# Patient Record
Sex: MALE | Race: WHITE | NOT HISPANIC OR LATINO | Employment: UNEMPLOYED | URBAN - METROPOLITAN AREA
[De-identification: names, ages, dates, MRNs, and addresses within clinical notes are randomized per-mention and may not be internally consistent; named-entity substitution may affect disease eponyms.]

---

## 2017-01-07 ENCOUNTER — TRANSFERRED RECORDS (OUTPATIENT)
Dept: HEALTH INFORMATION MANAGEMENT | Facility: CLINIC | Age: 22
End: 2017-01-07

## 2017-01-08 ENCOUNTER — TRANSFERRED RECORDS (OUTPATIENT)
Dept: HEALTH INFORMATION MANAGEMENT | Facility: CLINIC | Age: 22
End: 2017-01-08

## 2017-01-09 ENCOUNTER — TELEPHONE (OUTPATIENT)
Dept: PSYCHIATRY | Facility: CLINIC | Age: 22
End: 2017-01-09

## 2017-01-09 DIAGNOSIS — F31.9 BIPOLAR DISORDER (H): Primary | ICD-10-CM

## 2017-01-09 NOTE — TELEPHONE ENCOUNTER
----- Message from Kusum Sanchez sent at 1/9/2017 11:49 AM CST -----  Regarding: Patient Call  Contact: [phone]  Nathalie Fitzpatrickrick is calling because he would like to discuss discontinuing Latuda.  He says that the effects are very negative.  He has been experiencing akithisia, confusion and anger issues.  He said he went to the ER because of this.  Laci would like a call back at [phone].  It is ok to leave a detailed message.    Thanks,  Kusum

## 2017-01-09 NOTE — TELEPHONE ENCOUNTER
"Appt history:  LS  12/20/16  RTC  8 weeks   CAN  none  NSH  none  PEN  1/17/17    At 12/20/16 appt:  -No changes.  -He feels Latuda is helping with mood stabilization. He is using significant amounts of THC but is not interested in changing this.   -He is pleased with Latuda. He feels its helps to stabilize his mood and make him calmer.    Returned call to pt:  \"The stuff [Latuda] isn't working.  More negatives than positives.  It's like really dark and, god man, then I'm really depressed all the time.  In the morning I can't concentrate.  I'm agitated by it consistently.  It makes me wanna drink which is weird.  I liked Abilify better than this.\"      Over the weekend he went to the ER.  \"I just went there for like the night because of akathisia.\"  He was given benztropine & Valium which helped.  Then he went home.      Akathisia started: \"It just continues to get worse.  At first it was doing all right but it kept getting crappier. Not noticing it now with 20 mg.\"  Starting last night he self-decreased to Latuda 20 mg daily.      \"I was drinking pretty hard throughout Latuda.  Quit drinking about 2 weeks ago.  Maybe more like a week and a 1/2 ago.\"    \"I tried to quit marijuana but I wasn't able to.  I use is about 3 times a day.  I have bad dreams when I don't spoke pot.\"      Would be up to trying Latuda at 20 mg daily to see if sxs improve or would be up to trying something else.  States he tried Abilify in the past and would probably be able to quit smoking marijuana if he could go back to Abilify.  He doesn't recall why he stopped Abilify but per 1/19/16 note \"He is currently euthymic but experiencing likely medication side effects which he attributes to aripiprazole.\"    Routed to Dr. Oconnor for recommendations.        "

## 2017-01-10 RX ORDER — LURASIDONE HYDROCHLORIDE 20 MG/1
20 TABLET, FILM COATED ORAL DAILY
Qty: 1 TABLET | Refills: 0 | COMMUNITY
Start: 2017-01-10 | End: 2017-01-12

## 2017-01-10 NOTE — TELEPHONE ENCOUNTER
Bo Oconnor MD     Sent: Tue January 10, 2017  8:34 AM       To: Nimco Cr RN              Message                     Continue with Latuda 20 mg with food for now. It is unlikely that Latuda caused mood symptoms or increased urges to drink. It may have caused akathisia, which (if it's due to Latuda) should resolve with the decreased dose. Of course, it may be less effective as a mood stabilizer at 20 mg.              We could certainly try Abilify again, but if I recall correctly Laci also had akathisia with that. It's probably best that I discuss this with him at his next appointment.

## 2017-01-10 NOTE — TELEPHONE ENCOUNTER
Called pt who is agreeable to continue Latuda 20 mg with food.  Med tab updated.      He would like to discuss alternate meds at 1/17/17 appt.  He mentions today that Lamictal sounds interesting.

## 2017-01-12 ENCOUNTER — TELEPHONE (OUTPATIENT)
Dept: PSYCHIATRY | Facility: CLINIC | Age: 22
End: 2017-01-12

## 2017-01-12 DIAGNOSIS — F31.9 BIPOLAR 1 DISORDER (H): Primary | ICD-10-CM

## 2017-01-12 RX ORDER — ARIPIPRAZOLE 5 MG/1
5 TABLET ORAL DAILY
Qty: 30 TABLET | Refills: 0 | Status: SHIPPED | OUTPATIENT
Start: 2017-01-12 | End: 2017-02-14

## 2017-01-12 NOTE — TELEPHONE ENCOUNTER
Bo Oconnor MD     Sent: Thu January 12, 2017 11:02 AM       To: Nimco Cr RN              Message        Restart Abilify. His most recent dose was 5 mg so let's prescribe that for 1 month, as an add-on to his other medications.

## 2017-01-12 NOTE — TELEPHONE ENCOUNTER
----- Message from Tatum Merrill RN sent at 1/12/2017  8:05 AM CST -----  Regarding: FW: Pt Care  Contact: [phone]      ----- Message -----     From: Angel Lucia     Sent: 1/12/2017   7:15 AM       To: Louisa Vinson RN  Subject: Pt Care                                          Pt call lft several messages regarding medication Latuda and the symptoms that he has been experiencing.    Messages lft:    1/12/17@6:29am  1/12/17@6:44am  1/12/17@7:00am    Pt was loud expressing hostility, cursing, threatening w/homicidal and suicidal thoughts. Pt stated that he feels he is being use as an experiment. Pt expressed dissatisfaction and neglect  Pt feels he may need to file a grievance for lack of service.     Thanks

## 2017-01-12 NOTE — TELEPHONE ENCOUNTER
"See 1/9/17 encounter for additional information.      Returned call to pt:   Was angry earlier but took clonidine and feels better now.  Regarding SI and HI pt admits to this earlier because he was angry.  He no longer endorses these thoughts and replaces it with stating he's feeling angry, in a mixed state, and has anger outbursts.  His anger is not directed at anyone or anything.  \"I just feel angry inside.\"  Can't concentrate.  Can't think right with Latuda.  It hurts his nerves.  Went to ED over the weekend for akathisia and was administered benztropine and Valium which helped.      Last alcohol intake is \"I don't drink anymore.  I quit that a while back.\"  Last marijuana use was \"yesterday but I only smoked a bowl.\"  Use has been less than usual due to lack of money.  \"I can't quit marijuana on this med [Latuda].\"    Took Latuda 20 mg last night then dumped it all down the drain because \"it's shit and it doesn't work.\"  Will not take Latuda any longer.  Writer d/c'ed from med tab.  Requesting a trial of Abilify which he's previous tried and recalls success r/t to his mood.  Did have SE but states the SE of Abilify were better than how he feels now with Latuda.  States again today \"Lamictal sounds interesting\" and would like to discuss this in the future.  As of now, though, he wants to go back on Abilify because he's familiar with this.     Routed to Dr. Oconnor to advise.  Routed to clinic manager as FYI.    "

## 2017-01-17 ENCOUNTER — OFFICE VISIT (OUTPATIENT)
Dept: PSYCHIATRY | Facility: CLINIC | Age: 22
End: 2017-01-17
Attending: PSYCHIATRY & NEUROLOGY
Payer: COMMERCIAL

## 2017-01-17 DIAGNOSIS — F12.20 CANNABIS DEPENDENCE (H): ICD-10-CM

## 2017-01-17 DIAGNOSIS — F31.9 BIPOLAR 1 DISORDER (H): Primary | ICD-10-CM

## 2017-01-17 NOTE — PATIENT INSTRUCTIONS
Continue Depakote and Abilify, along with PRN clonidine  Blood test to check Depakote level and other routine labs  Return to clinic in 1 month

## 2017-01-17 NOTE — PROGRESS NOTES
" History and Physical    Laci Hinkle Jr. MRN# [id number]   Age: 20 year old YOB: 1995     Date of Assessment:  2017          Assessment:   This patient is a 20 year old  male with a significant past psychiatric history of  bipolar I disorder. He currently reports feeling well. He stopped Latuda and restarted Abilify 2.5 mg, and feels this is a much better medication for him. He describes akathisia and feeling too \"flat\" that resolved when he DCed Latuda. He will continue his current medications, get a blood test to check routine labs and Depakote level, and return in 1 month.      Attestation:  Patient has been seen and evaluated by me, Bo Oconnor MD, PhD.    I spent a total time of 30 minutes face-to-face with the patient during today s office visit.  Over 50% of this time was spent counseling the patient and/or coordinating care regarding medication management, counseling re substance use..          Diagnoses:   Axis I: Bipolar I disorder, currently euthymic; polysubstance dependence, THC and alcohol use currently active    Axis II: Deferred    Axis III: Medication-induced weight gain; hypertension    Axis IV: mild  psychosocial stressors     Axis V: Global Assessment of Functionin-60    Interim History   History is obtained from the patient.     At his last visit, Laci reported feeling well taking Latuda. Since then, he describes very uncomfortable akathisia and feeling \"too flat\" that he attributed to Latuda. The akathisia led to broken sleep and he would wake up feeling \"angry\". He occasionally had \"rages\" that his mother (who he lives with) would \"talk me down\" from. He denies any other symptoms suggestive of minda. He denies psychosis. No SI or HI.    The side effects were uncomfortable enough that Laci went to the ER and was given Cogentin and Valium which helped. He DCed Latuda immediately after that, and restarted Abilify 2.5 mg. He denies any side " "effects from Abilify.    He continues to use THC and alcohol regularly. He denies other substance use.    Over the past several days Laci has left a number of voice mails on the main Clinic number, and I listened to one of them today. He was clearly very angry and agitated, and made borderline threatening comments. He expressed his anger about the medications we were prescribing and said if things didn't change \"I'nm gonna come in there and...\". The rest of the sentence was unintelligible. I discussed this with him today and let him know the Clinic staff were frightened by the message. He did not appear to understand why, but said that he would not leave any more massages like that.    During the interview Laci appeared euthymic. Speech was normal. He was not agitated or irritable.     He will continue his current medications and return in 4 weeks.     Review of Systems     A comprehensive review of systems was performed and is negative other than noted above.          Allergies:    No Known Allergies        Current Medications   Depakote 2000 mg HS  Abilify 2.5 mg HS  Clonidine 0.1 mg daily    Mental Status Exam     Alertness: alert  and oriented  Appearance: adequately groomed  Behavior/Demeanor: cooperative, pleasant and calm, with good  eye contact  Speech: normal  Language: intact  Psychomotor: normal or unremarkable  Mood:  description consistent with euthymia  Affect: grandiose; was congruent to mood  Thought Process/Associations: unremarkable  Thought Content:  Denies active/passive suicidal ideation  Perception:  Denies hallucinations  Insight: fair  Judgment: fair  Cognition:  does appear grossly intact.  "

## 2017-01-18 ENCOUNTER — TELEPHONE (OUTPATIENT)
Dept: PSYCHIATRY | Facility: CLINIC | Age: 22
End: 2017-01-18

## 2017-01-18 DIAGNOSIS — F31.9 BIPOLAR 1 DISORDER (H): Primary | ICD-10-CM

## 2017-01-18 NOTE — TELEPHONE ENCOUNTER
----- Message -----      From: Bo Oconnor MD      Sent: 1/17/2017   4:56 PM        To: Louisa Vinson RN, MONIQUE Kennedy -     Can you order labs for Laci? He gets them done downstairs. He needs:     CBC + diff   AST ALT   Depakote level     Thx!     DB

## 2017-01-20 ASSESSMENT — PATIENT HEALTH QUESTIONNAIRE - PHQ9: SUM OF ALL RESPONSES TO PHQ QUESTIONS 1-9: 9

## 2017-02-13 ENCOUNTER — TELEPHONE (OUTPATIENT)
Dept: PSYCHIATRY | Facility: CLINIC | Age: 22
End: 2017-02-13

## 2017-02-13 DIAGNOSIS — F31.9 BIPOLAR 1 DISORDER (H): ICD-10-CM

## 2017-02-13 NOTE — TELEPHONE ENCOUNTER
Medication Requested: ARIPiprazole (ABILIFY) 5 MG   Last Written RX Date: 1/12/17  Last Pharmacy Fill Date:  unk  Last RX Quantity: 30,   # refills: 0    Last Office Visit: 1/17/17  Recommended RTC: 4 WKS  Next Scheduled Office Visit: 2/15/17    Since last Visit: # of CX0,# of NOS 0    Last Visit Recommendations for:  Medications: DOSE?   Labs: YES    Routing refill request to provider for review/approval because:    CORRECT DOSE?  1/12 TEL.CALL 5MG     1/17 PLAN 2.5MG       Bo Oconnor MD Kraemer, Julia B, RN        Caller: Unspecified (Yesterday,  1:47 PM)                     Ah, it's so hard to know with Laci. He previously took Abilify 5 mg but had possible akathisia. He restarted it a few weeks ago and 2.5 mg is the dose we agreed on at his last visit. So that's what he SHOULD be taking. And the dose we should refill for him.     Thx!     DB

## 2017-02-13 NOTE — TELEPHONE ENCOUNTER
See alternate 2/13/17 encounter.  Message has already been sent to Dr. Oconnor by RN from RF team.

## 2017-02-13 NOTE — TELEPHONE ENCOUNTER
refill  Received: Today       Bethany Bass Jodi L, RN       Phone Number: [phone]                     Elvin/Nimco     Caller:  patient     Medication:  abilify     Pharmacy and location:  [facility] on [address] in [facility]     Have you contacted the pharmacy: yes     How much of medication do you have left:  One dose     Pending appt: 2/15/17     Okay to leave VM:  yes

## 2017-02-13 NOTE — TELEPHONE ENCOUNTER
refill  Received: Today       Bethany Bass Jodi L, RN       Phone Number: [phone]                     Elvin/Nimco     Caller:  patient     Medication:  abilify     Pharmacy and location:  [facility] on [address] in [facility]     Have you contacted the pharmacy: yes     How much of medication do you have left:  One dose     Pending appt: 2/15/17     Okay to leave VM:  yes

## 2017-02-14 RX ORDER — ARIPIPRAZOLE 5 MG/1
2.5 TABLET ORAL DAILY
Qty: 15 TABLET | Refills: 0 | Status: SHIPPED | OUTPATIENT
Start: 2017-02-14 | End: 2017-02-15

## 2017-02-15 ENCOUNTER — OFFICE VISIT (OUTPATIENT)
Dept: PSYCHIATRY | Facility: CLINIC | Age: 22
End: 2017-02-15
Attending: PSYCHIATRY & NEUROLOGY
Payer: COMMERCIAL

## 2017-02-15 VITALS
WEIGHT: 204 LBS | BODY MASS INDEX: 31.95 KG/M2 | HEART RATE: 80 BPM | SYSTOLIC BLOOD PRESSURE: 123 MMHG | DIASTOLIC BLOOD PRESSURE: 84 MMHG

## 2017-02-15 DIAGNOSIS — F31.61 BIPOLAR DISORDER, CURRENT EPISODE MIXED, MILD (H): ICD-10-CM

## 2017-02-15 DIAGNOSIS — F31.9 BIPOLAR 1 DISORDER (H): ICD-10-CM

## 2017-02-15 PROCEDURE — 99212 OFFICE O/P EST SF 10 MIN: CPT | Mod: ZF

## 2017-02-15 RX ORDER — DIVALPROEX SODIUM 500 MG/1
2000 TABLET, EXTENDED RELEASE ORAL DAILY
Qty: 360 TABLET | Refills: 0 | Status: SHIPPED | OUTPATIENT
Start: 2017-02-15 | End: 2017-05-16

## 2017-02-15 RX ORDER — ARIPIPRAZOLE 5 MG/1
5 TABLET ORAL DAILY
Qty: 60 TABLET | Refills: 0 | Status: SHIPPED | OUTPATIENT
Start: 2017-02-15 | End: 2017-05-16

## 2017-02-15 NOTE — PATIENT INSTRUCTIONS
Continue usual medications  Please let us know when you have found a clinic so we can arrange for your blood test  Return in 6-8 weeks

## 2017-02-15 NOTE — MR AVS SNAPSHOT
After Visit Summary   2/15/2017    Laci Hinkle Jr    MRN: [id number]           Patient Information     Date Of Birth          1995        Visit Information        Provider Department      2/15/2017 3:30 PM Bo Oconnor MD [facility]        Today's Diagnoses     Bipolar 1 disorder (H)        Bipolar disorder, current episode mixed, mild (H)          Care Instructions    Continue usual medications  Please let us know when you have found a clinic so we can arrange for your blood test  Return in 6-8 weeks        Follow-ups after your visit        Follow-up notes from your care team     Return in about 8 weeks (around 4/12/2017).      Your next 10 appointments already scheduled     Apr 12, 2017  2:00 PM CDT   Adult Med Follow UP with Bo Oconnor MD   Psychiatry Clinic ([facility])    [place] [id number] [facility] [address] [phone]              Who to contact     Please call your clinic at [phone] to:    Ask questions about your health    Make or cancel appointments    Discuss your medicines    Learn about your test results    Speak to your doctor   If you have compliments or concerns about an experience at your clinic, or if you wish to file a complaint, please contact [facility] Patient Relations at [phone] or email us at [email or web address]         Additional Information About Your Visit        Care EveryWhere ID     This is your Care EveryWhere ID. This could be used by other organizations to access your [place] medical records  [id number]        Your Vitals Were     Pulse BMI (Body Mass Index)                80 31.95 kg/m2           Blood Pressure from Last 3 Encounters:   02/15/17 123/84   12/20/16 135/78   09/13/16 (!) 158/93    Weight from Last 3 Encounters:   02/15/17 92.5 kg (204 lb)   12/20/16 90.3 kg (199 lb)   09/13/16 90.4 kg (199 lb 3.2 oz)               Today, you had the following     No orders found for display         Today's Medication Changes          These changes are accurate as of: 2/15/17  3:51 PM.  If you have any questions, ask your nurse or doctor.               These medicines have changed or have updated prescriptions.        Dose/Directions    ARIPiprazole 5 MG tablet   Commonly known as:  ABILIFY   This may have changed:  how much to take   Used for:  Bipolar 1 disorder (H)   Changed by:  Bo Oconnor MD        Dose:  5 mg   Take 1 tablet (5 mg) by mouth daily *ALERT  DOSE CHANGE   2.5 MG/  1/2 TAB  DAILY   Quantity:  60 tablet   Refills:  0            Where to get your medicines      These medications were sent to [id number] - [facility], [place] [address] [address] [place] [address], [place] [address]     Phone:  [phone]     ARIPiprazole 5 MG tablet    divalproex 500 MG 24 hr tablet                Primary Care Provider Office Phone # Fax #    Bo Weinberg [phone]       [facility] [address] [facility] [address]        Thank you!     Thank you for choosing PSYCHIATRY CLINIC  for your care. Our goal is always to provide you with excellent care. Hearing back from our patients is one way we can continue to improve our services. Please take a few minutes to complete the written survey that you may receive in the mail after your visit with us. Thank you!             Your Updated Medication List - Protect others around you: Learn how to safely use, store and throw away your medicines at www.disposemymeds.org.          This list is accurate as of: 2/15/17  3:51 PM.  Always use your most recent med list.                   Brand Name Dispense Instructions for use    ARIPiprazole 5 MG tablet    ABILIFY    60 tablet    Take 1 tablet (5 mg) by mouth daily *ALERT  DOSE CHANGE   2.5 MG/  1/2 TAB  DAILY       cloNIDine 0.1 MG tablet    CATAPRES    180  tablet    Take 1-2 tablets po once daily.       divalproex 500 MG 24 hr tablet    DEPAKOTE ER    360 tablet    Take 4 tablets (2,000 mg) by mouth daily       nicotine polacrilex 2 MG gum    EQ NICOTINE    60 tablet    Place 1 each (2 mg) inside cheek as needed for smoking cessation       omeprazole 20 MG CR capsule    priLOSEC    30 capsule    Take 1 capsule (20 mg) by mouth every morning (before breakfast)

## 2017-02-15 NOTE — NURSING NOTE
Chief Complaint   Patient presents with     Recheck Medication     bipolar affective disorder     Reviewed allergies, smoking status, and pharmacy preference  Administered abuse screening questions   Obtained weight, blood pressure and heart rate

## 2017-02-15 NOTE — PROGRESS NOTES
" History and Physical    Laci Hinkle Jr. MRN# [id number]   Age: 20 year old YOB: 1995     Date of Assessment:  Feb 15, 2017          Assessment:   This patient is a 20 year old  male with a significant past psychiatric history of  bipolar I disorder. He currently reports feeling well. He stopped has increased Abilify to 5 mg, and feels this is a good dose. He denies akathisia. He will continue his current medications, get a blood test to check routine labs and Depakote level, and return in 2 month.      Attestation:  Patient has been seen and evaluated by me, Bo Oconnor MD, PhD.    I spent a total time of 30 minutes face-to-face with the patient during today s office visit.  Over 50% of this time was spent counseling the patient and/or coordinating care regarding medication management, counseling re substance use..          Diagnoses:   Axis I: Bipolar I disorder, currently euthymic; polysubstance dependence, THC and alcohol, currently in short-term remission     Axis II: Deferred    Axis III: Medication-induced weight gain; hypertension    Axis IV: mild  psychosocial stressors     Axis V: Global Assessment of Functionin-60    Interim History   History is obtained from the patient.     Laci reports feeling well. He denies symptoms of depression, minda, or psychosis. He has increased Abilify and denies side effects from the increased dose. He did feel sedated and with poor memory for about 4 days and then it resolved.    Laci reports possible ADHD symptoms. It is apparent when, for example, he is studying for his 's test. He was apparently diagnosed as a kid and found Strattera very helpful. He does not wish treatment now but may bring it up in the future. His addictions history means that Strattera or Wellbutrin may be the best options.    Laci has given up THC for the past 2 weeks and feels better and less \"paranoid\". He occasionally uses Kratom, but is " trying to limit his use.    He has not yet gotten his blood test. He is looking for a new clinic and will let us know when he has done and where we can send the order.     He will continue his current medications and return in 8 weeks.     Review of Systems     A comprehensive review of systems was performed and is negative other than noted above.          Allergies:    No Known Allergies        Current Medications   Depakote 2000 mg HS  Abilify 2.5 mg HS  Clonidine 0.1 mg daily    Mental Status Exam     Alertness: alert  and oriented  Appearance: adequately groomed  Behavior/Demeanor: cooperative, pleasant and calm, with good  eye contact  Speech: normal  Language: intact  Psychomotor: normal or unremarkable  Mood:  description consistent with euthymia  Affect: grandiose; was congruent to mood  Thought Process/Associations: unremarkable  Thought Content:  Denies active/passive suicidal ideation  Perception:  Denies hallucinations  Insight: fair  Judgment: fair  Cognition:  does appear grossly intact.

## 2017-02-17 ASSESSMENT — PATIENT HEALTH QUESTIONNAIRE - PHQ9: SUM OF ALL RESPONSES TO PHQ QUESTIONS 1-9: 4

## 2017-02-23 ENCOUNTER — TELEPHONE (OUTPATIENT)
Dept: PSYCHIATRY | Facility: CLINIC | Age: 22
End: 2017-02-23

## 2017-02-23 DIAGNOSIS — Z51.81 ENCOUNTER FOR THERAPEUTIC DRUG MONITORING: ICD-10-CM

## 2017-02-23 DIAGNOSIS — F31.9 BIPOLAR 1 DISORDER (H): Primary | ICD-10-CM

## 2017-02-23 NOTE — TELEPHONE ENCOUNTER
Returned call to pt:  States he discussed with Dr. Oconnor the option of starting Strattera but Dr. Oconnor wanted pt to think more about it.  Pt has thought about it and wants to restart it.  He took it when he was a child and recalls it worked and he had no negative side effects.  He's not sure why he stopped it.  States he cannot take stimulant meds.  Wants to start Strattera prior to starting school in May.  Willing to come in for an earlier appt to discuss with Dr. Oconnor.  Currently scheduled 4/12/17.

## 2017-02-23 NOTE — TELEPHONE ENCOUNTER
----- Message from Kusum Sanchez sent at 2/21/2017  3:56 PM CST -----  Regarding: Patient Call  Contact: [phone]  Laci Fitzpatrick is calling to discuss going back on Strattera.  He believes that his drug use has affected his concentration, and that Strattera would be the most effective medication.  He would like a call back at [phone].  It is ok to leave a detailed message.    Thanks,  Kusum

## 2017-02-23 NOTE — TELEPHONE ENCOUNTER
Spoke with pt regarding another matter when he requests lab orders placed at last appt be faxed to  [facility].  He will complete them some time next week and he is aware to have depakote level 12 hours post dose.    Pt is establishing care there today with a new PCP and will sign WU.      [facility]:  Phone: [phone]  Lab fax:  [phone]    1/18/17 orders are printed and faxed.

## 2017-02-27 NOTE — TELEPHONE ENCOUNTER
Called pt who agrees to schedule sooner on 3/7/17 at 8am.  Appt is on hold with message sent to scheduling.

## 2017-02-28 ENCOUNTER — TELEPHONE (OUTPATIENT)
Dept: PSYCHIATRY | Facility: CLINIC | Age: 22
End: 2017-02-28

## 2017-02-28 NOTE — TELEPHONE ENCOUNTER
Returned call to pt who already decided he wants to have the Depakote level drawn again.  He did have it done in January but the timing of dose to lab draw was off.  He's aware a lab order was faxed to PCP last week.  Will await results.

## 2017-02-28 NOTE — TELEPHONE ENCOUNTER
----- Message from Kusum Sanchez sent at 2/24/2017 12:08 PM CST -----  Regarding: Patient Call  Contact: [phone]  Alvin Rinaldi    Laci is calling to say that he had his Depakote levels drawn in January when he was hospitalized for 1 day.  He thinks this was January 7th or 17th.  The hospital is [facility].  He saw his PCP yesterday, and they told him that he did not need his levels drawn again.  I asked him to request that his PCP send us the results.  Laci can be reached at [phone].    Thanks,  Kusum

## 2017-03-01 ENCOUNTER — TELEPHONE (OUTPATIENT)
Dept: PSYCHIATRY | Facility: CLINIC | Age: 22
End: 2017-03-01

## 2017-03-01 DIAGNOSIS — F90.2 ATTENTION DEFICIT HYPERACTIVITY DISORDER (ADHD), COMBINED TYPE: ICD-10-CM

## 2017-03-01 DIAGNOSIS — F41.1 GENERALIZED ANXIETY DISORDER: ICD-10-CM

## 2017-03-01 DIAGNOSIS — F31.70 BIPOLAR DISORDER IN FULL REMISSION, MOST RECENT EPISODE UNSPECIFIED TYPE (H): ICD-10-CM

## 2017-03-01 NOTE — TELEPHONE ENCOUNTER
Spoke with who accounts for the early RF request as follows:  -he takes 2 tabs (0.2 mg) at bedtime  -sometimes he also takes 1 tab (0.1 mg) in the AM    He'd like the RF to be for 1 q AM + 2 q HS.  If Dr. Oconnor doesn't want to increase it now he would like to continue with 2 tabs q HS and discuss adding 1 more to take in AM.

## 2017-03-01 NOTE — TELEPHONE ENCOUNTER
Meds/Bond  Received: Today       Lita Cardoza, Nimco CHONG RN       Phone Number: [phone] (Call me)                     Caller:  Pt   Medication:  Clonidine   Pharmacy and location:  [facility] on [address] in [place]   Have you contacted the pharmacy: Yes   How much of medication do you have left:  1 dose   Pending appt: Yes   Okay to leave VM:  Yes

## 2017-03-01 NOTE — TELEPHONE ENCOUNTER
Received RF request from pt for:  clonidine    Last RF:  Per med tab: 6/17/16, 90 d/s if taking max daily dosing, 0 RFs  Per pharmacy:  2/11/17, #60, 1 mg tabs, taking 1-2 daily, Rx is by Dr. Oconnor    Due for RF:  Too soon to fill according to pharmacy RF date and quantity.      Appointment History:  Last appt: 2/15/17  RTC: 6-8 weeks  Cancel: none  No-show: none  Next appt: 3/7/17    LVM for pt requesting CB regarding RF request too soon.

## 2017-03-07 RX ORDER — CLONIDINE HYDROCHLORIDE 0.1 MG/1
TABLET ORAL
Qty: 90 TABLET | Refills: 0 | Status: SHIPPED | OUTPATIENT
Start: 2017-03-07 | End: 2017-04-07

## 2017-03-07 NOTE — TELEPHONE ENCOUNTER
Updated Rx sent.  Notified pt via VM.  He missed today's appt because he wanted to discuss starting Strattera.  Rescheduled to 3/28/17.  Appt placed on hold.  Message sent to scheduling.

## 2017-03-07 NOTE — TELEPHONE ENCOUNTER
Per Dr. Oconnor 3/7/17 at 0937:  0.1 mg qAM + 0.2 mg qHS is fine, it's well within the usual dose range of 0.2-0.6mg daily.

## 2017-03-28 ENCOUNTER — OFFICE VISIT (OUTPATIENT)
Dept: PSYCHIATRY | Facility: CLINIC | Age: 22
End: 2017-03-28
Attending: PSYCHIATRY & NEUROLOGY
Payer: COMMERCIAL

## 2017-03-28 ENCOUNTER — TELEPHONE (OUTPATIENT)
Dept: PSYCHIATRY | Facility: CLINIC | Age: 22
End: 2017-03-28

## 2017-03-28 VITALS
HEART RATE: 105 BPM | BODY MASS INDEX: 32.17 KG/M2 | DIASTOLIC BLOOD PRESSURE: 98 MMHG | SYSTOLIC BLOOD PRESSURE: 154 MMHG | WEIGHT: 205.4 LBS

## 2017-03-28 DIAGNOSIS — F31.30 BIPOLAR I DISORDER, MOST RECENT EPISODE DEPRESSED (H): Primary | ICD-10-CM

## 2017-03-28 PROCEDURE — 99212 OFFICE O/P EST SF 10 MIN: CPT | Mod: ZF

## 2017-03-28 RX ORDER — PREGABALIN 50 MG/1
50 CAPSULE ORAL 2 TIMES DAILY
Qty: 120 CAPSULE | Refills: 0 | Status: SHIPPED | OUTPATIENT
Start: 2017-03-28 | End: 2017-04-03

## 2017-03-28 NOTE — PROGRESS NOTES
History and Physical    Laci Hinkle Jr. MRN# [id number]   Age: 20 year old YOB: 1995     Date of Assessment:  Mar 28, 2017          Assessment:   This patient is a 20 year old  male with a significant past psychiatric history of  bipolar I disorder. He currently reports feeling well. He continues Abilify 5 mg, and feels this is a good dose. He denies akathisia. He complains of anxiety and accepts a trial of Lyrica 50 mg BID to target this. He previously had some response to gabapentin, but also had side effects. He will continue his other medications. He has not yet had a blood test to check routine labs and Depakote level, and so will get this done. He will return in 2 months. He will be starting school in Aug and is interested in a trial of Strattera for ADHD symptoms and we will discuss then.    Attestation:  Patient has been seen and evaluated by me, Bo Oconnor MD, PhD.    I spent a total time of 25 minutes face-to-face with the patient during today s office visit.  Over 50% of this time was spent counseling the patient and/or coordinating care regarding medication management, counseling re substance use..          Diagnoses:   Axis I: Bipolar I disorder, currently euthymic; polysubstance dependence, THC and alcohol, currently in short-term remission     Axis II: Deferred    Axis III: Medication-induced weight gain; hypertension    Axis IV: mild  psychosocial stressors     Axis V: Global Assessment of Functionin-60    Interim History   History is obtained from the patient.     Laci reports feeling well. He denies symptoms of depression, minda, or psychosis.     He does report generalized anxiety. It has been longstanding. Vistaril helped but only a little. Laci is trying to stay substance free and is not interested in a BZD. He previously had a partial response to gabapentin and accepts a trial of Lyrica.    Laci reports possible ADHD symptoms. He was  apparently diagnosed as a kid and found Strattera very helpful. His addictions history means that Strattera or Wellbutrin may be the best options.    Laci reports being clean for 3 days. He had a Rule 25 done and hopes to start treatment at [facility] in a week or 2.     He has not yet gotten his blood test.     He will continue his current medications, start Lyrica, and return in 8 weeks.     Review of Systems     A comprehensive review of systems was performed and is negative other than noted above.          Allergies:    No Known Allergies        Current Medications   Depakote 2000 mg HS  Abilify 2.5 mg HS  Clonidine 0.1 mg daily    Mental Status Exam     Alertness: alert  and oriented  Appearance: adequately groomed  Behavior/Demeanor: cooperative, pleasant and calm, with good  eye contact  Speech: normal  Language: intact  Psychomotor: normal or unremarkable  Mood:  description consistent with euthymia  Affect: grandiose; was congruent to mood  Thought Process/Associations: unremarkable  Thought Content:  Denies active/passive suicidal ideation  Perception:  Denies hallucinations  Insight: fair  Judgment: fair  Cognition:  does appear grossly intact.

## 2017-03-28 NOTE — MR AVS SNAPSHOT
After Visit Summary   3/28/2017    Laci Hinkle Jr    MRN: [id number]           Patient Information     Date Of Birth          1995        Visit Information        Provider Department      3/28/2017 3:00 PM Bo Oconnor MD Psychiatry Clinic        Today's Diagnoses     Bipolar I disorder, most recent episode depressed (H)    -  1      Care Instructions    Continue usual medications  Start Lyrica 50 mg twice daily for anxiety  Return in 2 months        Follow-ups after your visit        Follow-up notes from your care team     Return in about 8 weeks (around 5/23/2017).      Your next 10 appointments already scheduled     Apr 12, 2017  2:00 PM CDT   Adult Med Follow UP with Bo Oconnor MD   Psychiatry Clinic ([facility])    [address] [facility] [address] [phone]              Who to contact     Please call your clinic at [phone] to:    Ask questions about your health    Make or cancel appointments    Discuss your medicines    Learn about your test results    Speak to your doctor   If you have compliments or concerns about an experience at your clinic, or if you wish to file a complaint, please contact [facility] Patient Relations at [phone] or email us at [email or web address]         Additional Information About Your Visit        MyChart Information     Webtogs is an electronic gateway that provides easy, online access to your medical records. With Webtogs, you can request a clinic appointment, read your test results, renew a prescription or communicate with your care team.     To sign up for FastCAPt visit the website at [email or web address]   You will be asked to enter the access code listed below, as well as some personal information. Please follow the directions to create your username and password.     Your access code is: [email or web address]  Expires: 6/26/2017   3:26 PM     Your access code will  in 90 days. If you need help or a new code, please contact your [facility] or call [phone] for assistance.        Care EveryWhere ID     This is your Care EveryWhere ID. This could be used by other organizations to access your [place] medical records  [id number]        Your Vitals Were     Pulse BMI (Body Mass Index)                105 32.17 kg/m2           Blood Pressure from Last 3 Encounters:   17 (!) 154/98   02/15/17 123/84   16 135/78    Weight from Last 3 Encounters:   17 93.2 kg (205 lb 6.4 oz)   02/15/17 92.5 kg (204 lb)   16 90.3 kg (199 lb)              Today, you had the following     No orders found for display         Today's Medication Changes          These changes are accurate as of: 3/28/17  3:26 PM.  If you have any questions, ask your nurse or doctor.               Start taking these medicines.        Dose/Directions    pregabalin 50 MG capsule   Commonly known as:  LYRICA   Used for:  Bipolar I disorder, most recent episode depressed (H)   Started by:  Bo Oconnor MD        Dose:  50 mg   Take 1 capsule (50 mg) by mouth 2 times daily   Quantity:  120 capsule   Refills:  0            Where to get your medicines      Some of these will need a paper prescription and others can be bought over the counter.  Ask your nurse if you have questions.     Bring a paper prescription for each of these medications     pregabalin 50 MG capsule                Primary Care Provider Office Phone # Fax #    Bo Weinberg [phone]       [address] [facility] [address]        Thank you!     Thank you for choosing PSYCHIATRY CLINIC  for your care. Our goal is always to provide you with excellent care. Hearing back from our patients is one way we can continue to improve our services. Please take a few minutes to complete the written survey that  you may receive in the mail after your visit with us. Thank you!             Your Updated Medication List - Protect others around you: Learn how to safely use, store and throw away your medicines at www.disposemymeds.org.          This list is accurate as of: 3/28/17  3:26 PM.  Always use your most recent med list.                   Brand Name Dispense Instructions for use    ARIPiprazole 5 MG tablet    ABILIFY    60 tablet    Take 1 tablet (5 mg) by mouth daily *ALERT  DOSE CHANGE   2.5 MG/  1/2 TAB  DAILY       cloNIDine 0.1 MG tablet    CATAPRES    90 tablet    Take 1 tab (0.1 mg) po q AM and take 2 tabs (0.2 mg) po q HS.       divalproex 500 MG 24 hr tablet    DEPAKOTE ER    360 tablet    Take 4 tablets (2,000 mg) by mouth daily       nicotine polacrilex 2 MG gum    EQ NICOTINE    60 tablet    Place 1 each (2 mg) inside cheek as needed for smoking cessation       omeprazole 20 MG CR capsule    priLOSEC    30 capsule    Take 1 capsule (20 mg) by mouth every morning (before breakfast)       pregabalin 50 MG capsule    LYRICA    120 capsule    Take 1 capsule (50 mg) by mouth 2 times daily

## 2017-03-29 ENCOUNTER — TELEPHONE (OUTPATIENT)
Dept: PSYCHIATRY | Facility: CLINIC | Age: 22
End: 2017-03-29

## 2017-03-29 NOTE — TELEPHONE ENCOUNTER
Meds/Bond  Received: Today       Lita Cardoza, Nimco CHONG RN                   The pt is caller. He'd like to make sure his Lyrica is ready to be picked up at the pharmacy this afternoon. He's not sure why he hasn't heard back yet.

## 2017-03-29 NOTE — TELEPHONE ENCOUNTER
Received call from patient, was seen by Dr. Oconnor today who prescribed Lyrica for anxiety. Unfortunately it seems that this prescription did not go through to his pharmacy. Apologized to patient for the confusion, informed him that this writer was unable to prescribe a controlled substance tonight. Informed him that I would be routing my message to Dr. Oconnor, and he or his staff would be able to follow up on prescription tomorrow.    Sherice Cook MD  PGY2, N Psychiatry

## 2017-03-29 NOTE — TELEPHONE ENCOUNTER
Per 3/28/17 appt note:    Start Lyrica 50 mg twice daily for anxiety    Per tab a hard copy was printed.  Routed to Dr. Oconnor asking if he provided a signed hard copy to pt.

## 2017-03-30 ENCOUNTER — TELEPHONE (OUTPATIENT)
Dept: PSYCHIATRY | Facility: CLINIC | Age: 22
End: 2017-03-30

## 2017-03-30 DIAGNOSIS — F31.30 BIPOLAR I DISORDER, MOST RECENT EPISODE DEPRESSED (H): Primary | ICD-10-CM

## 2017-03-30 ASSESSMENT — PATIENT HEALTH QUESTIONNAIRE - PHQ9: SUM OF ALL RESPONSES TO PHQ QUESTIONS 1-9: 13

## 2017-03-30 NOTE — TELEPHONE ENCOUNTER
Rec'd notice from pharmacy that a PA is needed for Lyrica is needed.  He doesn't want to wait for the PA process.  In addition,  he's going into treatment and is concerned Lyrica would be d/c'ed there anyway.     We discussed gabapentin.  States he never had his own Rx for this and only tried his dad's.  Also, he was likely using at that time & only took it a few times.  As a result, efficacy is difficult to determine.  At this time, he prefers trying gabapentin.    Routed to Dr. Oconnor to advise.

## 2017-03-30 NOTE — TELEPHONE ENCOUNTER
----- Message from Lita Cardoza sent at 3/30/2017  8:32 AM CDT -----  Regarding: Meds/Bond  Contact: [phone]  The pt is caller. States the pharmacy told him he should try Neurontin rather than Lyrica. He told them he'd call us to get this script.  Pt also stated he's tried Neurontin in the past and it didn't work great for him.      Ok to [facility].    Pharmacy: [facility] [address] [facility]

## 2017-04-03 RX ORDER — GABAPENTIN 100 MG/1
100 CAPSULE ORAL 3 TIMES DAILY
Qty: 90 CAPSULE | Refills: 0 | Status: SHIPPED | OUTPATIENT
Start: 2017-04-03 | End: 2017-05-16

## 2017-04-03 NOTE — TELEPHONE ENCOUNTER
Bo Oconnor MD  Fllalitha, Nimco CHONG RN        Caller: Unspecified (4 days ago,  9:26 AM)                     Try gabapentin 100 mg tid. (if he prefers he could take 100 mg qAM + 200 mg qHS).

## 2017-04-06 ENCOUNTER — TELEPHONE (OUTPATIENT)
Dept: PSYCHIATRY | Facility: CLINIC | Age: 22
End: 2017-04-06

## 2017-04-06 NOTE — TELEPHONE ENCOUNTER
----- Message from Kusum Sanchez sent at 4/6/2017 10:17 AM CDT -----  Regarding: Patient Call  Contact: [phone]  Laci Fitzpatirck called to say that he is not taking his Abilify and he wishes to discuss this, and the reasons he is not taking it, with you.  He would like a call back at [phone].  It is ok to leave a message.    Thanks,  Kusum

## 2017-04-06 NOTE — TELEPHONE ENCOUNTER
"Returned call to pt:  He hasn't used psychedelics or weed \"in a long time and I think some of my psychotic symptoms could have been from that.\"  He would like to see how he does without Abilify 5 mg daily.  Last dose was 4/4/17.  He will monitor for return of sxs and he informed his mom so she can monitor for return of symptoms.    Recently started gabapentin and is pleased with anxiolytic effect.      PEN  5/16/17  "

## 2017-04-07 DIAGNOSIS — F41.1 GENERALIZED ANXIETY DISORDER: ICD-10-CM

## 2017-04-07 DIAGNOSIS — F31.70 BIPOLAR DISORDER IN FULL REMISSION, MOST RECENT EPISODE UNSPECIFIED TYPE (H): ICD-10-CM

## 2017-04-07 DIAGNOSIS — F90.2 ATTENTION DEFICIT HYPERACTIVITY DISORDER (ADHD), COMBINED TYPE: ICD-10-CM

## 2017-04-07 RX ORDER — CLONIDINE HYDROCHLORIDE 0.1 MG/1
TABLET ORAL
Qty: 90 TABLET | Refills: 0 | Status: SHIPPED | OUTPATIENT
Start: 2017-04-07 | End: 2017-05-10

## 2017-04-07 NOTE — TELEPHONE ENCOUNTER
Medication Requested: Clonidine 0.1 mg tabs  Last Written RX Date: 3-7-17  Last Pharmacy Fill Date: 3-7-17  Last RX Quantity: 90,   # refills: 0    Last Office Visit: 3-28-17  Recommended RTC: 2 mo  Next Scheduled Office Visit: 5-16-17    Since last Visit: # of CX 0 ,# of NOS 0    Last Visit Recommendations for:  Medications: continue  Labs: ordered but not done.    Will refill 30 day supply per protocol. With 1 additional refill.    Kathleen M Doege RN

## 2017-05-09 ENCOUNTER — TELEPHONE (OUTPATIENT)
Dept: PSYCHIATRY | Facility: CLINIC | Age: 22
End: 2017-05-09

## 2017-05-09 DIAGNOSIS — F31.30 BIPOLAR I DISORDER, MOST RECENT EPISODE DEPRESSED (H): Primary | ICD-10-CM

## 2017-05-09 NOTE — TELEPHONE ENCOUNTER
----- Message from Bethany Bass sent at 5/8/2017  3:08 PM CDT -----  Contact: [phone]  Elvin/Nimco    Patient is caller. He is wondering if the Neurontin can be increased. He says that he is not finding it as effective as before. He uses the [facility] [address] in [place].

## 2017-05-09 NOTE — TELEPHONE ENCOUNTER
"Returned call to pt:  1.  Gabapentin:  -Started at the beginning of April and was quite helpful.    -Currently taking 100 mg AM, 200 mg HS.  -Has noticed it becoming less effective.    -Increased anxiety when he has to leave the house and be around people stating, \"At first it worked.  It helped me be around people.\"  -Denies negative side effects.    2.  Abilify update:  -Called on 4/6/17 stating he's going to stop Abilify.   -He continues OFF it and is doing well.    -\"Things are good.  Pretty balanced.\"  -States Abilify works well to address sxs but feel it makes his motivation take a nosedive.  It increases his impulsivity & intensifies desire to use addictive drugs.  -At this time he prefers to continue without it.      Routed to Dr. Oconnor to address pt's request to increase gabapentin.  "

## 2017-05-10 DIAGNOSIS — F90.2 ATTENTION DEFICIT HYPERACTIVITY DISORDER (ADHD), COMBINED TYPE: ICD-10-CM

## 2017-05-10 DIAGNOSIS — F31.70 BIPOLAR DISORDER IN FULL REMISSION, MOST RECENT EPISODE UNSPECIFIED TYPE (H): ICD-10-CM

## 2017-05-10 DIAGNOSIS — F41.1 GENERALIZED ANXIETY DISORDER: ICD-10-CM

## 2017-05-10 RX ORDER — CLONIDINE HYDROCHLORIDE 0.1 MG/1
TABLET ORAL
Qty: 90 TABLET | Refills: 0 | Status: SHIPPED | OUTPATIENT
Start: 2017-05-10 | End: 2017-05-16

## 2017-05-10 NOTE — TELEPHONE ENCOUNTER
Medication Requested: Clonidine 0.1 mg tabs  Last Written RX Date: 4-7-17  Last Pharmacy Fill Date: 4-7-17  Last RX Quantity: 90,   # refills: 0    Last Office Visit: 3-28-17  Recommended RTC: 2 mo  Next Scheduled Office Visit: 5-16-16    Since last Visit: # of CX 0 ,# of NOS 0    Last Visit Recommendations for:  Medications: no change  Labs: 0    Will refill 30 day supply per protocol.      Kathleen M Doege RN

## 2017-05-12 RX ORDER — GABAPENTIN 400 MG/1
400 CAPSULE ORAL AT BEDTIME
Qty: 30 CAPSULE | Refills: 0 | Status: SHIPPED | OUTPATIENT
Start: 2017-05-12 | End: 2017-05-16

## 2017-05-12 RX ORDER — GABAPENTIN 100 MG/1
CAPSULE ORAL
Qty: 60 CAPSULE | Refills: 0 | Status: SHIPPED | OUTPATIENT
Start: 2017-05-12 | End: 2017-05-16

## 2017-05-16 ENCOUNTER — OFFICE VISIT (OUTPATIENT)
Dept: PSYCHIATRY | Facility: CLINIC | Age: 22
End: 2017-05-16
Attending: PSYCHIATRY & NEUROLOGY
Payer: COMMERCIAL

## 2017-05-16 VITALS
DIASTOLIC BLOOD PRESSURE: 84 MMHG | WEIGHT: 207 LBS | BODY MASS INDEX: 32.42 KG/M2 | HEART RATE: 71 BPM | SYSTOLIC BLOOD PRESSURE: 143 MMHG

## 2017-05-16 DIAGNOSIS — F31.70 BIPOLAR DISORDER IN FULL REMISSION, MOST RECENT EPISODE UNSPECIFIED TYPE (H): ICD-10-CM

## 2017-05-16 DIAGNOSIS — F31.30 BIPOLAR I DISORDER, MOST RECENT EPISODE DEPRESSED (H): ICD-10-CM

## 2017-05-16 DIAGNOSIS — F90.2 ATTENTION DEFICIT HYPERACTIVITY DISORDER (ADHD), COMBINED TYPE: ICD-10-CM

## 2017-05-16 DIAGNOSIS — F31.61 BIPOLAR DISORDER, CURRENT EPISODE MIXED, MILD (H): ICD-10-CM

## 2017-05-16 DIAGNOSIS — F41.1 GENERALIZED ANXIETY DISORDER: ICD-10-CM

## 2017-05-16 PROCEDURE — 99212 OFFICE O/P EST SF 10 MIN: CPT | Mod: ZF

## 2017-05-16 RX ORDER — DIVALPROEX SODIUM 500 MG/1
2000 TABLET, EXTENDED RELEASE ORAL DAILY
Qty: 360 TABLET | Refills: 0 | Status: SHIPPED | OUTPATIENT
Start: 2017-05-16 | End: 2017-07-18

## 2017-05-16 RX ORDER — GABAPENTIN 100 MG/1
CAPSULE ORAL
Qty: 90 CAPSULE | Refills: 0 | Status: SHIPPED | OUTPATIENT
Start: 2017-05-16 | End: 2017-07-14

## 2017-05-16 RX ORDER — GABAPENTIN 400 MG/1
400 CAPSULE ORAL AT BEDTIME
Qty: 90 CAPSULE | Refills: 0 | Status: SHIPPED | OUTPATIENT
Start: 2017-05-16 | End: 2017-07-18

## 2017-05-16 RX ORDER — CLONIDINE HYDROCHLORIDE 0.1 MG/1
TABLET ORAL
Qty: 270 TABLET | Refills: 0 | Status: SHIPPED | OUTPATIENT
Start: 2017-05-16 | End: 2017-07-18

## 2017-05-16 NOTE — MR AVS SNAPSHOT
After Visit Summary   5/16/2017    Laci Hinkle Jr    MRN: [id number]           Patient Information     Date Of Birth          1995        Visit Information        Provider Department      5/16/2017 2:30 PM Bo Oconnor MD Psychiatry Clinic        Today's Diagnoses     Attention deficit hyperactivity disorder (ADHD), combined type        Bipolar disorder in full remission, most recent episode unspecified type (H)        Generalized anxiety disorder        Bipolar disorder, current episode mixed, mild (H)        Bipolar I disorder, most recent episode depressed (H)          Care Instructions    Continue usual medications  Return in 3 months        Follow-ups after your visit        Follow-up notes from your care team     Return in about 3 months (around 8/16/2017).      Your next 10 appointments already scheduled     Aug 08, 2017  3:30 PM CDT   Adult Med Follow UP with Bo Oconnor MD   Psychiatry Clinic ([facility])    [address]  [address] [facility] [address] [phone]              Who to contact     Please call your clinic at [phone] to:    Ask questions about your health    Make or cancel appointments    Discuss your medicines    Learn about your test results    Speak to your doctor   If you have compliments or concerns about an experience at your clinic, or if you wish to file a complaint, please contact [facility] Patient Relations at [phone] or email us at [email or web address]         Additional Information About Your Visit        MyChart Information     TUNJIt is an electronic gateway that provides easy, online access to your medical records. With TUNJIt, you can request a clinic appointment, read your test results, renew a prescription or communicate with your care team.     To sign up for TUNJIt visit the website at [email or web address]   You will be asked to  enter the access code listed below, as well as some personal information. Please follow the directions to create your username and password.     Your access code is: [email or web address]  Expires: 2017  3:26 PM     Your access code will  in 90 days. If you need help or a new code, please contact your [facility] or call [phone] for assistance.        Care EveryWhere ID     This is your Care EveryWhere ID. This could be used by other organizations to access your [place] medical records  [id number]        Your Vitals Were     Pulse BMI (Body Mass Index)                71 32.42 kg/m2           Blood Pressure from Last 3 Encounters:   17 143/84   17 (!) 154/98   02/15/17 123/84    Weight from Last 3 Encounters:   17 93.9 kg (207 lb)   17 93.2 kg (205 lb 6.4 oz)   02/15/17 92.5 kg (204 lb)              Today, you had the following     No orders found for display         Where to get your medicines      These medications were [id number], [address], [place] [address]     Phone:  [phone]     cloNIDine 0.1 MG tablet    divalproex 500 MG 24 hr tablet    gabapentin 100 MG capsule    gabapentin 400 MG capsule          Primary Care Provider Office Phone # Fax #    Bo Weinberg [phone]       [address] [address]        Thank you!     Thank you for choosing PSYCHIATRY CLINIC  for your care. Our goal is always to provide you with excellent care. Hearing back from our patients is one way we can continue to improve our services. Please take a few minutes to complete the written survey that you may receive in the mail after your visit with us. Thank you!             Your Updated Medication List - Protect others around you: Learn how to safely use, store and throw away your medicines at  www.disposemymeds.org.          This list is accurate as of: 5/16/17  2:49 PM.  Always use your most recent med list.                   Brand Name Dispense Instructions for use    cloNIDine 0.1 MG tablet    CATAPRES    270 tablet    Take 1 tab (0.1 mg) po q AM and take 2 tabs (0.2 mg) po q HS.       divalproex 500 MG 24 hr tablet    DEPAKOTE ER    360 tablet    Take 4 tablets (2,000 mg) by mouth daily       * gabapentin 100 MG capsule    NEURONTIN    90 capsule    Take 2 caps (200 mg) po q AM.  In addition, take one 400 mg cap at HS.  Daily total of 600 mg.       * gabapentin 400 MG capsule    NEURONTIN    90 capsule    Take 1 capsule (400 mg) by mouth At Bedtime .  In addition, take two 100 mg capt po q AM.  Daily total of 600 mg.       nicotine polacrilex 2 MG gum    EQ NICOTINE    60 tablet    Place 1 each (2 mg) inside cheek as needed for smoking cessation       omeprazole 20 MG CR capsule    priLOSEC    30 capsule    Take 1 capsule (20 mg) by mouth every morning (before breakfast)       * Notice:  This list has 2 medication(s) that are the same as other medications prescribed for you. Read the directions carefully, and ask your doctor or other care provider to review them with you.

## 2017-05-18 ASSESSMENT — PATIENT HEALTH QUESTIONNAIRE - PHQ9: SUM OF ALL RESPONSES TO PHQ QUESTIONS 1-9: 9

## 2017-05-23 ENCOUNTER — TELEPHONE (OUTPATIENT)
Dept: PSYCHIATRY | Facility: CLINIC | Age: 22
End: 2017-05-23

## 2017-05-23 NOTE — TELEPHONE ENCOUNTER
----- Message from Emily Barron sent at 5/23/2017  9:47 AM CDT -----  Contact: [phone]  Laci, would like to know if Dr. Oconnor, can prescribe sleep medication because right now the only way he's able to sleep is to drink. He's requesting a call back from the nurse or dr. Oconnor.    Thanks!

## 2017-05-23 NOTE — TELEPHONE ENCOUNTER
"LS 5/16/17  RTC 3 months  PEN 8/8/17    Returned call to pt.  Difficulty falling asleep Sunday and Monday.  Three hours of sleep Sunday night. 6 hours of sleep Monday night.  Does not feel rested when he awakens.  Does not feel this is r/t to minda which he states is the only other time he has a history of difficulty sleeping.      While trying to fall asleep, he lays there with non-specific racing thoughts, will then get up for a while, continues to toss & turn...   Denies impulsive or unsafe behavior.  Denies extra energy - only has \"small bursts of energy.\"  Started outpt CD treatment on Friday.  Did have an alcoholic beverage (Peach Schnapps) Sunday and Monday nights to try to make himself tired.  Stopped Kratom a week ago Sunday (5/13/17).    Current clonidine Rx is for 0.1 mg AM and 0.2 mg HS but pt takes 0.1 mg TID.  However, 4 nights he stopped he 0.1 mg at HS.  Encourarged him to restart it but he won't because it made him feel twitchy.  The daytime doses do NOT cause him to feel twitchy.      Discussed if gabapentin 400 mg HS helps him sleep.  He states it does help but he's concerned that's starting to make him feel twitchy too.      Routed to Dr. Oconnor to advise.     "

## 2017-05-23 NOTE — TELEPHONE ENCOUNTER
Message  Received: Today       Bethany Bass Jodi L RN       Phone Number: [phone]                       Patient is caller. He is requesting a call back. Says that he is having problems with sleep and would like to talk to the nurse about possible mediations. He uses the [facility] in [place].

## 2017-05-30 NOTE — TELEPHONE ENCOUNTER
I spoke with pt and let him know Dr. Oconnor would like to meet with him prior to starting a new medication - I asked Dr. Oconnor if he would be okay with pt coming in sooner than next scheduled appt on 08/08/17 and he stated that would be fine. Pt is scheduled for tomorrow at 3:30pm. Message sent to scheduling to get him added officially. Norma Powers LPN

## 2017-05-30 NOTE — TELEPHONE ENCOUNTER
I called pt back and he stated that he feels these symptoms are more closely r/t hypomania and pt would like to try the Lamictal. Message forwarded to Dr. Oconnor to see if he agrees. Norma Powers LPN

## 2017-05-30 NOTE — TELEPHONE ENCOUNTER
----- Message -----      From: Bethany Bass      Sent: 5/26/2017  10:22 AM        To: MONIQUE Kennedy/Nimco     Patient is caller. He says that he called earlier and left a message about sleep meds. He has decided he would just like to try Lamictal instead. Thinks that it will be a better option.     He uses the [facility] on [address]

## 2017-05-30 NOTE — TELEPHONE ENCOUNTER
"Message from Dr. Oconnor:     Sorry for the delayed reply on this Nimco. I don't have much in the way of specific suggestions except 1) to reinforce basic sleep hygiene, 2) continue to monitor for any emerging signs of hypo/minda and 3) I doubt either clonidine or gabapentin is making him \"twitchy\".     DB   "

## 2017-05-30 NOTE — TELEPHONE ENCOUNTER
I'm not sure if Lamictal is a great medication for Laci. He has a tendency to start and stop meds on his own, which can be dangerous with Lamictal. At the least I would need to discuss it with him at his next appointment.     DB

## 2017-05-31 ENCOUNTER — OFFICE VISIT (OUTPATIENT)
Dept: PSYCHIATRY | Facility: CLINIC | Age: 22
End: 2017-05-31
Attending: PSYCHIATRY & NEUROLOGY
Payer: COMMERCIAL

## 2017-05-31 VITALS
SYSTOLIC BLOOD PRESSURE: 144 MMHG | WEIGHT: 209.2 LBS | HEART RATE: 86 BPM | DIASTOLIC BLOOD PRESSURE: 97 MMHG | BODY MASS INDEX: 32.77 KG/M2

## 2017-05-31 DIAGNOSIS — F31.10 BIPOLAR I DISORDER, MOST RECENT EPISODE (OR CURRENT) MANIC (H): Primary | ICD-10-CM

## 2017-05-31 PROCEDURE — 99212 OFFICE O/P EST SF 10 MIN: CPT | Mod: ZF

## 2017-05-31 RX ORDER — LAMOTRIGINE 25 MG/1
TABLET ORAL
Qty: 49 TABLET | Refills: 0 | Status: SHIPPED | OUTPATIENT
Start: 2017-05-31 | End: 2017-07-06

## 2017-05-31 NOTE — NURSING NOTE
Chief Complaint   Patient presents with     RECHECK     Bipolar affective disorder        Reviewed allergies, smoking status, and pharmacy preference  Administered abuse screening questions-done 5/16/17   Obtained weight, blood pressure and heart rate   Peyton Montelongo LPN

## 2017-05-31 NOTE — PATIENT INSTRUCTIONS
Start Lamictal 12.5 mg (half-tab) at night for 2 weeks on May 31, then increase to 25 mg (1 tab) for 2 weeks starting June 14, then increase to 50 mg (2 tabs) for 2 weeks starting June 28    Watch out for rash - stop medication if this occurs    Be very consistent with taking it    Continue other usual medications    Return in 6 weeks

## 2017-05-31 NOTE — PROGRESS NOTES
History and Physical    Laci Hinkle Jr. MRN# [id number]   Age: 20 year old YOB: 1995     Date of Assessment:  May 31, 2017          Assessment:   This patient is a 20 year old  male with a significant past psychiatric history of  bipolar I disorder. He currently reports mood cycling for the past couple of weeks. He will continue his usual medications (Depakote, gabapentin, clonidine). Will add lamotrigine 12.5mg for 2 weeks, then increase to 25mg for 2 weeks, then increase to 50mg for 2 weeks. He is aware of the risk of rash and will DC the medication if it occurs. He is aware of the importance of taking it very consistently and needing to restart at the beginning dose if he misses more than 2 days. He will return in 6 weeks.     Attestation:  Patient has been seen and evaluated by me, Bo Oconnor MD, PhD.    I spent a total time of 25 minutes face-to-face with the patient during today s office visit.  Over 50% of this time was spent counseling the patient and/or coordinating care regarding medication management, counseling re substance use..          Diagnoses:   Axis I: Bipolar I disorder, currently mild mood cycling; polysubstance dependence, THC and alcohol, currently in short-term remission     Axis II: Deferred    Axis III: Medication-induced weight gain; hypertension    Axis IV: mild  psychosocial stressors     Axis V: Global Assessment of Functionin-60    Interim History   History is obtained from the patient.     Laci reports mood cycling for the past 2 weeks. He experiences depression and hypomania, typically lasting for several hours each. He denies SI and denies any risky behaviors. The symptoms are worse if he has slept poorly the previous night. He cannot ID any other triggers.    His sleep has been problematic because of muscle twitches that keep him awake. He thinks they may be a withdrawal symptom of Kratom, which he stopped 2 weeks ago. There is no other  obvious cause.    Sobia has started a day program. He finds it moderately helpful.    We had a lengthy discussion about lamotrigine and its side effects, including Hansen-Julián syndrome. Laci was a galvez of this as he has read a bit about LTG. He agrees with the plan above.       Review of Systems     A comprehensive review of systems was performed and is negative other than noted above.          Allergies:    No Known Allergies        Current Medications   Depakote 2000 mg HS  Gabapentin 200 mg qAM + 400 mg qHS  Clonidine 0.1 mg daily + 0.2 mg HS    Mental Status Exam     Alertness: alert  and oriented  Appearance: adequately groomed  Behavior/Demeanor: cooperative, pleasant and calm, with good  eye contact  Speech: normal  Language: intact  Psychomotor: normal or unremarkable  Mood:  description consistent with euthymia  Affect: grandiose; was congruent to mood  Thought Process/Associations: unremarkable  Thought Content:  Denies active/passive suicidal ideation  Perception:  Denies hallucinations  Insight: fair  Judgment: fair  Cognition:  does appear grossly intact.

## 2017-06-26 ENCOUNTER — TELEPHONE (OUTPATIENT)
Dept: PSYCHIATRY | Facility: CLINIC | Age: 22
End: 2017-06-26

## 2017-06-26 DIAGNOSIS — F31.10 BIPOLAR I DISORDER, MOST RECENT EPISODE (OR CURRENT) MANIC (H): ICD-10-CM

## 2017-06-26 NOTE — TELEPHONE ENCOUNTER
----- Message from Melissa Cheney RN sent at 6/26/2017  1:05 PM CDT -----  Regarding: FW: Side Effects  Contact: [phone]      ----- Message -----     From: Emily Barron     Sent: 6/26/2017  12:38 PM       To: Nimco Cr RN  Subject: Side Effects                                     Caller: Laci  Medication:  Lamictal 25 mg  Pharmacy and location:  [facility]  Symptoms: No Emotions, Confused/Depressed  Symptom onset: Today  Pending appt: Yes  Okay to leave VM:  Yes    Thanks!

## 2017-06-26 NOTE — TELEPHONE ENCOUNTER
Called and left VM asking for a return call.    Per [employer]: Lamictal Adverse effects:    Common   Dermatologic: Rash (7% to 14% )   Gastrointestinal: Abdominal pain (immediate-release, 5% to 10% ), Diarrhea (immediate-release, 6% to 11%; extended-release, 5% ), Indigestion (immediate-release, 2% to 7% ), Nausea (immediate-release, 7% to 25%; extended-release, 7% ), Vomiting (immediate-release, 5% to 20%; extended-release, 6%))   Neurologic: Asthenia (immediate-release, 2% to 8%; extended-release, 6% ), Ataxia (immediate-release, 2% to 11% ), Coordination problem (immediate-release, 6% to 7%; extended-release, 3% ), Dizziness (immediate-release, 7% to 54% ; extended release, 14% ), Headache (immediate-release, 29% ), Insomnia (immediate-release, 5% to 10% ), Somnolence (immediate-release, 9% to 17%; extended-release, 5% ), Tremor (immediate-release, 4% to 10%; extended-release, 6% ), Vertigo (immediate-release, 2%; extended-release, 3% )   Ophthalmic: Blurred vision (immediate-release, 11% to 25% (adults) and 4% (children); extended-release, 3%), Diplopia (immediate-release, 24% to 49% (adults) and 5% (children); extended-release, 5%)   Psychiatric: Anxiety (immediate-release, 4%; extended-release, 3%), Depression (immediate-release, 4%; extended-release, 3% )   Reproductive: Dysmenorrhea (immediate-release, 5% to 7% )   Respiratory: Rhinitis (immediate-release, 7% to 14% )   Other: Pain (immediate-release, 5%)  Serious   Dermatologic: Erythema multiforme (less than 0.1% ), Rash, Serious (0.08% to 0.8% ), Hansen-Julián syndrome, Toxic epidermal necrolysis   Hematologic: Anemia (immediate release, less than 0.1% ), Disseminated intravascular coagulation, Eosinophil count raised (immediate release, less than 0.1% ), Leukopenia (immediate release, 0.1% to 1% ), Thrombocytopenia (immediate release, less than 0.1% )   Hepatic: Liver failure   Immunologic: Drug reaction with eosinophilia and systemic symptoms    Neurologic: Aseptic meningitis   Other: Angioedema (less than 0.1% ), Neuroleptic malignant syndrome

## 2017-07-06 RX ORDER — LAMOTRIGINE 25 MG/1
50 TABLET ORAL AT BEDTIME
Qty: 60 TABLET | Refills: 0 | Status: SHIPPED | OUTPATIENT
Start: 2017-07-06 | End: 2017-07-18

## 2017-07-06 NOTE — TELEPHONE ENCOUNTER
Patient called back and reported that he does not think it was the Lamictal that made him feel sick.  He reported that he believes that he mistakenly took his HIS dose of clonidine in the morning, and he felt really slowed down.  He reported that he had therapy that morning, and it did not turn out to be productive because he was just not feeling well.    He reported that the Lamictal has been very beneficial.  He stated that he noticed that at about the 6 week jd, he felt like his moods were in control, but he was not sedated.  He reported that he still feels like himself, but is just more stable.  He is still creative, and can still think, and he does not think that the Lamictal has had any adverse effect on his personality and cognitions.  He reported that he needs a refill.  Verified that he has been taking 50 mg total at night.  He stated that his only worry about Lamictal is that if he misses more than 2 days, he will need to start back at the beginning dose and titrate up.  He said that he wants to avoid having to do that as he feels very stable on the current dose.    Received RF request from patient for:    lamoTRIgine (LAMICTAL) 25 MG tablet 49 tablet 0 2017  --   Si.5mg po HS for 2 weeks, then 25mg HS for 2 weeks, then 50mg HS for 2 weeks     Last RF:  17    Due for RF:  yes    Appointment History:  Last appt: 17  RTC: 6 weeks  Cancel: none  No-show: none  Next appt: 17    Refilled per protocol for 30 d/s with no refills.  Sent the prescription electronically to [facility] on [address].    Will route to Dr. Oconnor for FYI.

## 2017-07-14 DIAGNOSIS — F31.30 BIPOLAR I DISORDER, MOST RECENT EPISODE DEPRESSED (H): ICD-10-CM

## 2017-07-14 RX ORDER — GABAPENTIN 100 MG/1
CAPSULE ORAL
Qty: 30 CAPSULE | Refills: 0 | Status: SHIPPED | OUTPATIENT
Start: 2017-07-14 | End: 2017-07-18

## 2017-07-14 NOTE — TELEPHONE ENCOUNTER
Medication requested: Gabapentin 100 mg caps  Last refilled: 6-11-17  Qty: 60      Last seen: 5-31-17  RTC: 6 wks  Cancel: 0  No-show: 0  Next appt: 8-8-17    Refill decision: Refilled for 30 days per protocol.    Kathleen M Doege RN

## 2017-07-18 ENCOUNTER — OFFICE VISIT (OUTPATIENT)
Dept: PSYCHIATRY | Facility: CLINIC | Age: 22
End: 2017-07-18
Attending: PSYCHIATRY & NEUROLOGY
Payer: COMMERCIAL

## 2017-07-18 VITALS
WEIGHT: 223.8 LBS | DIASTOLIC BLOOD PRESSURE: 97 MMHG | HEART RATE: 81 BPM | SYSTOLIC BLOOD PRESSURE: 145 MMHG | BODY MASS INDEX: 35.05 KG/M2

## 2017-07-18 DIAGNOSIS — F31.10 BIPOLAR I DISORDER, MOST RECENT EPISODE (OR CURRENT) MANIC (H): ICD-10-CM

## 2017-07-18 DIAGNOSIS — F31.70 BIPOLAR DISORDER IN FULL REMISSION, MOST RECENT EPISODE UNSPECIFIED TYPE (H): ICD-10-CM

## 2017-07-18 DIAGNOSIS — F31.30 BIPOLAR I DISORDER, MOST RECENT EPISODE DEPRESSED (H): ICD-10-CM

## 2017-07-18 DIAGNOSIS — F31.61 BIPOLAR DISORDER, CURRENT EPISODE MIXED, MILD (H): ICD-10-CM

## 2017-07-18 DIAGNOSIS — F41.1 GENERALIZED ANXIETY DISORDER: ICD-10-CM

## 2017-07-18 DIAGNOSIS — F90.2 ATTENTION DEFICIT HYPERACTIVITY DISORDER (ADHD), COMBINED TYPE: ICD-10-CM

## 2017-07-18 PROCEDURE — 99212 OFFICE O/P EST SF 10 MIN: CPT | Mod: ZF

## 2017-07-18 RX ORDER — GABAPENTIN 100 MG/1
CAPSULE ORAL
Qty: 120 CAPSULE | Refills: 0 | Status: SHIPPED | OUTPATIENT
Start: 2017-07-18 | End: 2017-07-19

## 2017-07-18 RX ORDER — LAMOTRIGINE 25 MG/1
50 TABLET ORAL AT BEDTIME
Qty: 180 TABLET | Refills: 0 | Status: SHIPPED | OUTPATIENT
Start: 2017-07-18 | End: 2017-10-18

## 2017-07-18 RX ORDER — GABAPENTIN 400 MG/1
400 CAPSULE ORAL AT BEDTIME
Qty: 90 CAPSULE | Refills: 0 | Status: SHIPPED | OUTPATIENT
Start: 2017-07-18 | End: 2017-09-07

## 2017-07-18 RX ORDER — CLONIDINE HYDROCHLORIDE 0.1 MG/1
TABLET ORAL
Qty: 180 TABLET | Refills: 0 | Status: SHIPPED | OUTPATIENT
Start: 2017-07-18 | End: 2017-10-18

## 2017-07-18 RX ORDER — DIVALPROEX SODIUM 500 MG/1
2000 TABLET, EXTENDED RELEASE ORAL DAILY
Qty: 360 TABLET | Refills: 0 | Status: SHIPPED | OUTPATIENT
Start: 2017-07-18 | End: 2017-10-18

## 2017-07-18 NOTE — NURSING NOTE
Chief Complaint   Patient presents with     Recheck Medication     bipolar I disorder     Reviewed allergies, smoking status, and pharmacy preference    Obtained weight, blood pressure and heart rate

## 2017-07-18 NOTE — PATIENT INSTRUCTIONS
Continue medications as usual  Continue attending therapy group and individual therapy sessions  Return in 3 months    Thank you for coming to the [facility].    Lab Testing:  If you had lab testing today and your results are reassuring or normal they will be mailed to you or sent through [employer] within 7 days.   If the lab tests need quick action we will call you with the results.  The phone number we will call with results is # [phone] (home) . If this is not the best number please call our clinic and change the number.    Medication Refills:  If you need any refills please call your pharmacy and they will contact us. Our fax number for refills is [phone]. Please allow three business for refill processing.   If you need to  your refill at a new pharmacy, please contact the new pharmacy directly. The new pharmacy will help you get your medications transferred.     Scheduling:  If you have any concerns about today's visit or wish to schedule another appointment please call our office during normal business hours [phone] (8-5:00 M-F)    Contact Us:  Please call [phone] during business hours (8-5:00 M-F).  If after clinic hours, or on the weekend, please call  [phone].    Financial Assistance [phone]  [facility] [phone]  [facility] [phone]  Medical Records [phone]      MENTAL HEALTH CRISIS NUMBERS:  [facility]:   [facility] - [phone]   Crisis Residence [facility] Residence - [phone]   Walk-In Counseling [facility] - [phone]   COPE 24/7 [place] Mobile Team for Adults - [[phone]]; Child - [[phone]]     Crisis Connection - [phone]     [facility]:   [facility] - [phone]   Walk-in counseling [facility] - [phone]   Walk-in counseling [facility] - [phone]   Crisis Residence [facility] - [phone]    Urgent Care Adult Mental Health:   --Drop-in, 24/7 crisis line, and [facility] Mobile Team [[phone]]    CRISIS TEXT LINE: Text [phone] from anywhere, anytime, any crisis 24/7;    OR SEE [email or web address]     Poison Control Center - [phone]    CHILD: [facility] needs assessment team - [phone]     [facility] Lifeline - [phone]; or [facility] Lifeline - [phone]    If you have a medical emergency please call 911or go to the nearest ER.                    _____________________________________________    Again thank you for choosing [facility] and please let us know how we can best partner with you to improve you and your family's health.  You may be receiving a survey in the mail regarding this appointment. We would love to have your feedback, both positive and negative, so please fill out the survey and return it using the provided envolpe. The survey is done by an external company, so your answers are anonymous.

## 2017-07-18 NOTE — PROGRESS NOTES
" History and Physical    Laci Hinkle Jr. MRN# [id number]   Age: 20 year old YOB: 1995     Date of Assessment:  May 31, 2017          Assessment:   This patient is a 20 year old  male with a significant past psychiatric history of  bipolar I disorder. He currently reports mood cycling for the past couple of weeks. He will continue Depakote and gabapentin at their usual doses. He has stopped his daytime dose of clonidine and is currently taking 0.2 mg HS, and will continue this. He has titrated LTG to 50 mg HS and feels this is working very well, and will continue this also. He is aware of the importance of taking his meds very consistently. He sees a therapist 2x per week and is also in a group substance abuse treatment program and will continue this. He will return in 3 months. We will need to check his VPA level and other routine labs then.    Attestation:  Patient has been seen and evaluated by me, Bo Oconnor MD, PhD.    I spent a total time of 25 minutes face-to-face with the patient during today s office visit.  Over 50% of this time was spent counseling the patient and/or coordinating care regarding medication management, counseling re substance use..          Diagnoses:   Axis I: Bipolar I disorder, currently euthymic; polysubstance dependence, THC and alcohol, currently in short-term remission     Axis II: Deferred    Axis III: Medication-induced weight gain; hypertension    Axis IV: mild  psychosocial stressors     Axis V: Global Assessment of Functionin-60    Interim History   History is obtained from the patient.     Laci reports feeling well since his last visit. He believes LTG has been very good for him and says that he feels less \"empty\" inside. He finds his group therapy very helpful, along with his individual therapy sessions. He is keeping a regular sleep wake cycle, has switched cigarettes to e-cigarettes, and has only used LSD once. He states he is " leading a healthier lifestyle and is feeling better because of that.    I have encouraged Laci to continue on his current path to health. We talked about the importance of maintianing regular biological rhythms, and in maintaining physical health.     Laci will continue his medications as above. He will return in 3 months.       Review of Systems     A comprehensive review of systems was performed and is negative other than noted above.          Allergies:    No Known Allergies        Current Medications   Depakote 2000 mg HS  Gabapentin 400 mg qAM + 200 mg qHS  Clonidine 0.2 mg HS  Lamotrigine 50 mg HS    Mental Status Exam     Alertness: alert  and oriented  Appearance: adequately groomed  Behavior/Demeanor: cooperative, pleasant and calm, with good  eye contact  Speech: normal  Language: intact  Psychomotor: normal or unremarkable  Mood:  description consistent with euthymia  Affect: grandiose; was congruent to mood  Thought Process/Associations: unremarkable  Thought Content:  Denies active/passive suicidal ideation  Perception:  Denies hallucinations  Insight: fair  Judgment: fair  Cognition:  does appear grossly intact.

## 2017-07-19 DIAGNOSIS — F31.30 BIPOLAR I DISORDER, MOST RECENT EPISODE DEPRESSED (H): ICD-10-CM

## 2017-07-19 RX ORDER — GABAPENTIN 100 MG/1
CAPSULE ORAL
Qty: 180 CAPSULE | Refills: 0 | Status: SHIPPED | OUTPATIENT
Start: 2017-07-19 | End: 2017-10-18

## 2017-07-19 NOTE — TELEPHONE ENCOUNTER
Medication/Refill question  Received: Yesterday       Kusum Sanchez, Nimco CHONG RN       Phone Number: [phone]                     Laci saw Dr. Oconnor today and Dr. Oconnor had said he was sending 3 months of refills to his pharmacy ([facility], [place]).  Laci noticed that the Neurontin 100 mg prescription seems to be only for 2 months.  He is wondering if there is a reason this was not entered for 3 months, or if this was in error?  Laci would appreciate a call back at [phone].  It is ok to leave a detailed message.

## 2017-07-19 NOTE — TELEPHONE ENCOUNTER
Writer confirms via med tab that only a 60 d/s was sent.  Writer re-sent 90 d/s with request to void 60 d/s sent on 7/18/17.  Notified pt via phone call.

## 2017-08-03 ENCOUNTER — TELEPHONE (OUTPATIENT)
Dept: PSYCHIATRY | Facility: CLINIC | Age: 22
End: 2017-08-03

## 2017-08-03 NOTE — TELEPHONE ENCOUNTER
----- Message from Kusum Sanchez sent at 8/2/2017 11:28 AM CDT -----  Regarding: Possible side effects  Contact: [phone]  Caller:  patient  Medication:  klonidine  Pharmacy and location:  [facility] on [address]  Symptoms: patient's memory has been weird and he has been eating a lot - especially when he takes the med.  He has noticed weight gain and lower energy  Symptom onset: in the last 2-3 months  Pending appt: 8/16/17  Okay to leave VM:  Yes     Patient would like to discuss going off of the med, but also wants to talk to a nurse about what has been going on and whether the appointment on 8/16 is necessary.

## 2017-08-03 NOTE — TELEPHONE ENCOUNTER
Appt history:  LS  7/18/17  RTC  3 months  CAN  8/8/17 not needed  NSH  none  PEN  8/16/17    At 7/18/17 appt:  He has stopped his daytime dose of clonidine and is currently taking 0.2 mg HS, and will continue this.     Returned call to pt:  8/3/17 at 1000:  LVM for pt requesting CB.

## 2017-08-10 NOTE — TELEPHONE ENCOUNTER
Message  Received: Yesterday       Emily Barron Jodi L RN       Phone Number: [phone]                     Laci, returning your call.

## 2017-08-10 NOTE — TELEPHONE ENCOUNTER
"Spoke with pt who states he no longer thinks clonidine is the source of memory concern.  He now attributes it to Lamictal because of research he's done on-line.  Severity of memory loss is \"not a lot but it's annoying.\"  He won't recall specific, small tasks he's completed.  For example, the need to re-check things such as if he put something in his backpack.  Another example is that he was making dough at work the other day he knew he added necessary ingredients but could not recall the specific action of doing so.  When he tries to replay something in his mind he cannot find the specific memory.  At weekly group he's unable to recall how his previous week has been and he does add that most of his days are the same but if something out of the ordinary happened he would remember it.      Pt has been sober for 2 months and memory issue has been noted 2-4 weeks.  We discussed his history of drug and alcohol use and their impacts on memory.  He's accepting of this.      His mood is \"pretty stable\" and \"my elio.\"  States Lamictal is the best medication he's ever had so he wants to continue it.      Asking for Dr. Oconnor's opinion of memory-enhancing supplements. Routed to Dr. Oconnor to advise.    "

## 2017-08-15 NOTE — TELEPHONE ENCOUNTER
Elvin, Bo Uribe MD   You  8/15/17  (7:59 AM)                 There aren't any medications shown to improve memory in people with BD. There was a small study just published that suggests that lurasidone can help cognition, but Laci has tried it and doesn't like it.     If it's related to poor concentration we could try bupropion.     People have tried using Alzheimer's meds (eg. donepizil, memantine) but there is little evidence for them.     I can talk with him about it at his next appointment.

## 2017-08-15 NOTE — TELEPHONE ENCOUNTER
"Called pt to let him know Dr. Oconnor has suggestions that can be further discussed at his appt tomorrow.  Pt started a supplement (vinpocetine) that's been \"so-so\" in that it helps a bit but also makes him feel \"too stable\" like he can't enjoy things.      "

## 2017-08-16 ENCOUNTER — OFFICE VISIT (OUTPATIENT)
Dept: PSYCHIATRY | Facility: CLINIC | Age: 22
End: 2017-08-16
Attending: PSYCHIATRY & NEUROLOGY
Payer: COMMERCIAL

## 2017-08-16 VITALS
HEART RATE: 69 BPM | SYSTOLIC BLOOD PRESSURE: 148 MMHG | HEIGHT: 67 IN | BODY MASS INDEX: 34.65 KG/M2 | OXYGEN SATURATION: 96 % | DIASTOLIC BLOOD PRESSURE: 97 MMHG | WEIGHT: 220.8 LBS

## 2017-08-16 DIAGNOSIS — F41.1 GENERALIZED ANXIETY DISORDER: ICD-10-CM

## 2017-08-16 DIAGNOSIS — F31.30 BIPOLAR I DISORDER, MOST RECENT EPISODE DEPRESSED (H): Primary | ICD-10-CM

## 2017-08-16 DIAGNOSIS — F90.2 ATTENTION DEFICIT HYPERACTIVITY DISORDER (ADHD), COMBINED TYPE: ICD-10-CM

## 2017-08-16 PROCEDURE — 99212 OFFICE O/P EST SF 10 MIN: CPT | Mod: ZF

## 2017-08-16 NOTE — MR AVS SNAPSHOT
After Visit Summary   8/16/2017    Laci Hinkle Jr    MRN: [id number]           Patient Information     Date Of Birth          1995        Visit Information        Provider Department      8/16/2017 3:30 PM Bo Oconnor MD Psychiatry Clinic        Today's Diagnoses     Bipolar I disorder, most recent episode depressed (H)    -  1    Attention deficit hyperactivity disorder (ADHD), combined type        Generalized anxiety disorder          Care Instructions    Divide lamotrigine dose so you take half in the morning and half in the evening  In 2 weeks if your memory is not bettr, take 1.5 tablets daily (instead of current 2 tabs)  Return in 8 weeks          Follow-ups after your visit        Follow-up notes from your care team     Return in about 8 weeks (around 10/11/2017).      Your next 10 appointments already scheduled     Oct 18, 2017  2:00 PM CDT   Adult Med Follow UP with Bo Oconnor MD   Psychiatry Clinic ([facility])    [address]  [address] [facility] [address] [phone]              Who to contact     Please call your clinic at [phone] to:    Ask questions about your health    Make or cancel appointments    Discuss your medicines    Learn about your test results    Speak to your doctor   If you have compliments or concerns about an experience at your clinic, or if you wish to file a complaint, please contact [facility] Patient Relations at [phone] or email us at [email or web address]         Additional Information About Your Visit        MyChart Information     WhoAPIt is an electronic gateway that provides easy, online access to your medical records. With FunGoPlay, you can request a clinic appointment, read your test results, renew a prescription or communicate with your care team.     To sign up for Zolair Energyhart visit the website at [email or web address]   You will be  "asked to enter the access code listed below, as well as some personal information. Please follow the directions to create your username and password.     Your access code is: [email or web address]  Expires: 10/16/2017  3:59 PM     Your access code will  in 90 days. If you need help or a new code, please contact your [facility] or call [phone] for assistance.        Care EveryWhere ID     This is your Care EveryWhere ID. This could be used by other organizations to access your [facility] medical records  [id number]        Your Vitals Were     Pulse Height Pulse Oximetry BMI (Body Mass Index)          69 1.702 m (5' 7\") 96% 34.58 kg/m2         Blood Pressure from Last 3 Encounters:   17 (!) 148/97   17 (!) 145/97   17 (!) 144/97    Weight from Last 3 Encounters:   17 100.2 kg (220 lb 12.8 oz)   17 101.5 kg (223 lb 12.8 oz)   17 94.9 kg (209 lb 3.2 oz)              Today, you had the following     No orders found for display       Primary Care Provider Office Phone # Fax #    [facility] [phone]       [address] [place] [address]        Equal Access to Services     AVELINO COLON AH: Hadii jc juleso Sokarlene, waaxda luqadaha, qaybta kaalmada anupam nieves. So [facility] [phone].    ATENCIÓN: Si habla español, tiene a simons disposición servicios gratuitos de asistencia lingüística. Marie al [phone].    We comply with applicable federal civil rights laws and [place] laws. We do not discriminate on the basis of race, color, national origin, age, disability sex, sexual orientation or gender identity.            Thank you!     Thank you for choosing PSYCHIATRY CLINIC  for your care. Our goal is always to provide you with excellent care. Hearing back from our patients is one way we can continue to improve our services. Please take a few minutes to complete the " written survey that you may receive in the mail after your visit with us. Thank you!             Your Updated Medication List - Protect others around you: Learn how to safely use, store and throw away your medicines at www.PaytopiaemJumpSofteds.org.          This list is accurate as of: 8/16/17  4:01 PM.  Always use your most recent med list.                   Brand Name Dispense Instructions for use Diagnosis    cloNIDine 0.1 MG tablet    CATAPRES    180 tablet    Take 2 tabs (0.2 mg) po q HS.    Attention deficit hyperactivity disorder (ADHD), combined type, Bipolar disorder in full remission, most recent episode unspecified type (H), Generalized anxiety disorder       divalproex 500 MG 24 hr tablet    DEPAKOTE ER    360 tablet    Take 4 tablets (2,000 mg) by mouth daily    Bipolar disorder, current episode mixed, mild (H)       * gabapentin 400 MG capsule    NEURONTIN    90 capsule    Take 1 capsule (400 mg) by mouth At Bedtime .  In addition, take two 100 mg capt po q AM.  Daily total of 600 mg.    Bipolar I disorder, most recent episode depressed (H)       * gabapentin 100 MG capsule    NEURONTIN    180 capsule    Take 2 caps (200 mg) po q AM.  In addition, take one 400 mg cap at HS.  Daily total of 600 mg.    Bipolar I disorder, most recent episode depressed (H)       lamoTRIgine 25 MG tablet    LaMICtal    180 tablet    Take 2 tablets (50 mg) by mouth At Bedtime    Bipolar I disorder, most recent episode (or current) manic (H)       nicotine polacrilex 2 MG gum    EQ NICOTINE    60 tablet    Place 1 each (2 mg) inside cheek as needed for smoking cessation    Tobacco abuse       omeprazole 20 MG CR capsule    priLOSEC    30 capsule    Take 1 capsule (20 mg) by mouth every morning (before breakfast)    Gastroesophageal reflux disease without esophagitis       * Notice:  This list has 2 medication(s) that are the same as other medications prescribed for you. Read the directions carefully, and ask your doctor or other  care provider to review them with you.

## 2017-08-16 NOTE — PATIENT INSTRUCTIONS
Divide lamotrigine dose so you take half in the morning and half in the evening  In 2 weeks if your memory is not bettr, take 1.5 tablets daily (instead of current 2 tabs)  Return in 8 weeks

## 2017-08-16 NOTE — NURSING NOTE
Chief Complaint   Patient presents with     Recheck Medication     Bipolar disorder     Reviewed allergies, smoking status, and pharmacy preference  Obtained weight, blood pressure and heart rate     Vianney Meadows CMA

## 2017-08-16 NOTE — PROGRESS NOTES
History and Physical    Laci Hinkle Jr. MRN# [id number]   Age: 20 year old YOB: 1995     Date of Assessment:  Aug 16, 2017          Assessment:   This patient is a 20 year old  male with a significant past psychiatric history of  bipolar I disorder. He currently is euthymic. He does reports being more forgetful for the past couple of weeks. He will continue his usual medications at their usual doses, but will divide LTG BID. He is also thinking of trying a choline supplement, which could be helpful. If in 2 weeks there is not substantial benefit, He will reduce LTG to 37.5 mg daily. He is aware of the importance of taking his meds very consistently. He sees a therapist 2x per week and is also in a group substance abuse treatment program and will continue this. He is thinking about doing DBT after this is finished. He will return in 8 weeks. We will need to check his VPA level and other routine labs then.    ADDENDUM - 2017 -   Laci has startedan intensive outpatient dual-diagnosis program. Wanda is the group facilitator. Contact: [phone]. Laci has signed an WU.     Attestation:  Patient has been seen and evaluated by me, Bo Oconnor MD, PhD.    I spent a total time of 25 minutes face-to-face with the patient during today s office visit.  Over 50% of this time was spent counseling the patient and/or coordinating care regarding medication management, counseling re substance use..          Diagnoses:   Axis I: Bipolar I disorder, currently euthymic; polysubstance dependence, THC and alcohol, currently in short-term remission     Axis II: Deferred    Axis III: Medication-induced weight gain; hypertension    Axis IV: mild  psychosocial stressors     Axis V: Global Assessment of Functionin-60    Interim History   History is obtained from the patient.     Laci reports feeling well since his last visit. He believes LTG has been very good for him. He does note  increased forgetfulness, as above. Itis more than a nuisance but not disabling. He finds his group therapy very helpful, along with his individual therapy sessions. He is keeping a regular sleep wake cycle. He denies substance use. He states he is leading a healthier lifestyle and is feeling better because of that.    Laci is agreeable with the plan as above. He will return in 3 months.       Review of Systems     A comprehensive review of systems was performed and is negative other than noted above.          Allergies:    No Known Allergies        Current Medications   Depakote 2000 mg HS  Gabapentin 400 mg qAM + 200 mg qHS  Clonidine 0.2 mg HS  Lamotrigine 50 mg HS    Mental Status Exam     Alertness: alert  and oriented  Appearance: adequately groomed  Behavior/Demeanor: cooperative, pleasant and calm, with good  eye contact  Speech: normal  Language: intact  Psychomotor: normal or unremarkable  Mood:  description consistent with euthymia  Affect: grandiose; was congruent to mood  Thought Process/Associations: unremarkable  Thought Content:  Denies active/passive suicidal ideation  Perception:  Denies hallucinations  Insight: fair  Judgment: fair  Cognition:  does appear grossly intact.

## 2017-08-17 ASSESSMENT — PATIENT HEALTH QUESTIONNAIRE - PHQ9: SUM OF ALL RESPONSES TO PHQ QUESTIONS 1-9: 7

## 2017-09-05 ENCOUNTER — CARE COORDINATION (OUTPATIENT)
Dept: PSYCHIATRY | Facility: CLINIC | Age: 22
End: 2017-09-05

## 2017-09-05 NOTE — PROGRESS NOTES
FW: Meds/Bond  Received: 5 days ago       Melissa Cheney, Tatum Jiang RN       Phone Number: [phone] (Call me)                       Previous Messages       ----- Message -----      From: Lita Cardoza      Sent: 8/31/2017  12:01 PM        To: Nimco Cr RN   Subject: Meds/Bond                                         The pt is caller. States he'd like to discuss how the Lamictal has been working out.  Reports no side effects. Ok to LVM.

## 2017-09-05 NOTE — PROGRESS NOTES
Appt history:  Last seen:  8/16/17  RTC:  8 weeks  Cancel:  none  No-show:  none  Next appt:  10/18/17    At last appt:  Divide lamotrigine dose so you take half in the morning and half in the evening  In 2 weeks if your memory is not bettr, take 1.5 tablets daily (instead of current 2 tabs)    Returned call to pt:  -No answer at number provided.    -LVM with clinic number and my name requesting c/b to discuss further.

## 2017-09-05 NOTE — PROGRESS NOTES
"Pt returns call.  Taking Lamictal 25 mg BID as discussed at last appt.  Describes mood as \"leveled out\" although notes that it can fluctuate \"a little bit\" but is not of concern.  Memory has improved and is almost back to normal.  Pt relays that he is taking ginkgo biloba to help with memory.  Does not feel further med change is needed at this time.  Agrees to call clinic before next appt if this should change.  Will update Dr. Oconnor upon his return to clinic.  "

## 2017-09-07 DIAGNOSIS — F31.30 BIPOLAR I DISORDER, MOST RECENT EPISODE DEPRESSED (H): ICD-10-CM

## 2017-09-07 RX ORDER — GABAPENTIN 400 MG/1
400 CAPSULE ORAL AT BEDTIME
Qty: 90 CAPSULE | Refills: 0 | Status: SHIPPED | OUTPATIENT
Start: 2017-09-07 | End: 2017-10-18

## 2017-09-07 NOTE — TELEPHONE ENCOUNTER
Medication requested: Gabapentin 400 mg caps  Last refilled: 8-19-17  Qty: 90      Last seen: 8-16-17  RTC: 8 wks  Cancel: 0  No-show: 0  Next appt: 10-18-17    Refill decision: Refilled for 30 days per protocol. With 1 additional refill.      Kathleen M Doege RN

## 2017-09-11 ENCOUNTER — TRANSFERRED RECORDS (OUTPATIENT)
Dept: HEALTH INFORMATION MANAGEMENT | Facility: CLINIC | Age: 22
End: 2017-09-11

## 2017-09-22 ENCOUNTER — TELEPHONE (OUTPATIENT)
Dept: PSYCHIATRY | Facility: CLINIC | Age: 22
End: 2017-09-22

## 2017-09-22 DIAGNOSIS — F31.10 BIPOLAR I DISORDER, MOST RECENT EPISODE (OR CURRENT) MANIC (H): ICD-10-CM

## 2017-09-22 NOTE — TELEPHONE ENCOUNTER
"Appt history:  LS  8/16/17  RTC  8 weeks  CAN  none  NSH  none  PEN  10/18/17    At 8/16/17 appt:  Divide lamotrigine dose so you take half in the morning and half in the evening  In 2 weeks if your memory is not bettr, take 1.5 tablets daily (instead of current 2 tabs)    Returned call to pt:  Since the time of last appt pt has been taking Lamictal 25 mg BID rather than 50 q HS.  This change was in an attempt to decrease his concerns about being forgetful.  Per pt, he feels less foggy with 25 BID dosing versus 50 mg a HS.  However, he feels like now his mood fluctuates every week or so from sad (decreased energy/tired/doesn't complete tasks), to okay, to \"a mild hypomanic\" where he feels impulsive.  Pt feels taking Lamictal 50 mg q HS would be better for his mood stability.  When asked about concern for mental fog returning with going back to HS dosing he states he's more concerned about mood at this time.  He is going to go back to Lamictal 50 mg q HS.  He will call PRN prior to 10/18/17 appt.      Routed to Dr. Oconnor to review and advise if he has any concerns about pt's plan.                  "

## 2017-09-22 NOTE — TELEPHONE ENCOUNTER
"----- Message from Paola Vicente sent at 9/22/2017  9:25 AM CDT -----  Regarding: med question  Contact: [phone]  The pt has been taking 1 tab of Lamictal in the am and 1 in the pm since August. He said his mood swings are getting better, but they are still \"challening.\" He was wondering if he could increase to taking 1.5 tabs at night to help with the mood swings and overall mood.   "

## 2017-09-27 ENCOUNTER — TELEPHONE (OUTPATIENT)
Dept: PSYCHIATRY | Facility: CLINIC | Age: 22
End: 2017-09-27

## 2017-09-28 ENCOUNTER — TELEPHONE (OUTPATIENT)
Dept: PSYCHIATRY | Facility: CLINIC | Age: 22
End: 2017-09-28

## 2017-09-28 NOTE — TELEPHONE ENCOUNTER
Wanda works with [facility]. WU is on file (see 9/27/17 note).  LVM for Wanda confirming her call has been rec'd and we have WU on file for all future communications.

## 2017-09-28 NOTE — TELEPHONE ENCOUNTER
----- Message from Kusum Russell sent at 9/28/2017  9:17 AM CDT -----  Regarding: care coordinate/Bond  Contact: [phone]  Wanda, group facilitator is the caller. She wanted to introduce herself to Dr Oconnor for any future care coordination that may happen as the pt just started an intensive outpatient dual-diagnosis program. Her contact number is listed if he should have any questions, or concerns.    Thanks

## 2017-10-18 ENCOUNTER — TELEPHONE (OUTPATIENT)
Dept: PSYCHIATRY | Facility: CLINIC | Age: 22
End: 2017-10-18

## 2017-10-18 ENCOUNTER — OFFICE VISIT (OUTPATIENT)
Dept: PSYCHIATRY | Facility: CLINIC | Age: 22
End: 2017-10-18
Attending: PSYCHIATRY & NEUROLOGY
Payer: COMMERCIAL

## 2017-10-18 DIAGNOSIS — F90.2 ATTENTION DEFICIT HYPERACTIVITY DISORDER (ADHD), COMBINED TYPE: ICD-10-CM

## 2017-10-18 DIAGNOSIS — F31.70 BIPOLAR DISORDER IN FULL REMISSION, MOST RECENT EPISODE UNSPECIFIED TYPE (H): ICD-10-CM

## 2017-10-18 DIAGNOSIS — F31.30 BIPOLAR I DISORDER, MOST RECENT EPISODE DEPRESSED (H): ICD-10-CM

## 2017-10-18 DIAGNOSIS — Z51.81 ENCOUNTER FOR THERAPEUTIC DRUG MONITORING: ICD-10-CM

## 2017-10-18 DIAGNOSIS — F41.1 GENERALIZED ANXIETY DISORDER: ICD-10-CM

## 2017-10-18 DIAGNOSIS — F31.10 BIPOLAR I DISORDER, MOST RECENT EPISODE (OR CURRENT) MANIC (H): ICD-10-CM

## 2017-10-18 DIAGNOSIS — F31.9 BIPOLAR 1 DISORDER (H): Primary | ICD-10-CM

## 2017-10-18 DIAGNOSIS — F31.61 BIPOLAR DISORDER, CURRENT EPISODE MIXED, MILD (H): ICD-10-CM

## 2017-10-18 RX ORDER — CLONIDINE HYDROCHLORIDE 0.1 MG/1
TABLET ORAL
Qty: 180 TABLET | Refills: 0 | Status: SHIPPED | OUTPATIENT
Start: 2017-10-18 | End: 2018-03-09

## 2017-10-18 RX ORDER — GABAPENTIN 400 MG/1
400 CAPSULE ORAL AT BEDTIME
Qty: 90 CAPSULE | Refills: 0 | Status: SHIPPED | OUTPATIENT
Start: 2017-10-18 | End: 2018-01-16

## 2017-10-18 RX ORDER — LAMOTRIGINE 25 MG/1
TABLET ORAL
Qty: 270 TABLET | Refills: 0 | Status: SHIPPED | OUTPATIENT
Start: 2017-10-18 | End: 2018-03-09

## 2017-10-18 RX ORDER — GABAPENTIN 100 MG/1
CAPSULE ORAL
Qty: 180 CAPSULE | Refills: 0 | Status: SHIPPED | OUTPATIENT
Start: 2017-10-18 | End: 2018-01-16

## 2017-10-18 RX ORDER — DIVALPROEX SODIUM 500 MG/1
2000 TABLET, EXTENDED RELEASE ORAL DAILY
Qty: 360 TABLET | Refills: 0 | Status: SHIPPED | OUTPATIENT
Start: 2017-10-18 | End: 2018-01-16

## 2017-10-18 NOTE — TELEPHONE ENCOUNTER
Message from Bo Oconnor MD sent at 10/18/2017  2:19 PM CDT:    Can you order labs for Laci? He gets them done at:    [address] , [place] [address] [phone]  Lab fax  [phone]    He needs:    CBC + diff  AST ALT  Valproic acid level

## 2017-10-18 NOTE — PATIENT INSTRUCTIONS
Increase lamotrigine to 12,5 mg (half-tab) in the morning and 50 mg (2 tabs) at night  Continue other medications  Blood test to check medication level  Return in 3 months

## 2017-10-18 NOTE — MR AVS SNAPSHOT
After Visit Summary   10/18/2017    Laci Hinkle Jr    MRN: [id number]           Patient Information     Date Of Birth          1995        Visit Information        Provider Department      10/18/2017 2:00 PM Bo Oconnor MD Psychiatry Clinic        Today's Diagnoses     Bipolar I disorder, most recent episode (or current) manic (H)        Bipolar I disorder, most recent episode depressed (H)        Bipolar disorder, current episode mixed, mild (H)        Attention deficit hyperactivity disorder (ADHD), combined type        Bipolar disorder in full remission, most recent episode unspecified type (H)        Generalized anxiety disorder          Care Instructions    Increase lamotrigine to 12,5 mg (half-tab) in the morning and 50 mg (2 tabs) at night  Continue other medications  Blood test to check medication level  Return in 3 months          Follow-ups after your visit        Follow-up notes from your care team     Return in about 3 months (around 1/18/2018).      Your next 10 appointments already scheduled     Jan 16, 2018  1:00 PM CST   Adult Med Follow UP with Bo Oconnor MD   Psychiatry Clinic ([facility])    [address] [facility] [address] [phone]              Who to contact     Please call your clinic at [phone] to:    Ask questions about your health    Make or cancel appointments    Discuss your medicines    Learn about your test results    Speak to your doctor   If you have compliments or concerns about an experience at your clinic, or if you wish to file a complaint, please contact [facility] Patient Relations at [phone] or email us at [email or web address]         Additional Information About Your Visit        YPlanhart Information     [employer] is an electronic gateway that provides easy, online access to your medical records. With [employer], you can request a  clinic appointment, read your test results, renew a prescription or communicate with your care team.     To sign up for Verteego ([employer])hart visit the website at [email or web address]   You will be asked to enter the access code listed below, as well as some personal information. Please follow the directions to create your username and password.     Your access code is: [email or web address]  Expires: 2018  2:27 PM     Your access code will  in 90 days. If you need help or a new code, please contact your [facility] or call [phone] for assistance.        Care EveryWhere ID     This is your Care EveryWhere ID. This could be used by other organizations to access your [facility] medical records  [id number]         Blood Pressure from Last 3 Encounters:   17 (!) 148/97   17 (!) 145/97   17 (!) 144/97    Weight from Last 3 Encounters:   17 100.2 kg (220 lb 12.8 oz)   17 101.5 kg (223 lb 12.8 oz)   17 94.9 kg (209 lb 3.2 oz)              Today, you had the following     No orders found for display         Today's Medication Changes          These changes are accurate as of: 10/18/17  2:27 PM.  If you have any questions, ask your nurse or doctor.               These medicines have changed or have updated prescriptions.        Dose/Directions    lamoTRIgine 25 MG tablet   Commonly known as:  LaMICtal   This may have changed:    - how much to take  - how to take this  - when to take this  - additional instructions   Used for:  Bipolar I disorder, most recent episode (or current) manic (H)        Take 1 in the morning and 2 at night (total 75 mg daily)   Quantity:  270 tablet   Refills:  0            Where to get your medicines      These medications were sent to [id number], [address] [place] [address]  [address] [facility] [address], [facility] [address]     Phone:  [phone]     cloNIDine 0.1 MG tablet    divalproex  500 MG 24 hr tablet    gabapentin 100 MG capsule    gabapentin 400 MG capsule    lamoTRIgine 25 MG tablet                Primary Care Provider Office Phone # Fax #    [facility] [phone] [address] [facility] [address]        Equal Access to Services     AVELINO COLON : Hadii jc osei hadissaco Soomaali, waaxda luqadaha, qaybta kaalmada adeegyada, waxkya blanca dequanparviz espinalderekodell ayala. So [facility] [phone].    ATENCIÓN: Si habla español, tiene a simons disposición servicios gratuitos de asistencia lingüística. Marie al [phone].    We comply with applicable federal civil rights laws and [place] laws. We do not discriminate on the basis of race, color, national origin, age, disability, sex, sexual orientation, or gender identity.            Thank you!     Thank you for choosing PSYCHIATRY CLINIC  for your care. Our goal is always to provide you with excellent care. Hearing back from our patients is one way we can continue to improve our services. Please take a few minutes to complete the written survey that you may receive in the mail after your visit with us. Thank you!             Your Updated Medication List - Protect others around you: Learn how to safely use, store and throw away your medicines at www.disposemymeds.org.          This list is accurate as of: 10/18/17  2:27 PM.  Always use your most recent med list.                   Brand Name Dispense Instructions for use Diagnosis    cloNIDine 0.1 MG tablet    CATAPRES    180 tablet    Take 2 tabs (0.2 mg) po q HS.    Attention deficit hyperactivity disorder (ADHD), combined type, Bipolar disorder in full remission, most recent episode unspecified type (H), Generalized anxiety disorder       divalproex 500 MG 24 hr tablet    DEPAKOTE ER    360 tablet    Take 4 tablets (2,000 mg) by mouth daily    Bipolar disorder, current episode mixed, mild (H)       * gabapentin 400 MG capsule    NEURONTIN    90  capsule    Take 1 capsule (400 mg) by mouth At Bedtime .  In addition, take two 100 mg caps po q AM.  Daily total of 600 mg.    Bipolar I disorder, most recent episode depressed (H)       * gabapentin 100 MG capsule    NEURONTIN    180 capsule    Take 2 caps (200 mg) po q AM.  In addition, take one 400 mg cap at HS.  Daily total of 600 mg.    Bipolar I disorder, most recent episode depressed (H)       lamoTRIgine 25 MG tablet    LaMICtal    270 tablet    Take 1 in the morning and 2 at night (total 75 mg daily)    Bipolar I disorder, most recent episode (or current) manic (H)       nicotine polacrilex 2 MG gum    EQ NICOTINE    60 tablet    Place 1 each (2 mg) inside cheek as needed for smoking cessation    Tobacco abuse       omeprazole 20 MG CR capsule    priLOSEC    30 capsule    Take 1 capsule (20 mg) by mouth every morning (before breakfast)    Gastroesophageal reflux disease without esophagitis       * Notice:  This list has 2 medication(s) that are the same as other medications prescribed for you. Read the directions carefully, and ask your doctor or other care provider to review them with you.

## 2017-10-18 NOTE — PROGRESS NOTES
History and Physical    Laci Hinkle Jr. MRN# [id number]   Age: 20 year old YOB: 1995     Date of Assessment:  Oct 18, 2017          Assessment:   This patient is a 21 year old  male with a significant past psychiatric history of  bipolar I disorder. He reports feeling well. His mood tends to dip in the fall/winter so he will try increasing LTG to 12.5 mg qAM + 50 mg HS to prevent this. He may increase further to 25 mg AM + 50 mg HS if warranted. He will continue his other usual medications at their usual doses. He is aware of the importance of taking his meds consistently. He sees a therapist 2x per week. He tried a DBT program but it was not to his liking. He will return in 12 weeks. He will get a blood test to check his VPA level and other routine labs.    Attestation:  Patient has been seen and evaluated by me, Bo Oconnor MD, PhD.    I spent a total time of 25 minutes face-to-face with the patient during today s office visit.  Over 50% of this time was spent counseling the patient and/or coordinating care regarding medication management, counseling re substance use..          Diagnoses:   Axis I: Bipolar I disorder, currently euthymic; polysubstance dependence, THC and alcohol, currently in short-term remission     Axis II: Deferred    Axis III: Medication-induced weight gain; hypertension    Axis IV: mild  psychosocial stressors     Axis V: Global Assessment of Functionin-60    Interim History   History is obtained from the patient.     Laci reports feeling well since his last visit. He denies symptoms of depression or hypo/minda. He continues to use LSD and is not interesting in stopping. He is working 40 hours per week and this is going well. He hopes to save some money and move out on his own. (He currently stays with his mom.) He hopes to return to college in the fall. He was in a DBT program for 3 weeks but stopped going when they requested that he stop  LSD.    Laci is agreeable with the plan as above. He will return in 3 months.       Review of Systems     A comprehensive review of systems was performed and is negative other than noted above.          Allergies:    No Known Allergies        Current Medications   Depakote 2000 mg HS  Gabapentin 400 mg qAM + 200 mg qHS  Clonidine 0.2 mg HS  Lamotrigine 50 mg HS    Mental Status Exam     Alertness: alert  and oriented  Appearance: adequately groomed  Behavior/Demeanor: cooperative, pleasant and calm, with good  eye contact  Speech: normal  Language: intact  Psychomotor: normal or unremarkable  Mood:  description consistent with euthymia  Affect: grandiose; was congruent to mood  Thought Process/Associations: unremarkable  Thought Content:  Denies active/passive suicidal ideation  Perception:  Denies hallucinations  Insight: fair  Judgment: fair  Cognition:  does appear grossly intact.

## 2017-10-20 ASSESSMENT — PATIENT HEALTH QUESTIONNAIRE - PHQ9: SUM OF ALL RESPONSES TO PHQ QUESTIONS 1-9: 7

## 2017-10-24 ENCOUNTER — TELEPHONE (OUTPATIENT)
Dept: PSYCHIATRY | Facility: CLINIC | Age: 22
End: 2017-10-24

## 2017-10-24 NOTE — TELEPHONE ENCOUNTER
Rec'd records from:  [facility],   Wanda Matute    Regarding:  Discharge Summary and Recommendations for IDD    Copy of records:  -placed in Dr. Oconnor's folder  -sent to scanning  -retained in clinic until scanning is confirmed

## 2017-12-14 ENCOUNTER — TELEPHONE (OUTPATIENT)
Dept: PSYCHOLOGY | Facility: CLINIC | Age: 22
End: 2017-12-14

## 2017-12-14 NOTE — TELEPHONE ENCOUNTER
Located within [facility]  DAYTIME AND AFTER HOURS CALL DOCUMENTATION    Date of phone call: 12/14/2017       Time Call Received from [facility]: 2:30AM   Time Client Called by Therapist: 2:35AM and 2:40AM    Client's [facility] Therapist: Unsure                Presenting Issue: Client indicated he was unable to sleep and feeling anxious. Indicated he is dx with bipolar.    Safety Concerns:  Risk status (Self / Other harm or suicidal ideation)  Client denies current fears or concerns for personal safety.  Client denies current or recent suicidal ideation or behaviors.  Client denies current or recent homicidal ideation or behaviors.  Client denies current or recent self injurious behavior or ideation.  Client denies other safety concerns.    Client did not answer return call either attempt. Invited him to call Crisis line again if he wanted to talk.     Disposition:   A safety and risk management plan has not been developed at this time, however client was given the after-hours number / 911 should there be a change in any of these risk factors.      Vida Cooper

## 2017-12-15 ENCOUNTER — CARE COORDINATION (OUTPATIENT)
Dept: PSYCHIATRY | Facility: CLINIC | Age: 22
End: 2017-12-15

## 2017-12-15 NOTE — PROGRESS NOTES
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 He wanted to know if he can get something for sleep or doctor's notes for trama therapy. He can be reached after 2:00 pm today.     Thanks!                Last appt: 10/18/17  Pend: 1/16/17    Writer returned call to pt.  Pt states that he is \"dealing with a lot\".  Reports that he started a new position at [employer] on 12/13/17 and has requested to work 6 days a week.  States that since he is now working 1st shift and waking at 4:30 am, is having difficulty falling asleep at night.  Denies excessive use of caffeine while working, but also will not give a straightforward answer on how much he consumes.  Attempts to fall asleep around 8:00 pm however is not tired.  Describes having \"broken sleep\", yet notes improvement last evening as slept a total of 5 hours.  Pt reports that he usually sleeps 7-8 hours nightly.  Pt is concerned that decreased sleep is worsening sx.  Writer inquired about symptoms and pt states \"I feel slightly hypomanic like I can't concentrate and feel impulsive\".  Denies any impulsive acts and tracks well during conversation.  Believes that sx are r/t increased stress and decreased sleep.  Pt is primarily stressed by starting a new position and states \"I've fucked so much shit up in my past and I need this job\".      With regard to sleep meds, pt states that Vistaril (not Atarax) has been most effective.  States that Melatonin causes \"minda\" and Benadryl is a \"delirient\".  Was previously on Vistaril and cannot recall why this was discontinued.      With regard to letter request.  Pt states that he hope he can have a work excuse letter on file.  States that he is concerned about " missing work due to sx and stress associated with trauma therapy.  Reports that he does not want to disclose any MH symptoms to his supervisor as worries that he will be discriminated against.      Writer explained that Dr. Oconnor is out of clinic and covering provider may not be comfortable prescribing medication or writing letter.  Discussed that if pt develops worsening of hypomania and/or sleep over the weekend, would recommend calling on-call provider.  Pt is agreeable to plan and reports to have access to the on-call number.

## 2018-01-12 ENCOUNTER — TELEPHONE (OUTPATIENT)
Dept: PSYCHIATRY | Facility: CLINIC | Age: 23
End: 2018-01-12

## 2018-01-12 NOTE — TELEPHONE ENCOUNTER
Appt history:  AKILAH  10/18/17  PEN  1/16/18    Returned call to pt:  LVM requesting call back.

## 2018-01-12 NOTE — TELEPHONE ENCOUNTER
1/11/15 at 1306:              Subhash Rodriguez Jodi L RN       Phone Number: [phone]                     Patient just moved out and is living in [facility].   Patient is having personal issues, just lost his job, and is feeling really down.  He says he currently feels safe. I told him you'd give him a call     OK to leave detailed voicemail message

## 2018-01-15 NOTE — TELEPHONE ENCOUNTER
OconnorBo MD   You    1/12/18 at 1008                 Sounds good. If you think he is depressed we could recommend increasing his LTG. He takes 25 AM + 50 HS. (I think he's c/o s/e at higher doses.) Adding 25 mg would be reasonable.

## 2018-01-16 ENCOUNTER — OFFICE VISIT (OUTPATIENT)
Dept: PSYCHIATRY | Facility: CLINIC | Age: 23
End: 2018-01-16
Attending: PSYCHIATRY & NEUROLOGY
Payer: COMMERCIAL

## 2018-01-16 VITALS
HEART RATE: 79 BPM | WEIGHT: 197.6 LBS | DIASTOLIC BLOOD PRESSURE: 91 MMHG | BODY MASS INDEX: 30.95 KG/M2 | SYSTOLIC BLOOD PRESSURE: 144 MMHG

## 2018-01-16 DIAGNOSIS — F31.30 BIPOLAR I DISORDER, MOST RECENT EPISODE DEPRESSED (H): ICD-10-CM

## 2018-01-16 DIAGNOSIS — F31.61 BIPOLAR DISORDER, CURRENT EPISODE MIXED, MILD (H): ICD-10-CM

## 2018-01-16 PROCEDURE — G0463 HOSPITAL OUTPT CLINIC VISIT: HCPCS | Mod: ZF

## 2018-01-16 RX ORDER — GABAPENTIN 100 MG/1
CAPSULE ORAL
Qty: 180 CAPSULE | Refills: 0 | Status: SHIPPED | OUTPATIENT
Start: 2018-01-16 | End: 2018-03-21

## 2018-01-16 RX ORDER — GABAPENTIN 400 MG/1
400 CAPSULE ORAL AT BEDTIME
Qty: 90 CAPSULE | Refills: 0 | Status: SHIPPED | OUTPATIENT
Start: 2018-01-16 | End: 2018-03-21

## 2018-01-16 RX ORDER — DIVALPROEX SODIUM 500 MG/1
2000 TABLET, EXTENDED RELEASE ORAL DAILY
Qty: 360 TABLET | Refills: 0 | Status: SHIPPED | OUTPATIENT
Start: 2018-01-16 | End: 2018-03-21

## 2018-01-16 ASSESSMENT — PATIENT HEALTH QUESTIONNAIRE - PHQ9: SUM OF ALL RESPONSES TO PHQ QUESTIONS 1-9: 21

## 2018-01-16 ASSESSMENT — PAIN SCALES - GENERAL: PAINLEVEL: NO PAIN (0)

## 2018-01-16 NOTE — NURSING NOTE
Chief Complaint   Patient presents with     Recheck Medication     Bipolar I disorder, most recent episode (or current) manic      Reviewed Allergies, Medications, Pharmacy, Smoking Status, and Pain Level  Administered Abuse Screening Questions   Obtained Weight, Blood Pressure, Heart Rate

## 2018-01-16 NOTE — PATIENT INSTRUCTIONS
Increase lamotrigine to 75 mg at night  Increase gabapentin to 600 mg at night  Continue other medications  Please cut down on your substance use  Nimco will contact you about MICD programs  Return in 6 weeks

## 2018-01-16 NOTE — MR AVS SNAPSHOT
After Visit Summary   1/16/2018    Laci Hinkle Jr    MRN: [id number]           Patient Information     Date Of Birth          1995        Visit Information        Provider Department      1/16/2018 1:00 PM Bo Oconnor MD Psychiatry Clinic        Today's Diagnoses     Bipolar disorder, current episode mixed, mild (H)        Bipolar I disorder, most recent episode depressed (H)          Care Instructions    Increase lamotrigine to 75 mg at night  Increase gabapentin to 600 mg at night  Continue other medications  Please cut down on your substance use  Nimco will contact you about MICD programs  Return in 6 weeks            Follow-ups after your visit        Follow-up notes from your care team     Return in about 6 weeks (around 2/27/2018).      Your next 10 appointments already scheduled     Feb 20, 2018  2:00 PM CST   Adult Med Follow UP with Bo Oconnor MD   Psychiatry Clinic ([facility])    [address] [facility] [address] [phone]              Who to contact     Please call your clinic at [phone] to:    Ask questions about your health    Make or cancel appointments    Discuss your medicines    Learn about your test results    Speak to your doctor   If you have compliments or concerns about an experience at your clinic, or if you wish to file a complaint, please contact [facility] Patient Relations at [phone] or email us at [email or web address]         Additional Information About Your Visit        MyChart Information     [employer] is an electronic gateway that provides easy, online access to your medical records. With [employer], you can request a clinic appointment, read your test results, renew a prescription or communicate with your care team.     To sign up for [employer] visit the website at [email or web address]   You will be asked to enter the access code  listed below, as well as some personal information. Please follow the directions to create your username and password.     Your access code is: [email or web address]  Expires: 2018  1:41 PM     Your access code will  in 90 days. If you need help or a new code, please contact your [facility] or call [phone] for assistance.        Care EveryWhere ID     This is your Care EveryWhere ID. This could be used by other organizations to access your [place] medical records  [id number]        Your Vitals Were     Pulse BMI (Body Mass Index)                79 30.95 kg/m2           Blood Pressure from Last 3 Encounters:   18 (!) 144/91   17 (!) 148/97   17 (!) 145/97    Weight from Last 3 Encounters:   18 89.6 kg (197 lb 9.6 oz)   17 100.2 kg (220 lb 12.8 oz)   17 101.5 kg (223 lb 12.8 oz)              Today, you had the following     No orders found for display         Where to get your medicines      Some of these will need a paper prescription and others can be bought over the counter.  Ask your nurse if you have questions.     Bring a paper prescription for each of these medications     divalproex 500 MG 24 hr tablet    gabapentin 100 MG capsule    gabapentin 400 MG capsule          Primary Care Provider Office Phone # Fax #    [facility] [phone]       [address] [place] [id number]        Equal Access to Services     AVELINO COLON : Hadii jc ku hadasho Soomaali, waaxda luqadaha, qaybta kaalmada adeegyajennifer, waxkya blanca ayala. So [facility] [phone].    ATENCIÓN: Si habla español, tiene a simons disposición servicios gratuitos de asistencia lingüística. Llame al [phone].    We comply with applicable federal civil rights laws and [place] laws. We do not discriminate on the basis of race, color, national origin, age, disability, sex, sexual orientation, or gender  identity.            Thank you!     Thank you for choosing PSYCHIATRY CLINIC  for your care. Our goal is always to provide you with excellent care. Hearing back from our patients is one way we can continue to improve our services. Please take a few minutes to complete the written survey that you may receive in the mail after your visit with us. Thank you!             Your Updated Medication List - Protect others around you: Learn how to safely use, store and throw away your medicines at www.disposemymeds.org.          This list is accurate as of: 1/16/18  1:41 PM.  Always use your most recent med list.                   Brand Name Dispense Instructions for use Diagnosis    cloNIDine 0.1 MG tablet    CATAPRES    180 tablet    Take 2 tabs (0.2 mg) po q HS.    Attention deficit hyperactivity disorder (ADHD), combined type, Bipolar disorder in full remission, most recent episode unspecified type (H), Generalized anxiety disorder       divalproex 500 MG 24 hr tablet    DEPAKOTE ER    360 tablet    Take 4 tablets (2,000 mg) by mouth daily    Bipolar disorder, current episode mixed, mild (H)       * gabapentin 100 MG capsule    NEURONTIN    180 capsule    Take 2 caps (200 mg) po q AM.  In addition, take one 400 mg cap at HS.  Daily total of 600 mg.    Bipolar I disorder, most recent episode depressed (H)       * gabapentin 400 MG capsule    NEURONTIN    90 capsule    Take 1 capsule (400 mg) by mouth At Bedtime .  In addition, take two 100 mg caps po q AM.  Daily total of 600 mg.    Bipolar I disorder, most recent episode depressed (H)       lamoTRIgine 25 MG tablet    LaMICtal    270 tablet    Take 1 in the morning and 2 at night (total 75 mg daily)    Bipolar I disorder, most recent episode (or current) manic (H)       nicotine polacrilex 2 MG gum    EQ NICOTINE    60 tablet    Place 1 each (2 mg) inside cheek as needed for smoking cessation    Tobacco abuse       omeprazole 20 MG CR capsule    priLOSEC    30 capsule     Take 1 capsule (20 mg) by mouth every morning (before breakfast)    Gastroesophageal reflux disease without esophagitis       * Notice:  This list has 2 medication(s) that are the same as other medications prescribed for you. Read the directions carefully, and ask your doctor or other care provider to review them with you.

## 2018-01-16 NOTE — PROGRESS NOTES
History and Physical    Laci Hinkle Jr. MRN# [id number]   Age: 22 year old YOB: 1995     Date of Assessment:  2018          Assessment:   This patient is a 21 year old  male with a significant past psychiatric history of  bipolar I disorder. He reports feeling well. He continues to use substances (THC, kratom, salvia) and these sometimes destabilize his mood into short-lived depression or hypomania, but otherwise he feels stable.   - I have encouraged him to cut down on his use & we will contact him about MICD programs  - He and his  will look into DBT programs  - I asked Laci (again) to get a blood test - it has been over a year  - He will increase lamotrigine to 75 mg daily for further mood stability, and gabapentin to 600 mg HS (he has been taking 400 mg) to target anxiety  - He will continue his other usual medications at their usual doses.   - He sees a therapist 2x per week.     He will return in 6 weeks. He will get a blood test to check his VPA level and other routine labs.    Attestation:  Patient has been seen and evaluated by me, Bo Oconnor MD, PhD.    I spent a total time of 25 minutes face-to-face with the patient during today s office visit.  Over 50% of this time was spent counseling the patient and/or coordinating care regarding medication management, counseling re substance use..          Diagnoses:   Axis I: Bipolar I disorder, currently euthymic; polysubstance dependence, THC and alcohol, currently in short-term remission     Axis II: Deferred    Axis III: Medication-induced weight gain; hypertension    Axis IV: mild  psychosocial stressors     Axis V: Global Assessment of Functionin-60    Interim History   History is obtained from the patient and his  Anita who accompanied him today.     Since the last visit Laci has moved out of his mothers and into an apartment. He enjoys the independence but it has also been  stressful. He does not know his roommates well. This may have contributed to increased subatnce use including THC, kratom, salvia. He became depressed for a few days after a period of heavy THC use (this is when he called Nimco a couple of weeks ago). He used salvia yesterday and is mildly hypomanic today (apparent on exam also).     He wants to cut back on substance use but finds it hard. He is interested in hearing about MICD programs. He and Anita will look into DBT programs.    Anita feels that overall Laci is doing well. She has noticed increased stress/anxiety since the move and supports Laci getting DBT.     He is agreeable with the plan above. He will return in 6 weeks.      Review of Systems     A comprehensive review of systems was performed and is negative other than noted above.          Allergies:    No Known Allergies        Current Medications   Depakote 2000 mg HS  Gabapentin 400 mg qAM + 200 mg qHS  Clonidine 0.2 mg HS  Lamotrigine 50 mg HS    Mental Status Exam     Alertness: alert  and oriented  Appearance: adequately groomed  Behavior/Demeanor: cooperative, pleasant and calm, with good  eye contact  Speech: normal  Language: intact  Psychomotor: normal or unremarkable  Mood:  description consistent with euthymia  Affect: grandiose; was congruent to mood  Thought Process/Associations: unremarkable  Thought Content:  Denies active/passive suicidal ideation  Perception:  Denies hallucinations  Insight: fair  Judgment: fair  Cognition:  does appear grossly intact.

## 2018-01-17 ENCOUNTER — TELEPHONE (OUTPATIENT)
Dept: PSYCHIATRY | Facility: CLINIC | Age: 23
End: 2018-01-17

## 2018-01-17 DIAGNOSIS — Z51.81 ENCOUNTER FOR THERAPEUTIC DRUG MONITORING: ICD-10-CM

## 2018-01-17 DIAGNOSIS — F31.9 BIPOLAR DISORDER (H): Primary | ICD-10-CM

## 2018-01-17 NOTE — TELEPHONE ENCOUNTER
----- Message from Bo Oconnor MD sent at 1/16/2018  1:31 PM CST -----      Would you mind contacting Laci?    1.  He needs a blood test & he has moved so I'm not sure where to send the order. He needs:    CBC + diff  AST ALT  valproic acid level    2.  His  was with him today & wanted an updated med list but I didn't know how to print it off    3.  He is interested in hearing about the MICD programs we have here.

## 2018-01-17 NOTE — TELEPHONE ENCOUNTER
Called pt [phone] to follow-up on Dr. Oconnor's message.  LVM requesting call back.      Of note, pt can call [facility] at [phone] regarding #3 below.

## 2018-02-05 NOTE — TELEPHONE ENCOUNTER
"Returned call to pt:  1.  He needs these labs:     CBC + diff   AST ALT   valproic acid level (reminded of 12 hours post dose)  His current lab is at this facility.  Orders are placed.    2.  His  was with him today & wanted an updated med list but I didn't know how to print it off:  It's agreed writer will mail him this list and he will give it to CM.      3.  He is interested in hearing about the MICD programs we have here.  No longer interested stating \"I just smoke weed and drink a couple beers and drop some acid.\"  He is not interested in a recovery program at this time.    4.  DBT:    He is interested in learning about locations for DBT, but NOT at this clinic.  Preferably near [facility] where he lives.  Writer informed him a message will be routed to clinic  for DBT options meeting this criteria.     Routed to:  -Clinic  to assist with #4.    -Routed to barbara Robin.             "

## 2018-02-05 NOTE — TELEPHONE ENCOUNTER
2/2/18 at 1337:    barb/Kusum Adames, Nimco CHONG RN       Phone Number: [phone]                     Pt is the caller. He is hoping to speak with you about DBT referrals that you may have for him from Dr Oconnor. Please follow up when you can. Ok to leave detailed message.

## 2018-02-13 ENCOUNTER — TELEPHONE (OUTPATIENT)
Dept: PSYCHIATRY | Facility: CLINIC | Age: 23
End: 2018-02-13

## 2018-02-13 NOTE — TELEPHONE ENCOUNTER
Social Work  Outgoing Voicemail Message  [facility]      Left a detailed voicemail message for Laci Hinkle. SW introduced self and reason for call: to provide referral numbers for DBT.      Writer requested call back. Included writer's direct contact information and availability.     Plan: SW will follow up in 4 to 5 business days as needed.    If patient returns prior to contact, below is referral information:    [facility] (located in [facility]) [phone]  [facility] (located in [place]) [phone]  [facility] (located in [place]) [phone]        Lynne Perez, LUZ, LICSW

## 2018-02-20 ENCOUNTER — OFFICE VISIT (OUTPATIENT)
Dept: PSYCHIATRY | Facility: CLINIC | Age: 23
End: 2018-02-20
Attending: PSYCHIATRY & NEUROLOGY
Payer: COMMERCIAL

## 2018-02-20 VITALS
SYSTOLIC BLOOD PRESSURE: 133 MMHG | DIASTOLIC BLOOD PRESSURE: 85 MMHG | HEART RATE: 51 BPM | BODY MASS INDEX: 31.14 KG/M2 | WEIGHT: 198.8 LBS

## 2018-02-20 DIAGNOSIS — F90.2 ATTENTION DEFICIT HYPERACTIVITY DISORDER (ADHD), COMBINED TYPE: ICD-10-CM

## 2018-02-20 DIAGNOSIS — F12.20 CANNABIS DEPENDENCE (H): ICD-10-CM

## 2018-02-20 DIAGNOSIS — F31.9 BIPOLAR DISORDER (H): ICD-10-CM

## 2018-02-20 DIAGNOSIS — F31.30 BIPOLAR I DISORDER, MOST RECENT EPISODE DEPRESSED (H): Primary | ICD-10-CM

## 2018-02-20 DIAGNOSIS — Z51.81 ENCOUNTER FOR THERAPEUTIC DRUG MONITORING: ICD-10-CM

## 2018-02-20 DIAGNOSIS — F41.1 GENERALIZED ANXIETY DISORDER: ICD-10-CM

## 2018-02-20 LAB
ALT SERPL W P-5'-P-CCNC: 27 U/L (ref 0–70)
AST SERPL W P-5'-P-CCNC: 16 U/L (ref 0–45)
BASOPHILS # BLD AUTO: 0.1 10E9/L (ref 0–0.2)
BASOPHILS NFR BLD AUTO: 0.6 %
DIFFERENTIAL METHOD BLD: NORMAL
EOSINOPHIL # BLD AUTO: 0.5 10E9/L (ref 0–0.7)
EOSINOPHIL NFR BLD AUTO: 5.7 %
ERYTHROCYTE [DISTWIDTH] IN BLOOD BY AUTOMATED COUNT: 13.2 % (ref 10–15)
HCT VFR BLD AUTO: 46.7 % (ref 40–53)
HGB BLD-MCNC: 15.6 G/DL (ref 13.3–17.7)
IMM GRANULOCYTES # BLD: 0 10E9/L (ref 0–0.4)
IMM GRANULOCYTES NFR BLD: 0.2 %
LYMPHOCYTES # BLD AUTO: 3.9 10E9/L (ref 0.8–5.3)
LYMPHOCYTES NFR BLD AUTO: 43.5 %
MCH RBC QN AUTO: 31.3 PG (ref 26.5–33)
MCHC RBC AUTO-ENTMCNC: 33.4 G/DL (ref 31.5–36.5)
MCV RBC AUTO: 94 FL (ref 78–100)
MONOCYTES # BLD AUTO: 0.8 10E9/L (ref 0–1.3)
MONOCYTES NFR BLD AUTO: 8.9 %
NEUTROPHILS # BLD AUTO: 3.7 10E9/L (ref 1.6–8.3)
NEUTROPHILS NFR BLD AUTO: 41.1 %
NRBC # BLD AUTO: 0 10*3/UL
NRBC BLD AUTO-RTO: 0 /100
PLATELET # BLD AUTO: 224 10E9/L (ref 150–450)
RBC # BLD AUTO: 4.98 10E12/L (ref 4.4–5.9)
VALPROATE SERPL-MCNC: 91 MG/L (ref 50–100)
WBC # BLD AUTO: 8.9 10E9/L (ref 4–11)

## 2018-02-20 PROCEDURE — 85025 COMPLETE CBC W/AUTO DIFF WBC: CPT | Performed by: PSYCHIATRY & NEUROLOGY

## 2018-02-20 PROCEDURE — G0463 HOSPITAL OUTPT CLINIC VISIT: HCPCS | Mod: ZF

## 2018-02-20 PROCEDURE — 84460 ALANINE AMINO (ALT) (SGPT): CPT | Performed by: PSYCHIATRY & NEUROLOGY

## 2018-02-20 PROCEDURE — 84450 TRANSFERASE (AST) (SGOT): CPT | Performed by: PSYCHIATRY & NEUROLOGY

## 2018-02-20 PROCEDURE — 80164 ASSAY DIPROPYLACETIC ACD TOT: CPT | Performed by: PSYCHIATRY & NEUROLOGY

## 2018-02-20 PROCEDURE — 36415 COLL VENOUS BLD VENIPUNCTURE: CPT | Performed by: PSYCHIATRY & NEUROLOGY

## 2018-02-20 ASSESSMENT — PAIN SCALES - GENERAL: PAINLEVEL: NO PAIN (0)

## 2018-02-20 NOTE — PROGRESS NOTES
" History and Physical    Laci Hinkle Jr. MRN# [id number]   Age: 22 year old YOB: 1995     Date of Assessment:  Feb 20, 2018          Assessment:     This patient is a 21 year old  male with a significant past psychiatric history of  bipolar I disorder. He reports feeling a little down.  He feels this may be a \"therapy-related thing\", as he is addressing a lot of past traumas with his therapist, and this is bringing up negative emotions for him.  Nonetheless, he does feel that therapy is beneficial for him.  He also continues to use substances (THC, kratom, salvia) and these sometimes destabilize his mood into short-lived depression or hypomania, but otherwise he feels stable.   - I have encouraged him to cut down on his use.  We recently contacted him about MICD programs, but he is not interested in pursuing this right now.  - He and his  will look into DBT programs  - Laci got his blood test today and I will review it when the results become available.  - He has recently increased lamotrigine to 75 mg daily for further mood stability, and gabapentin to 600 mg HS (he was taking 400 mg) to target anxiety.  We will give this a few weeks to see how effective the changes are before we make any further dose adjustments.  - He will continue his other usual medications at their usual doses.   - He sees a therapist 2x per week.     He will return in 4 weeks.     Attestation:  Patient has been seen and evaluated by me, Bo Oconnor MD, PhD.    I spent a total time of 25 minutes face-to-face with the patient during today s office visit.  Over 50% of this time was spent counseling the patient and/or coordinating care regarding medication management, counseling re substance use..          Diagnoses:   Axis I: Bipolar I disorder, currently euthymic; polysubstance dependence, THC and alcohol, currently in short-term remission     Axis II: Deferred    Axis III: Medication-induced " "weight gain; hypertension    Axis IV: mild  psychosocial stressors     Axis V: Global Assessment of Functionin-60    Interim History   Since the last visit, Laci reports that his mood has been a little bit on the low side.  He feels that this may be related to therapy.  He and his therapist are meeting twice weekly, and he is delving into past traumas.  This brings up negative emotions for him.  Nonetheless he feels that therapy is beneficial for him.  He has been attending for a year now, and feels comfortable with a therapist.  He is trying to \"open up more\" and be more fully honest with the therapist.    Laci is happy with his new accommodations.  Work is also going reasonably well.  Laci does continue to use substances, particularly cannabis, kratok, and Salvia, particularly cannabis.  He realizes it will be beneficial for him to cut back or stop, but he finds it hard to do this.  He is aware that when he uses cannabis he is less social, and being isolated can also contribute to his low mood.  This has been an ongoing problem.  Patch is not interested in treatment for substance use at this point in time.    Laci feels that increasing lamotrigine has been beneficial.  He denies any side effects, and particularly denies any rash or skin changes.  He is agreeable to leave the lamotrigine at its current dose for a few weeks to assess its effectiveness.  He is aware that the risk of rash increases if he increases the dose too quickly.    He is agreeable with the plan above. He will return in 4 weeks.      Review of Systems     A comprehensive review of systems was performed and is negative other than noted above.          Allergies:    No Known Allergies        Current Medications   Depakote 2000 mg HS  Gabapentin 400 mg qAM + 200 mg qHS  Clonidine 0.2 mg HS  Lamotrigine 50 mg HS    Mental Status Exam     Alertness: alert  and oriented  Appearance: adequately groomed  Behavior/Demeanor: cooperative, " pleasant and calm, with good  eye contact  Speech: normal  Language: intact  Psychomotor: normal or unremarkable  Mood:  description consistent with euthymia  Affect: grandiose; was congruent to mood  Thought Process/Associations: unremarkable  Thought Content:  Denies active/passive suicidal ideation  Perception:  Denies hallucinations  Insight: fair  Judgment: fair  Cognition:  does appear grossly intact.

## 2018-02-20 NOTE — NURSING NOTE
Chief Complaint   Patient presents with     Recheck Medication     Bipolar disorder     Reviewed allergies, medications, pharmacy and smoking status.  Administered abuse screening questions     Obtained weight, pain level, blood pressure and heart rate

## 2018-02-20 NOTE — MR AVS SNAPSHOT
After Visit Summary   2018    Laci Hinkle Jr    MRN: [id number]           Patient Information     Date Of Birth          1995        Visit Information        Provider Department      2018 2:00 PM Bo Oconnor MD [facility]        Today's Diagnoses     Bipolar I disorder, most recent episode depressed (H)    -  1    Cannabis dependence (H)        Generalized anxiety disorder        Attention deficit hyperactivity disorder (ADHD), combined type          Care Instructions    Continue usual medications  Return in 1 month          Follow-ups after your visit        Follow-up notes from your care team     Return in about 4 weeks (around 3/20/2018).      Your next 10 appointments already scheduled     Mar 21, 2018  2:30 PM CDT   Adult Med Follow UP with Bo Oconnor MD   Psychiatry Clinic ([facility])    [place] [id number] [address] [phone]              Who to contact     Please call your clinic at [phone] to:    Ask questions about your health    Make or cancel appointments    Discuss your medicines    Learn about your test results    Speak to your doctor            Additional Information About Your Visit        MyChart Information     [employer] is an electronic gateway that provides easy, online access to your medical records. With [employer], you can request a clinic appointment, read your test results, renew a prescription or communicate with your care team.     To sign up for [employer] visit the website at [email or web address]   You will be asked to enter the access code listed below, as well as some personal information. Please follow the directions to create your username and password.     Your access code is: [email or web address]  Expires: 2018  1:41 PM     Your access code will  in 90 days. If you need help or a new code, please contact your [facility]  or call [phone] for assistance.        Care EveryWhere ID     This is your Care EveryWhere ID. This could be used by other organizations to access your [facility] medical records  [id number]        Your Vitals Were     Pulse BMI (Body Mass Index)                51 31.14 kg/m2           Blood Pressure from Last 3 Encounters:   02/20/18 133/85   01/16/18 (!) 144/91   08/16/17 (!) 148/97    Weight from Last 3 Encounters:   02/20/18 90.2 kg (198 lb 12.8 oz)   01/16/18 89.6 kg (197 lb 9.6 oz)   08/16/17 100.2 kg (220 lb 12.8 oz)              Today, you had the following     No orders found for display       Primary Care Provider Office Phone # Fax #    [employer] [facility] [phone]       [address] [address]        Equal Access to Services     AVELINO COLON : Hadii jc kowalski Sokarlene, waaxda luqadaha, qaybta kaalmada aderaviyajennifer, anupam coto . So [facility] [phone].    ATENCIÓN: Si finnla espbarry, tiene a simons disposición servicios gratuitos de asistencia lingüística. Llame al [phone].    We comply with applicable federal civil rights laws and [place] laws. We do not discriminate on the basis of race, color, national origin, age, disability, sex, sexual orientation, or gender identity.            Thank you!     Thank you for choosing [facility]  for your care. Our goal is always to provide you with excellent care. Hearing back from our patients is one way we can continue to improve our services. Please take a few minutes to complete the written survey that you may receive in the mail after your visit with us. Thank you!             Your Updated Medication List - Protect others around you: Learn how to safely use, store and throw away your medicines at www.disposemymeds.org.          This list is accurate as of 2/20/18  2:33 PM.  Always use your most recent med list.                   Brand Name Dispense Instructions for use  Diagnosis    cloNIDine 0.1 MG tablet    CATAPRES    180 tablet    Take 2 tabs (0.2 mg) po q HS.    Attention deficit hyperactivity disorder (ADHD), combined type, Bipolar disorder in full remission, most recent episode unspecified type (H), Generalized anxiety disorder       divalproex sodium extended-release 500 MG 24 hr tablet    DEPAKOTE ER    360 tablet    Take 4 tablets (2,000 mg) by mouth daily    Bipolar disorder, current episode mixed, mild (H)       * gabapentin 100 MG capsule    NEURONTIN    180 capsule    Take 2 caps (200 mg) po q AM.  In addition, take one 400 mg cap at HS.  Daily total of 600 mg.    Bipolar I disorder, most recent episode depressed (H)       * gabapentin 400 MG capsule    NEURONTIN    90 capsule    Take 1 capsule (400 mg) by mouth At Bedtime .  In addition, take two 100 mg caps po q AM.  Daily total of 600 mg.    Bipolar I disorder, most recent episode depressed (H)       lamoTRIgine 25 MG tablet    LaMICtal    270 tablet    Take 1 in the morning and 2 at night (total 75 mg daily)    Bipolar I disorder, most recent episode (or current) manic (H)       nicotine polacrilex 2 MG gum    EQ NICOTINE    60 tablet    Place 1 each (2 mg) inside cheek as needed for smoking cessation    Tobacco abuse       omeprazole 20 MG CR capsule    priLOSEC    30 capsule    Take 1 capsule (20 mg) by mouth every morning (before breakfast)    Gastroesophageal reflux disease without esophagitis       * Notice:  This list has 2 medication(s) that are the same as other medications prescribed for you. Read the directions carefully, and ask your doctor or other care provider to review them with you.

## 2018-02-21 ASSESSMENT — PATIENT HEALTH QUESTIONNAIRE - PHQ9: SUM OF ALL RESPONSES TO PHQ QUESTIONS 1-9: 20

## 2018-03-09 DIAGNOSIS — F31.70 BIPOLAR DISORDER IN FULL REMISSION, MOST RECENT EPISODE UNSPECIFIED TYPE (H): ICD-10-CM

## 2018-03-09 DIAGNOSIS — F31.10 BIPOLAR I DISORDER, MOST RECENT EPISODE (OR CURRENT) MANIC (H): ICD-10-CM

## 2018-03-09 DIAGNOSIS — F90.2 ATTENTION DEFICIT HYPERACTIVITY DISORDER (ADHD), COMBINED TYPE: ICD-10-CM

## 2018-03-09 DIAGNOSIS — F41.1 GENERALIZED ANXIETY DISORDER: ICD-10-CM

## 2018-03-09 RX ORDER — LAMOTRIGINE 25 MG/1
75 TABLET ORAL AT BEDTIME
Qty: 90 TABLET | Refills: 0 | Status: SHIPPED | OUTPATIENT
Start: 2018-03-09 | End: 2018-03-21

## 2018-03-09 RX ORDER — CLONIDINE HYDROCHLORIDE 0.1 MG/1
TABLET ORAL
Qty: 60 TABLET | Refills: 0 | Status: SHIPPED | OUTPATIENT
Start: 2018-03-09 | End: 2018-03-21

## 2018-03-09 NOTE — TELEPHONE ENCOUNTER
Medication requested: Lamictal 25 mg tabs  Last refilled: 12-22-17  Qty: 90      Last seen: 2-20-18  RTC: 4 wks  Cancel: 0  No-show: 0  Next appt: 3-21-18    Refill decision:   Pended for 30 days per protocol and routed to clinic RN as pt should have been done with medication approximately 1-23-18.      Kathleen M Doege RN

## 2018-03-09 NOTE — TELEPHONE ENCOUNTER
Spoke with pt.  He denies being off Lamictal for any period of time.  He is aware of risks of abruptly starting/stopping this med.  Explains gap in refill was from obtaining refills from a different pharmacy ([facility]).  Confirms he is taking 75 mg qhs.  Is also needing refill of Clonidine.  A month supply of each medication is sent to  Pharmacy per protocol.

## 2018-03-21 ENCOUNTER — OFFICE VISIT (OUTPATIENT)
Dept: PSYCHIATRY | Facility: CLINIC | Age: 23
End: 2018-03-21
Attending: PSYCHIATRY & NEUROLOGY
Payer: COMMERCIAL

## 2018-03-21 VITALS
SYSTOLIC BLOOD PRESSURE: 147 MMHG | DIASTOLIC BLOOD PRESSURE: 88 MMHG | HEART RATE: 75 BPM | WEIGHT: 192.2 LBS | BODY MASS INDEX: 30.1 KG/M2

## 2018-03-21 DIAGNOSIS — F31.10 BIPOLAR I DISORDER, MOST RECENT EPISODE (OR CURRENT) MANIC (H): ICD-10-CM

## 2018-03-21 DIAGNOSIS — F31.70 BIPOLAR DISORDER IN FULL REMISSION, MOST RECENT EPISODE UNSPECIFIED TYPE (H): ICD-10-CM

## 2018-03-21 DIAGNOSIS — F31.30 BIPOLAR I DISORDER, MOST RECENT EPISODE DEPRESSED (H): ICD-10-CM

## 2018-03-21 DIAGNOSIS — F31.61 BIPOLAR DISORDER, CURRENT EPISODE MIXED, MILD (H): ICD-10-CM

## 2018-03-21 DIAGNOSIS — F90.2 ATTENTION DEFICIT HYPERACTIVITY DISORDER (ADHD), COMBINED TYPE: ICD-10-CM

## 2018-03-21 DIAGNOSIS — F41.1 GENERALIZED ANXIETY DISORDER: ICD-10-CM

## 2018-03-21 PROCEDURE — G0463 HOSPITAL OUTPT CLINIC VISIT: HCPCS | Mod: ZF

## 2018-03-21 RX ORDER — LAMOTRIGINE 25 MG/1
100 TABLET ORAL AT BEDTIME
Qty: 120 TABLET | Refills: 0 | Status: SHIPPED | OUTPATIENT
Start: 2018-03-21 | End: 2018-05-07

## 2018-03-21 RX ORDER — DIVALPROEX SODIUM 500 MG/1
2000 TABLET, EXTENDED RELEASE ORAL DAILY
Qty: 360 TABLET | Refills: 0 | Status: SHIPPED | OUTPATIENT
Start: 2018-03-21 | End: 2018-05-16

## 2018-03-21 RX ORDER — CLONIDINE HYDROCHLORIDE 0.1 MG/1
TABLET ORAL
Qty: 60 TABLET | Refills: 0 | Status: SHIPPED | OUTPATIENT
Start: 2018-03-21 | End: 2018-05-11

## 2018-03-21 RX ORDER — GABAPENTIN 100 MG/1
CAPSULE ORAL
Qty: 180 CAPSULE | Refills: 0 | Status: SHIPPED | OUTPATIENT
Start: 2018-03-21 | End: 2018-05-15

## 2018-03-21 RX ORDER — GABAPENTIN 400 MG/1
400 CAPSULE ORAL AT BEDTIME
Qty: 90 CAPSULE | Refills: 0 | Status: SHIPPED | OUTPATIENT
Start: 2018-03-21 | End: 2018-05-15

## 2018-03-21 ASSESSMENT — PAIN SCALES - GENERAL: PAINLEVEL: NO PAIN (0)

## 2018-03-21 NOTE — PATIENT INSTRUCTIONS
Increase lamotrigine to 100 mg at night  Continue your other usual medications  Return in 1 month    Thank you for coming to the PSY[facility].    Lab Testing:  If you had lab testing today and your results are reassuring or normal they will be mailed to you or sent through [facility] within 7 days.   If the lab tests need quick action we will call you with the results.  The phone number we will call with results is # [phone] (home) . If this is not the best number please call our clinic and change the number.    Medication Refills:  If you need any refills please call your pharmacy and they will contact us. Our fax number for refills is [phone]. Please allow three business for refill processing.   If you need to  your refill at a new pharmacy, please contact the new pharmacy directly. The new pharmacy will help you get your medications transferred.     Scheduling:  If you have any concerns about today's visit or wish to schedule another appointment please call our office during normal business hours [phone] (8-5:00 M-F)    Contact Us:  Please call [phone] during business hours (8-5:00 M-F).  If after clinic hours, or on the weekend, please call  [phone].    Financial Assistance [phone]  [facility] [phone]  [place] Billing [phone]  Medical Records [phone]      MENTAL HEALTH CRISIS NUMBERS:  [facility]:   [facility] - [phone]   Crisis Residence [facility] - [phone]   Walk-In Counseling [facility] [phone]   COPE 24/7 [facility] Mobile Team for Adults - [[phone]]; Child - [[phone]]     Crisis Connection - [phone]     [facility]:   [facility] - [phone]   Walk-in counseling [facility] - [phone]   Walk-in counseling [facility] - [phone]   Crisis Residence [facility] - [phone]   Urgent Care Adult  Mental Health:   --Drop-in, 24/7 crisis line, and [facility] Mobile Team [[phone]]    CRISIS TEXT LINE: Text [phone] from anywhere, anytime, any crisis 24/7;    OR SEE [email or web address]     Poison Control Center - [phone]    CHILD: [facility] needs assessment team - [phone]     [facility] Lifeline - [phone]; or [facility] Lifeline - [phone]    If you have a medical emergency please call 911or go to the nearest ER.                    _____________________________________________    Again thank you for choosing [facility] and please let us know how we can best partner with you to improve you and your family's health.  You may be receiving a survey in the mail regarding this appointment. We would love to have your feedback, both positive and negative, so please fill out the survey and return it using the provided envolpe. The survey is done by an external company, so your answers are anonymous.

## 2018-03-21 NOTE — PROGRESS NOTES
History and Physical    Laci Hinkle Jr. MRN# [id number]   Age: 22 year old YOB: 1995     Date of Assessment:  2018          Assessment:     Since last visit, Laci has been adherent with his usual medications.  He increased his lamotrigine to 75 mg at night.  He feels it has been helpful in stabilizing his mood.  He feels that his highs are less high, and his lows less low, though they still occur.  He does describe some possible cognitive side effects from lamotrigine, but they tend to attenuate after each dose increase.  He is agreeable to increase lamotrigine further to 100 mg at bedtime.  Given that he is also taking Depakote, the effective dose range should be between 100-250 mg daily.  Laci's most recent Depakote level was 91, well inside the therapeutic range.  He will continue his other usual medications, and will return in 1 month.  He remains uninterested in MICD treatment.    He will return in 4 weeks.     Attestation:  Patient has been seen and evaluated by me, Bo Oconnor MD, PhD.    I spent a total time of 25 minutes face-to-face with the patient during today s office visit.  Over 50% of this time was spent counseling the patient and/or coordinating care regarding medication management, counseling re substance use.          Diagnoses:   Axis I: Bipolar I disorder, currently euthymic; polysubstance dependence, THC and alcohol, currently in short-term remission     Axis II: Deferred    Axis III: Medication-induced weight gain; hypertension    Axis IV: mild  psychosocial stressors     Axis V: Global Assessment of Functionin-60    Interim History     Since the last visit, Laci reports that things have mostly been going well.  His mood is good most of the time.  He does still experience relatively mild depressive and hypomanic periods, but they are clearly better since he has been taking lamotrigine.  He does note some cognitive side effects from it.  He  feels it is harder to think through things, and he sometimes has word finding difficulties.  He otherwise tolerates the medication well, and the side effects tend to attenuate with time.    Laci is continuing to use cannabis, but he denies any recent hallucinogen use.  He views the cannabis as being a helpful mood stabilizing medication also.  He is not interested in MI CD treatment currently.    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  He hopes to return to college in the fall or winter.    Laci is agreeable with the above suggestions regarding his medications. He will return in 4 weeks.      Review of Systems     A comprehensive review of systems was performed and is negative other than noted above.          Allergies:    No Known Allergies        Current Medications   Depakote 2000 mg HS  Gabapentin 400 mg qAM + 200 mg qHS  Clonidine 0.2 mg HS  Lamotrigine 50 mg HS    Mental Status Exam     Alertness: alert  and oriented  Appearance: adequately groomed  Behavior/Demeanor: cooperative, pleasant and calm, with good  eye contact  Speech: normal  Language: intact  Psychomotor: normal or unremarkable  Mood:  description consistent with euthymia  Affect: full-range; was congruent to mood  Thought Process/Associations: unremarkable  Thought Content:  Denies active/passive suicidal ideation  Perception:  Denies hallucinations  Insight: fair  Judgment: fair  Cognition:  does appear grossly intact.

## 2018-03-21 NOTE — NURSING NOTE
Chief Complaint   Patient presents with     Recheck Medication     Bipolar I disorder, most recent episode depressed (H)     Reviewed Allergies, Medications, Pharmacy, Smoking Status, and Pain Level     Obtained Weight, Blood Pressure, Heart Rate x2

## 2018-03-21 NOTE — MR AVS SNAPSHOT
After Visit Summary   3/21/2018    Laci Hinkle Jr    MRN: [id number]           Patient Information     Date Of Birth          1995        Visit Information        Provider Department      3/21/2018 2:30 PM Bo Oconnor MD [facility]        Today's Diagnoses     Bipolar I disorder, most recent episode (or current) manic (H)        Bipolar disorder, current episode mixed, mild (H)        Attention deficit hyperactivity disorder (ADHD), combined type        Bipolar disorder in full remission, most recent episode unspecified type (H)        Generalized anxiety disorder        Bipolar I disorder, most recent episode depressed (H)          Care Instructions    Thank you for coming to the [facility]RY [facility].    Lab Testing:  If you had lab testing today and your results are reassuring or normal they will be mailed to you or sent through [employer] within 7 days.   If the lab tests need quick action we will call you with the results.  The phone number we will call with results is # [phone] (home) . If this is not the best number please call our clinic and change the number.    Medication Refills:  If you need any refills please call your pharmacy and they will contact us. Our fax number for refills is [phone]. Please allow three business for refill processing.   If you need to  your refill at a new pharmacy, please contact the new pharmacy directly. The new pharmacy will help you get your medications transferred.     Scheduling:  If you have any concerns about today's visit or wish to schedule another appointment please call our office during normal business hours [phone] (8-5:00 M-F)    Contact Us:  Please call [phone] during business hours (8-5:00 M-F).  If after clinic hours, or on the weekend, please call  [phone].    Financial Assistance [phone]  [facility] [phone]  [facility] [phone]  Medical Records [phone]      MENTAL  HEALTH CRISIS NUMBERS:  [facility]:   [facility] - [phone]   Crisis Residence [facility] - [phone]   Walk-In Counseling Center hospitals - [phone]   COPE 24/7 [place] Mobile Team for Adults - [[phone]]; Child - [[phone]]     Crisis Connection - [phone]     [facility] - [phone]   Walk-in counseling [facility] - [phone]   Walk-in counseling [facility] - [phone]   Crisis Residence [facility] - [phone]   Urgent Care Adult Mental Health:   --Drop-in, 24/7 crisis line, and [place] Mobile Team [[phone]]    CRISIS TEXT LINE: Text [phone] from anywhere, anytime, any crisis 24/7;    OR SEE www.crisistextline.org     Poison Control Center - [phone]    CHILD: [facility] needs assessment team - [phone]     [facility] Lifeline - [phone]; or [facility] Lifeline - 1-232.840.9230    If you have a medical emergency please call 911or go to the nearest ER.                    _____________________________________________    Again thank you for choosing PSY[facility]ATRY CLINIC and please let us know how we can best partner with you to improve you and your family's health.  You may be receiving a survey in the mail regarding this appointment. We would love to have your feedback, both positive and negative, so please fill out the survey and return it using the provided envolpe. The survey is done by an external company, so your answers are anonymous.               Follow-ups after your visit        Your next 10 appointments already scheduled     Apr 18, 2018  3:00 PM CDT   Adult Med Follow UP with Bo Oconnor MD   Psychiatry Clinic ([facility])    [address] [address] [phone]              Who to contact     Please call your clinic at [phone] to:    Ask  questions about your health    Make or cancel appointments    Discuss your medicines    Learn about your test results    Speak to your doctor            Additional Information About Your Visit        bewarkethart Information     [employer] is an electronic gateway that provides easy, online access to your medical records. With [employer], you can request a clinic appointment, read your test results, renew a prescription or communicate with your care team.     To sign up for [employer] visit the website at [email or web address]   You will be asked to enter the access code listed below, as well as some personal information. Please follow the directions to create your username and password.     Your access code is: [email or web address]  Expires: 2018  2:41 PM     Your access code will  in 90 days. If you need help or a new code, please contact your [facility] or call [phone] for assistance.        Care EveryWhere ID     This is your Care EveryWhere ID. This could be used by other organizations to access your [facility] medical records  [id number]        Your Vitals Were     Pulse BMI (Body Mass Index)                75 30.1 kg/m2           Blood Pressure from Last 3 Encounters:   18 147/88   18 133/85   18 (!) 144/91    Weight from Last 3 Encounters:   18 87.2 kg (192 lb 3.2 oz)   18 90.2 kg (198 lb 12.8 oz)   18 89.6 kg (197 lb 9.6 oz)              Today, you had the following     No orders found for display         Today's Medication Changes          These changes are accurate as of 3/21/18  2:55 PM.  If you have any questions, ask your nurse or doctor.               These medicines have changed or have updated prescriptions.        Dose/Directions    lamoTRIgine 25 MG tablet   Commonly known as:  LaMICtal   This may have changed:  how much to take   Used for:  Bipolar I disorder, most recent episode (or current) manic (H)   Changed by:  Bo Oconnor  MD Rony        Dose:  100 mg   Take 4 tablets (100 mg) by mouth At Bedtime   Quantity:  120 tablet   Refills:  0            Where to get your medicines      These medications were sent to [facility] [place], [place] - [address]  [address], [facility] [address]     Phone:  [phone]     cloNIDine 0.1 MG tablet    divalproex sodium extended-release 500 MG 24 hr tablet    gabapentin 100 MG capsule    gabapentin 400 MG capsule    lamoTRIgine 25 MG tablet                Primary Care Provider Office Phone # Fax #    [facility] [phone]       [address] [facility] [address]        Equal Access to Services     AVELINO COLON : Edwardo kowalski Sokarlene, wadominikda alla, qaybta kaalmada aderaviyajennifer, anupam ayala. So [facility] [phone].    ATENCIÓN: Si habla español, tiene a simons disposición servicios gratuitos de asistencia lingüística. Llame al [phone].    We comply with applicable federal civil rights laws and [place] laws. We do not discriminate on the basis of race, color, national origin, age, disability, sex, sexual orientation, or gender identity.            Thank you!     Thank you for choosing PS[facility]RY CLINIC  for your care. Our goal is always to provide you with excellent care. Hearing back from our patients is one way we can continue to improve our services. Please take a few minutes to complete the written survey that you may receive in the mail after your visit with us. Thank you!             Your Updated Medication List - Protect others around you: Learn how to safely use, store and throw away your medicines at www.disposemymeds.org.          This list is accurate as of 3/21/18  2:55 PM.  Always use your most recent med list.                   Brand Name Dispense Instructions for use Diagnosis    cloNIDine 0.1 MG tablet    CATAPRES    60 tablet    Take 2 tabs (0.2 mg) po q HS.     Attention deficit hyperactivity disorder (ADHD), combined type, Bipolar disorder in full remission, most recent episode unspecified type (H), Generalized anxiety disorder       divalproex sodium extended-release 500 MG 24 hr tablet    DEPAKOTE ER    360 tablet    Take 4 tablets (2,000 mg) by mouth daily    Bipolar disorder, current episode mixed, mild (H)       * gabapentin 400 MG capsule    NEURONTIN    90 capsule    Take 1 capsule (400 mg) by mouth At Bedtime .  In addition, take two 100 mg caps po q AM.  Daily total of 600 mg.    Bipolar I disorder, most recent episode depressed (H)       * gabapentin 100 MG capsule    NEURONTIN    180 capsule    Take 2 caps (200 mg) po q AM.  In addition, take one 400 mg cap at HS.  Daily total of 600 mg.    Bipolar I disorder, most recent episode depressed (H)       lamoTRIgine 25 MG tablet    LaMICtal    120 tablet    Take 4 tablets (100 mg) by mouth At Bedtime    Bipolar I disorder, most recent episode (or current) manic (H)       nicotine polacrilex 2 MG gum    EQ NICOTINE    60 tablet    Place 1 each (2 mg) inside cheek as needed for smoking cessation    Tobacco abuse       omeprazole 20 MG CR capsule    priLOSEC    30 capsule    Take 1 capsule (20 mg) by mouth every morning (before breakfast)    Gastroesophageal reflux disease without esophagitis       * Notice:  This list has 2 medication(s) that are the same as other medications prescribed for you. Read the directions carefully, and ask your doctor or other care provider to review them with you.

## 2018-03-23 ASSESSMENT — PATIENT HEALTH QUESTIONNAIRE - PHQ9: SUM OF ALL RESPONSES TO PHQ QUESTIONS 1-9: 12

## 2018-03-26 ENCOUNTER — CARE COORDINATION (OUTPATIENT)
Dept: PSYCHIATRY | Facility: CLINIC | Age: 23
End: 2018-03-26

## 2018-03-26 NOTE — PROGRESS NOTES
"Spoke with pt who states he was seen at urgent care and dx with viral infection that could have progressed to pneumonia.  Pt was rx'd albuteral neb, prednisone inhaler, and oral prednisone.  Prednisone are 20 mg tabs with directions to take 3 tabs last night and then 2 tabs daily x 4 days and then stop.  Pt started medication yesterday and rather than taking the 3 tabs at once took 20 mg at 1600, 1800, and 2200.  Slept for 5 hours.  Woke this morning and took 20 mg at 0730.  Since starting medication pt feels more manic than usual describing that he has episodes which come and go where he has more energy, feels \"amped up\", irritable, impulsive (wanting to smoke cigarettes and drink coffee), and aggressive.  Feels like his moods are \"all over the place.\"  Symptoms seem more severe after taking prednisone and then \"level off\" as more time passes.  States he has been having racing thoughts but smoking marijuana helps with this.  Confirms he increased Lamictal to 100 mg daily last Thursday.  Denies any side effects from higher dose including rash.  Feels that Lamictal has been able to help him reduce MJ use.  Other meds include Depakote 2000 mg qhs, clonidine 0.2 mg qhs and gabapentin 600 mg qhs.  Prefers to take gabapentin all at night since the medication makes him feel relaxed but \"confused.\"  Pt asks if Dr. Oconnor has any recommendations in how he can take prednisone to help reduce side effects/mood fluctuations.  Will forward to Dr. Oconnor.  Pt confirms detailed msg can be left at [phone].  "

## 2018-03-26 NOTE — PROGRESS NOTES
side effects/Bond  Received: Today       Kusum Russell Katherine J RN       Phone Number: [phone]                     Pt is the caller. He is feeling really confrontational and irritable. He feels these are side effects from a medication. He said he feels safe, and is looking to discuss his medications and other options he may be able to try. Ok to leave detailed message

## 2018-03-26 NOTE — PROGRESS NOTES
Care Coordination  Received: Today       Angel Lucia Katherine J, RN       Phone Number: [phone] (Call me)                     Pt call 3/26/18/@10:52am regarding medication Prednisone. Pt asked the nurse could please give him a call. Would like to discuss taking medication.     Thanks

## 2018-03-26 NOTE — PROGRESS NOTES
Appt history:  Last seen:  3/21/18  RTC:  4 weeks  Cancel:  none  No-show:  none  Next appt:  4/18/18    At last appt:  Lamictal increased from 75 to 100 mg daily    Returned call to pt:  -No answer at number provided  -LVM requesting c/b to discuss further  -Clinic number provided

## 2018-03-27 NOTE — PROGRESS NOTES
"Spoke with pt who states that he took 1/2 tab of Prednisone (10 mg) yesterday and then decided to stop taking the medication d/t effect it was having on his mood.  Went to work yesterday and felt very irritable.  Today states that he has not taken any prednisone and feels he is \"coming out of it.\"  Not feeling manic but still feeling upset and irritable.  Shares that he is feeling better physically and will continue to use inhalers that were prescribed.  Pt has questions about potential medication interactions in the future if he was ever prescribed a new medication.  Pt states that he always makes sure that providers know what medications he is currently taking.  Discuss contacting clinic or pharmacy when there is ever concern for potential medication interaction.  Pt agrees to this.  Directed to go back to PCP/Urgent care if physical symptoms worsen or has any difficulty breathing.  With any MH concerns pt is encouraged to call clinic back. Will update Dr. Oconnor.  "

## 2018-03-27 NOTE — PROGRESS NOTES
Message  Received: Today       Bo Oconnor MD Blake, Katherine J RN       Caller: Unspecified (Yesterday,  9:32 AM)                     Hm, this is a tricky one.  Prednisone is known to trigger manic symptoms in predisposed individuals, but is also obviously necessary for Laci's respiratory infection.  I will suggest he continue it as prescribed for the time being.  His Depakote should protect him from becoming fully manic.  If his hypomanic symptoms persist, we could consider low-dose risperidone for a few days as an add-on to his current medications.     Thx!     DB

## 2018-05-07 ENCOUNTER — TELEPHONE (OUTPATIENT)
Dept: PSYCHIATRY | Facility: CLINIC | Age: 23
End: 2018-05-07

## 2018-05-07 DIAGNOSIS — F31.10 BIPOLAR I DISORDER, MOST RECENT EPISODE (OR CURRENT) MANIC (H): ICD-10-CM

## 2018-05-07 RX ORDER — LAMOTRIGINE 25 MG/1
100 TABLET ORAL AT BEDTIME
Qty: 120 TABLET | Refills: 0 | Status: SHIPPED | OUTPATIENT
Start: 2018-05-07 | End: 2018-05-15

## 2018-05-07 NOTE — TELEPHONE ENCOUNTER
Last seen: 3/21/18  RTC: 1 month  Cancel: none  No-show: 4/18/18  Next appt: 5/15/18    Incoming refill from pt via phone    Per refill protocol, msg sent to Dr. Oconnor for approval to refill a 30 d/s as pt missed appt in April.

## 2018-05-07 NOTE — TELEPHONE ENCOUNTER
Writer refilled 30 d/s to  pharmacy to avoid sudden discontinuation if pt has been taking regularly.      LVM for pt with this information.  Discussed that if he has been off medication for 3 days or more he should either call clinic or on-call resident, if after 5 pm, to obtain instructions for restarting.  Also shared that if pt would prefer to refill at a different pharmacy he can call the pharmacy he would like to use and have them transfer rx from  Pharmacy.  Numbers for clinic and on-call resident provided in message.  Will notify Dr. Oconnor.

## 2018-05-07 NOTE — TELEPHONE ENCOUNTER
med refill pls do 5/7 before 2pm   Received: Today       Mandi Cottrell, Tatum BIRD RN       Phone Number: [phone]                     Caller:  pt   Relationship to pt: self   Medication:   Lamichtal   How many days left of med do you have left (if >3 day supply, redirect to pharmacy): none   [facility] and [facility]   Pending appt date:  5/15/18   Okay to leave detailed VM:  Yes     Pt would like a call as soon as the refill order has been placed today.

## 2018-05-11 DIAGNOSIS — F31.70 BIPOLAR DISORDER IN FULL REMISSION, MOST RECENT EPISODE UNSPECIFIED TYPE (H): ICD-10-CM

## 2018-05-11 DIAGNOSIS — F90.2 ATTENTION DEFICIT HYPERACTIVITY DISORDER (ADHD), COMBINED TYPE: ICD-10-CM

## 2018-05-11 DIAGNOSIS — F41.1 GENERALIZED ANXIETY DISORDER: ICD-10-CM

## 2018-05-11 RX ORDER — CLONIDINE HYDROCHLORIDE 0.1 MG/1
TABLET ORAL
Qty: 60 TABLET | Refills: 0 | Status: SHIPPED | OUTPATIENT
Start: 2018-05-11 | End: 2018-06-13

## 2018-05-11 NOTE — TELEPHONE ENCOUNTER
Med Refill  Received: Today       Mandi Davis Katherine J RN       Phone Number: [phone]                     Caller:  Pt   Medication:  Clonodine   How many days left of med do you have left (if >3 day supply, redirect to pharmacy): Has been out for a few days   Pharmacy and location: [facility]   Pending appt date:  5/15   Okay to leave detailed VM: Yes

## 2018-05-11 NOTE — TELEPHONE ENCOUNTER
Last seen: 3/21/18  RTC: 1 month  Cancel: none  No-show: 4/18/18  Next appt: 5/15/18    Incoming refill from pt via phone    Refilled one month supply of clonidine to pt's pharmacy.     Pt notified and is reminded of appt on Tue at 3:00 pm.

## 2018-05-15 ENCOUNTER — OFFICE VISIT (OUTPATIENT)
Dept: PSYCHIATRY | Facility: CLINIC | Age: 23
End: 2018-05-15
Attending: PSYCHIATRY & NEUROLOGY
Payer: COMMERCIAL

## 2018-05-15 VITALS
HEART RATE: 83 BPM | DIASTOLIC BLOOD PRESSURE: 84 MMHG | SYSTOLIC BLOOD PRESSURE: 137 MMHG | WEIGHT: 188.4 LBS | BODY MASS INDEX: 29.51 KG/M2

## 2018-05-15 DIAGNOSIS — F31.30 BIPOLAR I DISORDER, MOST RECENT EPISODE DEPRESSED (H): ICD-10-CM

## 2018-05-15 DIAGNOSIS — F31.10 BIPOLAR I DISORDER, MOST RECENT EPISODE (OR CURRENT) MANIC (H): ICD-10-CM

## 2018-05-15 DIAGNOSIS — F31.81 BIPOLAR 2 DISORDER (H): Primary | ICD-10-CM

## 2018-05-15 PROCEDURE — G0463 HOSPITAL OUTPT CLINIC VISIT: HCPCS | Mod: ZF

## 2018-05-15 RX ORDER — GABAPENTIN 400 MG/1
400 CAPSULE ORAL AT BEDTIME
Qty: 90 CAPSULE | Refills: 0 | Status: SHIPPED | OUTPATIENT
Start: 2018-05-15 | End: 2018-08-09

## 2018-05-15 RX ORDER — LAMOTRIGINE 100 MG/1
100 TABLET ORAL AT BEDTIME
Qty: 60 TABLET | Refills: 0 | Status: SHIPPED | OUTPATIENT
Start: 2018-05-15 | End: 2018-07-09

## 2018-05-15 RX ORDER — LAMOTRIGINE 25 MG/1
25 TABLET ORAL AT BEDTIME
Qty: 60 TABLET | Refills: 0 | Status: SHIPPED | OUTPATIENT
Start: 2018-05-15 | End: 2018-07-09

## 2018-05-15 RX ORDER — GABAPENTIN 100 MG/1
CAPSULE ORAL
Qty: 180 CAPSULE | Refills: 0 | Status: SHIPPED | OUTPATIENT
Start: 2018-05-15 | End: 2018-08-09

## 2018-05-15 ASSESSMENT — PAIN SCALES - GENERAL: PAINLEVEL: NO PAIN (0)

## 2018-05-15 NOTE — PATIENT INSTRUCTIONS
Increase lamotrigine to 125 mg daily  Continue your other usual medications  Please return for follow-up in 2 months

## 2018-05-15 NOTE — NURSING NOTE
Chief Complaint   Patient presents with     RECHECK     Bipolar I disorder, most recent episode (or current) manic

## 2018-05-15 NOTE — PROGRESS NOTES
" History and Physical    Laci Hinkle Jr. MRN# [id number]   Age: 22 year old YOB: 1995     Date of Assessment:  May 15, 2018          Assessment:     Since the last visit, Laci has increased his lamotrigine to 100 mg daily.  He has continued his other usual medications, which include gabapentin 600 mg daily, Depakote 2000 mg at night, and clonidine 0.2 mg at night.  He feels the increased dose of lamotrigine has been helpful in further stabilizing his mood.  He denies side effects from it.  He still experiences mild depressions and hypomanias, and would like to increase his lamotrigine to 125 mg daily.  He will take this along with his other usual medications and will return for follow-up in 2 months.    Attestation:  Patient has been seen and evaluated by me, Bo Oconnor MD, PhD.    I spent a total time of 25 minutes face-to-face with the patient during today s office visit.  Over 50% of this time was spent counseling the patient and/or coordinating care regarding medication management, counseling re substance use.          Diagnoses:   Axis I: Bipolar I disorder, currently euthymic; polysubstance dependence, THC and alcohol, currently in short-term remission     Axis II: Deferred    Axis III: Medication-induced weight gain; hypertension    Axis IV: mild  psychosocial stressors     Axis V: Global Assessment of Functionin-60    Interim History     Since the last visit, Laci has increased his lamotrigine to 100 mg daily.  He has been adherent with this and his other usual medications.  He continues to use cannabis daily, and is not interested in stopping this.  He denies other substance use.    Laci feels that adding lamotrigine and has been helpful in further stabilizing his moods.  He continues to experience mild depressions and hypomanic periods, but they are shorter and he finds it easier to \"snap out of them\".  He denies side effects from the higher dose of lamotrigine.  " There has been no rash or other skin changes.    Laci is interested in increasing his lamotrigine further to 125 mg daily for further mood stabilization, and I concur that this is reasonable.  Laci is planning to start taking courses again at [employer] in August.  He wants to get back into the routine of being in school, and hopes to return full-time at some point.  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    Laci will increase his lamotrigine to 125 mg daily and will continue his other usual medications.  He will return for follow-up in 2 months.      Review of Systems     A comprehensive review of systems was performed and is negative other than noted above.          Allergies:    No Known Allergies        Current Medications   Depakote 2000 mg HS  Gabapentin 400 mg qAM + 200 mg qHS  Clonidine 0.2 mg HS  Lamotrigine 50 mg HS    Mental Status Exam     Alertness: alert  and oriented  Appearance: adequately groomed  Behavior/Demeanor: cooperative, pleasant and calm, with good  eye contact  Speech: normal  Language: intact  Psychomotor: normal or unremarkable  Mood:  description consistent with euthymia  Affect: full-range; was congruent to mood  Thought Process/Associations: unremarkable  Thought Content:  Denies active/passive suicidal ideation  Perception:  Denies hallucinations  Insight: fair  Judgment: fair  Cognition:  does appear grossly intact.

## 2018-05-15 NOTE — MR AVS SNAPSHOT
After Visit Summary   5/15/2018    Laci Hinkle Jr    MRN: [id number]           Patient Information     Date Of Birth          1995        Visit Information        Provider Department      5/15/2018 3:00 PM Bo Oconnor MD [facility]iatry [facility]        Today's Diagnoses     Bipolar 2 disorder (H)    -  1    Bipolar I disorder, most recent episode (or current) manic (H)        Bipolar I disorder, most recent episode depressed (H)          Care Instructions    Increase lamotrigine to 125 mg daily  Continue your other usual medications  Please return for follow-up in 2 months          Follow-ups after your visit        Follow-up notes from your care team     Return in about 2 months (around 7/15/2018).      Your next 10 appointments already scheduled     2018  3:00 PM CDT   Adult Med Follow UP with Bo Oconnor MD   Psychiatry Clinic ([facility])    [place] [id number]  [address] [address] [phone]              Who to contact     Please call your clinic at [phone] to:    Ask questions about your health    Make or cancel appointments    Discuss your medicines    Learn about your test results    Speak to your doctor            Additional Information About Your Visit        MyChart Information     [employer] is an electronic gateway that provides easy, online access to your medical records. With [employer], you can request a clinic appointment, read your test results, renew a prescription or communicate with your care team.     To sign up for [employer] visit the website at [email or web address]   You will be asked to enter the access code listed below, as well as some personal information. Please follow the directions to create your username and password.     Your access code is: [email or web address]  Expires: 2018  3:33 PM     Your access code will  in 90 days. If you need help or a new code, please contact your  [facility] or call [phone] for assistance.        Care EveryWhere ID     This is your Care EveryWhere ID. This could be used by other organizations to access your [facility] medical records  [id number]        Your Vitals Were     Pulse BMI (Body Mass Index)                83 29.51 kg/m2           Blood Pressure from Last 3 Encounters:   05/15/18 137/84   03/21/18 147/88   02/20/18 133/85    Weight from Last 3 Encounters:   05/15/18 85.5 kg (188 lb 6.4 oz)   03/21/18 87.2 kg (192 lb 3.2 oz)   02/20/18 90.2 kg (198 lb 12.8 oz)              Today, you had the following     No orders found for display         Today's Medication Changes          These changes are accurate as of 5/15/18  3:33 PM.  If you have any questions, ask your nurse or doctor.               These medicines have changed or have updated prescriptions.        Dose/Directions    * lamoTRIgine 100 MG tablet   Commonly known as:  LaMICtal   This may have changed:  You were already taking a medication with the same name, and this prescription was added. Make sure you understand how and when to take each.   Used for:  Bipolar 2 disorder (H)   Changed by:  Bo Oconnor MD        Dose:  100 mg   Take 1 tablet (100 mg) by mouth At Bedtime   Quantity:  60 tablet   Refills:  0       * lamoTRIgine 25 MG tablet   Commonly known as:  LaMICtal   This may have changed:  how much to take   Used for:  Bipolar I disorder, most recent episode (or current) manic (H)   Changed by:  Bo Oconnor MD        Dose:  25 mg   Take 1 tablet (25 mg) by mouth At Bedtime   Quantity:  60 tablet   Refills:  0       * Notice:  This list has 2 medication(s) that are the same as other medications prescribed for you. Read the directions carefully, and ask your doctor or other care provider to review them with you.         Where to get your medicines      These medications were sent to [place], [place] - [address]  [place] [id number] [facility] [address]     Phone:  [phone]     gabapentin 100 MG capsule    gabapentin 400 MG capsule    lamoTRIgine 100 MG tablet    lamoTRIgine 25 MG tablet                Primary Care Provider Office Phone # Fax #    [facility] [phone]       [facility] [address]        Equal Access to Services     AVEILNO COLON : Hadii aad ku hadasho Soomaali, waaxda luqadaha, qaybta kaalmada adeegyada, waxay idiin hayaan adeeg khdereksh la'lalithan ah. So [facility] [phone].    ATENCIÓN: Si beto hicks, tiene a simons disposición servicios gratuitos de asistencia lingüística. Marie al [phone].    We comply with applicable federal civil rights laws and [place] laws. We do not discriminate on the basis of race, color, national origin, age, disability, sex, sexual orientation, or gender identity.            Thank you!     Thank you for choosing PSYCHIATRY CLINIC  for your care. Our goal is always to provide you with excellent care. Hearing back from our patients is one way we can continue to improve our services. Please take a few minutes to complete the written survey that you may receive in the mail after your visit with us. Thank you!             Your Updated Medication List - Protect others around you: Learn how to safely use, store and throw away your medicines at www.disposemymeds.org.          This list is accurate as of 5/15/18  3:33 PM.  Always use your most recent med list.                   Brand Name Dispense Instructions for use Diagnosis    cloNIDine 0.1 MG tablet    CATAPRES    60 tablet    Take 2 tabs (0.2 mg) po q HS.    Attention deficit hyperactivity disorder (ADHD), combined type, Bipolar disorder in full remission, most recent episode unspecified type (H), Generalized anxiety disorder       divalproex sodium extended-release 500 MG 24 hr tablet    DEPAKOTE ER    360 tablet    Take 4 tablets (2,000 mg) by mouth  daily    Bipolar disorder, current episode mixed, mild (H)       * gabapentin 400 MG capsule    NEURONTIN    90 capsule    Take 1 capsule (400 mg) by mouth At Bedtime .  In addition, take two 100 mg caps po q AM.  Daily total of 600 mg.    Bipolar I disorder, most recent episode depressed (H)       * gabapentin 100 MG capsule    NEURONTIN    180 capsule    Take 2 caps (200 mg) po q AM.  In addition, take one 400 mg cap at HS.  Daily total of 600 mg.    Bipolar I disorder, most recent episode depressed (H)       * lamoTRIgine 100 MG tablet    LaMICtal    60 tablet    Take 1 tablet (100 mg) by mouth At Bedtime    Bipolar 2 disorder (H)       * lamoTRIgine 25 MG tablet    LaMICtal    60 tablet    Take 1 tablet (25 mg) by mouth At Bedtime    Bipolar I disorder, most recent episode (or current) manic (H)       nicotine polacrilex 2 MG gum    EQ NICOTINE    60 tablet    Place 1 each (2 mg) inside cheek as needed for smoking cessation    Tobacco abuse       omeprazole 20 MG CR capsule    priLOSEC    30 capsule    Take 1 capsule (20 mg) by mouth every morning (before breakfast)    Gastroesophageal reflux disease without esophagitis       * Notice:  This list has 4 medication(s) that are the same as other medications prescribed for you. Read the directions carefully, and ask your doctor or other care provider to review them with you.

## 2018-05-16 ENCOUNTER — TELEPHONE (OUTPATIENT)
Dept: PSYCHIATRY | Facility: CLINIC | Age: 23
End: 2018-05-16

## 2018-05-16 DIAGNOSIS — F31.61 BIPOLAR DISORDER, CURRENT EPISODE MIXED, MILD (H): ICD-10-CM

## 2018-05-16 RX ORDER — DIVALPROEX SODIUM 500 MG/1
2000 TABLET, EXTENDED RELEASE ORAL DAILY
Qty: 360 TABLET | Refills: 0 | Status: SHIPPED | OUTPATIENT
Start: 2018-05-16 | End: 2018-07-09

## 2018-05-16 NOTE — TELEPHONE ENCOUNTER
-Received incoming phone call from pt requesting refill for Depakote.   -Pt saw provider yesterday (5/15). At this time, provider refilled other prescribed medications  -According to office visit note from yesterday, no changes have been made to Depakote dose. 90 d/s sent to pt's preferred pharmacy   -Med tab changed to reflect this

## 2018-05-17 ASSESSMENT — PATIENT HEALTH QUESTIONNAIRE - PHQ9: SUM OF ALL RESPONSES TO PHQ QUESTIONS 1-9: 16

## 2018-05-29 ENCOUNTER — TELEPHONE (OUTPATIENT)
Dept: PSYCHIATRY | Facility: CLINIC | Age: 23
End: 2018-05-29

## 2018-05-30 ENCOUNTER — TELEPHONE (OUTPATIENT)
Dept: PSYCHIATRY | Facility: CLINIC | Age: 23
End: 2018-05-30

## 2018-05-30 NOTE — TELEPHONE ENCOUNTER
On 5/24/2017 the patient signed an WU authorizing medical records to be released from [facility] to [facility] for the purpose to provide, manage, and coordinate health  Care services, to determine, who should pay for health care and for health care operations per WU.  I sent the original WU to Medical Records via the tube system and kept a copy in psychiatry until scanning is complete/confirmed. Nory Smith MA

## 2018-06-04 ENCOUNTER — TELEPHONE (OUTPATIENT)
Dept: PSYCHIATRY | Facility: CLINIC | Age: 23
End: 2018-06-04

## 2018-06-05 ENCOUNTER — CARE COORDINATION (OUTPATIENT)
Dept: PSYCHIATRY | Facility: CLINIC | Age: 23
End: 2018-06-05

## 2018-06-05 ENCOUNTER — OFFICE VISIT (OUTPATIENT)
Dept: PSYCHIATRY | Facility: CLINIC | Age: 23
End: 2018-06-05
Attending: PSYCHIATRY & NEUROLOGY
Payer: COMMERCIAL

## 2018-06-05 VITALS
WEIGHT: 191.8 LBS | BODY MASS INDEX: 30.04 KG/M2 | DIASTOLIC BLOOD PRESSURE: 93 MMHG | HEART RATE: 91 BPM | SYSTOLIC BLOOD PRESSURE: 157 MMHG

## 2018-06-05 DIAGNOSIS — F31.10 BIPOLAR I DISORDER, MOST RECENT EPISODE (OR CURRENT) MANIC (H): Primary | ICD-10-CM

## 2018-06-05 PROCEDURE — G0463 HOSPITAL OUTPT CLINIC VISIT: HCPCS | Mod: ZF

## 2018-06-05 RX ORDER — ARIPIPRAZOLE 5 MG/1
5 TABLET ORAL DAILY
Qty: 60 TABLET | Refills: 0 | Status: SHIPPED | OUTPATIENT
Start: 2018-06-05 | End: 2018-06-14

## 2018-06-05 ASSESSMENT — PAIN SCALES - GENERAL: PAINLEVEL: NO PAIN (0)

## 2018-06-05 NOTE — PATIENT INSTRUCTIONS
Start Abilify 5 mg at night  Continue your other medications at the usual doses  Please return for follow-up in one month

## 2018-06-05 NOTE — MR AVS SNAPSHOT
After Visit Summary   2018    Laci Hinkle Jr    MRN: [id number]           Patient Information     Date Of Birth          1995        Visit Information        Provider Department      2018 2:30 PM Bo Oconnor MD Psychiatry Clinic        Today's Diagnoses     Bipolar I disorder, most recent episode (or current) manic (H)    -  1      Care Instructions    Start Abilify 5 mg at night  Continue your other medications at the usual doses  Please return for follow-up in one month          Follow-ups after your visit        Follow-up notes from your care team     Return in about 4 weeks (around 7/3/2018).      Your next 10 appointments already scheduled     2018  3:00 PM CDT   Adult Med Follow UP with Bo Oconnor MD   Psychiatry Clinic ([facility])    [place] [id number] [address] [address] [phone]              Who to contact     Please call your clinic at [phone] to:    Ask questions about your health    Make or cancel appointments    Discuss your medicines    Learn about your test results    Speak to your doctor            Additional Information About Your Visit        MyChart Information     [employer] is an electronic gateway that provides easy, online access to your medical records. With [employer], you can request a clinic appointment, read your test results, renew a prescription or communicate with your care team.     To sign up for [employer] visit the website at [email or web address]   You will be asked to enter the access code listed below, as well as some personal information. Please follow the directions to create your username and password.     Your access code is: [email or web address]  Expires: 2018  3:33 PM     Your access code will  in 90 days. If you need help or a new code, please contact your [facility] or call [phone] for assistance.        Care  EveryWhere ID     This is your Care EveryWhere ID. This could be used by other organizations to access your [facility] medical records  [id number]        Your Vitals Were     Pulse BMI (Body Mass Index)                91 30.04 kg/m2           Blood Pressure from Last 3 Encounters:   06/05/18 (!) 157/93   05/15/18 137/84   03/21/18 147/88    Weight from Last 3 Encounters:   06/05/18 87 kg (191 lb 12.8 oz)   05/15/18 85.5 kg (188 lb 6.4 oz)   03/21/18 87.2 kg (192 lb 3.2 oz)              Today, you had the following     No orders found for display         Today's Medication Changes          These changes are accurate as of 6/5/18  2:59 PM.  If you have any questions, ask your nurse or doctor.               Start taking these medicines.        Dose/Directions    ARIPiprazole 5 MG tablet   Commonly known as:  ABILIFY   Used for:  Bipolar I disorder, most recent episode (or current) manic (H)   Started by:  Bo Oconnor MD        Dose:  5 mg   Take 1 tablet (5 mg) by mouth daily   Quantity:  60 tablet   Refills:  0            Where to get your medicines      These medications were sent to [address] [address]     Phone:  [phone]     ARIPiprazole 5 MG tablet                Primary Care Provider Office Phone # Fax #    [facility] [phone]       [address] [place] [address]        Equal Access to Services     AVELINO COLON AH: Hadii jc ku hadasho Soomaali, waaxda luqadaha, qaybta kaalmada adeegyada, wax blanca ayala. So [facility] [phone].    ATENCIÓN: Si habla español, tiene a simons disposición servicios gratuitos de asistencia lingüística. Llame al [phone].    We comply with applicable federal civil rights laws and [place] laws. We do not discriminate on the basis of race, color, national origin, age, disability, sex, sexual orientation, or  gender identity.            Thank you!     Thank you for choosing PSYCHIATRY CLINIC  for your care. Our goal is always to provide you with excellent care. Hearing back from our patients is one way we can continue to improve our services. Please take a few minutes to complete the written survey that you may receive in the mail after your visit with us. Thank you!             Your Updated Medication List - Protect others around you: Learn how to safely use, store and throw away your medicines at www.disposemymeds.org.          This list is accurate as of 6/5/18  2:59 PM.  Always use your most recent med list.                   Brand Name Dispense Instructions for use Diagnosis    ARIPiprazole 5 MG tablet    ABILIFY    60 tablet    Take 1 tablet (5 mg) by mouth daily    Bipolar I disorder, most recent episode (or current) manic (H)       cloNIDine 0.1 MG tablet    CATAPRES    60 tablet    Take 2 tabs (0.2 mg) po q HS.    Attention deficit hyperactivity disorder (ADHD), combined type, Bipolar disorder in full remission, most recent episode unspecified type (H), Generalized anxiety disorder       divalproex sodium extended-release 500 MG 24 hr tablet    DEPAKOTE ER    360 tablet    Take 4 tablets (2,000 mg) by mouth daily    Bipolar disorder, current episode mixed, mild (H)       * gabapentin 400 MG capsule    NEURONTIN    90 capsule    Take 1 capsule (400 mg) by mouth At Bedtime .  In addition, take two 100 mg caps po q AM.  Daily total of 600 mg.    Bipolar I disorder, most recent episode depressed (H)       * gabapentin 100 MG capsule    NEURONTIN    180 capsule    Take 2 caps (200 mg) po q AM.  In addition, take one 400 mg cap at HS.  Daily total of 600 mg.    Bipolar I disorder, most recent episode depressed (H)       * lamoTRIgine 100 MG tablet    LaMICtal    60 tablet    Take 1 tablet (100 mg) by mouth At Bedtime    Bipolar 2 disorder (H)       * lamoTRIgine 25 MG tablet    LaMICtal    60 tablet    Take 1 tablet  (25 mg) by mouth At Bedtime    Bipolar I disorder, most recent episode (or current) manic (H)       nicotine polacrilex 2 MG gum    EQ NICOTINE    60 tablet    Place 1 each (2 mg) inside cheek as needed for smoking cessation    Tobacco abuse       omeprazole 20 MG CR capsule    priLOSEC    30 capsule    Take 1 capsule (20 mg) by mouth every morning (before breakfast)    Gastroesophageal reflux disease without esophagitis       * Notice:  This list has 4 medication(s) that are the same as other medications prescribed for you. Read the directions carefully, and ask your doctor or other care provider to review them with you.

## 2018-06-05 NOTE — TELEPHONE ENCOUNTER
"  -----------------------------------------------------------------------------------------------------------  Brief Psychiatry Phone note      Laci Hinkle Jr MRN# [id number]   Age: 22 year old   Clinic Provider: Dr. Oconnor YOB: 1995   PCP: [facility]            Phone note:   Writer was notified that Laci Hinkle Jr has called and requested to speak with a resident regarding restarting Abilify for his manic sxs. Writer called aLci Hinkle Jr at this number : [phone] at 7:18 PM. He reported that he was doing fine since his last visit with Dr. Oconnor.  Lamotrigine was increased in the last visit to 125 mg and he was satisfied with the results. He reported that since Friday he feels minda and paranoia. He stated that he is \"hallucinating people having oral sex while I'm waiting for a bus\". He reports daily cannabis use and is \"trying to taper it off as it is not really helping with anxiety anymore\". He reports week long racing thoughts, sleeping 5 hours a night, feeling tired every morning, feeling people are out there to get him, losing appetite . He thinks seasonal changes play an important role on his mental health as he feels depressed in the winter and manic in summer.  He also reports many stressors including unemployment, not getting along with his roommate and finance that make him irritable and angry. Currently he is staying with his mother, feeling safe, denies suicidality and self harm thoughts. He is not willing to come to the ER with the fear of being hospitalized and is adamant to start Abilify over the phone, Told me that he was on the Abilify in the past and he stopped taking it due to side effects. He saw his therapist today and discussed the same sxs with the therapist. Therapist thought he might be in hypomanic state. He also reported panic attacks and nightmare (self reported PTSD).     Of note it was difficult to understand him as he was slurring " his speech and the voice was muffled.           Assessment and Plan:   Laci Hinkle Jr is a 22 year old male with a history of Bipolar 1 disorder who contacted us regarding experiencing manic sxs. Per my conversation with Mr. Hinkle, it was obvious that he was irritable, talking fast and in distress. His thought process was logical/linear and goal oriented and he reported paranoid thoughts and hallucinations. He was slurring his speech at times and his voice was muffled. I asked him to come to our ER and to be seen with a provider. He declined this as he is afraid of possibility of hospitalization. He stated that he feels safe, denied any SI/SIB/HI and asked to be seen with a provider at our clinic ASAP. I suggested to stop using cannabis or street drugs and call our schedulers tomorrow morning. I also send a chart message to our clinic scheduler to reach him tomorrow. He declined to take Gabapentin as needed to address his anxiety. He was adamant in getting back on Abilify.    Patient is not currently suicidal or homicidal. he is aware to call 911 or go to the nearest ER if safety concern or urgent symptoms arise.  =============================================================================  Joshua Davalos M.D.  PGY-2 Psychiatry Resident

## 2018-06-05 NOTE — PROGRESS NOTES
Ju Symptoms please call - Bond  Received: Today       Mandi Cottrell, Tatum BIRD RN       Phone Number: [phone]                     Patient is having some random Ju starting Friday (was pretty bad, is more mild today).  Ju symptoms, paranoia, racing thoughts, a little bit of psychosis (which went away possibly from smoking marijuana), stimulated yet fatigued. Please call back about changing some medications.     OK to lvm

## 2018-06-05 NOTE — PROGRESS NOTES
-See on-call resident note from yesterday-    Returned call to pt who started feeling hypo/manic last Friday.  Symptoms included paranoia, VH, lack of sleep and feeling tired.  Possible triggers may have included stress with roommates, trying to stop smoking marijuana, and that summers are typically a more difficult time of year for him.  Today pt states he is feeling better, more refreshed, but still describes mood as hypomanic.  Pt states he did increase Lamictal as instructed at last appt but would like to restart Abilify.  Offered appt with Dr. Oconnor this afternoon which pt accepts.  Appt scheduled for 2:30 pm.  Will update Dr. Oconnor prior to appt.

## 2018-06-05 NOTE — PROGRESS NOTES
History and Physical    Laci Hinkle Jr. MRN# [id number]   Age: 22 year old YOB: 1995     Date of Assessment:   isisis is is is so and is is 1 control is patient and is, 2018          Assessment:     Laci was seen on an urgent basis today, at his request.  He called the on-call resident last night and notified her that he was feeling manic.  He describes relatively mild hypomanic symptoms today, lasting for the past 4 days.  He previously did well in similar situations when he started Abilify, and he agrees to restart this at a 5 mg dose today.  This is the amount he has taken previously.  He will continue his other usual medications.  He will return for follow-up in one month.    Attestation:  Patient has been seen and evaluated by me, Bo Oconnor MD, PhD.    I spent a total time of 25 minutes face-to-face with the patient during today s office visit.  Over 50% of this time was spent counseling the patient and/or coordinating care regarding medication management, counseling re substance use.          Diagnoses:   Axis I: Bipolar I disorder, currently euthymic; polysubstance dependence, THC and alcohol, currently in short-term remission     Axis II: Deferred    Axis III: Medication-induced weight gain; hypertension    Axis IV: mild  psychosocial stressors     Axis V: Global Assessment of Functionin-60    Interim History     Laci reports mild hypomanic symptoms beginning about 4 days ago.  He believes that they began because he has been trying to cut back on his marijuana use.  He reports elevated mood, racing thoughts, increased energy, and being moderately more active.  He is actually been sleeping reasonably well.  He denies any reckless behaviors.  He stayed at his mom's place last night, and she was supportive.  He has continued to try cutting back on his cannabis use.    Laci denies symptoms of depression.  He denies psychosis.    Laci has previously taken  Abilify on a couple of occasions.  It has been helpful in reducing hypomanic symptoms.  He wishes to restart it today, and I think this is reasonable.  5 mg is the dose where he previously had the best balance of tolerability and efficacy.  He will add this to his current medications.  I have encouraged him to do his best to remain abstinent from cannabis and other substances.    Laci will return for follow-up in one month.      Review of Systems     A comprehensive review of systems was performed and is negative other than noted above.          Allergies:    No Known Allergies        Current Medications   Depakote 2000 mg HS  Gabapentin 400 mg qAM + 200 mg qHS  Clonidine 0.2 mg HS  Lamotrigine 50 mg HS    Mental Status Exam     Alertness: alert  and oriented  Appearance: adequately groomed  Behavior/Demeanor: cooperative, pleasant and calm, with good  eye contact  Speech: normal  Language: intact  Psychomotor: normal or unremarkable  Mood:  description consistent with euthymia  Affect: full-range; was congruent to mood  Thought Process/Associations: unremarkable  Thought Content:  Denies active/passive suicidal ideation  Perception:  Denies hallucinations  Insight: fair  Judgment: fair  Cognition:  does appear grossly intact.

## 2018-06-06 ASSESSMENT — PATIENT HEALTH QUESTIONNAIRE - PHQ9: SUM OF ALL RESPONSES TO PHQ QUESTIONS 1-9: 18

## 2018-06-08 ENCOUNTER — CARE COORDINATION (OUTPATIENT)
Dept: PSYCHIATRY | Facility: CLINIC | Age: 23
End: 2018-06-08

## 2018-06-08 NOTE — PROGRESS NOTES
"Rec'd call from pt ([phone]).  Started Abilify 5 mg on Tuesday night.  On Wed began experiencing \"intense cravings\" for fast food, alcohol, smoking, gambling, and stimulants.  Reports having similar experience when he was on Abilify in the past.  Pt has not acted on any of these cravings but states he can't get the them off his mind.  Otherwise has found Abilify helpful for mood.  Energy is elevated but \"not too energized.\"  Racing thoughts continue but are not as troublesome.  Has been able to sleep well at night and reports getting 8 hours of sleep on Wed night.  Speech is pressured throughout call.  Pt asks if he can expect that the cravings will eventually resolve with time.  If so, pt feels he will be able to continue on Abilify and work with therapist to address the cravings.  Will update Dr. Oconnor.  Pt states detailed msg can be left with response.      "

## 2018-06-11 NOTE — PROGRESS NOTES
Message  Received: Today       Bo Oconnor MD Blake, Katherine J, RN       Caller: Unspecified (3 days ago, 12:50 PM)                     Alvin Smiley -     Thoughts a good question.  I would expect to cravings to resolve over a week or 2.  That said, sometimes they do not, and if that is the case, increasing the dose of Abilify to 10 mg often does help them to go away.     DB

## 2018-06-13 ENCOUNTER — CARE COORDINATION (OUTPATIENT)
Dept: PSYCHIATRY | Facility: CLINIC | Age: 23
End: 2018-06-13

## 2018-06-13 DIAGNOSIS — F31.70 BIPOLAR DISORDER IN FULL REMISSION, MOST RECENT EPISODE UNSPECIFIED TYPE (H): ICD-10-CM

## 2018-06-13 DIAGNOSIS — F41.1 GENERALIZED ANXIETY DISORDER: ICD-10-CM

## 2018-06-13 DIAGNOSIS — F31.10 BIPOLAR I DISORDER, MOST RECENT EPISODE (OR CURRENT) MANIC (H): ICD-10-CM

## 2018-06-13 DIAGNOSIS — F90.2 ATTENTION DEFICIT HYPERACTIVITY DISORDER (ADHD), COMBINED TYPE: ICD-10-CM

## 2018-06-13 RX ORDER — CLONIDINE HYDROCHLORIDE 0.1 MG/1
TABLET ORAL
Qty: 60 TABLET | Refills: 1 | Status: SHIPPED | OUTPATIENT
Start: 2018-06-13 | End: 2018-07-09

## 2018-06-13 NOTE — PROGRESS NOTES
"Rec'd call from pt who initially is wanting to discuss ADHD testing since he plans to go to college in the fall and concentration is poor.  Has difficulty reading a book.  Relays that he took Strattera when he was younger.  Wonders if drug use when he was younger has caused \"brain damage\" and is contributing to concentration issue.  We also discuss that concentration can be difficult when depressed or hypo/manic.  Pt has been making attempts to reduce marijuana use.  Pt will plan to discuss further at upcoming appt.    Pt also updates that cravings are reduced.  He is not feeling depressed.  Describes current state as \"hypomanic.\"  Is sleeping 8 hours per night but during the day is \"going, going, going.\"  Feels stimulated, restless and all over the place. Denies racing thoughts and feels that he is thinking clearly.  No psychosis.  No risky behaviors.  Wonders if increase in Lamictal to 125 mg may have triggered current hypomanic episode.  Speech is rapid during call today.  Pt open to increase in Abilify if Dr. Oconnor suggests.  Currently taking 5 mg daily.  Is also needing refill of clonidine which is sent to his pharmacy.  Will forward to Dr. Oconnor.  Pt can be reached at [phone].    "

## 2018-06-14 RX ORDER — ARIPIPRAZOLE 10 MG/1
10 TABLET ORAL DAILY
Qty: 30 TABLET | Refills: 0 | Status: SHIPPED | OUTPATIENT
Start: 2018-06-14 | End: 2018-06-22

## 2018-06-14 NOTE — PROGRESS NOTES
Message  Received: Today       Bo Oconnor MD Blake, Katherine J RN       Caller: Unspecified (Yesterday, 11:13 AM)                     Alvin Mackey for the message.  I think I recommended recently the Laci increase his dose of Abilify to 10 mg dILY.  I'd suggest that he either do that, or stop Abilify completely.  It is very unlikely that the increased dose of lamotrigine triggered his hypomanic symptoms.     DB

## 2018-06-14 NOTE — PROGRESS NOTES
Spoke with pt who chooses to increase Abilify to 10 mg daily.  He will use up medication he has at home first but is aware writer was sending a new rx to pharmacy for a 10 mg tablet.  Pt appreciative.  Will update provider.

## 2018-06-19 ENCOUNTER — TELEPHONE (OUTPATIENT)
Dept: PSYCHIATRY | Facility: CLINIC | Age: 23
End: 2018-06-19

## 2018-06-19 NOTE — TELEPHONE ENCOUNTER
"Returned call to pt.  He asks to call back as he is \"in the middle of something.\"  Confirmed pt has clinic number.  Awaiting call back.  "

## 2018-06-19 NOTE — TELEPHONE ENCOUNTER
----- Message from Angel Lucia sent at 6/19/2018 10:41 AM CDT -----  Regarding: Care Coordination  Contact: [phone]  Caller:  Self   Medication:  [facility]   Pharmacy and location:  [facility]   [address] [place] [address] in [place], [place]  Symptoms:  Manic/Racing thoughts/heavy thoughts of anger.  Symptom onset: 1 month  Pending appt: 7/18/18  Okay to leave VM: Yes    Pt asked if either the nurse or provider Oconnor could please give him a call would like to discuss change in medication dosage.    Thanks

## 2018-06-20 ENCOUNTER — TELEPHONE (OUTPATIENT)
Dept: PSYCHIATRY | Facility: CLINIC | Age: 23
End: 2018-06-20

## 2018-06-20 NOTE — TELEPHONE ENCOUNTER
"Telephone Note    S: Received call from patient to discuss possible side effects of Abilify. He experienced intense cravings for substances at 5 mg and so increased dose to 10 mg about a week ago. Subsequently these cravings have lessened in intensity, but he has developed marked ambivalence towards daily tasks (e.g. Both wants to and doesn't want to eat). He also describes \"not caring\" about anything, such as going to work. This pattern of side effects is consistent with his past experiences on Abilify. He has not relapsed on substances. He reports an energy boost that he attributes to Lamictal. He is interested in talking to Dr. Oconnor about whether or not to continue Abilify, and whether any dose changes might be beneficial.    O:  Mood: good  Speech: normal prosody, non-pressured  Thought Processes: logical, linear, goal-oriented  Thought Content: no SI    A/P: Advised patient to take Abilify as prescribed today and to call back tomorrow to get in touch with Dr. Oconnor.    Roel Magallanes PGY-2  pg [phone]  6:43 PM 06/20/2018   "

## 2018-06-21 ENCOUNTER — CARE COORDINATION (OUTPATIENT)
Dept: PSYCHIATRY | Facility: CLINIC | Age: 23
End: 2018-06-21

## 2018-06-21 NOTE — PROGRESS NOTES
"Rec'd call from pt.  Tells writer that two days after starting Abilify he began drinking again.  In a day will drink a 6 pack of beer and 1/2 bottle of 100 proof liquor.  States he is not smoking much marijuana as he is trying to quit.  Continues to feel hypomanic.  Describes mood further as aimless, careless, and \"I just want to get high.\"  Did not go to work yesterday because \"I just don't give a shit.\"  Motivation is low.  States that he wants things but then doesn't.  Reports he is barely eating but yesterday had a grilled cheese sandwich, macaroni and cheese, broccoli, and eggs.  Has been buying scratch off [employer] tickets because \"I have to spend my money.\"  Reports sleeping well - 8 or 9 hours per night.  Denies any SI.  States he is taking medications every day.  Would only be willing to go to treatment if they cover his rent.  Does not drive.  Shares that he still would like to return to school next year.    Assisted pt in scheduling an appt with Dr. Oconnor for Monday at 10:30 am.  Pt is agreeable to try reducing alcohol intake.  States that they only way he could stop drinking would be by smoking more marijuana which writer advised against.  Discuss coming to ER if he experiences any withdrawal symptoms from decreasing or stopping alcohol use.  Patient does agree to this.  Will notify Dr. Oconnor to see if he recommends any medication changes prior to appt on Monday.  Pt can be reached at [phone].  "

## 2018-06-22 NOTE — PROGRESS NOTES
Spoke with pt who states understanding of recommendations to stop Abilify.  Reminded of appt on Monday at 10:30 am.  Pt appreciative.

## 2018-06-22 NOTE — PROGRESS NOTES
Message  Received: Today       Bo Oconnor MD Blake, Katherine J, RN; Roel Magallaens MD       Caller: Unspecified (Yesterday, 10:20 AM)                     Alvin Smiley -     I'll recommend that Laci discontinue Abilify.  I have seen low doses because hypomania, and he may be getting EPS (parkinsonism) on the 10 mg dose.  He does seem to be sensitive to it.  I otherwise agree with the advice you gave Laci.  Thanks for scheduling him to come in on Monday, we can make further decisions then.     DB

## 2018-06-25 ENCOUNTER — OFFICE VISIT (OUTPATIENT)
Dept: PSYCHIATRY | Facility: CLINIC | Age: 23
End: 2018-06-25
Attending: PSYCHIATRY & NEUROLOGY
Payer: COMMERCIAL

## 2018-06-25 VITALS
DIASTOLIC BLOOD PRESSURE: 86 MMHG | BODY MASS INDEX: 30.17 KG/M2 | SYSTOLIC BLOOD PRESSURE: 134 MMHG | WEIGHT: 192.6 LBS | HEART RATE: 71 BPM

## 2018-06-25 DIAGNOSIS — F31.10 BIPOLAR I DISORDER, MOST RECENT EPISODE (OR CURRENT) MANIC (H): Primary | ICD-10-CM

## 2018-06-25 PROCEDURE — G0463 HOSPITAL OUTPT CLINIC VISIT: HCPCS | Mod: ZF

## 2018-06-25 RX ORDER — OLANZAPINE 5 MG/1
5 TABLET ORAL AT BEDTIME
Qty: 14 TABLET | Refills: 0 | Status: SHIPPED | OUTPATIENT
Start: 2018-06-25 | End: 2018-07-09

## 2018-06-25 ASSESSMENT — PAIN SCALES - GENERAL: PAINLEVEL: NO PAIN (0)

## 2018-06-25 NOTE — MR AVS SNAPSHOT
After Visit Summary   6/25/2018    Laci Hinkle Jr    MRN: [id number]           Patient Information     Date Of Birth          1995        Visit Information        Provider Department      6/25/2018 10:30 AM Bo Oconnor MD [facility]        Today's Diagnoses     Bipolar I disorder, most recent episode (or current) manic (H)    -  1      Care Instructions    Continue your current medications at their usual doses  Add olanzapine 5 mg at bedtime  Please return for follow-up in 2 weeks          Follow-ups after your visit        Follow-up notes from your care team     Return in about 2 weeks (around 7/9/2018).      Your next 10 appointments already scheduled     Jul 09, 2018 10:00 AM CDT   Adult Med Follow UP with Bo Oconnor MD   Psychiatry Clinic ([facility])    [place] [id number]  [address] [place] [id number] [phone]            Jul 18, 2018  3:00 PM CDT   Adult Med Follow UP with Bo Oconnor MD   [facility] ([facility])    [place] [id number]  [address] [place] [id number] [phone]              Who to contact     Please call your clinic at [phone] to:    Ask questions about your health    Make or cancel appointments    Discuss your medicines    Learn about your test results    Speak to your doctor            Additional Information About Your Visit        MyChart Information     [employer] is an electronic gateway that provides easy, online access to your medical records. With [employer], you can request a clinic appointment, read your test results, renew a prescription or communicate with your care team.     To sign up for [employer] visit the website at [email or web address]   You will be asked to enter the access code listed below, as well as some personal information. Please follow the directions to create your username and password.     Your access  code is: [email or web address]  Expires: 2018  3:33 PM     Your access code will  in 90 days. If you need help or a new code, please contact your [facility] or call [phone] for assistance.        Care EveryWhere ID     This is your Care EveryWhere ID. This could be used by other organizations to access your [facility] medical records  [id number]        Your Vitals Were     Pulse BMI (Body Mass Index)                71 30.17 kg/m2           Blood Pressure from Last 3 Encounters:   18 134/86   18 (!) 157/93   05/15/18 137/84    Weight from Last 3 Encounters:   18 87.4 kg (192 lb 9.6 oz)   18 87 kg (191 lb 12.8 oz)   05/15/18 85.5 kg (188 lb 6.4 oz)              Today, you had the following     No orders found for display         Today's Medication Changes          These changes are accurate as of 18 10:54 AM.  If you have any questions, ask your nurse or doctor.               Start taking these medicines.        Dose/Directions    OLANZapine 5 MG tablet   Commonly known as:  zyPREXA   Used for:  Bipolar I disorder, most recent episode (or current) manic (H)   Started by:  Bo Oconnor MD        Dose:  5 mg   Take 1 tablet (5 mg) by mouth At Bedtime   Quantity:  14 tablet   Refills:  0            Where to get your medicines      These medications were sent to [address] [address]     Phone:  [phone]     OLANZapine 5 MG tablet                Primary Care Provider Office Phone # Fax #    [facility] [phone]       [address] [place] [id number]        Equal Access to Services     AVELINO COLON AH: Edwardo Rodríguez, waevelina gold, qaevaristota kaalmada ezequiel, anupam ayala. So [facility] [phone].    ATENCIÓN: Si habla español, tiene a simons disposición servicios gratuitos  de asistencia lingüística. Marie serra [phone].    We comply with applicable federal civil rights laws and [place] laws. We do not discriminate on the basis of race, color, national origin, age, disability, sex, sexual orientation, or gender identity.            Thank you!     Thank you for choosing [facility]  for your care. Our goal is always to provide you with excellent care. Hearing back from our patients is one way we can continue to improve our services. Please take a few minutes to complete the written survey that you may receive in the mail after your visit with us. Thank you!             Your Updated Medication List - Protect others around you: Learn how to safely use, store and throw away your medicines at www.disposemymeds.org.          This list is accurate as of 6/25/18 10:54 AM.  Always use your most recent med list.                   Brand Name Dispense Instructions for use Diagnosis    cloNIDine 0.1 MG tablet    CATAPRES    60 tablet    Take 2 tabs (0.2 mg) po q HS.    Attention deficit hyperactivity disorder (ADHD), combined type, Bipolar disorder in full remission, most recent episode unspecified type (H), Generalized anxiety disorder       divalproex sodium extended-release 500 MG 24 hr tablet    DEPAKOTE ER    360 tablet    Take 4 tablets (2,000 mg) by mouth daily    Bipolar disorder, current episode mixed, mild (H)       * gabapentin 400 MG capsule    NEURONTIN    90 capsule    Take 1 capsule (400 mg) by mouth At Bedtime .  In addition, take two 100 mg caps po q AM.  Daily total of 600 mg.    Bipolar I disorder, most recent episode depressed (H)       * gabapentin 100 MG capsule    NEURONTIN    180 capsule    Take 2 caps (200 mg) po q AM.  In addition, take one 400 mg cap at HS.  Daily total of 600 mg.    Bipolar I disorder, most recent episode depressed (H)       * lamoTRIgine 100 MG tablet    LaMICtal    60 tablet    Take 1 tablet (100 mg) by mouth At Bedtime    Bipolar 2 disorder  (H)       * lamoTRIgine 25 MG tablet    LaMICtal    60 tablet    Take 1 tablet (25 mg) by mouth At Bedtime    Bipolar I disorder, most recent episode (or current) manic (H)       nicotine polacrilex 2 MG gum    EQ NICOTINE    60 tablet    Place 1 each (2 mg) inside cheek as needed for smoking cessation    Tobacco abuse       OLANZapine 5 MG tablet    zyPREXA    14 tablet    Take 1 tablet (5 mg) by mouth At Bedtime    Bipolar I disorder, most recent episode (or current) manic (H)       omeprazole 20 MG CR capsule    priLOSEC    30 capsule    Take 1 capsule (20 mg) by mouth every morning (before breakfast)    Gastroesophageal reflux disease without esophagitis       * Notice:  This list has 4 medication(s) that are the same as other medications prescribed for you. Read the directions carefully, and ask your doctor or other care provider to review them with you.

## 2018-06-25 NOTE — PATIENT INSTRUCTIONS
Continue your current medications at their usual doses  Add olanzapine 5 mg at bedtime  Please return for follow-up in 2 weeks

## 2018-06-25 NOTE — PROGRESS NOTES
History and Physical    Laci Hinkle Jr. MRN# [id number]   Age: 22 year old YOB: 1995     Date of Assessment:  2018          Assessment:     Since the last visit, Laci reports ongoing symptoms of hypomania.  They actually seemed to intensify with low-dose Abilify.  He discontinued this medication about a week ago, and feels improved, but the hypomania has not fully resolved.  We discussed options, and Laci accepts a trial of olanzapine 5 mg as an add-on to his current medications.  He is aware that the dose may need to be titrated up over time.  He is aware of the possibility of increased appetite and weight gain.  He will add olanzapine to his usual medications, and will return for follow-up in 2 weeks.    Attestation:  Patient has been seen and evaluated by me, Bo Oconnor MD, PhD.    I spent a total time of 25 minutes face-to-face with the patient during today s office visit.  Over 50% of this time was spent counseling the patient and/or coordinating care regarding medication management, counseling re substance use.          Diagnoses:   Axis I: Bipolar I disorder, currently hypomanic; polysubstance dependence, THC and alcohol, currently in short-term remission     Axis II: Deferred    Axis III: Medication-induced weight gain; hypertension    Axis IV: mild  psychosocial stressors     Axis V: Global Assessment of Functionin-60    Interim History     Since the last visit, Laci reports ongoing symptoms of hypomania.  It is difficult for him to settle to sleep.  He is more impulsive than usual, and finds it hard to resist urges, for example to use drugs.    His mood is elevated.  The symptoms are mild in intensity.  Aside from drug use, mainly THC, which is chronic for Laci, he denies risky behaviors.    Laci's hypomanic symptoms actually seem to intensify with Abilify.  They became more pronounced at 5 mg, and more pronounced still when he increased the dose to  10 mg.  He discontinued the medication about a week ago, and notes that the symptoms are improved.  However, they have not fully remitted.    Laci will continue his current medications.  We discussed further options for treating his hypomania, and he elects a trial of olanzapine.  He has not taken this previously.  He will start with 5 mg at night.  He will return in 2 weeks for follow-up, and is aware that he can contact us in the meantime if he has any concerns.      Review of Systems     A comprehensive review of systems was performed and is negative other than noted above.          Allergies:    No Known Allergies        Current Medications   Depakote 2000 mg HS  Gabapentin 400 mg qAM + 200 mg qHS  Clonidine 0.2 mg HS  Lamotrigine 50 mg HS    Mental Status Exam     Alertness: alert  and oriented  Appearance: adequately groomed  Behavior/Demeanor: cooperative, pleasant and calm, with good  eye contact  Speech: normal  Language: intact  Psychomotor: normal or unremarkable  Mood:  description consistent with euthymia  Affect: full-range; was congruent to mood  Thought Process/Associations: unremarkable  Thought Content:  Denies active/passive suicidal ideation  Perception:  Denies hallucinations  Insight: fair  Judgment: fair  Cognition:  does appear grossly intact.

## 2018-06-26 ASSESSMENT — PATIENT HEALTH QUESTIONNAIRE - PHQ9: SUM OF ALL RESPONSES TO PHQ QUESTIONS 1-9: 12

## 2018-06-27 ENCOUNTER — TELEPHONE (OUTPATIENT)
Dept: PSYCHIATRY | Facility: CLINIC | Age: 23
End: 2018-06-27

## 2018-06-27 NOTE — TELEPHONE ENCOUNTER
"[facility] Call Center    Phone Message    May a detailed message be left on voicemail: yes    Reason for Call: Symptoms or Concerns     If patient has red-flag symptoms, warm transfer to triage line    Current symptom or concern: Patient has noted \"pin-point pupils\" or \"eye constriction\" and has concerns about long-term effects or damage if he remains on the medication    Symptoms have been present for: Since starting Zyprexa    Has patient previously been seen for this? No    Are there any new or worsening symptoms? NO      Action Taken: Message routed to:  Other: Sherice Merrill RN    "

## 2018-06-27 NOTE — TELEPHONE ENCOUNTER
Returned call to pt who is concerned about pupil constriction which was first noticed Monday night after starting Zyprexa.  Pt denies that pupils constriction is based on lighting of surrounding environment.  States that he knows his pupils are doing this because everything appears brighter to him.  He denies double or blurred vision.  We discuss that some substances can change pupil size.  Pt states that he is not drinking alcohol and has not used any marijuana today.  Pt denies any other recent med changes.  Is worried about potential long term damage to his vision if this continues.  Shared that writer has not heard of this being common s/e of Zyprexa but would relay concerns to Dr. Oconnor.  Will call pt back if what pt is experiencing could be r/t medications otherwise pt is encouraged to continue meds as prescribed and f/u at next appt on 7/9.  Pt agrees and states that even if this could be med r/t he would not want to change any of his medications.

## 2018-06-28 NOTE — TELEPHONE ENCOUNTER
Message  Received: Today       Bo Oconnor MD Blake, Katherine J, RN       Caller: Unspecified (Yesterday, 12:36 PM)                     Alvin Smiley -     Yes, a changes in pupillary response to light is occasionally a mild (and somewhat uncommon) side effect of olanzapine.  This is not dangerous, and will not affect Laci's long-term vision.  I encourage him to keep taking the olanzapine to get his current manic symptoms under control.     DB

## 2018-06-28 NOTE — TELEPHONE ENCOUNTER
Left detailed msg for pt with Dr. Oconnor's response.  Provided clinic number for c/b with any questions.

## 2018-07-09 ENCOUNTER — TELEPHONE (OUTPATIENT)
Dept: PSYCHIATRY | Facility: CLINIC | Age: 23
End: 2018-07-09

## 2018-07-09 ENCOUNTER — OFFICE VISIT (OUTPATIENT)
Dept: PSYCHIATRY | Facility: CLINIC | Age: 23
End: 2018-07-09
Attending: PSYCHIATRY & NEUROLOGY
Payer: COMMERCIAL

## 2018-07-09 VITALS
DIASTOLIC BLOOD PRESSURE: 102 MMHG | HEART RATE: 79 BPM | BODY MASS INDEX: 30.82 KG/M2 | WEIGHT: 196.8 LBS | SYSTOLIC BLOOD PRESSURE: 148 MMHG

## 2018-07-09 DIAGNOSIS — Z79.899 LONG TERM USE OF DRUG: ICD-10-CM

## 2018-07-09 DIAGNOSIS — F90.2 ATTENTION DEFICIT HYPERACTIVITY DISORDER (ADHD), COMBINED TYPE: ICD-10-CM

## 2018-07-09 DIAGNOSIS — F31.81 BIPOLAR 2 DISORDER (H): ICD-10-CM

## 2018-07-09 DIAGNOSIS — F31.61 BIPOLAR DISORDER, CURRENT EPISODE MIXED, MILD (H): ICD-10-CM

## 2018-07-09 DIAGNOSIS — F31.9 BIPOLAR AFFECTIVE DISORDER (H): Primary | ICD-10-CM

## 2018-07-09 DIAGNOSIS — F31.70 BIPOLAR DISORDER IN FULL REMISSION, MOST RECENT EPISODE UNSPECIFIED TYPE (H): ICD-10-CM

## 2018-07-09 DIAGNOSIS — F31.10 BIPOLAR I DISORDER, MOST RECENT EPISODE (OR CURRENT) MANIC (H): ICD-10-CM

## 2018-07-09 DIAGNOSIS — F41.1 GENERALIZED ANXIETY DISORDER: ICD-10-CM

## 2018-07-09 PROCEDURE — G0463 HOSPITAL OUTPT CLINIC VISIT: HCPCS | Mod: ZF

## 2018-07-09 RX ORDER — OLANZAPINE 5 MG/1
5 TABLET ORAL AT BEDTIME
Qty: 60 TABLET | Refills: 0 | Status: SHIPPED | OUTPATIENT
Start: 2018-07-09 | End: 2018-08-06

## 2018-07-09 RX ORDER — DIVALPROEX SODIUM 500 MG/1
2000 TABLET, EXTENDED RELEASE ORAL DAILY
Qty: 360 TABLET | Refills: 0 | Status: SHIPPED | OUTPATIENT
Start: 2018-07-09 | End: 2018-09-18

## 2018-07-09 RX ORDER — LAMOTRIGINE 25 MG/1
25 TABLET ORAL AT BEDTIME
Qty: 90 TABLET | Refills: 0 | Status: SHIPPED | OUTPATIENT
Start: 2018-07-09 | End: 2018-09-18

## 2018-07-09 RX ORDER — LAMOTRIGINE 100 MG/1
100 TABLET ORAL AT BEDTIME
Qty: 90 TABLET | Refills: 0 | Status: SHIPPED | OUTPATIENT
Start: 2018-07-09 | End: 2018-09-18

## 2018-07-09 RX ORDER — CLONIDINE HYDROCHLORIDE 0.1 MG/1
TABLET ORAL
Qty: 180 TABLET | Refills: 0 | Status: SHIPPED | OUTPATIENT
Start: 2018-07-09 | End: 2018-09-18 | Stop reason: SINTOL

## 2018-07-09 ASSESSMENT — PAIN SCALES - GENERAL: PAINLEVEL: NO PAIN (0)

## 2018-07-09 NOTE — MR AVS SNAPSHOT
After Visit Summary   7/9/2018    Laci Hinkle Jr    MRN: [id number]           Patient Information     Date Of Birth          1995        Visit Information        Provider Department      7/9/2018 10:00 AM Bo Oconnor MD [facility]iatry [facility]        Today's Diagnoses     Bipolar I disorder, most recent episode (or current) manic (H)        Bipolar 2 disorder (H)        Bipolar disorder, current episode mixed, mild (H)        Attention deficit hyperactivity disorder (ADHD), combined type        Bipolar disorder in full remission, most recent episode unspecified type (H)        Generalized anxiety disorder          Care Instructions    Continue your current medications at their usual doses  Please get a blood test to check your lipids and Depakote level  Please return for follow-up in 2 months          Follow-ups after your visit        Follow-up notes from your care team     Return in about 2 months (around 9/9/2018).      Your next 10 appointments already scheduled     Sep 18, 2018  3:00 PM CDT   Adult Med Follow UP with Bo Oconnor MD   Psychiatry Clinic ([facility])    [place] [id number] [address] [phone]              Who to contact     Please call your clinic at [phone] to:    Ask questions about your health    Make or cancel appointments    Discuss your medicines    Learn about your test results    Speak to your doctor            Additional Information About Your Visit        MyChart Information     [employer] is an electronic gateway that provides easy, online access to your medical records. With [employer], you can request a clinic appointment, read your test results, renew a prescription or communicate with your care team.     To sign up for [employer] visit the website at [email or web address]   You will be asked to enter the access code listed below, as well as some personal information. Please  follow the directions to create your username and password.     Your access code is: [email or web address]  Expires: 10/7/2018  9:48 AM     Your access code will  in 90 days. If you need help or a new code, please contact your [facility] or call [phone] for assistance.        Care EveryWhere ID     This is your Care EveryWhere ID. This could be used by other organizations to access your [facility] medical records  [id number]        Your Vitals Were     Pulse BMI (Body Mass Index)                79 30.82 kg/m2           Blood Pressure from Last 3 Encounters:   18 (!) 148/102   18 134/86   18 (!) 157/93    Weight from Last 3 Encounters:   18 89.3 kg (196 lb 12.8 oz)   18 87.4 kg (192 lb 9.6 oz)   18 87 kg (191 lb 12.8 oz)              Today, you had the following     No orders found for display         Where to get your medicines      These medications were sent to [address] [address]     Phone:  [phone]     OLANZapine 5 MG tablet         These medications were sent to [facility] [address] [place] [address] AT [place] [address], [facility] [address]     Phone:  [phone]     cloNIDine 0.1 MG tablet    divalproex sodium extended-release 500 MG 24 hr tablet    lamoTRIgine 100 MG tablet    lamoTRIgine 25 MG tablet          Primary Care Provider Office Phone # Fax #    [facility] [phone]       [facility] [facility] [address]        Equal Access to Services     AVELINO COLON : Edwardo Rodríguez, waevelina gold, qaybta kaalanupam shah. So [facility] [phone].    ATENCIÓN: Si habla español, tiene a simons disposición servicios gratuitos de asistencia lingüística. Llame al [phone].    We comply with  applicable federal civil rights laws and [place] laws. We do not discriminate on the basis of race, color, national origin, age, disability, sex, sexual orientation, or gender identity.            Thank you!     Thank you for choosing PSYCHIATRY CLINIC  for your care. Our goal is always to provide you with excellent care. Hearing back from our patients is one way we can continue to improve our services. Please take a few minutes to complete the written survey that you may receive in the mail after your visit with us. Thank you!             Your Updated Medication List - Protect others around you: Learn how to safely use, store and throw away your medicines at www.disposemymeds.org.          This list is accurate as of 7/9/18 10:32 AM.  Always use your most recent med list.                   Brand Name Dispense Instructions for use Diagnosis    cloNIDine 0.1 MG tablet    CATAPRES    180 tablet    Take 2 tabs (0.2 mg) po q HS.    Attention deficit hyperactivity disorder (ADHD), combined type, Bipolar disorder in full remission, most recent episode unspecified type (H), Generalized anxiety disorder       divalproex sodium extended-release 500 MG 24 hr tablet    DEPAKOTE ER    360 tablet    Take 4 tablets (2,000 mg) by mouth daily    Bipolar disorder, current episode mixed, mild (H)       * gabapentin 400 MG capsule    NEURONTIN    90 capsule    Take 1 capsule (400 mg) by mouth At Bedtime .  In addition, take two 100 mg caps po q AM.  Daily total of 600 mg.    Bipolar I disorder, most recent episode depressed (H)       * gabapentin 100 MG capsule    NEURONTIN    180 capsule    Take 2 caps (200 mg) po q AM.  In addition, take one 400 mg cap at HS.  Daily total of 600 mg.    Bipolar I disorder, most recent episode depressed (H)       * lamoTRIgine 25 MG tablet    LaMICtal    90 tablet    Take 1 tablet (25 mg) by mouth At Bedtime    Bipolar I disorder, most recent episode (or current) manic (H)       * lamoTRIgine 100 MG  tablet    LaMICtal    90 tablet    Take 1 tablet (100 mg) by mouth At Bedtime    Bipolar 2 disorder (H)       nicotine polacrilex 2 MG gum    EQ NICOTINE    60 tablet    Place 1 each (2 mg) inside cheek as needed for smoking cessation    Tobacco abuse       OLANZapine 5 MG tablet    zyPREXA    60 tablet    Take 1 tablet (5 mg) by mouth At Bedtime    Bipolar I disorder, most recent episode (or current) manic (H)       omeprazole 20 MG CR capsule    priLOSEC    30 capsule    Take 1 capsule (20 mg) by mouth every morning (before breakfast)    Gastroesophageal reflux disease without esophagitis       * Notice:  This list has 4 medication(s) that are the same as other medications prescribed for you. Read the directions carefully, and ask your doctor or other care provider to review them with you.

## 2018-07-09 NOTE — PATIENT INSTRUCTIONS
Continue your current medications at their usual doses  Please get a blood test to check your lipids and Depakote level  Please return for follow-up in 2 months

## 2018-07-09 NOTE — TELEPHONE ENCOUNTER
----- Message from Bo Oconnor MD sent at 7/9/2018 10:28 AM CDT -----  Alvin Smiley -     Can you order labs for Laci?  He gets them done downstairs.  He needs a CBC and differential, AST, ALT, valproic acid level, and fasting LDL, HDL, triglycerides, and glucose.    Thanks!    DB

## 2018-07-09 NOTE — PROGRESS NOTES
History and Physical    Laci Hinkle Jr. MRN# [id number]   Age: 22 year old YOB: 1995     Date of Assessment:  2018          Assessment:     Since the last visit, Laci has added olanzapine 5 mg at bedtime to his usual medications.  It has been remarkably effective in helping to stabilize his moods.  He now denies manic or depressive symptoms.  The medication is sedating, and he takes it at night, and is not drowsy in the daytime.  It has increased his appetite, and he is trying to watch his food intake and exercise regularly.  On the plus side, Laci does report decreased cravings for substance use now that his mood is stable.  We will check Laci's lipid profile, along with his Depakote level.  He will also make an appointment to see his PCP regarding his cardiac health.  He will continue olanzapine and his other usual medications, and return for follow-up in 2 months.    Attestation:  Patient has been seen and evaluated by me, Bo Oconnor MD, PhD.         Diagnoses:   Axis I: Bipolar I disorder, currently hypomanic; polysubstance dependence, THC and alcohol, currently in short-term remission     Axis II: Deferred    Axis III: Medication-induced weight gain; hypertension    Axis IV: mild  psychosocial stressors     Axis V: Global Assessment of Functionin-60    Interim History     Since the last visit, Laci has discontinued Depakote and has added olanzapine 5 mg at bedtime to his usual medications.  His mood is much more stable.  He now denies significant symptoms of depression or hypomania.  His cravings for substance use have also reduced.    Laci does reports sedation from olanzapine, and so he takes it close to bedtime.  As long as he gets 8 hours of sleep, he does not feel sedated the next day.  He also notices an increase in his appetite.  He is trying to snack on healthy foods, and be more physically active.  There is a history of heart disease in his  family.  He is agreeable to check his fasting lipid profile, and plans to make an appointment with his PCP to check on his cardiovascular health.    Laci is agreeable to continue his current medications at their usual doses.  He will return for follow-up in 2 months.  He is aware that he can schedule an earlier appointment if need be.    Review of Systems     A comprehensive review of systems was performed and is negative other than noted above.          Allergies:      Allergies   Allergen Reactions     Prednisone Other (See Comments)     psych problems     Current Medications   Depakote 2000 mg HS  Gabapentin 400 mg qAM + 200 mg qHS  Clonidine 0.2 mg HS  Lamotrigine 125 mg HS  Olanzapine 5 mg HS    Mental Status Exam     Alertness: alert  and oriented  Appearance: adequately groomed  Behavior/Demeanor: cooperative, pleasant and calm, with good  eye contact  Speech: normal  Language: intact  Psychomotor: normal or unremarkable  Mood:  description consistent with euthymia  Affect: full-range; was congruent to mood  Thought Process/Associations: unremarkable  Thought Content:  Denies active/passive suicidal ideation  Perception:  Denies hallucinations  Insight: fair  Judgment: fair  Cognition:  does appear grossly intact.      Psychiatry Clinic Individual Psychotherapy Note                                                                     [16]   Start time - 1005        End time - 1025  Date last reviewed - 7/9/2018       Date next due - 10/9/2018    Subjective: This supportive psychotherapy session addressed issues related to orientation to therapy  and goals of therapy .  Patient's reaction: Action in the context of mental status appropriate for ambulatory setting.  Progress: good  Plan: RTC 1 mo  Psychotherapy services during this visit included  myself and the patient.   Treatment Plan      SYMPTOMS; PROBLEMS   MEASURABLE GOALS;    FUNCTIONAL IMPROVEMENT INTERVENTIONS;   GAINS MADE DISCHARGE CRITERIA    Depression: None currently   reduce depressive symptoms and reduce depressive episodes medications   psycho-education   self-care skills symptom resolution   Ju/Hypomania: increased energy, decreased sleep need, increased activity and racing thoughts   reduce manic/hypomanic episodes medications   psycho-education   self-care skills symptom resolution

## 2018-07-10 ASSESSMENT — PATIENT HEALTH QUESTIONNAIRE - PHQ9: SUM OF ALL RESPONSES TO PHQ QUESTIONS 1-9: 11

## 2018-07-16 ENCOUNTER — CARE COORDINATION (OUTPATIENT)
Dept: PSYCHIATRY | Facility: CLINIC | Age: 23
End: 2018-07-16

## 2018-07-16 NOTE — PROGRESS NOTES
Called pt but no answer.  LVM that writer rec'd message regarding lost medication.  Shared that writer would contact pharmacy to let them know of situation.  Explained that in some cases pharmacy can contact insurance for emergency override.  If this is not accepted by insurance pt may need to pay out of pocket for refill.  Clinic number provided for c/b if needed.    Call placed to [facility] pharmacy.  Pt last filled Zyprexa 5 mg tabs #30 on 7/9/18.  Pt has one refill remaining from this script.  Explained situation.  Pharmacy staff will try contacting insurance to obtain lost med override and will notify pt of outcome.      Sent to Dr. Oconnor as an FYI.

## 2018-07-16 NOTE — PROGRESS NOTES
Lossed His Presctipion  Received: Today       Emily Barron Katherine J, RN       Phone Number: [phone]                     Laci, called to request a New Prescription for Zyprexa 5 mg. He stated that he's lost the one he had.     Thanks!

## 2018-07-26 ENCOUNTER — TELEPHONE (OUTPATIENT)
Dept: PSYCHIATRY | Facility: CLINIC | Age: 23
End: 2018-07-26

## 2018-07-26 NOTE — TELEPHONE ENCOUNTER
"After Hours Phone Call  7/26/18 at 1730    Phone number called: [phone]    Reason for call: Laci called the after hours phone line to discuss increasing his olanzapine dose due to worsening \"psychotic symptoms\". He stated that he started on olanzapine 5mg earlier this month and his symptoms improved drastically, however, as his body has adjusted to the medication some of his symptoms have started to return. He reported hypomania, uncontrollable impulsivity (stated he drank alcohol and is currently trying to abstain), lack of motivation (his room is dirty and he has no interest in cleaning it), \"audible thoughts\" that he cannot \"turn down\", visual distortions, and his mood is \"difficult to reflect upon\". He became tearful and stated \"I am so impulsive. I drank alcohol and I am trying to do treatment and get back on track\".     He stated he would like to increase the olanzapine to 7.5mg since it helped control his symptoms initially in such a significant way. He noted that he \"gets fat\" on olanzapine, but he is ok with it because it helps him so much. He does not want to increase to 10mg until he gets on a statin medication for his cholesterol. He stated that he did see his PCP per his agreement w/ Dr. Oconnor at his last appointment and he had his cholesterol levels checked which, per Laci, were elevated. His PCP is planning to start a low-intensity statin.    Discussed with Laci that it is reasonable to increase the olanzapine at this time due to return of his symptoms. He does not have a follow-up appointment until September so he agreed to call the clinic tomorrow to speak with Dr. Oconnor's nurse as a starting place.     - Recommended increasing olanzapine to 7.5mg po at bedtime due to re-emerging symptoms of hypomania and psychosis  - Laci agreed to call clinic tomorrow to discuss further details regarding his prescription with the clinic nurse    Merissa Grace MD  Psychiatry PGY2  "

## 2018-07-27 ENCOUNTER — CARE COORDINATION (OUTPATIENT)
Dept: PSYCHIATRY | Facility: CLINIC | Age: 23
End: 2018-07-27

## 2018-07-27 NOTE — PROGRESS NOTES
FW: medication requests  Received: Today       Neisha Aguilar, Neisha Mcknight, RN       Phone Number: [phone]                       Previous Messages       ----- Message -----      From: Paola Vicente      Sent: 7/27/2018  12:12 PM        To: Tatum Merrill RN   Subject: medication requests                               The pt is the caller. He is in outpatient programming at [address] (his counselor there, Floyd Valladares, is faxing over an WU, her number is [phone]) and he wants to chat w/Dr. Oconnor about starting Naltrexone as well as Zyprexa. Okay to [facility].

## 2018-07-30 ENCOUNTER — TELEPHONE (OUTPATIENT)
Dept: PSYCHIATRY | Facility: CLINIC | Age: 23
End: 2018-07-30

## 2018-07-30 NOTE — TELEPHONE ENCOUNTER
On 7/27/2018 the patient signed an WU authorizing [facility] to release/obtain medical records from [facility] for the purpose of disclosure is to provide a continuum of care per WU. On 7/30/2018 I sent this WU to Medical Records via the tube system and kept a copy in psychiatry until scanning is complete/confirmed. Nory Smith MA

## 2018-07-31 ENCOUNTER — TELEPHONE (OUTPATIENT)
Dept: PSYCHIATRY | Facility: CLINIC | Age: 23
End: 2018-07-31

## 2018-07-31 NOTE — TELEPHONE ENCOUNTER
-Call to pt  -No answer at number provided  -LVM requesting c/b to discuss symptoms further  -Clinic number provided

## 2018-07-31 NOTE — TELEPHONE ENCOUNTER
-Returned call to pt  -He is currently on the bus and unable to talk  -Will call pt back in 15 min at his request

## 2018-07-31 NOTE — TELEPHONE ENCOUNTER
"[facility] Call Center    Phone Message    May a detailed message be left on voicemail: yes    Reason for Call: Symptoms or Concerns     If patient has red-flag symptoms, warm transfer to triage line    Current symptom or concern: \"random, uncontrolled twitches\" and \"sluring\"     Symptoms have been present for: since increase in Zyprexa      Action Taken: Message routed to:  Other: Tatum Merrill RN    "

## 2018-08-01 NOTE — TELEPHONE ENCOUNTER
"Spoke with pt this AM.  He increased Zyprexa to 10 mg at bedtime after speaking with on-call resident last week.  Per pt on-call provider recommended increase to 7.5 mg or 10 mg so pt increased directly to 10 mg.  With the increase he reports the following adverse effects which have now all resolved:     1.  Muscle twitching in left arm  2.  Slurred speech  3.  Everything he touched felt like metal  4.  Strobing effect of objects  4.  When trying to fall asleep had sensation that objects were going to touch him    Pt does admit that over the w/e he smoked marijuana and drank alcohol.  Denies use of other substances.  No use since this weekend.  Denies any change in other medications or starting new meds.  Only current side effect is sedation.  Describes mood as \"stable.\"  No longer feeling hypomanic.  No AH.  Pt prefers to continue on Zyprexa 10 mg at bedtime for a few more days.  If he notices any unusual or new symptoms he will call clinic back.  Will send to Dr. Oconnor as an FYI.  "

## 2018-08-02 ENCOUNTER — TELEPHONE (OUTPATIENT)
Dept: PSYCHIATRY | Facility: CLINIC | Age: 23
End: 2018-08-02

## 2018-08-02 NOTE — TELEPHONE ENCOUNTER
Care Coordination  Received: Today       Angel Lucia Radhika, RN       Phone Number: [phone] (Call me)                     Caller: Self   Medication: (ZYPREXA) 5 MG   Pharmacy and location:[address]   [phone] (Phone)   [phone] (Fax)   Symptoms: Hard to Socialize and communication   Symptom onset: This Week   Pending appt: 9/18/18@3:00pm w/provider Oconnor   Okay to leave VM:  Yes     Thanks

## 2018-08-03 ENCOUNTER — TELEPHONE (OUTPATIENT)
Dept: PSYCHIATRY | Facility: CLINIC | Age: 23
End: 2018-08-03

## 2018-08-03 NOTE — TELEPHONE ENCOUNTER
Call Back (zyprexa 10 mg )            Bo Oconnor MD   You 1 hour ago (1:04 PM)                 Sure, he can try going down to 7.5 mg. Thx - DB (Routing comment)

## 2018-08-03 NOTE — TELEPHONE ENCOUNTER
- Patient contacted to relay message to decrease Zyprexa to 7.5mg   - LVM to call back if refill needed   - Waiting for call back

## 2018-08-03 NOTE — TELEPHONE ENCOUNTER
"Patient returned a call back to the clinic. Complains of feeling low in energy since increasing Zyprexa to 10 mg and is requesting to decrease to 7.5 mg. Complains of having hard time waking up and stated \" I feel highly drugged\" denies using drugs or alcohol since the past weekend. He also stated symptoms from 7/31 have resolved and feel they were from drinking and smoking marijuana. Denies any changed in medication since the past encounter. Describes mood as depressed. Denies AH. Looking for medication adjustment. Will route to provider for recommendation.     "

## 2018-08-03 NOTE — TELEPHONE ENCOUNTER
Medication Update (Zyprexa )            Bo Oconnor MD   You 44 minutes ago (11:08 AM)                 Alvin Driver -     I'm not sure what you mean?  From the message below, most of Laci's side effects have resolved, except for sedation.  It says that he prefers to continue Zyprexa 10 mg.  I am not sure why we would reduce it to 7.5 mg?     DB (Routing comment)

## 2018-08-03 NOTE — TELEPHONE ENCOUNTER
[facility] Call Center    Phone Message    May a detailed message be left on voicemail: yes    Reason for Call: Other: Pt is returning nurse's call. He is requesting to be called at 1pm when he will be available.      Action Taken: Message routed to:  Other: Neisha Aguilar

## 2018-08-03 NOTE — TELEPHONE ENCOUNTER
Care Coordination  Received: Today       Angel Lucia Radhika, RN       Phone Number: [phone] (Call me)                     Pt call 8/2/18@6:48pm. Pt asked if the nurse of  could please give him a callback. No details were given for callback.     Thanks

## 2018-08-06 ENCOUNTER — TELEPHONE (OUTPATIENT)
Dept: PSYCHIATRY | Facility: CLINIC | Age: 23
End: 2018-08-06

## 2018-08-06 DIAGNOSIS — F31.10 BIPOLAR I DISORDER, MOST RECENT EPISODE (OR CURRENT) MANIC (H): ICD-10-CM

## 2018-08-06 RX ORDER — OLANZAPINE 5 MG/1
7.5 TABLET ORAL AT BEDTIME
Qty: 90 TABLET | Refills: 0 | Status: SHIPPED | OUTPATIENT
Start: 2018-08-06 | End: 2018-09-18

## 2018-08-07 NOTE — TELEPHONE ENCOUNTER
After Hours Phone Call  8/6/18   9:40pm    Phone number called: [phone]    Reason for call: mediation refill    Laci called to obtain a medication refill for his Zyprexa 7.5mg at bedtime. Per chart review, the clinic had called him today to check if he needed a refill, however, he was unable to return the call until after hours. Laci reported he was completely out of medication so discussed a refill with the on-call attending Dr. Traci Nguyen who agreed to prescribe a refill.    - 2 month supply of olanzapine 7.5mg at bedtime sent to [facility] [address] Mayra Grace MD  Psychiatry PGY2

## 2018-08-09 DIAGNOSIS — F31.30 BIPOLAR I DISORDER, MOST RECENT EPISODE DEPRESSED (H): ICD-10-CM

## 2018-08-09 RX ORDER — GABAPENTIN 400 MG/1
400 CAPSULE ORAL AT BEDTIME
Qty: 30 CAPSULE | Refills: 0 | Status: SHIPPED | OUTPATIENT
Start: 2018-08-09 | End: 2018-09-14

## 2018-08-09 RX ORDER — GABAPENTIN 100 MG/1
CAPSULE ORAL
Qty: 60 CAPSULE | Refills: 0 | Status: SHIPPED | OUTPATIENT
Start: 2018-08-09 | End: 2018-09-14

## 2018-08-09 NOTE — TELEPHONE ENCOUNTER
Medication requested: gabapentin 100mg  caps  Last refilled: 7/14/18  Qty: 120    Medication requested: gabapentin  400mg caps  Last refilled: 7/14/18  Qty: 60        Last seen: 7/9/18  RTC: 2 months  Cancel: 0  No-show: 0  Next appt: 9/18/18    Refill decision:   Refilled for 30 days per protocol.

## 2018-08-14 ENCOUNTER — TELEPHONE (OUTPATIENT)
Dept: PSYCHIATRY | Facility: CLINIC | Age: 23
End: 2018-08-14

## 2018-08-14 NOTE — TELEPHONE ENCOUNTER
- Returned a call to patient at [phone]     Patient would like to relay, he stopped taking Zyprexa 7.5 mg since last Friday 8/10/18. Was feeling low in energy and sedated. Since stopping the medication, patient feels better and verbalized bipolar and depression symptoms have gotten better. Will route to provider as FYI.

## 2018-08-14 NOTE — TELEPHONE ENCOUNTER
[facility] Call Center    Phone Message    May a detailed message be left on voicemail: yes    Reason for Call: Medication Question or concern regarding medication   Name of Medication: Zyprexa--the patient stopped taking it on 8/10 because he stated that it was too powerful/sedating  Prescribing Provider: Bo Oconnor MD   Pharmacy: [address]             Action Taken: Message routed to:  Other: Neisha Aguilar RNCC

## 2018-08-15 NOTE — TELEPHONE ENCOUNTER
Medication Compliance Issues (Adverse Side Effects (Zyprexa))            Bo Oconnor MD   You 30 minutes ago (2:25 PM)                 Nope... Laci tends to start and stop medications on his own.  Zyprexa was started to target hypomanic symptoms, which seem to have resolved, so he may in fact not need Zyprexa any longer in any case.  We shall have to see how it goes.     DB (Routing comment)            - Call to patient   - No answer at number provided   - [facility], relaying providers response   - Clinic number provided for further questions

## 2018-08-21 ENCOUNTER — TELEPHONE (OUTPATIENT)
Dept: PSYCHIATRY | Facility: CLINIC | Age: 23
End: 2018-08-21

## 2018-08-21 NOTE — TELEPHONE ENCOUNTER
- Writer returned a call to patient     Patient verbalized feeling sedated due to Zyprexa. Patient has stopped taking Zyprexa 7.5 mg for the past week and half. Yesterday patient was able to take Zyprexa 5 mg due to feeling paranoid, stressed and change in mood. Verbalized it was effective but feels sedated from the medication. Patient has been sober since beginning of the month. Is looking for recommendation from provider if lowering the Zyprexa to 2.5 mg or starting a different medication possibly Risperidone. Will route to provider.

## 2018-08-21 NOTE — TELEPHONE ENCOUNTER
[facility] Call Center    Phone Message    May a detailed message be left on voicemail: yes    Reason for Call: Symptoms or Concerns     If patient has red-flag symptoms, warm transfer to triage line    Current symptom or concern: Pt stated Zyprexa is too strong or intense. He slept for 14 hours last night. He is open to taking another medication and stated Risperdone worked in the past.     Symptoms have been present for: within the last 12 hour(s)    Has patient previously been seen for this? No    By:       Action Taken: Other: Neisha Aguilar

## 2018-08-21 NOTE — TELEPHONE ENCOUNTER
Patient Request (Zyprexa 5 mg )            Bo Oconnor MD   You 3 minutes ago (3:37 PM)                 Zyprexa seems like it has been really effective for Laci, and even at a low dose might be beneficial.  So I will suggest he try 2.5 mg at night, and if this is too sedating, we can switch it to risperidone.     Thx!     DB (Routing comment)                  - Patient contacted   - No answer at phone number on [facility], requesting a call back to discuss lowering Zyprexa dose   - Clinic number provided

## 2018-08-29 ENCOUNTER — TELEPHONE (OUTPATIENT)
Dept: PSYCHIATRY | Facility: CLINIC | Age: 23
End: 2018-08-29

## 2018-08-29 NOTE — TELEPHONE ENCOUNTER
[facility] Call Center    Phone Message    May a detailed message be left on voicemail: yes    Reason for Call: Medication Question or concern regarding medication     Patient would like to discuss a possible medication change.  He would like to switch to a medication that does not cause weight gain.      Action Taken: Message routed to:  Other: Bo Delatorre RN

## 2018-09-11 NOTE — TELEPHONE ENCOUNTER
- Writer left voice mail message for pt, requesting callback regarding his interest in medication change due to weight gain. Also identified 9/18 appointment as opportunity to address concerns.

## 2018-09-11 NOTE — TELEPHONE ENCOUNTER
- Received telephone from pt. He is interested in starting naltrexone for weight control and alcohol cravings. Pt identified feeling hungry all the time with Zyprexa.

## 2018-09-13 ENCOUNTER — TELEPHONE (OUTPATIENT)
Dept: PSYCHIATRY | Facility: CLINIC | Age: 23
End: 2018-09-13

## 2018-09-13 DIAGNOSIS — F31.30 BIPOLAR I DISORDER, MOST RECENT EPISODE DEPRESSED (H): ICD-10-CM

## 2018-09-13 NOTE — TELEPHONE ENCOUNTER
[facility] Call Center    Phone Message    May a detailed message be left on voicemail: yes    Reason for Call: Medication Refill Request    Has the patient contacted the pharmacy for the refill? Yes   Name of medication being requested: Gabapentin  Provider who prescribed the medication:   [facility]acy: [facility] [place] on [address]  Date medication is needed: He has about a week of medication left.        Action Taken: Other: Bo Delatorre

## 2018-09-14 RX ORDER — GABAPENTIN 100 MG/1
CAPSULE ORAL
Qty: 14 CAPSULE | Refills: 0 | Status: SHIPPED | OUTPATIENT
Start: 2018-09-14 | End: 2018-09-18

## 2018-09-14 RX ORDER — GABAPENTIN 400 MG/1
400 CAPSULE ORAL AT BEDTIME
Qty: 7 CAPSULE | Refills: 0 | Status: SHIPPED | OUTPATIENT
Start: 2018-09-14 | End: 2018-09-18

## 2018-09-14 NOTE — TELEPHONE ENCOUNTER
Last seen: 7/9  RTC: 2 months  Cancel: None (7/18 appointment was cancelled on 7/9)  No-show: None  Next appt: 9/18     Incoming refill from patient via telephone     Medication requested: gabapentin (NEURONTIN) 400 MG capsule  Directions: Take 1 capsule (400 mg) by mouth At Bedtime .  In addition, take two 100 mg caps po q AM.  Daily total of 600 mg  Qty: 30    Medication requested: gabapentin (NEURONTIN) 100 MG capsule  Directions: Take 2 caps (200 mg) po q AM.  In addition, take one 400 mg cap at HS.  Daily total of 600 mg.  Qty: 60    Medication refill approved per refill protocol    - Writer e-prescribed one week supply to preferred pharmacy.

## 2018-09-18 ENCOUNTER — OFFICE VISIT (OUTPATIENT)
Dept: PSYCHIATRY | Facility: CLINIC | Age: 23
End: 2018-09-18
Attending: PSYCHIATRY & NEUROLOGY
Payer: COMMERCIAL

## 2018-09-18 VITALS
BODY MASS INDEX: 34.39 KG/M2 | DIASTOLIC BLOOD PRESSURE: 89 MMHG | WEIGHT: 219.6 LBS | SYSTOLIC BLOOD PRESSURE: 148 MMHG | HEART RATE: 90 BPM

## 2018-09-18 DIAGNOSIS — F31.10 BIPOLAR I DISORDER, MOST RECENT EPISODE (OR CURRENT) MANIC (H): ICD-10-CM

## 2018-09-18 DIAGNOSIS — F31.30 BIPOLAR I DISORDER, MOST RECENT EPISODE DEPRESSED (H): ICD-10-CM

## 2018-09-18 DIAGNOSIS — F31.61 BIPOLAR DISORDER, CURRENT EPISODE MIXED, MILD (H): ICD-10-CM

## 2018-09-18 DIAGNOSIS — F31.72 BIPOLAR DISORDER, IN FULL REMISSION, MOST RECENT EPISODE HYPOMANIC (H): Primary | ICD-10-CM

## 2018-09-18 DIAGNOSIS — F31.81 BIPOLAR 2 DISORDER (H): ICD-10-CM

## 2018-09-18 PROCEDURE — G0463 HOSPITAL OUTPT CLINIC VISIT: HCPCS | Mod: ZF

## 2018-09-18 RX ORDER — AMLODIPINE BESYLATE 2.5 MG/1
2.5 TABLET ORAL DAILY
COMMUNITY
Start: 2018-08-29 | End: 2021-08-05

## 2018-09-18 RX ORDER — LAMOTRIGINE 25 MG/1
25 TABLET ORAL AT BEDTIME
Qty: 90 TABLET | Refills: 0 | Status: SHIPPED | OUTPATIENT
Start: 2018-09-18 | End: 2019-01-02

## 2018-09-18 RX ORDER — TOPIRAMATE 50 MG/1
50 TABLET, FILM COATED ORAL AT BEDTIME
Qty: 30 TABLET | Refills: 1 | Status: SHIPPED | OUTPATIENT
Start: 2018-09-18 | End: 2018-10-31

## 2018-09-18 RX ORDER — ATORVASTATIN CALCIUM 20 MG/1
20 TABLET, FILM COATED ORAL DAILY
COMMUNITY
Start: 2018-08-29 | End: 2021-12-21

## 2018-09-18 RX ORDER — GABAPENTIN 400 MG/1
400 CAPSULE ORAL AT BEDTIME
Qty: 90 CAPSULE | Refills: 0 | Status: SHIPPED | OUTPATIENT
Start: 2018-09-18 | End: 2019-01-02

## 2018-09-18 RX ORDER — DIVALPROEX SODIUM 500 MG/1
2000 TABLET, EXTENDED RELEASE ORAL DAILY
Qty: 360 TABLET | Refills: 0 | Status: SHIPPED | OUTPATIENT
Start: 2018-09-18 | End: 2019-01-02

## 2018-09-18 RX ORDER — GABAPENTIN 100 MG/1
CAPSULE ORAL
Qty: 180 CAPSULE | Refills: 0 | Status: SHIPPED | OUTPATIENT
Start: 2018-09-18 | End: 2019-01-02

## 2018-09-18 RX ORDER — LAMOTRIGINE 100 MG/1
100 TABLET ORAL AT BEDTIME
Qty: 90 TABLET | Refills: 0 | Status: SHIPPED | OUTPATIENT
Start: 2018-09-18 | End: 2019-01-02

## 2018-09-18 RX ORDER — OLANZAPINE 5 MG/1
5 TABLET ORAL AT BEDTIME
Qty: 90 TABLET | Refills: 0 | Status: SHIPPED | OUTPATIENT
Start: 2018-09-18 | End: 2018-10-31

## 2018-09-18 ASSESSMENT — PAIN SCALES - GENERAL: PAINLEVEL: NO PAIN (0)

## 2018-09-18 NOTE — PATIENT INSTRUCTIONS
Start Topamax 50 mg at night  Continue your other usual medications  Please return for follow-up in one month

## 2018-09-18 NOTE — PROGRESS NOTES
" History and Physical    Laci Hinkle Jr. MRN# [id number]   Age: 22 year old YOB: 1995     Date of Assessment:  2018          Assessment:     Since the last visit, Laci has continued his usual medications, including Zyprexa 5 mg at night, lamotrigine 125 mg at night, Neurontin 600 mg at night, and Depakote 2000 mg at night.  He has stopped taking clonidine, as he feels the combination of olanzapine and clonidine was making him restless.  Laci feels the addition of Zyprexa to his other medications has been very beneficial in reducing mood instability and paranoia, but it has led to a marked increase in appetite.  Laci estimates he has gained 15-20 pounds in the last couple of months.  He is agreeable to a trial of Topamax 50 mg at night to target increased appetite and weight gain.  He will continue his other usual medications.  He will return for follow-up in one month.  He has not yet gotten a blood test to check his Depakote level and lipids, and I will have to remind him about that at his next visit.    Attestation:  Patient has been seen and evaluated by me, Bo Oconnor MD, PhD.         Diagnoses:   Axis I: Bipolar I disorder, currently hypomanic; polysubstance dependence, THC and alcohol, currently in short-term remission     Axis II: Deferred    Axis III: Medication-induced weight gain; hypertension    Axis IV: mild  psychosocial stressors     Axis V: Global Assessment of Functionin-60    Interim History     Since the last visit, Laci reports that things have been going well.  He feels his mood is stable.  He is sleeping well at night.  His thoughts are clear.  He denies symptoms of depression or minda.    Laci reports that he is having \"longer and longer periods of sobriety\".  These typically last for 1-3 weeks, until he \"slipped up\".  This typically involves using alcohol or marijuana.  That said, Laci does seem committed to reducing his substance " "use.    Laci starts a new job tomorrow as a .  He will have full-time hours.  He is looking forward to it.  He feels \"bored\" a lot of the time these days.    Laci is very pleased with the impact olanzapine has had on his symptoms.  He feels it has improved his mood stability, and reduce his paranoia and anxiety.  He does note marked increase in appetite.  He says he feels hungry all the time, and is never full, even after eating a meal.  It is particularly bad late at night, and Laci has increased his food intake.  He estimates that he has gained 15-20 pounds in the last couple of months.    Laci is agreeable to a trial of Topamax to target his increased appetite.  He will continue his other usual medications.  He will return for follow-up in one month.    Review of Systems     A comprehensive review of systems was performed and is negative other than noted above.          Allergies:      Allergies   Allergen Reactions     Prednisone Other (See Comments)     psych problems     Current Medications   Depakote 2000 mg HS  Gabapentin 400 mg qAM + 200 mg qHS  Clonidine 0.2 mg HS  Lamotrigine 125 mg HS  Olanzapine 5 mg HS    Mental Status Exam     Alertness: alert  and oriented  Appearance: adequately groomed  Behavior/Demeanor: cooperative, pleasant and calm, with good  eye contact  Speech: normal  Language: intact  Psychomotor: normal or unremarkable  Mood:  description consistent with euthymia  Affect: full-range; was congruent to mood  Thought Process/Associations: unremarkable  Thought Content:  Denies active/passive suicidal ideation  Perception:  Denies hallucinations  Insight: fair  Judgment: fair  Cognition:  does appear grossly intact.      Psychiatry Clinic Individual Psychotherapy Note                                                                     [16]   Start time - 1005        End time - 1025  Date last reviewed - 7/9/2018       Date next due - 10/9/2018    Subjective: This " supportive psychotherapy session addressed issues related to orientation to therapy  and goals of therapy .  Patient's reaction: Action in the context of mental status appropriate for ambulatory setting.  Progress: good  Plan: RTC 1 mo  Psychotherapy services during this visit included  myself and the patient.   Treatment Plan      SYMPTOMS; PROBLEMS   MEASURABLE GOALS;    FUNCTIONAL IMPROVEMENT INTERVENTIONS;   GAINS MADE DISCHARGE CRITERIA   Depression: None currently   reduce depressive symptoms and reduce depressive episodes medications   psycho-education   self-care skills symptom resolution   Ju/Hypomania: increased energy, decreased sleep need, increased activity and racing thoughts   reduce manic/hypomanic episodes medications   psycho-education   self-care skills symptom resolution

## 2018-09-18 NOTE — MR AVS SNAPSHOT
After Visit Summary   9/18/2018    Laci Hinkle Jr    MRN: [id number]           Patient Information     Date Of Birth          1995        Visit Information        Provider Department      9/18/2018 3:00 PM Bo Oconnor MD Psychiatry Clinic        Today's Diagnoses     Bipolar disorder, in full remission, most recent episode hypomanic (H)    -  1    Bipolar I disorder, most recent episode (or current) manic (H)        Bipolar 2 disorder (H)        Bipolar I disorder, most recent episode depressed (H)        Bipolar disorder, current episode mixed, mild (H)          Care Instructions    Start Topamax 50 mg at night  Continue your other usual medications  Please return for follow-up in one month          Follow-ups after your visit        Follow-up notes from your care team     Return in about 4 weeks (around 10/16/2018).      Your next 10 appointments already scheduled     Oct 22, 2018  9:00 AM CDT   Adult Med Follow UP with Bo Oconnor MD   Psychiatry Clinic ([facility])    [place] [id number] [address] [phone]              Who to contact     Please call your clinic at [phone] to:    Ask questions about your health    Make or cancel appointments    Discuss your medicines    Learn about your test results    Speak to your doctor            Additional Information About Your Visit        Care EveryWhere ID     This is your Care EveryWhere ID. This could be used by other organizations to access your [place] medical records  [id number]        Your Vitals Were     Pulse BMI (Body Mass Index)                90 34.39 kg/m2           Blood Pressure from Last 3 Encounters:   09/18/18 148/89   07/09/18 (!) 148/102   06/25/18 134/86    Weight from Last 3 Encounters:   09/18/18 99.6 kg (219 lb 9.6 oz)   07/09/18 89.3 kg (196 lb 12.8 oz)   06/25/18 87.4 kg (192 lb 9.6 oz)              Today, you had the following      No orders found for display         Today's Medication Changes          These changes are accurate as of 9/18/18  3:32 PM.  If you have any questions, ask your nurse or doctor.               Start taking these medicines.        Dose/Directions    topiramate 50 MG tablet   Commonly known as:  TOPAMAX   Used for:  Bipolar disorder, in full remission, most recent episode hypomanic (H)   Started by:  Bo Oconnor MD        Dose:  50 mg   Take 1 tablet (50 mg) by mouth At Bedtime   Quantity:  30 tablet   Refills:  1         These medicines have changed or have updated prescriptions.        Dose/Directions    OLANZapine 5 MG tablet   Commonly known as:  zyPREXA   This may have changed:  how much to take   Used for:  Bipolar I disorder, most recent episode (or current) manic (H)   Changed by:  Bo Oconnor MD        Dose:  5 mg   Take 1 tablet (5 mg) by mouth At Bedtime   Quantity:  90 tablet   Refills:  0         Stop taking these medicines if you haven't already. Please contact your care team if you have questions.     cloNIDine 0.1 MG tablet   Commonly known as:  CATAPRES   Stopped by:  Bo Oconnor MD                Where to get your medicines      These medications were sent to [facility] [address] [place] [address] AT [place] [address], [facility] [address]     Phone:  [phone]     divalproex sodium extended-release 500 MG 24 hr tablet    gabapentin 100 MG capsule    gabapentin 400 MG capsule    lamoTRIgine 100 MG tablet    lamoTRIgine 25 MG tablet    OLANZapine 5 MG tablet    topiramate 50 MG tablet                Primary Care Provider Office Phone # Fax #    [facility] [phone] [address] [facility] [address]        Equal Access to Services     AVELINO COLON AH: Edwardo Rodríguez, fermin gold, qaybta maria elenaalanupam shah. So  [facility] [phone].    ATENCIÓN: Si beto hicks, tiene a simons disposición servicios gratuitos de asistencia lingüística. Marie serra [phone].    We comply with applicable federal civil rights laws and [place] laws. We do not discriminate on the basis of race, color, national origin, age, disability, sex, sexual orientation, or gender identity.            Thank you!     Thank you for choosing PSYCHIATRY CLINIC  for your care. Our goal is always to provide you with excellent care. Hearing back from our patients is one way we can continue to improve our services. Please take a few minutes to complete the written survey that you may receive in the mail after your visit with us. Thank you!             Your Updated Medication List - Protect others around you: Learn how to safely use, store and throw away your medicines at www.disposemymeds.org.          This list is accurate as of 9/18/18  3:32 PM.  Always use your most recent med list.                   Brand Name Dispense Instructions for use Diagnosis    amLODIPine 2.5 MG tablet    NORVASC     Take 2.5 mg by mouth daily        atorvastatin 20 MG tablet    LIPITOR     20 mg daily        divalproex sodium extended-release 500 MG 24 hr tablet    DEPAKOTE ER    360 tablet    Take 4 tablets (2,000 mg) by mouth daily    Bipolar disorder, current episode mixed, mild (H)       * gabapentin 400 MG capsule    NEURONTIN    90 capsule    Take 1 capsule (400 mg) by mouth At Bedtime .  In addition, take two 100 mg caps po q AM.  Daily total of 600 mg.    Bipolar I disorder, most recent episode depressed (H)       * gabapentin 100 MG capsule    NEURONTIN    180 capsule    Take 2 caps (200 mg) po q AM.  In addition, take one 400 mg cap at HS.  Daily total of 600 mg.    Bipolar I disorder, most recent episode depressed (H)       * lamoTRIgine 25 MG tablet    LaMICtal    90 tablet    Take 1 tablet (25 mg) by mouth At Bedtime    Bipolar I disorder, most recent episode (or current)  manic (H)       * lamoTRIgine 100 MG tablet    LaMICtal    90 tablet    Take 1 tablet (100 mg) by mouth At Bedtime    Bipolar 2 disorder (H)       nicotine polacrilex 2 MG gum    EQ NICOTINE    60 tablet    Place 1 each (2 mg) inside cheek as needed for smoking cessation    Tobacco abuse       OLANZapine 5 MG tablet    zyPREXA    90 tablet    Take 1 tablet (5 mg) by mouth At Bedtime    Bipolar I disorder, most recent episode (or current) manic (H)       omeprazole 20 MG CR capsule    priLOSEC    30 capsule    Take 1 capsule (20 mg) by mouth every morning (before breakfast)    Gastroesophageal reflux disease without esophagitis       topiramate 50 MG tablet    TOPAMAX    30 tablet    Take 1 tablet (50 mg) by mouth At Bedtime    Bipolar disorder, in full remission, most recent episode hypomanic (H)       * Notice:  This list has 4 medication(s) that are the same as other medications prescribed for you. Read the directions carefully, and ask your doctor or other care provider to review them with you.

## 2018-09-20 ASSESSMENT — PATIENT HEALTH QUESTIONNAIRE - PHQ9: SUM OF ALL RESPONSES TO PHQ QUESTIONS 1-9: 7

## 2018-09-24 NOTE — PROGRESS NOTES
History and Physical    Laci Hinkle Jr. MRN# [id number]   Age: 20 year old YOB: 1995     Date of Assessment:  May 16, 2017          Assessment:   This patient is a 20 year old  male with a significant past psychiatric history of  bipolar I disorder. He currently reports feeling well. He will continue his usual medications (Depakote, gabapentin, clonidine). He reports that he had a blood test to check his Depakote level in  and was told it was 57. He does not want to repeat this today. He will start a partial day treatment program in 2 days, and school at the end of Aug. He will return in 3 months.     Attestation:  Patient has been seen and evaluated by me, Bo Oconnor MD, PhD.    I spent a total time of 25 minutes face-to-face with the patient during today s office visit.  Over 50% of this time was spent counseling the patient and/or coordinating care regarding medication management, counseling re substance use..          Diagnoses:   Axis I: Bipolar I disorder, currently euthymic; polysubstance dependence, THC and alcohol, currently in short-term remission     Axis II: Deferred    Axis III: Medication-induced weight gain; hypertension    Axis IV: mild  psychosocial stressors     Axis V: Global Assessment of Functionin-60    Interim History   History is obtained from the patient.     Laci reports feeling well. He denies symptoms of depression, minda, or psychosis.     He does report generalized anxiety. He feels gabapentin has been helpful with this.    With respect to substance use, Laci says he uses LSD twice per month. He has not smoked THC in some time. He denies other substance use.    Laci reports possible ADHD symptoms. He was apparently diagnosed as a kid and found Strattera very helpful. His addictions history means that Strattera or Wellbutrin may be the best options.    Review of Systems     A comprehensive review of systems was performed and is  negative other than noted above.          Allergies:    No Known Allergies        Current Medications   Depakote 2000 mg HS  Gabapentin 200 mg qAM + 400 mg qHS  Clonidine 0.1 mg daily + 0.2 mg HS    Mental Status Exam     Alertness: alert  and oriented  Appearance: adequately groomed  Behavior/Demeanor: cooperative, pleasant and calm, with good  eye contact  Speech: normal  Language: intact  Psychomotor: normal or unremarkable  Mood:  description consistent with euthymia  Affect: grandiose; was congruent to mood  Thought Process/Associations: unremarkable  Thought Content:  Denies active/passive suicidal ideation  Perception:  Denies hallucinations  Insight: fair  Judgment: fair  Cognition:  does appear grossly intact.   normal...

## 2018-10-12 ENCOUNTER — CARE COORDINATION (OUTPATIENT)
Dept: PSYCHIATRY | Facility: CLINIC | Age: 23
End: 2018-10-12

## 2018-10-12 ENCOUNTER — TELEPHONE (OUTPATIENT)
Dept: PSYCHIATRY | Facility: CLINIC | Age: 23
End: 2018-10-12

## 2018-10-12 NOTE — PROGRESS NOTES
Pt called in to provide an update on his symptoms. He continues to wake up 4-5 times throughout the night with a craving for sweets. He is concerned about these cravings and the impact they have had on his weight and his sleep. He reports starting topiramate on 10/6/18 and states that he feel like the medication has been helping with impulse control regarding drinking, but not so much with urges to eat. Writer encouraged pt to continue taking the medication as he has only been on it for about a week.     Upon further assessment of patient's appetite and eating habits, pt reports not eating at all during the day unless his mom cooks him dinner. It is unclear why pt does not eat regularly throughout the day. Provided education around the importance of eating regular meals throughout the day, highlighting the link between nutrition and mental/physical health. Pt reports he will be living independently soon, which means his mom won't be available to cook him meals and the ability to feed himself appropriately will become even more important for pt.     Regarding the zyprexa, pt reports he is no longer sedated by his current dose and is wondering if changing the administration time from bedtime to morning could help decrease binge urges during the middle of the night.    Routing call to provider for input on changing time of zyprexa administration.

## 2018-10-12 NOTE — PROGRESS NOTES
Message  Received: Today       Bo Oconnor MD Moccio, Emily, RN       Caller: Unspecified (Today, 12:38 PM)                     Sure we can do that. The only potential problem is if there is any sedation, he will notice it more during the day.     DB

## 2018-10-12 NOTE — PROGRESS NOTES
Writer returned call to pt and let him know that Dr. Oconnor is in support of him taking his Zyprexa in the morning to determine whether this makes any impact on his nighttime waking/binge urges. Encouraged pt to monitor for side effect of sedation with this change, and not plan to drive or perform any other activities that require mental alertness before determining the pt's response to the change in administration time. Pt verbalized understanding.

## 2018-10-23 ENCOUNTER — TELEPHONE (OUTPATIENT)
Dept: PSYCHIATRY | Facility: CLINIC | Age: 23
End: 2018-10-23

## 2018-10-23 NOTE — TELEPHONE ENCOUNTER
----- Message from Emily Barron sent at 10/22/2018  8:33 AM CDT -----  Regarding: Side Effects  Contact: [phone]  Caller:  Laci  Medication:  Topamax 50 mg  Pharmacy and location:  [facility]  Symptoms: More Depressed  Symptom onset: Week after starting  Pending appt: Yes  Okay to leave VM:  Yes    Thanks!

## 2018-10-23 NOTE — TELEPHONE ENCOUNTER
[facility] Call Center    Phone Message    May a detailed message be left on voicemail: yes    Reason for Call: Other: Call Back - Symptoms     Action Taken: Message routed to:  Other: Bo Delatorre RNCC

## 2018-10-23 NOTE — TELEPHONE ENCOUNTER
"Received telephone call from pt.    Pt reports that he stopped taking Topamax due to increased depression and is feeling better since. Pt reported that he had considered presenting at hospital due to his depression.    Pt reports his last topiramate dose was 10/19.  Pt identified interest in increasing lamotrigine, stating \"winter's always hard for me,\" and that he would discuss this with provider at next appointment, if needed.    Routed to provider.  "

## 2018-10-26 ENCOUNTER — TELEPHONE (OUTPATIENT)
Dept: PSYCHIATRY | Facility: CLINIC | Age: 23
End: 2018-10-26

## 2018-10-26 NOTE — TELEPHONE ENCOUNTER
"Received telephone call from pt who reported that he increased lamotrigine last night by 25 mg to 150 mg due to continued depression.    Pt identified that his depression continues, even after stopping topiramate, and stated \"I would rather just not feel so depressed\" and \"I'm in a really shitty spot right now.\"  He stated that he is \"So groggy all the time\" and is sleeping from midnight to noon.  Pt stated that \"I want to be proactive about this\" and work on his depression. He stated \"how did I survive without the meds\" when he thinks about how his life had been when using.    Today Laci Hinkle Jr reports suicidal thoughts, with no plan to act on them. In addition, he has notable risk factors for self-harm, including single status, substance abuse and previous suicide attempts. However, risk is mitigated by commitment to family, sobriety and history of seeking help when needed. Therefore, based on all available evidence including the factors cited above, he does not appear to be at imminent risk for self-harm, does not meet criteria for a 72-hr hold, and therefore remains appropriate for ongoing outpatient level of care.   Pt stated that he does think about suicide occasionally (he stated \"yeah, I do think about it and it's really depressing\") and that he gets himself out of it by talking to people. He stated  \"I have a huge support network\" and \"I have a lot of people that love me.\"   Pt identified passive thoughts of self-harm, but has not engaged in any.  Pt stated \"I'm with my mom. I'll be safe.\"  Pt reports that he last consumed alcohol on 10/22, which was the last time that he had money. He identified that his family helps him to keep his money secure so he doesn't use it all.  Pt identified significant drug cravings that he is interested in medications to help with, as he feels that they are physically painful.  He reported that he was going to try a non-AA group starting 10/29 and is volunteering to " help the homeless on 10/30.  Pt reported that he no longer associates with friends that were connected only with him when using.    Pt identified that he has the [employer] number and would use it if needed.  Writer provided after-hours number to pt.  Writer prompted pt to make no further adjustments to his lamotrigine dose prior to his 10/31 psych appointment and to be watchful for skin changes. He identified being aware of [facility]. He also identified looking forward to the 10/31 appointment.    Routed to provider.

## 2018-10-29 NOTE — TELEPHONE ENCOUNTER
Bo Oconnor MD Snyder, David J, RN        Caller: Unspecified (3 days ago, 12:06 PM)                     Thanks for talking with him Ja. It looks like he has an appointment with me on Wednesday.  I can discuss medications with him further then.     DB            Previous Messages       ----- Message -----      From: Bo Delatorre, RN      Sent: 10/26/2018  12:41 PM        To: Bo Oconnor MD     ----- Message from Bo Delatorre RN sent at 10/26/2018 12:41 PM CDT -----   Dr. Oconnor:   Pt increased his lamotrigine by 25 mg last night.   Depression continues.   He reports some passive SI, with no plan, and is with his parents.   More detail in note.   Ja

## 2018-10-31 ENCOUNTER — TELEPHONE (OUTPATIENT)
Dept: PSYCHIATRY | Facility: CLINIC | Age: 23
End: 2018-10-31

## 2018-10-31 ENCOUNTER — OFFICE VISIT (OUTPATIENT)
Dept: PSYCHIATRY | Facility: CLINIC | Age: 23
End: 2018-10-31
Attending: PSYCHIATRY & NEUROLOGY
Payer: COMMERCIAL

## 2018-10-31 VITALS
HEART RATE: 112 BPM | WEIGHT: 232.2 LBS | BODY MASS INDEX: 36.37 KG/M2 | SYSTOLIC BLOOD PRESSURE: 168 MMHG | DIASTOLIC BLOOD PRESSURE: 95 MMHG

## 2018-10-31 DIAGNOSIS — F31.9 BIPOLAR DISORDER (H): Primary | ICD-10-CM

## 2018-10-31 DIAGNOSIS — F31.10 BIPOLAR I DISORDER, MOST RECENT EPISODE (OR CURRENT) MANIC (H): ICD-10-CM

## 2018-10-31 DIAGNOSIS — Z51.81 ENCOUNTER FOR THERAPEUTIC DRUG MONITORING: ICD-10-CM

## 2018-10-31 PROCEDURE — G0463 HOSPITAL OUTPT CLINIC VISIT: HCPCS | Mod: ZF

## 2018-10-31 RX ORDER — OLANZAPINE 5 MG/1
10 TABLET ORAL AT BEDTIME
Qty: 180 TABLET | Refills: 0 | Status: SHIPPED | OUTPATIENT
Start: 2018-10-31 | End: 2019-01-02

## 2018-10-31 ASSESSMENT — PAIN SCALES - GENERAL: PAINLEVEL: NO PAIN (0)

## 2018-10-31 NOTE — LETTER
October 31, 2018        Laci Hinkle   [address] [facility] [address]          To whom it may concern:    This patient missed work the afternoon of 10/31/2018 due to a clinic visit.    Please contact me for questions or concerns.        Sincerely,        Bo Oconnor PhD

## 2018-10-31 NOTE — TELEPHONE ENCOUNTER
----- Message from Bo Oconnor MD sent at 10/31/2018  2:56 PM CDT -----  Alvin Peres -     Can you order labs for Laci?  He gets them done at health [facility], next door to us.  He needs a CBC and differential, AST, ALT, and fasting glucose, cholesterol, and triglycerides.    Thx!    DB

## 2018-10-31 NOTE — MR AVS SNAPSHOT
After Visit Summary   10/31/2018    Laci Hinkle Jr    MRN: [id number]           Patient Information     Date Of Birth          1995        Visit Information        Provider Department      10/31/2018 2:30 PM Bo Oconnor MD Psychiatry Clinic        Today's Diagnoses     Bipolar I disorder, most recent episode (or current) manic (H)          Care Instructions    Thank you for coming to the PSYCHIATRY CLINIC.    Lab Testing:  If you had lab testing today and your results are reassuring or normal they will be mailed to you or sent through [employer] within 7 days.   If the lab tests need quick action we will call you with the results.  The phone number we will call with results is # [phone] (home) . If this is not the best number please call our clinic and change the number.    Medication Refills:  If you need any refills please call your pharmacy and they will contact us. Our fax number for refills is [phone]. Please allow three business for refill processing.   If you need to  your refill at a new pharmacy, please contact the new pharmacy directly. The new pharmacy will help you get your medications transferred.     Scheduling:  If you have any concerns about today's visit or wish to schedule another appointment please call our office during normal business hours [phone] (8-5:00 M-F)    Contact Us:  Please call [phone] during business hours (8-5:00 M-F).  If after clinic hours, or on the weekend, please call  [phone].    Financial Assistance [phone]  [facility] Billing [phone]  [facility] Billing [phone]  Medical Records [phone]      MENTAL HEALTH CRISIS NUMBERS:  [facility]:   [facility] - [phone]   Crisis Residence [facility] - [phone]   Walk-In Counseling Center [facility] - [phone]   COPE 24/7 [facility] Mobile Team for Adults - [[phone]]; Child - [[phone]]     Crisis Connection -  [phone]     [facility]:   [facility] - [phone]   Walk-in counseling [facility] [place] [facility] - [phone]   Walk-in counseling [facility] - [phone]   Crisis Residence [facility] [place] [facility] - [phone]   Urgent Care Adult Mental Health:   --Drop-in, 24/7 crisis line, and [place] Mobile Team [[phone]]    CRISIS TEXT LINE: Text [phone] from anywhere, anytime, any crisis 24/7;    OR SEE www.crisistextline.org     Poison Control Center - [phone]    CHILD: [facility] needs assessment team - [phone]     [facility] Lifeline - [phone]; or [facility] Lifeline - [phone]    If you have a medical emergency please call 911or go to the nearest ER.                    _____________________________________________    Again thank you for choosing PSYCHIATRY CLINIC and please let us know how we can best partner with you to improve you and your family's health.  You may be receiving a survey in the mail regarding this appointment. We would love to have your feedback, both positive and negative, so please fill out the survey and return it using the provided envelope. The survey is done by an external company, so your answers are anonymous.             Follow-ups after your visit        Your next 10 appointments already scheduled     Dec 04, 2018  2:30 PM CST   Adult Med Follow UP with Bo Oconnor MD   Psychiatry Clinic ([facility])    [address] [place] [id number] [phone]              Who to contact     Please call your clinic at [phone] to:    Ask questions about your health    Make or cancel appointments    Discuss your medicines    Learn about your test results    Speak to your doctor            Additional Information About Your Visit        Care EveryWhere ID     This is your Care EveryWhere ID. This could be used by other organizations to access  your [facility] medical records  [id number]        Your Vitals Were     Pulse BMI (Body Mass Index)                112 36.37 kg/m2           Blood Pressure from Last 3 Encounters:   10/31/18 (!) 168/95   09/18/18 148/89   07/09/18 (!) 148/102    Weight from Last 3 Encounters:   10/31/18 105.3 kg (232 lb 3.2 oz)   09/18/18 99.6 kg (219 lb 9.6 oz)   07/09/18 89.3 kg (196 lb 12.8 oz)              Today, you had the following     No orders found for display         Today's Medication Changes          These changes are accurate as of 10/31/18  3:00 PM.  If you have any questions, ask your nurse or doctor.               These medicines have changed or have updated prescriptions.        Dose/Directions    * lamoTRIgine 25 MG tablet   Commonly known as:  LaMICtal   This may have changed:  how much to take   Used for:  Bipolar I disorder, most recent episode (or current) manic (H)        Dose:  25 mg   Take 1 tablet (25 mg) by mouth At Bedtime   Quantity:  90 tablet   Refills:  0       * lamoTRIgine 100 MG tablet   Commonly known as:  LaMICtal   This may have changed:  Another medication with the same name was changed. Make sure you understand how and when to take each.   Used for:  Bipolar 2 disorder (H)        Dose:  100 mg   Take 1 tablet (100 mg) by mouth At Bedtime   Quantity:  90 tablet   Refills:  0       * Notice:  This list has 2 medication(s) that are the same as other medications prescribed for you. Read the directions carefully, and ask your doctor or other care provider to review them with you.         Where to get your medicines      These medications were sent to [id number] [place] [address] 10 AT [facility] 10  [address] [place] [address] 10, [facility] [address]     Phone:  [phone]     OLANZapine 5 MG tablet                Primary Care Provider Office Phone # Fax #    [facility] [phone] [address] [facility]  [address]        Equal Access to Services     [facility] : Hadii jc osei dex Rodríguez, wadominikda luqgustavo, qabest maria elenaevelynejennifer nieves, anupam ayala. So [facility] [phone].    ATENCIÓN: Si habla español, tiene a simons disposición servicios gratuitos de asistencia lingüística. Rejiame al [phone].    We comply with applicable federal civil rights laws and [place] laws. We do not discriminate on the basis of race, color, national origin, age, disability, sex, sexual orientation, or gender identity.            Thank you!     Thank you for choosing PSYCHIATRY CLINIC  for your care. Our goal is always to provide you with excellent care. Hearing back from our patients is one way we can continue to improve our services. Please take a few minutes to complete the written survey that you may receive in the mail after your visit with us. Thank you!             Your Updated Medication List - Protect others around you: Learn how to safely use, store and throw away your medicines at www.disposemymeds.org.          This list is accurate as of 10/31/18  3:00 PM.  Always use your most recent med list.                   Brand Name Dispense Instructions for use Diagnosis    amLODIPine 2.5 MG tablet    NORVASC     Take 2.5 mg by mouth daily        atorvastatin 20 MG tablet    LIPITOR     20 mg daily        divalproex sodium extended-release 500 MG 24 hr tablet    DEPAKOTE ER    360 tablet    Take 4 tablets (2,000 mg) by mouth daily    Bipolar disorder, current episode mixed, mild (H)       * gabapentin 400 MG capsule    NEURONTIN    90 capsule    Take 1 capsule (400 mg) by mouth At Bedtime .  In addition, take two 100 mg caps po q AM.  Daily total of 600 mg.    Bipolar I disorder, most recent episode depressed (H)       * gabapentin 100 MG capsule    NEURONTIN    180 capsule    Take 2 caps (200 mg) po q AM.  In addition, take one 400 mg cap at HS.  Daily total of 600 mg.    Bipolar I disorder, most recent  episode depressed (H)       * lamoTRIgine 25 MG tablet    LaMICtal    90 tablet    Take 1 tablet (25 mg) by mouth At Bedtime    Bipolar I disorder, most recent episode (or current) manic (H)       * lamoTRIgine 100 MG tablet    LaMICtal    90 tablet    Take 1 tablet (100 mg) by mouth At Bedtime    Bipolar 2 disorder (H)       nicotine polacrilex 2 MG gum    EQ NICOTINE    60 tablet    Place 1 each (2 mg) inside cheek as needed for smoking cessation    Tobacco abuse       OLANZapine 5 MG tablet    zyPREXA    180 tablet    Take 2 tablets (10 mg) by mouth At Bedtime    Bipolar I disorder, most recent episode (or current) manic (H)       omeprazole 20 MG CR capsule    priLOSEC    30 capsule    Take 1 capsule (20 mg) by mouth every morning (before breakfast)    Gastroesophageal reflux disease without esophagitis       * Notice:  This list has 4 medication(s) that are the same as other medications prescribed for you. Read the directions carefully, and ask your doctor or other care provider to review them with you.

## 2018-10-31 NOTE — PROGRESS NOTES
History and Physical    Laci Hinkle Jr. MRN# [id number]   Age: 22 year old YOB: 1995     Date of Assessment:  Oct 31, 2018          Assessment:     Since the last visit, Laci has decided to make several changes in his medications.  He has increased olanzapine to 10 mg at night.  He has increased lamotrigine to 150 mg to target new onset depressive symptoms.  He stopped Topamax as he felt it might be contributing to his depression.  He stopped gabapentin as he does not want medication treatment for anxiety.  He continues on Depakote at the usual dose.  He feels reasonably well today.  I feel the Depakote, lamotrigine, and olanzapine are his core medications, and I have encouraged him not to make dosage adjustments without talking to me first.  He is in agreement.  He is worried about weight gain and metabolic problems from olanzapine, and we will check his glucose, cholesterol, and triglycerides, along with his Depakote level and liver function.  He will continue his current medications at their usual doses and will return for follow-up in 1 month.  We discussed adding metformin to his current medications of weight gain from olanzapine is problematic.    Attestation:  Patient has been seen and evaluated by me, Bo Oconnor MD, PhD.         Diagnoses:   Axis I: Bipolar I disorder, currently euthymic; polysubstance dependence, THC and alcohol, currently in short-term remission     Axis II: Deferred    Axis III: Medication-induced weight gain; hypertension    Axis IV: mild  psychosocial stressors     Axis V: Global Assessment of Functionin-60    Interim History     Since the last visit, Laci reports starting to experience depressive symptoms a couple of weeks ago.  He wondered if Topamax might be a contributing factor, and so he discontinued it.  He increase lamotrigine a couple of days later, as he knows that helps to treat depression.  He finds lamotrigine stimulating, and so he  "increased olanzapine back to his previous dose of 10mg concurrently.    Over the last few days, Laci feels well.  He denies current symptoms of depression or minda.  He feels more cognitively clear since stopping Topamax, which is not surprising.    Laci describes the Zyprexa and lamotrigine as \"life-changing\".  I have encouraged him not to make dosage adjustments without talking to me first, and he agrees to do so.    Laci is concerned about weight gain and metabolic problems from olanzapine.  He has gained about 5 pounds in the last few weeks.  He will check his metabolic labs, along with Depakote level.  We also talked about the possibility of using metformin if weight gain remains an ongoing problem.    Laci will continue his current medications at their usual doses    He will return for follow-up in 1 month.    Review of Systems     A comprehensive review of systems was performed and is negative other than noted above.          Allergies:      Allergies   Allergen Reactions     Prednisone Other (See Comments)     psych problems     Current Medications   Depakote 2000 mg HS  Gabapentin 400 mg qAM + 200 mg qHS  Clonidine 0.2 mg HS  Lamotrigine 125 mg HS  Olanzapine 5 mg HS    Mental Status Exam     Alertness: alert  and oriented  Appearance: adequately groomed  Behavior/Demeanor: cooperative, pleasant and calm, with good  eye contact  Speech: normal  Language: intact  Psychomotor: normal or unremarkable  Mood:  description consistent with euthymia  Affect: full-range; was congruent to mood  Thought Process/Associations: unremarkable  Thought Content:  Denies active/passive suicidal ideation  Perception:  Denies hallucinations  Insight: fair  Judgment: fair  Cognition:  does appear grossly intact.      Psychiatry Clinic Individual Psychotherapy Note                                                                     [16]   Start time - 1440        End time - 1500  Date last reviewed - 7/9/2018       " Date next due - 10/9/2018    Subjective: This supportive psychotherapy session addressed issues related to orientation to therapy  and goals of therapy .  Patient's reaction: Action in the context of mental status appropriate for ambulatory setting.  Progress: good  Plan: RTC 1 mo  Psychotherapy services during this visit included  myself and the patient.   Treatment Plan      SYMPTOMS; PROBLEMS   MEASURABLE GOALS;    FUNCTIONAL IMPROVEMENT INTERVENTIONS;   GAINS MADE DISCHARGE CRITERIA   Depression: None currently   reduce depressive symptoms and reduce depressive episodes medications   psycho-education   self-care skills symptom resolution   Ju/Hypomania: increased energy, decreased sleep need, increased activity and racing thoughts   reduce manic/hypomanic episodes medications   psycho-education   self-care skills symptom resolution

## 2018-10-31 NOTE — LETTER
October 31, 2018        Laci Hinkle   [address] [facility] [address]          To whom it may concern:    This patient missed work the afternoon 10/31/2018 due to a clinic visit.    Please contact me for questions or concerns.        Sincerely,        Bo Oconnor PhD

## 2018-10-31 NOTE — TELEPHONE ENCOUNTER
Writer telephoned [facility] for lab fax number: [phone].  Writer placed orders and faxed to [facility] lab.

## 2018-10-31 NOTE — PATIENT INSTRUCTIONS
Continue your current medications at their usual doses  Please get a blood test to check your cholesterol, blood sugar, and Depakote level  Please return for follow-up in 1 month    Thank you for coming to the PSYCHIATRY CLINIC.    Lab Testing:  If you had lab testing today and your results are reassuring or normal they will be mailed to you or sent through [employer] within 7 days.   If the lab tests need quick action we will call you with the results.  The phone number we will call with results is # [phone] (home) . If this is not the best number please call our clinic and change the number.    Medication Refills:  If you need any refills please call your pharmacy and they will contact us. Our fax number for refills is [phone]. Please allow three business for refill processing.   If you need to  your refill at a new pharmacy, please contact the new pharmacy directly. The new pharmacy will help you get your medications transferred.     Scheduling:  If you have any concerns about today's visit or wish to schedule another appointment please call our office during normal business hours [phone] (8-5:00 M-F)    Contact Us:  Please call [phone] during business hours (8-5:00 M-F).  If after clinic hours, or on the weekend, please call  [phone].    Financial Assistance [phone]  [facility] Billing [phone]  [facility]illing [phone]  Medical Records [phone]      MENTAL HEALTH CRISIS NUMBERS:  [facility]:   [facility] [place] [facility] - [phone]   Crisis Residence [facility]idence - [phone]   Walk-In Counseling [facility] - [phone]   COPE 24/7 [facility] Mobile Team for Adults - [[phone]]; Child - [[phone]]     Crisis Connection - [phone]     [facility]:   [facility] - [phone]   Walk-in counseling [facility] - [phone]   Walk-in counseling [facility] - [phone]   Crisis  Residence [facility] - [phone]   Urgent Care Adult Mental Health:   --Drop-in, 24/7 crisis line, and [facility] Mobile Team [[phone]]    CRISIS TEXT LINE: Text 065-706 from anywhere, anytime, any crisis 24/7;    OR SEE www.crisistextline.org     Poison Control Center - [phone]    CHILD: [facility] needs assessment team - [phone]     [facility] Lifeline - [phone]; or [employer] Lifeline - [phone]    If you have a medical emergency please call 911or go to the nearest ER.                    _____________________________________________    Again thank you for choosing PSYCHIATRY CLINIC and please let us know how we can best partner with you to improve you and your family's health.  You may be receiving a survey in the mail regarding this appointment. We would love to have your feedback, both positive and negative, so please fill out the survey and return it using the provided envelope. The survey is done by an external company, so your answers are anonymous.

## 2018-12-06 ENCOUNTER — CARE COORDINATION (OUTPATIENT)
Dept: PSYCHIATRY | Facility: CLINIC | Age: 23
End: 2018-12-06

## 2018-12-06 NOTE — PROGRESS NOTES
Received fax from pt's  requesting current medication list and that we verify pt has received AIMS in past year if on neuroleptics.  Writer faxed med list to .  Routed request and WU to scanning.

## 2019-01-02 ENCOUNTER — OFFICE VISIT (OUTPATIENT)
Dept: PSYCHIATRY | Facility: CLINIC | Age: 24
End: 2019-01-02
Attending: PSYCHIATRY & NEUROLOGY
Payer: COMMERCIAL

## 2019-01-02 ENCOUNTER — TELEPHONE (OUTPATIENT)
Dept: PSYCHIATRY | Facility: CLINIC | Age: 24
End: 2019-01-02

## 2019-01-02 VITALS
BODY MASS INDEX: 35.93 KG/M2 | WEIGHT: 229.4 LBS | HEART RATE: 84 BPM | SYSTOLIC BLOOD PRESSURE: 139 MMHG | DIASTOLIC BLOOD PRESSURE: 90 MMHG

## 2019-01-02 DIAGNOSIS — F31.81 BIPOLAR 2 DISORDER (H): ICD-10-CM

## 2019-01-02 DIAGNOSIS — F31.10 BIPOLAR I DISORDER, MOST RECENT EPISODE (OR CURRENT) MANIC (H): ICD-10-CM

## 2019-01-02 DIAGNOSIS — F31.61 BIPOLAR DISORDER, CURRENT EPISODE MIXED, MILD (H): Primary | ICD-10-CM

## 2019-01-02 DIAGNOSIS — Z51.81 ENCOUNTER FOR THERAPEUTIC DRUG MONITORING: ICD-10-CM

## 2019-01-02 DIAGNOSIS — F31.61 BIPOLAR DISORDER, CURRENT EPISODE MIXED, MILD (H): ICD-10-CM

## 2019-01-02 PROCEDURE — G0463 HOSPITAL OUTPT CLINIC VISIT: HCPCS | Mod: ZF

## 2019-01-02 RX ORDER — OLANZAPINE 5 MG/1
5 TABLET ORAL AT BEDTIME
Qty: 90 TABLET | Refills: 0 | Status: SHIPPED | OUTPATIENT
Start: 2019-01-02 | End: 2019-02-13

## 2019-01-02 RX ORDER — DIVALPROEX SODIUM 500 MG/1
2000 TABLET, EXTENDED RELEASE ORAL DAILY
Qty: 360 TABLET | Refills: 0 | Status: SHIPPED | OUTPATIENT
Start: 2019-01-02 | End: 2019-02-13

## 2019-01-02 RX ORDER — LAMOTRIGINE 25 MG/1
25 TABLET ORAL AT BEDTIME
Qty: 90 TABLET | Refills: 0 | Status: SHIPPED | OUTPATIENT
Start: 2019-01-02 | End: 2019-02-13

## 2019-01-02 RX ORDER — LAMOTRIGINE 100 MG/1
100 TABLET ORAL AT BEDTIME
Qty: 90 TABLET | Refills: 0 | Status: SHIPPED | OUTPATIENT
Start: 2019-01-02 | End: 2019-02-13

## 2019-01-02 ASSESSMENT — PAIN SCALES - GENERAL: PAINLEVEL: NO PAIN (0)

## 2019-01-02 ASSESSMENT — PATIENT HEALTH QUESTIONNAIRE - PHQ9: SUM OF ALL RESPONSES TO PHQ QUESTIONS 1-9: 3

## 2019-01-02 NOTE — NURSING NOTE
Chief Complaint   Patient presents with     Recheck Medication     Bipolar I disorder, most recent episode

## 2019-01-02 NOTE — TELEPHONE ENCOUNTER
----- Message from Bo Oconnor MD sent at 1/2/2019  4:02 PM CST -----  Alvin Peres -    Can you order labs for Laci?  [facility].  He needs a CBC and differential, AST, ALT, and valproic acid level.    Thanks!    DB

## 2019-01-02 NOTE — PROGRESS NOTES
History and Physical    Laci Hinkle Jr. MRN# [id number]   Age: 22 year old YOB: 1995     Date of Assessment:  2019          Assessment:     Since the last visit, Laci has reduced his lamotrigine from 150 mg to 125 mg at night, and reduced olanzapine from 10 mg to 5 mg at night.  He continues Depakote 2000 mg at bedtime.  Laci feels very well.  He is planning to take a couple of courses starting .  He is considering training to become a .  He will continue his medications at the current doses.  He still has not gotten a blood test to check his Depakote level, but promises to do so.  He will return for follow-up in 6 weeks.    Attestation:  Patient has been seen and evaluated by me, Bo Oconnor MD, PhD.         Diagnoses:   Axis I: Bipolar I disorder, currently euthymic; polysubstance dependence, THC and alcohol, currently in short-term remission     Axis II: Deferred    Axis III: Medication-induced weight gain; hypertension    Axis IV: mild  psychosocial stressors     Axis V: Global Assessment of Functionin-60    Interim History     Since the last visit, Laci reports that things have been going well.  His mood is stable.  His motivation and energy level are good.  He sleeps well at night.  He feels cognitively intact.  He denies symptoms of depression or minda.    Laci has made some adjustments in his medication doses, as outlined above.  He notes that his appetite is reduced with the lower dose of olanzapine, and he is pleased about this.  He denies side effects from his medications.    Regarding substance use, Laci continues to use kratom.  And also uses CBD oil.  He gets a mild high from kratom, and finds that CBD oil helps with anxiety.  He does not plan to use kratom when he restarts school.    Laci is agreeable to continue his medications at the current doses.  He will get a blood test to check his Depakote level and other routine  labs.  He will return for follow-up in 6 weeks.    Review of Systems     A comprehensive review of systems was performed and is negative other than noted above.          Allergies:      Allergies   Allergen Reactions     Prednisone Other (See Comments)     psych problems     Current Medications   Depakote 2000 mg HS  Gabapentin 400 mg qAM + 200 mg qHS  Clonidine 0.2 mg HS  Lamotrigine 125 mg HS  Olanzapine 5 mg HS    Mental Status Exam     Alertness: alert  and oriented  Appearance: adequately groomed  Behavior/Demeanor: cooperative, pleasant and calm, with good  eye contact  Speech: normal  Language: intact  Psychomotor: normal or unremarkable  Mood:  description consistent with euthymia  Affect: full-range; was congruent to mood  Thought Process/Associations: unremarkable  Thought Content:  Denies active/passive suicidal ideation  Perception:  Denies hallucinations  Insight: fair  Judgment: fair  Cognition:  does appear grossly intact.      Psychiatry Clinic Individual Psychotherapy Note                                                                     [16]   Start time - 1440        End time - 1500  Date last reviewed - 7/9/2018       Date next due - 10/9/2018    Subjective: This supportive psychotherapy session addressed issues related to orientation to therapy  and goals of therapy .  Patient's reaction: Action in the context of mental status appropriate for ambulatory setting.  Progress: good  Plan: RTC 1 mo  Psychotherapy services during this visit included  myself and the patient.   Treatment Plan      SYMPTOMS; PROBLEMS   MEASURABLE GOALS;    FUNCTIONAL IMPROVEMENT INTERVENTIONS;   GAINS MADE DISCHARGE CRITERIA   Depression: None currently   reduce depressive symptoms and reduce depressive episodes medications   psycho-education   self-care skills symptom resolution   Ju/Hypomania: increased energy, decreased sleep need, increased activity and racing thoughts   reduce manic/hypomanic episodes  medications   psycho-education   self-care skills symptom resolution

## 2019-01-02 NOTE — PATIENT INSTRUCTIONS
Continue your medications at the usual doses  Please get a blood test to check your Depakote level  Return for follow-up in 6 weeks

## 2019-01-15 ENCOUNTER — TELEPHONE (OUTPATIENT)
Dept: PSYCHIATRY | Facility: CLINIC | Age: 24
End: 2019-01-15

## 2019-01-15 DIAGNOSIS — F31.10 BIPOLAR I DISORDER, MOST RECENT EPISODE (OR CURRENT) MANIC (H): Primary | ICD-10-CM

## 2019-01-15 NOTE — TELEPHONE ENCOUNTER
"Writer received incoming call from patient [phone]     Patient reports increase in anxiety and stress due to recently starting school and job. Patient is very anxious and states \" school and job are stressing me out and I don't want to mess up in school.\" Due to stress and anxiety patient has difficulty sleeping and eating. Currently patient is taking 6 credit hours in school and is trying to cut down hours at work. Currently patient is taking Zyprexa 5 mg, Lamotrigine 125 mg and Depakote 2000 mg. At this point patient is requesting to start Gabapentin to help with anxiety. \" I don't want to start any controlled substances and be high.\"  Patient also educated on distracting self by going for a walk or watching TV. He reports those distraction are helpful. Denies safety concerns or thoughts to harm self or others. Patient provided with on-call resident number for further assistance for urgent situations. Also informed patient to admit to the ED if symptoms worsen. Writer agreed to pass this message to provider for further recommendation on medication.    Writer also informed the patient, we would not receive a response from provider today. Will call with an update tomorrow.   "

## 2019-01-16 RX ORDER — GABAPENTIN 100 MG/1
100 CAPSULE ORAL
Qty: 60 CAPSULE | Refills: 0 | Status: SHIPPED | OUTPATIENT
Start: 2019-01-16 | End: 2019-02-13

## 2019-01-16 NOTE — TELEPHONE ENCOUNTER
"Bo Oconnor MD Snyder, David J, RN             OK I guess we need to leave that feedback time at 1 blocked.     Let's try prescribing OLZ + GBP and I'll see him in early Feb.     DB      Bo Oconnor MD Snyder, David J, RN             Increasing olanzapine to 7.5 mg or even 10 mg might be a good idea. It could help with confusion and anxiety.     DB      Writer telephoned pt and reviewed medication changes and 1/22 appointment not being available. Pt stated understanding and identified having sufficient olanzapine to make the change.    When writer explored pt's current status, pt stated \"I'm alright\" and that he \"woke up a lot better.\"    Pt identified currently being in school and so was unable to talk further.  Writer set reminder to contact pt on 1/22 to explore his response to the medication changes.      "

## 2019-01-16 NOTE — TELEPHONE ENCOUNTER
"Bo Oconnor MD Oakenshield, Luke; Bo Delatorre, RN             Thanks for the message.  Ja, can you have a look at my schedule and see when we can fit in Laci?     In the meantime, one thing he could consider would be initiating a trial of gabapentin.  It is a very helpful antianxiety medication.  If he is interested, he could try 100 mg twice a day.  If it is sedating, which is the only likely side effect, he could switch at all to the nighttime.     DB    Previous Messages      ----- Message -----   From: Chip Sales   Sent: 1/15/2019   3:39 PM   To: Bo Oconnor MD, Bo Delatorre, RN     Patient is having a large spike in anxiety symptoms stemming from starting school, and still working leading to breaking out in acne, struggling to sleep, time management floundering, and wanting to seek out other options in terms of his care. Patient stated he is not wanting to \"dumb down my feelings, and take a controlled substance. I'm not trying to just cope.\"     Patient is feeling a large amount of crisis happening currently, and would highly appreciate being seen in the most available time. If there is an open slot that is not seen or can be opened he would be highly interested in taking it.           "

## 2019-01-16 NOTE — TELEPHONE ENCOUNTER
Writer telephoned pt to review medication recommendation.    Pt identified that when he was taking 600 mg gabapentin he was taking it as needed and found that it affected how he interacted with people and doesn't want to cause social problems, but is interested in trying the lower dose. He identified that gabapentin not sedating for him in the past. He requested that the medication be sent to the pharmacy so that he could get the medication today.    Writer e-prescribed 30-day supply to [facility] ([phone]). Qty 60, refills 0.    Pt inquired on an increase in Zyprexa to 7.5 mg due to his concern that he has developed a tolerance to the med and that he noticed some confusion yesterday. He identified that he doesn't want to be on too much Zyprexa due to sedation, but in the past it has improved his cognition after he has adjusted to the sedation. Pt reported that today is last day of classes for the week for him, and he could start the med now. Pt identified having an ample supply of 5 mg Zyprexa on hand.    Pt identified that he could meet with Dr. Oconnor at 1:00 or 1:30 on 1/22.    Routed to provider.

## 2019-01-24 ENCOUNTER — TELEPHONE (OUTPATIENT)
Facility: CLINIC | Age: 24
End: 2019-01-24

## 2019-01-24 NOTE — TELEPHONE ENCOUNTER
Writer placed a call to patient at [phone]. Patient reports doing well on Zyprexa 7.5 mg daily dose. Reports improvement in cognition status, helped with stress, acne, has gained more confidence in school and has easier time following in class. Patient would like to continue taking the 7.5 mg for now. Also reports gabapentin to be effective. Denies adverse side effects from meds. Denies safety concerns at this time.     Will route to provider as FYI.

## 2019-01-30 NOTE — TELEPHONE ENCOUNTER
"Writer telephoned pt to explore the response to the recent med change of increasing Zyprexa and adding gabapentin.    Pt stated \"so far so good\" and identified that he feels that Zyprexa has been helpful and that the gabapentin has been especially helpful prior to classes.  Pt stated that it has been \"nice to have a consistent mind set.\" He stated that his mood is \"good\" and his \"cognition is fine,\" with him being able to read and comprehend what he reads.  Pt stated that his \"drug use has been really good,\" that he has been using only Kratom, and that he doesn't get high on it any longer. Pt reports that he finds it quells his cravings without getting \"messed up.    Pt identified concerns with Zyprexa-related weight gain and that he wants to discuss this at his next appointment. He stated that he finds himself \"pillaging\" the refrigerator at night.                    "

## 2019-02-07 ENCOUNTER — TELEPHONE (OUTPATIENT)
Facility: CLINIC | Age: 24
End: 2019-02-07

## 2019-02-07 NOTE — TELEPHONE ENCOUNTER
"[facility]    Phone Message    May a detailed message be left on voicemail: yes    Reason for Call: Other:   Patient called to ask if Dr. Oconnor has experience prescribing naltrexone because he is hoping it might help him quit \"kratom\". He reported he is wanting help for \"eating and craving food\". He would like to talk about this at his upcoming appointment    Action Taken: Message routed to:  Other: Bo Delatorre RNCC  "

## 2019-02-07 NOTE — TELEPHONE ENCOUNTER
Writer telephoned pt to identify that his message had been forwarded to Dr. Oconnor.  Pt identified that he is at the stage of change where he is ready to quit Kratom.  Writer identified a plan to follow up with Dr. Oconnor and contact pt if he cannot provide the pt with naltrexone.  Pt reported that he is both scared and excited about the next stage of his life, and that quitting Kratom is a part of that.

## 2019-02-08 NOTE — TELEPHONE ENCOUNTER
Bo Oconnor MD Snyder, David J, RN   Caller: Unspecified (Yesterday,  1:34 PM)             I sometimes prescribe naltrexone.  It could conceivably be helpful for Laci in quitting kratom.  I'd need to discuss it with him in person before I could prescribe it, so maybe we can bring it up at his next appointment.     DB      Writer left voice mail message for pt, reviewing provider's response and prompting him to address it at the 2/13 appointment.

## 2019-02-13 ENCOUNTER — OFFICE VISIT (OUTPATIENT)
Dept: PSYCHIATRY | Facility: CLINIC | Age: 24
End: 2019-02-13
Attending: PSYCHIATRY & NEUROLOGY
Payer: COMMERCIAL

## 2019-02-13 VITALS
DIASTOLIC BLOOD PRESSURE: 101 MMHG | BODY MASS INDEX: 35.52 KG/M2 | HEART RATE: 88 BPM | WEIGHT: 226.8 LBS | SYSTOLIC BLOOD PRESSURE: 154 MMHG

## 2019-02-13 DIAGNOSIS — F31.10 BIPOLAR I DISORDER, MOST RECENT EPISODE (OR CURRENT) MANIC (H): ICD-10-CM

## 2019-02-13 DIAGNOSIS — F31.81 BIPOLAR 2 DISORDER (H): ICD-10-CM

## 2019-02-13 DIAGNOSIS — F31.61 BIPOLAR DISORDER, CURRENT EPISODE MIXED, MILD (H): ICD-10-CM

## 2019-02-13 PROCEDURE — G0463 HOSPITAL OUTPT CLINIC VISIT: HCPCS | Mod: ZF

## 2019-02-13 RX ORDER — OLANZAPINE 10 MG/1
10 TABLET ORAL AT BEDTIME
Qty: 90 TABLET | Refills: 1 | Status: SHIPPED | OUTPATIENT
Start: 2019-02-13 | End: 2019-07-10

## 2019-02-13 RX ORDER — NALTREXONE HYDROCHLORIDE 50 MG/1
50 TABLET, FILM COATED ORAL DAILY
Qty: 30 TABLET | Refills: 3 | Status: SHIPPED | OUTPATIENT
Start: 2019-02-13 | End: 2019-09-24

## 2019-02-13 RX ORDER — LAMOTRIGINE 100 MG/1
100 TABLET ORAL AT BEDTIME
Qty: 90 TABLET | Refills: 1 | Status: SHIPPED | OUTPATIENT
Start: 2019-02-13 | End: 2019-07-10

## 2019-02-13 RX ORDER — DIVALPROEX SODIUM 500 MG/1
2000 TABLET, EXTENDED RELEASE ORAL DAILY
Qty: 360 TABLET | Refills: 1 | Status: SHIPPED | OUTPATIENT
Start: 2019-02-13 | End: 2019-07-10

## 2019-02-13 RX ORDER — GABAPENTIN 100 MG/1
200 CAPSULE ORAL DAILY
Qty: 180 CAPSULE | Refills: 1 | Status: SHIPPED | OUTPATIENT
Start: 2019-02-13 | End: 2019-07-10

## 2019-02-13 RX ORDER — LAMOTRIGINE 25 MG/1
25 TABLET ORAL AT BEDTIME
Qty: 90 TABLET | Refills: 1 | Status: SHIPPED | OUTPATIENT
Start: 2019-02-13 | End: 2019-07-10

## 2019-02-13 ASSESSMENT — PAIN SCALES - GENERAL: PAINLEVEL: NO PAIN (0)

## 2019-02-13 ASSESSMENT — PATIENT HEALTH QUESTIONNAIRE - PHQ9: SUM OF ALL RESPONSES TO PHQ QUESTIONS 1-9: 7

## 2019-02-13 NOTE — PATIENT INSTRUCTIONS
Continue your medications at the usual doses    Please get a blood test to check your Depakote level and liver    You may start taking naltrexone 50 mg daily    Please return for follow-up in 3-4 weeks

## 2019-02-13 NOTE — PROGRESS NOTES
History and Physical    Laci Hinkle Jr. MRN# [id number]   Age: 22 year old YOB: 1995          Assessment:     Since the last visit, Laci reports that things have been going well.  He denies symptoms of depression or minda.  He is back in school doing 2 courses and reports that this is going well.  He wishes to stop kratom and requests naltrexone.  We discussed the potential benefits and side effects, and he accepts a trial of 50 mg daily.  He plans to quit kratom immediately.  He will continue his medications and get a blood test to check his Depakote level and other routine labs.  He will return for follow-up in 1 month.    Attestation:  Patient has been seen and evaluated by me, Bo Oconnor MD, PhD.         Diagnoses:   Axis I: Bipolar I disorder, currently euthymic; polysubstance dependence, currently in short-term remission     Axis II: Deferred    Axis III: Medication-induced weight gain; hypertension    Axis IV: mild  psychosocial stressors     Axis V: Global Assessment of Functionin-60    Interim History     Since the last visit, Laci reports that things have been going well.  His mood is stable.  His motivation and energy are good.  He feels cognitively intact.  He is sleeping well.  He denies symptoms of depression or minda.    Laci has returned to school and is doing 2 courses.  His goal is to become a .    Laci wishes to discontinue using kratom.  He finds it negatively impacts his cognition, and he is frustrated that he has to use a much of it.  He has stopped using it previously, and is familiar with the withdrawal symptoms.  He requests a trial of naltrexone to reduce withdrawal and cravings, and I think this is reasonable.  We had a discussion about the benefits and side effects.  Laci accepts a trial of 50 mg daily.    Otherwise, Laci will continue his usual medications.  He has still not got a blood test to check his Depakote level, but  promises to do so.  He will return for follow-up in 1 month.    Review of Systems     A comprehensive review of systems was performed and is negative other than noted above.          Allergies:      Allergies   Allergen Reactions     Prednisone Other (See Comments)     psych problems     Current Medications   Depakote 2000 mg HS  Gabapentin 200 mg daily  Lamotrigine 125 mg HS  Olanzapine 10 mg HS    Mental Status Exam     Alertness: alert  and oriented  Appearance: adequately groomed  Behavior/Demeanor: cooperative, pleasant and calm, with good  eye contact  Speech: normal  Language: intact  Psychomotor: normal or unremarkable  Mood:  description consistent with euthymia  Affect: full-range; was congruent to mood  Thought Process/Associations: unremarkable  Thought Content:  Denies active/passive suicidal ideation  Perception:  Denies hallucinations  Insight: fair  Judgment: fair  Cognition:  does appear grossly intact.      Psychiatry Clinic Individual Psychotherapy Note                                                                     [16]   Start time - 1440        End time - 1500  Date last reviewed - 7/9/2018       Date next due - 10/9/2018    Subjective: This supportive psychotherapy session addressed issues related to orientation to therapy  and goals of therapy .  Patient's reaction: Action in the context of mental status appropriate for ambulatory setting.  Progress: good  Plan: RTC 1 mo  Psychotherapy services during this visit included  myself and the patient.   Treatment Plan      SYMPTOMS; PROBLEMS   MEASURABLE GOALS;    FUNCTIONAL IMPROVEMENT INTERVENTIONS;   GAINS MADE DISCHARGE CRITERIA   Depression: None currently   reduce depressive symptoms and reduce depressive episodes medications   psycho-education   self-care skills symptom resolution   Ju/Hypomania: increased energy, decreased sleep need, increased activity and racing thoughts   reduce manic/hypomanic episodes medications    psycho-education   self-care skills symptom resolution

## 2019-03-06 ENCOUNTER — OFFICE VISIT (OUTPATIENT)
Dept: PSYCHIATRY | Facility: CLINIC | Age: 24
End: 2019-03-06
Attending: PSYCHIATRY & NEUROLOGY
Payer: COMMERCIAL

## 2019-03-06 VITALS
WEIGHT: 229.8 LBS | HEART RATE: 108 BPM | SYSTOLIC BLOOD PRESSURE: 162 MMHG | BODY MASS INDEX: 35.99 KG/M2 | DIASTOLIC BLOOD PRESSURE: 89 MMHG

## 2019-03-06 DIAGNOSIS — F31.10 BIPOLAR I DISORDER, MOST RECENT EPISODE (OR CURRENT) MANIC (H): ICD-10-CM

## 2019-03-06 PROCEDURE — G0463 HOSPITAL OUTPT CLINIC VISIT: HCPCS | Mod: ZF

## 2019-03-06 RX ORDER — GABAPENTIN 400 MG/1
400 CAPSULE ORAL 2 TIMES DAILY PRN
Qty: 14 CAPSULE | Refills: 0 | Status: SHIPPED | OUTPATIENT
Start: 2019-03-06 | End: 2019-09-24 | Stop reason: DRUGHIGH

## 2019-03-06 ASSESSMENT — PATIENT HEALTH QUESTIONNAIRE - PHQ9: SUM OF ALL RESPONSES TO PHQ QUESTIONS 1-9: 12

## 2019-03-06 ASSESSMENT — PAIN SCALES - GENERAL: PAINLEVEL: NO PAIN (0)

## 2019-03-06 NOTE — PATIENT INSTRUCTIONS
You may increase gabapentin to 400mg twice daily for 1 week.  Then you should return to your 200 mg twice daily dose    Continue your other medications at the usual doses    Please return for follow-up in 6 weeks

## 2019-03-06 NOTE — PROGRESS NOTES
History and Physical    Laci Hinkle Jr. MRN# [id number]   Age: 22 year old YOB: 1995          Assessment:     Since the last visit, Laci reports that things have been going well.  His mood is stable.  He is planning to discontinue using kratom over the spring break, in about a week's time.  He has withdrawn from opiates previously and found gabapentin to be helpful in reducing withdrawal symptoms.  I have prescribed him 400 mg twice daily for 1 week.  He has previously taken up to 600 mg daily without any problems.  I have let him know he should not exceed 800 mg daily without talking to us first.  I advised him if the withdrawal is bad to go to the ER.  He will continue his other usual medications and return for follow-up in 1 month.    Attestation:  Patient has been seen and evaluated by me, Bo Oconnor MD, PhD.         Diagnoses:   Axis I: Bipolar I disorder, currently euthymic; polysubstance dependence, currently in short-term remission     Axis II: Deferred    Axis III: Medication-induced weight gain; hypertension    Axis IV: mild  psychosocial stressors     Axis V: Global Assessment of Functionin-60    Interim History     Since the last visit, Laci reports that things have been going well.  His mood is stable.  His motivation and energy level are good.  He feels reasonably optimistic about the future.  He denies symptoms of depression or minda.    School is going well.  Laci has quit his job at [employer] and is looking for a new part-time job.    Laci plans to discontinue using kratom over the spring break, in about a week.  He has been through opiate withdrawal several times previously.  He is concerned about increased anxiety and restless legs, and has previously found gabapentin to be helpful with this.  Laci currently takes 200 mg twice daily.  I have prescribed him 400 mg twice daily for the week that he expects his withdrawal symptoms to be  present.    I have advised Laci not to exceed the 400 mg twice daily dose of gabapentin.  I recommended that he go to the ER if his withdrawal is bad.    Laci will otherwise continue his usual medications.  He will return for follow-up in 6 weeks.    Review of Systems     A comprehensive review of systems was performed and is negative other than noted above.          Allergies:      Allergies   Allergen Reactions     Prednisone Other (See Comments)     psych problems     Current Medications   Depakote 2000 mg HS  Gabapentin 200 mg daily  Lamotrigine 125 mg HS  Olanzapine 10 mg HS    Mental Status Exam     Alertness: alert  and oriented  Appearance: adequately groomed  Behavior/Demeanor: cooperative, pleasant and calm, with good  eye contact  Speech: normal  Language: intact  Psychomotor: normal or unremarkable  Mood:  description consistent with euthymia  Affect: full-range; was congruent to mood  Thought Process/Associations: unremarkable  Thought Content:  Denies active/passive suicidal ideation  Perception:  Denies hallucinations  Insight: fair  Judgment: fair  Cognition:  does appear grossly intact.      Psychiatry Clinic Individual Psychotherapy Note                                                                     [16]   Start time - 1440        End time - 1500  Date last reviewed - 7/9/2018       Date next due - 10/9/2018    Subjective: This supportive psychotherapy session addressed issues related to orientation to therapy  and goals of therapy .  Patient's reaction: Action in the context of mental status appropriate for ambulatory setting.  Progress: good  Plan: RTC 1 mo  Psychotherapy services during this visit included  myself and the patient.   Treatment Plan      SYMPTOMS; PROBLEMS   MEASURABLE GOALS;    FUNCTIONAL IMPROVEMENT INTERVENTIONS;   GAINS MADE DISCHARGE CRITERIA   Depression: None currently   reduce depressive symptoms and reduce depressive episodes medications   psycho-education    self-care skills symptom resolution   Ju/Hypomania: increased energy, decreased sleep need, increased activity and racing thoughts   reduce manic/hypomanic episodes medications   psycho-education   self-care skills symptom resolution

## 2019-03-11 ENCOUNTER — TELEPHONE (OUTPATIENT)
Dept: PSYCHIATRY | Facility: CLINIC | Age: 24
End: 2019-03-11

## 2019-03-11 NOTE — TELEPHONE ENCOUNTER
"Writer telephoned pt to explore his concerns.    Pt reported that he started naltrexone Saturday (3/9) and that he hadn't started it earlier due to concerns with work and school, but he is currently on spring break.  Pt identified that on 3/9 he felt sick and had difficulty sleeping, but that on 3/10 the sick feeling resolved and the felt good all day long until developing feelings of restless legs in the evening on 3/10. He stated that he has been \"completely restless\" in the last two days.    Pt reported that he skipped naltrexone today and used kratom so that he could sleep tonight. He also identified having used CBD oil, which helped with the restlessness and L-Theanine.  Pt reported that his current gabapentin dose is 800 mg at bedtime, which makes it \"less mentally anguishing\" to be without sleep, but he finds otherwise un-helpful.    Pt identified two concerns:  => Is the restless due to kratom withdrawal or a naltrexone side effect?  => Can his naltrexone dose be increased to 100 mg so that he gets less of a trough effect when the 50 mg dose each day and only has to deal with this every other day, which could reduce his likelihood of relapsing?    Writer reviewed provider availability and possible time to response. He stated understanding and confirmed having the clinic after-hours number, which this writer prompted him to utilize if his symptoms become unmanageable.    Routed to providers via e-mail.                                    "

## 2019-03-11 NOTE — TELEPHONE ENCOUNTER
CHEPE Chu MD, PhD <[email or web address]  Mon 3/11/2019 4:07 PM  Bo ,  The following are my recommendations for this patient as I reviewed Dr Oconnor's notes and patient's med list:  1-Kratom withdrawal symptoms do include muscle ache and jerky movements   2-Naltrexone effects can include leg pain and muscle cramps  3-if patient is experiencing withdrawal symptoms from kratom use he should go to an ER as was recommended by   4-patient should not exceed 50 mg of Naltrexone and should not take any opioid substances as interactions could be dangerous  5-taking Gabapentin at night is ok   6-taking olanzapine at bed time can help with sleep.  Please document in Epic and I will try calling you as well.  thanks.  [facility]        Writer received telephone call from Dr. Chu, who identified the following concerns, which should be relayed to the pt:  => Kratom use can cause restlessness symptoms, as can naltrexone.  => Taking naltrexone with anything opioid-like can be fatal.  => Pt should present at ED if he is concerned about Kratom withdrawal.    Writer telephoned pt to review these items. Pt requested that he call writer back, as he is on the bus.    Bo Oconnor [email or web address]  Tue 3/12/2019 2:45 AM  Alvin Armando -     This is most likely a withdrawal effect. When I saw Laci a week or 2 ago, he noted (spontaneously) that he got restless legs from stopping Kratom previously and that taking Kratom again relieved the symptom.    I can't recommend increasing naltrexone dose as the usual recommended dose is 50 mg daily and obviously I'm not around if anything were to happen on the higher dose.    JARED Oconnor MD, PhD   of Psychiatry and Neuroscience   [employer]  [id number], [address] [place] [address], [id number]  Ph: [phone]    [email or web address]

## 2019-03-11 NOTE — TELEPHONE ENCOUNTER
[facility] Call Center    Phone Message    May a detailed message be left on voicemail: yes    Reason for Call: Medication Question or concern regarding medication     Patient would like to discuss medication increase (naltraxone).        Action Taken: Message routed to:  Other: Bo Delatorre RNCC

## 2019-03-12 NOTE — TELEPHONE ENCOUNTER
Writer left voice mail message for pt, requesting callback with information on how his sleep was the previous night and to review provider recommendations from yesterday. Pt was prompted to request another nurse if writer not available.  Recommendations writer expects to make:  => Taking naltrexone and kratom could be dangerous; taking naltrexone with any opioid-like substance can be fatal, so switching between the two is not recommended.  => The restlessness is likely due to kratom withdrawal based upon the pt's previous experiences.  => If the withdrawal symptoms are concerning or intolerable to the pt, he should present at ED.  => A naltrexone increase won't be available until the 4/17 appointment with Dr. Oconnor.

## 2019-03-12 NOTE — TELEPHONE ENCOUNTER
"Writer received telephone call from pt.    Pt stated that \"I was able to sleep last night.\"  Writer reviewed providers' concerns with pt. Pt stated that there is \"no risk with kratom because there's no respiratory depression.\"  Pt identified being fearful of restarting naltrexone, but his spring break ends 3/18 and so wants to stop using kratom soon. He reported that his kratom supply is exhausted tonight.  Pt identified plan to restart naltrexone tomorrow and to present at ED if he misses more than 2 nights of sleep due to withdrawal symptoms.                  "

## 2019-03-18 NOTE — TELEPHONE ENCOUNTER
Writer received telephone call from pt, who reported that he has slept 6 hours in last 3 nights, approximately 2 hours per night and is feeling confused and edgy.  Pt reports that he continues to take naltrexone and that he believes that he is still in withdrawal from kratom.  Writer prompted pt to present at ED per provider guidance last week and continued poor sleep. Pt identified plan to go home and try to sleep and to present at ED if unable to. He reported that he wouldn't be able to be hospitalized, though, due to having important things to do the next day.    Routed to providers.

## 2019-03-19 NOTE — TELEPHONE ENCOUNTER
"Writer received telephone call from pt, who stated that \"yesterday went well\" and identified having slept well the previous night. He stated that he recalled from previous kratom withdrawals that the \"4th day was the hump\" and that he feels that his sweat smell and digestion have \"normalized\" and he expects to be \"fine by day 7.\"  Pt reported that he continues to feel good about using naltrexone and is currently experiencing no side effects.    Routed to providers.    "

## 2019-04-26 ENCOUNTER — TELEPHONE (OUTPATIENT)
Dept: PSYCHIATRY | Facility: CLINIC | Age: 24
End: 2019-04-26

## 2019-04-26 DIAGNOSIS — Z51.81 ENCOUNTER FOR THERAPEUTIC DRUG MONITORING: ICD-10-CM

## 2019-04-26 DIAGNOSIS — F31.61 BIPOLAR DISORDER, CURRENT EPISODE MIXED, MILD (H): Primary | ICD-10-CM

## 2019-04-26 NOTE — TELEPHONE ENCOUNTER
"Writer received call from pt, who identified that he is planning to attend his 4/30 clinic appointment, but wanted to convey information on his status in case he doesn't make it.    Pt identified the following:  => Zyprexa 10 mg has been working \"flat straight good.\"  => He is currently taking a total of 100 mg Naltrexone daily, once in the morning and once either before he uses alcohol or at bedtime if he doesn't use alcohol. He reported that he feels that it is more helpful with this pattern. He doesn't want to switch to Vivitrol due to his being able to manage the naltrexone dosing better.  => He is no longer using marijuana (due to Zyprexa providing a similar effect) or kratom (withdrawal symptoms have resolved), and is currently using only alcohol. He identified that he tries to abstain from alcohol, but often does succumb to the cravings. He reports that he experiences cravings once per day.  => He feels that naltrexone has increased his tolerance of alcohol, enabling him to function better and \"can say no easier.\" He feels that the alcohol cravings are dwindling and getting better each week.  => Naltrexone \"can really benefit my life.\" He doesn't experience mood swings while on naltrexone, with no cycles of 4 days manic, 4 days depressed. He expects to experience more manic symptoms with the warming of the weather.  => He does occasionally experience some anhedonia, but takes a 3-4 day break from naltrexone and then feels fine. His cravings for alcohol are unchanged during this break.  => Feels that naltrexone has a \"sexually numbing\" side effect.  => He is attending harm reduction group at [facility] and meeting with his therapist weekly.  => He is taking gabapentin 200 mg twice daily and that he notices when he doesn't take it because he is \"more chill\" when he takes it.  => \"My desire for drugs is different\" which is \"boggling for me.\"  => He is sleeping more lately, \"probably because of my drinking,\" that " "he is taking B12 and D3 complexes. He finds it difficult to wake up in the morning and that his dreams are \"incredibly vivid\" and pleasurable.  => He experiences \"intense sweets cravings with Zyprexa.  => Wants liver panel and diabetes tests done. Had diabetic symptoms at 240 lbs in past. Currently at 235 lbs.    Pt denies SI, SIB, HI.    Routed to provider.      Bo Oconnor MD Snyder, David J, RN   Caller: Unspecified (5 days ago,  4:15 PM)             Great, thanks for the update.  It sounds like Laci is mostly doing well.  Can you please let him know not to increase the dose of naltrexone further?  Also, we probably should do some labs.  Lets do a CBC, AST, ALT, fasting glucose, hemoglobin A1c, and fasting lipid panel.     Thx -     DB    Previous Messages      ----- Message -----   From: Bo Delatorre, RN   Sent: 4/26/2019   5:17 PM   To: Bo Oconnor MD     ----- Message from Bo Delatorre RN sent at 4/26/2019  5:17 PM CDT -----   Dr. Oconnor:   Laci called to provide an update on how he's been doing.   He plans to attend his 4/30 appointment with you, but wanted to get this information to us in case he doesn't make it.   It appears that naltrexone is working well for him. No more marijuana or kratom use.   Ja Rivera placed requested orders and faxed to [facility] [place] [facility] ([phone]).  Writer telephoned pt to identify lab orders placed and provider's advice on naltrexone dose. Pt identified understanding and confirmed the 5/7 appointment. He reported no other needs.                  "

## 2019-05-15 ENCOUNTER — OFFICE VISIT (OUTPATIENT)
Dept: PSYCHIATRY | Facility: CLINIC | Age: 24
End: 2019-05-15
Attending: PSYCHIATRY & NEUROLOGY
Payer: COMMERCIAL

## 2019-05-15 ENCOUNTER — TELEPHONE (OUTPATIENT)
Dept: PSYCHIATRY | Facility: CLINIC | Age: 24
End: 2019-05-15

## 2019-05-15 VITALS
WEIGHT: 240 LBS | SYSTOLIC BLOOD PRESSURE: 135 MMHG | DIASTOLIC BLOOD PRESSURE: 88 MMHG | BODY MASS INDEX: 37.59 KG/M2 | HEART RATE: 86 BPM

## 2019-05-15 DIAGNOSIS — F31.10 BIPOLAR I DISORDER, MOST RECENT EPISODE (OR CURRENT) MANIC (H): Primary | ICD-10-CM

## 2019-05-15 DIAGNOSIS — F31.61 BIPOLAR DISORDER, CURRENT EPISODE MIXED, MILD (H): Primary | ICD-10-CM

## 2019-05-15 DIAGNOSIS — Z51.81 ENCOUNTER FOR THERAPEUTIC DRUG MONITORING: ICD-10-CM

## 2019-05-15 PROCEDURE — G0463 HOSPITAL OUTPT CLINIC VISIT: HCPCS | Mod: ZF

## 2019-05-15 ASSESSMENT — PAIN SCALES - GENERAL: PAINLEVEL: NO PAIN (0)

## 2019-05-15 NOTE — TELEPHONE ENCOUNTER
----- Message from Bo Oconnor MD sent at 5/15/2019  3:44 PM CDT -----  Alvin Peres -     Can you order labs for Laci? He gets them done at [facility] [place]. He needs CBC+diff, AST, ALT, valproic acid level, cholesterol, LDL, HDL, glucose.    Thx -     DB    Writer placed requested orders and faxed to [phone].

## 2019-05-15 NOTE — PATIENT INSTRUCTIONS
Continue your medications at the current doses    Please get a blood test to check your liver, cholesterol, and Depakote level    Can you please return for follow-up in 1 month

## 2019-05-15 NOTE — NURSING NOTE
Chief Complaint   Patient presents with     Recheck Medication     Bipolar I disorder, most recent episode (or current) manic

## 2019-05-15 NOTE — PROGRESS NOTES
History and Physical    Laci Hinkle Jr. MRN# [id number]   Age: 22 year old YOB: 1995          Assessment:     I last saw Laci in early March.  Since then, he has discontinued using kratom.  He had significant withdrawal symptoms, but a slightly increased dose of gabapentin helped him get through those.  More recently, he was drinking heavily, up to 1/2 L of liquor per day.  He discontinued that about 5 days ago.  He denies significant withdrawal symptoms.  He is now using marijuana in the evenings but hopes to cut back on that too.  He is planning to start attending a harm reduction group that his  has set up for him.  Laci has been adherent with his medications, including Depakote 2000 mg at night, lamotrigine 125 mg at night, olanzapine 10 mg at night, and gabapentin 800 mg at night.  His mood has been stable and he denies symptoms of depression or minda.  He will continue his current medications.  He promises to get a blood test done (despite several requests, he has not had one since 2018).  He will return for follow-up in 1 month.    Attestation:  Patient has been seen and evaluated by me, Bo Oconnor MD, PhD.         Diagnoses:   Axis I: Bipolar I disorder, currently euthymic; polysubstance dependence, currently in short-term remission     Axis II: Deferred    Axis III: Medication-induced weight gain; hypertension    Axis IV: mild  psychosocial stressors     Axis V: Global Assessment of Functionin-60    Interim History     Since the last visit, Laci discontinued kratom.  He had significant withdrawal symptoms, most notably difficulty sleeping.  Slightly higher dose of gabapentin helped him get through those, and they have resolved.  After stopping kratom, Laci began drinking heavily, up to 1/2 L/day.  He became worried about his liver and stopped drinking about 5 days ago.  He has substituted alcohol with marijuana which she smokes in the  evenings.  He denies any other substance use currently.    Laci does recognize that his substance use is a problem.  Especially now that he is back in school (and did well last semester), he wishes to cut out substances.  He does plan to attend a self-help group after he gets back from a short vacation in [place].  I have encouraged him to do so.    From the mood perspective, Laci has been adherent with his mood stabilizing medications.  He reports that his mood has been stable through all of this.  He sleeps well at night.  He has good energy level and motivation during the daytime.  He feels cognitively intact.  He got B's in school last semester and is pleased with this.  He denies symptoms of depression or minda.    Laci has gained some weight and is now 240 pounds.  He is worried about the effects of substances on his liver, and also about his cholesterol given his weight gain.  I have strongly encouraged him to get a blood test done.  He has not had one done since February 2018.  In addition to a liver panel and metabolic panel, we will check his Depakote level.    Laci is happy with his current medications and we will continue them at the current doses.  He will return for follow-up in 1 month.    Review of Systems     A comprehensive review of systems was performed and is negative other than noted above.          Allergies:      Allergies   Allergen Reactions     Prednisone Other (See Comments)     psych problems     Current Medications   Depakote 2000 mg HS  Gabapentin 200 mg daily  Lamotrigine 125 mg HS  Olanzapine 10 mg HS    Mental Status Exam     Alertness: alert  and oriented  Appearance: adequately groomed  Behavior/Demeanor: cooperative, pleasant and calm, with good  eye contact  Speech: normal  Language: intact  Psychomotor: normal or unremarkable  Mood:  description consistent with euthymia  Affect: full-range; was congruent to mood  Thought Process/Associations:  unremarkable  Thought Content:  Denies active/passive suicidal ideation  Perception:  Denies hallucinations  Insight: fair  Judgment: fair  Cognition:  does appear grossly intact.      Psychiatry Clinic Individual Psychotherapy Note                                                                     [16]   Start time - 1440        End time - 1500  Date last reviewed - 7/9/2018       Date next due - 10/9/2018    Subjective: This supportive psychotherapy session addressed issues related to orientation to therapy  and goals of therapy .  Patient's reaction: Action in the context of mental status appropriate for ambulatory setting.  Progress: good  Plan: RTC 1 mo  Psychotherapy services during this visit included  myself and the patient.   Treatment Plan      SYMPTOMS; PROBLEMS   MEASURABLE GOALS;    FUNCTIONAL IMPROVEMENT INTERVENTIONS;   GAINS MADE DISCHARGE CRITERIA   Depression: None currently   reduce depressive symptoms and reduce depressive episodes medications   psycho-education   self-care skills symptom resolution   Ju/Hypomania: increased energy, decreased sleep need, increased activity and racing thoughts   reduce manic/hypomanic episodes medications   psycho-education   self-care skills symptom resolution

## 2019-05-17 ASSESSMENT — PATIENT HEALTH QUESTIONNAIRE - PHQ9: SUM OF ALL RESPONSES TO PHQ QUESTIONS 1-9: 6

## 2019-05-28 ENCOUNTER — TELEPHONE (OUTPATIENT)
Dept: PSYCHIATRY | Facility: CLINIC | Age: 24
End: 2019-05-28

## 2019-05-28 NOTE — TELEPHONE ENCOUNTER
"Writer received incoming phone call from pt at [phone]. Pt is calling in regards to the naltrexone. Per the pt, he was on naltrexone for approximately 2 months. During that time, the pt reports he continued to have cravings and would drink 1/2 liter of vodka daily. When drinking and taking naltrexone, the pt denies getting sick, but said he felt more intoxicated. Eventually he stopped the medication and now feels his cravings have actually decreased. Although, he's thinking about restarting the medication, as it \"Changed my thinking.\" Recently, the pt decided to start using cannabis to help with the alcohol cravings. He reports occasionally drinking still, which is why he's thinking of restarting the naltrexone. The pt plans on using this and cannabis.     The pt reports possible SEs from the naltrexone, which includes some shortness of breath and difficulty swallowing. This was alarming, so writer asked pt to elaborate. Pt shared that he has smoking related asthma and the shortness of breath occurred while he was on an airplane. Pt then reports this has not occurred since, so believes it was related to the altitude change. The difficulty swallowing occurred any time the pt would eat and has since stopped since the pt discontinued the naltrexone. He denies any other signs/symptoms of an allergic reaction.     Per pt, he is currently taking Zyprexa as prescribed, so he is not having any psychosis and is not worried about this with future cannabis use.     Writer agreed to discuss this with the provider and reminded pt of his upcoming appt on 6/11.   "

## 2019-05-30 NOTE — TELEPHONE ENCOUNTER
Bo Oconnor MD Pratt, Laura, RN   Caller: Unspecified (2 days ago, 11:10 AM)             Alvin Elder -     Thanks for the message.  I have had some interesting conversations with Laci as well.       It is hard for me to believe that naltrexone led to an increase in cravings for substance use.  I read through the list of side effects, and I did not see anything about difficulty swallowing, even among the rarely reported side effects.     DB            Writer called pt and relayed the above information. He voiced understanding. Pt shared that he restarted the medication a few nights ago. He has not experienced the difficulty swallowing or any other SEs. He also reports asking the dentist about this concern and they were not aware of any causes. The pt will call the clinic if the difficulty with swallowing returns. He will also see his PCP at that time.

## 2019-06-11 ENCOUNTER — TELEPHONE (OUTPATIENT)
Facility: CLINIC | Age: 24
End: 2019-06-11

## 2019-06-11 NOTE — TELEPHONE ENCOUNTER
Laci Hinkle Jr [phone]  [facility] Call Center    Phone Message    May a detailed message be left on voicemail: yes    Reason for Call: Medication Question or concern regarding medication   Prescription Clarification  Name of Medication: depakote 500mg  Prescribing Provider: Bo Oconnor MD   What on the order needs clarification? Patient states it is difficult to get blood tests because he takes his medication with meals but blood tests require him to fast for 12 hours. He is wondering if there might be another option for taking meds or getting levels tested.        Action Taken: Message routed to:  Other: Bo Delatorre RNCC

## 2019-06-11 NOTE — TELEPHONE ENCOUNTER
Writer called pt and brainstormed with him on the timing of his meals, meds, and tests. He identified a plan to take Depakote at noon, have his blood test done at noon the next day. This will fit the requirements.

## 2019-07-03 ENCOUNTER — TELEPHONE (OUTPATIENT)
Dept: PSYCHIATRY | Facility: CLINIC | Age: 24
End: 2019-07-03

## 2019-07-03 NOTE — TELEPHONE ENCOUNTER
Laci Hinkle Jr [phone]  Tashia Barrientos [facility] Call Center    Phone Message    May a detailed message be left on voicemail: yes    Reason for Call: Other: Pt has questions on multiple things, including medications and labs and wanted to get an update.      Action Taken: Other: [facility] [place]

## 2019-07-03 NOTE — TELEPHONE ENCOUNTER
"Writer called pt to explore his concerns.    Pt reports that he stopped naltrexone. Noticed no clear difference in cravings while on the med but thinks that after a time on the med they might have been reduced. Without naltrexone he can drink (currently drinks beer only and only at night) and use kratom (plans to stop). Feels \"dopey\" on naltrexone. Doesn't have any on hand and wants a refill so that he can restart it if he decides to. Not sure if he wants to restart it or not. Ran out a month ago, last dose 3 weeks to 1 month ago. Drive to socialize with people is reduced while no naltrexone.    Pt stated that Zyprexa has \"been amazing\" and that the 10 mg dose is good.    Pt reports that his eating is better. Now that he smokes weed he eats once per day and can eat healthy.  Zyprexa helps to avoid psychotic symptoms with weed.    Pt identified having had a relapse with meth 1.5 weeks ago (1-time use) and 2 weeks ago. He reported that he feels guilty about it since, thought about it constantly for 5 days, and felt unwell.    Pt identified plan to engage with  to help get labwork done.    Routed to provider.                    "

## 2019-07-05 NOTE — TELEPHONE ENCOUNTER
Bo Oconnor MD Snyder, David J, RN   Caller: Unspecified (2 days ago,  3:06 PM)             Alvin Peres,     Smiley for the message.  I think it would be best for Laci to wait until his next appointments for us to reassess the advisability of naltrexone.     DB    Previous Messages      ----- Message -----   From: Bo Delatorre RN   Sent: 7/3/2019   4:50 PM   To: Bo Oconnor MD, Bo Delatorre RN     ----- Message from Bo Delatorre RN sent at 7/3/2019  4:50 PM CDT -----   Dr. Oconnor:   Please see the note from my conversation with Laci.   He is still ambivalent about using naltrexone, but requested a refill of it.   OK to refill, or should he wait until his 7/10 appointment with you (which he is OK with)?   Ja

## 2019-07-09 ENCOUNTER — TELEPHONE (OUTPATIENT)
Dept: PSYCHIATRY | Facility: CLINIC | Age: 24
End: 2019-07-09

## 2019-07-09 NOTE — TELEPHONE ENCOUNTER
On 7/8/2019, 4 faxed pages, including WU, requesting an AIMS assessment be completed at patient's next appointment. Pt has an appointment with Dr. Oconnor on 7/10/2019 at 1530. The original copies are held in Psych. A message was routed to Dr. Oconnor .Ladonna Sotelo LPN

## 2019-07-10 ENCOUNTER — OFFICE VISIT (OUTPATIENT)
Dept: PSYCHIATRY | Facility: CLINIC | Age: 24
End: 2019-07-10
Attending: PSYCHIATRY & NEUROLOGY
Payer: COMMERCIAL

## 2019-07-10 VITALS
HEART RATE: 82 BPM | SYSTOLIC BLOOD PRESSURE: 148 MMHG | WEIGHT: 237 LBS | BODY MASS INDEX: 37.12 KG/M2 | DIASTOLIC BLOOD PRESSURE: 92 MMHG

## 2019-07-10 DIAGNOSIS — F31.10 BIPOLAR I DISORDER, MOST RECENT EPISODE (OR CURRENT) MANIC (H): ICD-10-CM

## 2019-07-10 DIAGNOSIS — F31.61 BIPOLAR DISORDER, CURRENT EPISODE MIXED, MILD (H): ICD-10-CM

## 2019-07-10 DIAGNOSIS — F31.81 BIPOLAR 2 DISORDER (H): ICD-10-CM

## 2019-07-10 PROCEDURE — G0463 HOSPITAL OUTPT CLINIC VISIT: HCPCS | Mod: ZF

## 2019-07-10 RX ORDER — OLANZAPINE 10 MG/1
10 TABLET ORAL AT BEDTIME
Qty: 90 TABLET | Refills: 1 | Status: SHIPPED | OUTPATIENT
Start: 2019-07-10 | End: 2020-01-07

## 2019-07-10 RX ORDER — LAMOTRIGINE 25 MG/1
25 TABLET ORAL AT BEDTIME
Qty: 90 TABLET | Refills: 1 | Status: SHIPPED | OUTPATIENT
Start: 2019-07-10 | End: 2019-12-31

## 2019-07-10 RX ORDER — GABAPENTIN 400 MG/1
400 CAPSULE ORAL 2 TIMES DAILY PRN
Qty: 14 CAPSULE | Refills: 0 | Status: CANCELLED | OUTPATIENT
Start: 2019-07-10

## 2019-07-10 RX ORDER — NALTREXONE HYDROCHLORIDE 50 MG/1
50 TABLET, FILM COATED ORAL DAILY
Qty: 30 TABLET | Refills: 3 | Status: CANCELLED | OUTPATIENT
Start: 2019-07-10

## 2019-07-10 RX ORDER — DIVALPROEX SODIUM 500 MG/1
2000 TABLET, EXTENDED RELEASE ORAL DAILY
Qty: 360 TABLET | Refills: 1 | Status: SHIPPED | OUTPATIENT
Start: 2019-07-10 | End: 2019-12-31

## 2019-07-10 RX ORDER — GABAPENTIN 100 MG/1
200 CAPSULE ORAL DAILY
Qty: 180 CAPSULE | Refills: 1 | Status: SHIPPED | OUTPATIENT
Start: 2019-07-10 | End: 2019-12-31

## 2019-07-10 RX ORDER — LAMOTRIGINE 100 MG/1
100 TABLET ORAL AT BEDTIME
Qty: 90 TABLET | Refills: 1 | Status: SHIPPED | OUTPATIENT
Start: 2019-07-10 | End: 2019-12-31

## 2019-07-10 ASSESSMENT — PAIN SCALES - GENERAL: PAINLEVEL: NO PAIN (0)

## 2019-07-10 NOTE — PATIENT INSTRUCTIONS
Continue your medications at the usual doses    Restart naltrexone when you feel ready to commit to it    Please return for follow-up in 3-4 weeks

## 2019-07-10 NOTE — PROGRESS NOTES
" History and Physical    Laci Hinkle Jr. MRN# [id number]   Age: 22 year old YOB: 1995          Assessment:     Since the last visit, Laci had a relapse on crystal meth 2 weeks ago, using it once.  Since then, he has also restarted using kratom and alcohol.  He is committed to not using meth again, and would like to restart naltrexone to help him stop kratom and alcohol use.  However, he does not feel he is quite ready to do this yet.  He will let me know when he is.  He also uses cannabis on a daily basis but does not wish to discontinue this currently.  On the plus side, his mood has remained stable through all of this.  He has been adherent with his medications.  He will continue his medications at the usual doses, restart naltrexone when he feels ready, and will return for follow-up in 3 weeks.    Attestation:  Patient has been seen and evaluated by me, Bo Oconnor MD, PhD.         Diagnoses:   Axis I: Bipolar I disorder, currently euthymic; polysubstance dependence, currently in short-term remission     Axis II: Deferred    Axis III: Medication-induced weight gain; hypertension    Axis IV: mild  psychosocial stressors     Axis V: Global Assessment of Functionin-60    Interim History     Since the last visit, Laci had a relapse of substance use as outlined above.  He is disappointed with himself for letting this happen.  He thinks it is partly because he ran out of naltrexone and experienced increased cravings.    After using crystal meth once, Laci has vowed not to use it again.  However, he finds it more difficult to stop using kratom and alcohol.  He recognizes that they are not good for him, making him feel \"dopey\", and increases risk of depression.    Laci wishes to restart naltrexone, but he does not want to do it until he is ready to \"commit\" to stopping his substance use and using naltrexone regularly.  He will let me know when he feels ready.    Through all of " this, Laci's mood has remained stable.  He denies symptoms of depression or minda.  He denies any psychosis.  He has been adherent with his medications.    Laci will continue his medications except for naltrexone.  He promises to get a blood test to check his Depakote level in August.  He will return for follow-up in 3-4 weeks.    Review of Systems     A comprehensive review of systems was performed and is negative other than noted above.          Allergies:      Allergies   Allergen Reactions     Prednisone Other (See Comments)     psych problems     Current Medications   Depakote 2000 mg HS  Gabapentin 200 mg daily  Lamotrigine 125 mg HS  Olanzapine 10 mg HS    Mental Status Exam     Alertness: alert  and oriented  Appearance: adequately groomed  Behavior/Demeanor: cooperative, pleasant and calm, with good  eye contact  Speech: normal  Language: intact  Psychomotor: normal or unremarkable  Mood:  description consistent with euthymia  Affect: full-range; was congruent to mood  Thought Process/Associations: unremarkable  Thought Content:  Denies active/passive suicidal ideation  Perception:  Denies hallucinations  Insight: fair  Judgment: fair  Cognition:  does appear grossly intact.      Psychiatry Clinic Individual Psychotherapy Note                                                                     [16]   Start time - 1440        End time - 1500  Date last reviewed - 7/9/2018       Date next due - 10/9/2018    Subjective: This supportive psychotherapy session addressed issues related to orientation to therapy  and goals of therapy .  Patient's reaction: Action in the context of mental status appropriate for ambulatory setting.  Progress: good  Plan: RTC 1 mo  Psychotherapy services during this visit included  myself and the patient.   Treatment Plan      SYMPTOMS; PROBLEMS   MEASURABLE GOALS;    FUNCTIONAL IMPROVEMENT INTERVENTIONS;   GAINS MADE DISCHARGE CRITERIA   Depression: None currently   reduce  depressive symptoms and reduce depressive episodes medications   psycho-education   self-care skills symptom resolution   Ju/Hypomania: increased energy, decreased sleep need, increased activity and racing thoughts   reduce manic/hypomanic episodes medications   psycho-education   self-care skills symptom resolution

## 2019-07-11 ASSESSMENT — PATIENT HEALTH QUESTIONNAIRE - PHQ9: SUM OF ALL RESPONSES TO PHQ QUESTIONS 1-9: 15

## 2019-07-16 DIAGNOSIS — F31.10 BIPOLAR I DISORDER, MOST RECENT EPISODE (OR CURRENT) MANIC (H): ICD-10-CM

## 2019-07-16 RX ORDER — NALTREXONE HYDROCHLORIDE 50 MG/1
50 TABLET, FILM COATED ORAL DAILY
Qty: 30 TABLET | Refills: 3 | OUTPATIENT
Start: 2019-07-16

## 2019-09-16 ENCOUNTER — TELEPHONE (OUTPATIENT)
Dept: PSYCHIATRY | Facility: CLINIC | Age: 24
End: 2019-09-16

## 2019-09-16 NOTE — TELEPHONE ENCOUNTER
Writer received call from pt, who reported that he has been using alcohol and using kratom for the last 2-3 months, although less than he was using previously.  He reported feeling more prepared to be on Naltrexone, that he feels better when not on kratom.  He identified planning to attend 9/24 appointment.  Writer set reminders to call pt regarding appointment.

## 2019-09-24 ENCOUNTER — OFFICE VISIT (OUTPATIENT)
Dept: PSYCHIATRY | Facility: CLINIC | Age: 24
End: 2019-09-24
Attending: PSYCHIATRY & NEUROLOGY
Payer: COMMERCIAL

## 2019-09-24 VITALS
DIASTOLIC BLOOD PRESSURE: 99 MMHG | BODY MASS INDEX: 36.77 KG/M2 | WEIGHT: 234.8 LBS | HEART RATE: 95 BPM | SYSTOLIC BLOOD PRESSURE: 152 MMHG

## 2019-09-24 DIAGNOSIS — F31.10 BIPOLAR I DISORDER, MOST RECENT EPISODE (OR CURRENT) MANIC (H): ICD-10-CM

## 2019-09-24 PROCEDURE — G0463 HOSPITAL OUTPT CLINIC VISIT: HCPCS | Mod: ZF

## 2019-09-24 RX ORDER — NALTREXONE HYDROCHLORIDE 50 MG/1
50 TABLET, FILM COATED ORAL DAILY
Qty: 30 TABLET | Refills: 3 | Status: SHIPPED | OUTPATIENT
Start: 2019-09-24 | End: 2020-12-09

## 2019-09-24 ASSESSMENT — PAIN SCALES - GENERAL: PAINLEVEL: NO PAIN (0)

## 2019-09-24 NOTE — PATIENT INSTRUCTIONS
Start naltrexone 50 mg daily    Continue your other medications at the usual doses    Please get a blood test to check her Depakote level    Please return for follow-up in 1 month

## 2019-09-24 NOTE — NURSING NOTE
Chief Complaint   Patient presents with     Recheck Medication     Bipolar disorder, current episode mixed, mild

## 2019-09-24 NOTE — PROGRESS NOTES
History and Physical    Laci Hinkle Jr. MRN# [id number]   Age: 23 year old YOB: 1995          Assessment:     Since the last visit, Laci has continued using kratom and alcohol.  His current alcohol consumption is 4 drinks per night.  Laci wishes to discontinue both, and to this end, he would like to restart naltrexone.  He plans to stop using for a few days first, and then will initiate it.  I have prescribed at 50 mg daily, which Laci has taken previously with good benefit.  I have asked him again to get a blood test done.  It has been almost a year and a half since his last blood test.  He will continue his other medications at the usual doses, which include lamotrigine 125 mg at bedtime, olanzapine 10 mg at bedtime, gabapentin 200 mg as needed, and Depakote 2000 mg at bedtime.  He will return for follow-up in 1 month.    Attestation:  Patient has been seen and evaluated by me, Bo Oconnor MD, PhD.         Diagnoses:   Axis I: Bipolar I disorder, currently euthymic; polysubstance dependence     Axis II: Deferred    Axis III: Medication-induced weight gain; hypertension    Axis IV: mild  psychosocial stressors     Axis V: Global Assessment of Functionin-60    Interim History     Since the last visit, Laci reports that he has been adherent with his usual medications.  He reports that his mood is stable and he denies symptoms of depression, minda, or psychosis.      Laci has continued using kratom and alcohol.  He was drinking up to 15 beers per night, but has cut it back to 4 beers for the last few weeks.  He denies other substance use.    Laci recognizes that his substance abuse is unhealthy.  He also notes that it prevents him from doing well in school.  He is interested in coming back for these reasons.  He plans to stop using for a few days and then asks to reinitiate naltrexone.  He has taken it previously with good benefit, and I have prescribed him 50 mg  daily.    Laci is attending school part-time and also working part-time at a [employer].  He has a [employer] [place]. in [place] which he has happy with.    Laci is agreeable with the above plan.  He will return for follow-up in 1 month.    Review of Systems     A comprehensive review of systems was performed and is negative other than noted above.          Allergies:      Allergies   Allergen Reactions     Prednisone Other (See Comments)     psych problems     Current Medications   Depakote 2000 mg HS  Gabapentin 200 mg daily  Lamotrigine 125 mg HS  Olanzapine 10 mg HS    Mental Status Exam     Alertness: alert  and oriented  Appearance: adequately groomed  Behavior/Demeanor: cooperative, pleasant and calm, with good  eye contact  Speech: normal  Language: intact  Psychomotor: normal or unremarkable  Mood:  description consistent with euthymia  Affect: full-range; was congruent to mood  Thought Process/Associations: unremarkable  Thought Content:  Denies active/passive suicidal ideation  Perception:  Denies hallucinations  Insight: fair  Judgment: fair  Cognition:  does appear grossly intact.      Psychiatry Clinic Individual Psychotherapy Note                                                                     [16]

## 2019-09-25 ASSESSMENT — PATIENT HEALTH QUESTIONNAIRE - PHQ9: SUM OF ALL RESPONSES TO PHQ QUESTIONS 1-9: 16

## 2019-10-14 ENCOUNTER — TELEPHONE (OUTPATIENT)
Dept: PSYCHIATRY | Facility: CLINIC | Age: 24
End: 2019-10-14

## 2019-10-14 NOTE — TELEPHONE ENCOUNTER
"FW: Dr. Oconnor Patient call   Received: Today   Message Contents   Neisha Aguilar, Neisha Mcknight, RN   Phone Number: [phone]          Previous Messages      ----- Message -----   From: Chip Sales   Sent: 10/14/2019   2:37 PM CDT   To: [facility] [place]   Subject: Dr. Oconnor Patient call                             Patient called today stating he had to talk to a nurse about \"something\". No other information given. Patient stated that he is wanting to speak to a nurse because it is \"that type of message\". Patient has a therapy appointment at 3PM today, and will be free before, and after 4 PM if it isn't tomorrow.      Writer placed a call to patient at [phone] to gather additional information. No answer at number provided. LVM, requesting a call back. Clinic number provided.   "

## 2019-10-14 NOTE — TELEPHONE ENCOUNTER
"Writer received incoming call from patient [phone] wanting to update provider. Patient has been taking Phenibut 500-700 mg daily since the past week and half. He is wanting to get off this mediation and is afraid of the withdrawal symptoms. Patient unable to explain the reasoning of taking this mediation daily but shared \" I messed up.\" Denies side effects or any concerning symptoms at this time. Patient is wanting to be seen sooner to discuss this with provider. Patient scheduled to be seen in clinic on 10/15/19 at 3 pm for a 30 min appt. Scheduling notified.     Routed to provider as FYI  "

## 2019-10-16 NOTE — TELEPHONE ENCOUNTER
Medication Question      Bo Oconnor MD  You 2 hours ago (9:29 AM)      Wow, I had to look up Phenibut, I'd never heard of its. It's similar to both gabapentin and stimulants (an odd combination).     When Laci stops it, it's possible he could experience insomnia and increased anxiety. Those would probably be less if he tapered the medication over a few days. Not having any familiarity with it, I can't be any more specific than that.     DB    Routing comment      Writer placed a call to patient at [phone] to relay the above information. He agreed to taper off Phenibut overtime and is aware of the possible side effects of insomnia and anxiety. He also agreed to come into the ED or call on-call resident if symptoms worsen. Patient is scheduled to be seen with Dr. Oconnor on 10/22/19.     No further action needed by this writer

## 2019-10-22 ENCOUNTER — OFFICE VISIT (OUTPATIENT)
Dept: PSYCHIATRY | Facility: CLINIC | Age: 24
End: 2019-10-22
Attending: PSYCHIATRY & NEUROLOGY
Payer: COMMERCIAL

## 2019-10-22 VITALS
SYSTOLIC BLOOD PRESSURE: 160 MMHG | BODY MASS INDEX: 37.59 KG/M2 | DIASTOLIC BLOOD PRESSURE: 100 MMHG | WEIGHT: 240 LBS | HEART RATE: 86 BPM

## 2019-10-22 DIAGNOSIS — F31.81 BIPOLAR 2 DISORDER (H): Primary | ICD-10-CM

## 2019-10-22 PROCEDURE — G0463 HOSPITAL OUTPT CLINIC VISIT: HCPCS | Mod: ZF

## 2019-10-22 ASSESSMENT — PAIN SCALES - GENERAL: PAINLEVEL: NO PAIN (0)

## 2019-10-22 NOTE — PROGRESS NOTES
" History and Physical    Laci Hinkle Jr. MRN# [id number]   Age: 23 year old YOB: 1995          Assessment:     Since the last visit, Laci has been adherent with his usual medications.  He relates that he has been using Phenabut.  I have not heard of this previously, but on reading about it, it is a AYDE B receptor agonist, and inhibitor of voltage gated calcium channel similar to gabapentin, and also increases dopamine activity in the brain.  Laci relates that it makes him feel \"stupid\" and he wants to taper off it.  He has read online about doing so, and has decided the best way would be to taper his current 4 g daily dose by 100 mg daily, taking 40 mg to come off the compound.  I have let Laci know that, not having any experience with this compound, I cannot give him any advice on how to taper it.  I suggested that he go to detox to come off the medication in a controlled environment, but he is not willing to do so.  He requested that I prescribe him gabapentin to ease withdrawal symptoms, but I have let him know that this may be unsafe and I am any unable to do so.  He chooses to taper the medication as outlined above.  I have suggested he consider treatment for recurrent substance use, but he is not willing to do this.  He will return for follow-up in 1 month.  If withdrawal symptoms become intense as he tapers Phenabut he will go to the ER.    Attestation:  Patient has been seen and evaluated by me, Bo Oconnor MD, PhD.         Diagnoses:   Axis I: Bipolar I disorder, currently euthymic; polysubstance dependence     Axis II: Deferred    Axis III: Medication-induced weight gain; hypertension    Axis IV: mild  psychosocial stressors     Axis V: Global Assessment of Functionin-60    Interim History     Since the last visit, Laci reports that he has been adherent with his usual medications.  He reports that his mood is stable and he denies symptoms of depression, minda, " "or psychosis.  He continues to attend school.    Laci relates that he has begun using Phenabut.  He brought it at a [employer] store.  He has some awareness of its pharmacologic effects and which medications that is similar to.  Initially it made him feel euphoric and very relaxed.  After a week or so of use, he only feels \"stupid\" when taking it.  He is using 4 g/day.  He wishes to taper off it.    Laci has spoken to some friends who have use this compound and has read about it online, and has come up with a self tapering strategy as outlined above.  He requests that I prescribe gabapentin to ease withdrawal symptoms, but I have let him know that I am not convinced that this is safe and I am unable to do so.    I suggested to Laci that he go to a detox center to come off Phenbut in a controlled environment.  He is not willing to do so.  He is reluctant to stop attending school.  He does acknowledge that if withdrawal symptoms become intense as he is tapering the compound he will go to the ER.    I strongly suggested to Laci that he consider seeing a drug and alcohol counselor or going to a dual diagnosis program given his recurrent substance abuse.  He is isabel and saying that he enjoys using substances, does not want to quit, and is not willing to consider treatment at this point.    Laci will continue his current medications.  He will return for follow-up in 1 month.    Review of Systems     A comprehensive review of systems was performed and is negative other than noted above.          Allergies:      Allergies   Allergen Reactions     Prednisone Other (See Comments)     psych problems     Current Medications   Depakote 2000 mg HS  Gabapentin 200 mg daily  Lamotrigine 125 mg HS  Olanzapine 10 mg HS    Mental Status Exam     Alertness: alert  and oriented  Appearance: adequately groomed  Behavior/Demeanor: cooperative, pleasant and calm, with good  eye contact  Speech: normal  Language: " intact  Psychomotor: normal or unremarkable  Mood:  description consistent with euthymia  Affect: full-range; was congruent to mood  Thought Process/Associations: unremarkable  Thought Content:  Denies active/passive suicidal ideation  Perception:  Denies hallucinations  Insight: fair  Judgment: fair  Cognition:  does appear grossly intact.      Psychiatry Clinic Individual Psychotherapy Note                                                                     [16]

## 2019-10-22 NOTE — PATIENT INSTRUCTIONS
Continue your medications at the usual doses    Please return for follow-up in 1 month    Thank you for coming to the PSYCHIATRY CLINIC.    Lab Testing:  If you had lab testing today and your results are reassuring or normal they will be mailed to you or sent through [employer] within 7 days.   If the lab tests need quick action we will call you with the results.  The phone number we will call with results is # [phone] (home) . If this is not the best number please call our clinic and change the number.    Medication Refills:  If you need any refills please call your pharmacy and they will contact us. Our fax number for refills is [phone]. Please allow three business for refill processing.   If you need to  your refill at a new pharmacy, please contact the new pharmacy directly. The new pharmacy will help you get your medications transferred.     Scheduling:  If you have any concerns about today's visit or wish to schedule another appointment please call our office during normal business hours [phone] (8-5:00 M-F)    Contact Us:  Please call [phone] during business hours (8-5:00 M-F).  If after clinic hours, or on the weekend, please call  [phone].    Financial Assistance [phone]  [facility] Billing [phone]  [place] Billing Office, [facility]: [phone]  [place] Billing [phone]  Medical Records [phone]      MENTAL HEALTH CRISIS NUMBERS:  [facility]:   [facility] - [phone]   Crisis Residence [facility] - [phone]   Walk-In Counseling [facility] [phone]   COPE 24/7 [place] Mobile Team for Adults - [[phone]]; Child - [[phone]]        [facility]:   [facility] - [phone]   Walk-in counseling [facility] - [phone]   Walk-in counseling [facility] - [phone]   Crisis Residence [facility] - [phone]   Urgent Care Adult  Mental Health:   --Drop-in, 24/7 crisis line, and [facility] Mobile Team [[phone]]    CRISIS TEXT LINE: Text [phone] from anywhere, anytime, any crisis 24/7;    OR SEE www.crisistextline.org     Poison Control Center - [phone]    CHILD: [facility] needs assessment team - [phone]     [facility] Lifeline - [phone]; or [facility] Lifeline - [phone]    If you have a medical emergency please call 911or go to the nearest ER.                    _____________________________________________    Again thank you for choosing PSYCHIATRY CLINIC and please let us know how we can best partner with you to improve you and your family's health.  You may be receiving a survey in the mail regarding this appointment. We would love to have your feedback, both positive and negative, so please fill out the survey and return it using the provided envelope. The survey is done by an external company, so your answers are anonymous.

## 2019-11-09 ENCOUNTER — HOSPITAL ENCOUNTER (EMERGENCY)
Facility: CLINIC | Age: 24
Discharge: HOME | End: 2019-11-09
Attending: EMERGENCY MEDICINE | Admitting: EMERGENCY MEDICINE
Payer: COMMERCIAL

## 2019-11-09 VITALS
SYSTOLIC BLOOD PRESSURE: 148 MMHG | WEIGHT: 240 LBS | OXYGEN SATURATION: 96 % | RESPIRATION RATE: 18 BRPM | HEART RATE: 86 BPM | TEMPERATURE: 97.9 F | DIASTOLIC BLOOD PRESSURE: 113 MMHG | BODY MASS INDEX: 37.59 KG/M2

## 2019-11-09 DIAGNOSIS — F19.10 SUBSTANCE ABUSE (H): ICD-10-CM

## 2019-11-09 LAB
AMPHETAMINES UR QL SCN: NEGATIVE
BARBITURATES UR QL: NEGATIVE
BENZODIAZ UR QL: NEGATIVE
CANNABINOIDS UR QL SCN: POSITIVE
COCAINE UR QL: NEGATIVE
ETHANOL UR QL SCN: NEGATIVE
OPIATES UR QL SCN: NEGATIVE

## 2019-11-09 PROCEDURE — 80307 DRUG TEST PRSMV CHEM ANLYZR: CPT | Performed by: FAMILY MEDICINE

## 2019-11-09 PROCEDURE — 99284 EMERGENCY DEPT VISIT MOD MDM: CPT | Mod: Z6 | Performed by: EMERGENCY MEDICINE

## 2019-11-09 PROCEDURE — 99283 EMERGENCY DEPT VISIT LOW MDM: CPT | Performed by: EMERGENCY MEDICINE

## 2019-11-09 PROCEDURE — 25000132 ZZH RX MED GY IP 250 OP 250 PS 637: Performed by: EMERGENCY MEDICINE

## 2019-11-09 PROCEDURE — 80320 DRUG SCREEN QUANTALCOHOLS: CPT | Performed by: FAMILY MEDICINE

## 2019-11-09 RX ORDER — DIAZEPAM 10 MG
10 TABLET ORAL
Qty: 3 TABLET | Refills: 0 | Status: SHIPPED | OUTPATIENT
Start: 2019-11-09 | End: 2020-04-14

## 2019-11-09 RX ORDER — LORAZEPAM 1 MG/1
1 TABLET ORAL ONCE
Status: COMPLETED | OUTPATIENT
Start: 2019-11-09 | End: 2019-11-09

## 2019-11-09 RX ADMIN — LORAZEPAM 1 MG: 1 TABLET ORAL at 12:32

## 2019-11-09 ASSESSMENT — ENCOUNTER SYMPTOMS
SHORTNESS OF BREATH: 0
ABDOMINAL PAIN: 0
APPETITE CHANGE: 1
FEVER: 0
SLEEP DISTURBANCE: 1
NERVOUS/ANXIOUS: 1

## 2019-11-09 NOTE — ED PROVIDER NOTES
[facility] EMERGENCY DEPARTMENT ([facility])    11/09/19       History     Chief Complaint   Patient presents with     Drug / Alcohol Assessment     Seeking detox from Phenibut.     HPI  Laci Hinkle Jr is a 23 year old male with a medical history significant for bipolar affective disorder, CEASAR, ADHD, substance abuse and hyperlipidemia who presents to the Emergency Department for evaluation of insomnia and worsening anxiety in the setting of quitting Phenibut.  Patient reports that he bought this from a [employer] store.  Patient reports that it is a Russian medication that is similar to Lyrica.  The patient reports that he has been taking this medication for the past month.  Patient reports that he has been taking 7 to 10 g a day of the medication.  The patient wanted to stop the medication, so he attempted to taper, but he was having withdrawal symptoms.  So, he stopped the medication altogether on 11/5/2019.  The next day he began having withdrawal symptoms including increased anxiety, decreased appetite and insomnia.  The patient denies any suicidal ideations.  The patient is not wanting detox, he is just here at the Emergency Department wanting something to help him to sleep.  The patient does see a psychiatrist and is currently on Depakote 2000 mg at bedtime, gabapentin 200 mg daily, lamotrigine 125 mg at bedtime and olanzapine 10 mg at bedtime.  The patient reports that he has been taking all of these medications as prescribed, but they have not been helping with his withdrawal symptoms.  The patient also notes a history of alcohol abuse and marijuana abuse.  The patient reports that he smokes marijuana once a day.  He reports that his last alcohol use was a couple of weeks ago.    I have reviewed the Medications, Allergies, Past Medical and Surgical History, and Social History in the [employer] system.    Past Medical History:   Diagnosis Date     ADHD (attention deficit hyperactivity disorder)  3/18/2011     Bipolar affective disorder (H) 3/18/2011     CEASAR (generalized anxiety disorder) 3/18/2011     GERD (gastroesophageal reflux disease)      Hyperlipidemia LDL goal <130      Impaired fasting glucose      Obesity      Substance abuse (H) 3/18/2011       Past Surgical History:   Procedure Laterality Date     ENT SURGERY  2010    jaw surgery        Family History   Problem Relation Age of Onset     Hypertension Father      Alcohol/Drug Father         various substances     Psychotic Disorder Father         bipolar, anxiety, ADHD       Social History     Tobacco Use     Smoking status: Current Every Day Smoker     Packs/day: 1.00     Types: Cigarettes     Smokeless tobacco: Former User     Types: Snuff, Chew     Tobacco comment: and e-cig,    Substance Use Topics     Alcohol use: Yes     Comment: drinks occasionally       No current facility-administered medications for this encounter.      Current Outpatient Medications   Medication     diazepam (VALIUM) 10 MG tablet     amLODIPine (NORVASC) 2.5 MG tablet     atorvastatin (LIPITOR) 20 MG tablet     divalproex sodium extended-release (DEPAKOTE ER) 500 MG 24 hr tablet     gabapentin (NEURONTIN) 100 MG capsule     lamoTRIgine (LAMICTAL) 100 MG tablet     lamoTRIgine (LAMICTAL) 25 MG tablet     naltrexone (DEPADE/REVIA) 50 MG tablet     nicotine polacrilex (EQ NICOTINE) 2 MG gum     OLANZapine (ZYPREXA) 10 MG tablet     omeprazole (PRILOSEC) 20 MG capsule        Allergies   Allergen Reactions     Prednisone Other (See Comments)     psych problems         Review of Systems   Constitutional: Positive for appetite change (decrease). Negative for fever.   Respiratory: Negative for shortness of breath.    Cardiovascular: Negative for chest pain.   Gastrointestinal: Negative for abdominal pain.   Psychiatric/Behavioral: Positive for sleep disturbance. Negative for suicidal ideas. The patient is nervous/anxious.    All other systems reviewed and are  negative.      Physical Exam   BP: (!) 148/113  Pulse: 86  Temp: 97.9  F (36.6  C)  Resp: 18  Weight: 108.9 kg (240 lb)  SpO2: 96 %      Physical Exam  Vitals signs and nursing note reviewed.   Constitutional:       General: He is not in acute distress.     Appearance: He is not diaphoretic.   HENT:      Head: Normocephalic and atraumatic.   Neck:      Musculoskeletal: Normal range of motion and neck supple.   Cardiovascular:      Rate and Rhythm: Normal rate.   Pulmonary:      Effort: Pulmonary effort is normal.   Abdominal:      Palpations: Abdomen is soft.   Musculoskeletal: Normal range of motion.   Skin:     General: Skin is warm and dry.   Neurological:      Mental Status: He is alert and oriented to person, place, and time.      Sensory: No sensory deficit.         ED Course   12:34 PM  The patient was seen and examined by Girish Pan MD in HW03.        Procedures             Critical Care time:  none             Labs Ordered and Resulted from Time of ED Arrival Up to the Time of Departure from the ED   DRUG ABUSE SCREEN 6 CHEM DEP URINE ([facility]) - Abnormal; Notable for the following components:       Result Value    Cannabinoids Qual Urine Positive (*)     All other components within normal limits            Assessments & Plan (with Medical Decision Making)   22 yo M who presents with dependence to Phenibut, which he stopped on his own 3-4 days ago.  He is mainly having anxiety and insomnia.  He is not in distress.  He declines detox.  No thoughts of self harm or harming others.  Laci will be discharged with 3 doses of Valium for his symptoms.  If worsening symptoms, he should return for detox.  There is no evidence that his insomnia is related to his bipolar disorder.      I have reviewed the nursing notes.    I have reviewed the findings, diagnosis, plan and need for follow up with the patient.    New Prescriptions    No medications on file       Final diagnoses:   None     IWyatt, am  serving as a trained medical scribe to document services personally performed by Girish Pan MD, based on the provider's statements to me.   I, Girish Pan MD, was physically present and have reviewed and verified the accuracy of this note documented by Wyatt Sosa.    11/9/2019   [facility], [place], EMERGENCY DEPARTMENT     Girish Pan MD  11/09/19 [id number]

## 2019-11-09 NOTE — ED AVS SNAPSHOT
[facility], Emergency Department  [address]  [facility] [place] [place] [address]  Phone:  [phone]  Fax:  [phone]                                    Laci Hinkle Jr   MRN: [id number]    Department:  [facility], Emergency Department   Date of Visit:  11/9/2019           After Visit Summary Signature Page    I have received my discharge instructions, and my questions have been answered. I have discussed any challenges I see with this plan with the nurse or doctor.    ..........................................................................................................................................  Patient/Patient Representative Signature      ..........................................................................................................................................  Patient Representative Print Name and Relationship to Patient    ..................................................               ................................................  Date                                   Time    ..........................................................................................................................................  Reviewed by Signature/Title    ...................................................              ..............................................  Date                                               Time          22EPIC Rev 08/18

## 2019-11-09 NOTE — LETTER
November 9, 2019      To Whom It May Concern:      Laci Hinkle Jr was seen in our Emergency Department today, 11/09/19.  I expect his condition to improve over the next 3 days.  He may return to work/school when improved.    Sincerely,        Girish Pan MD

## 2019-11-09 NOTE — DISCHARGE INSTRUCTIONS
Please take the valium tonight and tomorrow for sleep.  If you would like to enter detox, please check for detox beds available at [facility]:   [phone]

## 2019-12-03 ENCOUNTER — TELEPHONE (OUTPATIENT)
Dept: PSYCHIATRY | Facility: CLINIC | Age: 24
End: 2019-12-03

## 2019-12-03 NOTE — TELEPHONE ENCOUNTER
"Writer received call from pt, who stated that he has been off naltrexone \"for a little while now\" and wants to restart the medication. He has four 50 mg tablets. He identified a plan to get a refill of the medication. Pt reports that he is no longer taking phenibut.    Pt reported that he feels that a half tablet (25 mg) is effective for his cravings without side effect of flattening and numbing. He requests confirmation that provider is OK with him taking this dose.    Pt reported that he is on wait list for non-AA based treatment program and wants to be sober.    Routed to provider.          "

## 2019-12-04 NOTE — TELEPHONE ENCOUNTER
Bo Oconnor MD Snyder, David J, RN   Caller: Unspecified (Yesterday,  1:06 PM)             Sure I think restarting 25 mg naltrexone is fine, thx! - DB        Writer left voice mail message for pt, identifying that medication inquired on yesterday could be restarted at 25 mg rather than 50 mg. Pt was prompted to call clinic if further clarification was needed. The pt was not identified on his voice mail greeting.

## 2019-12-31 DIAGNOSIS — F31.10 BIPOLAR I DISORDER, MOST RECENT EPISODE (OR CURRENT) MANIC (H): ICD-10-CM

## 2019-12-31 DIAGNOSIS — F31.81 BIPOLAR 2 DISORDER (H): ICD-10-CM

## 2019-12-31 DIAGNOSIS — F31.61 BIPOLAR DISORDER, CURRENT EPISODE MIXED, MILD (H): ICD-10-CM

## 2019-12-31 RX ORDER — DIVALPROEX SODIUM 500 MG/1
2000 TABLET, EXTENDED RELEASE ORAL DAILY
Qty: 360 TABLET | Refills: 0 | Status: SHIPPED | OUTPATIENT
Start: 2019-12-31 | End: 2020-03-30

## 2019-12-31 RX ORDER — LAMOTRIGINE 25 MG/1
25 TABLET ORAL AT BEDTIME
Qty: 90 TABLET | Refills: 0 | Status: SHIPPED | OUTPATIENT
Start: 2019-12-31 | End: 2020-03-30

## 2019-12-31 RX ORDER — LAMOTRIGINE 100 MG/1
100 TABLET ORAL AT BEDTIME
Qty: 90 TABLET | Refills: 0 | Status: SHIPPED | OUTPATIENT
Start: 2019-12-31 | End: 2020-03-30

## 2019-12-31 RX ORDER — GABAPENTIN 100 MG/1
100 CAPSULE ORAL DAILY
Qty: 180 CAPSULE | Refills: 0 | Status: SHIPPED | OUTPATIENT
Start: 2019-12-31 | End: 2020-03-27

## 2019-12-31 NOTE — TELEPHONE ENCOUNTER
Last Seen 10/22/19    RTC 1 month    Cancel 0    No-Show 1    Next Appt 1/7/20    Incoming Refill From [facility] via fax    Medication Requested gabapentin (NEURONTIN) 100 MG capsule    Directions Take 2 capsules (200 mg) by mouth daily    Qty 180    Last Refill 11/30/19 quantity of 50    Medication Refill Pended Per Refill Protocol      AND      Medication Requested divalproex sodium extended-release (DEPAKOTE ER) 500 MG 24 hr tablet    Directions Take 4 tablets (2,000 mg) by mouth daily     Qty 360    Last Refill 12/03/09 quantity of 120    Medication Refill Pended Per Refill Protocol      AND      Medication Requested lamoTRIgine (LAMICTAL) 100 MG tablet    Directions Take 1 tablet (100 mg) by mouth At Bedtime    Qty 90    Last Refill 11/30/19 quantity of 30    Medication Refill Pended Per Refill Protocol      AND      Medication Requested lamoTRIgine (LAMICTAL) 25 MG tablet    Directions Take 1 tablet (25 mg) by mouth At Bedtime    Qty 90    Last Refill 11/30/19 quantity of 30    All medications are Pended Per Refill Protocol    Judie Davies LPN

## 2020-01-07 ENCOUNTER — OFFICE VISIT (OUTPATIENT)
Dept: PSYCHIATRY | Facility: CLINIC | Age: 25
End: 2020-01-07
Attending: PSYCHIATRY & NEUROLOGY
Payer: COMMERCIAL

## 2020-01-07 VITALS
HEART RATE: 84 BPM | SYSTOLIC BLOOD PRESSURE: 138 MMHG | BODY MASS INDEX: 37.15 KG/M2 | DIASTOLIC BLOOD PRESSURE: 96 MMHG | WEIGHT: 237.2 LBS

## 2020-01-07 DIAGNOSIS — F31.10 BIPOLAR I DISORDER, MOST RECENT EPISODE (OR CURRENT) MANIC (H): ICD-10-CM

## 2020-01-07 PROCEDURE — G0463 HOSPITAL OUTPT CLINIC VISIT: HCPCS | Mod: ZF

## 2020-01-07 RX ORDER — OLANZAPINE 10 MG/1
10 TABLET ORAL AT BEDTIME
Qty: 90 TABLET | Refills: 3 | Status: SHIPPED | OUTPATIENT
Start: 2020-01-07 | End: 2021-01-07

## 2020-01-07 ASSESSMENT — PAIN SCALES - GENERAL: PAINLEVEL: NO PAIN (0)

## 2020-01-07 ASSESSMENT — PATIENT HEALTH QUESTIONNAIRE - PHQ9: SUM OF ALL RESPONSES TO PHQ QUESTIONS 1-9: 15

## 2020-01-07 NOTE — PATIENT INSTRUCTIONS
Continue your medications at the current doses    Please get a blood test to check your medication level, blood counts, and liver function    Please return for follow-up in 2 months

## 2020-01-07 NOTE — PROGRESS NOTES
History and Physical    Laci Hinkle Jr. MRN# [id number]   Age: 23 year old YOB: 1995          Assessment:     Since the last visit, Laci has been adherent with his medications .    Attestation:  Patient has been seen and evaluated by me, Bo Oconnor MD, PhD.         Diagnoses:   Axis I: Bipolar I disorder, currently euthymic; polysubstance dependence     Axis II: Deferred    Axis III: Medication-induced weight gain; hypertension    Axis IV: mild  psychosocial stressors     Axis V: Global Assessment of Functionin-60    Interim History     Since the last visit, Laci reports that he has been adherent with his usual medications including olanzapine 10 mg daily, Depakote 2000 mg daily, lamotrigine 125 mg daily, and gabapentin 100 mg daily.  He has not yet started naltrexone, saying he first wants to quit kratom.      Laci reports that his mood is stable.  His motivation and energy level are good.  He is sleeping well.  He feels cognitively intact.  He is reasonably optimistic.  He denies symptoms of depression, minda, or psychosis.    Regarding substance use, Laci was able to taper and discontinue Phenabut.  He continues to use kratom and marijuana.  He wants to eventually stop using kratom, but does not plan to stop using marijuana.  He has stopped drinking.  He denies other substance use.    For substance abuse treatment, Laci is attending the group program at [facility].  He hopes to get into the treatment center, which he would attend for 2 days a week, and he is currently on a wait list for this.  He is taking this semester off college as he recognizes that he is unlikely to succeed there at his current level of substance use.  He feels if he can stop kratom and will be realistic for him to go back to school.    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  He enjoys the work.    I have encouraged Laci to follow  through on his substance use treatment.  Laci will continue his current medications.  I encouraged him again to get his blood test done, which she has still not done.  He will return for follow-up in 2 months.    Review of Systems     A comprehensive review of systems was performed and is negative other than noted above.          Allergies:      Allergies   Allergen Reactions     Prednisone Other (See Comments)     psych problems     Current Medications   Depakote 2000 mg HS  Gabapentin 100 mg daily  Lamotrigine 125 mg HS  Olanzapine 10 mg HS    Mental Status Exam     Alertness: alert  and oriented  Appearance: adequately groomed  Behavior/Demeanor: cooperative, pleasant and calm, with good  eye contact  Speech: normal  Language: intact  Psychomotor: normal or unremarkable  Mood:  description consistent with euthymia  Affect: full-range; was congruent to mood  Thought Process/Associations: unremarkable  Thought Content:  Denies active/passive suicidal ideation  Perception:  Denies hallucinations  Insight: fair  Judgment: fair  Cognition:  does appear grossly intact.      Psychiatry Clinic Individual Psychotherapy Note                                                                     [16]

## 2020-03-03 ENCOUNTER — TELEPHONE (OUTPATIENT)
Dept: PSYCHIATRY | Facility: CLINIC | Age: 25
End: 2020-03-03

## 2020-03-03 NOTE — TELEPHONE ENCOUNTER
"Patient called with the following message for Dr. Oconnor, which he hoped could get to Dr. Oconnor prior to their appointment tomorrow (3/4/2020):    \"I have been having very random insomnia issues that happen both when sober and when I am using. I don't think they have anything to do with being sober or not. Sometimes the insomnia issues are okay, as I don't work the next day. Wondering if the issues are maybe do the minda or bipolar? In the last 4 months, it has happened 3 times while sober, and 2 times while I've been drinking.\"    Patient then went on to mention wondering if there was a PRN medication to help sleep when insomnia is an issue? He is hoping these are things the two of them can discuss tomorrow.  "

## 2020-03-04 ENCOUNTER — TELEPHONE (OUTPATIENT)
Dept: PSYCHIATRY | Facility: CLINIC | Age: 25
End: 2020-03-04

## 2020-03-04 ENCOUNTER — OFFICE VISIT (OUTPATIENT)
Dept: PSYCHIATRY | Facility: CLINIC | Age: 25
End: 2020-03-04
Attending: PSYCHIATRY & NEUROLOGY
Payer: COMMERCIAL

## 2020-03-04 VITALS
SYSTOLIC BLOOD PRESSURE: 148 MMHG | BODY MASS INDEX: 37.78 KG/M2 | WEIGHT: 241.2 LBS | DIASTOLIC BLOOD PRESSURE: 95 MMHG | HEART RATE: 85 BPM

## 2020-03-04 DIAGNOSIS — F51.01 PRIMARY INSOMNIA: Primary | ICD-10-CM

## 2020-03-04 DIAGNOSIS — Z51.81 ENCOUNTER FOR THERAPEUTIC DRUG MONITORING: ICD-10-CM

## 2020-03-04 DIAGNOSIS — F31.61 BIPOLAR DISORDER, CURRENT EPISODE MIXED, MILD (H): Primary | ICD-10-CM

## 2020-03-04 PROCEDURE — G0463 HOSPITAL OUTPT CLINIC VISIT: HCPCS | Mod: ZF

## 2020-03-04 ASSESSMENT — PAIN SCALES - GENERAL: PAINLEVEL: NO PAIN (0)

## 2020-03-04 NOTE — PROGRESS NOTES
History and Physical    Laci Hinkle Jr. MRN# [id number]   Age: 24 year old YOB: 1995          Assessment:     Since the last visit, Laci has been adherent with his medications .    Attestation:  Patient has been seen and evaluated by me, Bo Oconnor MD, PhD.         Diagnoses:   Axis I: Bipolar I disorder, currently euthymic; polysubstance dependence     Axis II: Deferred    Axis III: Medication-induced weight gain; hypertension    Axis IV: mild  psychosocial stressors     Axis V: Global Assessment of Functionin-60    Interim History     Since the last visit, Laci reports that he has been adherent with his usual medications including olanzapine 10 mg daily, Depakote 2000 mg daily, lamotrigine 125 mg daily, and gabapentin 100 mg daily.       Laci reports that things are going well.  He denies symptoms of depression or minda.  His mood is stable.  His motivation and energy level are reasonably good.  He sleeps well most nights.  He is cognitively intact.  He feels reasonably optimistic.    Laci does report occasional difficulty initiating sleep, typically only about once per month.  He is unable to identify any precipitating factors.  He has tried multiple medications for sleep, including trazodone and Seroquel with poor tolerability.  We both agree that using a benzodiazepine or benzodiazepine receptor agonist would be unwise given his ongoing substance use.  Laci accepts a trial of amitriptyline 25 mg at bedtime as needed.    Laci continues to use cannabis daily, alcohol periodically, and has had meth 3 times in the last year or so.  He is now attending [facility] as a substance abuse treatment program.  He finds it very beneficial.  I have encouraged him to minimize his substance use.    Other than Elavil Laci will continue his medications at the usual doses.  He agrees to get a blood test today to get his valproic acid level checked, along with  other routine labs.  He will return for follow-up in 6 weeks.    Review of Systems     A comprehensive review of systems was performed and is negative other than noted above.          Allergies:      Allergies   Allergen Reactions     Prednisone Other (See Comments)     psych problems     Current Medications   Depakote 2000 mg HS  Gabapentin 100 mg daily  Lamotrigine 125 mg HS  Olanzapine 10 mg HS    Mental Status Exam     Alertness: alert  and oriented  Appearance: adequately groomed  Behavior/Demeanor: cooperative, pleasant and calm, with good  eye contact  Speech: normal  Language: intact  Psychomotor: normal or unremarkable  Mood:  description consistent with euthymia  Affect: full-range; was congruent to mood  Thought Process/Associations: unremarkable  Thought Content:  Denies active/passive suicidal ideation  Perception:  Denies hallucinations  Insight: fair  Judgment: fair  Cognition:  does appear grossly intact.

## 2020-03-04 NOTE — PATIENT INSTRUCTIONS
Continue your medications at the usual doses    Use Elavil 25 mg at bedtime as needed for sleep    Please return for follow-up in 6 weeks

## 2020-03-04 NOTE — NURSING NOTE
Chief Complaint   Patient presents with     Recheck Medication     Bipolar I disorder, most recent episode (or current) manic (

## 2020-03-04 NOTE — TELEPHONE ENCOUNTER
----- Message from Bo Oconnor MD sent at 3/4/2020  3:32 PM CST -----  Alvin Peres,    Can you order labs for Laci? He can get them done downstairs this afternoon. He needs CBC+diff, AST, ALT, valproic acid level, and Hb A1C.    DB      Writer placed requested orders.

## 2020-03-06 ENCOUNTER — TELEPHONE (OUTPATIENT)
Dept: PSYCHIATRY | Facility: CLINIC | Age: 25
End: 2020-03-06

## 2020-03-06 NOTE — TELEPHONE ENCOUNTER
Writer called [facility] pharmacy and was informed that amitriptyline 25 mg tabs can be split.  Routed to provider.      Bo Oconnor MD Snyder, David J RN   Caller: Unspecified (Today,  1:46 PM)             Let's try a half-tab first and then can think of alternatives if necessary.     DB      Writer called pt and he identified a plan to try a half tablet on a day that he doesn't work the next day. Writer confirmed plan.

## 2020-03-06 NOTE — TELEPHONE ENCOUNTER
"[facility] Call Center    Phone Message    May a detailed message be left on voicemail: yes     Reason for Call: Medication Question or concern regarding medication   Prescription Clarification  Name of Medication: amitriptyline 25mg  Prescribing Provider: Bo Oconnor MD   Pharmacy:  [id number] - [address]   What on the order needs clarification? Patient feels this medication works \"too well\". He took it on 3/4/20 because he did not have to work the next day and didn't know how it would affect him. Patient reports he was in a stupor for about 6 hours the following day and ended up missing treatment. He says he can still feel some effects of it today. Patient would like to know if there is an alternative medication he could try.          Action Taken: Message routed to:  Other: Nursing pool    Travel Screening: Not Applicable                                                                      "

## 2020-03-13 ENCOUNTER — TELEPHONE (OUTPATIENT)
Dept: PSYCHIATRY | Facility: CLINIC | Age: 25
End: 2020-03-13

## 2020-03-13 NOTE — TELEPHONE ENCOUNTER
"Writer called pt to explore his concerns.    Pt reported that Elavil works well for him at 12.5 mg dose with no side effects other than slight next-day sedation. 25 mg causes blurred vision and oversedation.   Pt identified having used Seroquel 50 mg sleep, stating \"it didn't make me really sleepy\" but it was somewhat helpful for his sleep with no next-day effects. He is inquiring if 100 mg would be a safe dose.  Pt reported that in the past he took Seroquel  mg, which had intolerable side effects.    Pt identified that he is concerned with taking the same sleep aid every day and having it lose effectiveness and so is wondering if he could alternate Elavil and Seroquel use. He also identified that he would be OK with only one med if that's OK with Dr. Oconnor. The pt reports that he had a perfect 8 hours of sleep last night with 12.5 mg Elavil.    Pt provided the following informational updates:  > He is using 200-600 mg of gabapentin, but not every day.  > Treatment has \"been super beneficial\" with his drinking reduced to 4 drinks per night and his marijuana use has been \"substantially lower\". One hit makes him high and didn't use any yesterday.    The pt reviewed his two main questions:  1. Can he take Seroquel to use as a sleep aid, and what dose?   2. Can he be prescribed 10 mg Elavil tablets, as the pill splitter doesn't always split them evenly? He thinks this would be a very appropriate dose for him.    Routed to provider.            "

## 2020-03-13 NOTE — TELEPHONE ENCOUNTER
----- Message from Bethany Bass sent at 3/13/2020  9:04 AM CDT -----  Contact: [phone]  Patient is caller. He has been using amitriptyline every night and does not like using it. He says he got some Seroquel from a friend and he has been changing up which he takes at night. One night he will do the amitriptyline and the next he will take Seroquel. He say that he did not have any benefit from Seroquel 50mg and is wondering what the effective dose would be for this med.

## 2020-03-16 DIAGNOSIS — F51.01 PRIMARY INSOMNIA: ICD-10-CM

## 2020-03-16 RX ORDER — AMITRIPTYLINE HYDROCHLORIDE 10 MG/1
25 TABLET ORAL
Qty: 30 TABLET | Refills: 1 | Status: SHIPPED | OUTPATIENT
Start: 2020-03-16 | End: 2020-04-14

## 2020-03-16 NOTE — TELEPHONE ENCOUNTER
"Bo Oconnor MD Snyder, David J, RN    Caller: Unspecified (3 days ago,  9:21 AM)               Hi Ja,     I tried to send Laci a message but it looks like he does not use my chart.    My suggestion is that he continue Elavil since it seems to be working well.  As he requested, I have prescribed him 10 mg tablets, he can take 1 of those at night as needed.       We we can let him know that although some medications like benzodiazepines and \"Z drugs\" lose their effectiveness if they are used consistently for sleep, amitriptyline should continue to work well.  People do not typically develop tolerance to it.     DB      Writer called pharmacy and confirmed that the Elavil was ready for pickup.  Writer attempted to contact pt via telephone, but he identified being in an appointment and that he would return the call.    "

## 2020-03-27 DIAGNOSIS — F31.10 BIPOLAR I DISORDER, MOST RECENT EPISODE (OR CURRENT) MANIC (H): ICD-10-CM

## 2020-03-27 DIAGNOSIS — F31.81 BIPOLAR 2 DISORDER (H): ICD-10-CM

## 2020-03-27 DIAGNOSIS — F31.61 BIPOLAR DISORDER, CURRENT EPISODE MIXED, MILD (H): ICD-10-CM

## 2020-03-30 RX ORDER — LAMOTRIGINE 25 MG/1
25 TABLET ORAL AT BEDTIME
Qty: 30 TABLET | Refills: 0 | Status: SHIPPED | OUTPATIENT
Start: 2020-03-30 | End: 2020-04-14

## 2020-03-30 RX ORDER — DIVALPROEX SODIUM 500 MG/1
2000 TABLET, EXTENDED RELEASE ORAL DAILY
Qty: 120 TABLET | Refills: 0 | Status: SHIPPED | OUTPATIENT
Start: 2020-03-30 | End: 2020-04-14

## 2020-03-30 RX ORDER — LAMOTRIGINE 100 MG/1
100 TABLET ORAL AT BEDTIME
Qty: 30 TABLET | Refills: 0 | Status: SHIPPED | OUTPATIENT
Start: 2020-03-30 | End: 2020-04-14

## 2020-03-30 RX ORDER — NALTREXONE HYDROCHLORIDE 50 MG/1
50 TABLET, FILM COATED ORAL DAILY
Qty: 30 TABLET | Refills: 0 | OUTPATIENT
Start: 2020-03-30

## 2020-03-30 RX ORDER — GABAPENTIN 100 MG/1
200 CAPSULE ORAL DAILY
Qty: 60 CAPSULE | Refills: 0 | Status: SHIPPED | OUTPATIENT
Start: 2020-03-30 | End: 2020-04-14

## 2020-03-30 NOTE — TELEPHONE ENCOUNTER
"Per [place]  gabapentin filled 3/1, 2/1, 12/31. Qty=60.  Per 3/13/20 telephone encounter: \"He is using 200-600 mg of gabapentin, but not every day.\"    Per 1/7/20 office visit note: \"He has not yet started naltrexone, saying he first wants to quit kratom.\"      Writer e-prescribed 30-day supply of gabapentin, Lamictal, and Depakote to [facility] ([phone]). Refills 0.  Declined naltrexone.            "

## 2020-03-30 NOTE — TELEPHONE ENCOUNTER
naltrexone (DEPADE/REVIA) 50 MG  Last refilled: 9/24/19  Qty: 30  : 3    DEPAKOTE  MG  Last refilled: 12/31/19  Qty: 360    lamoTRIgine (LAMICTAL) 25 MG & 100MG   Last refilled: 12/31/19     Qty: 90     Last seen: 3/4/20   RTC: 6 WEEKS   Cancel: 0    No-show: 0  Next appt: 4/14/20  Refill decision: Refilled for 30 days per protocol. MED OVER RIDE

## 2020-04-14 ENCOUNTER — VIRTUAL VISIT (OUTPATIENT)
Dept: PSYCHIATRY | Facility: CLINIC | Age: 25
End: 2020-04-14
Attending: PSYCHIATRY & NEUROLOGY
Payer: COMMERCIAL

## 2020-04-14 DIAGNOSIS — F51.01 PRIMARY INSOMNIA: ICD-10-CM

## 2020-04-14 DIAGNOSIS — F31.10 BIPOLAR I DISORDER, MOST RECENT EPISODE (OR CURRENT) MANIC (H): ICD-10-CM

## 2020-04-14 DIAGNOSIS — F31.61 BIPOLAR DISORDER, CURRENT EPISODE MIXED, MILD (H): ICD-10-CM

## 2020-04-14 DIAGNOSIS — F31.81 BIPOLAR 2 DISORDER (H): ICD-10-CM

## 2020-04-14 RX ORDER — LAMOTRIGINE 100 MG/1
100 TABLET ORAL AT BEDTIME
Qty: 90 TABLET | Refills: 3 | Status: SHIPPED | OUTPATIENT
Start: 2020-04-14 | End: 2021-01-12

## 2020-04-14 RX ORDER — LAMOTRIGINE 25 MG/1
25 TABLET ORAL AT BEDTIME
Qty: 90 TABLET | Refills: 3 | Status: SHIPPED | OUTPATIENT
Start: 2020-04-14 | End: 2020-10-13 | Stop reason: DRUGHIGH

## 2020-04-14 RX ORDER — GABAPENTIN 100 MG/1
200 CAPSULE ORAL DAILY
Qty: 60 CAPSULE | Refills: 0 | Status: SHIPPED | OUTPATIENT
Start: 2020-04-14 | End: 2020-06-29

## 2020-04-14 RX ORDER — AMITRIPTYLINE HYDROCHLORIDE 10 MG/1
25 TABLET ORAL
Qty: 30 TABLET | Refills: 1 | Status: SHIPPED | OUTPATIENT
Start: 2020-04-14 | End: 2020-07-03

## 2020-04-14 RX ORDER — DIVALPROEX SODIUM 500 MG/1
2000 TABLET, EXTENDED RELEASE ORAL DAILY
Qty: 120 TABLET | Refills: 0 | Status: SHIPPED | OUTPATIENT
Start: 2020-04-14 | End: 2020-06-18

## 2020-04-14 NOTE — PATIENT INSTRUCTIONS
Continue your medications at the current doses    Please get a blood test to check your Depakote level    Please return for follow-up in 1 month

## 2020-04-14 NOTE — PROGRESS NOTES
Psychiatry Clinic Follow-up Note    TELEPHONE VISIT  Laci Hinkle Jr is a 24 year old pt. who is being evaluated via a billable telephone visit.   All of the following have been reviewed and updated in the provider progress note: Family and Social History, Past Medical History and Medication list if relevant to the visit and Allergy.    Patient has given verbal consent for a telephone visit?  yes   How would the pt like to obtain the AVS? Eduin    Type of service:  Telephone visit for medication management  Time of service:    Date:  04/14/2020    Start Time:  1:58 PM        End Time:  2:20 pm  Reason for Telephone Visit: Patient unable to travel due to Covid-19  Originating Site (patient location): Patient's home  Distant Site (provider location): Remote location      Laci Hinkle Jr. MRN# [id number]   Age: 24 year old YOB: 1995          Assessment:     Since the last visit, Laci has been adherent with his medications including Depakote 2000 mg at bedtime, gabapentin 200 mg at bedtime, lamotrigine 125 mg at bedtime, olanzapine 10 mg at bedtime, and amitriptyline 10 mg at bedtime (decreased from 25 mg at bedtime).  He restarted naltrexone but is only taking it about 2 days/week.  Laci continues to use alcohol and cannabis regularly.  He had a binge about a month ago on multiple illicit substances including cocaine and methamphetamine and had a very bad experience.  He wants to work more on his sobriety.  He is continuing to attend his group meetings twice a week.  He is now considering attending AA again and I have encouraged him in this regard.  He is not interested in other treatments for substance use.  He is agreeable to continue his medications at the current doses and return for follow-up in 1 month.  I encouraged him to get a blood test to check his Depakote level, which she has still not done.    Attestation:  Patient has been evaluated by me, Bo Oconnor MD, PhD.          Diagnoses:     Axis I: Bipolar I disorder, currently euthymic; polysubstance dependence     Axis II: Deferred    Axis III: Medication-induced weight gain; hypertension    Axis IV: mild  psychosocial stressors     Axis V: Global Assessment of Functionin-60    Interim History     Since the last visit Laci reports that his mood has mostly been stable.  His motivation and energy level are good.  He finds amitriptyline very helpful for sleep and he is sleeping well.  He feels cognitively intact.  He is reasonably optimistic.  He denies symptoms of depression, minda, or psychosis.    A month ago Laci had a very bad experience with drugs.  He had a binge which included multiple substances, including cocaine and methamphetamine.  He experienced psychosis for several hours which was frightening.  He has now cut out stimulant use.      Laci continues to use alcohol and cannabis daily.  He recognizes that this is not good for his health but he finds it difficult to quit.  He is attending [facility] substance abuse treatment program.  He finds it beneficial but realizes that more is needed.  He does not like AA but is considering starting to attend again.  I have encouraged him to minimize his substance use.  He is not currently interested in other treatment options.    Laci will continue his medications at the current doses.  I have encouraged him to get a blood test when he feels comfortable doing so.  He will return for follow-up in 1 month.    Review of Systems     A comprehensive review of systems was performed and is negative other than noted above.          Allergies:      Allergies   Allergen Reactions     Prednisone Other (See Comments)     psych problems     Current Medications     Depakote 2000 mg HS  Gabapentin 100 mg daily  Lamotrigine 125 mg HS  Olanzapine 10 mg HS    Mental Status Exam     Alertness: alert  and oriented  Behavior/Demeanor: cooperative, pleasant and calm,  Speech:  normal  Language: intact  Psychomotor: normal or unremarkable  Mood: description consistent with euthymia  Affect: full-range; was congruent to mood  Thought Process/Associations: unremarkable  Thought Content:  Denies active/passive suicidal ideation  Perception:  Denies hallucinations  Insight: fair  Judgment: fair  Cognition:  does appear grossly intact.

## 2020-06-18 DIAGNOSIS — F31.61 BIPOLAR DISORDER, CURRENT EPISODE MIXED, MILD (H): ICD-10-CM

## 2020-06-18 RX ORDER — DIVALPROEX SODIUM 500 MG/1
2000 TABLET, EXTENDED RELEASE ORAL DAILY
Qty: 120 TABLET | Refills: 0 | Status: SHIPPED | OUTPATIENT
Start: 2020-06-18 | End: 2020-07-22

## 2020-06-18 NOTE — TELEPHONE ENCOUNTER
Medication requested: divalproex sodium extended-release (DEPAKOTE ER) 500 MG 24 hr tablet   Last refilled: 5/15/20  Qty: 120      Last seen: 4/14/20  RTC: 1 month  Cancel: 0  No-show: 0  Next appt: 0    Refill decision:   30 day alli refill sent to the pharmacy - including instructions for patient to call the clinic and schedule an appointment.  Scheduling has been notified to contact the pt for appointment.    Warnings Override History for divalproex sodium extended-release (DEPAKOTE ER) 500 MG 24 hr tablet [[id number]]     Overridden by Bo Oconnor MD on Apr 14, 2020 2:21 PM    Drug-Drug    1. VALPROIC ACID / LAMOTRIGINE [Level: Major] [Reason: Tolerated medication/side effects in past]    Other Orders:  lamoTRIgine (LAMICTAL) 25 MG tablet       2. VALPROIC ACID / LAMOTRIGINE [Level: Major] [Reason: Tolerated medication/side effects in past]    Other Orders:  lamoTRIgine (LAMICTAL) 25 MG tablet       3. VALPROIC ACID / LAMOTRIGINE [Level: Major] [Reason: Tolerated medication/side effects in past]    Other Orders:  lamoTRIgine (LAMICTAL) 100 MG tablet       4. VALPROIC ACID / LAMOTRIGINE [Level: Major] [Reason: Tolerated medication/side effects in past]    Other Orders:  lamoTRIgine (LAMICTAL) 100 MG tablet

## 2020-06-29 DIAGNOSIS — F31.10 BIPOLAR I DISORDER, MOST RECENT EPISODE (OR CURRENT) MANIC (H): ICD-10-CM

## 2020-06-29 RX ORDER — GABAPENTIN 100 MG/1
200 CAPSULE ORAL DAILY
Qty: 60 CAPSULE | Refills: 0 | Status: SHIPPED | OUTPATIENT
Start: 2020-06-29 | End: 2020-10-27

## 2020-06-29 NOTE — TELEPHONE ENCOUNTER
Medication requested: gabapentin (NEURONTIN) 100 MG capsule   Last refilled: 4/26/20  Qty: 60      Last seen: 4/14/20  RTC: 1 month  Cancel: 0  No-show: 0  Next appt: 0    Refill decision:   Refill pended and routed to the provider for review/determination due to   Pt outside of RTC timeframe.  Scheduling has been notified to contact the pt for appointment.

## 2020-07-01 ENCOUNTER — TELEPHONE (OUTPATIENT)
Dept: PSYCHIATRY | Facility: CLINIC | Age: 25
End: 2020-07-01

## 2020-07-01 NOTE — TELEPHONE ENCOUNTER
Pt said that he has been taking amitriptyline 20mg nightly for at least a couple months. It is not helping him sleep, and he is unsure if it is doing anything beneficial for him at this point. He has 6 10mg tabs left and is unsure if he needs to taper down slowly, or if he can just stop taking it when he runs out. He would appreciate a call back tomorrow if at all possible. Okay to [facility].    Message routed to: Bo Delatorre RNCC

## 2020-07-02 DIAGNOSIS — F51.01 PRIMARY INSOMNIA: ICD-10-CM

## 2020-07-03 RX ORDER — AMITRIPTYLINE HYDROCHLORIDE 10 MG/1
25 TABLET ORAL
Qty: 30 TABLET | Refills: 0 | Status: SHIPPED | OUTPATIENT
Start: 2020-07-03 | End: 2020-10-13

## 2020-07-03 NOTE — TELEPHONE ENCOUNTER
Last seen: 4/14  RTC: 1 month  Non-provider cancel: None  No-show: None  Next appt: 7/7     Incoming refill from pharmacy via electronic request     Medication requested:   Disp  Refills  Start  End  SERGIO    amitriptyline (ELAVIL) 10 MG tablet  30 tablet  1  4/14/2020   No    Sig - Route: Take 2.5 tablets (25 mg) by mouth nightly as needed for sleep     Medication refill approved per refill protocol.  Writer e-prescribed 30-tablet supply to [facility] ([phone]). Qty 30, refills 0.

## 2020-07-03 NOTE — TELEPHONE ENCOUNTER
Writer left voice mail message for pt, requesting callback with information on whether or not the medication was helping with his sleep for a while. Writer identified that Dr. Oconnor is out of town and so a response may be delayed.

## 2020-07-07 ENCOUNTER — VIRTUAL VISIT (OUTPATIENT)
Dept: PSYCHIATRY | Facility: CLINIC | Age: 25
End: 2020-07-07
Attending: PSYCHIATRY & NEUROLOGY
Payer: COMMERCIAL

## 2020-07-07 DIAGNOSIS — F31.72 BIPOLAR DISORDER, IN FULL REMISSION, MOST RECENT EPISODE HYPOMANIC (H): Primary | ICD-10-CM

## 2020-07-07 NOTE — PROGRESS NOTES
Psychiatry Clinic Follow-up Note    TELEPHONE VISIT  Laci Hinkle Jr is a 24 year old pt. who is being evaluated via a billable telephone visit.      The patient has been notified of the following:    We have found that certain health care needs can be provided without the need for a physical exam. This service lets us provide the care you need with a short phone conversation. If a prescription is necessary we can send it directly to your pharmacy. If lab work is needed we can place an order for that and you can then stop by our lab to have the test done at a later time. Insurers are generally covering virtual visits as they would in-office visits so billing should not be different than normal.  If for some reason you do get billed incorrectly, you should contact the billing office to correct it and that number is in the AVS .    Patient has given verbal consent for a telephone visit?:  Yes   How would the pt like to obtain the AVS?:  Patient declined  AVS SmartPhrase [PsychAVS] has been placed in 'Patient Instructions':  Yes     Start Time:  4:16 PM          End Time:  4:35 pm      Laci Hinkle Jr. MRN# [id number]   Age: 24 year old YOB: 1995          Assessment:     Since the last visit, Laci has been adherent with his medications including Depakote 2000 mg at bedtime, gabapentin 200 mg at bedtime, lamotrigine 125 mg at bedtime, olanzapine 10 mg at bedtime, and amitriptyline 10 mg at bedtime.  His sleep has not been as good lately and he and tried increasing the dose of amitriptyline to 20 mg, though with little additional benefit.  He wishes to discontinue amitriptyline.  He will try melatonin extended release for sleep.  He will continue his other medications as usual.  He has still not gotten a blood test to check his Depakote level and I have encouraged him to do so.    Attestation:  Patient has been evaluated by me, Bo Oconnor MD, PhD.         Diagnoses:     Axis I: Bipolar I  "disorder, currently euthymic; polysubstance dependence     Axis II: Deferred    Axis III: Medication-induced weight gain; hypertension    Axis IV: mild  psychosocial stressors     Axis V: Global Assessment of Functionin-60    Interim History     Since the last visit Laci reports that his mood has been stable.  He denies symptoms of depression, minda, or psychosis.  His sleep has not been as good.  He sleeps well 3 or 4 nights, but has than 1 night where he is \"wide awake\".  He tried increasing his dose of amitriptyline though with little additional benefit.    We discussed a number of options, including Seroquel, trazodone, hydroxyzine, and melatonin.  Laci has had side effects from most of these which make them difficult to tolerate.  He will try the extended release version of melatonin, as he previously found the immediate release version only helped him sleep for a few hours.    Laci is not currently working.  He continues to work on his sobriety.  He is agreeable to continue his other medications as usual and he will return for follow-up in 1 month.    Review of Systems     A comprehensive review of systems was performed and is negative other than noted above.          Allergies:      Allergies   Allergen Reactions     Prednisone Other (See Comments)     psych problems     Current Medications     Depakote 2000 mg HS  Gabapentin 100 mg daily  Lamotrigine 125 mg HS  Olanzapine 10 mg HS    Mental Status Exam     Alertness: alert  and oriented  Behavior/Demeanor: cooperative, pleasant and calm,  Speech: normal  Language: intact  Psychomotor: normal or unremarkable  Mood: description consistent with euthymia  Affect: full-range; was congruent to mood  Thought Process/Associations: unremarkable  Thought Content:  Denies active/passive suicidal ideation  Perception:  Denies hallucinations  Insight: fair  Judgment: fair  Cognition:  does appear grossly intact.  "

## 2020-07-07 NOTE — PATIENT INSTRUCTIONS
Treatment Plan Today:   As discussed    Stop amitriptyline    Try melatonin extended release for sleep    Continue your other medications as usual    Please get a blood test to check your Depakote level    Please return for follow-up in 1 month    ------------------------------------------------------------------------    Thank you for coming to the PSYCHIATRY CLINIC.    Lab Testing:  If you had lab testing today and your results are reassuring or normal they will be mailed to you or sent through [employer] within 7 days. If the lab tests need quick action we will call you with the results. The phone number we will call with results is # [phone] (home) . If this is not the best number please call our clinic and change the number.    Medication Refills:  If you need any refills please call your pharmacy and they will contact us. Our fax number for refills is [phone]. Please allow three business for refill processing. If you need to  your refill at a new pharmacy, please contact the new pharmacy directly. The new pharmacy will help you get your medications transferred.     Scheduling:  If you have any concerns about today's visit or wish to schedule another appointment please call our office during normal business hours [phone] (8-5:00 M-F)    Contact Us:  Please call [phone] during business hours (8-5:00 M-F).  If after clinic hours, or on the weekend, please call  [phone].    Financial Assistance [phone]  [facility] Billing [phone]  Central Billing Office, [facility]: [phone]  [facility] Billing [phone]  Medical Records [phone]      MENTAL HEALTH CRISIS NUMBERS:  For a medical emergency please call  911 or go to the nearest ER.     [facility]:   [facility] -[phone]   Crisis Residence [facility] -[phone]   Walk-In Counseling Center [facility] -[phone]   [facility] -[phone] (adults)/[phone]  (child)  CHILD: [facility] needs assessment team - [phone]      [facility]:   [facility] - [phone]   Walk-in counseling [facility] - [phone]   Walk-in counseling [facility] - [phone]   Crisis Residence [facility] - [phone]  Urgent Care Adult Mental [facility]-[phone] mobile unit/ 24/7 crisis line    National Crisis Numbers:   National Suicide Prevention Lifeline: 2-509-480-TALK (824-953-3363)  Poison Control Center - 2-937-968-4105  Mechanology/resources for a list of additional resources (SOS)  Trans Lifeline a hotline for transgender people 5-884-064-2567  The Esa Project a hotline for LGBT youth 4-658-139-2650  Crisis Text Line: For any crisis 24/7   To: 790740  see www.crisistextline.org  - IF MAKING A CALL FEELS TOO HARD, send a text!         Again thank you for choosing PSYCHIATRY CLINIC and please let us know how we can best partner with you to improve you and your family's health.    You may be receiving a survey regarding this appointment. We would love to have your feedback, both positive and negative. The survey is done by an external company, so your answers are anonymous.

## 2020-07-22 DIAGNOSIS — F31.61 BIPOLAR DISORDER, CURRENT EPISODE MIXED, MILD (H): ICD-10-CM

## 2020-07-22 RX ORDER — DIVALPROEX SODIUM 500 MG/1
2000 TABLET, EXTENDED RELEASE ORAL DAILY
Qty: 120 TABLET | Refills: 0 | Status: SHIPPED | OUTPATIENT
Start: 2020-07-22 | End: 2020-08-27

## 2020-08-07 ENCOUNTER — TELEPHONE (OUTPATIENT)
Dept: PSYCHIATRY | Facility: CLINIC | Age: 25
End: 2020-08-07

## 2020-08-07 NOTE — TELEPHONE ENCOUNTER
On 8/7/2020, at 1216, writer called patient at mobile to confirm Virtual Visit. Writer unable to make contact with patient. Writer left detailed voice message for callback. [phone], left as call back number. LANIE Frost, EMT

## 2020-08-10 ENCOUNTER — TELEPHONE (OUTPATIENT)
Dept: PSYCHIATRY | Facility: CLINIC | Age: 25
End: 2020-08-10

## 2020-08-10 NOTE — TELEPHONE ENCOUNTER
[facility] Call Center    Phone Message    May a detailed message be left on voicemail: yes     Reason for Call: Other: care coordination help       at [facility] would like a call back to give some updates on pt and coordinate care.    He'd also like a copy of the pt's med list faxed to [phone]        Action Taken: Message routed to:  Other: nursing pool    Travel Screening: Not Applicable

## 2020-08-11 NOTE — TELEPHONE ENCOUNTER
Writer called Mahesh Hung CM. No answer. LVM requesting a c/b. Message forwarded to team as DIANNA.

## 2020-08-27 DIAGNOSIS — F31.61 BIPOLAR DISORDER, CURRENT EPISODE MIXED, MILD (H): ICD-10-CM

## 2020-08-27 RX ORDER — DIVALPROEX SODIUM 500 MG/1
2000 TABLET, EXTENDED RELEASE ORAL DAILY
Qty: 120 TABLET | Refills: 0 | Status: SHIPPED | OUTPATIENT
Start: 2020-08-27 | End: 2020-09-25

## 2020-08-27 NOTE — TELEPHONE ENCOUNTER
Last Seen 07/07/2020    RTC 4 weeks    Cancel 0    No-Show 1    Next Appt 10/13/2020 1430    [facility]    Medication Requested Divalproex Sodium (Depakote)  mg 24 HR Tablets    Directions Take 4 Tablets by mouth daily    Qty 120    Last Refill 07/22/2020 Qty 120 with no refills       Medication Refill Approved Per Refill Protocol

## 2020-09-05 PROCEDURE — 99283 EMERGENCY DEPT VISIT LOW MDM: CPT | Performed by: EMERGENCY MEDICINE

## 2020-09-05 PROCEDURE — 99283 EMERGENCY DEPT VISIT LOW MDM: CPT | Mod: Z6 | Performed by: EMERGENCY MEDICINE

## 2020-09-06 ENCOUNTER — HOSPITAL ENCOUNTER (EMERGENCY)
Facility: CLINIC | Age: 25
Discharge: HOME | End: 2020-09-06
Attending: EMERGENCY MEDICINE | Admitting: EMERGENCY MEDICINE
Payer: COMMERCIAL

## 2020-09-06 ENCOUNTER — APPOINTMENT (OUTPATIENT)
Dept: GENERAL RADIOLOGY | Facility: CLINIC | Age: 25
End: 2020-09-06
Attending: EMERGENCY MEDICINE
Payer: COMMERCIAL

## 2020-09-06 VITALS
SYSTOLIC BLOOD PRESSURE: 150 MMHG | TEMPERATURE: 99.2 F | HEART RATE: 86 BPM | OXYGEN SATURATION: 93 % | RESPIRATION RATE: 18 BRPM | DIASTOLIC BLOOD PRESSURE: 97 MMHG

## 2020-09-06 DIAGNOSIS — S96.912A LEFT ANKLE STRAIN, INITIAL ENCOUNTER: Primary | ICD-10-CM

## 2020-09-06 LAB — ALCOHOL BREATH TEST: 0 (ref 0–0.01)

## 2020-09-06 PROCEDURE — 73610 X-RAY EXAM OF ANKLE: CPT | Mod: LT

## 2020-09-06 PROCEDURE — 25000132 ZZH RX MED GY IP 250 OP 250 PS 637: Performed by: EMERGENCY MEDICINE

## 2020-09-06 RX ORDER — ACETAMINOPHEN 325 MG/1
325 TABLET ORAL ONCE
Status: COMPLETED | OUTPATIENT
Start: 2020-09-06 | End: 2020-09-06

## 2020-09-06 RX ORDER — KETOROLAC TROMETHAMINE 30 MG/ML
30 INJECTION, SOLUTION INTRAMUSCULAR; INTRAVENOUS ONCE
Status: DISCONTINUED | OUTPATIENT
Start: 2020-09-06 | End: 2020-09-06 | Stop reason: HOSPADM

## 2020-09-06 RX ORDER — OXYCODONE HYDROCHLORIDE 5 MG/1
5 TABLET ORAL
Status: COMPLETED | OUTPATIENT
Start: 2020-09-06 | End: 2020-09-06

## 2020-09-06 RX ORDER — ALUMINA, MAGNESIA, AND SIMETHICONE 2400; 2400; 240 MG/30ML; MG/30ML; MG/30ML
30 SUSPENSION ORAL ONCE
Status: COMPLETED | OUTPATIENT
Start: 2020-09-06 | End: 2020-09-06

## 2020-09-06 RX ORDER — ACETAMINOPHEN 325 MG/1
975 TABLET ORAL ONCE
Status: COMPLETED | OUTPATIENT
Start: 2020-09-06 | End: 2020-09-06

## 2020-09-06 RX ADMIN — ACETAMINOPHEN 325 MG: 325 TABLET, FILM COATED ORAL at 00:40

## 2020-09-06 RX ADMIN — ACETAMINOPHEN 975 MG: 325 TABLET, FILM COATED ORAL at 01:58

## 2020-09-06 RX ADMIN — ALUMINUM HYDROXIDE, MAGNESIUM HYDROXIDE, AND DIMETHICONE 30 ML: 400; 400; 40 SUSPENSION ORAL at 02:00

## 2020-09-06 RX ADMIN — OXYCODONE HYDROCHLORIDE 5 MG: 5 TABLET ORAL at 00:40

## 2020-09-06 NOTE — ED PROVIDER NOTES
"ED Provider Note  [facility]      History     Chief Complaint   Patient presents with     Fall     Pt reports drinking a lot eariler today and fell down a few stairs, reports he unsure exactly how it happened, did not hit head, reports pain in left ankle, CMS intact, reports he drinks daily unknown amounts denies seizure history or withdrawal complications      HPI  Laci Hinkle Jr is a 24 year old male with a PMH of substance abuse, ADHD, CEASAR, bipolar affective disorder, hyperlipidemia, and GERD who presents the ED for evaluation of fall -foot injury.    Patient reports drinking \"a lot\" earlier today and fell down the stairs.  He denies hitting his head and reports pain in his left ankle.  He reports drinking daily but is not sure of the amount and denies seizure history or withdrawal complications.  Patient denies additional associated injury, has been unable to ambulate due to persistent pain and swelling.     Past Medical History  Past Medical History:   Diagnosis Date     ADHD (attention deficit hyperactivity disorder) 3/18/2011     Bipolar affective disorder (H) 3/18/2011     CEASAR (generalized anxiety disorder) 3/18/2011     GERD (gastroesophageal reflux disease)      Hyperlipidemia LDL goal <130      Impaired fasting glucose      Obesity      Substance abuse (H) 3/18/2011     Past Surgical History:   Procedure Laterality Date     ENT SURGERY  2010    jaw surgery      divalproex sodium extended-release (DEPAKOTE ER) 500 MG 24 hr tablet  lamoTRIgine (LAMICTAL) 100 MG tablet  OLANZapine (ZYPREXA) 10 MG tablet  amitriptyline (ELAVIL) 10 MG tablet  amLODIPine (NORVASC) 2.5 MG tablet  atorvastatin (LIPITOR) 20 MG tablet  gabapentin (NEURONTIN) 100 MG capsule  lamoTRIgine (LAMICTAL) 25 MG tablet  naltrexone (DEPADE/REVIA) 50 MG tablet  nicotine polacrilex (EQ NICOTINE) 2 MG gum  omeprazole (PRILOSEC) 20 MG capsule      Allergies   Allergen Reactions     Prednisone Other (See Comments) "     psych problems     Family History  Family History   Problem Relation Age of Onset     Hypertension Father      Alcohol/Drug Father         various substances     Psychotic Disorder Father         bipolar, anxiety, ADHD     Social History   Social History     Tobacco Use     Smoking status: Current Every Day Smoker     Packs/day: 1.00     Types: Cigarettes     Smokeless tobacco: Former User     Types: Snuff, Chew     Tobacco comment: and e-cig,    Substance Use Topics     Alcohol use: Yes     Comment: drinks daily     Drug use: Yes     Comment: stopped marijuana      Past medical history, past surgical history, medications, allergies, family history, and social history were reviewed with the patient. No additional pertinent items.       Review of Systems  A complete review of systems was performed with pertinent positives and negatives noted in the HPI, and all other systems negative.    Physical Exam   BP: (!) 183/113  Pulse: 104  Temp: 99.2  F (37.3  C)  Resp: 18  SpO2: 96 %  Physical Exam  Vitals signs and nursing note reviewed.   Constitutional:       General: He is not in acute distress.     Appearance: He is not ill-appearing or toxic-appearing.   HENT:      Head: Normocephalic and atraumatic.      Right Ear: External ear normal.      Left Ear: External ear normal.      Nose: Nose normal.      Mouth/Throat:      Mouth: Mucous membranes are moist.   Eyes:      Conjunctiva/sclera: Conjunctivae normal.   Neck:      Musculoskeletal: Normal range of motion and neck supple. No muscular tenderness.   Cardiovascular:      Rate and Rhythm: Normal rate.   Pulmonary:      Effort: Pulmonary effort is normal.   Abdominal:      General: There is no distension.   Musculoskeletal:      Left ankle: He exhibits swelling. He exhibits no deformity, no laceration and normal pulse. Tenderness. Lateral malleolus, medial malleolus and AITFL tenderness found. No head of 5th metatarsal and no proximal fibula tenderness found.  Achilles tendon exhibits no pain, no defect and normal Suero's test results.   Skin:     General: Skin is warm and dry.      Capillary Refill: Capillary refill takes less than 2 seconds.   Neurological:      General: No focal deficit present.      Mental Status: He is alert and oriented to person, place, and time.   Psychiatric:         Speech: Speech normal.         Behavior: Behavior normal.         Cognition and Memory: Cognition is not impaired.       ED Course     ED Course as of Sep 06 1821   Sun Sep 06, 2020   0127                                                                 IMPRESSION: No fracture or dislocation. No distal left tibiofibular syndesmosis, ankle mortise and talar dome intact. Lateral process of the talus and anterior process calcaneus intact. Soft tissue swelling lateral aspect left ankle.   XR Ankle Left 3 Views   0132 Stirrup splint and crutches provided.            Results for orders placed or performed during the hospital encounter of 09/06/20   Alcohol breath test POCT     Status: Normal   Result Value Ref Range    Alcohol Breath Test 0.00 0.00 - 0.01     Medications - No data to display     Assessments & Plan (with Medical Decision Making)   This is a 24-year-old male presenting for evaluation of fall with acute left ankle injury with painful ambulation.  Differential diagnosis includes but is not limited to fracture/dislocation/strain/sprain.  History and physical exam are inconsistent with additional significant traumatic injury.  Diagnostic evaluation will include plain left ankle radiography.  Disposition pending results of diagnostic evaluation and response to therapeutic interventions.    I have reviewed the nursing notes. I have reviewed the findings, diagnosis, plan and need for follow up with the patient.    New Prescriptions    No medications on file     Final diagnosis: Acute left ankle sprain    Disposition: Discharged to home with ankle splint and crutches with  recommended close outpatient reevaluation   --  Ozzie Junior MD  [facility], [place], EMERGENCY DEPARTMENT  9/5/2020     Ozzie Junior MD  09/06/20 1821

## 2020-09-06 NOTE — DISCHARGE INSTRUCTIONS
Please make an appointment to follow up with Your Primary Care Provider in 7 days unless symptoms completely resolve.    No obvious broken bone by review of today's xrays.      Ankle splint and crutches until pain improves, limited weight bearing as tolerated.  Follow up with your primary doctor within 7-10 days if symptoms persist.

## 2020-09-06 NOTE — ED AVS SNAPSHOT
[facility], Emergency Department  [address]  [place] [address]  Phone:  [phone]  Fax:  [phone]                                    Laci Hinkle Jr   MRN: [id number]    Department:  [facility], Emergency Department   Date of Visit:  9/5/2020           After Visit Summary Signature Page    I have received my discharge instructions, and my questions have been answered. I have discussed any challenges I see with this plan with the nurse or doctor.    ..........................................................................................................................................  Patient/Patient Representative Signature      ..........................................................................................................................................  Patient Representative Print Name and Relationship to Patient    ..................................................               ................................................  Date                                   Time    ..........................................................................................................................................  Reviewed by Signature/Title    ...................................................              ..............................................  Date                                               Time          22EPIC Rev 08/18

## 2020-09-14 ENCOUNTER — TELEPHONE (OUTPATIENT)
Dept: PSYCHIATRY | Facility: CLINIC | Age: 25
End: 2020-09-14

## 2020-09-24 DIAGNOSIS — F31.61 BIPOLAR DISORDER, CURRENT EPISODE MIXED, MILD (H): ICD-10-CM

## 2020-09-25 RX ORDER — DIVALPROEX SODIUM 500 MG/1
2000 TABLET, EXTENDED RELEASE ORAL DAILY
Qty: 120 TABLET | Refills: 0 | Status: SHIPPED | OUTPATIENT
Start: 2020-09-25 | End: 2020-10-13

## 2020-09-25 NOTE — TELEPHONE ENCOUNTER
DEPAKOTE  MG   Last refilled: 8/27/20/  Qty: 120      Last seen: 7/7/20  RTC: 1 MOS  Cancel: 0  No-show: 0  Next appt: 10/13/20  Refill decision: Refilled for 30 days per protocol.  Will send FYI to provider as is outside RTC timeframe.

## 2020-09-25 NOTE — TELEPHONE ENCOUNTER
UPDATE: Unable to use past Progress Notes. Forms need to be completed. The original copies were put in Dr. Oconnor's folder for completion.Ladonna Sotelo LPN

## 2020-10-13 ENCOUNTER — VIRTUAL VISIT (OUTPATIENT)
Dept: PSYCHIATRY | Facility: CLINIC | Age: 25
End: 2020-10-13
Attending: PSYCHIATRY & NEUROLOGY
Payer: COMMERCIAL

## 2020-10-13 DIAGNOSIS — F51.01 PRIMARY INSOMNIA: ICD-10-CM

## 2020-10-13 DIAGNOSIS — F31.61 BIPOLAR DISORDER, CURRENT EPISODE MIXED, MILD (H): ICD-10-CM

## 2020-10-13 PROCEDURE — 99214 OFFICE O/P EST MOD 30 MIN: CPT | Mod: TEL | Performed by: PSYCHIATRY & NEUROLOGY

## 2020-10-13 RX ORDER — DIVALPROEX SODIUM 500 MG/1
2000 TABLET, EXTENDED RELEASE ORAL DAILY
Qty: 360 TABLET | Refills: 1 | Status: SHIPPED | OUTPATIENT
Start: 2020-10-13 | End: 2021-01-12

## 2020-10-13 RX ORDER — AMITRIPTYLINE HYDROCHLORIDE 10 MG/1
20 TABLET ORAL
Qty: 60 TABLET | Refills: 1 | Status: SHIPPED | OUTPATIENT
Start: 2020-10-13 | End: 2021-01-12

## 2020-10-13 NOTE — PATIENT INSTRUCTIONS
Treatment Plan Today:     1) Medications-you may start amitriptyline 20 mg at bedtime as needed for sleep.  Otherwise continue your usual medications.    2) I encourage you to follow through on residential CD treatment and finding a prescriber for Suboxone    3) please get a blood test to check your Depakote level.  Please return for follow-up in 3 months    ------------------------------------------------------------------------    Thank you for coming to the [facility]    Lab Testing:  If you had lab testing today and your results are reassuring or normal they will be mailed to you or sent through [employer] within 7 days. If the lab tests need quick action we will call you with the results. The phone number we will call with results is # [phone] (home) . If this is not the best number please call our clinic and change the number.    Medication Refills:  If you need any refills please call your pharmacy and they will contact us. Our fax number for refills is [phone]. Please allow three business for refill processing. If you need to  your refill at a new pharmacy, please contact the new pharmacy directly. The new pharmacy will help you get your medications transferred.     Scheduling:  If you have any concerns about today's visit or wish to schedule another appointment please call our office during normal business hours [phone] (8-5:00 M-F)    Contact Us:  Please call [phone] during business hours (8-5:00 M-F).  If after clinic hours, or on the weekend, please call  [phone].    Financial Assistance [phone]  [facility] Billing [phone]  Central Billing Office, [facility]: [phone]  [facility] Billing [phone]  Medical Records [phone]      MENTAL HEALTH CRISIS NUMBERS:  For a medical emergency please call  911 or go to the nearest ER.     [facility]:   [facility] -[phone]   Crisis Residence [facility] -[phone]    Walk-In Counseling Center [facility] -[phone]   COPE 24/7 [facility] Team -[phone] (adults)/[phone] (child)  CHILD: [facility] needs assessment team - [phone]      [facility]:   [facility] - [phone]   Walk-in counseling [facility] - [phone]   Walk-in counseling [facility] - [phone]   Crisis Residence [facility] Residence - [phone]  Urgent Care Adult Mental [facility]-[phone] mobile unit/ 24/7 crisis line    National Crisis Numbers:   National Suicide Prevention Lifeline: 0-776-455-TALK (335-324-1665)  Poison Control Center - 1-278.357.6442  [employer]/resources for a list of additional resources (SOS)  Trans [employer] a hotline for transgender people 1-175.652.4867  The Esa Project a hotline for LGBT youth 1-227.711.3153  Crisis Text Line: For any crisis 24/7   To: 788357  see www.crisistextline.org  - IF MAKING A CALL FEELS TOO HARD, send a text!         Again thank you for choosing [facility] and please let us know how we can best partner with you to improve you and your family's health.    You may be receiving a survey regarding this appointment. We would love to have your feedback, both positive and negative. The survey is done by an external company, so your answers are anonymous.

## 2020-10-25 DIAGNOSIS — F31.61 BIPOLAR DISORDER, CURRENT EPISODE MIXED, MILD (H): ICD-10-CM

## 2020-10-25 DIAGNOSIS — F31.10 BIPOLAR I DISORDER, MOST RECENT EPISODE (OR CURRENT) MANIC (H): ICD-10-CM

## 2020-10-27 RX ORDER — DIVALPROEX SODIUM 500 MG/1
2000 TABLET, EXTENDED RELEASE ORAL DAILY
Qty: 360 TABLET | Refills: 1 | OUTPATIENT
Start: 2020-10-27

## 2020-10-27 RX ORDER — GABAPENTIN 100 MG/1
200 CAPSULE ORAL DAILY
Qty: 60 CAPSULE | Refills: 2 | Status: SHIPPED | OUTPATIENT
Start: 2020-10-27 | End: 2021-01-20

## 2020-10-27 NOTE — TELEPHONE ENCOUNTER
Medication requested: gabapentin (NEURONTIN) 100 MG capsule   Last refilled: 6/29/20  Qty: 60/0  divalproex sodium extended-release (DEPAKOTE ER) 500 MG 24 hr tablet : Take 4 tablets (2,000 mg) by mouth daily - Oral  Last refilled: 10/13/20  Qty: 360/1  [id number] [address]    Last seen: 10/13/20  RTC: 3 months  Cancel: 0  No-show: 0  Next appt: 1/12/2021    Refill decision:   Denied divalproex as duplicate request. Refill pended and routed to the provider for review/determination due to    .Gabapentin > controlled medication

## 2020-10-30 NOTE — TELEPHONE ENCOUNTER
On 10/20/2020, completed, signed forms were returned to this writer and faxed to R attn: Velma Hickey at [phone]. The original copies were sent to scanning.Ladonna Sotelo LPN

## 2020-11-10 ENCOUNTER — HOSPITAL ENCOUNTER (EMERGENCY)
Facility: CLINIC | Age: 25
Discharge: HOME | End: 2020-11-11
Attending: EMERGENCY MEDICINE | Admitting: EMERGENCY MEDICINE
Payer: COMMERCIAL

## 2020-11-10 ENCOUNTER — APPOINTMENT (OUTPATIENT)
Dept: CT IMAGING | Facility: CLINIC | Age: 25
End: 2020-11-10
Attending: EMERGENCY MEDICINE
Payer: COMMERCIAL

## 2020-11-10 DIAGNOSIS — R51.9 ACUTE INTRACTABLE HEADACHE, UNSPECIFIED HEADACHE TYPE: ICD-10-CM

## 2020-11-10 DIAGNOSIS — K59.00 CONSTIPATION, UNSPECIFIED CONSTIPATION TYPE: ICD-10-CM

## 2020-11-10 DIAGNOSIS — E16.2 HYPOGLYCEMIA: ICD-10-CM

## 2020-11-10 LAB
ALBUMIN SERPL-MCNC: 4.6 G/DL (ref 3.4–5)
ALP SERPL-CCNC: 101 U/L (ref 40–150)
ALT SERPL W P-5'-P-CCNC: 43 U/L (ref 0–70)
ANION GAP SERPL CALCULATED.3IONS-SCNC: 8 MMOL/L (ref 3–14)
AST SERPL W P-5'-P-CCNC: 26 U/L (ref 0–45)
BASOPHILS # BLD AUTO: 0.1 10E9/L (ref 0–0.2)
BASOPHILS NFR BLD AUTO: 0.5 %
BILIRUB SERPL-MCNC: 0.3 MG/DL (ref 0.2–1.3)
BUN SERPL-MCNC: 10 MG/DL (ref 7–30)
CALCIUM SERPL-MCNC: 10.1 MG/DL (ref 8.5–10.1)
CHLORIDE SERPL-SCNC: 104 MMOL/L (ref 94–109)
CO2 SERPL-SCNC: 24 MMOL/L (ref 20–32)
CREAT SERPL-MCNC: 0.75 MG/DL (ref 0.66–1.25)
DIFFERENTIAL METHOD BLD: ABNORMAL
EOSINOPHIL # BLD AUTO: 0.5 10E9/L (ref 0–0.7)
EOSINOPHIL NFR BLD AUTO: 3.5 %
ERYTHROCYTE [DISTWIDTH] IN BLOOD BY AUTOMATED COUNT: 12.6 % (ref 10–15)
GFR SERPL CREATININE-BSD FRML MDRD: >90 ML/MIN/{1.73_M2}
GLUCOSE BLDC GLUCOMTR-MCNC: 107 MG/DL (ref 70–99)
GLUCOSE BLDC GLUCOMTR-MCNC: 90 MG/DL (ref 70–99)
GLUCOSE SERPL-MCNC: 107 MG/DL (ref 70–99)
HCT VFR BLD AUTO: 47.9 % (ref 40–53)
HGB BLD-MCNC: 16.7 G/DL (ref 13.3–17.7)
IMM GRANULOCYTES # BLD: 0.1 10E9/L (ref 0–0.4)
IMM GRANULOCYTES NFR BLD: 0.5 %
INTERPRETATION ECG - MUSE: NORMAL
LIPASE SERPL-CCNC: 134 U/L (ref 73–393)
LYMPHOCYTES # BLD AUTO: 2.3 10E9/L (ref 0.8–5.3)
LYMPHOCYTES NFR BLD AUTO: 18 %
MAGNESIUM SERPL-MCNC: 2.2 MG/DL (ref 1.6–2.3)
MCH RBC QN AUTO: 31.5 PG (ref 26.5–33)
MCHC RBC AUTO-ENTMCNC: 34.9 G/DL (ref 31.5–36.5)
MCV RBC AUTO: 90 FL (ref 78–100)
MONOCYTES # BLD AUTO: 1.2 10E9/L (ref 0–1.3)
MONOCYTES NFR BLD AUTO: 9.5 %
NEUTROPHILS # BLD AUTO: 8.8 10E9/L (ref 1.6–8.3)
NEUTROPHILS NFR BLD AUTO: 68 %
NRBC # BLD AUTO: 0 10*3/UL
NRBC BLD AUTO-RTO: 0 /100
PLATELET # BLD AUTO: 354 10E9/L (ref 150–450)
POTASSIUM SERPL-SCNC: 4 MMOL/L (ref 3.4–5.3)
PROT SERPL-MCNC: 9.3 G/DL (ref 6.8–8.8)
RBC # BLD AUTO: 5.31 10E12/L (ref 4.4–5.9)
SODIUM SERPL-SCNC: 136 MMOL/L (ref 133–144)
TROPONIN I SERPL-MCNC: <0.015 UG/L (ref 0–0.04)
WBC # BLD AUTO: 12.9 10E9/L (ref 4–11)

## 2020-11-10 PROCEDURE — 99285 EMERGENCY DEPT VISIT HI MDM: CPT | Mod: 25

## 2020-11-10 PROCEDURE — 258N000003 HC RX IP 258 OP 636: Performed by: EMERGENCY MEDICINE

## 2020-11-10 PROCEDURE — 93005 ELECTROCARDIOGRAM TRACING: CPT

## 2020-11-10 PROCEDURE — 80053 COMPREHEN METABOLIC PANEL: CPT | Performed by: EMERGENCY MEDICINE

## 2020-11-10 PROCEDURE — 96361 HYDRATE IV INFUSION ADD-ON: CPT

## 2020-11-10 PROCEDURE — 83735 ASSAY OF MAGNESIUM: CPT | Performed by: EMERGENCY MEDICINE

## 2020-11-10 PROCEDURE — 250N000011 HC RX IP 250 OP 636: Performed by: EMERGENCY MEDICINE

## 2020-11-10 PROCEDURE — 83690 ASSAY OF LIPASE: CPT | Performed by: EMERGENCY MEDICINE

## 2020-11-10 PROCEDURE — 85025 COMPLETE CBC W/AUTO DIFF WBC: CPT | Performed by: EMERGENCY MEDICINE

## 2020-11-10 PROCEDURE — 96374 THER/PROPH/DIAG INJ IV PUSH: CPT

## 2020-11-10 PROCEDURE — 96375 TX/PRO/DX INJ NEW DRUG ADDON: CPT

## 2020-11-10 PROCEDURE — 84484 ASSAY OF TROPONIN QUANT: CPT | Performed by: EMERGENCY MEDICINE

## 2020-11-10 PROCEDURE — 70450 CT HEAD/BRAIN W/O DYE: CPT

## 2020-11-10 PROCEDURE — 999N001017 HC STATISTIC GLUCOSE BY METER IP

## 2020-11-10 RX ORDER — METOCLOPRAMIDE HYDROCHLORIDE 5 MG/ML
10 INJECTION INTRAMUSCULAR; INTRAVENOUS ONCE
Status: COMPLETED | OUTPATIENT
Start: 2020-11-10 | End: 2020-11-10

## 2020-11-10 RX ORDER — DIPHENHYDRAMINE HYDROCHLORIDE 50 MG/ML
25 INJECTION INTRAMUSCULAR; INTRAVENOUS ONCE
Status: COMPLETED | OUTPATIENT
Start: 2020-11-10 | End: 2020-11-10

## 2020-11-10 RX ORDER — KETOROLAC TROMETHAMINE 30 MG/ML
30 INJECTION, SOLUTION INTRAMUSCULAR; INTRAVENOUS ONCE
Status: DISCONTINUED | OUTPATIENT
Start: 2020-11-10 | End: 2020-11-11 | Stop reason: HOSPADM

## 2020-11-10 RX ADMIN — DIPHENHYDRAMINE HYDROCHLORIDE 25 MG: 50 INJECTION, SOLUTION INTRAMUSCULAR; INTRAVENOUS at 22:42

## 2020-11-10 RX ADMIN — METOCLOPRAMIDE 10 MG: 5 INJECTION, SOLUTION INTRAMUSCULAR; INTRAVENOUS at 22:42

## 2020-11-10 RX ADMIN — SODIUM CHLORIDE 1000 ML: 9 INJECTION, SOLUTION INTRAVENOUS at 22:42

## 2020-11-10 ASSESSMENT — ENCOUNTER SYMPTOMS
NERVOUS/ANXIOUS: 1
VOMITING: 1
HEADACHES: 1
NAUSEA: 1
CONSTIPATION: 1
CHILLS: 1

## 2020-11-10 ASSESSMENT — MIFFLIN-ST. JEOR: SCORE: 2041.33

## 2020-11-10 NOTE — ED AVS SNAPSHOT
[facility] Emergency Dept  [address] [address]  Phone: [phone]  Fax: [phone]                                    Laci Hinkle Jr   MRN: [id number]    Department: [facility] Emergency [facility]   Date of Visit: 11/10/2020           After Visit Summary Signature Page    I have received my discharge instructions, and my questions have been answered. I have discussed any challenges I see with this plan with the nurse or doctor.    ..........................................................................................................................................  Patient/Patient Representative Signature      ..........................................................................................................................................  Patient Representative Print Name and Relationship to Patient    ..................................................               ................................................  Date                                   Time    ..........................................................................................................................................  Reviewed by Signature/Title    ...................................................              ..............................................  Date                                               Time          22EPIC Rev 08/18

## 2020-11-11 VITALS
OXYGEN SATURATION: 100 % | BODY MASS INDEX: 34.8 KG/M2 | TEMPERATURE: 98.1 F | HEART RATE: 64 BPM | DIASTOLIC BLOOD PRESSURE: 98 MMHG | SYSTOLIC BLOOD PRESSURE: 156 MMHG | RESPIRATION RATE: 18 BRPM | HEIGHT: 69 IN | WEIGHT: 235 LBS

## 2020-11-11 LAB — GLUCOSE BLDC GLUCOMTR-MCNC: 94 MG/DL (ref 70–99)

## 2020-11-11 PROCEDURE — 999N001017 HC STATISTIC GLUCOSE BY METER IP

## 2020-11-11 RX ORDER — METOCLOPRAMIDE 10 MG/1
10 TABLET ORAL 4 TIMES DAILY PRN
Qty: 6 TABLET | Refills: 0 | Status: SHIPPED | OUTPATIENT
Start: 2020-11-11 | End: 2021-12-09

## 2020-11-11 NOTE — ED PROVIDER NOTES
"  History     Chief Complaint:  Hypoglycemia     HPI   Laci Hinkle Jr is a 24 year old male with a history of ADHD, bipolar disorder, anxiety, depression, substance abuse, withdrawal and PTSD who presents via EMS from [facility] with hypoglycemia. Patient reports that approximately 4 hours ago he began developing a headache followed by chills, \"feeling hot,\" nausea and one episode of vomiting. He took his night meds as well as ibuprofen, but his symptoms continued to worsen so he decided to call EMS. He was found to have a blood sugar of 25 and was given oral D50 en route. Patient states that he has never had issues with his blood sugar before. He ate twice today, last had half of an enchilada at 5 pm. He continues to have a mild headache and nausea but overall feels improved. He has had a couple of migraines in the past and states this feels similar. He is on baseline Suboxone and last used alcohol, Adderall and marijuana on Friday. No heroin use. He further endorses not having a bowel movement in 5 days. No known COVID exposures.     Allergies:  Prednisone      Medications:    Depakote   Lamictal   Revia   Elavil   Gabapentin   Suboxone   Zyprexa     Past Medical History:    ADHD (attention deficit hyperactivity disorder)   Asthma   Bipolar affective disorder    Depression   CEASAR (generalized anxiety disorder)   GERD (gastroesophageal reflux disease)   Hyperlipidemia   Hypertension   Impaired fasting glucose   Substance abuse   PTSD     Past Surgical History:    Mandible ORIF     Family History:    Father: hypertension, alcohol/drug use, bipolar disorder, anxiety, ADHD, type II diabetes     Social History:  Smoking Status: Current smoker   Smokeless Tobacco: Former user   Alcohol Use: Positive  Drug Use: Positive, marijuana, hx of substance abuse including opioids and benzodiazepines   PCP: [facility]  Marital Status: Single      Review of Systems   Constitutional: Positive for " "chills.   Gastrointestinal: Positive for constipation, nausea and vomiting.   Neurological: Positive for headaches.   Psychiatric/Behavioral: The patient is nervous/anxious.    All other systems reviewed and are negative.        Physical Exam     Patient Vitals for the past 24 hrs:   BP Temp Temp src Pulse Resp SpO2 Height Weight   11/11/20 0056 (!) 156/98 -- -- 64 18 100 % -- --   11/11/20 0004 (!) 161/98 -- -- -- -- -- -- --   11/10/20 2236 (!) 181/114 98.1  F (36.7  C) Oral 89 24 99 % 1.753 m (5' 9\") 106.6 kg (235 lb)     Physical Exam  General: Patient is alert and normal appearing.  HEENT: Head atraumatic    Eyes: pupils equal and reactive. Conjunctiva clear   Nares: patent   Oropharynx: no lesions, uvula midline, no palatal draping, normal voice, no trismus  Neck: Supple without lymphadenopathy, no meningismus  Chest: Heart regular rate and rhythm.   Lungs: Equal clear to auscultation with no wheeze or rales  Abdomen: Soft, non tender, nondistended, normal bowel sounds  Back: No costovertebral angle tenderness, no midline C, T or L spine tenderness  Neuro: Grossly nonfocal, normal speech, strength equal bilaterally, CN 2-12 intact  Extremities: No deformities, equal radial and DP pulses. No clubbing, cyanosis.  No edema  Skin: Warm and dry with no rash.       Emergency Department Course     ECG:  ECG taken at 2256, ECG read at 2306  Sinus bradycardia   Otherwise normal ECG  No significant change compared to EKG dated 4/8/2015.   Rate 48 bpm. CO interval 134 ms. QRS duration 112 ms. QT/QTc 472/421 ms. P-R-T axes 11 41 32.    Imaging:  Radiology findings were communicated with the patient who voiced understanding of the findings.    CT Head w/o Contrast  1.  No evidence of acute hemorrhage or other acute intracranial abnormality.  Reading per radiology     Laboratory:  Laboratory findings were communicated with the patient who voiced understanding of the findings.    CBC: WBC 12.9(H), HGB 16.7,    CMP: " Glucose 107(H), Protein total 9.3(H) o/w WNL (Creatinine 0.75)  Lipase: 134  Troponin (Collected 2238): <0.015    Magnesium: 2.2     (2246) Glucose by meter: 90   (2336) Glucose by meter: 107(H)   (0108) Glucose by meter: 94     Interventions:  2242 NS 1,000 mL IV   2242 Reglan 10 mg IV   2242 Benadryl 25 mg IV     Emergency Department Course:    2228 Nursing notes and vitals reviewed.  I performed an exam of the patient as documented above.     2238 IV was inserted and blood was drawn for laboratory testing, results above.    2256 EKG taken as documented above.     2304 The patient was sent for a CT while in the emergency department, results above.     0047 Rechecked and updated. His headache has resolved.     0125 Findings and plan explained to the patient. Patient discharged home with instructions regarding supportive care, medications, and reasons to return. The importance of close follow-up was reviewed. The patient was prescribed magnesium citrate and Reglan.     Impression & Plan      Medical Decision Making:  Laci Hinkle Jr is a 24 year old male who presents to the emergency department today for evaluation of headache, nausea, diaphoresis and constipation.  Patient has a history of chronic alcohol abuse as well as opioid abuse who is now on Suboxone and presented to [facility] for treatment in the last day who had sudden onset of symptoms tonight and was found to have a blood sugar of 24 at the [facility].  He took oral glucose and has had stable hemoglobin since that time.  He has no history of diabetes and does not use any oral hypoglycemics.  He has a benign abdominal examination here in the emergency department I do not feel he needs advanced imaging.  Do not suspect intra-abdominal catastrophe.  Magnesium citrate was provided for constipation at discharge.  Given his headache of which he states is similar to previous migraine headaches but his significantly elevated blood pressure did  perform head CT scan which was negative for acute intracranial hemorrhage.  Do not suspect meningitis or subarachnoid hemorrhage and would not do LP at this time.  He felt improved and his headache resolved with the treatments provided here in the emergency department.  Patient did not want to eat due to his level of constipation but denies any nausea or vomiting.  He has continued benign abdominal examination on recheck.  He has been observed for over 3 hours with stable blood sugar at this time.  Suspect it was likely due to poor p.o. intake but he did have significant symptoms with that and I discussed with him if he has any further symptoms similar to this he should see the nurse again at [facility] for recheck of his blood sugar.  Work-up in the emergency department was otherwise unremarkable.  Is with reasonable clinical certainty that I feel he safe for discharge at this time.  Encourage patient to eat regularly.  He ultimately did end up eating and drinking juice while here in the emergency department.  He was given a prescription for Reglan for further headache.  He was comfortable with plan for discharge back to [facility] and return precautions the emergency department reviewed    Diagnosis:    ICD-10-CM    1. Acute intractable headache, unspecified headache type  R51.9 Glucose by meter     Glucose by meter   2. Hypoglycemia  E16.2    3. Constipation, unspecified constipation type  K59.00      Disposition:   Discharged to home.      Discharge Medications:  New Prescriptions    MAGNESIUM CITRATE SOLUTION    Take 296 mLs by mouth once for 1 dose    METOCLOPRAMIDE (REGLAN) 10 MG TABLET    Take 1 tablet (10 mg) by mouth 4 times daily as needed (headache)     Scribe Disclosure:  I, Lyn Yu, am serving as a scribe at 10:29 PM on 11/10/2020 to document services personally performed by Jaz Moncada MD based on my observations and the provider's statements to me.      [facility]  [facility] EMERGENCY DEPT       Jaz Moncada MD  11/11/20 0147

## 2020-11-11 NOTE — ED TRIAGE NOTES
Pt arrives via EMS for eval of hypoglycemia. EMS was called for an initial complaint of nausea and vomiting. EMS found blood sugar of 25. Oral D50 given en route

## 2020-11-11 NOTE — ED NOTES
"Bed: ED11  Expected date:   Expected time:   Means of arrival:   Comments:  411  24M Hypoglycemia/Nausea  \"No covid\"  1155  "

## 2020-11-18 ENCOUNTER — TELEPHONE (OUTPATIENT)
Dept: PSYCHIATRY | Facility: CLINIC | Age: 25
End: 2020-11-18

## 2020-11-18 NOTE — TELEPHONE ENCOUNTER
"[facility] Call Center    Phone Message    May a detailed message be left on voicemail: yes     Reason for Call: Other:   Patient called to inform Dr. Oconnor that he is \"finally\" getting his depakote level drawn today. He is inpatient at [facility] in [facility], patient was unsure of location, and said he is being brought to the lab for depakote levels and lipid panels. Patient signed an WU for [facility] and wants to be sure we receive the results.    Patient does not have access to his phone other than a couple times a week but he has requested a phone call with detailed voicemail if the results are received or if there is an issue receiving the results.      Action Taken: Message routed to:  Other: Nurse pool    Travel Screening: Not Applicable                                                                      "

## 2020-11-19 ENCOUNTER — TELEPHONE (OUTPATIENT)
Dept: PSYCHIATRY | Facility: CLINIC | Age: 25
End: 2020-11-19

## 2020-11-19 NOTE — TELEPHONE ENCOUNTER
On 11/11/2020 the patient signed an WU authorizing medical records to be exchanged between [facility] and [facility] for the purpose of continuing care. This was sent to scanning on November 19, 2020 and held a copy in Psychiatry until scanning is confirmed. Afsaneh Short CMA

## 2020-11-19 NOTE — TELEPHONE ENCOUNTER
Writer received faxed WU for Medication list to be sent. Writer faxed 4 pages to [facility] attn: RN Staff at [phone]. Writer called nurse line [phone] to alert them that fax was sent. The WU was sent to scanning.Ladonna Sotelo LPN

## 2020-11-19 NOTE — TELEPHONE ENCOUNTER
[facility] Call Center    Phone Message    May a detailed message be left on voicemail: yes     Reason for Call: Other: Care Coordination      RN from [facility] is calling to obtain patient's current med list for continuity care.  They will be faxing an WU.  RN can be reached at [phone].  Med list can be faxed to [phone]    Action Taken: Message routed to:  Other: Psychiatry Nurse Pool    Travel Screening: Not Applicable

## 2020-11-25 ENCOUNTER — TELEPHONE (OUTPATIENT)
Dept: PSYCHIATRY | Facility: CLINIC | Age: 25
End: 2020-11-25

## 2020-11-25 NOTE — TELEPHONE ENCOUNTER
----- Message from Emily Barron sent at 2020  3:19 PM CST -----  Regarding: Needs A Referral from Dr. Oconnor  Contact: [phone]  Caller/relationship: Laci  Patient/: [id number]  Diagnosis/concern: CDE  Desired treatment: Medication Management/patient is staying at [facility] just ask for him at the number listed.  Okay to leave detailed message: Yes    Thanks!          =========================================      Writer called pt to explore his concerns.    Pt reported that he has been inpatient at [facility] in [place] for somewhat less than a month and will be discharging on Monday, Tuesday, or Wednesday of next week (-).  Pt will be connecting with [facility].    Pt identified needing a referral to an addiction provider.  Pt will be provided with 10 doses of medication plus another 30 days on order at pharmacy.  Pt reports that he is currently on Suboxone, Baclofen, and his Lamictal is increasing to 150 mg, with plans to increase further in future.    Pt identified that he is no longer having cravings to use, has support plan, and is involved with AA.  Pt identified that managing his alcohol cravings is key, as if he drinks, he will do speed.    Writer requested that facility fax med list to clinic at discharge, provided fax number.    Pt scheduled with Dr. Oconnor , but inquires on earlier appointment.    Routed to provider.

## 2020-12-07 ENCOUNTER — TELEPHONE (OUTPATIENT)
Dept: PSYCHIATRY | Facility: CLINIC | Age: 25
End: 2020-12-07

## 2020-12-07 NOTE — TELEPHONE ENCOUNTER
"Writer called pt to explore his concerns.    Pt identified that he prefers to work with our clinic's addiction providers, but if he can't get scheduled within 30 days or less (when he'll run out of Suboxone), then he will connect with [facility] providers.    Nurse Akua Heath is a contact for [facility] addiction providers: [phone].    Pt identified wishing to have an adjustment in his olanzapine. He is currently taking 10 mg at bedtime and 5 mg PRN (also at bedtime). He feels this is too sedating and wants to have the dose reduced to 2.5 mg.  Pt also identified that he increased lamotrigine to 150 mg and stated that he \"feels good,\" although he also identified concerns that he might be somewhat manic. Pt presented with somewhat loud speech, slightly more rapid than previously, although he was also on his way to a therapy appointment.            "

## 2020-12-07 NOTE — TELEPHONE ENCOUNTER
[facility] Call Center    Phone Message    May a detailed message be left on voicemail: yes     Reason for Call: Other: Pt has referral for addiction specialist      Suboxone 2MG  Baclofen 15 MG 2x daily  Antabuse 250 MG   Zyprexa 5 MG - PRN medication   lamictal 150MG     Pt would like to talk to Dr. Oconnor - regarding medication list and dosages. Pt depakote levels are great - per other psychiatrist.     Action Taken: Other: Sent to [facility]    Travel Screening: Not Applicable

## 2020-12-08 ENCOUNTER — TELEPHONE (OUTPATIENT)
Dept: PSYCHIATRY | Facility: CLINIC | Age: 25
End: 2020-12-08

## 2020-12-08 NOTE — TELEPHONE ENCOUNTER
"Writer received call from pt, who stated that yesterday he was \"pretty manic\" after miscommunication with sister and stimulation from [employer], so he took an extra 5 mg olanzapine last night and this was helpful in decreasing his symptoms.    Pt has been taking 10 mg olanzapine HS, wants to increase to 15 mg.  Now thinks can handle the 5 mg PRN olanzapine.    Scheduled for CDE on 12/9.    Routed to Dr. Oconnor.          "

## 2020-12-08 NOTE — TELEPHONE ENCOUNTER
[facility] Call Center    Phone Message    May a detailed message be left on voicemail: yes     Reason for Call: Other: Pt requested a call from Bo to discuss increasing the dose of one of his medications, as well as coordinating with [facility] in regards to his addiction medicine referral.     Action Taken: Message routed to:  Other: [facility] Delatorre RNCC    Travel Screening: Negative
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
## 2020-12-08 NOTE — TELEPHONE ENCOUNTER
Paola Vicente, Bo BIRD RN    Cc: Leann Armijo MD   Caller: Unspecified (1 week ago)             Katerina Soriano,     Right now the soonest available CDE slots are on 1/6 with the fellows.     Please keep up posted as to whether or not he wants to wait til then, and if he does we can give him a call and get something scheduled     Paola        Writer called [facility], who identified that [facility] don't see [facility]ts after discharge unless in their private practice. [facility] is working on a referral to Suboxone provider outside of [place], but there is no known availability date yet. Given pt's preference to continue with our clinic, [facility] will check with their provider about an additional refill of Suboxone so that pt can be bridged until he is seen for a CDE appointment.  Writer requested they send a med list to us.  Routed message to Paola requesting pt be contacted for appointment.  Routed message to Dr. Oconnor regarding an appointment for pt.

## 2020-12-09 ENCOUNTER — VIRTUAL VISIT (OUTPATIENT)
Dept: PSYCHIATRY | Facility: CLINIC | Age: 25
End: 2020-12-09
Attending: PSYCHIATRY & NEUROLOGY
Payer: COMMERCIAL

## 2020-12-09 ENCOUNTER — TELEPHONE (OUTPATIENT)
Dept: PSYCHIATRY | Facility: CLINIC | Age: 25
End: 2020-12-09

## 2020-12-09 ENCOUNTER — TELEPHONE (OUTPATIENT)
Facility: CLINIC | Age: 25
End: 2020-12-09

## 2020-12-09 DIAGNOSIS — F31.9 BIPOLAR 1 DISORDER (H): ICD-10-CM

## 2020-12-09 DIAGNOSIS — F10.20 ALCOHOL USE DISORDER, SEVERE, DEPENDENCE (H): ICD-10-CM

## 2020-12-09 DIAGNOSIS — F11.20 OPIOID USE DISORDER, SEVERE, DEPENDENCE (H): Primary | ICD-10-CM

## 2020-12-09 DIAGNOSIS — F15.90 STIMULANT USE DISORDER: ICD-10-CM

## 2020-12-09 PROCEDURE — 90792 PSYCH DIAG EVAL W/MED SRVCS: CPT | Mod: GC | Performed by: STUDENT IN AN ORGANIZED HEALTH CARE EDUCATION/TRAINING PROGRAM

## 2020-12-09 RX ORDER — BACLOFEN 10 MG/1
10 TABLET ORAL 2 TIMES DAILY
Qty: 60 TABLET | Refills: 0 | Status: CANCELLED | OUTPATIENT
Start: 2020-12-09

## 2020-12-09 RX ORDER — BACLOFEN 5 MG/1
5 TABLET ORAL 2 TIMES DAILY
Qty: 60 TABLET | Refills: 0 | Status: CANCELLED | OUTPATIENT
Start: 2020-12-09

## 2020-12-09 RX ORDER — DISULFIRAM 250 MG/1
250 TABLET ORAL DAILY
Qty: 30 TABLET | Refills: 0 | Status: CANCELLED | OUTPATIENT
Start: 2020-12-09

## 2020-12-09 ASSESSMENT — PAIN SCALES - GENERAL: PAINLEVEL: NO PAIN (0)

## 2020-12-09 NOTE — PATIENT INSTRUCTIONS
It was a pleasure to meet with you today    We will talk again in 1 week. This will give you more time to think about continuing Suboxone. If you decide to continue Suboxone, we ask that you remain abstinent from other substances    Thank you for coming to the [facility] [place] [facility].    Lab Testing:  If you had lab testing today and your results are reassuring or normal they will be mailed to you or sent through [email or web address] within 7 days. If the lab tests need quick action we will call you with the results. The phone number we will call with results is # [phone] (home) . If this is not the best number please call our clinic and change the number.    Medication Refills:  If you need any refills please call your pharmacy and they will contact us. Our fax number for refills is [phone]. Please allow three business for refill processing. If you need to  your refill at a new pharmacy, please contact the new pharmacy directly. The new pharmacy will help you get your medications transferred.     Scheduling:  If you have any concerns about today's visit or wish to schedule another appointment please call our office during normal business hours [phone] (8-5:00 M-F)    Contact Us:  Please call [phone] during business hours (8-5:00 M-F).  If after clinic hours, or on the weekend, please call  [phone].    Financial Assistance [phone]  [facility] Billing [phone]  Central Billing Office, [facility]: [phone]  [facility] Billing [phone]  Medical Records [phone]  [facility] Patient Bill of Rights [email or web address]       MENTAL HEALTH CRISIS NUMBERS:  For a medical emergency please call  911 or go to the nearest ER.     [facility]:   [facility] -[phone]   Crisis Residence [facility] -[phone]   Walk-In Counseling Center [facility]  -[phone]   COPE 24/7 [facility] Mobile Team -[phone] (adults)/[phone] (child)  CHILD: [facility] needs assessment team - [phone]      [facility]:   [facility] - [phone]   Walk-in counseling [facility] - [phone]   Walk-in counseling [facility] - [phone]   Crisis Residence [facility] - [phone]  Urgent Care Adult Mental Wgjmdj-[phone] mobile unit/ 24/7 crisis line    National Crisis Numbers:   National Suicide Prevention Lifeline: 2-739-693-TALK (960-408-1867)  Poison Control Center - 1-778.552.8907  [place]/resources for a list of additional resources (SOS)  Trans Lifeline a hotline for transgender people 0-124-184-0750  The [employer] Project a hotline for LGBT youth 1-619.683.4543  Crisis Text Line: For any crisis 24/7   To: 098003  see www.crisistextline.org  - IF MAKING A CALL FEELS TOO HARD, send a text!         Again thank you for choosing [facility] and please let us know how we can best partner with you to improve you and your family's health.    You may be receiving a survey regarding this appointment. We would love to have your feedback, both positive and negative. The survey is done by an external company, so your answers are anonymous.

## 2020-12-09 NOTE — TELEPHONE ENCOUNTER
[facility] Call Center    Phone Message    May a detailed message be left on voicemail: yes     Reason for Call: Medication Question or concern regarding medication   Prescription Clarification  Name of Medication: Antabuse and balcofen  Prescribing Provider: bebe     What on the order needs clarification?   Pt called regarding previous discussions with RNHEIKE Soriano, and wants to know if Dr Oconnor would be able to continue prescribing Antabuse and balcofen for him          Action Taken: Message routed to:  Other: nursing pool    Travel Screening: Not Applicable

## 2020-12-09 NOTE — TELEPHONE ENCOUNTER
Bo Oconnor MD Snyder, David J, RN   Caller: Unspecified (2 days ago,  1:47 PM)             Alvin Padillae,    Thanks for the message.  I really do not have any availability to see Laci before his scheduled appointment in January.  I tried to send him a [employer] message, but it looks like he is not set up with [employer].    From my perspective, he can take olanzapine either 12.5 mg or 15 mg at bedtime.  Perhaps he can do essentially what he is doing, which is to have 10 mg as the core dose, and then take either 2.5 mg or 5 mg in addition, as he feels necessary.    I do not have any problem with him increasing his lamotrigine to 150 mg.  It can rarely contribute to manic symptoms, and so if he notices a pattern where those are increased, he could consider reducing the dose back to the previous dose..  However, I think this will be unlikely.     DB

## 2020-12-09 NOTE — PROGRESS NOTES
"VIDEO VISIT  Laci Hinkle Jr is a 25 year old patient who is being evaluated via a billable video visit.      The patient has been notified of following:   \"This video visit will be conducted via a call between you and your physician/provider. We have found that certain health care needs can be provided without the need for an in-person physical exam. This service lets us provide the care you need with a video conversation. If a prescription is necessary we can send it directly to your pharmacy. If lab work is needed we can place an order for that and you can then stop by our lab to have the test done at a later time. Insurers are generally covering virtual visits as they would in-office visits so billing should not be different than normal.  If for some reason you do get billed incorrectly, you should contact the billing office to correct it and that number is in the AVS .    Video Conference to be completed via:  [email or web address]    Patient has given verbal consent for video visit?:  Yes    Patient would prefer that any video invitations be sent by: Send to e-mail at: [email or web address]      How would patient like to obtain AVS?:  Mail a copy    AVS SmartPhrase [PsychAVS] has been placed in 'Patient Instructions':  Yes    "

## 2020-12-10 NOTE — TELEPHONE ENCOUNTER
Sean Soliz MD Snyder, David J, MONIQUE; Bo Oconnor MD   Caller: Unspecified (Yesterday,  3:36 PM)             Hi Dr. Oconnor -     I'll just give you a quick update because I haven't finished my note yet.     He is currently taking Suboxone for opioid use disorder, and baclofen and Antabuse for alcohol use disorder. We will only continue prescribing Suboxone if he agrees to remain abstinent from other substances. He is pretty sure he wants to continue using Salvia and marijuana, which would prevent him from getting Suboxone. We were going to give him time to think about this and meet with him again in 1 week. I think the addiction clinic could continue prescribing the antabuse and baclofen if he would like, though I am not certain about this.     -Sean PEPPER.

## 2020-12-11 ENCOUNTER — TELEPHONE (OUTPATIENT)
Dept: PSYCHIATRY | Facility: CLINIC | Age: 25
End: 2020-12-11

## 2020-12-11 NOTE — TELEPHONE ENCOUNTER
[facility] Call Center    Phone Message    May a detailed message be left on voicemail: yes     Reason for Call: Other: Pt is calling to see if Baclofen and Antibuse can be prescribed because the pt is feeling that he needs to be contacting an addiction specialist. Pt finds the suboxene triggering and feels that he wants to take more of the medication than he is supposed to. Pt can be reached at the number provided.       Can leave detailed VM.     Action Taken: Other: Sent to Bo AMAYA    Travel Screening: Not Applicable

## 2020-12-11 NOTE — TELEPHONE ENCOUNTER
On 12/10/2020 a request came in from [facility], requesting a current med list.  [facility] on file.  Writer printed out a med list and faxed it to [facility].

## 2020-12-14 NOTE — TELEPHONE ENCOUNTER
"Routed to Dr. Soliz for guidance.      Sean Soliz MD Snyder, David J RN   Caller: Unspecified (3 days ago,  4:52 PM)             Dr. Aleksander Alcala and I were going to talk to him more about this when we see him on Wednesday. Would he be okay waiting until Wednesday to discuss this?     -Sean LAMB        Writer called pt, who identified that suboxone is triggering for him and he keeps wanting to take more of it than is prescribed, so wants to get off it.    Writer identified plan to address the antabuse and baclofen at 12/16 appointment and pt stated \"if he can't prescribe anatabuse and baclofen because of what happened, I'm going to be absolutely livid\" and that \"every day they keep me sober\" and he doesn't think about drinking when on those meds.    Routed to provider.      "

## 2020-12-16 ENCOUNTER — TELEPHONE (OUTPATIENT)
Dept: PSYCHIATRY | Facility: CLINIC | Age: 25
End: 2020-12-16

## 2020-12-16 ENCOUNTER — VIRTUAL VISIT (OUTPATIENT)
Dept: PSYCHIATRY | Facility: CLINIC | Age: 25
End: 2020-12-16
Attending: PSYCHIATRY & NEUROLOGY
Payer: COMMERCIAL

## 2020-12-16 DIAGNOSIS — F11.20 OPIOID USE DISORDER, SEVERE, DEPENDENCE (H): Primary | ICD-10-CM

## 2020-12-16 DIAGNOSIS — F10.20 ALCOHOL USE DISORDER, SEVERE, DEPENDENCE (H): ICD-10-CM

## 2020-12-16 PROCEDURE — 99214 OFFICE O/P EST MOD 30 MIN: CPT | Mod: HN | Performed by: STUDENT IN AN ORGANIZED HEALTH CARE EDUCATION/TRAINING PROGRAM

## 2020-12-16 RX ORDER — NALTREXONE HYDROCHLORIDE 50 MG/1
50 TABLET, FILM COATED ORAL DAILY
Qty: 30 TABLET | Refills: 0 | Status: SHIPPED | OUTPATIENT
Start: 2020-12-16 | End: 2021-01-06

## 2020-12-16 RX ORDER — BACLOFEN 10 MG/1
15 TABLET ORAL 2 TIMES DAILY
Qty: 90 TABLET | Refills: 0 | Status: SHIPPED | OUTPATIENT
Start: 2020-12-16 | End: 2021-01-06

## 2020-12-16 RX ORDER — DISULFIRAM 250 MG/1
250 TABLET ORAL DAILY
Qty: 30 TABLET | Refills: 0 | Status: SHIPPED | OUTPATIENT
Start: 2020-12-16 | End: 2021-01-06

## 2020-12-16 NOTE — TELEPHONE ENCOUNTER
On December 16, 2020, at 8:38 AM, writer called patient at [phone] to confirm Virtual Visit. Writer unable to make contact with patient. Writer left detailed voice message for call back. [phone] left as call back number. Afsaneh Short, [facility]

## 2020-12-16 NOTE — PROGRESS NOTES
----------------------------------------------------------------------------------------------------------  [facility]   Psychiatry Clinic Progress Note  Addiction Medicine/Psychiatry                    Video- Visit Details  Type of service:  video visit for medication management  Time of service:    Date:  12/16/2020    Video Start Time: 10 am       Video End Time:  10:30 am    Reason for video visit:  Patient unable to travel due to Covid-19  Originating Site (patient location):  [facility]   Location- Patient's home  Distant Site (provider location):  [facility]  Mode of Communication:  Video Conference via [employer]  Consent:  Patient has given verbal consent for video visit?: Yes     IDENTIFICATION:  Laci Hinkle Jr is a 25 year old male with previous psychiatric/substance use diagnoses of Bipolar 1 disorder, Generalized anxiety disorder, Opioid use disorder, Alcohol use disorder, Stimulant use disorder, and Cannabis use.   Pt presents for ongoing psychiatric/substance use follow-up. Patient was seen for initial evaluation on 12/9/2020  Background: (Detailed psych/substance history below)  Nathalie reports an extensive history of substance use beginning at age 12. He feels that alcohol has been the most problematic substance in his life. He recently had a cycle of using Kratom, followed by using Phenibut to treat the withdrawal from kratom. He disliked methadone in the past. He describes ongoing Marijuana use, and occasional Salvia use for spiritual purposes.   SUBJECTIVE / INTERIM HISTORY                                                 Plan at last visit, 12/19/2020 was: No med changes were made    Interim history:   - Nathalie shares that he would like to stop taking Suboxone.  He is having cravings to use more than prescribed.  He gave a friend his prescription to reduce his temptation to overuse, and his friend is administering him a few doses every couple days.   I asked if his cravings meant that he might need a higher dose of Suboxone.  He strongly disliked being on methadone in the past, and it was difficult for him to taper off.  He feels like he either wants to be on a low-dose of Suboxone for nothing.  He would like to be on naltrexone instead of Suboxone  -He used marijuana once this week.  He is trying not to use it.  -His plan is to not use Salvia for at least the next 6 to 9 months.  -He is attending the [facility] [place].  He is planning on switching to [place] alternatives may have an opening.  Current Substance Use-   - Marijuana once since our last visit    RECENT SYMPTOMS   [PSYCH ROS]   CRAVINGS/URGES: Yes, opioid and marijuana cravings  SLEEP: NA  SIDE EFFECTS: NA  ANXIETY:  None  PANIC ATTACK:  None   DEPRESSION:  None;  DENIES- depressed mood and anhedonia  PSYCHOSIS:  none;  DENIES- auditory hallucinations  YUMIKO/HYPOMANIA:  none;  DENIES- increased energy, decreased sleep need, increased activity and grandiosity  TRAUMA RELATED:  none  EATING DISORDER:  none  COMPULSIVE:  NA  OTHER:  NA  MEDICAL ROS- 10 point ROS negative except as listed     RELEVANT SOCIAL/FAMILY                                                     Patient  Reported     Employment/Financial Support-Unemployment benefits    Living Situation/Family/Relationships- Living alone in an apartment. Children- None.    PSYCHIATRIC HISTORY      Previous diagnoses, history and diagnostic clarification:  Bipolar 1 disorder  Generalized anxiety disorder    Suicide Attempt Hx:   #- 3, two were as a child, the last was during a psychotic episode in 2016       Psych Hosp- At least 4, last in 2016  Outpatient Programs: Individual therapy  Therapist: Julio Cesar Ac[facility] at [facility]  Current/prior Psychiatrist: Dr. Oconnor at [facility]    SUBSTANCE USE HISTORY                                                                 Substance Pattern/Details of use Most recent use   Alcohol  1/2 L of liquor/day. Would use off and  on since age 20 Prior to treatment in 11/2020   Cannabis Yes, frequent use starting at age 12     Cocaine       Stimulants Methamphetamine use starting at age 17. In the last 1.5 years, he has had 8 relapses on methamphetamine. Prior to his recent CD treatment, he was using illicit adderall daily     Opioids Kratom use over the last ~4 years, up to 100 grams/day. Heroin use started around age 18. Used heroin a few times prior to his recent CD treatment.      Sedatives Recent history of Phenibut use to treat his Kratom withdrawals     Hallucinogens Yes, uses Salvia for spiritual purposes     Inhalants       Other       OTC drugs       Nicotine Current smoker        Treatment History, Abstinent Periods, Substance use Pharmacotherapies:  4 treatments between ages [age]  Recently completed treatment at inpatient treatment at [facility]      PAST MEDICATION TRIALS     Drug  Max Dose (mg) Helpful Adverse Effects    Reason Discontinued Treatment dates    Depakote             Amitriptyline             Lamictal             Olanzapine             Gabapentin 100 BID           Suboxone 2       11/2020-current   Naltrexone 50       09/2019   Abilify             Strattera 100       2012/2013   Buspar             Clonidine 0.2       [id number]   Hydroxyzine             Latuda             Trazodone             Topiramate             Seroquel             Methadone             Antabuse 250 Y     11/2020-current   Baclofen 15 BID Y     11/2020-current                                    UPDATED MEDICAL / SURGICAL HISTORY                     Medical problems:   Patient Active Problem List    Diagnosis Date Noted     Hypertension goal BP (blood pressure) < 140/90 10/28/2015     Priority: Medium     CEASAR (generalized anxiety disorder) 09/16/2014     Priority: Medium     MENTAL HEALTH 10/07/2012     Priority: Medium     Obesity (BMI 30-39.9) 10/07/2012     Priority: Medium     GERD (gastroesophageal reflux disease) 04/20/2012     Priority: Medium      Tobacco abuse 03/30/2012     Priority: Medium     Hyperlipidemia LDL goal <130 03/14/2012     Priority: Medium     Impaired fasting glucose 03/14/2012     Priority: Medium     Bipolar affective disorder (H) 03/18/2011     Priority: Medium     ADHD (attention deficit hyperactivity disorder) 03/18/2011     Priority: Medium     Substance abuse (H) 03/18/2011     Priority: Medium       Medical Team:    [facility]                    ALLERGY   Prednisone  MEDICATIONS                                                                       bold meds Rx in clinic     Current Outpatient Medications   Medication Sig     amitriptyline (ELAVIL) 10 MG tablet Take 2 tablets (20 mg) by mouth nightly as needed for sleep     amLODIPine (NORVASC) 2.5 MG tablet Take 2.5 mg by mouth daily     atorvastatin (LIPITOR) 20 MG tablet 20 mg daily     divalproex sodium extended-release (DEPAKOTE ER) 500 MG 24 hr tablet Take 4 tablets (2,000 mg) by mouth daily     gabapentin (NEURONTIN) 100 MG capsule Take 2 capsules (200 mg) by mouth daily     lamoTRIgine (LAMICTAL) 100 MG tablet Take 1 tablet (100 mg) by mouth At Bedtime     metoclopramide (REGLAN) 10 MG tablet Take 1 tablet (10 mg) by mouth 4 times daily as needed (headache)     nicotine polacrilex (EQ NICOTINE) 2 MG gum Place 1 each (2 mg) inside cheek as needed for smoking cessation (Patient not taking: Reported on 9/24/2019)     OLANZapine (ZYPREXA) 10 MG tablet Take 1 tablet (10 mg) by mouth At Bedtime     omeprazole (PRILOSEC) 20 MG capsule Take 1 capsule (20 mg) by mouth every morning (before breakfast) (Patient not taking: Reported on 3/4/2020)     No current facility-administered medications for this visit.      VITALS   There were no vitals taken for this visit.   PHQ9                    PHQ-9 SCORE 7/10/2019 9/24/2019 1/7/2020   PHQ-9 Total Score - - -   PHQ-9 Total Score 15 16 15       LABS                                                                                                    Recent Labs   Lab Test 11/10/20  2238 10/24/15 05/23/14  0925 03/18/14  0840   CR 0.75 0.78 0.78 0.72   GFRESTIMATED >90  --  Not Calculated >90     Recent Labs   Lab Test 11/10/20  2238 02/20/18  1349 09/21/16  1315 05/23/14  0925 05/23/14  0925 03/18/14  0840   AST 26 16 15   < > 32 28   ALT 43 27 21   < > 30 47   ALKPHOS 101  --   --   --  90 90    < > = values in this interval not displayed.     MENTAL STATUS EXAM                                                              MENTAL STATUS EXAM/WITHDRAWAL                                                              Withdrawal Symptoms: None    Alertness: drowsy  Orientation: awake and alert  Appearance: adequately groomed  Behavior/Demeanor: cooperative, with good  eye contact.  Speech: normal  Psychomotor: normal or unremarkable    Mood:  description consistent with euthymia  Affect: full range and was congruent to speech content.  Thought Process/Associations: circumstantial   Thought Content: devoid of  suicidal ideation.   Perception: devoid of  auditory hallucinations  Insight: fair.  Judgment: fair.  Attention/Concentration: Adequate for interview  Language: intact  Fund of Knowledge: Appropriate  Memory: intact    These cognitive functions grossly appear as described, but were not formally tested.    SUBSTANCE USE/PSYCHIATRIC DIAGNOSES                                                                                                    Bipolar 1 disorder  Generalized anxiety disorder     Opioid use disorder, severe  Alcohol use disorder, severe  Stimulant use disorder, severe  Cannabis use, r/o disorder     ASSESSMENT                                                     This patient is a 25 year old male with the above diagnoses, seen for follow up by addiction medicine/psychiatry.      Substance use disorders:  Pat would like to stop the Suboxone.  Discussed the possibility that he may need a higher dose to treat his  cravings.  He dislikes being on higher doses, and he prefers naltrexone as an alternative.  We agreed to stop the Suboxone.  After 2 weeks when the Suboxone is cleared from his system, he will start naltrexone at the usual dose.  He would be a good candidate for Vivitrol, though this could be difficult as he dislikes needles.  He has used marijuana once since her last visit.  I encouraged sobriety.  I also encouraged him to continue the intensive outpatient program he is engaged in.    Mental health concerns: No acute concerns today.  He will follow-up with Dr. Oconnor.    Reports  no suicidal ideations, no plan or intent, no current safety concerns.    Further diagnostic clarification is not needed.      [place] PRESCRIPTION MONITORING PROGRAM [] was checked today:  indicates Suboxone and gabapentin    PLAN                                    Changes today:   - Stop Suboxone 2 mg daily  - After 2 weeks, start Naltrexone 50 mg    Continue the following:     - Antabuse 250 mg daily  - Baclofen 15 mg BID     Prescribed by Dr. Oconnor:  - Depakote ER 2,000 mg HS  - Lamictal 150 mg daily  - Olanzapine 10 mg HS       Treatment/Therapy/Referrals: NA    Goals for next visit: NA    NEXT DUE:  LABS- N/A                          RTC: 3 weeks with addiction fellow    CRISIS NUMBERS:   Provided routinely in AVS.    TREATMENT RISK STATEMENT:  The risks, benefits, alternatives and potential adverse effects have been explained and are understood by the pt. The pt agrees to the treatment plan with the ability to do so. The pt knows to call the clinic for any problems or to access emergency care if needed.  Medical and CD concerns are documented above.  Psychotropic drug interaction check was done, including changes made today, and is discussed above.    Psychiatry Resident: Sean Soliz MD      Patient not staffed in clinic, note will be reviewed by supervisor Dr. Armijo     TELEHEALTH ATTENDING ATTESTATION  Following the [facility]ME  guidelines on telemedicine and direct supervision due to COVID-19, I was concurrently participating in and/or monitoring the patient care through appropriate telecommunication technology.  I discussed the key portions of the service with the resident, including the mental status examination and developing the plan of care. I reviewed key portions of the history with the resident. I agree with the findings and plan as documented in this note.   Leann Armijo MD

## 2020-12-16 NOTE — PATIENT INSTRUCTIONS
STOP Suboxone    In 2 weeks, start taking Naltrexone 50 mg daily    I will prescribe you antabuse and baclofen    I will have the scheduling desk reach out to you about your next appointment    Thank you for coming to the [facility].    Lab Testing:  If you had lab testing today and your results are reassuring or normal they will be mailed to you or sent through [employer] within 7 days. If the lab tests need quick action we will call you with the results. The phone number we will call with results is # [phone] (home) . If this is not the best number please call our clinic and change the number.    Medication Refills:  If you need any refills please call your pharmacy and they will contact us. Our fax number for refills is [phone]. Please allow three business for refill processing. If you need to  your refill at a new pharmacy, please contact the new pharmacy directly. The new pharmacy will help you get your medications transferred.     Scheduling:  If you have any concerns about today's visit or wish to schedule another appointment please call our office during normal business hours [phone] (8-5:00 M-F)    Contact Us:  Please call [phone] during business hours (8-5:00 M-F).  If after clinic hours, or on the weekend, please call  [phone].    Financial Assistance [phone]  [facility] Billing [phone]  Central Billing Office, ealth: [phone]  [facility] Billing [phone]  Medical Records [phone]  [facility] Patient Bill of Rights [email or web address]       MENTAL HEALTH CRISIS NUMBERS:  For a medical emergency please call  911 or go to the nearest ER.     [facility]:   [facility] -[phone]   Crisis Residence [facility] -[phone]   Walk-In Counseling Center [facility] -[phone]   COPE 24/7 [facility] Team  -[phone] (adults)/[phone] (child)  CH[facility]: [facility] needs [facility] - [phone]      [facility]:   [facility] - [phone]   Walk-in counseling [facility] - [phone]   Walk-in counseling [facility] - [phone]   Crisis Residence [facility] Residence - [phone]  Urgent Care Adult Mental Xisoww-[phone] mobile unit/ 24/7 crisis line    National Crisis Numbers:   National Suicide Prevention Lifeline: 5-996-422-TALK (033-451-9850)  Poison Control Center - 1-987.393.4812  StudyMax/resources for a list of additional resources (SOS)  Trans Lifeline a hotline for transgender people 5-262-160-8180  The [employer]sa Project a hotline for LGBT youth 6-898-412-0271  Crisis Text Line: For any crisis 24/7   To: 178734  see www.crisistextline.org  - IF MAKING A CALL FEELS TOO HARD, send a text!         Again thank you for choosing [facility] [place] [facility] and please let us know how we can best partner with you to improve you and your family's health.    You may be receiving a survey regarding this appointment. We would love to have your feedback, both positive and negative. The survey is done by an external company, so your answers are anonymous.

## 2020-12-17 ENCOUNTER — TELEPHONE (OUTPATIENT)
Dept: PSYCHIATRY | Facility: CLINIC | Age: 25
End: 2020-12-17

## 2020-12-17 NOTE — TELEPHONE ENCOUNTER
"[facility] Call Center    Phone Message    May a detailed message be left on voicemail: yes     Reason for Call: Symptoms or Concerns     Patient called with concerns about medication.  Patient is concerned about misusing Suboxone, or \"getting off of it.\"  Patient reports he has 11 doses remaining.  His friend is keeping his supply for him and patient will ask friend every day for one day's dose.      Per scheduling message, patient is scheduled with Addiction Medicine Fellow in three weeks (Dr. Banks) on January 6.  Patient would like sooner appointment, if possible.    Action Taken: Message routed to:  Other: ABHI Dejesus    Travel Screening: Not Applicable                                                                        "

## 2020-12-18 NOTE — TELEPHONE ENCOUNTER
Called patient to get more information. He said that he is not sure if he should even be on Suboxone because he does not use opioids, but rather Kratom. He only started using Suboxone because it's cheaper than Kratom. He used it over the summer - 8 mg every other day, and this dose was sufficient for him to feel high. He is anxious because he has been wanting to use more and more. He used 12 mg yesterday. He was vague about how he was able to procure that amount when his supply is being held by his friend and dispensed daily. He said that he has 11 films left, and will cut them in half and take 1 mg each day until he uses up his supply. At that point, he said hat he will stop.     Patient possibly under the influence during the call - thought process and speech slow, and he was irritable. He told writer that he doesn't even know why he called, because he does not want to hear the lecture. Writer unclear what lecture he is referring to as he was talking 90% of the time.     He ended the call by reiterating that he will take half a film (1 mg) every day for the next 22 days.    Routed to Dr. Soliz for FYI.

## 2020-12-21 ENCOUNTER — TELEPHONE (OUTPATIENT)
Dept: PSYCHIATRY | Facility: CLINIC | Age: 25
End: 2020-12-21

## 2020-12-21 NOTE — TELEPHONE ENCOUNTER
[facility] Call Center    Phone Message    May a detailed message be left on voicemail: yes     Reason for Call: Other: Pt stated that they need to continue on suboxene. Pt neighbor distributes it to him so that he does not take more than the dosage. Pt feels that he is craving the suboxene and is willing to quit smoking cannabis to do so. Pt stated that he will not smoke cannabis when taking suboxene. Pt is running out of supply and stated he needs to get more. He has about 7-8 doses left. Pt thinks that he should be taking 4MG instead of 2MG. He doesn't want to go any higher. Pt would like a call as soon as possible. Pt stated that he did relapse. He stated he is going to take it seriously this time.      Action Taken: Other: sent to ivan AMAYA    Travel Screening: Not Applicable

## 2020-12-21 NOTE — TELEPHONE ENCOUNTER
----- Message from Emily Barron sent at 12/21/2020  9:23 AM CST -----  Regarding: DIANNA: For Dr. Oconnor  Contact: [phone]  Sunny Kaur, called to inform Dr. Oconnor, that Laci, has been discharged from IOP [place] and will now be in the IOP at [facility]. This was all the information given. Please call Sunny, if you have questions.    Thanks!

## 2020-12-22 NOTE — TELEPHONE ENCOUNTER
Patient has an upcoming intake appointment with Dr. Banks on January 6 and is not an established patient of the addiction medicine clinic yet.     Called patient but he did not answer and the phone just kept ringing until it eventually went to a dial tone. No option to leave VM.     Per [place] , Suboxone 2-0.5 mg SL film were prescribed on   11/30 - #30, 30 by Gustavo Guevara      [address] [place] [address]    11/6 - #30, 30 by Keyona Patel      [address] [place] [address]      Routed to Dr. Soliz for guidance on next steps.

## 2020-12-22 NOTE — TELEPHONE ENCOUNTER
Sean Soliz MD Labossiere, Laura, RN; Becca Wayne RN   Cc: Leann Armijo MD   Caller: Unspecified (Yesterday,  1:11 PM)             Smiley Torres for the update. We can get the dose from the  when we see him next. He can talk about the logistics of this at his next visit.     Thanks!     -Sean LAMB

## 2020-12-22 NOTE — TELEPHONE ENCOUNTER
Received a return call from patient. He said that he needs to be on 4 mg of Suboxone. He tried 4 mg yesterday and he did feel better. He wanted to emphasize that he does not want to get addicted to Suboxone because it can be difficult to get off it. He said that he tried to discontinue on Saturday, and he felt really miserable and was in full withdrawal after 36 hours.    He will run out and he wants to make sure that he gets a new prescription. He said that he is scared to run out although he really does not want to be on this medication long-term. He said that he will run out soon and he does not want to. He has been trying to make his supply last longer, but the 2 mg just makes him crave more. The 4 mg helped because he did not experience cravings on it. Asked him if it's a possibility to contact the provider who most recently prescribed Suboxone to explain the situation. He said that he will call now, and see if he will prescribe 2 weeks worth at 4 mg daily. He again expressed fear about how he will eventually get off Suboxone. Explained that this is why he will be seeing Dr. Banks - they can work together on eventually discontinuing Suboxone in a manner that is safe and as comfortable as possible. Encouraged him to focus for now on the more immediate problem of making sure that he does not run out before he can meet with Dr. Banks. He expressed understanding and will call the previous provider now. He will call with an update.

## 2020-12-22 NOTE — TELEPHONE ENCOUNTER
Writer received incoming phone call from pt. This writer was unaware of the conversation that transpired yesterday at the time of today's phone call.     Pt informed this writer that he was going to run out of Suboxone prior to his appt with Dr. Banks on 1/6/21. Although, he was able to get a new script of Suboxone from Dr. Laci Guevara at the [facility]. Pt also shared that he is trying not to abuse the Suboxone and so his friend is holding onto the prescription. Pt voiced concerns of becoming addicted to Suboxone. He also said that he's trying to limit his marijuana use and asked whether this clinic completes UAs.     Writer agreed to relay all of the above information to the pt's care team. Unfortunately, this writer did not confirm what dose of Suboxone he was prescribed.

## 2020-12-22 NOTE — TELEPHONE ENCOUNTER
Alvin Hudson,    Thanks for gathering this information, and I agree with this plan.    I saw Pat in the addiction medicine clinic under the supervision of Dr. Armijo. We had discussed the possibilty of cont

## 2020-12-22 NOTE — TELEPHONE ENCOUNTER
Alvin Hudson,     Thanks for gathering this information, and I agree with this plan.     I saw Pat in the addiction medicine clinic under the supervision of Dr. Armijo. We had discussed the possibilty of continuing Suboxone. However, he would also have to agree to remain sober from other substances. He did not think he could stop using marijuana and hallucinations, so he requested to stop Suboxone and start Naltrexone.     If he is going to be prescribed Suboxone from our clinic, he would have to agree to sobriety and our controlled substances agreement. This is a whole discussion that would likely require a clinic visit. It is concerning that he is currently using more suboxone than prescribed from his last provider.     I think he will have to talk with the prescriber to see if they are willing to bridge him. If not, then he will likely have to taper down with his previous supply until he see Dr. Banks.    I will defer to Dr. Armijo if she has any other suggestions.

## 2020-12-29 ENCOUNTER — TELEPHONE (OUTPATIENT)
Dept: PSYCHIATRY | Facility: CLINIC | Age: 25
End: 2020-12-29

## 2020-12-30 NOTE — TELEPHONE ENCOUNTER
Per outside meds tab, patient received buprenorphine-naloxone 2-0.5 mg SL tablet, #12, on Dec 29 from Dr. Guevara. (This is not available on  yet.)    On Nov 6, he received 30 tabs from Keyona Patel, and on Nov 30, he received 30 tabs from Dr. Guevara.     Appointment with Dr. Banks on Jan 6.

## 2021-01-05 ENCOUNTER — TELEPHONE (OUTPATIENT)
Dept: PSYCHIATRY | Facility: CLINIC | Age: 26
End: 2021-01-05

## 2021-01-05 DIAGNOSIS — F31.10 BIPOLAR I DISORDER, MOST RECENT EPISODE (OR CURRENT) MANIC (H): ICD-10-CM

## 2021-01-05 NOTE — TELEPHONE ENCOUNTER
Writer called pt to gather more information in regards to his initial message. Pt had the following to relay/discuss:    1. Pt would like to know what dose of Zyprexa he should take. Pt said he gets no direction on what dose he should be taking daily. He reports taking 20 mg daily and that his minda is gone.     -Reviewed pt's chart and it appears 10 mg at bedtime is prescribed. Relayed this to the pt. He voiced understanding, but said 20 mg works better and his minda is completely gone. Agreed to relay this to the team.    2. Pt is currently taking two supplements to help with memory, mood, and weight gain associated with Zyprexa. These include ashwagandha and alpha GPC. He's going to look into trying other supplement as well.    3. Pt wants to taper off of suboxone. At tomorrow's appt, he's hoping to get a week's worth of suboxone 2 mg. Pt said he is addicted to suboxone and no longer wants to take it. He feels it's not effective in treating his cravings. Pt reports the antabuse is more effective in treating his cravings.    Writer agreed to relay the above to the team and he will f/up with the provider at tomorrow's appt.

## 2021-01-05 NOTE — TELEPHONE ENCOUNTER
"[facility]    Phone Message    May a detailed message be left on voicemail: yes     Reason for Call: Symptoms or Concerns     If patient has red-flag symptoms, warm transfer to triage line    Current symptom or concern: Memory loss/issues    Symptoms have been present for: Patient reports since he got out of treatment. Did not elaborate when this was.    Has patient previously been seen for this? No    Are there any new or worsening symptoms? Yes: Patient had long conversation with writer and became increasingly frustrated as call went on and writer attempted to gather more details. Patient says there are a lot of moments where he is not sure something even exists or not, because he can't remember if they happened or not. He reports periodically blacking out, and doing things and then not remembering doing them. He used an example of contacting people that he would never contact and then getting return messages from them and wondering why they were contacting him only to see that he sent them a message first. He is not sure whether this is from the new medication or if it is the \"years of drug abuse.\" He thinks it started right after he got back from treatment, but then elaborated that maybe it was happening before and he just couldn't remember it before. Towards the end of the call, he yelled at writer saying \"My memory is SHIT\" repeatedly before writer tried to get a nurse to speak with him. Writer was unable to transfer call to a nurse, but told patient that someone would call him as soon as possible.     *He started the call by mentioning that the new anti-psychotic was \"working wonders\"       Action Taken: Message routed to:  Other: [facility]    Travel Screening: Not Applicable                                                                                                                    "

## 2021-01-06 ENCOUNTER — VIRTUAL VISIT (OUTPATIENT)
Dept: PSYCHIATRY | Facility: CLINIC | Age: 26
End: 2021-01-06
Attending: PSYCHIATRY & NEUROLOGY
Payer: COMMERCIAL

## 2021-01-06 ENCOUNTER — TELEPHONE (OUTPATIENT)
Dept: PSYCHIATRY | Facility: CLINIC | Age: 26
End: 2021-01-06

## 2021-01-06 DIAGNOSIS — F11.10 OPIOID USE DISORDER, MILD, ABUSE (H): Primary | ICD-10-CM

## 2021-01-06 DIAGNOSIS — F10.20 ALCOHOL USE DISORDER, SEVERE, DEPENDENCE (H): ICD-10-CM

## 2021-01-06 PROCEDURE — 99214 OFFICE O/P EST MOD 30 MIN: CPT | Mod: GC | Performed by: FAMILY MEDICINE

## 2021-01-06 RX ORDER — DISULFIRAM 250 MG/1
250 TABLET ORAL DAILY
Qty: 30 TABLET | Refills: 0 | Status: SHIPPED | OUTPATIENT
Start: 2021-01-14 | End: 2021-07-27

## 2021-01-06 RX ORDER — BUPRENORPHINE AND NALOXONE 2; .5 MG/1; MG/1
FILM, SOLUBLE BUCCAL; SUBLINGUAL
Qty: 3 FILM | Refills: 0 | Status: SHIPPED | OUTPATIENT
Start: 2021-01-06 | End: 2021-01-18

## 2021-01-06 RX ORDER — BACLOFEN 10 MG/1
15 TABLET ORAL 2 TIMES DAILY
Qty: 90 TABLET | Refills: 0 | Status: SHIPPED | OUTPATIENT
Start: 2021-01-14 | End: 2021-07-22

## 2021-01-06 RX ORDER — CLONIDINE HYDROCHLORIDE 0.1 MG/1
0.1 TABLET ORAL 3 TIMES DAILY PRN
Qty: 15 TABLET | Refills: 0 | Status: SHIPPED | OUTPATIENT
Start: 2021-01-06 | End: 2021-01-12

## 2021-01-06 NOTE — TELEPHONE ENCOUNTER
On January 6, 2021, at 8:14 AM, writer called patient at [phone] to confirm Virtual Visit. Writer unable to make contact with patient. Writer left detailed voice message for call back. [phone] left as call back number. Afsaneh Short, [facility]
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
## 2021-01-06 NOTE — TELEPHONE ENCOUNTER
"Patient called again, showing symptoms of being manic and even mentioning himself that he thinks he has been manic for the last few days. Patient was a bit all over the place on the call, but felt his main concerns were that he wanted to keep increasing his Zyprexa dose. He said he is taking 20mg currently, but because he started taking more he is almost out. He noted feeling \"just so much better\" when he increased the dose and wishes to continue doing so. He is afraid he is going to run out of medication and is currently taking 5mg tablets that he had left over from an old script. He also was concerned about his cravings for sugary foods, and that he couldn't stop eating or craving them. He also voiced concern for possible weight gain.     Patient wanted to speak to Dr. Oconnor right away. Writer told patient that he had an appointment next Tuesday (1/12) and that Dr. Oconnor did not have any openings before then. Patient said he could wait, but would really like Dr. Oconnor to call him before then.  "

## 2021-01-07 DIAGNOSIS — F31.10 BIPOLAR I DISORDER, MOST RECENT EPISODE (OR CURRENT) MANIC (H): ICD-10-CM

## 2021-01-07 RX ORDER — OLANZAPINE 20 MG/1
20 TABLET ORAL AT BEDTIME
Qty: 30 TABLET | Refills: 0 | Status: CANCELLED | OUTPATIENT
Start: 2021-01-07

## 2021-01-07 RX ORDER — OLANZAPINE 10 MG/1
20 TABLET ORAL AT BEDTIME
Qty: 14 TABLET | Refills: 0 | Status: SHIPPED | OUTPATIENT
Start: 2021-01-07 | End: 2021-01-12

## 2021-01-07 NOTE — TELEPHONE ENCOUNTER
Writer called pt to gather more information. Pt told this writer that he increased his Zyprexa 2 weeks ago to 20 mg daily. He feels he's still experiencing some minda and denies any significant depressive symptoms. Pt wants to increase the dose further, but feels he needs to give the current dose more time. While taking the Zyprexa 20 mg, the pt reports understanding that he now knows what is normal versus psychosis. He also said he's starting to understand the difference between a stable mood and minda. The pt reports some distressing SEs of the high dose, including sugary cravings. Pt shared that he ate 5 tins of ice cream yesterday and often eats boxes of [employer] treats. He believes he's gained about 10 lbs in the last 2 weeks. Still, he thinks an increase may be helpful in the future and said he'll work on his weight gain.    Writer inquired about sleep and safety concerns. Pt said he's actually over sleeping and said he's getting about 12 hours of sleep/day. He reports increased spending and shopping, but denies any other impulsive or risky behaviors.    Ultimately, the pt agreed that he can wait until 1/12 to discuss an increase in Zyprexa. Asked that he c/b prior if he feels his symptoms are worsening. In the meantime, he is requesting a refill of Zyprexa 20 mg to be sent to [facility].

## 2021-01-07 NOTE — TELEPHONE ENCOUNTER
Elvin, MD Darinel Miles Laura, RN   Caller: Unspecified (2 days ago, 12:49 PM)             Alvin Elder,    Thanks for the message.  I did a refill for olanzapine 20 mg, for 1 week to get Laci to his next visit with me.    Thanks!    DB        Called pt and LVM notifying him of the RF.

## 2021-01-12 ENCOUNTER — VIRTUAL VISIT (OUTPATIENT)
Dept: PSYCHIATRY | Facility: CLINIC | Age: 26
End: 2021-01-12
Attending: PSYCHIATRY & NEUROLOGY
Payer: COMMERCIAL

## 2021-01-12 ENCOUNTER — TELEPHONE (OUTPATIENT)
Dept: PSYCHIATRY | Facility: CLINIC | Age: 26
End: 2021-01-12

## 2021-01-12 DIAGNOSIS — F31.61 BIPOLAR DISORDER, CURRENT EPISODE MIXED, MILD (H): ICD-10-CM

## 2021-01-12 DIAGNOSIS — F31.61 BIPOLAR DISORDER, CURRENT EPISODE MIXED, MILD (H): Primary | ICD-10-CM

## 2021-01-12 DIAGNOSIS — Z51.81 ENCOUNTER FOR THERAPEUTIC DRUG MONITORING: ICD-10-CM

## 2021-01-12 DIAGNOSIS — F11.10 OPIOID USE DISORDER, MILD, ABUSE (H): ICD-10-CM

## 2021-01-12 DIAGNOSIS — F31.81 BIPOLAR 2 DISORDER (H): ICD-10-CM

## 2021-01-12 DIAGNOSIS — F31.10 BIPOLAR I DISORDER, MOST RECENT EPISODE (OR CURRENT) MANIC (H): ICD-10-CM

## 2021-01-12 PROCEDURE — 99214 OFFICE O/P EST MOD 30 MIN: CPT | Mod: GT | Performed by: PSYCHIATRY & NEUROLOGY

## 2021-01-12 RX ORDER — LAMOTRIGINE 100 MG/1
100 TABLET ORAL AT BEDTIME
Qty: 90 TABLET | Refills: 3 | Status: SHIPPED | OUTPATIENT
Start: 2021-01-12 | End: 2021-03-25

## 2021-01-12 RX ORDER — CLONIDINE HYDROCHLORIDE 0.1 MG/1
0.1 TABLET ORAL 3 TIMES DAILY PRN
Qty: 90 TABLET | Refills: 5 | Status: SHIPPED | OUTPATIENT
Start: 2021-01-12 | End: 2021-09-07

## 2021-01-12 RX ORDER — DIVALPROEX SODIUM 500 MG/1
2000 TABLET, EXTENDED RELEASE ORAL DAILY
Qty: 360 TABLET | Refills: 3 | Status: SHIPPED | OUTPATIENT
Start: 2021-01-12 | End: 2021-11-02

## 2021-01-12 RX ORDER — OLANZAPINE 10 MG/1
20 TABLET ORAL AT BEDTIME
Qty: 180 TABLET | Refills: 3 | Status: SHIPPED | OUTPATIENT
Start: 2021-01-12 | End: 2021-03-29

## 2021-01-12 ASSESSMENT — PAIN SCALES - GENERAL: PAINLEVEL: NO PAIN (0)

## 2021-01-12 NOTE — PROGRESS NOTES
Psychiatry Clinic Follow-up Note    Video- Visit Details  Type of service:  video visit for medication management  Time of service:    Date:  2021    Video Start Time:  2 PM        Video End Time:  2:25    Reason for video visit:  Patient unable to travel due to Covid-19  Originating Site (patient location):  [facility]   Location- Patient's home  Distant Site (provider location):  Remote location  Mode of Communication:  Video Conference via AmWell  Consent:  Patient has given verbal consent for video visit?: Yes       Laci Hinkle Jr. MRN# [id number]   Age: 24 year old YOB: 1995          Assessment:     Since the last visit, after contacting the clinic, Laci increased his dose of olanzapine from 10 mg to 20 mg daily.  He finds the higher dose to be beneficial with mood stability and psychotic symptoms and wishes to continue it.  I think this is perfectly reasonable.  We will do a blood test to check Laci's metabolic labs.  He will continue his other usual medications also.  He is working with our addictions team regarding possibly coming off Suboxone which he reports makes him feel too flat.  He is taking Antabuse prescribed by another provider and has been alcohol free for some time.  He has discontinued amitriptyline but is sleeping well.  He will return for follow-up in 3 months.    Attestation:  Patient has been evaluated by me, Bo Oconnor MD, PhD.         Diagnoses:     Axis I: Bipolar I disorder, currently euthymic; polysubstance dependence     Axis II: Deferred    Axis III: Medication-induced weight gain; hypertension    Axis IV: mild  psychosocial stressors     Axis V: Global Assessment of Functionin-60    Interim History     Since the last visit, Laci tried increasing his dose of olanzapine to 20 mg daily and noted that he felt better.  His mood was more stable and his psychotic symptoms reduced.  After contacting the clinic, we gave the okay for him to stay  "at the 20 mg dose.      Today, when I asked Laci about psychotic symptoms, he gave an example of normally being a \"spiritual\" person, \"but when I am psychotic I am really spiritual\".  This is reduced with the higher dose of olanzapine.    Laci continues to work on being substance free.  He is taking Antabuse prescribed by another provider and finds this to be helpful.  He has been alcohol free for some time.  He wants to work with our addictions team regarding coming off Suboxone as he feels it makes him too flat.    Laci is also discontinued amitriptyline though he is sleeping well.  He has been adherent with his other medications.    Laci will continue olanzapine at the current dose.  He is concerned about the possibility of weight gain and we can consider Topamax or metformin if needed.  In the meantime we will check Laci's metabolic labs.    Laci will return for follow-up in 3 months.    Review of Systems     A comprehensive review of systems was performed and is negative other than noted above.          Allergies:      Allergies   Allergen Reactions     Prednisone Other (See Comments)     psych problems     Current Medications     Depakote 2000 mg HS  Gabapentin 100 mg daily  Lamotrigine 125 mg HS  Olanzapine 10 mg HS    Mental Status Exam     Alertness: alert  and oriented  Behavior/Demeanor: cooperative, pleasant and calm,  Speech: normal  Language: intact  Psychomotor: normal or unremarkable  Mood: description consistent with euthymia  Affect: full-range; was congruent to mood  Thought Process/Associations: unremarkable  Thought Content:  Denies active/passive suicidal ideation  Perception:  Denies hallucinations  Insight: fair  Judgment: fair  Cognition:  does appear grossly intact.  "

## 2021-01-12 NOTE — PATIENT INSTRUCTIONS
Thank you for coming to the [facility] [place] [facility].    Continue your medications at the current doses    Please get a blood test to check your cholesterol and Depakote    Please return for follow-up in 3 months.    Lab Testing:  If you had lab testing today and your results are reassuring or normal they will be mailed to you or sent through [employer] within 7 days. If the lab tests need quick action we will call you with the results. The phone number we will call with results is # [phone] (home) . If this is not the best number please call our clinic and change the number.    Medication Refills:  If you need any refills please call your pharmacy and they will contact us. Our fax number for refills is [phone]. Please allow three business for refill processing. If you need to  your refill at a new pharmacy, please contact the new pharmacy directly. The new pharmacy will help you get your medications transferred.     Scheduling:  If you have any concerns about today's visit or wish to schedule another appointment please call our office during normal business hours [phone] (8-5:00 M-F)    Contact Us:  Please call [phone] during business hours (8-5:00 M-F).  If after clinic hours, or on the weekend, please call  [phone].    Financial Assistance [phone]  [facility] Billing [phone]  Central Billing Office, [facility]: [phone]  [facility] Billing [phone]  Medical Records [phone]  [facility] Patient Bill of Rights [email or web address]       MENTAL HEALTH CRISIS NUMBERS:  For a medical emergency please call  911 or go to the nearest ER.     [facility]:   [facility] -[phone]   Crisis Residence [facility] -[phone]   Walk-In Counseling Center [facility] -[phone]   COPE 24/7 [facility] Mobile Team -[phone] (adults)/[phone]  (child)  CHILD: [facility] needs assessment team - [phone]      [facility]:   [facility] - [phone]   Walk-in counseling [facility] - [phone]   Walk-in counseling [facility] - [phone]   Crisis Residence [facility] - [phone]  Urgent Care Adult Mental Cxddib-[phone] mobile unit/ 24/7 crisis line    National Crisis Numbers:   National Suicide Prevention Lifeline: 3-651-581-TALK (639-004-5168)  Poison Control Center - 1-123-506-9168  [facility]/resources for a list of additional resources (SOS)  Trans Lifeline a hotline for transgender people 4-456-452-5474  The Esa Project a hotline for LGBT youth 5-459-669-1088  Crisis Text Line: For any crisis 24/7   To: 307979  see www.crisistextline.org  - IF MAKING A CALL FEELS TOO HARD, send a text!         Again thank you for choosing [facility] MENTAL HEALTH & ADDICTION [facility] and please let us know how we can best partner with you to improve you and your family's health.    You may be receiving a survey regarding this appointment. We would love to have your feedback, both positive and negative. The survey is done by an external company, so your answers are anonymous.

## 2021-01-12 NOTE — PROGRESS NOTES
"VIDEO VISIT  Laci Hinkle Jr is a 25 year old patient who is being evaluated via a billable video visit.      The patient has been notified of following:   \"This video visit will be conducted via a call between you and your physician/provider. We have found that certain health care needs can be provided without the need for an in-person physical exam. This service lets us provide the care you need with a video conversation. If a prescription is necessary we can send it directly to your pharmacy. If lab work is needed we can place an order for that and you can then stop by our lab to have the test done at a later time. Insurers are generally covering virtual visits as they would in-office visits so billing should not be different than normal.  If for some reason you do get billed incorrectly, you should contact the billing office to correct it and that number is in the AVS .    Video Conference to be completed via:  [email or web address]    Patient has given verbal consent for video visit?:  Yes    Patient would prefer that any video invitations be sent by: Send to e-mail at: [email or web address]      How would patient like to obtain AVS?:  Mail a copy    AVS SmartPhrase [PsychAVS] has been placed in 'Patient Instructions':  Yes    "

## 2021-01-12 NOTE — TELEPHONE ENCOUNTER
----- Message from Bo Oconnor MD sent at 1/12/2021  2:27 PM CST -----  Alvin Peres,Can you order labs for Laci?  He gets them done at [facility] in [facility].  He needs a Depakote level and fasting cholesterol, triglycerides, glucose, and hemoglobin A1c.Thanks    DB        Writer placed requested orders, coordinated with in-clinic staff to print and fax.

## 2021-01-18 ENCOUNTER — TELEPHONE (OUTPATIENT)
Dept: PSYCHIATRY | Facility: CLINIC | Age: 26
End: 2021-01-18

## 2021-01-18 DIAGNOSIS — F11.10 OPIOID USE DISORDER, MILD, ABUSE (H): ICD-10-CM

## 2021-01-18 RX ORDER — BUPRENORPHINE AND NALOXONE 2; .5 MG/1; MG/1
1 FILM, SOLUBLE BUCCAL; SUBLINGUAL DAILY
Qty: 3 FILM | Refills: 0
Start: 2021-01-18 | End: 2021-01-21

## 2021-01-18 NOTE — TELEPHONE ENCOUNTER
Patient was seen for an intake appointment with Dr. Banks on Jan 6 at which time the plan was to do a rapid taper of Suboxone per his request.      Per [place] , last filled:  Jan 7 (sold Jan 12) #3, 8 ds  Dec 29, #12, 12 ds  Nov 30, #30, 30 ds    Follow up appointment with Dr. Banks on Jan 20.     Called patient to follow up on his request. He said that he is going through withdrawal right now because he has been out of Suboxone since Saturday. He said that he is experiencing intense cravings right now, and would like to restart Suboxone. He would like only a week supply at a time because that's a small enough amount that he will likely not use more than he's supposed to.    He said that he will probably go into residential treatment on Feb 1 at the [facility]. This is a 28-day program after which he will move to a sober living facility.    He is taking clonidine 0.1 mg TID PRN which he does find helpful but he would like to get a higher dose - 0.2 mg TID - which was what he was prescribed at the methadone clinic.     Routed to Dr. Banks for authorization to refill/restart Suboxone and for recommendation re: clonidine.

## 2021-01-18 NOTE — TELEPHONE ENCOUNTER
Health Call Center    Phone Message    May a detailed message be left on voicemail: yes     Reason for Call: Medication Refill Request    Has the patient contacted the pharmacy for the refill? Yes   Name of medication being requested: Suboxone  Provider who prescribed the medication: Dr. Banks/Dr. Armijo  Pharmacy: [facility] [place], [place] - [address]  Date medication is needed: ASAP. Patient is completely out. He reports wanting to taper off the medication but he says the cravings he is getting are way too bad. He is requesting refills one week at a time until he goes into residential treatment where he can taper completely off in a more supervised setting with additional resources.         Action Taken: Message routed to:  Other: [facility]    Travel Screening: Not Applicable

## 2021-01-18 NOTE — TELEPHONE ENCOUNTER
"[facility]    Phone Message    May a detailed message be left on voicemail: yes     Reason for Call: Other:   Patient called to inform Dr. Oconnor that he will be re-entering a residential treatment and said it is the same one as before. He is wanting to be sure the residential treatment is aware he is taking zyprexa 20mg since this is a different dose than before.    Patient also wanted to inform Dr. Oconnor that he has been taking \"ashwagandha 500mg and 5% with anolides\" from the brand [employer], which he purchased from [employer]. He is wanting to know if this is known to have any interactions with his current medications. Patient said he is taking this as a way to prevent weight gain from the antipsychotics in addition to helping his brain function. Patient said he does not notice a change in his mood from it and said it does not get him high.    Patient said he will be discontinuing the suboxone and baclophen in about 1-2 weeks once he moves into the residential treatment. He also stopped taking the Alpha GPC.        Action Taken: Message routed to:  Other: ABHI Neri and Bo Oconnor MD    Travel Screening: Not Applicable                                          " 15-May-2018 11:10

## 2021-01-18 NOTE — TELEPHONE ENCOUNTER
Received authorization from Dr. Banks to give a 3 day supply of Suboxone at 2 mg per day, and to increase clonidine to 0.2 mg TID PRN.    Called and left detailed VM for patient. Advised him that Suboxone can be discussed at the appointment on Wednesday re: dose and refills. Invited him to call back if he has questions. Provided clinic number.    Routed to Dr. Banks for FYI.

## 2021-01-19 ENCOUNTER — TELEPHONE (OUTPATIENT)
Dept: PSYCHIATRY | Facility: CLINIC | Age: 26
End: 2021-01-19

## 2021-01-19 NOTE — TELEPHONE ENCOUNTER
[facility] Call [facility]    Phone Message    May a detailed message be left on voicemail: yes     Reason for Call: Other: Pt stated he needed to get refills of suboxone and nicotine  gun, and antabuse. However, pt stated he's been drinking and will be going to tx soon.     Action Taken: Other: [facility] psych pool    Travel Screening: Not Applicable

## 2021-01-19 NOTE — TELEPHONE ENCOUNTER
"Per outside meds tab, Suboxone was filled yesterday (Jan 18) by [facility].     Called patient to follow up on message. He confirmed that he did  the Suboxone, and he took one film yesterday and one today, and will have one for tomorrow. He wanted to make sure that he has a plan for a taper and a way to get only a small supply at a time. He expressed frustration at having to be on Suboxone, and wants to be able to stop taking it as soon as he can because he does not want to get even more dependent on it. He said that he was able to make 3 films last 10 days, before the cravings got really bad and he \"cracked.\"      He reports that he was on methadone in the past and he was able to wean himself off that, which was very difficult. Encouraged him to remind himself of that success, especially when he is feeling bad about being on Suboxone.     He admits that he is drinking alcohol, although \"not a lot.\" He said that he is not taking antabuse right now because he knows not to take that while drinking alcohol, but does want to get back on that. He is also considering naltrexone.      He said that he finds the baclofen helpful with cravings, and it does not get him \"high.\" He identifies cravings as the biggest obstacle to discontinuing Suboxone right now. He said that he has tried painting and cleaning the house to distract himself when he is craving, and has also sometimes smoked marijuana. He said that marijuana is not very helpful, and it is not even an enjoyable experience because he's not happy with his life and the marijuana seems to intensify whatever emotion he is feeling when smoking it.    Laci talked about his goal of going to college and having a chance to show his creativity. He said that he feels like Suboxone has led to memory impairment, loss of libido, and loss of creativity.    Encouraged him to discuss a reasonable taper schedule at his appointment with Dr. Banks tomorrow. He " confirmed that he will attend the appointment.       Routed to Dr. Banks for FYI.

## 2021-01-20 ENCOUNTER — TELEPHONE (OUTPATIENT)
Dept: PSYCHIATRY | Facility: CLINIC | Age: 26
End: 2021-01-20

## 2021-01-20 NOTE — TELEPHONE ENCOUNTER
Patient did not show up for his appointment today.     Dr. Banks would like to discuss with patient first before providing any more refills of Suboxone are provided.     Called Laci to offer a phone appointment for Thursday, Jan 21, at 4 pm. He accepted and said that he will make sure to be available for the phone call. He understands that he needs to be available for this appointment to get a refill. He confirmed that he has one film left for today, so he will be okay until tomorrow. He was not able to make it to his appointment this morning because he stayed up late last night and dd not wake up in time.     Message sent to scheduling to request that the phone appointment be entered.

## 2021-01-20 NOTE — TELEPHONE ENCOUNTER
Bo Oconnor MD Strand, Mackenzie; Bo Delatorre RN   Caller: Unspecified (2 days ago, 12:50 PM)             Smiley Gaytan for the message.     Olanzapine 20 mg is currently what's in Laci's medication record.     Ashwagandha is a herbal preparation and there's not much known about any interactions between it and Laci's medications.     (I was gonna MyCjamest Laci myself, but he is not set up to use it.)     DB

## 2021-01-20 NOTE — TELEPHONE ENCOUNTER
[facility] Call [facility]    Phone Message    May a detailed message be left on voicemail: yes     Reason for Call: Medication Refill Request    Has the patient contacted the pharmacy for the refill? Yes   Name of medication being requested: suboxone  Provider who prescribed the medication: Darren  Pharmacy:    [facility]     Date medication is needed:   Pt doesn't want more than a week's supply because of his issues with abusing it    Action Taken: Message routed to:  Other: nursing pool    Travel Screening: Not Applicable

## 2021-01-20 NOTE — TELEPHONE ENCOUNTER
On January 20, 2021, at 9:34 AM, writer called patient at [phone] to confirm Virtual Visit. Writer unable to make contact with patient. Writer left detailed voice message for call back. [phone] left as call back number. Afsaneh Short, [facility]

## 2021-01-27 ENCOUNTER — TELEPHONE (OUTPATIENT)
Dept: PSYCHIATRY | Facility: CLINIC | Age: 26
End: 2021-01-27

## 2021-01-28 RX ORDER — AMLODIPINE BESYLATE 2.5 MG/1
2.5 TABLET ORAL DAILY
OUTPATIENT
Start: 2021-01-28

## 2021-01-28 NOTE — TELEPHONE ENCOUNTER
Refill denied has not been prescribed by  in the past  Med is historical  Called pharmacy and asked them to check in with primary care

## 2021-02-01 ENCOUNTER — TRANSFERRED RECORDS (OUTPATIENT)
Dept: HEALTH INFORMATION MANAGEMENT | Facility: CLINIC | Age: 26
End: 2021-02-01

## 2021-02-03 ENCOUNTER — TELEPHONE (OUTPATIENT)
Dept: PSYCHIATRY | Facility: CLINIC | Age: 26
End: 2021-02-03

## 2021-02-03 NOTE — TELEPHONE ENCOUNTER
On February 3, 2021, at 8:42 AM, writer called patient at [phone] to confirm Virtual Visit. Writer unable to make contact with patient. Writer left detailed voice message for call back. [phone] left as call back number. Afsaneh Short, [facility]

## 2021-03-25 DIAGNOSIS — F31.81 BIPOLAR 2 DISORDER (H): ICD-10-CM

## 2021-03-25 RX ORDER — LAMOTRIGINE 100 MG/1
200 TABLET ORAL AT BEDTIME
Qty: 60 TABLET | Refills: 0 | Status: SHIPPED | OUTPATIENT
Start: 2021-03-25 | End: 2021-04-06

## 2021-03-25 NOTE — TELEPHONE ENCOUNTER
"Writer received call from pt, who requested a refill of Lamictal and identified that he had independently increased his dose from 150 mg to 200 mg approximately 1.5 months prior. His last dose was two nights ago, so did not have a dose last night.  Pt identified that he made the change because he was \"feeling low\" and he feels that the change has been helpful for both his mood and maintaining sobriety.  Pt reported that he will be [facility] to get off Suboxone.    Last seen: 1/12  RTC: 3 months  Non-provider cancel: None  No-show: None  Next appt: 4/6     Incoming refill from patient via telephone     Medication requested:   Disp Refills Start End SERGIO   lamoTRIgine (LAMICTAL) 100 MG tablet 90 tablet 3 1/12/2021  No   Sig - Route: Take 1 tablet (100 mg) by mouth At Bedtime     Per 1/12/21 virtual visit note:  Lamotrigine 125 mg HS  From 12/9/20 telephone encounter:  I do not have any problem with him increasing his lamotrigine to 150 mg     Per med dispense history  Dispensed Days Supply Quantity Provider Pharmacy    LAMOTRIGINE 100 MG TABLET 03/06/2021 30 30 tablet BO OCONNOR [facility]...       Routed to provider due to dose change.          03/25/21 1617 Sign Bo Oconnor MD   E-Prescribing Status: Receipt confirmed by pharmacy (3/25/2021  4:21 PM CDT)              "

## 2021-03-29 DIAGNOSIS — F31.10 BIPOLAR I DISORDER, MOST RECENT EPISODE (OR CURRENT) MANIC (H): ICD-10-CM

## 2021-03-29 RX ORDER — OLANZAPINE 10 MG/1
20 TABLET ORAL AT BEDTIME
Qty: 60 TABLET | Refills: 0 | Status: SHIPPED | OUTPATIENT
Start: 2021-03-29 | End: 2021-04-06

## 2021-03-29 NOTE — TELEPHONE ENCOUNTER
[facility] Call [facility]    Phone Message    May a detailed message be left on voicemail: yes     Reason for Call: Medication Refill Request    Has the patient contacted the pharmacy for the refill? Yes   Name of medication being requested: Lamictal 200mg  Provider who prescribed the medication: Dr. Oconnor  Pharmacy:  [id number] - [address] [facility] [place] [address] [facility] [facility] [address] [facility] [address] [facility] [address]    Date medication is needed: ASAP, patient has been out for 2 days.         Action Taken: Message routed to:  Other: [facility]    Travel Screening: Not Applicable

## 2021-03-29 NOTE — TELEPHONE ENCOUNTER
"Last seen: 1/12  RTC: 3 months  Non-provider cancel: None  No-show: None  Next appt: 4/6     Incoming refill from pt via telephone. Pt stated that the medication has \"really helped me out.\"     Medication requested:   Disp Refills Start End SERGIO   OLANZapine (ZYPREXA) 10 MG tablet 180 tablet 3 1/12/2021  No   Sig - Route: Take 2 tablets (20 mg) by mouth At Bedtime    Pharmacy  [facility] - [place], [place] - [address] AT [address]       From med dispense report:  Dispensed Days Supply Quantity Provider Pharmacy    OLANZAPINE 10 MG TABLET 03/06/2021 30 60 tablet [facility]   OLANZAPINE 10 MG TABLET 01/25/2021 30 60 tablet [facility]       Medication refill approved per refill protocol.  Writer [facility][phone]). Qty 60, refills 0.              "

## 2021-03-29 NOTE — TELEPHONE ENCOUNTER
Writer called pt and identified that the refill was sent and apologized for not calling him at the time it was sent.    Pt identified interest in having a 5 mg PRN Zyprexa supply available. Writer identified plan to add reminder to 4/6 appointment.

## 2021-04-06 ENCOUNTER — VIRTUAL VISIT (OUTPATIENT)
Dept: PSYCHIATRY | Facility: CLINIC | Age: 26
End: 2021-04-06
Attending: PSYCHIATRY & NEUROLOGY
Payer: COMMERCIAL

## 2021-04-06 DIAGNOSIS — F31.10 BIPOLAR I DISORDER, MOST RECENT EPISODE (OR CURRENT) MANIC (H): ICD-10-CM

## 2021-04-06 DIAGNOSIS — F31.81 BIPOLAR 2 DISORDER (H): ICD-10-CM

## 2021-04-06 PROCEDURE — 99213 OFFICE O/P EST LOW 20 MIN: CPT | Mod: GT | Performed by: PSYCHIATRY & NEUROLOGY

## 2021-04-06 RX ORDER — LAMOTRIGINE 100 MG/1
200 TABLET ORAL AT BEDTIME
Qty: 60 TABLET | Refills: 0 | Status: SHIPPED | OUTPATIENT
Start: 2021-04-06 | End: 2021-06-25

## 2021-04-06 RX ORDER — OLANZAPINE 10 MG/1
20 TABLET ORAL AT BEDTIME
Qty: 60 TABLET | Refills: 0 | Status: SHIPPED | OUTPATIENT
Start: 2021-04-06 | End: 2021-05-05

## 2021-04-06 RX ORDER — HYDROXYZINE PAMOATE 25 MG/1
CAPSULE ORAL
Qty: 120 CAPSULE | Refills: 1 | Status: SHIPPED | OUTPATIENT
Start: 2021-04-06 | End: 2021-09-08

## 2021-04-06 NOTE — PATIENT INSTRUCTIONS
You may use hydroxyzine 25 mg daily as needed and 25 to 50 mg at night as needed for anxiety and sleep    Continue your other medications as usual    Please return for follow-up in 3 months          **For crisis resources, please see the information at the end of this document**     Patient Education      Thank you for coming to the [facility] [place] [facility].    Lab Testing:  If you had lab testing today and your results are reassuring or normal they will be mailed to you or sent through [employer] within 7 days. If the lab tests need quick action we will call you with the results. The phone number we will call with results is # [phone] (home) . If this is not the best number please call our clinic and change the number.    Medication Refills:  If you need any refills please call your pharmacy and they will contact us. Our fax number for refills is [phone]. Please allow three business for refill processing. If you need to  your refill at a new pharmacy, please contact the new pharmacy directly. The new pharmacy will help you get your medications transferred.     Scheduling:  If you have any concerns about today's visit or wish to schedule another appointment please call our office during normal business hours [phone] (8-5:00 M-F)    Contact Us:  Please call [phone] during business hours (8-5:00 M-F).  If after clinic hours, or on the weekend, please call  [phone].    Financial Assistance [phone]  [facility] Billing [phone]  Central Billing Office, ealth: [phone]  [facility] Billing [phone]  Medical Records [phone]  [facility] Patient Bill of Rights [email or web address]       MENTAL HEALTH CRISIS NUMBERS:  For a medical emergency please call  911 or go to the nearest ER.     [facility]:   [facility] -[phone]   Crisis Residence [facility] -[phone]   Walk-In Counseling Center [facility] -[phone]   COPE 24/7 [facility] Mobile Team -[phone] (adults)/[phone] (child)  CHILD: [facility] needs assessment team - [phone]      [facility]:   [facility] - [phone]   Walk-in counseling [facility] - [phone]   Walk-in counseling [facility] - [phone]   Crisis Residence [facility] - [phone]  Urgent Care Adult Mental [facility]-[phone] mobile unit/ 24/7 crisis line    National Crisis Numbers:   National Suicide Prevention Lifeline: 7-914-124-TALK (427-418-9356)  Poison Control Center - 5-318-800-3284  [employer]/resources for a list of additional resources (SOS)  Trans Lifeline a hotline for transgender people 1-480.592.2111  The [employer] Project a hotline for LGBT youth 9-781-519-7229  Crisis Text Line: For any crisis 24/7   To: 477502  see www.crisistextline.org  - IF MAKING A CALL FEELS TOO HARD, send a text!         Again thank you for choosing [facility] MENTAL HEALTH & ADDICTION [facility] and please let us know how we can best partner with you to improve you and your family's health.    You may be receiving a survey regarding this appointment. We would love to have your feedback, both positive and negative. The survey is done by an external company, so your answers are anonymous.

## 2021-04-06 NOTE — PROGRESS NOTES
"VIDEO VISIT  Laci Hinkle Jr is a 25 year old patient who is being evaluated via a billable video visit.      The patient has been notified of following:   \"This video visit will be conducted via a call between you and your physician/provider. We have found that certain health care needs can be provided without the need for an in-person physical exam. This service lets us provide the care you need with a video conversation. If a prescription is necessary we can send it directly to your pharmacy. If lab work is needed we can place an order for that and you can then stop by our lab to have the test done at a later time. Insurers are generally covering virtual visits as they would in-office visits so billing should not be different than normal.  If for some reason you do get billed incorrectly, you should contact the billing office to correct it and that number is in the AVS .    Video Conference to be completed via:  [email or web address]    Patient has given verbal consent for video visit?:  Yes    Patient would prefer that any video invitations be sent by: Send to e-mail at: [email or web address]      How would patient like to obtain AVS?:  Mail a copy    AVS SmartPhrase [PsychAVS] has been placed in 'Patient Instructions':  Yes  "

## 2021-04-06 NOTE — PROGRESS NOTES
Psychiatry Clinic Follow-up Note    TELEPHONE VISIT  Laci Hinkle Jr is a 25 year old pt. who is being evaluated via a billable telephone visit.      The patient has been notified of the following:    We have found that certain health care needs can be provided without the need for a physical exam. This service lets us provide the care you need with a short phone conversation. If a prescription is necessary we can send it directly to your pharmacy. If lab work is needed we can place an order for that and you can then stop by our lab to have the test done at a later time. Insurers are generally covering virtual visits as they would in-office visits so billing should not be different than normal.  If for some reason you do get billed incorrectly, you should contact the billing office to correct it and that number is in the AVS .    Patient has given verbal consent for a telephone visit?:  Yes   How would the pt like to obtain the AVS?:  [employer] SmartPhrase [PsychAVS] has been placed in 'Patient Instructions':  Yes     Start Time:  4:15 PM          End Time:  430        Laci Hinkle Jr. MRN# [id number]   Age: 24 year old YOB: 1995          Assessment:     Since the last visit, Laci reports that he has been adherent with his usual medications.  His mood is stable and he denies symptoms of depression or minda.  He will soon be starting a [facility]-day [facility], and is nervous about this.  He accepts a trial of as needed hydroxyzine to target anxiety.  He will otherwise continue his usual medications.  He will return for follow-up in 3 months.    Attestation:  Patient has been evaluated by me, Bo Oconnor MD, PhD.         Diagnoses:     Axis I: Bipolar I disorder, currently euthymic; polysubstance dependence     Axis II: Deferred    Axis III: Medication-induced weight gain; hypertension    Axis IV: mild  psychosocial stressors     Axis V: Global Assessment of  Functionin-60    Interim History     Since the last visit Laci reports things have been going well.  His mood is stable.  He denies symptoms of depression or minda.    Laci continues to use substances and has decided to start a 21-day inpatient stay at [facility].  He is looking forward to this but also expresses some anxiety.  He has responded well to hydroxyzine in the past and accepts a trial of this, 25 mg twice daily as needed +25 to 50 mg at bedtime as needed.    Laci will return for follow-up in 3 months.  He will continue his other usual medications.    Review of Systems     A comprehensive review of systems was performed and is negative other than noted above.          Allergies:      Allergies   Allergen Reactions     Prednisone Other (See Comments)     psych problems     Current Medications     Depakote 2000 mg HS  Gabapentin 100 mg daily  Lamotrigine 125 mg HS  Olanzapine 10 mg HS    Mental Status Exam     Alertness: alert  and oriented  Behavior/Demeanor: cooperative, pleasant and calm,  Speech: normal  Language: intact  Psychomotor: normal or unremarkable  Mood: description consistent with euthymia  Affect: full-range; was congruent to mood  Thought Process/Associations: unremarkable  Thought Content:  Denies active/passive suicidal ideation  Perception:  Denies hallucinations  Insight: fair  Judgment: fair  Cognition:  does appear grossly intact.

## 2021-05-05 DIAGNOSIS — F31.10 BIPOLAR I DISORDER, MOST RECENT EPISODE (OR CURRENT) MANIC (H): ICD-10-CM

## 2021-05-05 RX ORDER — OLANZAPINE 10 MG/1
20 TABLET ORAL AT BEDTIME
Qty: 60 TABLET | Refills: 0 | Status: SHIPPED | OUTPATIENT
Start: 2021-05-05 | End: 2021-08-18

## 2021-05-05 NOTE — TELEPHONE ENCOUNTER
Medication requested: OLANZapine (ZYPREXA) 10 MG tablet  Last refilled: 4/6/21  Qty: 60/0      Last seen: 4/6/2021  RTC: 3 months  Cancel: 0  No-show: 0  Next appt: 0    Refill decision:   Refilled for 30 days per protocol.

## 2021-05-28 ENCOUNTER — TELEPHONE (OUTPATIENT)
Dept: PSYCHIATRY | Facility: CLINIC | Age: 26
End: 2021-05-28

## 2021-05-28 NOTE — TELEPHONE ENCOUNTER
Writer received incoming phone call from STEPHAN Egan CM with [facility]. He's currently covering for the pt's usual CM who is out. Per STEPHAN Egan has not been in contact with the pt since April and wanted to confirm if this clinic has been in touch with the pt. Confirmed that pt attended his appt on 4/6 with a plan to f/up on 7/6. Confirmed that there has been no communication with the pt since 4/6. Jignesh reports he went to the pt's address, but does not have an apartment number and could not confirm which unit the pt is in. The address Jignesh has on file is different than what is in epic. Jignesh believes the address in [facility] is the pt's mother's address. He will try contacting her next.

## 2021-06-08 ENCOUNTER — TELEPHONE (OUTPATIENT)
Dept: PSYCHIATRY | Facility: CLINIC | Age: 26
End: 2021-06-08

## 2021-06-08 NOTE — TELEPHONE ENCOUNTER
On June 8, 2021, at 1:56 PM, writer called patient at [phone] to confirm Virtual Visit. Writer unable to make contact with patient. Writer unable to leave detailed voice mail message due to no option. Afsaneh Short, CMA

## 2021-06-24 DIAGNOSIS — F31.81 BIPOLAR 2 DISORDER (H): ICD-10-CM

## 2021-06-25 RX ORDER — LAMOTRIGINE 100 MG/1
200 TABLET ORAL AT BEDTIME
Qty: 60 TABLET | Refills: 0 | Status: SHIPPED | OUTPATIENT
Start: 2021-06-25 | End: 2021-09-07

## 2021-06-25 NOTE — TELEPHONE ENCOUNTER
Medication requested: lamoTRIgine (LAMICTAL) 100 MG tablet  Last refilled: 4/8/21  Qty: 60      Last seen: 4/6/21  RTC: 3 months  Cancel: 0  No-show: x 1  Next appt: 0    Refill decision:   Refill pended and routed to the provider for review/determination due to   NO SHOW X 1  Scheduling has been notified to contact the pt for appointment.

## 2021-07-09 ENCOUNTER — MEDICAL CORRESPONDENCE (OUTPATIENT)
Dept: HEALTH INFORMATION MANAGEMENT | Facility: CLINIC | Age: 26
End: 2021-07-09

## 2021-07-16 NOTE — TELEPHONE ENCOUNTER
14 faxed pages, including WU, was received from [facility] requesting completion of [facility] DA forms. The forms are currently held in Nurse Triage awaiting a callback from Velma Hickey - AYO to see if last Progress Notes will suffice.Ladonna Sotelo LPN   ASSESSMENT AND PLAN:  1. Skin cancer screening   Discussed with patient no lesions concerning for NMSC (non-melanoma skin cancer) or other skin cancers.  Suggest sunscreens, monthly self-examinations, and yearly total skin exam: July 2022 Patient to watch for the ABCDE's of pigmented lesions or persistent crusts, erosions, irritated areas.  Technique of skin self-exam discussed with patient and given the AAD (American Academy of Dermatology) information on this.   Observe closely for skin damage/changes, and call if such occurs.    2. Multiple melanocytic nevi benign  Please monitor your moles once monthly for any:   Asymmetry   Border irregularity   Color change or multiple colors   Diameter that is changing   Evolution (any change at all)   If any of the moles concern you please do not hesitate to call our office so that we may reassess the moles.  Also, feel free to call our office to reassess any new moles that you notice.   Wear an SPF of at least 30 daily and especially during any sun exposure.   Minimize sun exposure between the hours of 10 AM and 3 PM, when the sun's rays are the strongest.    3. Seborrheic Keratosis - Posterior scalp, back, chest, abdomen  Stuck-on hyperkeratotic, tan waxy papules.  Discussed with patient lesion benign, no concern for malignancy today.  Advised PT lesions can be removed for cosmesis    4. Angioma - Left frontal scalp, chest , abdomen  Uniform dome-shaped 1-2 mm red papules.  Discussed benign nature of lesions.  Follow.  Call if change.    4. Abscess- under breast  Recommend using cetaphil anti bacterial for the areas  Then apply conrstarch during the day   Apply Clindamycin lotion at night

## 2021-07-19 ENCOUNTER — HOSPITAL ENCOUNTER (EMERGENCY)
Facility: CLINIC | Age: 26
Discharge: HOME | End: 2021-07-19
Attending: EMERGENCY MEDICINE | Admitting: EMERGENCY MEDICINE
Payer: COMMERCIAL

## 2021-07-19 VITALS
DIASTOLIC BLOOD PRESSURE: 105 MMHG | WEIGHT: 240 LBS | BODY MASS INDEX: 35.44 KG/M2 | RESPIRATION RATE: 16 BRPM | OXYGEN SATURATION: 96 % | HEART RATE: 114 BPM | SYSTOLIC BLOOD PRESSURE: 160 MMHG | TEMPERATURE: 97.7 F

## 2021-07-19 DIAGNOSIS — F10.20 UNCOMPLICATED ALCOHOL DEPENDENCE (H): ICD-10-CM

## 2021-07-19 LAB — ALCOHOL BREATH TEST: 0.07 (ref 0–0.01)

## 2021-07-19 PROCEDURE — 99284 EMERGENCY DEPT VISIT MOD MDM: CPT | Performed by: EMERGENCY MEDICINE

## 2021-07-19 PROCEDURE — 82075 ASSAY OF BREATH ETHANOL: CPT | Performed by: EMERGENCY MEDICINE

## 2021-07-19 PROCEDURE — 99283 EMERGENCY DEPT VISIT LOW MDM: CPT | Performed by: EMERGENCY MEDICINE

## 2021-07-19 ASSESSMENT — ENCOUNTER SYMPTOMS
PSYCHIATRIC NEGATIVE: 1
GASTROINTESTINAL NEGATIVE: 1
DYSPHORIC MOOD: 0
HALLUCINATIONS: 0

## 2021-07-19 NOTE — ED PROVIDER NOTES
ED Provider Note  [facility]      History     Chief Complaint   Patient presents with     Alcohol Problem     Here for detox from alcohol, drinks a liter of vodka daily, last drink was at 1:30 pm today     HPI  Laci Hinkle Jr is a 25 year old male with hx of alcohol dependence, bipolar disorder, CEASAR, hypertension who presents hoping to go to detox here and then [facility].  He says he has been drinking about 1 L daily of david or vodka daily for the past month.  Prior to that he was drinking about 15 beers per day.  Last use prior to arrival.  He says he gets shaky when withdrawing as well as has seen things in the past.  Denies withdrawal seizures.  He wants to come here and would consider [email or web address] but won't go to [address] [place] [address] detox or [place] detox.  He has missed his medicines for a couple of days.  He feels he should take them again.  Denies si/hi/hallucinations.  He was working with [facility] to get into [facility].     Past Medical History  Past Medical History:   Diagnosis Date     ADHD (attention deficit hyperactivity disorder) 3/18/2011     Bipolar affective disorder (H) 3/18/2011     CEASAR (generalized anxiety disorder) 3/18/2011     GERD (gastroesophageal reflux disease)      Hyperlipidemia LDL goal <130      Impaired fasting glucose      Obesity      Substance abuse (H) 3/18/2011     Past Surgical History:   Procedure Laterality Date     ENT SURGERY  2010    jaw surgery      amLODIPine (NORVASC) 2.5 MG tablet  atorvastatin (LIPITOR) 20 MG tablet  baclofen (LIORESAL) 10 MG tablet  buprenorphine HCl-naloxone HCl (SUBOXONE) 2-0.5 MG per film  cloNIDine (CATAPRES) 0.1 MG tablet  disulfiram (ANTABUSE) 250 MG tablet  divalproex sodium extended-release (DEPAKOTE ER) 500 MG 24 hr tablet  hydrOXYzine (VISTARIL) 25 MG capsule  lamoTRIgine (LAMICTAL) 100 MG tablet  metoclopramide (REGLAN) 10 MG tablet  nicotine polacrilex (EQ NICOTINE) 2 MG gum  OLANZapine (ZYPREXA) 10 MG  tablet  omeprazole (PRILOSEC) 20 MG capsule      Allergies   Allergen Reactions     Prednisone Other (See Comments)     psych problems     Family History  Family History   Problem Relation Age of Onset     Hypertension Father      Alcohol/Drug Father         various substances     Psychotic Disorder Father         bipolar, anxiety, ADHD     Social History   Social History     Tobacco Use     Smoking status: Current Every Day Smoker     Packs/day: 1.00     Types: Cigarettes     Smokeless tobacco: Former User     Types: Snuff, Chew     Tobacco comment: and e-cig,    Substance Use Topics     Alcohol use: Yes     Comment: drinks daily     Drug use: Yes     Comment: stopped marijuana      Past medical history, past surgical history, medications, allergies, family history, and social history were reviewed with the patient. No additional pertinent items.       Review of Systems   Gastrointestinal: Negative.    Psychiatric/Behavioral: Negative.  Negative for dysphoric mood, hallucinations, self-injury and suicidal ideas.   All other systems reviewed and are negative.    A complete review of systems was performed with pertinent positives and negatives noted in the HPI, and all other systems negative.    Physical Exam   BP: (!) 160/105  Pulse: 114  Temp: 97.7  F (36.5  C)  Resp: 16  Weight: 108.9 kg (240 lb)  SpO2: 96 %  Physical Exam  Vitals and nursing note reviewed.   Constitutional:       General: He is not in acute distress.     Appearance: He is obese. He is not ill-appearing, toxic-appearing or diaphoretic.   HENT:      Head: Normocephalic and atraumatic.      Right Ear: External ear normal.      Left Ear: External ear normal.      Nose: No congestion or rhinorrhea.   Eyes:      General: No scleral icterus.     Extraocular Movements: Extraocular movements intact.   Cardiovascular:      Rate and Rhythm: Tachycardia present.   Pulmonary:      Effort: Pulmonary effort is normal.      Comments: Speaks in full  sentences  Musculoskeletal:         General: Normal range of motion.      Cervical back: Normal range of motion.   Neurological:      General: No focal deficit present.      Mental Status: He is alert and oriented to person, place, and time.   Psychiatric:         Attention and Perception: Attention and perception normal.         Mood and Affect: Mood and affect normal.         Speech: Speech normal.         Behavior: Behavior normal. Behavior is cooperative.         Thought Content: Thought content normal.         Cognition and Memory: Cognition and memory normal.         Judgment: Judgment normal.         ED Course      Procedures       The medical record was reviewed and interpreted.  Current labs reviewed and interpreted.       Results for orders placed or performed during the hospital encounter of 07/19/21   Alcohol breath test POCT     Status: Abnormal   Result Value Ref Range    Alcohol Breath Test 0.070 (A) 0.00 - 0.01     Medications - No data to display     Assessments & Plan (with Medical Decision Making)   The patient has hx of alcohol dependence, bipolar disorder, CEASAR who presents to the ED seeking detox for alcohol use.  He says he has been drinking 1 L of hard liquor daily for the past month.  He denies withdrawal seizures.  He does get shaky and says he was seeing things in the past.  He declines going to a [facility] detox.  [facility] and [place] detox are both full.  He has chosen to leave and return at another date to see if a detox bed is available.   I have reviewed the nursing notes. I have reviewed the findings, diagnosis, plan and need for follow up with the patient.    New Prescriptions    No medications on file       Final diagnoses:   Uncomplicated alcohol dependence (H)       --  Kathryn ROSARIO [facility] EMERGENCY DEPARTMENT  7/19/2021     Kathryn Nagy MD  07/19/21 0089

## 2021-07-19 NOTE — DISCHARGE INSTRUCTIONS
There are no detox beds available here or at [facility].  Patient declined checking [facility] detox ([address] or [place])    You can call [facility] for our detox bed availability. [phone].

## 2021-07-22 ENCOUNTER — OFFICE VISIT (OUTPATIENT)
Dept: BEHAVIORAL HEALTH | Facility: CLINIC | Age: 26
End: 2021-07-22
Payer: COMMERCIAL

## 2021-07-22 ENCOUNTER — HOSPITAL ENCOUNTER (OUTPATIENT)
Dept: BEHAVIORAL HEALTH | Facility: CLINIC | Age: 26
End: 2021-07-22
Attending: FAMILY MEDICINE
Payer: COMMERCIAL

## 2021-07-22 ENCOUNTER — TELEPHONE (OUTPATIENT)
Dept: BEHAVIORAL HEALTH | Facility: CLINIC | Age: 26
End: 2021-07-22

## 2021-07-22 VITALS
HEIGHT: 69 IN | HEART RATE: 81 BPM | WEIGHT: 244 LBS | SYSTOLIC BLOOD PRESSURE: 165 MMHG | BODY MASS INDEX: 36.14 KG/M2 | DIASTOLIC BLOOD PRESSURE: 97 MMHG

## 2021-07-22 VITALS
TEMPERATURE: 97.8 F | BODY MASS INDEX: 36.14 KG/M2 | HEIGHT: 69 IN | HEART RATE: 93 BPM | SYSTOLIC BLOOD PRESSURE: 156 MMHG | WEIGHT: 244 LBS | OXYGEN SATURATION: 98 % | DIASTOLIC BLOOD PRESSURE: 108 MMHG

## 2021-07-22 DIAGNOSIS — F11.20 POLYSUBSTANCE (INCLUDING OPIOIDS) DEPENDENCE, DAILY USE (H): ICD-10-CM

## 2021-07-22 DIAGNOSIS — F11.93 OPIOID WITHDRAWAL (H): ICD-10-CM

## 2021-07-22 DIAGNOSIS — F11.90 OPIOID USE DISORDER: Primary | ICD-10-CM

## 2021-07-22 DIAGNOSIS — F17.200 NICOTINE DEPENDENCE: Primary | ICD-10-CM

## 2021-07-22 DIAGNOSIS — G25.81 RESTLESS LEG SYNDROME: ICD-10-CM

## 2021-07-22 DIAGNOSIS — F19.20 POLYSUBSTANCE (INCLUDING OPIOIDS) DEPENDENCE, DAILY USE (H): ICD-10-CM

## 2021-07-22 LAB — SARS-COV-2 RNA RESP QL NAA+PROBE: NEGATIVE

## 2021-07-22 PROCEDURE — 99205 OFFICE O/P NEW HI 60 MIN: CPT | Performed by: FAMILY MEDICINE

## 2021-07-22 PROCEDURE — 1002N00001 HC LODGING PLUS FACILITY CHARGE ADULT

## 2021-07-22 PROCEDURE — 87635 SARS-COV-2 COVID-19 AMP PRB: CPT | Performed by: FAMILY MEDICINE

## 2021-07-22 PROCEDURE — H2035 A/D TX PROGRAM, PER HOUR: HCPCS | Mod: HQ

## 2021-07-22 RX ORDER — LORATADINE 10 MG/1
10 TABLET ORAL DAILY
COMMUNITY
End: 2021-08-05

## 2021-07-22 RX ORDER — MAGNESIUM HYDROXIDE/ALUMINUM HYDROXICE/SIMETHICONE 120; 1200; 1200 MG/30ML; MG/30ML; MG/30ML
30 SUSPENSION ORAL EVERY 6 HOURS PRN
COMMUNITY
End: 2021-08-05

## 2021-07-22 RX ORDER — AMOXICILLIN 250 MG
2 CAPSULE ORAL DAILY PRN
COMMUNITY
End: 2022-12-21

## 2021-07-22 RX ORDER — ACETAMINOPHEN 325 MG/1
325-650 TABLET ORAL EVERY 4 HOURS PRN
COMMUNITY
End: 2021-08-05

## 2021-07-22 RX ORDER — IBUPROFEN 200 MG
400 TABLET ORAL EVERY 6 HOURS PRN
COMMUNITY
End: 2021-08-05

## 2021-07-22 RX ORDER — LANOLIN ALCOHOL/MO/W.PET/CERES
3 CREAM (GRAM) TOPICAL
COMMUNITY
End: 2021-08-05

## 2021-07-22 RX ORDER — PRAMIPEXOLE DIHYDROCHLORIDE 0.25 MG/1
0.25 TABLET ORAL
Qty: 30 TABLET | Refills: 1 | Status: SHIPPED | OUTPATIENT
Start: 2021-07-22 | End: 2021-08-05

## 2021-07-22 RX ORDER — BUPRENORPHINE AND NALOXONE 2; .5 MG/1; MG/1
FILM, SOLUBLE BUCCAL; SUBLINGUAL
Qty: 6 FILM | Refills: 0 | Status: SHIPPED | OUTPATIENT
Start: 2021-07-22 | End: 2021-07-23

## 2021-07-22 RX ORDER — ONDANSETRON 4 MG/1
4-8 TABLET, ORALLY DISINTEGRATING ORAL EVERY 8 HOURS PRN
Qty: 30 TABLET | Refills: 0 | Status: SHIPPED | OUTPATIENT
Start: 2021-07-22 | End: 2021-08-05

## 2021-07-22 ASSESSMENT — ANXIETY QUESTIONNAIRES
4. TROUBLE RELAXING: MORE THAN HALF THE DAYS
5. BEING SO RESTLESS THAT IT IS HARD TO SIT STILL: NEARLY EVERY DAY
7. FEELING AFRAID AS IF SOMETHING AWFUL MIGHT HAPPEN: SEVERAL DAYS
6. BECOMING EASILY ANNOYED OR IRRITABLE: NOT AT ALL
3. WORRYING TOO MUCH ABOUT DIFFERENT THINGS: NOT AT ALL
GAD7 TOTAL SCORE: 6
2. NOT BEING ABLE TO STOP OR CONTROL WORRYING: NOT AT ALL
1. FEELING NERVOUS, ANXIOUS, OR ON EDGE: NOT AT ALL
IF YOU CHECKED OFF ANY PROBLEMS ON THIS QUESTIONNAIRE, HOW DIFFICULT HAVE THESE PROBLEMS MADE IT FOR YOU TO DO YOUR WORK, TAKE CARE OF THINGS AT HOME, OR GET ALONG WITH OTHER PEOPLE: SOMEWHAT DIFFICULT

## 2021-07-22 ASSESSMENT — PATIENT HEALTH QUESTIONNAIRE - PHQ9: SUM OF ALL RESPONSES TO PHQ QUESTIONS 1-9: 22

## 2021-07-22 ASSESSMENT — MIFFLIN-ST. JEOR
SCORE: 2077.16
SCORE: 2077.16

## 2021-07-22 NOTE — PROGRESS NOTES
Progress Note    This patient had a Assessment and Placement Summary Update on 7-5-21 completed by Velma Barlow at [facility].  This patient was seen for a face to face update of the Assessment and Placement Summary Updatet on 7-22-21 by LEELA Mckenna-T.  OUTSIDE: A paper copy of the Assessment and Placement Summary Update will be placed in the patient's paper chart until being scanned into the patient's electronic medical record in Epic under the Media tab.    Alcohol/Drug use since the last CD evaluation (include date of last use):     No additional substances use since the last CD evaluation. Pt reports last use dates of 7-21-21 for marijuana, 7-5-21 for methamphetamine, 7-18-21 for alcohol, 7-21-21 for suboxone. Pt denies any withdrawal symptoms.      Please note any other clinical changes since the last CD evaluation (such as medication changes, additional legal charges, detoxification admissions, overdoses, etc.)     No significant changes since the last CD evaluation        ASAM Dimensions Original scores Current Scores   I.) Intoxication and Withdrawal: 0 0   II.) Biomedical:  0 0   III.) Emotional and Behavioral:  2 2   IV.) Readiness to Change:  2 2   V.) Relapse Potential: 4 4   VI.) Recovery Environmental: 3 3     Please list clinical justifications for the above ASAM score changes since the original comprehensive assessment:     None of the ASAM scores on the six dimensions had changed since the Assessment and Placement Summary Update was completed on 7-5-21.       Current RICHARD: Current UA:     0.000     Positive for Buprenorphine, THC and tricyclic anti-depressants and negative for all other screened drugs.       PHQ-9, CEASAR-7   PHQ-9 on 7/22/2021  CEASAR-7 on 5/18/2021 7/22/2021    The patient's PHQ-9 score was 22 out of 27, indicating severe depression. The patient's CEASAR-7 score was 6 out of 21, indicating mild anxiety.       Turner-Suicide Severity Rating Scale Reassessment   Have you  ever wished you were dead or that you could go to sleep and not wake up?  Past Month:  No     Have you actually had any thoughts of killing yourself?  Past Month: No     Have you been thinking about how you might do this?     Past Month:  No   Lifetime:  Yes, Describe: getting a gun   Have you had these thoughts and had some intention of acting on them?     Past Month:  No   Lifetime:  No   Have you started to work out the details of how to kill yourself?   Past Month:  No   Lifetime:  No   Do you intend to carry out this plan?   No     When you have the thoughts how long do they last?  4-8 hours/most of day     Are there things - anyone or anything (i.e. family, Restoration, pain of death) that stopped you from wanting to die or acting on thoughts of suicide? Protective factors definitely stopped you from attempting suicide       2008  [employer]  Used with permission by Margret Abreu, PhD.       Guide to C-SSRS Risk Ratings   NO IDEATION:  with no active thoughts IDEATION: with a wish to die. IDEATION: with active thoughts. Risk Ratings   If Yes No No 0 - Very Low Risk   If NA Yes No 1 - Low Risk   If NA Yes Yes 2 - Low/moderate risk   IDEATION: associated thoughts of methods without intent or plan INTENT: Intent to follow through on suicide PLAN: Plan to follow through on suicide Risk Ratings cont...   If Yes No No 3 - Moderate Risk   If Yes Yes No 4 - High Risk   If Yes Yes Yes 5 - High Risk   The patient's ADDITIONAL RISK FACTORS and lack of PROTECTIVE FACTORS may increase their overall suicide risk ratings.     Additional Risk Factors:    Tendency to be socially isolated and/or cut off from the support of others     Significant history of trauma and/or abuse issues     History of impulsive or aggressive behaviors   Protective Factors:    Having people in his/her life that would prevent the patient from considering a suicide attempt (i.e. young children, spouse, parents,  "etc.)     Having pet(s) that give companionship and/or would prevent the patient from considering a suicide attempt     An absence of mental health issues or stable and well treated mental health issues     An absence of chronic health problems or stable and well treated chronic health issues     A positive relationship with his/her clinical medical and/or mental health providers     Having easy access to supportive family members     Having cultural, Orthodoxy or spiritual beliefs that discourage suicide     Having restricted access to highly lethal means of suicide     Risk Status     2. - Low/moderate risk: Document in Epic / SBAR to counselor  Collaborate with patient / client to develop \"Patient Safety Plan\"  Address in Treatment Plan     Additional information to support suicide risk rating: There was no additional information to provide at this time.           "

## 2021-07-22 NOTE — TELEPHONE ENCOUNTER
"Dr Hastings, sending you a pt that admitted to [facility] today.  He stated that he is \"addicted to Suboxone\" and does not want to be on it anymore.  Wants to wean off.  He is currently taking 2mg every other day.  He reports getting it from friends.  In the past has been prescribed.    So, I will bring him as a walk in today    TY    Nani  "
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
## 2021-07-22 NOTE — PROGRESS NOTES
Initial Services Plan        Before your first treatment group, please do the following    Immediate health & safety concerns: Go to the emergency room if you start to have withdrawal symptoms.  Look for sober housing and a supportive social network.  Look for a support network (such as AA, NA, DBT group, a Holiness group, etc.)    Suggestions for client during the time between intake & completion of treatment plan:  Tour your treatment center (unit or outpatient clinic).  Introduce yourself to the treatment group.  Spend time getting to know your peers.  Complete your psycho-social paperwork.  Complete the problem list for your treatment plan.  Start drug and alcohol use history.  Review your patient or client handbook.  Identify concerns about whom to ask for family week    Client issues to be addressed in the first treatment sessions:  Identify motivations(s) for coming to treatment, i.e. legal, family, job, self  Identify concerns about coming into treatment, i.e. fear of failing again, sharing a room in treatment  Identify outside concerns that may interfere with treatment, i.e. bills not getting paid, homesick for children    MEERA Mckenna  7/22/2021  9:13 AM

## 2021-07-22 NOTE — PROGRESS NOTES
[facility] - Recovery Clinic Initial Visit    ASSESSMENT/PLAN                                                    1. Opioid use disorder (H)  Pt with h/o heroin use as a teenager,  previous methadone therapy age 17, kratom use [id number], has taken buprenorphine from prescription then nonprescription sources since 2019. Most recently taking 2mg buprenorphine every other day due to severe restless legs, insomnia, other withdrawal symptoms if he tries to discontinue.  Pt is very clear he wants to discontinue buprenorphine at this time.   After some discussion of options available and patient's symptoms and previous experiences, pt agreed to buprenorphine taper.   Will start with alternating 2mg/1mg doses every other day for one week then 1mg every other day.  Will reevaluate as noted below; pt is welcome to return at any time if problems.   Encouraged him to utilize his currently prescribed clonidine 0.1mg tid prn and hydroxyzine 25mg bid and 50mg at bedtime as needed for withdrawal symptoms.  zofran 4-8mg q8hr prn nausea added as well.     - buprenorphine HCl-naloxone HCl (SUBOXONE) 2-0.5 MG per film; 7/24 1/2 film sl, 7/26 1 film sl, 7/28 1/2 film sl, 7/30 1 film, 8/1 1/2 film, 8/3 1/2 film 8/5 1/2 film  Dispense: 6 Film; Refill: 0    2. Restless leg syndrome  Trial of pramipexole to address these symptoms associated with opioid withdrawal. Pt declined rx for gabapentin due to previously misusing gabapentin per his report.  - pramipexole (MIRAPEX) 0.25 MG tablet; Take 1 tablet (0.25 mg) by mouth nightly as needed (restless leg symptoms)  Dispense: 30 tablet; Refill: 1    3. Polysubstance (including opioids) dependence, daily use (H)  Proceed with [facility]  Reevaluate cravings for other substances at follow up visit.         Return in 12 days (on 8/3/2021) for Follow up, in person walk in hours 1:30-4:30p..      SUBJECTIVE                                                      CC/HPI:  Laci Hinkle Jr  is a 25 year old male with PMH bipolar disorder, CEASAR, ADHD, and PSUD including opioids who presents to the Recovery Clinic for initial visit.  Pt was referred by staff in [facility] due to pt's desire to taper off buprenorphine.   Pt decided to seek treatment at this time due to wanting to stop use of substances to allow him to take necessary steps to develop a career he enjoys.    Substance Use History :  Opioids:    Pt states he use heroin as a teenager for 4 months, last use age 17.  He started methadone treatment age 17, dose up to 100mg, then tapered off to 6mg/day then stopped.  He began using kratom ~2016, used larger amounts up to 100g daily.  He was first prescribed buprenorphine for OUD in 2019. He took as prescribed for 1-2 years, then continued through nonprescription sources, currently taking 2mg every other day.  He has tried to stop using buprenorphine but the withdrawal symptoms have been prohibitive, particularly restless legs.   IV drug use: No   History of overdose: No  Previous treatments : Yes: multiple, currently in [facility] 11/2020  Longest period of sobriety: few months  Medical complications related to substance use: exacerbation of MH diagnoses  Hepatitis C Status  unknown  HIV Status unknown    Other recent substance use:  Stimulants: used methamphetamine ages [age], stopped for 5 years, then resumed age 24.  Had been using 2-3x/week for the last year, returned to use after leaving [place] 11/2020.   Sedatives/hypnotics/anxiolytics: has used in past, denies regular use  Alcohol: h/o drinking 1 L liquor daily, returned to use after leaving [place] 11/2020  Tobacco: 1 ppd and ENDS  Cannabis: occasional  Hallucinogens:has used in past, denies recent  Behavioral addictions: no previous treatment for gambling per [facility] Prescription Drug Monitoring Program Reviewed:  Yes; as expected        Past Medical History:   Diagnosis Date     ADHD (attention deficit  hyperactivity disorder) 3/18/2011     Bipolar affective disorder (H) 3/18/2011     CEASAR (generalized anxiety disorder) 3/18/2011     GERD (gastroesophageal reflux disease)      Hyperlipidemia LDL goal <130      Impaired fasting glucose      Obesity      Substance abuse (H) 3/18/2011         PAST PSYCHIATRIC HISTORY:  Diagnoses- bipolar disorder, CEASAR, ADHD  Suicide Attempts: No   Hospitalizations: Yes     Past Surgical History:   Procedure Laterality Date     ENT SURGERY  2010    jaw surgery        Medications:  acetaminophen (TYLENOL) 325 MG tablet, Take 325-650 mg by mouth every 4 hours as needed for mild pain  alum & mag hydroxide-simethicone (MAALOX) 200-200-20 MG/5ML SUSP suspension, Take 30 mLs by mouth every 6 hours as needed for indigestion  amLODIPine (NORVASC) 2.5 MG tablet, Take 2.5 mg by mouth daily  atorvastatin (LIPITOR) 20 MG tablet, 20 mg daily  cloNIDine (CATAPRES) 0.1 MG tablet, Take 1 tablet (0.1 mg) by mouth 3 times daily as needed (opioid withdrawal.)  disulfiram (ANTABUSE) 250 MG tablet, Take 1 tablet (250 mg) by mouth daily  divalproex sodium extended-release (DEPAKOTE ER) 500 MG 24 hr tablet, Take 4 tablets (2,000 mg) by mouth daily  guaiFENesin (ROBITUSSIN) 20 mg/mL SOLN solution, Take 10 mLs by mouth every 4 hours as needed for cough  hydrOXYzine (VISTARIL) 25 MG capsule, May take 1 capsule (25 mg) by mouth 2 times daily as needed for anxiety. May also take 2 capsules (50 mg) nightly as needed for anxiety.  ibuprofen (ADVIL/MOTRIN) 200 MG tablet, Take 400 mg by mouth every 6 hours as needed for mild pain  lamoTRIgine (LAMICTAL) 100 MG tablet, Take 2 tablets (200 mg) by mouth At Bedtime For more refills,schedule an appointment at [phone]'  loratadine (CLARITIN) 10 MG tablet, Take 10 mg by mouth daily  melatonin 3 MG tablet, Take 3 mg by mouth nightly as needed for sleep  metoclopramide (REGLAN) 10 MG tablet, Take 1 tablet (10 mg) by mouth 4 times daily as needed (headache)  nicotine  "polacrilex (NICORETTE) 4 MG gum, Place 1 each (4 mg) inside cheek every hour as needed for smoking cessation  OLANZapine (ZYPREXA) 10 MG tablet, Take 2 tablets (20 mg) by mouth At Bedtime  omeprazole (PRILOSEC) 20 MG capsule, Take 1 capsule (20 mg) by mouth every morning (before breakfast)  phenol-menthol (CEPASTAT) 14.5 MG lozenge, Place 1 lozenge inside cheek every 2 hours as needed for moderate pain  senna-docusate (SENOKOT-S/PERICOLACE) 8.6-50 MG tablet, Take 2 tablets by mouth daily as needed for constipation    No current facility-administered medications on file prior to visit.      Allergies   Allergen Reactions     Prednisone Other (See Comments)     psych problems       Family History   Problem Relation Age of Onset     Hypertension Father      Alcohol/Drug Father         various substances     Psychotic Disorder Father         bipolar, anxiety, ADHD         Social History  Housing status: in [facility], previously living in his own apt  Employment status: Unemployed, not seeking work  Relationship status: Single  Children: no children          REVIEW OF SYSTEMS:  Pt states his mood has been stable. He is very happy with his current psychotropic medications in controlling his bipolar disorder symptoms.     OBJECTIVE                                                        BP (!) 165/97   Pulse 81   Ht 1.753 m (5' 9\")   Wt 110.7 kg (244 lb)   BMI 36.03 kg/m      Physical Exam  Constitutional:       General: He is not in acute distress.     Appearance: He is not ill-appearing.   HENT:      Head: Normocephalic and atraumatic.      Nose: No rhinorrhea.   Eyes:      General: No scleral icterus.     Extraocular Movements: Extraocular movements intact.      Conjunctiva/sclera: Conjunctivae normal.   Pulmonary:      Effort: No respiratory distress.   Musculoskeletal:         General: No swelling or deformity.   Skin:     General: Skin is warm and dry.   Neurological:      Mental Status: He is alert and " oriented to person, place, and time.      Cranial Nerves: No cranial nerve deficit.      Coordination: Coordination normal.      Gait: Gait normal.   Psychiatric:         Attention and Perception: Attention and perception normal.         Mood and Affect: Mood and affect normal.         Speech: Speech normal.         Behavior: Behavior normal.         Thought Content: Thought content normal.         Cognition and Memory: Cognition normal.         Judgment: Judgment normal.         Labs:    UDS: buprenorphine and THC  *POC urine drug screen does not screen for Fentanyl    Recent Results (from the past 240 hour(s))   Alcohol breath test POCT    Collection Time: 07/19/21  3:49 PM   Result Value Ref Range    Alcohol Breath Test 0.070 (A) 0.00 - 0.01   SARS-COV2 (COVID-19) Virus RT-PCR    Collection Time: 07/22/21  9:12 AM    Specimen: Nasopharyngeal; Swab   Result Value Ref Range    SARS CoV2 PCR Negative Negative         At least 60 min spent in review of medical record,  review, obtaining histories, reviewing symptoms, discussing pt's goals for treatment, counseling noted above, coordinating care with  staff.    FRANK GALARZA MD    [place] [id number] [address]  [phone]

## 2021-07-22 NOTE — PROGRESS NOTES
[facility] Health Assessment      Vital signs:     B/P 156/108  P 93  T 98.8 O2SATS -97    Direct admission    Counselor: Maxx  Drug of Choice: alcohol  Last use: 7/19/2021  Home clinic/MD:   [facility] [place]  Nella Carlin  Address: [address], [place] [address]  Phone: [phone]  Fax: [phone]    Upcoming phone appt while at :  Dr Nella Vigil  Monday, August 2nd at 7:20 AM    Pt did not have PCP in community so set appt up with provider at [facility].  Pt will ask for Flovent, Albuterol rescue inhaler, Amlodipine for High Blood Pressure, Lipitor, Omeprazole, Disulfiram as he has been prescribed these medications in the past, but did not bring them with him to [facility].    Pt is aware of the above and upcoming appt    Psychiatrist: Dr Bo Oconnor , Psychiatry, [facility], 2nd [facility]    Therapist:  [facility]  Address: [address], [place] [address]  Phone: [phone]  BEN MILLS MA, [facility]  Pt has upcoming appt on 7/22/21 at 1:30pm     Medical history/current conditions:      PTSD (post-traumatic stress disorder)   Opioid use disorder, moderate, in early remission   Blood pressure elevated without history of HTN   Current smoker   Alcohol abuse   Elevated BP   Anxiety   Bipolar 1 disorder   Depressive disorder, not elsewhere classified   Drug abuse, morphine type   Drug abuse, marijuana   Benzodiazepine (tranquilizer) overdose     Medications:   Amlodipine  Atorvastatin  Buprenorphine  Clonidine  Disulfiram  Divalproex  Hydroxyzine  Lamotrigine  Metoclopramide  Nicotine gum  Olanzapine  Omeprazole    H&P Screen:  H&P within the last 90 days: Yes.  Date: 7/19/2021 Location: [facility]    Mental Health diagnosis: ADHD, Bipolar, CEASAR  Medication compliant?: yes  Recent sucidal thoughts? no     When? na  Current thought of self-harm? no    Plan? na    Pain assessment:   Pt. Experiencing  pain at this time?  No     [facility] Community Medical Screen for COVID-19    Have you received COVID-19 Vaccination? Yes . 1st shot of Pfizer   If NO, would you be interested in receiving the vaccination while you are at [facility]?  Yes. Pfizer   LPRN, if YES, please  add pt name with vaccination requested (MODERNA, Pfizer, J&J) to RN SBAR (Top of page)    Do you have any of the following NEW or worsening symptoms NOT attributed to pre-existing conditions?    No,     o Fever of 100.0  F (37.8 C) or over  o Chills  o Cough  o Shortness of Breath  o Loss of taste or smell  o Generalized body aches  o Persistent headache  o Sore throat (or trouble eating or drinking in young children?)  o Nausea, Vomiting, or diarrhea (loose stools)    Did you test positive for COVID-19 in the last 14  days or are you waiting on the test results due to an exposure or symptoms?  No,     Has anyone told you to self-quarantine due to exposure to someone with COVID-19?  No,     Does the above COVID-19 screen need to be reviewed by Infection Prevention? No,     COVID-19 Test completed by LPRN ? Yes     IF PATIENTS ARE FULLY VACCINATED, OMIT QUESTIONS BELOW    In the last 2 weeks have you been in an large group gatherings (more than 10 people)? no    Have you been covering your nose and mouth while out in the community? No,       COVID-19 - Pt informed of the following while at LP:    1)Staff will take temperature and O2Sats once daily    2) Practice good hand washing hygiene and avoid touching face    3) If pt has any of the symptoms below, notify staff immediately.      Fever     Cough     Shortness of breath or difficulty breathing     Chills     Repeated shaking with chills    Muscle pain     4) COVID-19 testing may be initiated more than once during your stay.  Patient will be required to stay in room until lab results confirm negative. If COVID results are positive, You will have to exit the program, quarantine as recommended per CDC and  "then may return for CD treatment after symptoms have resolved    5) Per COVID protocol, during your stay at , social distancing is required AND mouth and nose must be covered at all times with facial mask while out in milieu.      6) Patients will not be allowed to go to any outside appointments, all outside appointments will need to be virtual or by phone    RN Assessment of Patient's Ability to Safely Manage and Self-Administer Respiratory Treatments    Has experience in the management of Respiratory (If NA, indicate and move to Integrative Therapies): Yes    Including knowledge and understanding of the importance of:    Does pt have any of the following Respiratory Illness/disorders?   Asthma, COPD, RAD, Bronchitis, Emphysema, Cystic fibrosis, MADELEINE Yes and pt uses inhalers in the p[ast (Flovent and Albuterol) for \"tobacco induced Asthma\")    Which Acute or Chronic Respiratory Illness do you have which requires intervention? \"tobacco induced Asthma\"   Did pt bring all prescribed respiratory supplies? CPAP, BiPap, Nebulizer, Nebulizer solution, scheduled inhaler, Rescue Inhaler  No. Pt has upcoming appt with [facility] to get these inhalers filled   Pt understands they must carry  Rescue Inhalers  at all times Yes   Alerting staff with respiratory symptoms?  Wheezing, SOB, Tightness or pain in chest, Excessive Daytime Sleepiness Yes     Does the patient have the physical and mental ability to:     Perform respiratory cares? CPAP, BiPap, Nebulizer tx, scheduled inhaler, Rescue Inhaler tx, respiratory medicines Yes   Determine when and how often to use respiratory treatments? (CPAP, BiPap, Nebulizer tx, scheduled inhaler, Rescue Inhaler tx, respiratory medicines Yes   Nebulizer/CPAP/BiPAP accessories No. Awaiting pt appt to get his inhalers   CPAP/BiPAP Therapy settings NA     Therefore does the patient, present a risk of harm to themselves or other clients in the facility if allowed to self-administer Respiratory " treatments.  Consider factors above.  No    I have assessed the patient to be able to safely administer respiratory treatments.  Yes.     Integrative Therapies: Essential Oils    Patient requesting essential oil inhaler to manage (Mood/Mental Health/Physical/Spiritual symptoms).     Discussed appropriate use of essential oil inhalers and instructed patient not to leave labeled product out on unit.     Patient was screened for kidney disease, asthma/reactive airway disease and rashes and wounds or 1st trimester of pregnancy    List Essential Oils requested by pt DENIED. Pt has resp issues    Patient verbalized and demonstrated understanding of how to use essential oil inhaler correctly and will notify LP RN with any concerns or side effects. Patient agrees not to share their essential oil inhaler with other clients.  Continue to support the patient in safely utilizing integrative therapies as able to manage symptoms during treatment.       Patient tobacco use:    Do you use tobacco? yes   Type? cigarettes  How often? daily  How much? 2 packs daily   Are you interested in quitting? no    NRT (Nicotine Replacement therapy) ordered? yes   Pt is aware of the dangers of tobacco cessation and in contemplation.    Pt given written education.    Nutritional Assessment:    Have you ever purged, binged or restricted yourself as a way to control your weight?   No     Are you on a special diet?   No     Do you have any concerns regarding your nutritional status?   No     Have you had any appetite changes in the last 3 months?   No   Have you had weight loss or weight gain of more than 10 lbs in the last 3 months?   If patient gained or lost more than 10 lbs, then refer to program RN / attending Physician for assessment.   No   Was the patient informed of BMI?    Above,  General nutrition education   Yes   Have you engaged in any risk-taking behavior that would put you at risk for exposure to blood-borne or sexually transmitted  "diseases?   No   Do you have any dental problems?   No     Nursing Assessment Summary:  As above  Pt is aware he needs to wear his mask over nose and face at all times while at LP     Pt will go to recovery clinic for Suboxone treatment today.  Pt stated he is taking 2mg Suboxone every other day and that he is \"addicted to it\" and wants to get off it. Writer sent Dr Hastings a note    Pt is aware he needs to ask for the following medications at upcoming PCP appt on August 2nd:  Flovent, Albuterol rescue inhaler, Amlodipine for High Blood Pressure, Lipitor, Omeprazole and Disulfiram as he has been prescribed these medications in the past, but did not bring them with him to [facility].      On-going nursing intervention required?   No    Acute care visit recommended: no       "

## 2021-07-22 NOTE — PROGRESS NOTES
[facility]  [address] [place] [address]        ADULT CD ASSESSMENT ADDENDUM      Patient Name: Laci Hinkle Jr  Cell Phone:   Home: [phone] (home)    Mobile:   Telephone Information:   Mobile [phone]       Email:  [email or web address]  Emergency Contact: Carolyn Galvin    Tel: [phone]    The patient reported being:  Single, no serious involvement    With which race do you identify? White    Initial Screening Questions     1. Are you currently having severe withdrawal symptoms that are putting yourself or others in danger?  No    2. Are you currently having severe medical problems that require immediate attention?  No    3. Are you currently having severe emotional or behavioral problems that are putting yourself or others at risk of harm?  No    4. Do you currently participate in community amada activities, such as attending Evangelical, temple, Pentecostalism or Alevism services?  No    5. How does your spirituality impact your recovery?  Heavily - it's a huge part of it, god is something I truly believe in    6. Do you currently self-administer your medications?  Yes    7. Do you have a valid 's license? No, explain: never had one    Mental Health Status   Physical Appearance/Attire: Appears stated age   Hygiene: well groomed   Eye Contact: at examiner   Speech Rate:  rapid   Speech Volume: regular   Speech Quality: fluid and pressured   Cognitive/Perceptual:  reality based   Cognition: memory intact    Judgment: intact and able to concentrate   Insight: intact and able to concentrate   Orientation:  time, place, person and situation   Thought: logical    Hallucinations:  none   General Behavioral Tone: cooperative and dramatic   Psychomotor Activity: wrings hands and leg jiggling   Gait:  no problem   Mood: normal, anxious and hopeful   Affect: congruence/appropriate and expansive   Counselor Notes: NA     Criteria for Diagnosis: DSM-5 Criteria for Substance Use  Disorders      Alcohol Use Disorder Severe - 303.90 (F10.20)  Cannabis Use Disorder Severe - 304.30 (F12.20)  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.10)  Opioid Use Disorder Mild - 305.50 (F11.10)  Sedative, Hypnotic, 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)  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)    Level of Care   I.) Intoxication and Withdrawal: 0   II.) Biomedical:  0   III.) Emotional and Behavioral:  2   IV.) Readiness to Change:  2   V.) Relapse Potential: 4   VI.) Recovery Environmental: 3     Initial Problem List     The patient is currently living in an unhealthy and/or using environment  The patient lacks relapse prevention skills  The patient has poor coping skills  The patient has poor refusal skills   The patient lacks a sober peer support network  The patient has a tendency to isolate  The patient has dual issues of MI and CD  The patient has a significant history of trauma and/or abuse issues    Patient/Client is willing to follow treatment recommendations.  Yes    Counselor: MEERA Mckenna

## 2021-07-22 NOTE — NURSING NOTE
[facility]      Rooming information:  Last use of illicit opioids: kratom-winter 2019  Taking buprenorphine? Yes: off the street  Narcan currently available: No  Other recent substance use:    NICOTINE-Yes:   Alcohol, Cannabis-about every other day  and Methamphetamine-about two weeks ago   If using nicotine, ready to quit? Yes:     Point of care urine drug screen positive for: buprenorphine and THC  *POC urine drug screen does not screen for Fentanyl      Insurance needs: active    Housing needs: in [place]    Contact information up to date? yes    3rd Party Involvement none today (please obtain WU if pt would like to include)    Primary care provider: [facility]     Mental health provider: yes, here at [facility] (MH referral if needed)    Morena Mckeon CMA  July 22, 2021  3:11 PM

## 2021-07-22 NOTE — PROGRESS NOTES
Name: Laci Hinkle Jr  Date: 7/22/2021  Medical Record: [id number]    Envelope Number: [id number]    List of Contents (List each item separately in new row):   One cell phone with     Admission:  I am responsible for any personal items that are not sent to the safe or pharmacy.  [facility] is not responsible for loss, theft or damage of any property in my possession.      Patient Signature:  ___________________________________________       Date/Time:__________________________    Staff Signature: __________________________________       Date/Time:__________________________    2nd Staff person, if patient is unable/unwilling to sign:      __________________________________________________________       Date/Time: __________________________      Discharge:  [facility] has returned all of my personal belongings:    Patient Signature: ________________________________________     Date/Time: ____________________________________    Staff Signature: ______________________________________     Date/Time:_____________________________________

## 2021-07-23 ENCOUNTER — HOSPITAL ENCOUNTER (OUTPATIENT)
Dept: BEHAVIORAL HEALTH | Facility: CLINIC | Age: 26
End: 2021-07-23
Attending: FAMILY MEDICINE
Payer: COMMERCIAL

## 2021-07-23 ENCOUNTER — OFFICE VISIT (OUTPATIENT)
Dept: BEHAVIORAL HEALTH | Facility: CLINIC | Age: 26
End: 2021-07-23
Payer: COMMERCIAL

## 2021-07-23 VITALS — TEMPERATURE: 97.5 F | OXYGEN SATURATION: 100 %

## 2021-07-23 VITALS — HEART RATE: 80 BPM | DIASTOLIC BLOOD PRESSURE: 89 MMHG | SYSTOLIC BLOOD PRESSURE: 146 MMHG

## 2021-07-23 DIAGNOSIS — F11.90 OPIOID USE DISORDER: ICD-10-CM

## 2021-07-23 PROBLEM — F19.20 CHEMICAL DEPENDENCY (H): Status: ACTIVE | Noted: 2021-07-23

## 2021-07-23 PROCEDURE — 99213 OFFICE O/P EST LOW 20 MIN: CPT | Performed by: FAMILY MEDICINE

## 2021-07-23 PROCEDURE — 1002N00001 HC LODGING PLUS FACILITY CHARGE ADULT

## 2021-07-23 PROCEDURE — H2035 A/D TX PROGRAM, PER HOUR: HCPCS | Mod: HQ

## 2021-07-23 RX ORDER — BUPRENORPHINE AND NALOXONE 2; .5 MG/1; MG/1
FILM, SOLUBLE BUCCAL; SUBLINGUAL
Qty: 6 FILM | Refills: 0 | COMMUNITY
Start: 2021-07-23 | End: 2021-07-27

## 2021-07-23 ASSESSMENT — ANXIETY QUESTIONNAIRES: GAD7 TOTAL SCORE: 6

## 2021-07-23 NOTE — GROUP NOTE
Group Therapy Documentation    PATIENT'S NAME: Laci Hinkle Jr  MRN:   [id number]  :   1995  ACCT. NUMBER: [id number]  DATE OF SERVICE: 21  START TIME: 12:30 PM  END TIME:  2:30 PM  FACILITATOR(S): Shane Burks LADC  TOPIC: BEH Group Therapy  Number of patients attending the group:  5  Group Length:  2 Hours    Group Therapy Type: Emotion processing    Summary of Group / Topics Discussed:    Self-care activities      Group Attendance:  Attended group session    Patient's response to the group topic/interactions:  cooperative with task    Patient appeared to be Actively participating.        Client specific details:  Pat participated and interacted appropriately with peers and staff in PM group. No triggers to use noted or discussed.

## 2021-07-23 NOTE — GROUP NOTE
Group Therapy Documentation    PATIENT'S NAME: Laci Hinkle Jr  MRN:   [id number]  :   1995  ACCT. NUMBER: [id number]  DATE OF SERVICE: 21  START TIME:  9:00 AM  END TIME: 11:00 AM  FACILITATOR(S): Lexx Cain LADC  TOPIC: BEH Group Therapy  Number of patients attending the group: 4  Group Length:  2 Hours    Group Therapy Type: Recovery strategies    Summary of Group / Topics Discussed:    Recovery Principles, Sober coping skills, and Cognitive behavioral therapy skills      Group Attendance:  Attended group session    Patient's response to the group topic/interactions:  cooperative with task, discussed personal experience with topic, expressed understanding of topic and listened actively    Patient appeared to be Actively participating, Attentive and Engaged.        Client specific details:  Patient reports that his longest period of sobriety is 45 days. Polysubstance user. Patient notes significant hx of self sabotaging behaviors, attitudes and beliefs. Describes toxic relationships in family of origin.

## 2021-07-24 ENCOUNTER — HOSPITAL ENCOUNTER (EMERGENCY)
Facility: CLINIC | Age: 26
Discharge: HOME | End: 2021-07-24
Attending: EMERGENCY MEDICINE | Admitting: EMERGENCY MEDICINE
Payer: COMMERCIAL

## 2021-07-24 ENCOUNTER — HOSPITAL ENCOUNTER (OUTPATIENT)
Dept: BEHAVIORAL HEALTH | Facility: CLINIC | Age: 26
End: 2021-07-24
Attending: FAMILY MEDICINE
Payer: COMMERCIAL

## 2021-07-24 VITALS
TEMPERATURE: 97.4 F | RESPIRATION RATE: 18 BRPM | SYSTOLIC BLOOD PRESSURE: 151 MMHG | HEART RATE: 65 BPM | DIASTOLIC BLOOD PRESSURE: 96 MMHG | OXYGEN SATURATION: 99 %

## 2021-07-24 VITALS — OXYGEN SATURATION: 100 % | TEMPERATURE: 97.4 F

## 2021-07-24 DIAGNOSIS — Z13.9 ENCOUNTER FOR MEDICAL SCREENING EXAMINATION: ICD-10-CM

## 2021-07-24 DIAGNOSIS — F19.20 COMBINATIONS OF DRUG DEPENDENCE EXCLUDING OPIOID TYPE DRUG, CONTINUOUS (H): ICD-10-CM

## 2021-07-24 PROCEDURE — 99281 EMR DPT VST MAYX REQ PHY/QHP: CPT

## 2021-07-24 PROCEDURE — H2035 A/D TX PROGRAM, PER HOUR: HCPCS | Mod: HQ

## 2021-07-24 PROCEDURE — 1002N00001 HC LODGING PLUS FACILITY CHARGE ADULT

## 2021-07-24 PROCEDURE — 99282 EMERGENCY DEPT VISIT SF MDM: CPT | Performed by: EMERGENCY MEDICINE

## 2021-07-24 NOTE — GROUP NOTE
Group Therapy Documentation    PATIENT'S NAME: Laci Hinkle Jr  MRN:   [id number]  :   1995  ACCT. NUMBER: [id number]  DATE OF SERVICE: 21  START TIME: 12:30 PM  END TIME:  2:30 PM  FACILITATOR(S): Ruth Collazo  TOPIC: BEH Group Therapy  Number of patients attending the group:  4  Group Length:  2 Hours    Group Therapy Type: Recovery strategies    Summary of Group / Topics Discussed:    Relapse prevention      Group Attendance:  Attended group session    Patient's response to the group topic/interactions:  cooperative with task    Patient appeared to be Actively participating, Attentive and Engaged.        Client specific details:  Patient was attentive and participative during group session.

## 2021-07-24 NOTE — GROUP NOTE
Group Therapy Documentation    PATIENT'S NAME: Laci Hinkle Jr  MRN:   [id number]  :   1995  ACCT. NUMBER: [id number]  DATE OF SERVICE: 21  START TIME:  9:00 AM  END TIME: 11:30 AM  FACILITATOR(S): Yanira Gregorio LADC  TOPIC: BEH Group Therapy  Number of patients attending the group:  4  Group Length:  2 Hours    Group Therapy Type: Recovery strategies    Summary of Group / Topics Discussed:    Relapse prevention      Group Attendance:  Attended group session    Patient's response to the group topic/interactions:  cooperative with task    Patient appeared to be Engaged.        Client specific details:  Laci participated in the relapse prevention workshop. He was engaged in a discussion on social anxiety and CLARIBEL.

## 2021-07-25 ENCOUNTER — TELEPHONE (OUTPATIENT)
Dept: BEHAVIORAL HEALTH | Facility: CLINIC | Age: 26
End: 2021-07-25

## 2021-07-25 NOTE — PROGRESS NOTES
[facility] Return Visit    ASSESSMENT/PLAN                                                    1. Opioid use disorder (H)  Pt remains interested in tapering off buprenorphine and requests tapering schedule below in spite of discussion of options allowing greater time between decreases in dose.    Encouraged use of clonidine, hydroxyzine as prescribed  Continue PHP  - buprenorphine HCl-naloxone HCl (SUBOXONE) 2-0.5 MG per film; starting 7/24/21 1/2 film sl qam x7d , then 1/4 film sl qam x7d, then stop  Dispense: 6 Film; Refill: 0         Return in about 1 week (around 7/30/2021) for Follow up during walk in hours 8/3 and sooner if problems.      SUBJECTIVE                                                      CC/HPI:  Laci Hinkle Jr is a 25 year old male with PMH bipolar disorder, CEASAR, ADHD, and PSUD including opioids who presents to the Recovery Clinic for return visit.  Pt was referred by staff in [facility] due to pt's desire to taper off buprenorphine.   Pt decided to seek treatment at this time due to wanting to stop use of substances to allow him to take necessary steps to develop a career he enjoys.    Substance Use History :  Opioids:    Pt states he use heroin as a teenager for 4 months, last use age 17.  He started methadone treatment age 17, dose up to 100mg, then tapered off to 6mg/day then stopped.  He began using kratom ~2016, used larger amounts up to 100g daily.  He was first prescribed buprenorphine for OUD in 2019. He took as prescribed for 1-2 years, then continued through nonprescription sources, currently taking 2mg every other day.  He has tried to stop using buprenorphine but the withdrawal symptoms have been prohibitive, particularly restless legs.   IV drug use: No   History of overdose: No  Previous treatments : Yes: multiple, currently in [facility] 11/2020  Longest period of sobriety: few months  Medical complications related to substance  use: exacerbation of MH diagnoses  Hepatitis C Status  unknown  HIV Status unknown    Other recent substance use:  Stimulants: used methamphetamine ages [age], stopped for 5 years, then resumed age 24.  Had been using 2-3x/week for the last year, returned to use after leaving [facility] 11/2020.   Sedatives/hypnotics/anxiolytics: has used in past, denies regular use  Alcohol: h/o drinking 1 L liquor daily, returned to use after leaving [facility] 11/2020  Tobacco: 1 ppd and ENDS  Cannabis: occasional  Hallucinogens:has used in past, denies recent  Behavioral addictions: no previous treatment for gambling per SUA    I last met with pt on 7/22/21.  A/P at that time was:  1. Opioid use disorder (H)  Pt with h/o heroin use as a teenager,  previous methadone therapy age 17, kratom use [id number], has taken buprenorphine from prescription then nonprescription sources since 2019. Most recently taking 2mg buprenorphine every other day due to severe restless legs, insomnia, other withdrawal symptoms if he tries to discontinue.  Pt is very clear he wants to discontinue buprenorphine at this time.   After some discussion of options available and patient's symptoms and previous experiences, pt agreed to buprenorphine taper.   Will start with alternating 2mg/1mg doses every other day for one week then 1mg every other day.  Will reevaluate as noted below; pt is welcome to return at any time if problems.   Encouraged him to utilize his currently prescribed clonidine 0.1mg tid prn and hydroxyzine 25mg bid and 50mg at bedtime as needed for withdrawal symptoms.  zofran 4-8mg q8hr prn nausea added as well.     - buprenorphine HCl-naloxone HCl (SUBOXONE) 2-0.5 MG per film; 7/24 1/2 film sl, 7/26 1 film sl, 7/28 1/2 film sl, 7/30 1 film, 8/1 1/2 film, 8/3 1/2 film 8/5 1/2 film  Dispense: 6 Film; Refill: 0    2. Restless leg syndrome  Trial of pramipexole to address these symptoms associated with opioid withdrawal. Pt declined rx for gabapentin due  to previously misusing gabapentin per his report.  - pramipexole (MIRAPEX) 0.25 MG tablet; Take 1 tablet (0.25 mg) by mouth nightly as needed (restless leg symptoms)  Dispense: 30 tablet; Refill: 1    3. Polysubstance (including opioids) dependence, daily use (H)  Proceed with PHP/[facility]  Reevaluate cravings for other substances at follow up visit.      Today, pt states he wants to change the buprenorphine taper previously discussed to 1mg/day for possibly one to two weeks then 1/2 mg/day then discontinue.  We reviewed use of prn medications for withdrawal he has available.      He currently denies cravings for methamphetamine or alcohol.  We reviewed medication therapy options for alcohol; pt previously tried naltrexone and states he drank more on this.  He reports h/o misusing gabapentin.  He has not tried topiramate.  He goes on to say he does not want to introduce any other pharmacotherapy at this time, but may be interested at the time he leaves [facility].       [place] Prescription Drug Monitoring Program Reviewed:  Yes; as expected        Past Medical History:   Diagnosis Date     ADHD (attention deficit hyperactivity disorder) 3/18/2011     Bipolar affective disorder (H) 3/18/2011     CEASAR (generalized anxiety disorder) 3/18/2011     GERD (gastroesophageal reflux disease)      Hyperlipidemia LDL goal <130      Impaired fasting glucose      Obesity      Substance abuse (H) 3/18/2011         PAST PSYCHIATRIC HISTORY:  Diagnoses- bipolar disorder, CEASAR, ADHD  Suicide Attempts: No   Hospitalizations: Yes     Past Surgical History:   Procedure Laterality Date     ENT SURGERY  2010    jaw surgery        Medications:  buprenorphine HCl-naloxone HCl (SUBOXONE) 2-0.5 MG per film, starting 7/24/21 1/2 film sl qam x7d , then 1/4 film sl qam x7d, then stop  acetaminophen (TYLENOL) 325 MG tablet, Take 325-650 mg by mouth every 4 hours as needed for mild pain  alum & mag hydroxide-simethicone (MAALOX) 200-200-20  MG/5ML SUSP suspension, Take 30 mLs by mouth every 6 hours as needed for indigestion  amLODIPine (NORVASC) 2.5 MG tablet, Take 2.5 mg by mouth daily  atorvastatin (LIPITOR) 20 MG tablet, 20 mg daily  cloNIDine (CATAPRES) 0.1 MG tablet, Take 1 tablet (0.1 mg) by mouth 3 times daily as needed (opioid withdrawal.)  disulfiram (ANTABUSE) 250 MG tablet, Take 1 tablet (250 mg) by mouth daily  divalproex sodium extended-release (DEPAKOTE ER) 500 MG 24 hr tablet, Take 4 tablets (2,000 mg) by mouth daily  guaiFENesin (ROBITUSSIN) 20 mg/mL SOLN solution, Take 10 mLs by mouth every 4 hours as needed for cough  hydrOXYzine (VISTARIL) 25 MG capsule, May take 1 capsule (25 mg) by mouth 2 times daily as needed for anxiety. May also take 2 capsules (50 mg) nightly as needed for anxiety.  ibuprofen (ADVIL/MOTRIN) 200 MG tablet, Take 400 mg by mouth every 6 hours as needed for mild pain  lamoTRIgine (LAMICTAL) 100 MG tablet, Take 2 tablets (200 mg) by mouth At Bedtime For more refills,schedule an appointment at [phone]'  loratadine (CLARITIN) 10 MG tablet, Take 10 mg by mouth daily  melatonin 3 MG tablet, Take 3 mg by mouth nightly as needed for sleep  metoclopramide (REGLAN) 10 MG tablet, Take 1 tablet (10 mg) by mouth 4 times daily as needed (headache)  nicotine polacrilex (NICORETTE) 4 MG gum, Place 1 each (4 mg) inside cheek every hour as needed for smoking cessation  OLANZapine (ZYPREXA) 10 MG tablet, Take 2 tablets (20 mg) by mouth At Bedtime  omeprazole (PRILOSEC) 20 MG capsule, Take 1 capsule (20 mg) by mouth every morning (before breakfast)  ondansetron (ZOFRAN-ODT) 4 MG ODT tab, Take 1-2 tablets (4-8 mg) by mouth every 8 hours as needed for nausea  phenol-menthol (CEPASTAT) 14.5 MG lozenge, Place 1 lozenge inside cheek every 2 hours as needed for moderate pain  pramipexole (MIRAPEX) 0.25 MG tablet, Take 1 tablet (0.25 mg) by mouth nightly as needed (restless leg symptoms)  senna-docusate (SENOKOT-S/PERICOLACE) 8.6-50  MG tablet, Take 2 tablets by mouth daily as needed for constipation    Self Administer Medications: Behavioral Services        Allergies   Allergen Reactions     Prednisone Other (See Comments)     psych problems       Family History   Problem Relation Age of Onset     Hypertension Father      Alcohol/Drug Father         various substances     Psychotic Disorder Father         bipolar, anxiety, ADHD         Social History  Housing status: in [facility], previously living in his own apt  Employment status: Unemployed, not seeking work  Relationship status: Single  Children: no children          REVIEW OF SYSTEMS:  No other concerns today    OBJECTIVE                                                        BP (!) 146/89   Pulse 80     Physical Exam  Constitutional:       General: He is not in acute distress.     Appearance: He is not ill-appearing.   HENT:      Head: Normocephalic and atraumatic.      Nose: No rhinorrhea.   Eyes:      General: No scleral icterus.     Extraocular Movements: Extraocular movements intact.      Conjunctiva/sclera: Conjunctivae normal.   Pulmonary:      Effort: No respiratory distress.   Musculoskeletal:         General: No swelling or deformity.   Skin:     General: Skin is warm and dry.   Neurological:      Mental Status: He is alert and oriented to person, place, and time.      Cranial Nerves: No cranial nerve deficit.      Coordination: Coordination normal.      Gait: Gait normal.   Psychiatric:         Attention and Perception: Attention and perception normal.         Mood and Affect: Mood and affect normal.         Speech: Speech normal.         Behavior: Behavior normal.         Thought Content: Thought content normal.         Cognition and Memory: Cognition normal.         Judgment: Judgment normal.         Labs:    UDS: 7/22/21 buprenorphine and THC  *POC urine drug screen does not screen for Fentanyl    Recent Results (from the past 240 hour(s))   Alcohol breath test POCT     Collection Time: 07/19/21  3:49 PM   Result Value Ref Range    Alcohol Breath Test 0.070 (A) 0.00 - 0.01   SARS-COV2 (COVID-19) Virus RT-PCR    Collection Time: 07/22/21  9:12 AM    Specimen: Nasopharyngeal; Swab   Result Value Ref Range    SARS CoV2 PCR Negative Negative         At least 20 min spent in review of medical record,  review, obtaining histories, reviewing symptoms, discussing pt's goals for treatment, counseling noted above, coordinating care with  staff.    FRANK GALARZA MD    [place] [id number] [address] [id number] [phone]

## 2021-07-25 NOTE — ED NOTES
Pt has been attempting a voluntary suboxone taper in [facility].  This is proving ineffective so he would like to return to his previous level then switch to Sublicate.

## 2021-07-25 NOTE — ED PROVIDER NOTES
"ED Provider Note  [facility]      History     Chief Complaint   Patient presents with     Addiction Problem     started LP 3 days ago, starting suboxone taper but \"can't do it\". Has taken 1mg today     The history is provided by the patient and medical records.     Laci Hinkle Jr is a 25 year old male with a PMH significant for opioid use disorder bipolar affective disorder, CEASAR, ADHD, and hyperlipidemia. Patient presents to the ED for evaluation of his treatment plan for his addiction to buprenorphine. Patient reports the current taper plan of going down to 1 mg a day is not working for him. Patient requests to go to 2 mg a day. Patient notes he wants to get a Sublocade injection on 7/26/21.     Per chart review patient was seen at Recovery Clinic on 7/22/21. Patient reported most recently taking 2mg buprenorphine every other day due to severe restless legs, insomnia, other withdrawal symptoms if he tries to discontinue.  Patient was very clear he wants to discontinue buprenorphine at this time. Patient agreed to a buprenorphine taper starting with alternating 2mg/1mg doses every other day for one week then 1mg every other day. Patient was directed to follow up in 12 days.     Past Medical History  Past Medical History:   Diagnosis Date     ADHD (attention deficit hyperactivity disorder) 3/18/2011     Bipolar affective disorder (H) 3/18/2011     CEASAR (generalized anxiety disorder) 3/18/2011     GERD (gastroesophageal reflux disease)      Hyperlipidemia LDL goal <130      Impaired fasting glucose      Obesity      Substance abuse (H) 3/18/2011     Past Surgical History:   Procedure Laterality Date     ENT SURGERY  2010    jaw surgery      acetaminophen (TYLENOL) 325 MG tablet  alum & mag hydroxide-simethicone (MAALOX) 200-200-20 MG/5ML SUSP suspension  amLODIPine (NORVASC) 2.5 MG tablet  atorvastatin (LIPITOR) 20 MG tablet  buprenorphine HCl-naloxone HCl (SUBOXONE) 2-0.5 MG per " film  cloNIDine (CATAPRES) 0.1 MG tablet  disulfiram (ANTABUSE) 250 MG tablet  divalproex sodium extended-release (DEPAKOTE ER) 500 MG 24 hr tablet  guaiFENesin (ROBITUSSIN) 20 mg/mL SOLN solution  hydrOXYzine (VISTARIL) 25 MG capsule  ibuprofen (ADVIL/MOTRIN) 200 MG tablet  lamoTRIgine (LAMICTAL) 100 MG tablet  loratadine (CLARITIN) 10 MG tablet  melatonin 3 MG tablet  metoclopramide (REGLAN) 10 MG tablet  nicotine polacrilex (NICORETTE) 4 MG gum  OLANZapine (ZYPREXA) 10 MG tablet  omeprazole (PRILOSEC) 20 MG capsule  ondansetron (ZOFRAN-ODT) 4 MG ODT tab  phenol-menthol (CEPASTAT) 14.5 MG lozenge  pramipexole (MIRAPEX) 0.25 MG tablet  senna-docusate (SENOKOT-S/PERICOLACE) 8.6-50 MG tablet      Allergies   Allergen Reactions     Prednisone Other (See Comments)     psych problems     Family History  Family History   Problem Relation Age of Onset     Hypertension Father      Alcohol/Drug Father         various substances     Psychotic Disorder Father         bipolar, anxiety, ADHD     Social History   Social History     Tobacco Use     Smoking status: Current Every Day Smoker     Packs/day: 1.00     Types: Cigarettes     Smokeless tobacco: Former User     Types: Snuff, Chew     Tobacco comment: and e-cig,    Substance Use Topics     Alcohol use: Yes     Comment: drinks daily     Drug use: Yes     Comment: stopped marijuana      Past medical history, past surgical history, medications, allergies, family history, and social history were reviewed with the patient. No additional pertinent items.       Review of Systems   Constitutional: Negative for fever.   Respiratory: Negative for shortness of breath.    Cardiovascular: Negative for chest pain.   Gastrointestinal: Negative for abdominal pain.   All other systems reviewed and are negative.    A complete review of systems was performed with pertinent positives and negatives noted in the HPI, and all other systems negative.    Physical Exam   BP: (!) 165/96  Pulse:  86  Temp: 98.9  F (37.2  C)  Resp: 16  SpO2: 99 %  Physical Exam  Vitals and nursing note reviewed.   Constitutional:       General: He is not in acute distress.     Appearance: Normal appearance. He is not diaphoretic.   HENT:      Head: Atraumatic.   Eyes:      General: No scleral icterus.     Pupils: Pupils are equal, round, and reactive to light.   Cardiovascular:      Heart sounds: Normal heart sounds.   Pulmonary:      Effort: No respiratory distress.      Breath sounds: Normal breath sounds.   Abdominal:      General: Bowel sounds are normal.      Palpations: Abdomen is soft.      Tenderness: There is no abdominal tenderness.   Musculoskeletal:         General: No tenderness.   Skin:     General: Skin is warm.      Findings: No rash.   Neurological:      General: No focal deficit present.      Mental Status: He is alert and oriented to person, place, and time.           ED Course      Procedures             No results found for any visits on 07/24/21.  Medications - No data to display     Assessments & Plan (with Medical Decision Making)     25 year old male with a PMH significant for opioid use disorder bipolar affective disorder, CEASAR, ADHD, and hyperlipidemia. Patient presents to the ED for evaluation of his treatment plan for his addiction to buprenorphine.  Per patient's request, taper doubled for 2 days until he is able to be seen in the recovery clinic for further evaluation and care.  Documentation provided for [facility] staff.    I have reviewed the nursing notes. I have reviewed the findings, diagnosis, plan and need for follow up with the patient.    New Prescriptions    No medications on file       Final diagnoses:   Encounter for medical screening examination       --  Michelle TUBBS, am serving as a trained medical scribe to document services personally performed by , MD, based on the provider's statements to me.     TUBBS MD, was physically present and have reviewed and verified the  accuracy of this note documented by Michelle Orlando.    Berna Bowers MD  [facility] EMERGENCY DEPARTMENT  7/24/2021     Berna Bowers MD  08/16/21 [id number]

## 2021-07-25 NOTE — PROGRESS NOTES
Pt was reporting increased agitation last evening and went to ED to address this. When writer checked in with this pt this am he had decided he wanted to leave treatment. Pt said he had a safe place to go and would continue to follow with Dr Hastings in the recovery clinic. Pt said he pans to get the sublocade infusion and to change his group of friends to help prevent relapsing.

## 2021-07-26 NOTE — ADDENDUM NOTE
Encounter addended by: Shane Burks LADC on: 7/26/2021 11:24 AM   Actions taken: Clinical Note Signed

## 2021-07-26 NOTE — PROGRESS NOTES
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   LAST USE DATE: Patient reported last date of use as: 21 for marijuana, 21 for methamphetamine, 21 for alcohol, 21 for suboxone.  DAYS OF TREATMENT COMPLETED:  Patient completed 2 days of treatment    PRESENTING INFORMATION: Mr. Laci Hinkle Jr. presented to the [facility] in [place], [place] for a substance use disorder evaluation. The pt was a self referent. The pt was seeking detoxification for alcohol and treatment for polysubstance use via emergency department. Pt was sent home due to no bed availability in detox and then arrived to [facility] as a direct from the community pt. The pt reported a hx of alcohol dependence, polysubstance use, bipolar disorder, CEASAR, and multiple tx episodes and detoxes. Pt reported unemployment and homelessness. Pt was assessed to be  appropriate for substance use disorder tx at [facility].     READMITTANCE INFORMATION: If additional substance use tx is required, pt may re-admit in to the [facility] program following 30 days from date of discharge.      SERVICES PROVIDED: Our services included assessment, treatment planning and education regarding chemical dependency, mental illness, relationships, and relapse prevention. However, the patient only participated in group therapy.    ISSUES ADDRESS IN TREATMENT:    DIMENSION 1 - ACUTE INTOXICATION/WITHDRAWAL POTENTIAL  ADMISSION RISK RATIN  DISCHARGE RISK RATIN      DIMENSION 2 - BIOMEDICAL COMPLICATIONS AND CONDITIONS  ADMISSION RISK RATIN  DISCHARGE RISK RATIN      DIMENSION 3 - EMOTIONAL, BEHAVIORAL, COGNITIVE CONDITIONS AND COMPLICATIONS  ADMISSION RISK RATIN  DISCHARGE RISK RATIN      DIMENSION 4 - READINESS FOR CHANGE  ADMISSION RISK RATIN  DISCHARGE RISK RATIN      DIMENSION 5 - RELAPSE, CONTINUED USE AND CONTINUED PROBLEM POTENTIAL   ADMISSION RISK RATIN  DISCHARGE RISK RATIN      DIMENSION 6 - RECOVERY ENVIRONMENT  ADMISSION RISK RATING: 3  DISCHARGE RISK RATING: 3      LIVING ARRANGEMENTS AT DISCHARGE: Pt reported that he had a safe place to go after discharge from [facility].      PROGNOSIS: Prognosis for this patient is unfavorable at this time. This can upgraded if the patient follows the continuing care recommendations below.      CONTINUING CARE RECOMMENDATIONS AND REFERRALS:  1.  Admit immediately into an inpatient or residential treatment program and follow all recommendations.        This information has been disclosed to you from records protected by Federal confidentiality rules (42 CFR part 2). The Federal rules prohibit you from making any further disclosure of this information unless further disclosure is expressly permitted by the written consent of the person to whom it pertains or as otherwise permitted by 42  CFR part 2. A general authorization for the release of medical or other information is NOT sufficient for this purpose. The Federal rules restrict any use of the information to criminally investigate or prosecute any alcohol or drug abuse patient.       MEERA Cline

## 2021-07-27 ENCOUNTER — OFFICE VISIT (OUTPATIENT)
Dept: BEHAVIORAL HEALTH | Facility: CLINIC | Age: 26
End: 2021-07-27
Payer: COMMERCIAL

## 2021-07-27 VITALS — HEART RATE: 89 BPM | SYSTOLIC BLOOD PRESSURE: 134 MMHG | DIASTOLIC BLOOD PRESSURE: 101 MMHG

## 2021-07-27 DIAGNOSIS — F11.20 OPIOID USE DISORDER, SEVERE, DEPENDENCE (H): ICD-10-CM

## 2021-07-27 DIAGNOSIS — F31.32 BIPOLAR AFFECTIVE DISORDER, CURRENTLY DEPRESSED, MODERATE (H): Primary | ICD-10-CM

## 2021-07-27 DIAGNOSIS — F11.20 OPIOID USE DISORDER, SEVERE, DEPENDENCE (H): Primary | ICD-10-CM

## 2021-07-27 DIAGNOSIS — F10.20 ALCOHOL USE DISORDER, SEVERE, DEPENDENCE (H): ICD-10-CM

## 2021-07-27 PROCEDURE — 99214 OFFICE O/P EST MOD 30 MIN: CPT | Performed by: FAMILY MEDICINE

## 2021-07-27 PROCEDURE — 90832 PSYTX W PT 30 MINUTES: CPT | Performed by: SOCIAL WORKER

## 2021-07-27 RX ORDER — MEPERIDINE HYDROCHLORIDE 25 MG/ML
25 INJECTION INTRAMUSCULAR; INTRAVENOUS; SUBCUTANEOUS EVERY 30 MIN PRN
Status: CANCELLED | OUTPATIENT
Start: 2021-07-27

## 2021-07-27 RX ORDER — BUPRENORPHINE AND NALOXONE 4; 1 MG/1; MG/1
1 FILM, SOLUBLE BUCCAL; SUBLINGUAL DAILY
Qty: 10 FILM | Refills: 0 | Status: SHIPPED | OUTPATIENT
Start: 2021-07-27 | End: 2021-08-05

## 2021-07-27 RX ORDER — ALBUTEROL SULFATE 90 UG/1
1-2 AEROSOL, METERED RESPIRATORY (INHALATION)
Status: CANCELLED
Start: 2021-07-27

## 2021-07-27 RX ORDER — LIDOCAINE HYDROCHLORIDE 10 MG/ML
2 INJECTION, SOLUTION EPIDURAL; INFILTRATION; INTRACAUDAL; PERINEURAL ONCE
Status: CANCELLED
Start: 2021-07-27 | End: 2021-07-27

## 2021-07-27 RX ORDER — METHYLPREDNISOLONE SODIUM SUCCINATE 125 MG/2ML
125 INJECTION, POWDER, LYOPHILIZED, FOR SOLUTION INTRAMUSCULAR; INTRAVENOUS
Status: CANCELLED
Start: 2021-07-27

## 2021-07-27 RX ORDER — DISULFIRAM 250 MG/1
250 TABLET ORAL DAILY
Qty: 90 TABLET | Refills: 3 | Status: SHIPPED | OUTPATIENT
Start: 2021-07-27 | End: 2022-04-19

## 2021-07-27 RX ORDER — NALOXONE HYDROCHLORIDE 0.4 MG/ML
0.2 INJECTION, SOLUTION INTRAMUSCULAR; INTRAVENOUS; SUBCUTANEOUS
Status: CANCELLED | OUTPATIENT
Start: 2021-07-27

## 2021-07-27 RX ORDER — DIPHENHYDRAMINE HYDROCHLORIDE 50 MG/ML
50 INJECTION INTRAMUSCULAR; INTRAVENOUS
Status: CANCELLED
Start: 2021-07-27

## 2021-07-27 RX ORDER — EPINEPHRINE 1 MG/ML
0.3 INJECTION, SOLUTION, CONCENTRATE INTRAVENOUS EVERY 5 MIN PRN
Status: CANCELLED | OUTPATIENT
Start: 2021-07-27

## 2021-07-27 RX ORDER — ALBUTEROL SULFATE 0.83 MG/ML
2.5 SOLUTION RESPIRATORY (INHALATION)
Status: CANCELLED | OUTPATIENT
Start: 2021-07-27
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
## 2021-07-27 NOTE — PROGRESS NOTES
[facility]  July 28, 2021      Behavioral Health Clinician Progress Note    Patient Name: Laci Hinkle Jr           Service Type:  Individual      Service Location:   Face to Face in Clinic     Session Start Time: 3:10pm Session End Time: 3:35pm      Session Length: 16 - 37      Attendees: Patient    Visit Activities (Refresh list every visit): Psychotherapy     Diagnostic Assessment Date: Not completed yet  Treatment Plan Review Date: Not completed yet  See Flowsheets for today's PHQ-9 and CEASAR-7 results  Previous PHQ-9:   PHQ-9 SCORE 9/24/2019 1/7/2020 7/22/2021   PHQ-9 Total Score - - -   PHQ-9 Total Score 16 15 22     Previous CEASAR-7:   CEASAR-7 SCORE 3/14/2012 9/16/2014 7/22/2021   Total Score 17 18 -   Total Score - - 6       GLORIA LEVEL:  GLORIA Score (Last Two) 9/16/2014   GLORIA Raw Score 45   Activation Score 73.1   GLORIA Level 4       DATA  Extended Session (60+ minutes): No  Interactive Complexity: No  Crisis: No  [facility] Patient: No    Treatment Objective(s) Addressed in This Session:  Target Behavior(s): mental health and addiction    Depressed Mood: Increase interest, engagement, and pleasure in doing things  Decrease frequency and intensity of feeling down, depressed, hopeless  Anxiety: will experience a reduction in anxiety and will develop more effective coping skills to manage anxiety symptoms  Alcohol / Substance Use: will discuss/consider potential need for formal substance use evaluation, treatment and/or support  continue to make healthy choices regarding substance use and engage in activities / supportive services that promote sobriety    Current Stressors / Issues:  He stated that he left treatment-and he stated that he felt like he was going to please everyone else-he is planning to go to  and get on the sublicade injection-he is having some difficulty with sleeping and he has less confidence with his ability to sleep-he is hoping that when he gets the injection he will  have a better time with sleeping-he is talking about being humble and doing what the experts tell him to do-he is reflecting on his leave-he has a panic attack when he was in treatment and he went to the ED and then he convinced himself that he wanted to leave treatment-if he is not able to follow through with school-he has not drank alcohol-he has spiritual connection-he has some shame-he stated scared of drinking-he is feeling the anxiety and (shaking during the visit)-I should have stated in treatment-he has not talked to the friends that are in active addiction-    Alcohol, marijuana, Kratum-    I need: AA meetings, injection for opiate use, anibuse, he is reading the Big book-          Progress on Treatment Objective(s) / Homework:  No improvement - ACTION (Actively working towards change); Intervened by reinforcing change plan / affirming steps taken    Motivational Interviewing    MI Intervention: Expressed Empathy/Understanding, Supported Autonomy, Collaboration, Evocation, Permission to raise concern or advise, Open-ended questions, Reflections: simple and complex and Change talk (evoked)     Change Talk Expressed by the Patient: Desire to change Ability to change Reasons to change Need to change Committment to change Activation Taking steps    Provider Response to Change Talk: E - Evoked more info from patient about behavior change, A - Affirmed patient's thoughts, decisions, or attempts at behavior change, R - Reflected patient's change talk and S - Summarized patient's change talk statements      Care Plan review completed: No    Medication Review:  No changes to current psychiatric medication(s)    Medication Compliance:  NA    Changes in Health Issues:   None reported    Chemical Use Review:   Substance Use: Chemical use reviewed, no active concerns identified      Tobacco Use: not reported    Assessment: Current Emotional / Mental Status (status of significant symptoms):  Risk status (Self / Other  harm or suicidal ideation)  Patient denies a history of suicidal ideation, suicide attempts, self-injurious behavior, homicidal ideation, homicidal behavior and and other safety concerns  Patient denies current fears or concerns for personal safety.  Patient denies current or recent suicidal ideation or behaviors.  Patient denies current or recent homicidal ideation or behaviors.  Patient denies current or recent self injurious behavior or ideation.  Patient denies other safety concerns.  A safety and risk management plan has not been developed at this time, however patient was encouraged to call [place] [phone] should there be a change in any of these risk factors.    Appearance:   Appropriate   Eye Contact:   Good   Psychomotor Behavior: Restless   Attitude:   Cooperative   Orientation:   All  Speech   Rate / Production: Normal/ Responsive Talkative Normal    Volume:  Normal   Mood:    Anxious  Normal  Affect:    Appropriate   Thought Content:  Clear   Thought Form:  Coherent  Goal Directed  Logical   Insight:    Fair     Diagnoses:  1. Bipolar affective disorder, currently depressed, moderate (H)    2. Opioid use disorder, severe, dependence (H)        Collateral Reports Completed:  Not Applicable    Plan: (Homework, other):  Patient was given information about behavioral services and encouraged to schedule a follow up appointment with the clinic [facility] as needed.  He was also given information about mental health symptoms and treatment options  and strategies to maintain sobriety.  CD Recommendations: Maintain Sobriety.   Jose Roberto Jane, [facility] Psychotherapy

## 2021-07-27 NOTE — NURSING NOTE
[facility]      Rooming information:  Last use of illicit opioids: Kratom- winter 2019  Taking buprenorphine? Yes:  As prescribed? No, taking 2 mg daily  Side effects related to buprenorphine (constipation, dry mouth, sedation?) Yes: constipation  Narcan currently available: No  Other recent substance use:    NICOTINE-Yes:    Cannabis   If using nicotine, ready to quit? Yes:     Point of care urine drug screen positive for: buprenorphine and THC  *POC urine drug screen does not screen for Fentanyl    Insurance needs: active    Housing needs: stable    Contact information up to date? yes    3rd Party Involvement none today (please obtain WU if pt would like to include)    Primary care provider: [facility] [place]     Mental health provider: yes here [facility] (MH referral if needed)    Morena Mckeon CMA  July 27, 2021  2:53 PM

## 2021-07-27 NOTE — PROGRESS NOTES
[facility] - Recovery Clinic Return Visit    ASSESSMENT/PLAN                                                      1. Opioid use disorder, severe, dependence (H)  Increase buprenorphine to 4mg/day until initial XR buprenorphine injection of 100mg, then stop SL buprenorphine.   Sublocade 100mg #1 was scheduled with infusion center staff during pt's visit.   Return to  for follow up in about one week  Proceed with plans to reengage with mutual support groups  - buprenorphine HCl-naloxone HCl (SUBOXONE) 4-1 MG per film; Place 1 Film under the tongue daily  Dispense: 10 Film; Refill: 0    2. Alcohol use disorder, severe, dependence (H)  Pt requests to continue disulfiram  Proceed with plans to reengage with mutual support groups  Consider topiramate, acamprosate next visit.   - disulfiram (ANTABUSE) 250 MG tablet; Take 1 tablet (250 mg) by mouth daily  Dispense: 90 tablet; Refill: 3       Return in about 1 week (around 8/3/2021) for Follow up, in person.      SUBJECTIVE                                                      CC/HPI:  Laci Hinkle Jr is a 25 year old male with PMH bipolar disorder, CEASAR, ADHD, and PSUD including opioids who presents to the Recovery Clinic for return visit.  Pt was referred by staff in [facility] due to pt's desire to taper off buprenorphine.   Pt decided to seek treatment at this time due to wanting to stop use of substances to allow him to take necessary steps to develop a career he enjoys.    Substance Use History :  Opioids:    Pt states he use heroin as a teenager for 4 months, last use age 17.  He started methadone treatment age 17, dose up to 100mg, then tapered off to 6mg/day then stopped.  He began using kratom ~2016, used larger amounts up to 100g daily.  He was first prescribed buprenorphine for OUD in 2019. He took as prescribed for 1-2 years, then continued through nonprescription sources, currently taking 2mg every other day.  He has tried to stop using  buprenorphine but the withdrawal symptoms have been prohibitive, particularly restless legs.   IV drug use: No   History of overdose: No  Previous treatments : Yes: multiple, currently in [facility] 11/2020  Longest period of sobriety: few months  Medical complications related to substance use: exacerbation of MH diagnoses  Hepatitis C Status  unknown  HIV Status unknown    Other recent substance use:  Stimulants: used methamphetamine ages [age], stopped for 5 years, then resumed age 24.  Had been using 2-3x/week for the last year, returned to use after leaving [place] 11/2020.   Sedatives/hypnotics/anxiolytics: has used in past, denies regular use  Alcohol: h/o drinking 1 L liquor daily, returned to use after leaving [facility] 11/2020  Tobacco: 1 ppd and ENDS  Cannabis: occasional  Hallucinogens:has used in past, denies recent  Behavioral addictions: no previous treatment for gambling per SUA    I last met with pt on 7/23/21.  A/P at that time was:  1. Opioid use disorder (H)  Pt remains interested in tapering off buprenorphine and requests tapering schedule below in spite of discussion of options allowing greater time between decreases in dose.    Encouraged use of clonidine, hydroxyzine as prescribed  Continue PHP  - buprenorphine HCl-naloxone HCl (SUBOXONE) 2-0.5 MG per film; starting 7/24/21 1/2 film sl qam x7d , then 1/4 film sl qam x7d, then stop  Dispense: 6 Film; Refill: 0      Since our last visit, pt left [facility] and states he would prefer to utilize XR buprenorphine to complete his taper off buprenorphine.  He has been taking buprenorphine 2mg/day since our last visit.  He denies any other opioid use.  He has been smoking cannabis, denies any other substance use.   He started taking Antabuse the day he left  and would like to continue this.  He is considering other medical treatment options for alcohol addiction, including naltrexone in the future or additional trial of topiramate.           [place] Prescription Drug Monitoring Program Reviewed:  Yes; as expected        Past Medical History:   Diagnosis Date     ADHD (attention deficit hyperactivity disorder) 3/18/2011     Bipolar affective disorder (H) 3/18/2011     CEASAR (generalized anxiety disorder) 3/18/2011     GERD (gastroesophageal reflux disease)      Hyperlipidemia LDL goal <130      Impaired fasting glucose      Obesity      Substance abuse (H) 3/18/2011         PAST PSYCHIATRIC HISTORY:  Diagnoses- bipolar disorder, CEASAR, ADHD  Suicide Attempts: No   Hospitalizations: Yes     Past Surgical History:   Procedure Laterality Date     ENT SURGERY  2010    jaw surgery        Medications:  acetaminophen (TYLENOL) 325 MG tablet, Take 325-650 mg by mouth every 4 hours as needed for mild pain  alum & mag hydroxide-simethicone (MAALOX) 200-200-20 MG/5ML SUSP suspension, Take 30 mLs by mouth every 6 hours as needed for indigestion  amLODIPine (NORVASC) 2.5 MG tablet, Take 2.5 mg by mouth daily  atorvastatin (LIPITOR) 20 MG tablet, 20 mg daily  cloNIDine (CATAPRES) 0.1 MG tablet, Take 1 tablet (0.1 mg) by mouth 3 times daily as needed (opioid withdrawal.)  disulfiram (ANTABUSE) 250 MG tablet, Take 1 tablet (250 mg) by mouth daily  divalproex sodium extended-release (DEPAKOTE ER) 500 MG 24 hr tablet, Take 4 tablets (2,000 mg) by mouth daily  guaiFENesin (ROBITUSSIN) 20 mg/mL SOLN solution, Take 10 mLs by mouth every 4 hours as needed for cough  hydrOXYzine (VISTARIL) 25 MG capsule, May take 1 capsule (25 mg) by mouth 2 times daily as needed for anxiety. May also take 2 capsules (50 mg) nightly as needed for anxiety.  ibuprofen (ADVIL/MOTRIN) 200 MG tablet, Take 400 mg by mouth every 6 hours as needed for mild pain  lamoTRIgine (LAMICTAL) 100 MG tablet, Take 2 tablets (200 mg) by mouth At Bedtime For more refills,schedule an appointment at [phone]'  loratadine (CLARITIN) 10 MG tablet, Take 10 mg by mouth daily  melatonin 3 MG tablet, Take 3 mg  by mouth nightly as needed for sleep  metoclopramide (REGLAN) 10 MG tablet, Take 1 tablet (10 mg) by mouth 4 times daily as needed (headache)  nicotine polacrilex (NICORETTE) 4 MG gum, Place 1 each (4 mg) inside cheek every hour as needed for smoking cessation  OLANZapine (ZYPREXA) 10 MG tablet, Take 2 tablets (20 mg) by mouth At Bedtime  omeprazole (PRILOSEC) 20 MG capsule, Take 1 capsule (20 mg) by mouth every morning (before breakfast)  ondansetron (ZOFRAN-ODT) 4 MG ODT tab, Take 1-2 tablets (4-8 mg) by mouth every 8 hours as needed for nausea  phenol-menthol (CEPASTAT) 14.5 MG lozenge, Place 1 lozenge inside cheek every 2 hours as needed for moderate pain  pramipexole (MIRAPEX) 0.25 MG tablet, Take 1 tablet (0.25 mg) by mouth nightly as needed (restless leg symptoms)  senna-docusate (SENOKOT-S/PERICOLACE) 8.6-50 MG tablet, Take 2 tablets by mouth daily as needed for constipation    Self Administer Medications: Behavioral Services        Allergies   Allergen Reactions     Prednisone Other (See Comments)     psych problems       Family History   Problem Relation Age of Onset     Hypertension Father      Alcohol/Drug Father         various substances     Psychotic Disorder Father         bipolar, anxiety, ADHD         Social History  Housing status: [facility], previously living in his own apt  Employment status: Unemployed, not seeking work  Relationship status: Single  Children: no children          REVIEW OF SYSTEMS:  No other concerns today    OBJECTIVE                                                        BP (!) 134/101   Pulse 89     Physical Exam  Constitutional:       General: He is not in acute distress.     Appearance: He is not ill-appearing.   HENT:      Head: Normocephalic and atraumatic.      Nose: No rhinorrhea.   Eyes:      General: No scleral icterus.     Extraocular Movements: Extraocular movements intact.      Conjunctiva/sclera: Conjunctivae normal.   Pulmonary:      Effort: No  respiratory distress.   Musculoskeletal:         General: No swelling or deformity.   Skin:     General: Skin is warm and dry.   Neurological:      Mental Status: He is alert and oriented to person, place, and time.      Cranial Nerves: No cranial nerve deficit.      Coordination: Coordination normal.      Gait: Gait normal.   Psychiatric:         Attention and Perception: Attention and perception normal.         Mood and Affect: Mood and affect normal.         Speech: Speech normal.         Behavior: Behavior normal.         Thought Content: Thought content normal.         Cognition and Memory: Cognition normal.         Judgment: Judgment normal.         Labs:    UDS: 7/22/21 buprenorphine and THC  *POC urine drug screen does not screen for Fentanyl    Recent Results (from the past 240 hour(s))   Alcohol breath test POCT    Collection Time: 07/19/21  3:49 PM   Result Value Ref Range    Alcohol Breath Test 0.070 (A) 0.00 - 0.01   SARS-COV2 (COVID-19) Virus RT-PCR    Collection Time: 07/22/21  9:12 AM    Specimen: Nasopharyngeal; Swab   Result Value Ref Range    SARS CoV2 PCR Negative Negative         At least 30 min spent in review of medical record,  review, obtaining histories, reviewing symptoms, discussing pt's goals for treatment, counseling noted above, coordinating care with [facility] staff.    FRANK GALARZA MD    [place] [address] [place] [id number] [phone]

## 2021-08-05 ENCOUNTER — OFFICE VISIT (OUTPATIENT)
Dept: BEHAVIORAL HEALTH | Facility: CLINIC | Age: 26
End: 2021-08-05
Payer: COMMERCIAL

## 2021-08-05 VITALS — SYSTOLIC BLOOD PRESSURE: 164 MMHG | DIASTOLIC BLOOD PRESSURE: 95 MMHG | HEART RATE: 109 BPM

## 2021-08-05 DIAGNOSIS — F10.20 ALCOHOL USE DISORDER, SEVERE, DEPENDENCE (H): ICD-10-CM

## 2021-08-05 DIAGNOSIS — F11.20 OPIOID USE DISORDER, SEVERE, DEPENDENCE (H): Primary | ICD-10-CM

## 2021-08-05 DIAGNOSIS — F17.200 TOBACCO USE DISORDER: ICD-10-CM

## 2021-08-05 PROCEDURE — 99214 OFFICE O/P EST MOD 30 MIN: CPT | Performed by: FAMILY MEDICINE

## 2021-08-05 RX ORDER — LIDOCAINE HYDROCHLORIDE 10 MG/ML
2 INJECTION, SOLUTION EPIDURAL; INFILTRATION; INTRACAUDAL; PERINEURAL ONCE
Status: CANCELLED
Start: 2021-08-05 | End: 2021-08-05

## 2021-08-05 RX ORDER — EPINEPHRINE 1 MG/ML
0.3 INJECTION, SOLUTION, CONCENTRATE INTRAVENOUS EVERY 5 MIN PRN
Status: CANCELLED | OUTPATIENT
Start: 2021-08-05

## 2021-08-05 RX ORDER — METHYLPREDNISOLONE SODIUM SUCCINATE 125 MG/2ML
125 INJECTION, POWDER, LYOPHILIZED, FOR SOLUTION INTRAMUSCULAR; INTRAVENOUS
Status: CANCELLED
Start: 2021-08-05

## 2021-08-05 RX ORDER — BUPRENORPHINE AND NALOXONE 8; 2 MG/1; MG/1
1 FILM, SOLUBLE BUCCAL; SUBLINGUAL DAILY
Qty: 10 FILM | Refills: 0 | Status: SHIPPED | OUTPATIENT
Start: 2021-08-05 | End: 2021-09-14

## 2021-08-05 RX ORDER — NALOXONE HYDROCHLORIDE 0.4 MG/ML
0.2 INJECTION, SOLUTION INTRAMUSCULAR; INTRAVENOUS; SUBCUTANEOUS
Status: CANCELLED | OUTPATIENT
Start: 2021-08-05

## 2021-08-05 RX ORDER — ALBUTEROL SULFATE 90 UG/1
1-2 AEROSOL, METERED RESPIRATORY (INHALATION)
Status: CANCELLED
Start: 2021-08-05

## 2021-08-05 RX ORDER — MEPERIDINE HYDROCHLORIDE 25 MG/ML
25 INJECTION INTRAMUSCULAR; INTRAVENOUS; SUBCUTANEOUS EVERY 30 MIN PRN
Status: CANCELLED | OUTPATIENT
Start: 2021-08-05

## 2021-08-05 RX ORDER — ALBUTEROL SULFATE 0.83 MG/ML
2.5 SOLUTION RESPIRATORY (INHALATION)
Status: CANCELLED | OUTPATIENT
Start: 2021-08-05

## 2021-08-05 RX ORDER — DIPHENHYDRAMINE HYDROCHLORIDE 50 MG/ML
50 INJECTION INTRAMUSCULAR; INTRAVENOUS
Status: CANCELLED
Start: 2021-08-05

## 2021-08-05 NOTE — NURSING NOTE
[facility]      Rooming information:  Last use of illicit opioids: Kratom- winter of 2019  Taking buprenorphine? Yes:  As prescribed? Yes:   Side effects related to buprenorphine (constipation, dry mouth, sedation?) No   Narcan currently available: No  Other recent substance use:    NICOTINE-Yes:    THC  If using nicotine, ready to quit? No    Point of care urine drug screen positive for: buprenorphine and THC  *POC urine drug screen does not screen for Fentanyl      Insurance needs: active    Housing needs: stable    Contact information up to date? yes    3rd Party Involvement none today (please obtain WU if pt would like to include)    [facility]     Mental health provider: [facility] (MH referral if needed)    Morena Mckeon CMA  August 5, 2021  1:40 PM

## 2021-08-05 NOTE — PROGRESS NOTES
[facility] Return Visit    ASSESSMENT/PLAN                                                    1. Opioid use disorder, severe, dependence (H)  Pt expressing interest in returning to stable buprenorphine therapy  Increase buprenorphine SL to 8mg/day  Proceed with initial Sublocade 300mg injection, this was scheduled 8/12/21 10am.   Discontinue sublingual buprenorphine 24hrs after Sublocade #1.   Continue mutual support group participation  - naloxone (NARCAN) 4 MG/0.1ML nasal spray; Spray 1 spray (4 mg) into one nostril alternating nostrils once as needed for opioid reversal every 2-3 minutes until assistance arrives  Dispense: 0.2 mL; Refill: 11  - buprenorphine HCl-naloxone HCl (SUBOXONE) 8-2 MG per film; Place 1 Film under the tongue daily  Dispense: 10 Film; Refill: 0    2. Alcohol use disorder, severe, dependence (H)  Pt continues disulfiram 250mg/day.   He is open to trial of topiramate 50mg at bedtime to address cravings.   Will consult with pt's psychiatrist, Dr. Bo Oconnor, regarding this addition given his existing medications for bipolar disorder.    Continue mutual support group participation    3. Tobacco use disorder  Pt not currently ready to quit, continue MI future visits           Return in about 1 week (around 8/12/2021) for Follow up, in person walk in 1:30p after Sublocade at 10a.      SUBJECTIVE                                                      CC/HPI:  Laci Hinkle Jr is a 25 year old male with PMH bipolar disorder, CEASAR, ADHD, and PSUD including opioids who presents to the [facility] for return visit.  Pt was referred by staff in [facility] due to pt's initial desire to taper off buprenorphine which he had been taking from nonprescription sources.   Pt decided to seek treatment at this time due to wanting to stop use of substances to allow him to take necessary steps to develop a career he enjoys.    Brief History:  [facility]. He describes his opioid use history starting with heroin as a teenager, last use age 17.  He started methadone treatment age 17, dose up to 100mg, then tapered off to 6mg/day then stopped.  He began using kratom ~2016, eventually used amounts up to 100g daily.  He was first prescribed buprenorphine for OUD in 2019. He took as prescribed through 1/2021, then continued and started attempts to taper buprenorphine through nonprescription sources.    IV drug use: No   History of overdose: No  Previous treatments : Yes: multiple, [facility], [facility] 11/2020  Longest period of sobriety: few months  Medical complications related to substance use: exacerbation of  diagnoses  Hepatitis C Status  unknown  HIV Status unknown    Other recent substance use:  Stimulants: used methamphetamine ages [age], stopped for 5 years, then resumed age 24.  Had been using 2-3x/week for the last year, returned to use after leaving [facility] 11/2020.   Sedatives/hypnotics/anxiolytics: has used in past, denies regular use  Alcohol: h/o drinking 1 L liquor daily, returned to use after leaving [facility] 11/2020; last use 7/20/21  Tobacco: 1 ppd and ENDS  Cannabis: occasional   Hallucinogens:has used in past, denies recent  Behavioral addictions: no previous treatment for gambling per SUA    I last met with pt on 7/27/21.  A/P at that time was:  1. Opioid use disorder, severe, dependence (H)  Increase buprenorphine to 4mg/day until initial XR buprenorphine injection of 100mg, then stop SL buprenorphine.   Sublocade 100mg #1 was scheduled with infusion center staff during pt's visit.   Return to  for follow up in about one week  Proceed with plans to reengage with mutual support groups  - buprenorphine HCl-naloxone HCl (SUBOXONE) 4-1 MG per film; Place 1 Film under the tongue daily  Dispense: 10 Film; Refill: 0    2. Alcohol use disorder, severe, dependence (H)  Pt requests to continue disulfiram  Proceed with plans  to reengage with mutual support groups  Consider topiramate, acamprosate next visit.   - disulfiram (ANTABUSE) 250 MG tablet; Take 1 tablet (250 mg) by mouth daily  Dispense: 90 tablet; Refill: 3      Today, pt states he has changed his mind about tapering off buprenorphine.  With regular dosing of 4mg/day since his last visit, he has noticed he feels more stable, has fewer cravings, and is sleeping better at night.  He would like to proceed with XR buprenorphine, but instead utilize standard dosing with 300mg initial injections and proceed with standard monthly dosing.      He has been taking disulfiram as he requested.  He states he has not drank alcohol since 7/20/21.  He endorses cravings for alcohol.      He continues to smoke 1 ppd and uses ENDS, not ready to quit at this time.     He states he will be starting college classes in the Fall.  He has signed up for 9 credits.  He will be moving into student housing as well.  He is very happy about being able to move out of his mother's house again.        [place] Prescription Drug Monitoring Program Reviewed:  Yes; as expected        Past Medical History:   Diagnosis Date     ADHD (attention deficit hyperactivity disorder) 3/18/2011     Bipolar affective disorder (H) 3/18/2011     CEASAR (generalized anxiety disorder) 3/18/2011     GERD (gastroesophageal reflux disease)      Hyperlipidemia LDL goal <130      Impaired fasting glucose      Obesity      Substance abuse (H) 3/18/2011         PAST PSYCHIATRIC HISTORY:  Diagnoses- bipolar disorder, CEASAR, ADHD  Suicide Attempts: No   Hospitalizations: Yes     Past Surgical History:   Procedure Laterality Date     ENT SURGERY  2010    jaw surgery        Medications:  atorvastatin (LIPITOR) 20 MG tablet, 20 mg daily  cloNIDine (CATAPRES) 0.1 MG tablet, Take 1 tablet (0.1 mg) by mouth 3 times daily as needed (opioid withdrawal.)  disulfiram (ANTABUSE) 250 MG tablet, Take 1 tablet (250 mg) by mouth daily  divalproex sodium  extended-release (DEPAKOTE ER) 500 MG 24 hr tablet, Take 4 tablets (2,000 mg) by mouth daily  hydrOXYzine (VISTARIL) 25 MG capsule, May take 1 capsule (25 mg) by mouth 2 times daily as needed for anxiety. May also take 2 capsules (50 mg) nightly as needed for anxiety.  lamoTRIgine (LAMICTAL) 100 MG tablet, Take 2 tablets (200 mg) by mouth At Bedtime For more refills,schedule an appointment at [phone]'  metoclopramide (REGLAN) 10 MG tablet, Take 1 tablet (10 mg) by mouth 4 times daily as needed (headache)  nicotine polacrilex (NICORETTE) 4 MG gum, Place 1 each (4 mg) inside cheek every hour as needed for smoking cessation  OLANZapine (ZYPREXA) 10 MG tablet, Take 2 tablets (20 mg) by mouth At Bedtime  omeprazole (PRILOSEC) 20 MG capsule, Take 1 capsule (20 mg) by mouth every morning (before breakfast)  senna-docusate (SENOKOT-S/PERICOLACE) 8.6-50 MG tablet, Take 2 tablets by mouth daily as needed for constipation    No current facility-administered medications on file prior to visit.      Allergies   Allergen Reactions     Prednisone Other (See Comments)     psych problems       Family History   Problem Relation Age of Onset     Hypertension Father      Alcohol/Drug Father         various substances     Psychotic Disorder Father         bipolar, anxiety, ADHD         Social History  Housing status: with his mother  Employment status: Unemployed, not seeking work; starting college classes Fall 2021  Relationship status: Single  Children: no children          REVIEW OF SYSTEMS:  No other concerns today    OBJECTIVE                                                        BP (!) 164/95   Pulse 109     Physical Exam  Constitutional:       General: He is not in acute distress.     Appearance: He is not ill-appearing.   HENT:      Head: Normocephalic and atraumatic.      Nose: No rhinorrhea.   Eyes:      General: No scleral icterus.     Extraocular Movements: Extraocular movements intact.      Conjunctiva/sclera:  Conjunctivae normal.   Pulmonary:      Effort: No respiratory distress.   Musculoskeletal:         General: No swelling or deformity.   Skin:     General: Skin is warm and dry.   Neurological:      Mental Status: He is alert and oriented to person, place, and time.      Cranial Nerves: No cranial nerve deficit.      Coordination: Coordination normal.      Gait: Gait normal.   Psychiatric:         Attention and Perception: Attention and perception normal.         Mood and Affect: Mood and affect normal.         Speech: Speech normal.         Behavior: Behavior normal.         Thought Content: Thought content normal.         Cognition and Memory: Cognition normal.         Judgment: Judgment normal.         Labs:    UDS:  buprenorphine and THC  *POC urine drug screen does not screen for Fentanyl    No results found for this or any previous visit (from the past 240 hour(s)).      At least 30 min spent in review of medical record,  review, obtaining histories, reviewing symptoms, discussing pt's goals for treatment, counseling noted above, coordinating care with psychiatrist and infusion center staff.     FRANK GALARZA MD    [place] [id number] [place] [id number] [phone]

## 2021-08-13 ENCOUNTER — OFFICE VISIT (OUTPATIENT)
Dept: BEHAVIORAL HEALTH | Facility: CLINIC | Age: 26
End: 2021-08-13
Payer: COMMERCIAL

## 2021-08-13 ENCOUNTER — INFUSION THERAPY VISIT (OUTPATIENT)
Dept: INFUSION THERAPY | Facility: CLINIC | Age: 26
End: 2021-08-13
Attending: FAMILY MEDICINE
Payer: COMMERCIAL

## 2021-08-13 VITALS
OXYGEN SATURATION: 97 % | DIASTOLIC BLOOD PRESSURE: 110 MMHG | TEMPERATURE: 98.4 F | HEART RATE: 56 BPM | SYSTOLIC BLOOD PRESSURE: 172 MMHG

## 2021-08-13 VITALS — HEART RATE: 115 BPM | SYSTOLIC BLOOD PRESSURE: 160 MMHG | DIASTOLIC BLOOD PRESSURE: 95 MMHG

## 2021-08-13 DIAGNOSIS — F11.20 OPIOID USE DISORDER, SEVERE, DEPENDENCE (H): Primary | ICD-10-CM

## 2021-08-13 DIAGNOSIS — F10.20 ALCOHOL USE DISORDER, SEVERE, DEPENDENCE (H): ICD-10-CM

## 2021-08-13 DIAGNOSIS — F31.32 BIPOLAR AFFECTIVE DISORDER, CURRENTLY DEPRESSED, MODERATE (H): Primary | ICD-10-CM

## 2021-08-13 DIAGNOSIS — F15.90 STIMULANT USE DISORDER: ICD-10-CM

## 2021-08-13 DIAGNOSIS — F17.200 TOBACCO USE DISORDER: ICD-10-CM

## 2021-08-13 DIAGNOSIS — F11.20 OPIOID USE DISORDER, SEVERE, DEPENDENCE (H): ICD-10-CM

## 2021-08-13 PROCEDURE — 99214 OFFICE O/P EST MOD 30 MIN: CPT | Mod: GT | Performed by: FAMILY MEDICINE

## 2021-08-13 PROCEDURE — 90832 PSYTX W PT 30 MINUTES: CPT | Performed by: SOCIAL WORKER

## 2021-08-13 PROCEDURE — 250N000009 HC RX 250: Performed by: FAMILY MEDICINE

## 2021-08-13 PROCEDURE — 96372 THER/PROPH/DIAG INJ SC/IM: CPT | Performed by: FAMILY MEDICINE

## 2021-08-13 PROCEDURE — 250N000011 HC RX IP 250 OP 636: Performed by: FAMILY MEDICINE

## 2021-08-13 RX ORDER — ALBUTEROL SULFATE 90 UG/1
1-2 AEROSOL, METERED RESPIRATORY (INHALATION)
Status: CANCELLED
Start: 2021-09-10

## 2021-08-13 RX ORDER — LIDOCAINE HYDROCHLORIDE 10 MG/ML
2 INJECTION, SOLUTION EPIDURAL; INFILTRATION; INTRACAUDAL; PERINEURAL ONCE
Status: CANCELLED
Start: 2021-09-10 | End: 2021-09-10

## 2021-08-13 RX ORDER — DIPHENHYDRAMINE HYDROCHLORIDE 50 MG/ML
50 INJECTION INTRAMUSCULAR; INTRAVENOUS
Status: CANCELLED
Start: 2021-09-10

## 2021-08-13 RX ORDER — NALOXONE HYDROCHLORIDE 0.4 MG/ML
0.2 INJECTION, SOLUTION INTRAMUSCULAR; INTRAVENOUS; SUBCUTANEOUS
Status: CANCELLED | OUTPATIENT
Start: 2021-09-10

## 2021-08-13 RX ORDER — ALBUTEROL SULFATE 0.83 MG/ML
2.5 SOLUTION RESPIRATORY (INHALATION)
Status: CANCELLED | OUTPATIENT
Start: 2021-09-10

## 2021-08-13 RX ORDER — LIDOCAINE HYDROCHLORIDE 10 MG/ML
2 INJECTION, SOLUTION EPIDURAL; INFILTRATION; INTRACAUDAL; PERINEURAL ONCE
Status: COMPLETED | OUTPATIENT
Start: 2021-08-13 | End: 2021-08-13

## 2021-08-13 RX ORDER — METHYLPREDNISOLONE SODIUM SUCCINATE 125 MG/2ML
125 INJECTION, POWDER, LYOPHILIZED, FOR SOLUTION INTRAMUSCULAR; INTRAVENOUS
Status: CANCELLED
Start: 2021-09-10

## 2021-08-13 RX ORDER — MEPERIDINE HYDROCHLORIDE 25 MG/ML
25 INJECTION INTRAMUSCULAR; INTRAVENOUS; SUBCUTANEOUS EVERY 30 MIN PRN
Status: CANCELLED | OUTPATIENT
Start: 2021-09-10

## 2021-08-13 RX ORDER — EPINEPHRINE 1 MG/ML
0.3 INJECTION, SOLUTION, CONCENTRATE INTRAVENOUS EVERY 5 MIN PRN
Status: CANCELLED | OUTPATIENT
Start: 2021-09-10

## 2021-08-13 RX ADMIN — BUPRENORPHINE 300 MG: 300 SOLUTION SUBCUTANEOUS at 13:19

## 2021-08-13 RX ADMIN — LIDOCAINE HYDROCHLORIDE 2 ML: 10 INJECTION, SOLUTION EPIDURAL; INFILTRATION; INTRACAUDAL; PERINEURAL at 13:18

## 2021-08-13 ASSESSMENT — PAIN SCALES - GENERAL: PAINLEVEL: NO PAIN (0)

## 2021-08-13 NOTE — NURSING NOTE
Brief visit with Pat.   CMA assisted patient in making PCP appt.   Pat visited with [facility] therapist.     Pat reports doing well; Sublocade injection went well. He had questions re: touching the injection site. Encouraged Pat to not apply pressure to injection site and ensure that nothing tight such as a belt lays directly on injection site. Pat verbalized understanding.    Suzanne Sanchez RN on 8/13/2021 at 2:33 PM

## 2021-08-13 NOTE — PROGRESS NOTES
[facility] Return Visit    ASSESSMENT/PLAN                                                      1. Opioid use disorder, severe, dependence (H)  Pt tolerated initial Sublocade 300mg today  Discontinue sublingual buprenorphine    2. Alcohol use disorder, severe, dependence (H)  Pt denying cravings at this time.  topiramate may be an option if needed in future.  Will check baseline NH4 if initiated.     3. Tobacco use disorder  Not ready to quit at this time, MI future visits    4. Stimulant use disorder  Denying cravings at this time  Topiramate would be an option if needed in future      Return in 4 weeks (on 9/10/2021) for Follow up, with me, in person 2p after Sublocade #2.      SUBJECTIVE                                                    Visit was conducted by telephone; pt had presented to clinic for intake, then elected to leave due to # of patients present in the clinic and agreed to complete visit virtually.  Pt was located in his home, I was located in [facility] office.  Start time 1730, end time 1750.     CC/HPI:  Laci Hinkle Jr is a 25 year old male with PMH bipolar disorder, CEASAR, ADHD, and PSUD including opioids on buprenorphine who presents to the [facility] for return visit.  Pt was initially referred by staff in [facility] due to pt's initial desire to taper off buprenorphine which he had been taking from nonprescription sources.   Pt decided to seek treatment at this time due to wanting to stop use of substances to allow him to take necessary steps to develop a career he enjoys.    Brief History:  Pt was initially evaluated in [facility] on 7/22/21. He describes his opioid use history starting with heroin as a teenager, last use age 17.  He started methadone treatment age 17, dose up to 100mg, then tapered off to 6mg/day then stopped.  He began using kratom ~2016, eventually used amounts up to 100g daily.  He was first prescribed buprenorphine for OUD  in 2019. He took as prescribed through 1/2021, then continued and started attempts to taper buprenorphine through nonprescription sources.    IV drug use: No   History of overdose: No  Previous treatments : Yes: multiple, [facility], [facility] 11/2020  Longest period of sobriety: few months  Medical complications related to substance use: exacerbation of MH diagnoses  Hepatitis C Status  unknown  HIV Status unknown    Other recent substance use:  Stimulants: used methamphetamine ages [age], stopped for 5 years, then resumed age 24.  Had been using 2-3x/week for the last year, returned to use after leaving [facility] 11/2020.   Sedatives/hypnotics/anxiolytics: has used in past, denies regular use  Alcohol: h/o drinking 1 L liquor daily, returned to use after leaving [facility] 11/2020; last use 7/20/21  Tobacco: 1 ppd and ENDS  Cannabis: occasional   Hallucinogens:has used in past, denies recent  Behavioral addictions: no previous treatment for gambling per SUA    I last met with pt on 8/5/21.  A/P at that time was:  1. Opioid use disorder, severe, dependence (H)  Pt expressing interest in returning to stable buprenorphine therapy  Increase buprenorphine SL to 8mg/day  Proceed with initial Sublocade 300mg injection, this was scheduled 8/12/21 10am.   Discontinue sublingual buprenorphine 24hrs after Sublocade #1.   Continue mutual support group participation  - naloxone (NARCAN) 4 MG/0.1ML nasal spray; Spray 1 spray (4 mg) into one nostril alternating nostrils once as needed for opioid reversal every 2-3 minutes until assistance arrives  Dispense: 0.2 mL; Refill: 11  - buprenorphine HCl-naloxone HCl (SUBOXONE) 8-2 MG per film; Place 1 Film under the tongue daily  Dispense: 10 Film; Refill: 0    2. Alcohol use disorder, severe, dependence (H)  Pt continues disulfiram 250mg/day.   He is open to trial of topiramate 50mg at bedtime to address cravings.   Will consult with pt's psychiatrist, Dr. Bo Oconnor, regarding this  addition given his existing medications for bipolar disorder.    Continue mutual support group participation    3. Tobacco use disorder  Pt not currently ready to quit, continue MI future visits      Pt did have initial Sublocade 300mg injection on 8/13/21.      Today, pt states he no problems with injection, no concerns of injection site reactions.  He had been taking 8mg SL buprenorphine prior to the Sublocade injection.  He denies any cravings for opioids.  He also denies cravings for alcohol or other substances.  We had discussed potential addition of topiramate to address AUD.  Pt's psychiatrist recommended checking baseline NH4 levels if topiramate is initiated.  Pt states he does not want to start topiramate at this time due to absence of cravings.       He continues to smoke 1 ppd and uses ENDS, not ready to quit at this time.     He states he will be starting college classes in the Fall.  He has signed up for 9 credits.  He will be moving into student housing as well.  He is very happy about being able to move out of his mother's house again.        [place] Prescription Drug Monitoring Program Reviewed:  Yes; as expected        Past Medical History:   Diagnosis Date     ADHD (attention deficit hyperactivity disorder) 3/18/2011     Bipolar affective disorder (H) 3/18/2011     CEASAR (generalized anxiety disorder) 3/18/2011     GERD (gastroesophageal reflux disease)      Hyperlipidemia LDL goal <130      Impaired fasting glucose      Obesity      Substance abuse (H) 3/18/2011         PAST PSYCHIATRIC HISTORY:  Diagnoses- bipolar disorder, CEASAR, ADHD  Suicide Attempts: No   Hospitalizations: Yes     Past Surgical History:   Procedure Laterality Date     ENT SURGERY  2010    jaw surgery        Medications:  atorvastatin (LIPITOR) 20 MG tablet, 20 mg daily (Patient not taking: Reported on 8/13/2021)  buprenorphine HCl-naloxone HCl (SUBOXONE) 8-2 MG per film, Place 1 Film under the tongue daily  cloNIDine  (CATAPRES) 0.1 MG tablet, Take 1 tablet (0.1 mg) by mouth 3 times daily as needed (opioid withdrawal.)  disulfiram (ANTABUSE) 250 MG tablet, Take 1 tablet (250 mg) by mouth daily  divalproex sodium extended-release (DEPAKOTE ER) 500 MG 24 hr tablet, Take 4 tablets (2,000 mg) by mouth daily  hydrOXYzine (VISTARIL) 25 MG capsule, May take 1 capsule (25 mg) by mouth 2 times daily as needed for anxiety. May also take 2 capsules (50 mg) nightly as needed for anxiety.  lamoTRIgine (LAMICTAL) 100 MG tablet, Take 2 tablets (200 mg) by mouth At Bedtime For more refills,schedule an appointment at [phone]'  metoclopramide (REGLAN) 10 MG tablet, Take 1 tablet (10 mg) by mouth 4 times daily as needed (headache) (Patient not taking: Reported on 8/13/2021)  naloxone (NARCAN) 4 MG/0.1ML nasal spray, Spray 1 spray (4 mg) into one nostril alternating nostrils once as needed for opioid reversal every 2-3 minutes until assistance arrives (Patient not taking: Reported on 8/13/2021)  nicotine polacrilex (NICORETTE) 4 MG gum, Place 1 each (4 mg) inside cheek every hour as needed for smoking cessation  OLANZapine (ZYPREXA) 10 MG tablet, Take 2 tablets (20 mg) by mouth At Bedtime  omeprazole (PRILOSEC) 20 MG capsule, Take 1 capsule (20 mg) by mouth every morning (before breakfast) (Patient not taking: Reported on 8/13/2021)  senna-docusate (SENOKOT-S/PERICOLACE) 8.6-50 MG tablet, Take 2 tablets by mouth daily as needed for constipation    [COMPLETED] buprenorphine ER (SUBLOCADE) syringe 300 mg  [COMPLETED] lidocaine (PF) (XYLOCAINE) 1 % injection 2 mL        Allergies   Allergen Reactions     Prednisone Other (See Comments)     psych problems       Family History   Problem Relation Age of Onset     Hypertension Father      Alcohol/Drug Father         various substances     Psychotic Disorder Father         bipolar, anxiety, ADHD         Social History  Housing status: with his mother  Employment status: Unemployed, not seeking work;  starting college classes Fall 2021  Relationship status: Single  Children: no children          REVIEW OF SYSTEMS:  No other concerns today    OBJECTIVE                                                        BP (!) 160/95   Pulse 115     Physical Exam  Constitutional:       General: He is not in acute distress.     Appearance: He is not ill-appearing.   Neurological:      Mental Status: He is alert and oriented to person, place, and time.   Psychiatric:         Attention and Perception: Attention and perception normal.         Mood and Affect: Mood and affect normal.         Speech: Speech normal.         Behavior: Behavior normal.         Thought Content: Thought content normal.         Cognition and Memory: Cognition normal.         Judgment: Judgment normal.         Labs:    UDS:  buprenorphine and THC  *POC urine drug screen does not screen for Fentanyl    No results found for this or any previous visit (from the past 240 hour(s)).      At least 30 min spent in review of medical record,  review, obtaining histories, reviewing symptoms, discussing pt's goals for treatment, counseling noted above, scheduling follow up.    FRANK GALARZA MD    [place] [address] [place] [id number] [phone]

## 2021-08-13 NOTE — CONFIDENTIAL NOTE
"Infusion Nursing Note:  Laci Hinkle  presents today for first dose 300mg sublocade injection.    Patient seen by provider today: Yes: States he might \"walk over there\" after injectio appointment   present during visit today: Not Applicable.    Note: In basket message sent to Dr. Hastings regarding declined lab draw and patient's HTN.      Intravenous Access:  Declined lab draw.    Treatment Conditions:  States sober 3 weeks.      Post Infusion Assessment:  Patient tolerated injection without incident.  Given in RLQ with 2 ml subcutaneous xylocaine for local anesthesia.       Discharge Plan:   Discharge instructions reviewed with: Patient.  Patient discharged in stable condition accompanied by: self.  Departure Mode: Ambulatory.  Reviewed and provided with printed info/wallet card.      Ximena Fermin"

## 2021-08-13 NOTE — NURSING NOTE
[facility]      Rooming information:  Last use of illicit opioids: Kratom-winter 2019  Taking buprenorphine? Yes:  As prescribed? Yes:   Side effects related to buprenorphine (constipation, dry mouth, sedation?) No   Narcan currently available: Yes  Other recent substance use:    NICOTINE-Yes:    Cannabis   If using nicotine, ready to quit? No    Point of care urine drug screen positive for: buprenorphine and THC  *POC urine drug screen does not screen for Fentanyl      Insurance needs: active    Housing needs: stable    Contact information up to date? yes    3rd Party Involvement none today (please obtain WU if pt would like to include)    Primary care provider: [facility]     Mental health provider: [facility] (MH referral if needed)    Morena Mckeon CMA  August 13, 2021  1:40 PM

## 2021-08-13 NOTE — PROGRESS NOTES
[facility]  August 13, 2021      Behavioral Health Clinician Progress Note    Patient Name: Laci Hinkle Jr           Service Type:  Individual      Service Location:   Face to Face in Clinic     Session Start Time: 2:00pm Session End Time: 2:25pm      Session Length: 16 - 37      Attendees: Patient    Visit Activities (Refresh list every visit): Psychotherapy     Diagnostic Assessment Date: Not completed yet  Treatment Plan Review Date: Not completed yet  See Flowsheets for today's PHQ-9 and CEASAR-7 results  Previous PHQ-9:   PHQ-9 SCORE 9/24/2019 1/7/2020 7/22/2021   PHQ-9 Total Score - - -   PHQ-9 Total Score 16 15 22     Previous CEASAR-7:   CEASAR-7 SCORE 3/14/2012 9/16/2014 7/22/2021   Total Score 17 18 -   Total Score - - 6       GLORIA LEVEL:  GLORIA Score (Last Two) 9/16/2014   GLORIA Raw Score 45   Activation Score 73.1   GLORIA Level 4       DATA  Extended Session (60+ minutes): No  Interactive Complexity: No  Crisis: No  H Patient: No    Treatment Objective(s) Addressed in This Session:  Target Behavior(s): mental health and addiction    Depressed Mood: Increase interest, engagement, and pleasure in doing things  Decrease frequency and intensity of feeling down, depressed, hopeless  Identify negative self-talk and behaviors: challenge core beliefs, myths, and actions  Improve concentration, focus, and mindfulness in daily activities   Anxiety: will experience a reduction in anxiety and will develop more effective coping skills to manage anxiety symptoms  Mood Instability: will develop better understanding of triggers and coping strategies to stabilize mood  Alcohol / Substance Use: will discuss/consider potential need for formal substance use evaluation, treatment and/or support  continue to make healthy choices regarding substance use and engage in activities / supportive services that promote sobriety    Current Stressors / Issues:  2pm to 2:25pm    The patient reported that he had  his first injection today and this is what he needed as he was playing with his medications and he does not want to be on that trap up misusing his medications-I'm ready to grow up and take life serious and this is being sober and in recovery-he was able save money to get a place to live and he is planning to move at the 1st of next month-he is in the process of registering for classes-he is planning how classes will be set up-he is feeling a little hypomanic-he was talking rapidly-he missed some medication dosages because he ran out of the medication and was lazy and did not  medications-it takes a while before before it goes to mental health relapse as his minda will reach to psychosis and paranoid-so it is very important for him to take medications-he wants to find a job-he has been sober for about 3 weeks and I like this and I'm seeing more goodness in life and more color-he stated that he has more structure to his sleeping-he had to stop talking to some friends because they trigger me and I have to protect my self-this other guide does not treat me good and I connect to some of the negative things that he says to me-has to protect the self-he stated that he is smoking less of the marijuana-as it is getting in the way of his productivity and having fun-regarding sleeping the patient reported having good sleeping-he got his refills of his mental health medications-      Progress on Treatment Objective(s) / Homework:  Minimal progress - PREPARATION (Decided to change - considering how); Intervened by negotiating a change plan and determining options / strategies for behavior change, identifying triggers, exploring social supports, and working towards setting a date to begin behavior change    Motivational Interviewing    MI Intervention: Expressed Empathy/Understanding, Supported Autonomy, Collaboration, Evocation, Permission to raise concern or advise, Open-ended questions, Reflections: simple and complex  and Change talk (evoked)     Change Talk Expressed by the Patient: Desire to change Ability to change Reasons to change Need to change Committment to change Activation Taking steps    Provider Response to Change Talk: E - Evoked more info from patient about behavior change, A - Affirmed patient's thoughts, decisions, or attempts at behavior change, R - Reflected patient's change talk and S - Summarized patient's change talk statements      Care Plan review completed: No    Medication Review:  No changes to current psychiatric medication(s)    Medication Compliance:  NA    Changes in Health Issues:   None reported    Chemical Use Review:   Substance Use: Chemical use reviewed, no active concerns identified      Tobacco Use: not reported    Assessment: Current Emotional / Mental Status (status of significant symptoms):  Risk status (Self / Other harm or suicidal ideation)  Patient denies a history of suicidal ideation, suicide attempts, self-injurious behavior, homicidal ideation, homicidal behavior and and other safety concerns  Patient denies current fears or concerns for personal safety.  Patient denies current or recent suicidal ideation or behaviors.  Patient denies current or recent homicidal ideation or behaviors.  Patient denies current or recent self injurious behavior or ideation.  Patient denies other safety concerns.  A safety and risk management plan has not been developed at this time, however patient was encouraged to call [place] [phone] should there be a change in any of these risk factors.    Appearance:   Appropriate   Eye Contact:   Good   Psychomotor Behavior: Restless   Attitude:   Cooperative   Orientation:   All  Speech   Rate / Production: Normal/ Responsive Talkative Normal    Volume:  Normal   Mood:    Anxious  Normal  Affect:    Appropriate   Thought Content:  Clear   Thought Form:  Coherent  Goal Directed  Logical   Insight:    Fair     Diagnoses:  1. Bipolar affective disorder,  currently depressed, moderate (H)    2. Opioid use disorder, severe, dependence (H)        Collateral Reports Completed:  Not Applicable    Plan: (Homework, other):  Patient was given information about behavioral services and encouraged to schedule a follow up appointment with the clinic [facility] as needed.  He was also given information about mental health symptoms and treatment options  and strategies to maintain sobriety.  CD Recommendations: Maintain Sobriety.   Jose Roberto Jane, [facility]

## 2021-08-16 ASSESSMENT — ENCOUNTER SYMPTOMS
SHORTNESS OF BREATH: 0
FEVER: 0
ABDOMINAL PAIN: 0

## 2021-08-18 DIAGNOSIS — F31.10 BIPOLAR I DISORDER, MOST RECENT EPISODE (OR CURRENT) MANIC (H): ICD-10-CM

## 2021-08-18 RX ORDER — OLANZAPINE 10 MG/1
20 TABLET ORAL AT BEDTIME
Qty: 60 TABLET | Refills: 0 | Status: SHIPPED | OUTPATIENT
Start: 2021-08-18 | End: 2021-09-07 | Stop reason: DRUGHIGH

## 2021-08-18 NOTE — TELEPHONE ENCOUNTER
Medication requested: OLANZapine (ZYPREXA) 10 MG tablet  Last refilled: 5/5/21  Qty: 60/0       Last seen: 4/6/21  RTC: 3 months  Cancel: 0  No-show: x 1  Next appt: 0       Refill decision:   Refill pended and routed to the provider for review/determination due to  no show x 1, appt due.  Scheduling has been notified to contact the pt for appointment.

## 2021-09-07 ENCOUNTER — TELEPHONE (OUTPATIENT)
Dept: PSYCHIATRY | Facility: CLINIC | Age: 26
End: 2021-09-07

## 2021-09-07 DIAGNOSIS — F31.81 BIPOLAR 2 DISORDER (H): ICD-10-CM

## 2021-09-07 DIAGNOSIS — F11.10 OPIOID USE DISORDER, MILD, ABUSE (H): ICD-10-CM

## 2021-09-07 DIAGNOSIS — F31.10 BIPOLAR I DISORDER, MOST RECENT EPISODE (OR CURRENT) MANIC (H): Primary | ICD-10-CM

## 2021-09-07 RX ORDER — CLONIDINE HYDROCHLORIDE 0.1 MG/1
TABLET ORAL
Qty: 120 TABLET | Refills: 2 | Status: SHIPPED | OUTPATIENT
Start: 2021-09-07 | End: 2021-11-30

## 2021-09-07 RX ORDER — OLANZAPINE 15 MG/1
15 TABLET ORAL AT BEDTIME
Qty: 30 TABLET | Refills: 2 | Status: SHIPPED | OUTPATIENT
Start: 2021-09-07 | End: 2021-11-02

## 2021-09-07 RX ORDER — LAMOTRIGINE 100 MG/1
200 TABLET ORAL AT BEDTIME
Qty: 60 TABLET | Refills: 2 | Status: SHIPPED | OUTPATIENT
Start: 2021-09-07 | End: 2021-11-02

## 2021-09-07 NOTE — TELEPHONE ENCOUNTER
Last seen: 4/6  RTC: 3 months  Non-provider cancel: None  No-show: 6/8  Next appt: 11/30     Incoming refill from patient via telephone     Medication requested:  Disp Refills Start End SERGIO    lamoTRIgine (LAMICTAL) 100 MG tablet 60 tablet 0 6/25/2021  No   Sig - Route: Take 2 tablets (200 mg) by mouth At Bedtime          Refill routed to provider due to refill protocol (no show).          09/07/21 [address] Oconnor, Bo Uribe MD   E-Prescribing Status: Receipt confirmed by pharmacy (9/7/2021 10:24 AM CDT)

## 2021-09-07 NOTE — TELEPHONE ENCOUNTER
Writer received call from pt, who reported that he wanted Dr. Oconnor to know that he's been sober since 7/20 and that things have been going well overall.    Pt requested reducing Zyprexa by 5 mg from his current 20 mg dose.He has 10 mg tabs, so needs 5 mg tabs. Pt identified that the Zyprexa makes him slow and sleepy during the day, his appetite is increased and he feels that he doesn't need as much antipsychotic medication since he's not using.    Pt reported that he has been experiencing increased anxiety at night and hydroxyzine hasn't been helpful. He has found clonidine to be helpful and requests an increase to 0.2 mg. He has been out of this med for a couple of days. Pt identified that his daytime anxiety is limited and mostly is around the public speaking and acting classes that he is taking, which push him out of his comfort zone.    Routed to Dr. Oconnor.            =========================================      Bo Oconnor MD Snyder, David J RN  Caller: Unspecified (Today,  9:28 AM)  Oh, ok, sorry about that. Yes, I'll suggest he increase clonidine to 0.1 mg BID and 0.2 mg HS.     DB           Previous Messages       ----- Message -----   From: Bo Delatorre RN   Sent: 9/7/2021  12:58 PM CDT   To: Bo Oconnor MD     Laci's clonidine dose had been 0.1 mg three times a day.   Does that change your answer?     Ja     ----- Message -----   From: Bo Oconnor MD   Sent: 9/7/2021   9:52 AM CDT   To: MONIQUE Lizarraga,    I am okay with Laci reducing his olanzapine to 15 mg.    I am also okay with him increasing his clonidine to either 0.1 mg bid or 0.2 mg once daily.    Do you mind the refills for him and letting him know to be watchful for any symptoms of orthostatic hypotension from clonidine?     Thx,    DB             =========================================      Writer e-prescribed updated doses to pt's preferred pharmacy.

## 2021-09-10 ENCOUNTER — OFFICE VISIT (OUTPATIENT)
Dept: BEHAVIORAL HEALTH | Facility: CLINIC | Age: 26
End: 2021-09-10
Payer: COMMERCIAL

## 2021-09-10 ENCOUNTER — TELEPHONE (OUTPATIENT)
Dept: BEHAVIORAL HEALTH | Facility: CLINIC | Age: 26
End: 2021-09-10

## 2021-09-10 ENCOUNTER — INFUSION THERAPY VISIT (OUTPATIENT)
Dept: INFUSION THERAPY | Facility: CLINIC | Age: 26
End: 2021-09-10
Attending: FAMILY MEDICINE
Payer: COMMERCIAL

## 2021-09-10 VITALS — HEART RATE: 80 BPM | SYSTOLIC BLOOD PRESSURE: 135 MMHG | DIASTOLIC BLOOD PRESSURE: 87 MMHG

## 2021-09-10 DIAGNOSIS — F10.20 ALCOHOL USE DISORDER, SEVERE, DEPENDENCE (H): ICD-10-CM

## 2021-09-10 DIAGNOSIS — F11.20 OPIOID USE DISORDER, SEVERE, DEPENDENCE (H): Primary | ICD-10-CM

## 2021-09-10 DIAGNOSIS — F17.200 TOBACCO USE DISORDER: ICD-10-CM

## 2021-09-10 DIAGNOSIS — F15.90 STIMULANT USE DISORDER: ICD-10-CM

## 2021-09-10 PROCEDURE — 99214 OFFICE O/P EST MOD 30 MIN: CPT | Performed by: FAMILY MEDICINE

## 2021-09-10 PROCEDURE — 96372 THER/PROPH/DIAG INJ SC/IM: CPT | Performed by: FAMILY MEDICINE

## 2021-09-10 PROCEDURE — 250N000009 HC RX 250: Performed by: FAMILY MEDICINE

## 2021-09-10 PROCEDURE — 250N000011 HC RX IP 250 OP 636: Performed by: FAMILY MEDICINE

## 2021-09-10 RX ORDER — NALOXONE HYDROCHLORIDE 0.4 MG/ML
0.2 INJECTION, SOLUTION INTRAMUSCULAR; INTRAVENOUS; SUBCUTANEOUS
Status: CANCELLED | OUTPATIENT
Start: 2021-10-07

## 2021-09-10 RX ORDER — LIDOCAINE HYDROCHLORIDE 10 MG/ML
2 INJECTION, SOLUTION EPIDURAL; INFILTRATION; INTRACAUDAL; PERINEURAL ONCE
Status: COMPLETED | OUTPATIENT
Start: 2021-09-10 | End: 2021-09-10

## 2021-09-10 RX ORDER — DIPHENHYDRAMINE HYDROCHLORIDE 50 MG/ML
50 INJECTION INTRAMUSCULAR; INTRAVENOUS
Status: CANCELLED
Start: 2021-10-07

## 2021-09-10 RX ORDER — LIDOCAINE HYDROCHLORIDE 10 MG/ML
2 INJECTION, SOLUTION EPIDURAL; INFILTRATION; INTRACAUDAL; PERINEURAL ONCE
Status: CANCELLED
Start: 2021-10-07 | End: 2021-10-07

## 2021-09-10 RX ORDER — METHYLPREDNISOLONE SODIUM SUCCINATE 125 MG/2ML
125 INJECTION, POWDER, LYOPHILIZED, FOR SOLUTION INTRAMUSCULAR; INTRAVENOUS
Status: CANCELLED
Start: 2021-10-07

## 2021-09-10 RX ORDER — ALBUTEROL SULFATE 90 UG/1
1-2 AEROSOL, METERED RESPIRATORY (INHALATION)
Status: CANCELLED
Start: 2021-10-07

## 2021-09-10 RX ORDER — ALBUTEROL SULFATE 0.83 MG/ML
2.5 SOLUTION RESPIRATORY (INHALATION)
Status: CANCELLED | OUTPATIENT
Start: 2021-10-07

## 2021-09-10 RX ORDER — EPINEPHRINE 1 MG/ML
0.3 INJECTION, SOLUTION, CONCENTRATE INTRAVENOUS EVERY 5 MIN PRN
Status: CANCELLED | OUTPATIENT
Start: 2021-10-07

## 2021-09-10 RX ORDER — MEPERIDINE HYDROCHLORIDE 25 MG/ML
25 INJECTION INTRAMUSCULAR; INTRAVENOUS; SUBCUTANEOUS EVERY 30 MIN PRN
Status: CANCELLED | OUTPATIENT
Start: 2021-10-07

## 2021-09-10 RX ADMIN — LIDOCAINE HYDROCHLORIDE 2 ML: 10 INJECTION, SOLUTION EPIDURAL; INFILTRATION; INTRACAUDAL; PERINEURAL at 13:48

## 2021-09-10 RX ADMIN — BUPRENORPHINE 300 MG: 300 SOLUTION SUBCUTANEOUS at 13:51

## 2021-09-10 NOTE — PROGRESS NOTES
[facility] Recovery Clinic Return Visit    ASSESSMENT/PLAN                                                    1. Opioid use disorder, severe, dependence (H)  Pt reporting some return of cravings in 4th week after initial Sublocade 300mg injection.  Also with triggers including new tooth pain.  One use of kratom in the last week, last use 9/8/21.   Proceed with Sublocade 300mg #2 today; pt agreed to notify clinic if his symptoms remain uncontrolled.     2. Alcohol use disorder, severe, dependence (H)  Pt has decided he does not want to try addition of topiramate to address alcohol cravings.   Continue disulfiram.     3. Stimulant use disorder  Pt denying cravings or recent use    4. Tobacco use disorder  Not ready to quit at this time    Return in about 4 weeks (around 10/8/2021) for Follow up, with me, in person.      SUBJECTIVE                                                        CC/HPI:  Laci Hinkle Jr is a 25 year old male with PMH bipolar disorder, CEASAR, ADHD, and PSUD including opioids on buprenorphine who presents to the Recovery Clinic for return visit.      Brief History:  Pt was initially evaluated in Recovery Clinic on 7/22/21. Pt was referred by staff in [facility] due to pt's initial desire to taper off buprenorphine which he had been taking from nonprescription sources.   Pt decided to seek treatment at this time due to wanting to stop use of substances to allow him to take necessary steps to develop a career he enjoys.  Pt left [facility], but continued with Recovery Clinic.       He describes his opioid use history starting with heroin as a teenager, last use age 17.  He started methadone treatment age 17, dose up to 100mg, then tapered off to 6mg/day then stopped.  He began using kratom ~2016, eventually used amounts up to 100g daily.  He was first prescribed buprenorphine for OUD in 2019. He took as prescribed through 1/2021, then started attempts to taper buprenorphine through  nonprescription sources.  After leaving [facility] shortly after admission, pt eventually decided to remain on buprenorphine, and has transferred to XR buprenorphine with initial Sublocade 300mg injection 8/13/21.  Most recent Sublocade (#2, 300mg) is schduled 9/10/21.  IV drug use: No   History of overdose: No  Previous treatments : Yes: multiple, [facility], [facility] 11/2020, previous buprenorphine and methadone  Longest period of sobriety: few months  Medical complications related to substance use: exacerbation of MH diagnoses  Hepatitis C Status  unknown  HIV Status unknown    Other recent substance use:  Stimulants: used methamphetamine ages [age], stopped for 5 years, then resumed age 24.  Had been using 2-3x/week for the last year, returned to use after leaving [facility] 11/2020.   Sedatives/hypnotics/anxiolytics: has used in past, denies regular use  Alcohol: h/o drinking 1 L liquor daily, returned to use after leaving [facility] 11/2020; last use 7/20/21  Tobacco: 1 ppd and ENDS  Cannabis: occasional   Hallucinogens:has used in past, denies recent  Behavioral addictions: none    I last met with pt on 8/13/21.  A/P at that time was:  1. Opioid use disorder, severe, dependence (H)  Pt tolerated initial Sublocade 300mg today  Discontinue sublingual buprenorphine    2. Alcohol use disorder, severe, dependence (H)  Pt denying cravings at this time.  topiramate may be an option if needed in future.  Will check baseline NH4 if initiated.     3. Tobacco use disorder  Not ready to quit at this time, MI future visits    4. Stimulant use disorder  Denying cravings at this time  Topiramate would be an option if needed in future        Today, pt states he was doing very well for the first 3 weeks after his initial Sublocade, but has noticed return of cravings for the last week or so.  He also has a very painful tooth at the moment, and is working on getting his insurance to cover a root canal procedure.  He did  purchase kratom last week, and took it once.  He did not feel the opioid effects, and he has not attempted to use again.      He states his cannabis use is decreasing.  He is taking classes now, and has cut back on cannabis use to help him study more effectively.  He reports some days he is not smoking cannabis at all.      He endorses occasional alcohol cravings, but has not drank alcohol.  He remains on disulfiram which he finds very helpful to prevent him from drinking.     He has been feeling more tired lately, and reduced his Zyprexa dose to help this.  He states he did talk with his psychiatrist about this decrease and was prescribed a lower dose of Zyprexa.       He continues to smoke 1 ppd and uses ENDS, not ready to quit at this time.     He has started classes and is enjoying this.  He will be moving into student housing as well.  He is very happy about being able to move out of his mother's house again.        [place] Prescription Drug Monitoring Program Reviewed:  Yes; as expected        Past Medical History:   Diagnosis Date     ADHD (attention deficit hyperactivity disorder) 3/18/2011     Bipolar affective disorder (H) 3/18/2011     CEASAR (generalized anxiety disorder) 3/18/2011     GERD (gastroesophageal reflux disease)      Hyperlipidemia LDL goal <130      Impaired fasting glucose      Obesity      Substance abuse (H) 3/18/2011         PAST PSYCHIATRIC HISTORY:  Diagnoses- bipolar disorder, CEASAR, ADHD  Suicide Attempts: No   Hospitalizations: Yes     Past Surgical History:   Procedure Laterality Date     ENT SURGERY  2010    jaw surgery        Medications:  atorvastatin (LIPITOR) 20 MG tablet, 20 mg daily (Patient not taking: Reported on 8/13/2021)  buprenorphine HCl-naloxone HCl (SUBOXONE) 8-2 MG per film, Place 1 Film under the tongue daily  cloNIDine (CATAPRES) 0.1 MG tablet, Take 1 tablet (0.1 mg) by mouth 2 times daily AND 2 tablets (0.2 mg) At Bedtime.  disulfiram (ANTABUSE) 250 MG tablet, Take  1 tablet (250 mg) by mouth daily  divalproex sodium extended-release (DEPAKOTE ER) 500 MG 24 hr tablet, Take 4 tablets (2,000 mg) by mouth daily  lamoTRIgine (LAMICTAL) 100 MG tablet, Take 2 tablets (200 mg) by mouth At Bedtime  metoclopramide (REGLAN) 10 MG tablet, Take 1 tablet (10 mg) by mouth 4 times daily as needed (headache) (Patient not taking: Reported on 8/13/2021)  naloxone (NARCAN) 4 MG/0.1ML nasal spray, Spray 1 spray (4 mg) into one nostril alternating nostrils once as needed for opioid reversal every 2-3 minutes until assistance arrives (Patient not taking: Reported on 8/13/2021)  nicotine polacrilex (NICORETTE) 4 MG gum, Place 1 each (4 mg) inside cheek every hour as needed for smoking cessation  OLANZapine (ZYPREXA) 15 MG tablet, Take 1 tablet (15 mg) by mouth At Bedtime  omeprazole (PRILOSEC) 20 MG capsule, Take 1 capsule (20 mg) by mouth every morning (before breakfast) (Patient not taking: Reported on 8/13/2021)  senna-docusate (SENOKOT-S/PERICOLACE) 8.6-50 MG tablet, Take 2 tablets by mouth daily as needed for constipation    No current facility-administered medications on file prior to visit.      Allergies   Allergen Reactions     Prednisone Other (See Comments)     psych problems       Family History   Problem Relation Age of Onset     Hypertension Father      Alcohol/Drug Father         various substances     Psychotic Disorder Father         bipolar, anxiety, ADHD         Social History  Housing status: with his mother before student housing  Employment status: in school  Relationship status: Single  Children: no children          REVIEW OF SYSTEMS:  No other concerns today    OBJECTIVE                                                        /87   Pulse 80     Physical Exam  Constitutional:       General: He is not in acute distress.  Pulmonary:      Effort: Pulmonary effort is normal.   Neurological:      Mental Status: He is alert and oriented to person, place, and time.       Coordination: Coordination normal.      Gait: Gait normal.   Psychiatric:         Attention and Perception: Attention and perception normal.         Mood and Affect: Mood and affect normal.         Behavior: Behavior normal. Behavior is cooperative.         Thought Content: Thought content normal.         Cognition and Memory: Cognition normal.         Judgment: Judgment normal.      Comments: Pt is loquacious         Labs:    UDS:  buprenorphine and THC  *POC urine drug screen does not screen for Fentanyl    No results found for this or any previous visit (from the past 240 hour(s)).      At least 30 min spent in review of medical record,  review, obtaining histories, reviewing symptoms, discussing pt's goals for treatment, counseling noted above, scheduling follow up.    FRANK GALARZA MD    [place] [id number] [place] [id number] [phone]

## 2021-09-10 NOTE — NURSING NOTE
"[facility] Clinic      Rooming information:  Approximate last use of illicit opioids: [place]- 9/8/2021  Taking buprenorphine? Yes:  As prescribed? Yes:   Number of buprenorphine films/tablets remaining currently: 0  Side effects related to buprenorphine (constipation, dry mouth, sedation?) Yes: Constipation, fell like he is \"crashing\" this last week before his shot  Narcan currently available: Yes  Other recent substance use:    NICOTINE-Yes:    Cannabis   If using nicotine, ready to quit? No    Point of care urine drug screen positive for: buprenorphine and THC  *P[facility] urine drug screen does not screen for Fentanyl    Insurance needs: active    Housing needs: stable    Contact information up to date? yes    3rd Party Involvement none today (please obtain WU if pt would like to include)    Primary care provider: [facility]     Mental health provider: kemar (follow up on MH referral if needed)    Morena Mckeon CMA  September 10, 2021  12:11 PM      "

## 2021-09-10 NOTE — TELEPHONE ENCOUNTER
"Reason for call:  Other   Patient called regarding (reason for call): prescription  Additional comments:     Pt left vm in recovery clinic stated he met with  today (9/10). Requesting extra suboxone strips for the month.. states \"I think it will help with the cravings\". Will like to discuss in details with . Please further assist.    Phone number to reach patient:  Cell number on file:    Telephone Information:   Mobile [phone]       Best Time:  ANY    Can we leave a detailed message on this number?  YES    Travel screening: Not Applicable    "

## 2021-09-10 NOTE — PROGRESS NOTES
Infusion Nursing Note:  Laci Hinkle Jr presents today for Sublocade 300 mg.    Patient seen by provider today: Yes: saw Dr Hastings before arriving today.   present during visit today: Not Applicable.    Note: Pt states he took kratom on Wednesday, which is an opiate agonist.  Called Dr Hastings who stated this is a short acting supplement and pt is ok to received Sublocade.      Intravenous Access:  No Intravenous access/labs at this visit.  Pt states he is highly afraid of needles and was okayed by Dr Hastings to get labs checked only every 3 months.    Treatment Conditions:  See above.      Post Infusion Assessment:  Patient tolerated injection without incident.       Discharge Plan:   Patient and/or family verbalized understanding of discharge instructions and all questions answered.  Patient discharged in stable condition accompanied by: self.      FRANK GROVE RN

## 2021-09-10 NOTE — TELEPHONE ENCOUNTER
Return phone call from Pat. He wanted to review plan he discussed with Dr. Hastings today re: moving forward with the Sublocade injection #2 and seeing how it goes/if he has opioid cravings.     Pat reports he did get his 2nd Sublocade injection and wants to see how it goes. Encouraged Pat to contact the Recovery Clinic next week with any concerns. Pat verbalized understanding.    Suzanne Sanchez RN on 9/10/2021 at 3:52 PM

## 2021-09-10 NOTE — TELEPHONE ENCOUNTER
Attempted to call patient for more information. No answer; LVM to return call to Recovery Clinic.    [facility]  [address], [address]   [place], [place], [address]  Phone: [phone]  Fax: [phone]    Open Mon, Wed, Fridays  9:00am-4:00pm  Walk in hours: 9am-3pm    Open Tues and Thursdays  Walk-in only 1:30-4pm    Suzanne Sanchez RN on 9/10/2021 at 3:35 PM

## 2021-09-12 NOTE — TELEPHONE ENCOUNTER
Please contact pt to determine level of cravings s/p Sublocade 300mg #2 on 9/10/21.   Let me know if not improving.     Also, remind him to be seen in Recovery Clinic either 10/8 before his 1p Sublocade appointment or as walk in on 10/7.

## 2021-09-13 ENCOUNTER — TELEPHONE (OUTPATIENT)
Dept: BEHAVIORAL HEALTH | Facility: CLINIC | Age: 26
End: 2021-09-13

## 2021-09-13 DIAGNOSIS — K08.89 PAIN, DENTAL: Primary | ICD-10-CM

## 2021-09-13 NOTE — TELEPHONE ENCOUNTER
Reason for call:    patient is requesting sbxn strips - patient is having cravings and need micro dose       By Louisa Montano          Phone number to reach patient: [phone]    Best Time:  any    Can we leave a detailed message on this number?  YES    Travel screening: Negative

## 2021-09-13 NOTE — TELEPHONE ENCOUNTER
Attempted to call Pat. No answer; LVM to call the Recovery Clinic.    [facility]  [address], [address]   [place], [place], [address]  Phone: [phone]  Fax: [phone]    Open Mon, Wed, Fridays  9:00am-4:00pm  Walk in hours: 9am-3pm    Open Tues and Thursdays  Walk-in only 1:30-4pm    Suzanne Sanchez RN on 9/13/2021 at 3:45 PM

## 2021-09-14 RX ORDER — IBUPROFEN 800 MG/1
800 TABLET, FILM COATED ORAL EVERY 8 HOURS PRN
Qty: 30 TABLET | Refills: 0 | Status: SHIPPED | OUTPATIENT
Start: 2021-09-14 | End: 2021-12-09

## 2021-09-14 NOTE — TELEPHONE ENCOUNTER
Attempted to reach pt to inform him of Dr. Hastings's directives. Pt did not answer phone call. FAWN.

## 2021-09-14 NOTE — TELEPHONE ENCOUNTER
If pt is c/o increased drowsiness since his 2nd Sublocade, I do not recommend supplemental buprenorphine.  I sent in rx for ibuprofen for pain.  Agree with need to follow-up with denal providers.      1. Pain, dental  - ibuprofen (ADVIL/MOTRIN) 800 MG tablet; Take 1 tablet (800 mg) by mouth every 8 hours as needed for moderate pain  Dispense: 30 tablet; Refill: 0

## 2021-09-14 NOTE — TELEPHONE ENCOUNTER
Called pt. Reports continuous Kratom cravings. Reports increasing dental pain is a contributor to Kratom cravings. States he has not seen dentistry yet. Afraid insurance may not cover expense of possible root canal tx. States called dentistry on 9/10/21, but was not examined. Provided # for [facility] (a sliding scale dental clinic) [phone]. States he will call today to schedule apt.     C/O increased drowsiness, believes it is from Sublocade. However, requesting oral Suboxone to take in addition to Sublocade. Hoping this will help with cravings and tooth ache. Educated on importance of seeking dental care to manage dental pain and not relying on Suboxone for this. Pt verbalized understanding.     Last use of Kratom: 9/8/21    Informed writer will route encounter to  for review and recommendations.

## 2021-09-15 NOTE — TELEPHONE ENCOUNTER
Flasma message sent to patient with Dr. Hastinsg's recommendations.    Suzanne Sanchez RN on 9/15/2021 at 1:34 PM

## 2021-09-19 ENCOUNTER — HEALTH MAINTENANCE LETTER (OUTPATIENT)
Age: 26
End: 2021-09-19

## 2021-09-28 ENCOUNTER — TELEPHONE (OUTPATIENT)
Dept: BEHAVIORAL HEALTH | Facility: CLINIC | Age: 26
End: 2021-09-28

## 2021-09-28 NOTE — TELEPHONE ENCOUNTER
"Reason for call:  Other   Patient called regarding (reason for call): call back  Additional comments:    Pt requesting a call back from the RN team or . Stated \"I saw on the news that you can take 4 sublocade shots and then you don't need to anymore....basically I want to get off of sublocade and would like a plan to get off from it.\"     Please further assist.    Phone number to reach patient:  Cell number on file:    Telephone Information:   Mobile [phone]       Best Time:  Any    Can we leave a detailed message on this number?  YES    Travel screening: Not Applicable    Routing to [facility] RN Pool for review and further assistance.  "

## 2021-09-28 NOTE — TELEPHONE ENCOUNTER
Spoke with pt and made an appointment to see Dr. Hastings to discuss stopping the Sublocade after the next injection.

## 2021-10-08 ENCOUNTER — INFUSION THERAPY VISIT (OUTPATIENT)
Dept: INFUSION THERAPY | Facility: CLINIC | Age: 26
End: 2021-10-08
Attending: FAMILY MEDICINE
Payer: COMMERCIAL

## 2021-10-08 DIAGNOSIS — F11.20 OPIOID USE DISORDER, SEVERE, DEPENDENCE (H): Primary | ICD-10-CM

## 2021-10-08 PROCEDURE — 250N000009 HC RX 250: Performed by: FAMILY MEDICINE

## 2021-10-08 PROCEDURE — 96372 THER/PROPH/DIAG INJ SC/IM: CPT | Performed by: FAMILY MEDICINE

## 2021-10-08 PROCEDURE — 250N000011 HC RX IP 250 OP 636: Performed by: FAMILY MEDICINE

## 2021-10-08 RX ORDER — METHYLPREDNISOLONE SODIUM SUCCINATE 125 MG/2ML
125 INJECTION, POWDER, LYOPHILIZED, FOR SOLUTION INTRAMUSCULAR; INTRAVENOUS
Status: CANCELLED
Start: 2021-11-04

## 2021-10-08 RX ORDER — LIDOCAINE HYDROCHLORIDE 10 MG/ML
2 INJECTION, SOLUTION EPIDURAL; INFILTRATION; INTRACAUDAL; PERINEURAL ONCE
Status: COMPLETED | OUTPATIENT
Start: 2021-10-08 | End: 2021-10-08

## 2021-10-08 RX ORDER — ALBUTEROL SULFATE 0.83 MG/ML
2.5 SOLUTION RESPIRATORY (INHALATION)
Status: CANCELLED | OUTPATIENT
Start: 2021-11-04

## 2021-10-08 RX ORDER — NALOXONE HYDROCHLORIDE 0.4 MG/ML
0.2 INJECTION, SOLUTION INTRAMUSCULAR; INTRAVENOUS; SUBCUTANEOUS
Status: CANCELLED | OUTPATIENT
Start: 2021-11-04

## 2021-10-08 RX ORDER — DIPHENHYDRAMINE HYDROCHLORIDE 50 MG/ML
50 INJECTION INTRAMUSCULAR; INTRAVENOUS
Status: CANCELLED
Start: 2021-11-04

## 2021-10-08 RX ORDER — LIDOCAINE HYDROCHLORIDE 10 MG/ML
2 INJECTION, SOLUTION EPIDURAL; INFILTRATION; INTRACAUDAL; PERINEURAL ONCE
Status: CANCELLED
Start: 2021-11-04 | End: 2021-11-04

## 2021-10-08 RX ORDER — ALBUTEROL SULFATE 90 UG/1
1-2 AEROSOL, METERED RESPIRATORY (INHALATION)
Status: CANCELLED
Start: 2021-11-04

## 2021-10-08 RX ORDER — MEPERIDINE HYDROCHLORIDE 25 MG/ML
25 INJECTION INTRAMUSCULAR; INTRAVENOUS; SUBCUTANEOUS EVERY 30 MIN PRN
Status: CANCELLED | OUTPATIENT
Start: 2021-11-04

## 2021-10-08 RX ORDER — EPINEPHRINE 1 MG/ML
0.3 INJECTION, SOLUTION, CONCENTRATE INTRAVENOUS EVERY 5 MIN PRN
Status: CANCELLED | OUTPATIENT
Start: 2021-11-04

## 2021-10-08 RX ADMIN — LIDOCAINE HYDROCHLORIDE 2 ML: 10 INJECTION, SOLUTION EPIDURAL; INFILTRATION; INTRACAUDAL; PERINEURAL at 13:29

## 2021-10-08 RX ADMIN — BUPRENORPHINE 100 MG: 100 SOLUTION SUBCUTANEOUS at 13:32

## 2021-10-08 NOTE — PROGRESS NOTES
Infusion Nursing Note:  Laci EMERSON Hinkle Jr presents today for Sublocade.    Patient seen by provider today: No   present during visit today: Not Applicable.    Note: Lidocaine then Sublocade injected into abd.      Intravenous Access:  No Intravenous access/labs at this visit.    Treatment Conditions:  Denies opioid use.      Post Infusion Assessment:  Patient tolerated injection without incident.       Discharge Plan:   Discharge instructions reviewed with: Patient.  Patient discharged in stable condition accompanied by: friend.      FRANK GROVE RN

## 2021-10-28 ENCOUNTER — TELEPHONE (OUTPATIENT)
Dept: PSYCHIATRY | Facility: CLINIC | Age: 26
End: 2021-10-28

## 2021-10-28 ENCOUNTER — DOCUMENTATION ONLY (OUTPATIENT)
Dept: OTHER | Facility: CLINIC | Age: 26
End: 2021-10-28

## 2021-10-28 NOTE — TELEPHONE ENCOUNTER
"Writer received call from pt, who identified increased depression, likely due to the change in seasons, and he is concerned that Lamictal \"won't cut it.\"    Pt reported that he has not been cleaning his apartment, not getting out of bed, struggling to look for work.   Pt reports remaining sober, with no alcohol in over 90 days, no meth, and occasional marijuana.    Pt inquired on Symbyax for his depression. Writer identified that selecting an antidepressant for someone with bipolar disorder is complicated, so suggested an appointment.  Pt agreed to appointment with Dr. Oconnor on 11/2 @ 3:30PM. Routed message to scheduling to convert held time.    Routed to Dr. Oconnor.            "

## 2021-11-01 ENCOUNTER — TELEPHONE (OUTPATIENT)
Dept: BEHAVIORAL HEALTH | Facility: CLINIC | Age: 26
End: 2021-11-01

## 2021-11-01 NOTE — TELEPHONE ENCOUNTER
Refill requested: disulfiram (ANTABUSE) 250 MG tablet  Request arrived via: fax  Pharmacy name and phone #: [id number] [place] [facility] [place] - [address]    Medication refill request routed to [facility] RN ([id number])    Louisa Montano  November 1, 2021

## 2021-11-02 ENCOUNTER — TELEPHONE (OUTPATIENT)
Dept: PSYCHIATRY | Facility: CLINIC | Age: 26
End: 2021-11-02

## 2021-11-02 ENCOUNTER — VIRTUAL VISIT (OUTPATIENT)
Dept: PSYCHIATRY | Facility: CLINIC | Age: 26
End: 2021-11-02
Attending: PSYCHIATRY & NEUROLOGY
Payer: COMMERCIAL

## 2021-11-02 DIAGNOSIS — F31.61 BIPOLAR DISORDER, CURRENT EPISODE MIXED, MILD (H): ICD-10-CM

## 2021-11-02 DIAGNOSIS — F31.9 BIPOLAR 1 DISORDER (H): Primary | ICD-10-CM

## 2021-11-02 DIAGNOSIS — F31.10 BIPOLAR I DISORDER, MOST RECENT EPISODE (OR CURRENT) MANIC (H): ICD-10-CM

## 2021-11-02 DIAGNOSIS — F31.81 BIPOLAR 2 DISORDER (H): ICD-10-CM

## 2021-11-02 PROCEDURE — 99214 OFFICE O/P EST MOD 30 MIN: CPT | Mod: GT | Performed by: PSYCHIATRY & NEUROLOGY

## 2021-11-02 RX ORDER — OLANZAPINE 15 MG/1
15 TABLET ORAL AT BEDTIME
Qty: 90 TABLET | Refills: 3 | Status: SHIPPED | OUTPATIENT
Start: 2021-11-02 | End: 2022-05-09

## 2021-11-02 RX ORDER — DIVALPROEX SODIUM 500 MG/1
2000 TABLET, EXTENDED RELEASE ORAL DAILY
Qty: 360 TABLET | Refills: 3 | Status: SHIPPED | OUTPATIENT
Start: 2021-11-02 | End: 2022-05-10

## 2021-11-02 RX ORDER — LAMOTRIGINE 100 MG/1
200 TABLET ORAL AT BEDTIME
Qty: 180 TABLET | Refills: 3 | Status: SHIPPED | OUTPATIENT
Start: 2021-11-02 | End: 2022-05-10

## 2021-11-02 ASSESSMENT — PAIN SCALES - GENERAL: PAINLEVEL: NO PAIN (0)

## 2021-11-02 NOTE — PROGRESS NOTES
Psychiatry Clinic Follow-up Note    Video- Visit Details  Type of service:  video visit for medication management  Time of service:    Date:  2021    Video Start Time:  3:39 PM        Video End Time:  4pm    Reason for video visit:  Patient unable to travel due to Covid-19  Originating Site (patient location):  [facility]   Location- Patient's home  Distant Site (provider location):  Remote location  Mode of Communication:  Video Conference via AmWell  Consent:  Patient has given verbal consent for video visit?: Yes         Laci Hinkle Jr. MRN# [id number]   Age: 24 year old YOB: 1995          Assessment:     Since the last visit Laci has been adherent with his usual medications.  Except for marijuana, he has been sober for 3 months.  He reports depressive symptoms of several weeks duration.  He is agreeable to add Prozac 20 mg daily to his current medications.  He will contact me in a month if his depression has not fully remitted.  He will continue his other medications.  He will get a blood test to check his Depakote level and other routine labs.  He will return for follow-up in February.    Attestation:  Patient has been evaluated by me, Bo Oconnor MD, PhD.         Diagnoses:     Axis I: Bipolar I disorder, currently depressed; polysubstance dependence     Axis II: Deferred    Axis III: Medication-induced weight gain; hypertension    Axis IV: mild  psychosocial stressors     Axis V: Global Assessment of Functionin-60    Interim History     Since the last visit, Laci has been experiencing depressive symptoms for the last several weeks.  He is unable to identify any triggers.    Laci reports typical symptoms, including low mood, poor motivation and energy, anhedonia, and negative thoughts.  His sleep is okay.  He denies suicidal thoughts and is able to contract for safety.    Laci denies current manic symptoms.  Aside from marijuana, he has been substance free for  3 months.    Laci contacted the clinic a couple of weeks ago wondering about the possibility of using Symbyax (the combination of olanzapine and fluoxetine) to treat his depression.  This is a very reasonable idea.  The combination is quite effective in treating depression and has a low manic switch rate.  Laci is already taking the near maximum dose of lamotrigine, given that he is taking in combination with Depakote.    Laci will add 20 mg of Prozac to his current medications.  He is aware will take several weeks to start to work.  He will message me in a month if his depression is not substantially better.    Laci will return for follow-up in February.  He will get a blood test to check his Depakote level and other routine labs.    Review of Systems     A comprehensive review of systems was performed and is negative other than noted above.          Allergies:      Allergies   Allergen Reactions     Prednisone Other (See Comments)     psych problems     Current Medications     Depakote 2000 mg HS  Lamotrigine 200mg HS  Olanzapine 15 mg HS    Mental Status Exam     Alertness: alert  and oriented  Behavior/Demeanor: cooperative, pleasant and calm,  Speech: normal  Language: intact  Psychomotor: normal or unremarkable  Mood: description consistent with euthymia  Affect: full-range; was congruent to mood  Thought Process/Associations: unremarkable  Thought Content:  Denies active/passive suicidal ideation  Perception:  Denies hallucinations  Insight: fair  Judgment: fair  Cognition:  does appear grossly intact.

## 2021-11-02 NOTE — TELEPHONE ENCOUNTER
On November 2, 2021, at 2:29 PM, writer called patient at mobile to confirm Virtual Visit. Writer unable to make contact with patient. Writer left detailed voice message for call back. [phone] left as call back number. Josue Shultz, EMT

## 2021-11-02 NOTE — PROGRESS NOTES
"VIDEO VISIT  Laci Hinkle Jr is a 25 year old patient that has consented to receive services via billable video visit.      The patient has been notified of following:   \"This video visit will be conducted via a call between you and your physician/provider. We have found that certain health care needs can be provided without the need for an in-person physical exam. This service lets us provide the care you need with a video conversation. If a prescription is necessary we can send it directly to your pharmacy. If lab work is needed we can place an order for that and you can then stop by our lab to have the test done at a later time. Insurers are generally covering virtual visits as they would in-office visits so billing should not be different than normal.  If for some reason you do get billed incorrectly, you should contact the billing office to correct it and that number is in the AVS .    Patient will join video visit via:  IndigoBoom (Patient / guardian confirmed to join via IndigoBoom)    If patient attempts to join the video via IndigoBoom at appointment start time, but is unable to, they would prefer that the provider send them a video invitation via:   Send to preferred e-mail: [email or web address]      How would patient like to obtain AVS?:  MyChart    "

## 2021-11-02 NOTE — PATIENT INSTRUCTIONS
Start Prozac 20 mg daily    Continue your other medications at the usual doses    Please return for follow-up in February    Please message me in a month if your depression is not substantially improved          **For crisis resources, please see the information at the end of this document**     Patient Education      Thank you for coming to the [facility] [place] [facility].    Lab Testing:  If you had lab testing today and your results are reassuring or normal they will be mailed to you or sent through Gumroad within 7 days. If the lab tests need quick action we will call you with the results. The phone number we will call with results is # [phone] (home) . If this is not the best number please call our clinic and change the number.    Medication Refills:  If you need any refills please call your pharmacy and they will contact us. Our fax number for refills is [phone]. Please allow three business for refill processing. If you need to  your refill at a new pharmacy, please contact the new pharmacy directly. The new pharmacy will help you get your medications transferred.     Scheduling:  If you have any concerns about today's visit or wish to schedule another appointment please call our office during normal business hours [phone] (8-5:00 M-F)    Contact Us:  Please call [phone] during business hours (8-5:00 M-F).  If after clinic hours, or on the weekend, please call  [phone].    Financial Assistance [phone]  [employer] Billing [phone]  Central Billing Office, [facility]: [phone]  [facility] Billing [phone]  Medical Records [phone]  [facility] Patient Bill of Rights [email or web address]       MENTAL HEALTH CRISIS NUMBERS:  For a medical emergency please call  911 or go to the nearest ER.     [facility]:   [facility] -[phone]   Crisis  Residence [facility] -[phone]   Walk-In Counseling Center [facility] -[phone]   COPE 24/7 [facility] Mobile Team -[phone] (adults)/[phone] (child)  CHILD: [facility] needs assessment team - [phone]      [facility]:   [facility] - [phone]   Walk-in counseling [facility] - [phone]   Walk-in counseling [facility] - [phone]   Crisis Residence [facility] - [phone]  Urgent Care Adult Mental Xtkgah-[phone] mobile unit/ 24/7 crisis line    National Crisis Numbers:   National Suicide Prevention Lifeline: 4-043-044-TALK (801-995-3982)  Poison Control Center - 1-189.305.8598  [employer]/resources for a list of additional resources (SOS)  Trans Lifeline a hotline for transgender people 1-220.939.1099  The Esa Project a hotline for LGBT youth 1-845-727-2970  Crisis Text Line: For any crisis 24/7   To: 015149  see www.crisistextline.org  - IF MAKING A CALL FEELS TOO HARD, send a text!         Again thank you for choosing [facility] MENTAL HEALTH & ADDICTION [facility] and please let us know how we can best partner with you to improve you and your family's health.    You may be receiving a survey regarding this appointment. We would love to have your feedback, both positive and negative. The survey is done by an external company, so your answers are anonymous.

## 2021-11-03 ENCOUNTER — TELEPHONE (OUTPATIENT)
Dept: BEHAVIORAL HEALTH | Facility: CLINIC | Age: 26
End: 2021-11-03

## 2021-11-03 NOTE — TELEPHONE ENCOUNTER
Reason for call:  Other   Patient called regarding (reason for call): call back  Additional comments:     Pt originally has his sublocade appt for Friday 11/5, but rescheduled for the following Monday 11/8 because he has an interview on Friday 11/5. Expressed concern on whether or not he will get sick. Please further assist. Pt stated if he does not answer, leave a vm with the answer as he does not want to have to call back.     Phone number to reach patient:  Cell number on file:    Telephone Information:   Mobile [phone]       Best Time:  any    Can we leave a detailed message on this number?  YES    Travel screening: Not Applicable

## 2021-11-03 NOTE — TELEPHONE ENCOUNTER
Attempted to call Pat, no answer. LVM informing him his last injection (Sublocade) was 10/8/21 and his next is scheduled 11/8/21 and that there is an adequate level of the medication in his body from previous injections that he will not experience withdrawal. Encouraged Pat to call with questions.    Suzanne Sanchez RN on 11/3/2021 at 10:58 AM

## 2021-11-27 DIAGNOSIS — F11.10 OPIOID USE DISORDER, MILD, ABUSE (H): ICD-10-CM

## 2021-11-30 ENCOUNTER — VIRTUAL VISIT (OUTPATIENT)
Dept: PSYCHIATRY | Facility: CLINIC | Age: 26
End: 2021-11-30
Attending: PSYCHIATRY & NEUROLOGY
Payer: COMMERCIAL

## 2021-11-30 ENCOUNTER — TELEPHONE (OUTPATIENT)
Dept: PSYCHIATRY | Facility: CLINIC | Age: 26
End: 2021-11-30
Payer: COMMERCIAL

## 2021-11-30 DIAGNOSIS — F11.10 OPIOID USE DISORDER, MILD, ABUSE (H): ICD-10-CM

## 2021-11-30 PROCEDURE — 99214 OFFICE O/P EST MOD 30 MIN: CPT | Mod: GT | Performed by: PSYCHIATRY & NEUROLOGY

## 2021-11-30 RX ORDER — CLONIDINE HYDROCHLORIDE 0.1 MG/1
TABLET ORAL
Qty: 120 TABLET | Refills: 2 | OUTPATIENT
Start: 2021-11-30

## 2021-11-30 RX ORDER — CLONIDINE HYDROCHLORIDE 0.1 MG/1
TABLET ORAL
Qty: 120 TABLET | Refills: 2 | Status: SHIPPED | OUTPATIENT
Start: 2021-11-30 | End: 2022-02-08

## 2021-11-30 NOTE — PROGRESS NOTES
Psychiatry Clinic Follow-up Note    Video- Visit Details  Type of service:  video visit for medication management  Time of service:    Date:  2021    Video Start Time:  3:07 PM        Video End Time:  4pm    Reason for video visit:  Patient unable to travel due to Covid-19  Originating Site (patient location):  [facility]   Location- Patient's home  Distant Site (provider location):  Remote location  Mode of Communication:  Video Conference via AmWell  Consent:  Patient has given verbal consent for video visit?: Yes       Laci Hinkle Jr. MRN# [id number]   Age: 24 year old YOB: 1995          Assessment:     At the last visit I prescribed Laci Prozac 20 mg daily as an adjunct to his usual medications to target depressive symptoms.  He has been inconsistent with taking it.  Today he agrees to take it consistently for a month to see how effective it is.  He will otherwise continue his medications at the usual doses.  He is still experiencing periodic depressive symptoms about half the time.  He will return for follow-up in February.    Attestation:  Patient has been evaluated by me, Bo Oconnor MD, PhD.         Diagnoses:     Axis I: Bipolar I disorder, currently depressed; polysubstance dependence     Axis II: Deferred    Axis III: Medication-induced weight gain; hypertension    Axis IV: mild  psychosocial stressors     Axis V: Global Assessment of Functionin-60    Interim History     Since the last visit Laci reports ongoing symptoms of depression.  They last for several days and then remit for a short period of time.  He estimates that he spends about 50% of his time with depression.    Laci has been only sporadically adherent with taking Prozac.  He is aware that to gauge its effectiveness he will need to take it consistently for at least a month, and today he agrees to do so.    Otherwise Laci is adherent with his usual medications.  He is doing his best to remain  substance free.  Unfortunately he had to leave school but has taken a job at [employer] as a .    Laci will continue his usual medications including being more adherent with the Prozac.  He will return for follow-up in February.  He has still not gotten a blood test to check his Depakote, despite several requests.    Review of Systems     A comprehensive review of systems was performed and is negative other than noted above.          Allergies:      Allergies   Allergen Reactions     Prednisone Other (See Comments)     psych problems     Current Medications     Depakote 2000 mg HS  Lamotrigine 200mg HS  Olanzapine 15 mg HS    Mental Status Exam     Alertness: alert  and oriented  Behavior/Demeanor: cooperative, pleasant and calm,  Speech: normal  Language: intact  Psychomotor: normal or unremarkable  Mood: description consistent with euthymia  Affect: full-range; was congruent to mood  Thought Process/Associations: unremarkable  Thought Content:  Denies active/passive suicidal ideation  Perception:  Denies hallucinations  Insight: fair  Judgment: fair  Cognition:  does appear grossly intact.

## 2021-11-30 NOTE — PATIENT INSTRUCTIONS
Continue your medications at the usual doses    Please return for follow-up in February as discussed          **For crisis resources, please see the information at the end of this document**     Patient Education      Thank you for coming to the [facility] HEALTH & ADDI[facility] [place] [facility].    Lab Testing:  If you had lab testing today and your results are reassuring or normal they will be mailed to you or sent through [employer]Tree within 7 days. If the lab tests need quick action we will call you with the results. The phone number we will call with results is # [phone] (home) . If this is not the best number please call our clinic and change the number.    Medication Refills:  If you need any refills please call your pharmacy and they will contact us. Our fax number for refills is [phone]. Please allow three business for refill processing. If you need to  your refill at a new pharmacy, please contact the new pharmacy directly. The new pharmacy will help you get your medications transferred.     Scheduling:  If you have any concerns about today's visit or wish to schedule another appointment please call our office during normal business hours [phone] (8-5:00 M-F)    Contact Us:  Please call [phone] during business hours (8-5:00 M-F).  If after clinic hours, or on the weekend, please call  [phone].    Financial Assistance [phone]  [employer] Billing [phone]  Central Billing Office, [facility]: [phone]  [facility] Billing [phone]  Medical Records [phone]  [facility] Patient Bill of Rights [email or web address]       MENTAL HEALTH CRISIS NUMBERS:  For a medical emergency please call  911 or go to the nearest ER.     [facility]:   [facility] -[phone]   Crisis Residence [facility] -[phone]   Walk-In Counseling Center [facility] -[phone]   COPE 24/7  [facility] Mobile Team -[phone] (adults)/[phone] (child)  CHILD: [facility] needs assessment team - [phone]      [facility] - [phone]   Walk-in counseling [facility] - [phone]   Walk-in counseling [facility] - [phone]   Crisis Residence [facility] Residence - [phone]  Urgent Care Adult Mental Zmzqeu-[phone] mobile unit/ 24/7 crisis line    National Crisis Numbers:   National Suicide Prevention Lifeline: 7-152-318-TALK (852-182-5808)  Poison Control Center - 1-279.593.9173  [employer]/resources for a list of additional resources (SOS)  Trans Lifeline a hotline for transgender people 2-492-537-0808  The Esa Project a hotline for LGBT youth 1-858.932.1967  Crisis Text Line: For any crisis 24/7   To: 203019  see www.crisistextline.org  - IF MAKING A CALL FEELS TOO HARD, send a text!         Again thank you for choosing [facility] HEALTH [facility] ADDI[facility] [place] [facility] and please let us know how we can best partner with you to improve you and your family's health.    You may be receiving a survey regarding this appointment. We would love to have your feedback, both positive and negative. The survey is done by an external company, so your answers are anonymous.

## 2021-11-30 NOTE — TELEPHONE ENCOUNTER
On November 30, 2021, at 1:53 PM, writer called patient at mobile to confirm Virtual Visit. Writer unable to make contact with patient. Writer left detailed voice message for call back. [phone] left as call back number. DANILO Mercado    On November 30, 2021, at 2:41 PM, writer called patient at mobile to confirm Virtual Visit. Writer unable to make contact with patient. A link to the video visit was sent to the patient's email address and mobile phone number. Josue Shultz, EMT

## 2021-12-09 ENCOUNTER — APPOINTMENT (OUTPATIENT)
Dept: BEHAVIORAL HEALTH | Facility: CLINIC | Age: 26
End: 2021-12-09
Payer: COMMERCIAL

## 2021-12-09 ENCOUNTER — OFFICE VISIT (OUTPATIENT)
Dept: BEHAVIORAL HEALTH | Facility: CLINIC | Age: 26
End: 2021-12-09

## 2021-12-09 ENCOUNTER — OFFICE VISIT (OUTPATIENT)
Dept: BEHAVIORAL HEALTH | Facility: CLINIC | Age: 26
End: 2021-12-09
Payer: COMMERCIAL

## 2021-12-09 DIAGNOSIS — F15.90 STIMULANT USE DISORDER: ICD-10-CM

## 2021-12-09 DIAGNOSIS — F17.200 TOBACCO USE DISORDER: ICD-10-CM

## 2021-12-09 DIAGNOSIS — F11.20 OPIOID USE DISORDER, SEVERE, DEPENDENCE (H): Primary | ICD-10-CM

## 2021-12-09 DIAGNOSIS — F10.20 ALCOHOL USE DISORDER, SEVERE, DEPENDENCE (H): ICD-10-CM

## 2021-12-09 PROCEDURE — 99214 OFFICE O/P EST MOD 30 MIN: CPT | Performed by: FAMILY MEDICINE

## 2021-12-09 RX ORDER — BUPRENORPHINE AND NALOXONE 8; 2 MG/1; MG/1
1 FILM, SOLUBLE BUCCAL; SUBLINGUAL DAILY
Qty: 14 FILM | Refills: 0 | Status: SHIPPED | OUTPATIENT
Start: 2021-12-09 | End: 2021-12-21

## 2021-12-09 NOTE — NURSING NOTE
[facility] [place] - Recovery Clinic      Rooming information:  Approximate last use of illicit opioids: 12/7/2021-[place]  Taking buprenorphine? No   Number of buprenorphine films/tablets remaining currently: 0  Narcan currently available: Yes  Other recent substance use:    Alcohol and Delta 8  NICOTINE-Yes:   If using nicotine, ready to quit? Yes:     Point of care urine drug screen positive for: buprenorphine and THC  *POC urine drug screen does not screen for Fentanyl       PHQ-2 Score:     PHQ-2 ( 1999 Pfizer) 12/9/2021 1/7/2020   Q1: Little interest or pleasure in doing things 1 2   Q2: Feeling down, depressed or hopeless 2 2   PHQ-2 Score 3 4   PHQ-2 Total Score (12-17 Years)- Positive if 3 or more points; Administer PHQ-A if positive - 4        If PHQ-2 score of 3 or higher, has Recovery Clinic therapist or provider been notified? Yes    Any current suicidal ideation? No  If yes, has [facility] therapist or provider been notified? N/A    Primary care provider: [facility]     Mental health provider: Yes, in [place] (follow up on MH referral if needed)    Insurance needs: active    Housing needs: stable    Contact information up to date? yes    3rd Party Involvement none today (please obtain WU if pt would like to include)    Morena Mckeon CMA  December 9, 2021  1:09 PM

## 2021-12-09 NOTE — PROGRESS NOTES
"JULISA met with pt in the Recovery Clinic to introduce himself, detail services and discuss current recovery status.  Pt appeared alert oriented and open to feedback during our meeting.     Pt states he has previously been on suboxone and sublocade.  He wants to discontinue injections and return to daily suboxone use.    Pt states he has recently began consuming alcohol and discontinued antabuse given the side effects.     [facility] offered resources such as recovery meetings or inpatient/outpatients treatments which the pt declined stating concerns over associating with people \"in recovery\" as when they relapse it could lead him into a relapse as well.  Pt is not interested in treatment options stating he was in [facility] for a short period of time and felt it was not the proper population for him to be around to assist in his recovery.    [facility] provided his business card and welcomes contact for support and recovery resources.    Stewart Chester on 12/9/2021 at 1:06 PM         "

## 2021-12-09 NOTE — PROGRESS NOTES
[facility] Return Visit    ASSESSMENT/PLAN                                                    1. Opioid use disorder, severe, dependence (H)  Endorses return of symptoms as he approached 2 months for last Sublocade.  He wants to resume sublingual buprenorphine.   Start SL buprenorphine 4mg/day, titrate to 8mg/day as indicated to treat cravings.   Pt confirmed he has naloxone.   - buprenorphine HCl-naloxone HCl (SUBOXONE) 8-2 MG per film; Place 1 Film under the tongue daily  Dispense: 14 Film; Refill: 0    2. Alcohol use disorder, severe, dependence (H)  Resume disulfiram, pt stated he did not need a new rx for this    3. Tobacco use disorder  Expressing interest in quitting, discuss bupropion, NRT next visit    4. Stimulant use disorder  Discuss bupropion next visit      Return in about 2 weeks (around 12/23/2021) for Follow up, in person.      SUBJECTIVE                                                        CC/HPI:  Laci Hinkle Jr is a 26 year old male with PMH bipolar disorder, CEASAR, ADHD, and PSUD including opioids on buprenorphine who presents to the [facility] for return visit.      Brief History:  Pt was initially evaluated in [facility] on 7/22/21. Pt was referred by staff in [facility] due to pt's initial desire to taper off buprenorphine which he had been taking from nonprescription sources.   Pt decided to seek treatment at this time due to wanting to stop use of substances to allow him to take necessary steps to develop a career he enjoys.  Pt left [facility], but continued with [facility].  He eventually continued buprenorphine therapy, and then transferred to [facility] with initial injection 8/13/21.  He received a total of 3 Sublocade injections, most recently 100mg on 10/8/21.   Pt then indicated to  staff he wanted to discontinue Sublocade injections.  He was lost to follow up until he returned to  on 12/9/21 requesting to resume  buprenorphine after brief return to use of alcohol and kratom.      He describes his opioid use history starting with heroin as a teenager, last use age 17.  He started methadone treatment age 17, dose up to 100mg, then tapered off to 6mg/day then stopped.  He began using kratom ~2016, eventually used amounts up to 100g daily.  He was first prescribed buprenorphine for OUD in 2019. He took as prescribed through 1/2021, then continued on buprenorphine through nonprescription sources.    IV drug use: No   History of overdose: No  Previous treatments : Yes: multiple, [facility], [facility] 11/2020, previous buprenorphine and methadone  Longest period of sobriety: few months  Medical complications related to substance use: exacerbation of MH diagnoses  Hepatitis C Status  unknown  HIV Status unknown    Other recent substance use:  Stimulants: used methamphetamine ages [age], stopped for 5 years, then resumed age 24.  Had been using 2-3x/week for the last year, returned to use after leaving [facility] 11/2020.   Sedatives/hypnotics/anxiolytics: has used in past, denies regular use  Alcohol: h/o drinking 1 L liquor daily, returned to use after leaving [facility] 11/2020; last use 7/20/21  Tobacco: 1 ppd and ENDS  Cannabis: occasional   Hallucinogens:has used in past, denies recent  Behavioral addictions: none    I last met with pt on 9/10/21.  A/P at that time was:  1. Opioid use disorder, severe, dependence (H)  Pt reporting some return of cravings in 4th week after initial Sublocade 300mg injection.  Also with triggers including new tooth pain.  One use of kratom in the last week, last use 9/8/21.   Proceed with Sublocade 300mg #2 today; pt agreed to notify clinic if his symptoms remain uncontrolled.      2. Alcohol use disorder, severe, dependence (H)  Pt has decided he does not want to try addition of topiramate to address alcohol cravings.   Continue disulfiram.      3. Stimulant use disorder  Pt denying cravings or  recent use     4. Tobacco use disorder  Not ready to quit at this time     Return in about 4 weeks (around 10/8/2021) for Follow up, with me, in person.        Today, pt states he began to experience alcohol cravings and returned to drinking end of 11/2021, and had one episode of kratom use 12/7/21.  He would like to resume buprenorphine, but with daily dosed sublingual forms and not Sublocade.  He did not like the means of administration with Sublocade.  He also wants to restart disulfiram which he finds helpful for his goal of stopping use of alcohol.     He continues to see his psychiatrist, Dr. Oconnor, for treatment of bipolar 1 disorder.            [place] Prescription Drug Monitoring Program Reviewed:  Yes; as expected        Past Medical History:   Diagnosis Date     ADHD (attention deficit hyperactivity disorder) 3/18/2011     Bipolar affective disorder (H) 3/18/2011     CEASAR (generalized anxiety disorder) 3/18/2011     GERD (gastroesophageal reflux disease)      Hyperlipidemia LDL goal <130      Impaired fasting glucose      Obesity      Substance abuse (H) 3/18/2011         PAST PSYCHIATRIC HISTORY:  Diagnoses- bipolar disorder, CEASAR, ADHD  Suicide Attempts: No   Hospitalizations: Yes     Past Surgical History:   Procedure Laterality Date     ENT SURGERY  2010    jaw surgery        Medications:  cloNIDine (CATAPRES) 0.1 MG tablet, Take 1 tablet (0.1 mg) by mouth 2 times daily AND 2 tablets (0.2 mg) At Bedtime.  disulfiram (ANTABUSE) 250 MG tablet, Take 1 tablet (250 mg) by mouth daily  divalproex sodium extended-release (DEPAKOTE ER) 500 MG 24 hr tablet, Take 4 tablets (2,000 mg) by mouth daily  FLUoxetine (PROZAC) 20 MG capsule, Take 1 capsule (20 mg) by mouth daily  lamoTRIgine (LAMICTAL) 100 MG tablet, Take 2 tablets (200 mg) by mouth At Bedtime  OLANZapine (ZYPREXA) 15 MG tablet, Take 1 tablet (15 mg) by mouth At Bedtime  senna-docusate (SENOKOT-S/PERICOLACE) 8.6-50 MG tablet, Take 2 tablets by mouth  daily as needed for constipation  atorvastatin (LIPITOR) 20 MG tablet, 20 mg daily (Patient not taking: Reported on 12/9/2021)  naloxone (NARCAN) 4 MG/0.1ML nasal spray, Spray 1 spray (4 mg) into one nostril alternating nostrils once as needed for opioid reversal every 2-3 minutes until assistance arrives (Patient not taking: Reported on 8/13/2021)    No current facility-administered medications on file prior to visit.      Allergies   Allergen Reactions     Prednisone Other (See Comments)     psych problems       Family History   Problem Relation Age of Onset     Hypertension Father      Alcohol/Drug Father         various substances     Psychotic Disorder Father         bipolar, anxiety, ADHD         Social History  Housing status: with his mother before student housing  Employment status: employed  Relationship status: Single, wants to start dating  Children: no children          REVIEW OF SYSTEMS:  No other concerns today    OBJECTIVE                                                        There were no vitals taken for this visit.    Physical Exam  Constitutional:       General: He is not in acute distress.  Pulmonary:      Effort: Pulmonary effort is normal.   Neurological:      Mental Status: He is alert and oriented to person, place, and time.      Coordination: Coordination normal.      Gait: Gait normal.   Psychiatric:         Attention and Perception: Attention and perception normal.         Mood and Affect: Mood and affect normal.         Behavior: Behavior normal. Behavior is cooperative.         Thought Content: Thought content normal.         Cognition and Memory: Cognition normal.         Judgment: Judgment normal.      Comments: Pt is loquacious         Labs:    UDS:  buprenorphine and THC  *POC urine drug screen does not screen for Fentanyl    No results found for this or any previous visit (from the past 240 hour(s)).      At least 30 min spent in review of medical record,  review, obtaining  histories, reviewing symptoms, discussing pt's goals for treatment, counseling regarding importance of avoiding concurrent use of alcohol or other sedatives with opioids, harm reduction including keeping naloxone readily accessible    FRANK GALARZA MD  [place] [id number] [place] [id number] [phone]

## 2021-12-13 ENCOUNTER — TELEPHONE (OUTPATIENT)
Dept: BEHAVIORAL HEALTH | Facility: CLINIC | Age: 26
End: 2021-12-13
Payer: COMMERCIAL

## 2021-12-13 NOTE — TELEPHONE ENCOUNTER
The writer called the patient and he stated that he has an outpatient mental health therapist and they meet 2 time a week-he is looking for a partner-he has been sober for 4 months-he talked about needing a higher dosage of Soboxone so this writer will have the nurse call him to talk with him about his medication concerns-he is smoking marijuana and this negatively impacts his ability to be social.

## 2021-12-17 NOTE — TELEPHONE ENCOUNTER
Attempted to call pharmacy x 2. Lengthy holds; went to voicemail. LVM that Suboxone goes through insurance as brand name--if any other issues, please call [facility].    Suzanne Sanchez RN on 12/17/2021 at 4:20 PM

## 2021-12-17 NOTE — TELEPHONE ENCOUNTER
Suboxone Prior Authorization Request    Medication/Dose: buprenorphine HCl-naloxone HCl (SUBOXONE) 8-2mg    Pharmacy Information  Name:  [facility]  [address] [place] [address] [address], [place] [address]  Phone:  [phone]    Has pharmacy tried to run brand-name only through insurance? Unknown    Routing to [facility] RN ([id number])    See Estephania  12/17/21  3:24 PM

## 2021-12-20 ENCOUNTER — TELEPHONE (OUTPATIENT)
Dept: BEHAVIORAL HEALTH | Facility: CLINIC | Age: 26
End: 2021-12-20
Payer: COMMERCIAL

## 2021-12-20 NOTE — TELEPHONE ENCOUNTER
Suboxone Prior Authorization Request    Medication/Dose: buprenorphine HCl-naloxone HCl (SUBOXONE) 8-2mg    Pharmacy Information  Name:  [facility]   Phone:  [phone]    Has pharmacy tried to run brand-name only through insurance? Unknown    Routing to [facility] RN ([id number])    See Estephania  12/20/21  4:09 PM

## 2021-12-21 ENCOUNTER — OFFICE VISIT (OUTPATIENT)
Dept: BEHAVIORAL HEALTH | Facility: CLINIC | Age: 26
End: 2021-12-21
Payer: COMMERCIAL

## 2021-12-21 ENCOUNTER — APPOINTMENT (OUTPATIENT)
Dept: BEHAVIORAL HEALTH | Facility: CLINIC | Age: 26
End: 2021-12-21
Payer: COMMERCIAL

## 2021-12-21 ENCOUNTER — OFFICE VISIT (OUTPATIENT)
Dept: BEHAVIORAL HEALTH | Facility: CLINIC | Age: 26
End: 2021-12-21

## 2021-12-21 ENCOUNTER — APPOINTMENT (OUTPATIENT)
Dept: GENERAL RADIOLOGY | Facility: CLINIC | Age: 26
End: 2021-12-21
Attending: EMERGENCY MEDICINE
Payer: COMMERCIAL

## 2021-12-21 ENCOUNTER — HOSPITAL ENCOUNTER (EMERGENCY)
Facility: CLINIC | Age: 26
Discharge: HOME | End: 2021-12-21
Attending: EMERGENCY MEDICINE | Admitting: EMERGENCY MEDICINE
Payer: COMMERCIAL

## 2021-12-21 VITALS — DIASTOLIC BLOOD PRESSURE: 99 MMHG | HEART RATE: 76 BPM | SYSTOLIC BLOOD PRESSURE: 158 MMHG

## 2021-12-21 VITALS
TEMPERATURE: 98.3 F | HEART RATE: 57 BPM | OXYGEN SATURATION: 98 % | RESPIRATION RATE: 56 BRPM | SYSTOLIC BLOOD PRESSURE: 136 MMHG | BODY MASS INDEX: 36.03 KG/M2 | WEIGHT: 244 LBS | DIASTOLIC BLOOD PRESSURE: 97 MMHG

## 2021-12-21 DIAGNOSIS — F10.20 ALCOHOL USE DISORDER, SEVERE, DEPENDENCE (H): ICD-10-CM

## 2021-12-21 DIAGNOSIS — F17.210 CIGARETTE SMOKER: ICD-10-CM

## 2021-12-21 DIAGNOSIS — N52.9 ERECTILE DYSFUNCTION, UNSPECIFIED ERECTILE DYSFUNCTION TYPE: ICD-10-CM

## 2021-12-21 DIAGNOSIS — F11.20 OPIOID USE DISORDER, SEVERE, DEPENDENCE (H): Primary | ICD-10-CM

## 2021-12-21 DIAGNOSIS — F15.90 STIMULANT USE DISORDER: ICD-10-CM

## 2021-12-21 DIAGNOSIS — F17.200 TOBACCO USE DISORDER: ICD-10-CM

## 2021-12-21 DIAGNOSIS — R07.9 CHEST PAIN, UNSPECIFIED TYPE: ICD-10-CM

## 2021-12-21 LAB
ALBUMIN SERPL-MCNC: 4.3 G/DL (ref 3.4–5)
ALP SERPL-CCNC: 126 U/L (ref 40–150)
ALT SERPL W P-5'-P-CCNC: 56 U/L (ref 0–70)
AMPHETAMINES UR QL SCN: ABNORMAL
ANION GAP SERPL CALCULATED.3IONS-SCNC: 8 MMOL/L (ref 3–14)
AST SERPL W P-5'-P-CCNC: ABNORMAL U/L
BARBITURATES UR QL: ABNORMAL
BASOPHILS # BLD AUTO: 0.1 10E3/UL (ref 0–0.2)
BASOPHILS NFR BLD AUTO: 1 %
BENZODIAZ UR QL: ABNORMAL
BILIRUB SERPL-MCNC: 0.5 MG/DL (ref 0.2–1.3)
BUN SERPL-MCNC: 9 MG/DL (ref 7–30)
CALCIUM SERPL-MCNC: 9.9 MG/DL (ref 8.5–10.1)
CANNABINOIDS UR QL SCN: ABNORMAL
CHLORIDE BLD-SCNC: 104 MMOL/L (ref 94–109)
CO2 SERPL-SCNC: 24 MMOL/L (ref 20–32)
COCAINE UR QL: ABNORMAL
CREAT SERPL-MCNC: 0.67 MG/DL (ref 0.66–1.25)
CREAT UR-MCNC: 281 MG/DL
EOSINOPHIL # BLD AUTO: 0.1 10E3/UL (ref 0–0.7)
EOSINOPHIL NFR BLD AUTO: 1 %
ERYTHROCYTE [DISTWIDTH] IN BLOOD BY AUTOMATED COUNT: 13.3 % (ref 10–15)
GFR SERPL CREATININE-BSD FRML MDRD: >90 ML/MIN/1.73M2
GLUCOSE BLD-MCNC: 86 MG/DL (ref 70–99)
HCT VFR BLD AUTO: 44.5 % (ref 40–53)
HGB BLD-MCNC: 15 G/DL (ref 13.3–17.7)
HOLD SPECIMEN: NORMAL
IMM GRANULOCYTES # BLD: 0.1 10E3/UL
IMM GRANULOCYTES NFR BLD: 1 %
LIPASE SERPL-CCNC: 148 U/L (ref 73–393)
LYMPHOCYTES # BLD AUTO: 2.6 10E3/UL (ref 0.8–5.3)
LYMPHOCYTES NFR BLD AUTO: 33 %
MCH RBC QN AUTO: 29.4 PG (ref 26.5–33)
MCHC RBC AUTO-ENTMCNC: 33.7 G/DL (ref 31.5–36.5)
MCV RBC AUTO: 87 FL (ref 78–100)
MONOCYTES # BLD AUTO: 1.4 10E3/UL (ref 0–1.3)
MONOCYTES NFR BLD AUTO: 18 %
NEUTROPHILS # BLD AUTO: 3.7 10E3/UL (ref 1.6–8.3)
NEUTROPHILS NFR BLD AUTO: 46 %
NRBC # BLD AUTO: 0 10E3/UL
NRBC BLD AUTO-RTO: 0 /100
OPIATES UR QL SCN: ABNORMAL
PCP UR QL SCN: ABNORMAL
PLATELET # BLD AUTO: 329 10E3/UL (ref 150–450)
POTASSIUM BLD-SCNC: 5.2 MMOL/L (ref 3.4–5.3)
PROT SERPL-MCNC: 9.4 G/DL (ref 6.8–8.8)
RBC # BLD AUTO: 5.1 10E6/UL (ref 4.4–5.9)
SODIUM SERPL-SCNC: 136 MMOL/L (ref 133–144)
TROPONIN I SERPL HS-MCNC: 4 NG/L
WBC # BLD AUTO: 7.9 10E3/UL (ref 4–11)

## 2021-12-21 PROCEDURE — 82247 BILIRUBIN TOTAL: CPT | Performed by: EMERGENCY MEDICINE

## 2021-12-21 PROCEDURE — 93005 ELECTROCARDIOGRAM TRACING: CPT | Performed by: EMERGENCY MEDICINE

## 2021-12-21 PROCEDURE — 36415 COLL VENOUS BLD VENIPUNCTURE: CPT | Performed by: EMERGENCY MEDICINE

## 2021-12-21 PROCEDURE — 84484 ASSAY OF TROPONIN QUANT: CPT | Performed by: EMERGENCY MEDICINE

## 2021-12-21 PROCEDURE — 93010 ELECTROCARDIOGRAM REPORT: CPT | Performed by: EMERGENCY MEDICINE

## 2021-12-21 PROCEDURE — 99214 OFFICE O/P EST MOD 30 MIN: CPT | Performed by: FAMILY MEDICINE

## 2021-12-21 PROCEDURE — 80349 CANNABINOIDS NATURAL: CPT | Performed by: FAMILY MEDICINE

## 2021-12-21 PROCEDURE — 99285 EMERGENCY DEPT VISIT HI MDM: CPT | Mod: 25 | Performed by: EMERGENCY MEDICINE

## 2021-12-21 PROCEDURE — 85025 COMPLETE CBC W/AUTO DIFF WBC: CPT | Performed by: EMERGENCY MEDICINE

## 2021-12-21 PROCEDURE — 83690 ASSAY OF LIPASE: CPT | Performed by: EMERGENCY MEDICINE

## 2021-12-21 PROCEDURE — 80307 DRUG TEST PRSMV CHEM ANLYZR: CPT | Performed by: FAMILY MEDICINE

## 2021-12-21 PROCEDURE — 84155 ASSAY OF PROTEIN SERUM: CPT | Performed by: EMERGENCY MEDICINE

## 2021-12-21 PROCEDURE — 71046 X-RAY EXAM CHEST 2 VIEWS: CPT

## 2021-12-21 RX ORDER — BUPRENORPHINE AND NALOXONE 12; 3 MG/1; MG/1
1 FILM, SOLUBLE BUCCAL; SUBLINGUAL DAILY
Qty: 30 FILM | Refills: 0 | Status: SHIPPED | OUTPATIENT
Start: 2021-12-21 | End: 2022-03-04

## 2021-12-21 RX ORDER — SILDENAFIL 50 MG/1
50 TABLET, FILM COATED ORAL DAILY PRN
Qty: 30 TABLET | Refills: 3 | Status: SHIPPED | OUTPATIENT
Start: 2021-12-21 | End: 2022-10-26 | Stop reason: ALTCHOICE

## 2021-12-21 ASSESSMENT — ENCOUNTER SYMPTOMS
COUGH: 0
COLOR CHANGE: 0
DIFFICULTY URINATING: 0
ABDOMINAL PAIN: 0
HEADACHES: 0
NAUSEA: 0
ARTHRALGIAS: 0
FEVER: 0
PALPITATIONS: 0
DIARRHEA: 0
SHORTNESS OF BREATH: 1
CHILLS: 0
CONFUSION: 0
VOMITING: 0

## 2021-12-21 NOTE — ED PROVIDER NOTES
ED Provider Note  [facility]      History     Chief Complaint   Patient presents with     Chest Pain     Hypertension     The history is provided by the patient and medical records.     Laci Hinkle Jr is a 26 year old male with history of polysubstance use disorder now on Suboxone therapy, bipolar disorder, CEASAR, ADHD, HTN, GERD, and ongoing tobacco abuse presenting to the ED with chest pain. Patient reports this afternoon around 3:30 pm while outside smoking a cigarette he had the sudden onset of sharp, mid sternal chest pain. He states that this has improved since, but his chest continues to feel tight. He reports that he has been having daily chest pains for the past few months. He describes this as a dull ache that is intermittent and onset at random, not always associated with smoking. He was smoking 2.5 packs a day over the summer, now down to 1 pack daily. His chest pain is occasionally associated with shortness of breath and palpitations. It is unchanged with inspiration. He reports new diarrhea in the past week. No fever, chills, cough, abdominal pain, nausea, vomiting, leg pain, or swelling. He notes that he has a significant history of HTN, was previously on medications, but has not been recently. He previously followed with a PCP at [facility], but is wanting to establish here as he goes to the recovery clinic and has a psychiatrist here. He notes that he was started on Prozac 1 mo ago. He has been sober from alcohol and opiates since July 20th. Denies vaping or other drugs. He has no personal history of DVT/PE and is unsure of familial blood clot history, but does note significant family cardiac history. Patient reports he is Covid vaccinated x2, but lost his card. He is interested in a booster.     Past Medical History  Past Medical History:   Diagnosis Date     ADHD (attention deficit hyperactivity disorder) 3/18/2011     Bipolar affective disorder (H) 3/18/2011     CEASAR  (generalized anxiety disorder) 3/18/2011     GERD (gastroesophageal reflux disease)      Hyperlipidemia LDL goal <130      Impaired fasting glucose      Obesity      Substance abuse (H) 3/18/2011     Past Surgical History:   Procedure Laterality Date     ENT SURGERY  2010    jaw surgery      Buprenorphine HCl-Naloxone HCl (SUBOXONE) 12-3 MG FILM per film  cloNIDine (CATAPRES) 0.1 MG tablet  disulfiram (ANTABUSE) 250 MG tablet  divalproex sodium extended-release (DEPAKOTE ER) 500 MG 24 hr tablet  FLUoxetine (PROZAC) 20 MG capsule  lamoTRIgine (LAMICTAL) 100 MG tablet  naloxone (NARCAN) 4 MG/0.1ML nasal spray  OLANZapine (ZYPREXA) 15 MG tablet  senna-docusate (SENOKOT-S/PERICOLACE) 8.6-50 MG tablet  sildenafil (VIAGRA) 50 MG tablet      Allergies   Allergen Reactions     Prednisone Other (See Comments)     psych problems     Family History  Family History   Problem Relation Age of Onset     Hypertension Father      Alcohol/Drug Father         various substances     Psychotic Disorder Father         bipolar, anxiety, ADHD     Social History   Social History     Tobacco Use     Smoking status: Current Every Day Smoker     Packs/day: 1.00     Types: Cigarettes     Smokeless tobacco: Former User     Types: Snuff, Chew     Tobacco comment: and e-cig,    Substance Use Topics     Alcohol use: Not Currently     Comment: last use 7/20/21     Drug use: Not Currently      Past medical history, past surgical history, medications, allergies, family history, and social history were reviewed with the patient. No additional pertinent items.       Review of Systems   Constitutional: Negative for chills and fever.   HENT: Negative for congestion.    Respiratory: Positive for shortness of breath. Negative for cough.    Cardiovascular: Positive for chest pain. Negative for palpitations and leg swelling.   Gastrointestinal: Negative for abdominal pain, diarrhea, nausea and vomiting.   Genitourinary: Negative for difficulty urinating.    Musculoskeletal: Negative for arthralgias.   Skin: Negative for color change.   Neurological: Negative for headaches.   Psychiatric/Behavioral: Negative for confusion.   All other systems reviewed and are negative.    A complete review of systems was performed with pertinent positives and negatives noted in the HPI, and all other systems negative.    Physical Exam   BP: (!) 138/100  Pulse: 78  Temp: 98.3  F (36.8  C)  Resp: 18  Weight: 110.7 kg (244 lb)  SpO2: 100 %  Physical Exam  Vitals and nursing note reviewed.   Constitutional:       Appearance: He is well-developed.      Comments: Adult male, alert, cooperative, NAD. Mildly pressured speech   HENT:      Head: Normocephalic.   Eyes:      Pupils: Pupils are equal, round, and reactive to light.   Cardiovascular:      Rate and Rhythm: Normal rate and regular rhythm.      Heart sounds: Normal heart sounds. No murmur heard.  No gallop.    Pulmonary:      Effort: Pulmonary effort is normal. No respiratory distress.      Breath sounds: Normal breath sounds. No wheezing or rales.   Abdominal:      General: Bowel sounds are normal. There is no distension.      Palpations: Abdomen is soft.      Tenderness: There is no abdominal tenderness. There is no guarding or rebound.      Comments: Obese, soft, nontender to palpation   Skin:     General: Skin is warm and dry.   Neurological:      Mental Status: He is alert and oriented to person, place, and time.   Psychiatric:         Behavior: Behavior normal.      Comments: Mildly pressured speech         ED Course      Procedures            EKG Interpretation:      Interpreted by Lizzeth Soliz MD  Time reviewed: 1805  Symptoms at time of EKG: none   Rhythm: normal sinus   Rate: normal  Axis: normal  Ectopy: none  Conduction: normal  ST Segments/ T Waves: No ST-T wave changes  Q Waves: none  Comparison to prior: Unchanged    Clinical Impression: normal EKG        The medical record was reviewed and interpreted.  Current  labs reviewed and interpreted.  Current images reviewed and interpreted: clear CXR.              Results for orders placed or performed during the hospital encounter of 12/21/21   Chest XR,  PA & LAT     Status: None    Narrative    EXAM: XR CHEST 2 VW  LOCATION: [facility]  DATE/TIME: 12/21/2021 6:28 PM    INDICATION: chest pain/SOB  COMPARISON: None.      Impression    IMPRESSION: Negative chest.   Comprehensive metabolic panel     Status: Abnormal   Result Value Ref Range    Sodium 136 133 - 144 mmol/L    Potassium 5.2 3.4 - 5.3 mmol/L    Chloride 104 94 - 109 mmol/L    Carbon Dioxide (CO2) 24 20 - 32 mmol/L    Anion Gap 8 3 - 14 mmol/L    Urea Nitrogen 9 7 - 30 mg/dL    Creatinine 0.67 0.66 - 1.25 mg/dL    Calcium 9.9 8.5 - 10.1 mg/dL    Glucose 86 70 - 99 mg/dL    Alkaline Phosphatase 126 40 - 150 U/L    AST      ALT 56 0 - 70 U/L    Protein Total 9.4 (H) 6.8 - 8.8 g/dL    Albumin 4.3 3.4 - 5.0 g/dL    Bilirubin Total 0.5 0.2 - 1.3 mg/dL    GFR Estimate >90 >60 mL/min/1.73m2   Lipase     Status: Normal   Result Value Ref Range    Lipase 148 73 - 393 U/L   Troponin I     Status: Normal   Result Value Ref Range    Troponin I High Sensitivity 4 <79 ng/L   CBC with platelets and differential     Status: Abnormal   Result Value Ref Range    WBC Count 7.9 4.0 - 11.0 10e3/uL    RBC Count 5.10 4.40 - 5.90 10e6/uL    Hemoglobin 15.0 13.3 - 17.7 g/dL    Hematocrit 44.5 40.0 - 53.0 %    MCV 87 78 - 100 fL    MCH 29.4 26.5 - 33.0 pg    MCHC 33.7 31.5 - 36.5 g/dL    RDW 13.3 10.0 - 15.0 %    Platelet Count 329 150 - 450 10e3/uL    % Neutrophils 46 %    % Lymphocytes 33 %    % Monocytes 18 %    % Eosinophils 1 %    % Basophils 1 %    % Immature Granulocytes 1 %    NRBCs per 100 WBC 0 <1 /100    Absolute Neutrophils 3.7 1.6 - 8.3 10e3/uL    Absolute Lymphocytes 2.6 0.8 - 5.3 10e3/uL    Absolute Monocytes 1.4 (H) 0.0 - 1.3 10e3/uL    Absolute Eosinophils 0.1 0.0 - 0.7 10e3/uL     "Absolute Basophils 0.1 0.0 - 0.2 10e3/uL    Absolute Immature Granulocytes 0.1 <=0.4 10e3/uL    Absolute NRBCs 0.0 10e3/uL   CBC with Platelets & Differential     Status: Abnormal    Narrative    The following orders were created for panel order CBC with Platelets & Differential.  Procedure                               Abnormality         Status                     ---------                               -----------         ------                     CBC with platelets and d...[[id number]]  Abnormal            Final result                 Please view results for these tests on the individual orders.   Results for orders placed or performed in visit on 12/21/21   Drug abuse screen 77 urine (FL, RH, SH)     Status: Abnormal   Result Value Ref Range    Amphetamines Urine Screen Negative Screen Negative    Barbiturates Urine Screen Negative Screen Negative    Benzodiazepines Urine Screen Negative Screen Negative    Cannabinoids Urine Screen Positive (A) Screen Negative    Cocaine Urine Screen Negative Screen Negative    Opiates Urine Screen Negative Screen Negative    PCP Urine Screen Negative Screen Negative     Medications - No data to display     Assessments & Plan (with Medical Decision Making)   Patient presents to the emergency department today due to chest discomfort which occurred while smoking a cigarette outside in the cold. Patient reports this has been an issue for him on a regular basis for \"months \". He has not had this checked out and decided to come in today because he believes he wants to get back on blood pressure medications. He reports he previously was on antihypertensives but has not been for \"years\". States he currently does not have a primary care clinic.    Differential diagnosis of chest pain and shortness of breath certainly could include ACS which I think is very unlikely at this time. Pneumothorax would of course be possible but I think is less likely. Rather doubt PE given the fact that " this has been going on for months. He does not have any infectious symptoms to suggest pneumonia.    Blood pressure here in the emergency department is 138/100. EKG shows no sign of ectopy or ischemia. We did establish IV access and we did draw blood for laboratory analysis. CBC within normal limits, CMP within normal limits, lipase within normal limits, troponin within normal limits, chest x-ray clear.    Patient had no further chest pain here in the emergency department, stated that it stopped after he finished his cigarette.  Strongly suspect this is what is causing his symptoms and strongly advised him to discontinue cigarette use.  He was also worried about his blood pressure.  Multiple readings here in the emergency department show his blood pressures to be in the 130s over 80s to 90s.  This is certainly not at a level where we would emergently start medications from the emergency department.  I have advised that he discontinue tobacco use.  He is interested in following up with a [place] clinic.  I will give him the phone number to the [place] primary care clinic in the professional building as he states he would prefer to establish care here as he has his addiction specialist and psychiatrists here.    Of note, the patient did express interest in getting a Covid vaccine booster. According to the snapshot in epic he received his first dose in August. There is no second dose recorded though the patient is quite insistent that he did receive it. He would clearly be too early for a booster if he truly received 2 doses. We will direct him to follow-up with his clinic for this    I have reviewed the nursing notes. I have reviewed the findings, diagnosis, plan and need for follow up with the patient.    New Prescriptions    No medications on file       Final diagnoses:   Chest pain, unspecified type - while smoking a cigarette   Tobacco use disorder   I, Eliana Farley, am serving as a trained medical scribe  to document services personally performed by Lizzeth Soliz MD, based on the provider's statements to me.     I, Lizzeth Soliz MD, was physically present and have reviewed and verified the accuracy of this note documented by Eliana Farley.      --  Lizzeth oSliz MD  [facility] EMERGENCY DEPARTMENT  12/21/2021     Lizzeth Soliz MD  12/21/21 1950

## 2021-12-21 NOTE — NURSING NOTE
[facility]      Rooming information:  Approximate last use of illicit opioids: 12/7/2021-[place]  Taking buprenorphine? Yes:  As prescribed? No, 12 mg daily  Number of buprenorphine films/tablets remaining currently: 0  Side effects related to buprenorphine (constipation, dry mouth, sedation?) Yes: constipation   Narcan currently available: Yes  Other recent substance use:    Delta 8  NICOTINE-Yes:   If using nicotine, ready to quit? Yes:     Point of care urine drug screen positive for: amphetamines, buprenorphine, benzodiazepines and THC  *POC urine drug screen does not screen for Fentanyl      PHQ-2 Score:     PHQ-2 ( 1999 Pfizer) 12/21/2021 12/9/2021   Q1: Little interest or pleasure in doing things 1 1   Q2: Feeling down, depressed or hopeless 0 2   PHQ-2 Score 1 3   PHQ-2 Total Score (12-17 Years)- Positive if 3 or more points; Administer PHQ-A if positive - -        If PHQ-2 score of 3 or higher, has [facility] therapist or provider been notified? N/A    Any current suicidal ideation? No  If yes, has [facility] therapist or provider been notified? N/A    Primary care provider: [facility]     Mental health provider: Yes, in [place] (follow up on MH referral if needed)    Insurance needs: active    Housing needs: stable    Contact information up to date? yes    3rd Party Involvement none today (please obtain WU if pt would like to include)    Morena Mckeon CMA  December 21, 2021  12:56 PM

## 2021-12-21 NOTE — PROGRESS NOTES
[facility]    Peer  met with Laci Hinkle Jr in the Recovery Clinic to discuss current status of recovery.  Pt appeared alert, oriented and open to feedback during our discussion.     Pt states having scheduled appointment for 12/23/21 but is here today as he has used his suboxone supply. Pt states anxiety and cravings lead to taking more than the prescribed amounts. PRC encouraged discussion with Dr Hastings regarding this matter.     PRC and pt discussed using tools to effectively monitor medication and other substances. PRC and pt discussed making healthy informed decisions regarding socialization with ppl places and things that may lead to re-use scenarios.     PRC provided pt with his contact information for support and resource needs.  PRC and pt agree to meet during an upcoming RC appointment.     Patient Goal: To utilize suboxone assisted treatment for sobriety and long term recovery.     Goal Progress: Ongoing  Ongoing.    Key Risk Factors to Recovery:   PRC and pt discussed being aware of risk factor elements that can lead to relapse which include avoiding isolating, being open and willing to acceptance and change on a daily basis, avoiding triggers and managing cravings in a healthy manner.   PRC encouraged pt to establish a sober network calling tree to reach out to when needed.  Continue to practice honesty with ourselves and trusted support person(s). PRC encouraged regular attendance at recovery based meetings as well as finding a sponsor for mentoring and accountability.   PRC encouraged consideration of of other services such as counseling for mental health issues which can correlate with our substance use.      Support Needs: Patient needing continued support    Follow up: at next Recovery Clinic appointment  PRC provided pt with his contact information for support and resource needs.  PRC and pt agree to meet during an upcoming  appointment.         [facility]  [address], [address]   [place], [place], [address]  Clinic Phone: [phone]  Clinic Fax: [phone]  Peer  phone: [phone]    Open Monday - Friday  9:00am-4:00pm  Walk in hours: 9am-3pm      Stewart Chester  December 21, 2021  1:03 PM

## 2021-12-21 NOTE — PROGRESS NOTES
Rooming information:  Approximate last use of illicit opioids: 12/7/2021-Kratom  Taking buprenorphine? Yes:  As prescribed? No, 12 mg daily  Number of buprenorphine films/tablets remaining currently: 0  Side effects related to buprenorphine (constipation, dry mouth, sedation?) Yes: constipation   Narcan currently available: Yes  Other recent substance use:    Delta 8  NICOTINE-Yes:   If using nicotine, ready to quit? Yes:     Point of care urine drug screen positive for: amphetamines, buprenorphine, benzodiazepines and THC  *POC urine drug screen does not screen for Fentanyl           [facility] Return Visit    ASSESSMENT/PLAN                                                    1. Opioid use disorder, severe, dependence (H)  Ok to continue buprenorphine SL 12mg/day.    Recommend he take this once daily to reduce replication of behaviors associated with active addiction.   Pt agreeable to return to Sublocade if unable to maintain this dose.   - Drug abuse screen 77 urine (FL, RH, SH); Future  - Buprenorphine HCl-Naloxone HCl (SUBOXONE) 12-3 MG FILM per film; Place 1 Film under the tongue daily  Dispense: 30 Film; Refill: 0  - Drug abuse screen 77 urine (FL, RH, SH)    2. Erectile dysfunction, unspecified erectile dysfunction type  rx for sildenafil, pt aware this is not covered by insurance.   - sildenafil (VIAGRA) 50 MG tablet; Take 1 tablet (50 mg) by mouth daily as needed (ED)  Dispense: 30 tablet; Refill: 3    3. Stimulant use disorder  Pt denies use; utox confirms false positive on UDS    4. Alcohol use disorder, severe, dependence (H)  Continue disulfiram per pt's preference    5. Tobacco use disorder  Pt expressing interest in quitting; discuss NRT next visit      Return in about 4 weeks (around 1/18/2022) for Follow up, in person; and sooner if problems or want to return to [facility].      SUBJECTIVE                                                        CC/HPI:  Laci Hinkle Jr is  a 26 year old male with PMH bipolar disorder, CEASAR, ADHD, and PSUD including opioids on buprenorphine who presents to the Recovery Clinic for return visit.      Brief History:  Pt was initially evaluated in Recovery Clinic on 7/22/21. Pt was referred by staff in [facility] due to pt's initial desire to taper off buprenorphine which he had been taking from nonprescription sources.   Pt decided to seek treatment at this time due to wanting to stop use of substances to allow him to take necessary steps to develop a career he enjoys.  Pt left [facility], but continued with Recovery Clinic.  He eventually continued buprenorphine therapy, and then transferred to [facility] with initial injection 8/13/21.  He received a total of 3 Sublocade injections, most recently 100mg on 10/8/21.   Pt then indicated to  staff he wanted to discontinue Sublocade injections.  He was lost to follow up until he returned to  on 12/9/21 requesting to resume buprenorphine after brief return to use of alcohol and kratom.      He describes his opioid use history starting with heroin as a teenager, last use age 17.  He started methadone treatment age 17, dose up to 100mg, then tapered off to 6mg/day then stopped.  He began using kratom ~2016, eventually used amounts up to 100g daily.  He was first prescribed buprenorphine for OUD in 2019. He took as prescribed through 1/2021, then continued on buprenorphine through nonprescription sources.    IV drug use: No   History of overdose: No  Previous treatments : Yes: multiple, [facility], [facility] 11/2020, previous buprenorphine and methadone  Longest period of sobriety: few months  Medical complications related to substance use: exacerbation of MH diagnoses  Hepatitis C Status  unknown  HIV Status unknown    Other recent substance use:  Stimulants: used methamphetamine ages [age], stopped for 5 years, then resumed age 24.  Had been using 2-3x/week for the last year, returned to use  "after leaving [facility] 11/2020.   Sedatives/hypnotics/anxiolytics: has used in past, denies regular use  Alcohol: h/o drinking 1 L liquor daily, returned to use after leaving [facility] 11/2020; last use 7/20/21  Tobacco: 1 ppd and ENDS  Cannabis: occasional   Hallucinogens:has used in past, denies recent  Behavioral addictions: none    Pt was last seen in  on 12/9/21.   A/P at that time was:  1. Opioid use disorder, severe, dependence (H)  Endorses return of symptoms as he approached 2 months from last Sublocade.  He wants to resume sublingual buprenorphine.   Start SL buprenorphine 4mg/day, titrate to 8mg/day as indicated to treat cravings.   Pt confirmed he has naloxone.   - buprenorphine HCl-naloxone HCl (SUBOXONE) 8-2 MG per film; Place 1 Film under the tongue daily  Dispense: 14 Film; Refill: 0     2. Alcohol use disorder, severe, dependence (H)  Resume disulfiram, pt stated he did not need a new rx for this     3. Tobacco use disorder  Expressing interest in quitting, discuss bupropion, NRT next visit     4. Stimulant use disorder  Discuss bupropion next visit    Return in about 2 weeks (around 12/23/2021) for Follow up, in person.      Today, pt states he increased SL buprenorphine to 8mg in am and 4mg in PM due to feeling like he was \"crashing\" in the afternoons while at work.  He denies cravings for or use of other opioids.  He has noticed decreased libido and ED which he attributes to buprenorphine.  He has continued to take disulfiram which he states is helpful to prevent him from drinking.      He continues to see his psychiatrist, Dr. Oconnor, for treatment of bipolar 1 disorder, and was recently started on fluoxetine.            [place] Prescription Drug Monitoring Program Reviewed:  Yes; as expected        Past Medical History:   Diagnosis Date     ADHD (attention deficit hyperactivity disorder) 3/18/2011     Bipolar affective disorder (H) 3/18/2011     CEASAR (generalized anxiety disorder) 3/18/2011     GERD " (gastroesophageal reflux disease)      Hyperlipidemia LDL goal <130      Impaired fasting glucose      Obesity      Substance abuse (H) 3/18/2011         PAST PSYCHIATRIC HISTORY:  Diagnoses- bipolar disorder, CEASAR, ADHD  Suicide Attempts: No   Hospitalizations: Yes     Past Surgical History:   Procedure Laterality Date     ENT SURGERY  2010    jaw surgery        Medications:  atorvastatin (LIPITOR) 20 MG tablet, 20 mg daily (Patient not taking: Reported on 12/9/2021)  buprenorphine HCl-naloxone HCl (SUBOXONE) 8-2 MG per film, Place 1 Film under the tongue daily  cloNIDine (CATAPRES) 0.1 MG tablet, Take 1 tablet (0.1 mg) by mouth 2 times daily AND 2 tablets (0.2 mg) At Bedtime.  disulfiram (ANTABUSE) 250 MG tablet, Take 1 tablet (250 mg) by mouth daily  divalproex sodium extended-release (DEPAKOTE ER) 500 MG 24 hr tablet, Take 4 tablets (2,000 mg) by mouth daily  FLUoxetine (PROZAC) 20 MG capsule, Take 1 capsule (20 mg) by mouth daily  lamoTRIgine (LAMICTAL) 100 MG tablet, Take 2 tablets (200 mg) by mouth At Bedtime  naloxone (NARCAN) 4 MG/0.1ML nasal spray, Spray 1 spray (4 mg) into one nostril alternating nostrils once as needed for opioid reversal every 2-3 minutes until assistance arrives (Patient not taking: Reported on 8/13/2021)  OLANZapine (ZYPREXA) 15 MG tablet, Take 1 tablet (15 mg) by mouth At Bedtime  senna-docusate (SENOKOT-S/PERICOLACE) 8.6-50 MG tablet, Take 2 tablets by mouth daily as needed for constipation    No current facility-administered medications on file prior to visit.      Allergies   Allergen Reactions     Prednisone Other (See Comments)     psych problems       Family History   Problem Relation Age of Onset     Hypertension Father      Alcohol/Drug Father         various substances     Psychotic Disorder Father         bipolar, anxiety, ADHD         Social History  Housing status: with his mother   Employment status: employed  Relationship status: Single, wants to start dating  Children:  no children          REVIEW OF SYSTEMS:  Endorses feeling more manic recently, then recalls he recently ran out of his Depakote  No other concerns today    OBJECTIVE                                                        BP (!) 158/99   Pulse 76     Physical Exam  Constitutional:       Appearance: He is overweight.      Comments: Dressed in work uniform   HENT:      Head: Normocephalic and atraumatic.   Eyes:      General: No scleral icterus.     Conjunctiva/sclera: Conjunctivae normal.   Pulmonary:      Effort: Pulmonary effort is normal.   Neurological:      Mental Status: He is alert and oriented to person, place, and time.      Coordination: Coordination is intact.      Gait: Gait is intact.   Psychiatric:         Attention and Perception: Attention and perception normal.         Mood and Affect: Mood is elated. Affect is not inappropriate.         Speech: Speech is rapid and pressured.         Behavior: Behavior is cooperative.         Thought Content: Thought content normal.               Labs:    UDS:  amphetamines, buprenorphine, benzodiazepines and THC pt denies use of amphetamine or benzodiazepines; recently started fluoxetine which can cause false positives  *POC urine drug screen does not screen for Fentanyl    Recent Results (from the past 240 hour(s))   Drug abuse screen 77 urine (FL, RH, SH)    Collection Time: 12/21/21  1:39 PM   Result Value Ref Range    Amphetamines Urine Screen Negative Screen Negative    Barbiturates Urine Screen Negative Screen Negative    Benzodiazepines Urine Screen Negative Screen Negative    Cannabinoids Urine Screen Positive (A) Screen Negative    Cocaine Urine Screen Negative Screen Negative    Opiates Urine Screen Negative Screen Negative    PCP Urine Screen Negative Screen Negative   CBC with platelets and differential    Collection Time: 12/21/21  6:05 PM   Result Value Ref Range    WBC Count 7.9 4.0 - 11.0 10e3/uL    RBC Count 5.10 4.40 - 5.90 10e6/uL    Hemoglobin  15.0 13.3 - 17.7 g/dL    Hematocrit 44.5 40.0 - 53.0 %    MCV 87 78 - 100 fL    MCH 29.4 26.5 - 33.0 pg    MCHC 33.7 31.5 - 36.5 g/dL    RDW 13.3 10.0 - 15.0 %    Platelet Count 329 150 - 450 10e3/uL    % Neutrophils 46 %    % Lymphocytes 33 %    % Monocytes 18 %    % Eosinophils 1 %    % Basophils 1 %    % Immature Granulocytes 1 %    NRBCs per 100 WBC 0 <1 /100    Absolute Neutrophils 3.7 1.6 - 8.3 10e3/uL    Absolute Lymphocytes 2.6 0.8 - 5.3 10e3/uL    Absolute Monocytes 1.4 (H) 0.0 - 1.3 10e3/uL    Absolute Eosinophils 0.1 0.0 - 0.7 10e3/uL    Absolute Basophils 0.1 0.0 - 0.2 10e3/uL    Absolute Immature Granulocytes 0.1 <=0.4 10e3/uL    Absolute NRBCs 0.0 10e3/uL         At least 30 min spent in review of medical record,  review, obtaining histories, reviewing symptoms, discussing pt's goals for treatment, counseling regarding importance of avoiding concurrent use of alcohol or other sedatives with opioids, harm reduction including keeping naloxone readily accessible    FRANK GALARZA MD  [place] [id number] [address] [phone]

## 2021-12-21 NOTE — LETTER
December 21, 2021      To Whom It May Concern:      Laci EMERSON Hinkle Jr was seen in our Emergency Department today, 12/21/21.     Sincerely,        Lizzeth Soliz MD

## 2021-12-21 NOTE — ED TRIAGE NOTES
Pt states having left sided chest ache feeling and was at the suboxone clinic today and his BP was 150s.  Pt would like to be started on HTN meds and get his CP checked.

## 2021-12-22 LAB
ATRIAL RATE - MUSE: 70 BPM
DIASTOLIC BLOOD PRESSURE - MUSE: NORMAL MMHG
INTERPRETATION ECG - MUSE: NORMAL
P AXIS - MUSE: 30 DEGREES
PR INTERVAL - MUSE: 140 MS
QRS DURATION - MUSE: 94 MS
QT - MUSE: 390 MS
QTC - MUSE: 421 MS
R AXIS - MUSE: 3 DEGREES
SYSTOLIC BLOOD PRESSURE - MUSE: NORMAL MMHG
T AXIS - MUSE: 26 DEGREES
VENTRICULAR RATE- MUSE: 70 BPM

## 2021-12-22 NOTE — ED NOTES
From lab:  Chem Zee was slightly hemolyzed, K might be slightly elevated; ans AST was unable to run

## 2021-12-22 NOTE — DISCHARGE INSTRUCTIONS
You have been seen in the ER for chest discomfort while smoking.  Your blood tests look good, your EKG is normal and your chest X ray is also normal.  Your blood pressure here is good - you do not need to start medicine for blood pressure control from the ER. However you do need to establish care with a primary clinic so they can monitor this for you and if needed they can start medication.  Call the number below and ask to make an appointment at the [facility] (in the [facility]) Primary Care Clinic.  To make an appointment, call [phone].    We also recommend talking to your clinic about ways to help quit smoking so that you can help prevent problems that can develop from chronic cigarette use.

## 2021-12-23 LAB
CANNABINOIDS UR CFM-MCNC: 1596 NG/ML
CARBOXYTHC/CREAT UR: 568 NG/MG CREAT

## 2022-01-18 ENCOUNTER — TELEPHONE (OUTPATIENT)
Dept: BEHAVIORAL HEALTH | Facility: CLINIC | Age: 27
End: 2022-01-18

## 2022-01-18 NOTE — TELEPHONE ENCOUNTER
Writer called patient due to no show today at the Recovery Clinic, pt states he will so a walk in this week

## 2022-02-08 ENCOUNTER — VIRTUAL VISIT (OUTPATIENT)
Dept: PSYCHIATRY | Facility: CLINIC | Age: 27
End: 2022-02-08
Attending: PSYCHIATRY & NEUROLOGY
Payer: COMMERCIAL

## 2022-02-08 DIAGNOSIS — F11.10 OPIOID USE DISORDER, MILD, ABUSE (H): ICD-10-CM

## 2022-02-08 DIAGNOSIS — F31.9 BIPOLAR 1 DISORDER (H): ICD-10-CM

## 2022-02-08 PROCEDURE — 99214 OFFICE O/P EST MOD 30 MIN: CPT | Mod: TEL | Performed by: PSYCHIATRY & NEUROLOGY

## 2022-02-08 RX ORDER — CLONIDINE HYDROCHLORIDE 0.1 MG/1
TABLET ORAL
Qty: 120 TABLET | Refills: 2 | Status: SHIPPED | OUTPATIENT
Start: 2022-02-08 | End: 2022-07-25

## 2022-02-08 NOTE — PROGRESS NOTES
Psychiatry Clinic Follow-up Note    TELEPHONE VISIT  Laci Hinkle Jr is a 26 year old pt. who is being evaluated via a billable telephone visit.      The patient has been notified of the following:    We have found that certain health care needs can be provided without the need for a physical exam. This service lets us provide the care you need with a short phone conversation. If a prescription is necessary we can send it directly to your pharmacy. If lab work is needed we can place an order for that and you can then stop by our lab to have the test done at a later time. Insurers are generally covering virtual visits as they would in-office visits so billing should not be different than normal.  If for some reason you do get billed incorrectly, you should contact the billing office to correct it and that number is in the AVS .    Patient has given verbal consent for a telephone visit?:  Yes   How would the pt like to obtain the AVS?:  Tekmi  AVS SmartPhrase [PsychAVS] has been placed in 'Patient Instructions':  Yes     Start Time:  2:07 PM          End Time:  230      Laci Hinkle Jr. MRN# [id number]   Age: 24 year old YOB: 1995          Assessment:     At the last visit, I prescribed Laci Prozac 20 mg daily to target depressive symptoms.  He took the 20 mg dose for a month with some benefit, but then felt that the effects were lost and he increased the dose to 40 mg.  Likewise, he increased it further to 60 mg after another month.  He has remained on this dose with good effect and good tolerability.  I reminded Laci not to increase medication doses without discussing them with me first.  That said, 60 mg of Prozac is within the usual dose range and I have agreed to continue it at that dose.  Laci will also continue his other usual medications.  He will get a blood test to check his Depakote level.  (Despite numerous reminders it is been at least a couple of years since this is been  done.)  Laci will return for follow-up on May 10.    Attestation:  Patient has been evaluated by me, Bo Oconnor MD, PhD.         Diagnoses:     Axis I: Bipolar I disorder, currently depressed; polysubstance dependence     Axis II: Deferred    Axis III: Medication-induced weight gain; hypertension    Axis IV: mild  psychosocial stressors     Axis V: Global Assessment of Functionin-60    Interim History     Since the last visit Laci reports things have been going well.  He feels Prozac was very helpful with his depressive symptoms.  He reports the main benefits being increased to drive and energy, and a better mood.  As noted, he felt the effects were lost at each dose until he got to 60 mg, which gave him sustained benefit and good tolerability.  Laci denies manic or mixed symptoms.    Laci has been adherent with his other usual medications.  He is working regularly as a  and enjoys that.  Generally things are going well and he is pleased with his progress.  He continues to see the addictions program for Suboxone.    Laci will continue his medications at the current doses including Prozac 60 mg daily.  He promises not to increase the dose further without talking to me.  He will get a blood test to check his Depakote level.  He will return for follow-up on May 10.    Review of Systems     A comprehensive review of systems was performed and is negative other than noted above.          Allergies:      Allergies   Allergen Reactions     Prednisone Other (See Comments)     psych problems     Current Medications     Depakote 2000 mg HS  Lamotrigine 200mg HS  Olanzapine 15 mg HS    Mental Status Exam     Alertness: alert  and oriented  Behavior/Demeanor: cooperative, pleasant and calm,  Speech: normal  Language: intact  Psychomotor: normal or unremarkable  Mood: description consistent with euthymia  Affect: full-range; was congruent to mood  Thought Process/Associations:  unremarkable  Thought Content:  Denies active/passive suicidal ideation  Perception:  Denies hallucinations  Insight: fair  Judgment: fair  Cognition:  does appear grossly intact.

## 2022-02-08 NOTE — PATIENT INSTRUCTIONS
Continue your medications at the usual doses    Please return for follow-up in 3 months      **For crisis resources, please see the information at the end of this document**   Patient Education    Thank you for coming to the [facility] [place] [facility].    Lab Testing:  If you had lab testing today and your results are reassuring or normal they will be mailed to you or sent through [employer] within 7 days. If the lab tests need quick action we will call you with the results. The phone number we will call with results is # [phone] (home) . If this is not the best number please call our clinic and change the number.    Medication Refills:  If you need any refills please call your pharmacy and they will contact us. Our fax number for refills is [phone]. Please allow three business for refill processing. If you need to  your refill at a new pharmacy, please contact the new pharmacy directly. The new pharmacy will help you get your medications transferred.     Scheduling:  If you have any concerns about today's visit or wish to schedule another appointment please call our office during normal business hours [phone] (8-5:00 M-F)    Contact Us:  Please call [phone] during business hours (8-5:00 M-F).  If after clinic hours, or on the weekend, please call  [phone].    Financial Assistance [phone]  [facility] Billing [phone]  Central Billing Office, ealth: [phone]  [facility] Billing [phone]  Medical Records [phone]  [facility] Patient Bill of Rights [email or web address]       MENTAL HEALTH CRISIS NUMBERS:  For a medical emergency please call  911 or go to the nearest ER.     [facility]:   [facility] -[phone]   Crisis Residence [facility] -[phone]   Walk-In Counseling Center [facility] -[phone]   COPE 24/7 [facility] Team  -[phone] (adults)/[phone] (child)  [facility]: [facility] needs [facility] team - [phone]      [facility]:   [facility] - [phone]   Walk-in counseling [facility] - [phone]   Walk-in counseling [facility] - [phone]   Crisis Residence [facility] - [phone]  Urgent Care Adult Mental Emjvvp-[phone] mobile unit/ 24/7 crisis line    National Crisis Numbers:   National Suicide Prevention Lifeline: 6-774-110-TALK (224-474-9434)  Poison Control Center - 1-838.719.5912  [employer]!/resources for a list of additional resources (SOS)  Trans Lifeline a hotline for transgender people 7-507-953-9718  The [employer] a hotline for LGBT youth 0-382-055-7549  Crisis Text Line: For any crisis 24/7   To: 149193  see www.crisistextline.org  - IF MAKING A CALL FEELS TOO HARD, send a text!         Again thank you for choosing [facility] [place] [facility] and please let us know how we can best partner with you to improve you and your family's health.    You may be receiving a survey regarding this appointment. We would love to have your feedback, both positive and negative. The survey is done by an external company, so your answers are anonymous.

## 2022-02-22 ENCOUNTER — VIRTUAL VISIT (OUTPATIENT)
Dept: PSYCHIATRY | Facility: CLINIC | Age: 27
End: 2022-02-22
Attending: PSYCHIATRY & NEUROLOGY
Payer: COMMERCIAL

## 2022-02-22 DIAGNOSIS — F31.9 BIPOLAR 1 DISORDER (H): Primary | ICD-10-CM

## 2022-02-22 PROCEDURE — 99214 OFFICE O/P EST MOD 30 MIN: CPT | Mod: TEL | Performed by: PSYCHIATRY & NEUROLOGY

## 2022-02-22 NOTE — PATIENT INSTRUCTIONS
Continue your medications at the usual doses    Please return for follow-up in 3 months    **For crisis resources, please see the information at the end of this document**   Patient Education    Thank you for coming to the [facility] [facility].    Lab Testing:  If you had lab testing today and your results are reassuring or normal they will be mailed to you or sent through [facility] within 7 days. If the lab tests need quick action we will call you with the results. The phone number we will call with results is # [phone]. If this is not the best number please call our clinic and change the number.     Medication Refills:  If you need any refills please call your pharmacy and they will contact us. Our fax number for refills is [phone]. Please allow three business days for refill processing.   If you need to change to a different pharmacy, please contact the new pharmacy directly. The new pharmacy will help you get your medications transferred.     Contact Us:  Please call [phone] during business hours (8-5:00 M-F).  If you have medication related questions after clinic hours, or on the weekend, please call [phone].    Financial Assistance [phone]  [facility] Billing [phone]  Central Billing Office, [facility]: [phone]  [facility] Billing [phone]  Medical Records [phone]       MENTAL HEALTH CRISIS RESOURCES:  For a emergency help, please call 911 or go to the nearest Emergency Department.     Emergency Walk-In Options:   EmPATH Unit @ [facility] ([place]): [phone] - Specialized mental health emergency area designed to be calming  [facility] ([place]): [phone]  [facility] ([place]): [phone]  [facility] ([place]): [phone]    [facility] Crisis Information:   [place]: [phone]  [place]: [phone]  [place] ([place]) - Adult: [phone]     Child:  [phone]  [place] - Adult: [phone]     Child: [phone]  [place]: [phone]  List of all [facility] resources:   https://[place].gov/dhs/people-we-serve/adults/health-care/mental-health/resources/crisis-contacts.jsp    National Crisis Information:   Crisis Text Line: Text  [place]  to 778265  National Suicide Prevention Lifeline: 8-993-161-TALK (1-618.199.7414)       For online chat options, visit https://suicidepreventionlifeline.org/chat/  Poison Control Center: 3-763-436-0097  Trans Lifeline: 3-956-030-8826 - Hotline for transgender people of all ages  The [employer] Project: 6-410-777-8935 - Hotline for LGBT youth     For Non-Emergency Support:   Fast Tracker: Mental Health & Substance Use Disorder Resources -   https://www.Shustirtrackermn.org/

## 2022-02-22 NOTE — PROGRESS NOTES
Psychiatry Clinic Follow-up Note    TELEPHONE VISIT  Laci Hinkle Jr is a 26 year old pt. who is being evaluated via a billable telephone visit.      The patient has been notified of the following:    We have found that certain health care needs can be provided without the need for a physical exam. This service lets us provide the care you need with a short phone conversation. If a prescription is necessary we can send it directly to your pharmacy. If lab work is needed we can place an order for that and you can then stop by our lab to have the test done at a later time. Insurers are generally covering virtual visits as they would in-office visits so billing should not be different than normal.  If for some reason you do get billed incorrectly, you should contact the billing office to correct it and that number is in the AVS .    Patient has given verbal consent for a telephone visit?:  Yes   How would the pt like to obtain the AVS?:  ZoomInfo  AVS SmartPhrase [PsychAVS] has been placed in 'Patient Instructions':  Yes     Start Time:  2:04 PM          End Time:  230      Laci Hinkle Jr. MRN# [id number]   Age: 24 year old YOB: 1995          Assessment:     Laci reports that since the last visit he has been sporadically adherent with Prozac and Depakote.  He has been taking his other medications regularly.  He does not currently report any symptoms of depression or minda, but recognizes the importance of medication adherence, and so we'll do his best to be more regular with these medications.  He is agreeable to return for follow-up on May 31.    Attestation:  Patient has been evaluated by me, Bo Oconnor MD, PhD.         Diagnoses:     Axis I: Bipolar I disorder, polysubstance dependence    Axis II: Deferred    Axis III: Medication-induced weight gain; hypertension    Axis IV: mild  psychosocial stressors     Axis V: Global Assessment of Functionin-60    Interim History     Since  "the last visit Laci reports that he has been only sporadically adherent with Depakote and Prozac.  \"I just haven't been very good with my medications, man.\"  He recognizes the importance of medication adherence and so will be more consistent with them.  He has been taking his other medications regularly.    Laci is now living at his mom's as he has been having trouble staying away from substances living on his own.  He has been drinking alcohol recently, and lost a job because of that.  He also used meth at least once.  He recognizes that these things are unhealthy and hopes that it will be easier for him to be clean at his mom's place.    Laci denies symptoms of depression or minda.  He recognizes that his risk of relapse is higher if he is not taking medications regularly and so promises to be more adherent.  If he is not able to stay clean in his mom's house he plans to check into a residential drug treatment facility.    Laci had a lab test recently.  His GFR was normal.  His liver enzymes were normal.  The rest of the results were unremarkable.    Laci will return for follow-up in 3 months.    Review of Systems     A comprehensive review of systems was performed and is negative other than noted above.          Allergies:      Allergies   Allergen Reactions     Prednisone Other (See Comments)     psych problems     Current Medications     Depakote 2000 mg HS  Lamotrigine 200mg HS  Olanzapine 15 mg HS    Mental Status Exam     Alertness: alert  and oriented  Behavior/Demeanor: cooperative, pleasant and calm,  Speech: normal  Language: intact  Psychomotor: normal or unremarkable  Mood: description consistent with euthymia  Affect: full-range; was congruent to mood  Thought Process/Associations: unremarkable  Thought Content:  Denies active/passive suicidal ideation  Perception:  Denies hallucinations  Insight: fair  Judgment: fair  Cognition:  does appear grossly intact.  "

## 2022-02-22 NOTE — PROGRESS NOTES
Laci Hinkle Jr is a 26 year old who has consented to receive services via billable phone visit.      What phone number would you prefer to be contacted at?: Mobile phone number on file:   Telephone Information:   Mobile [phone]     How would you prefer to obtain AVS?: Eduin

## 2022-02-24 ENCOUNTER — OFFICE VISIT (OUTPATIENT)
Dept: BEHAVIORAL HEALTH | Facility: CLINIC | Age: 27
End: 2022-02-24
Payer: COMMERCIAL

## 2022-02-24 DIAGNOSIS — F10.20 ALCOHOL USE DISORDER, SEVERE, DEPENDENCE (H): ICD-10-CM

## 2022-02-24 DIAGNOSIS — F11.20 OPIOID USE DISORDER, SEVERE, DEPENDENCE (H): Primary | ICD-10-CM

## 2022-02-24 DIAGNOSIS — F17.210 CIGARETTE NICOTINE DEPENDENCE WITHOUT COMPLICATION: ICD-10-CM

## 2022-02-24 DIAGNOSIS — F15.90 STIMULANT USE DISORDER: ICD-10-CM

## 2022-02-24 DIAGNOSIS — F12.90 CONTINUOUS CANNABIS USE: ICD-10-CM

## 2022-02-24 PROCEDURE — 99215 OFFICE O/P EST HI 40 MIN: CPT | Performed by: NURSE PRACTITIONER

## 2022-02-24 RX ORDER — ACAMPROSATE CALCIUM 333 MG/1
666 TABLET, DELAYED RELEASE ORAL 3 TIMES DAILY
Qty: 180 TABLET | Refills: 1 | Status: SHIPPED | OUTPATIENT
Start: 2022-02-24 | End: 2022-03-25

## 2022-02-24 RX ORDER — BUPRENORPHINE AND NALOXONE 12; 3 MG/1; MG/1
1 FILM, SOLUBLE BUCCAL; SUBLINGUAL DAILY
Qty: 7 FILM | Refills: 0 | Status: SHIPPED | OUTPATIENT
Start: 2022-02-24 | End: 2022-03-04

## 2022-02-24 NOTE — PROGRESS NOTES
[facility] Recovery Clinic Follow Up    ASSESSMENT/PLAN                                                      1. Opioid use disorder, severe, dependence (H)  - Discussed restarting Suboxone. No currently withdrawal. Follow up in 1 week to monitor for use and cravings   - Buprenorphine HCl-Naloxone HCl (SUBOXONE) 12-3 MG FILM per film; Place 1 Film under the tongue daily  Dispense: 7 Film; Refill: 0    2. Alcohol use disorder, severe, dependence (H)  - Alcohol is reported as DOC. Discussed transitioning patient to [facility].   - Discussed other treatment options for AUD other than Antabuse. Patient would like to try Campral at this time for better craving control.   - Strongly encouraged patient complete Rule 25 and follow recommendations of . Discussed the need for psychosocial interventions as well as medication for successful recovery from polysubstance use.   - acamprosate (CAMPRAL) 333 MG EC tablet; Take 2 tablets (666 mg) by mouth 3 times daily  Dispense: 180 tablet; Refill: 1    3. Stimulant use disorder  - monitor for use and cravings   - interventions as above     4. Cigarette nicotine dependence without complication  - did not discuss today, follow up at next visit to assess for readiness to quit     5. Continuous cannabis use  - discouraged cannabis use, especially d/t paranoia and complex mental health hx.        Return in about 1 week (around 3/3/2022) for Follow up, with me, in person.    SUBJECTIVE                                                        CC/HPI:  Laci Hinkle Jr is a 26 year old male with PMH bipolar disorder, CEASAR, ADHD, and PSUD including opioids on buprenorphine who presents to the Recovery Clinic for return visit.       Brief History:  Pt was initially evaluated in Recovery Clinic on 7/22/21. Pt was referred by staff in [facility] due to pt's initial desire to taper off buprenorphine which he had been taking from nonprescription sources.  Pt decided to seek treatment at this time due to wanting to stop use of substances to allow him to take necessary steps to develop a career he enjoys.  Pt left [facility], but continued with Recovery Clinic.  He eventually continued buprenorphine therapy, and then transferred to [facility] with initial injection 8/13/21.  He received a total of 3 Sublocade injections, most recently 100mg on 10/8/21.   Pt then indicated to  staff he wanted to discontinue Sublocade injections.  He was lost to follow up until he returned to  on 12/9/21 requesting to resume buprenorphine after brief return to use of alcohol and kratom. Relapsed again on kratom and alcohol, would like to resume Suboxone.       He describes his opioid use history starting with heroin as a teenager, last use age 17.  He started methadone treatment age 17, dose up to 100mg, then tapered off to 6mg/day then stopped.  He began using kratom ~2016, eventually used amounts up to 100g daily.  He was first prescribed buprenorphine for OUD in 2019. He took as prescribed through 1/2021, then continued on buprenorphine through nonprescription sources.    IV drug use: No   History of overdose: No  Previous treatments : Yes: multiple, [facility], [facility] 11/2020, previous buprenorphine and methadone  Longest period of sobriety: few months  Medical complications related to substance use: exacerbation of MH diagnoses  Hepatitis C Status  unknown  HIV Status unknown     Other recent substance use:  Stimulants: used methamphetamine ages [age], stopped for 5 years, then resumed age 24.  Had been using 2-3x/week for the last year, returned to use after leaving [facility] 11/2020.   Sedatives/hypnotics/anxiolytics: has used in past, denies regular use  Alcohol: h/o drinking 1 L liquor daily, returned to use after leaving [facility] 11/2020; last use 7/20/21  Tobacco: 1 ppd and ENDS  Cannabis: occasional   Hallucinogens:has used in past, denies recent  Behavioral  "addictions: none      Most recent Recovery Clinic visit 12/21/21:  A/P from that visit:     1. Opioid use disorder, severe, dependence (H)  Ok to continue buprenorphine SL 12mg/day.    Recommend he take this once daily to reduce replication of behaviors associated with active addiction.   Pt agreeable to return to Sublocade if unable to maintain this dose.   - Drug abuse screen 77 urine (FL, RH, SH); Future  - Buprenorphine HCl-Naloxone HCl (SUBOXONE) 12-3 MG FILM per film; Place 1 Film under the tongue daily  Dispense: 30 Film; Refill: 0  - Drug abuse screen 77 urine (FL, RH, SH)     2. Erectile dysfunction, unspecified erectile dysfunction type  rx for sildenafil, pt aware this is not covered by insurance.   - sildenafil (VIAGRA) 50 MG tablet; Take 1 tablet (50 mg) by mouth daily as needed (ED)  Dispense: 30 tablet; Refill: 3     3. Stimulant use disorder  Pt denies use; utox confirms false positive on UDS     4. Alcohol use disorder, severe, dependence (H)  Continue disulfiram per pt's preference     5. Tobacco use disorder  Pt expressing interest in quitting; discuss NRT next visit   Return in about 4 weeks (around 1/18/2022) for Follow up, in person; and sooner if problems or want to return to Sublocade.    Today, pt states:     - quit taking Suboxone for a \"little while\"  - relapsed on Kratom, alcohol, and amphetamines. Last alcoholic beverage was Monday. - Last Kratom use was 1 week ago.    - Last time he took amphetamines was 3 weeks ago   - He moved in with his mother in hopes this would help him with his sobriety. Struggling to not drink. Alcohol cravings are the worse. He does have cravings or opioids as well, specifically kratom. He has plans to go to [facility] if he is not able to maintain sobriety at his mothers house.   - Will get a chemical health assessment done at [facility].   - After the relapse he found his Suboxone and took them. The suboxone did help with cravings.    - using " "cannabis once per week - reports it \"makes him paranoid only at first, but then it gets better\"    - He wants to get back on Suboxone, felt 12-3 mg once daily worked well for him   - Last Sublocade injection was 10/8/21 - he is not interested in Sublocade at this time   - Would like to continue on Antabuse for AUD however realizes he can just not take it if he plans to drink. This has been problematic and is not helping him to maintain sobriety from alcohol. Reports he has been stopping antabuse so he can drink.   - Established with a psychiatric, mood has been stable   - sleeping well   - without other concerns today       [place] Prescription Drug Monitoring Program Reviewed:  Yes  12/21/2021  Suboxone 12 Mg-3 MG SL Film    30.00  30       Medications:  Buprenorphine HCl-Naloxone HCl (SUBOXONE) 12-3 MG FILM per film, Place 1 Film under the tongue daily  cloNIDine (CATAPRES) 0.1 MG tablet, Take 1 tablet (0.1 mg) by mouth 2 times daily AND 2 tablets (0.2 mg) At Bedtime.  disulfiram (ANTABUSE) 250 MG tablet, Take 1 tablet (250 mg) by mouth daily  divalproex sodium extended-release (DEPAKOTE ER) 500 MG 24 hr tablet, Take 4 tablets (2,000 mg) by mouth daily  [START ON 2/28/2022] FLUoxetine (PROZAC) 20 MG capsule, Take 2 capsules (40 mg) by mouth daily for 7 days, THEN 3 capsules (60 mg) daily.  lamoTRIgine (LAMICTAL) 100 MG tablet, Take 2 tablets (200 mg) by mouth At Bedtime  naloxone (NARCAN) 4 MG/0.1ML nasal spray, Spray 1 spray (4 mg) into one nostril alternating nostrils once as needed for opioid reversal every 2-3 minutes until assistance arrives (Patient not taking: Reported on 8/13/2021)  OLANZapine (ZYPREXA) 15 MG tablet, Take 1 tablet (15 mg) by mouth At Bedtime  senna-docusate (SENOKOT-S/PERICOLACE) 8.6-50 MG tablet, Take 2 tablets by mouth daily as needed for constipation  sildenafil (VIAGRA) 50 MG tablet, Take 1 tablet (50 mg) by mouth daily as needed (ED)    No current facility-administered medications " on file prior to visit.      Allergies   Allergen Reactions     Prednisone Other (See Comments)     psych problems       PMH, PSH, FamHx reviewed    Social History  Housing status: with his mother   Employment status: employed  Relationship status: Single, wants to start dating  Children: no children    ROS: no concerns today     OBJECTIVE                                                      There were no vitals taken for this visit.    Physical Exam  Constitutional:       General: He is not in acute distress.     Appearance: Normal appearance. He is not diaphoretic.   HENT:      Head: Normocephalic and atraumatic.   Eyes:      General: No scleral icterus.     Extraocular Movements: Extraocular movements intact.      Pupils: Pupils are equal, round, and reactive to light.   Pulmonary:      Effort: Pulmonary effort is normal.   Skin:     Coloration: Skin is not jaundiced.   Neurological:      General: No focal deficit present.      Mental Status: He is alert and oriented to person, place, and time.      Motor: Tremor present. No weakness.      Coordination: Coordination normal.      Gait: Gait normal.   Psychiatric:         Attention and Perception: Attention and perception normal.         Mood and Affect: Mood and affect normal.         Speech: Speech normal. Speech is not rapid and pressured or slurred.         Behavior: Behavior is hyperactive. Behavior is cooperative.         Thought Content: Thought content normal.         Cognition and Memory: Cognition and memory normal.      Comments: Insight and judgment are fair        Labs:    UDS: buprenorphine and THC  *P[facility] urine drug screen does not screen for Fentanyl    No results found for this or any previous visit (from the past 240 hour(s)).      Patient counseling completed today:  Discussed mechanism of action, potential risks/benefits/side effects of medications and other recommendations above.  Recommend pt keep naloxone in their possession and reviewed other  aspects of harm reduction to reduce risk of overdose with return to use.   Recommended avoiding concurrent use of alcohol, benzodiazepines or other sedatives with buprenorphine or other opioids.  Discussed importance of avoiding isolation, building a network of supportive relationships, avoiding people/places/things associated with past use to reduce risk of relapse; including motivational interviewing regarding psychosocial treatment for addiction.     At least 40 min spent in review of medical record,  review, obtaining histories, reviewing symptoms, discussing pt's goals for treatment, counseling noted above.    SCOTT Teran [place] [address] [place] [id number] [phone]

## 2022-02-24 NOTE — NURSING NOTE
[facility] Recovery Clinic      Rooming information:  Approximate last use of illicit opioids: about a week ago-Kratom  Taking buprenorphine? Yes:  As prescribed? No  Number of buprenorphine films/tablets remaining currently: 0  Side effects related to buprenorphine (constipation, dry mouth, sedation?) Yes: constipation    Narcan currently available: Yes  Other recent substance use:    Alcohol  and Methamphetamine   NICOTINE-Yes:   If using nicotine, ready to quit? Yes:     Point of care urine drug screen positive for: buprenorphine and THC  *POC urine drug screen does not screen for Fentanyl      PHQ-2 Score:     PHQ-2 ( 1999 Pfizer) 2/24/2022 12/21/2021   Q1: Little interest or pleasure in doing things 1 1   Q2: Feeling down, depressed or hopeless 1 0   PHQ-2 Score 2 1   PHQ-2 Total Score (12-17 Years)- Positive if 3 or more points; Administer PHQ-A if positive - -        If PHQ-2 score of 3 or higher, has Recovery Clinic therapist or provider been notified? N/A    Any current suicidal ideation? No  If yes, has Recovery Clinic therapist or provider been notified? N/A    Primary care provider: [facility]     Mental health provider: Yes (follow up on MH referral if needed)    Insurance needs: active    Housing needs: stable    Contact information up to date? yes    3rd Party Involvement none today (please obtain WU if pt would like to include)    Morena Mckeon CMA  February 24, 2022  3:00 PM

## 2022-02-24 NOTE — PROGRESS NOTES
[facility] Recovery [facility]    Peer  met with Laci Hinkle Jr in the Recovery Clinic to introduce himself, detail services provided and discuss current status of recovery. Pt appeared alert, oriented and open to feedback during our discussion.     Pt arrives today stating a re-use of alcohol, meth and Kratum.  Pt states the episode resulted in the loss of his job due to inability to work scheduled shifts. Pt is temporarily residing with his mother while he re-engages with suboxone, other medications that were sporadically being taken and finds sobriety again. PRC and pt discussed what triggered the re-use and how it can be dealt with more effectively in the future. PRC and pt discussed establishing a network of sober minded individuals as source of support when triggered or feeling cravings.     PRC welcomes pt contact for recovery support and resources. PRC and pt will speak again during upcoming  visit.     Patient Goal: To utilize suboxone assisted treatment for sobriety and long term recovery.     Goal Progress:   Ongoing.    Key Risk Factors to Recovery:   PRC encourages being aware of risk factors that can lead to re-use which include avoiding isolation, avoiding triggers and managing cravings in a healthy manner. being open and willing to acceptance and change on a daily basis.  PRC encourages pt to establish a sober network calling tree to reach out to when needed.  Continue to practice honesty with ourselves and trusted support person(s).   PRC encourages regular attendance at recovery based meetings as well as finding a sponsor for mentoring and accountability.   PRC encourages consideration of of other services such as counseling for mental health issues which can correlate with our substance use.      Support Needs:   Ongoing care, support and resources for opioid substance use disorder.     Follow up:   JULISA provided pt with his contact information for support and resource  Patient is requesting a referral for a mammogram.    needs.  PRC and pt agree to meet during an upcoming RC appointment.       [facility]  [address], [address]   [place], [place], [address]  Clinic Phone: [phone]  Clinic Fax: [phone]  Peer  phone: [phone]    Open Monday - Friday  9:00am-4:00pm  Walk in hours: 9am-3pm      Stewart Chester  February 24, 2022  4:06 PM

## 2022-02-25 ENCOUNTER — TELEPHONE (OUTPATIENT)
Dept: BEHAVIORAL HEALTH | Facility: CLINIC | Age: 27
End: 2022-02-25
Payer: COMMERCIAL

## 2022-02-25 NOTE — TELEPHONE ENCOUNTER
Reason for call:  Other   Patient called regarding (reason for call): prescription  Additional comments: patient deferred pharmacy DO NOT have sbxn 12mg. Patient is requesting refill be sent to available pharmacy   [address] [facility] [address] [facility] [address] [facility]   Phone number to reach patient:  Home number on file [phone] (home)    Best Time:  ANY    Can we leave a detailed message on this number?  YES    Travel screening: Negative

## 2022-02-25 NOTE — TELEPHONE ENCOUNTER
Writer attempted to phone patient regarding change in pharmacy request for Suboxone script. The original pharmacy that the medication was requested at [id number] - [facility], [address] AT [facility] [address] did receive the 12-3 MG films in today and are filling the order for him. Therefore the transfer request was not initiated. No answer; left VM and will send [email or web address] message as well.    Mandi Hanna RN on 2/25/2022 at 2:46 PM

## 2022-02-25 NOTE — TELEPHONE ENCOUNTER
Alvin Raymond,     I just added you as a delegate on my [place]  account. It does not look like the prescription has been filled. Ok to switch prescription to new pharmacy. Please let me know if a new order needs to be sent.     Thank you,     Kamla Noyola, SCOTT CNP on 2/25/2022 at 2:25 PM

## 2022-02-25 NOTE — TELEPHONE ENCOUNTER
Writer does not have access to [place]  for provider and does not feel comfortable phoning in a verbal without being able to check.     Routing to Provider Dennys.     Mandi Hanna RN on 2/25/2022 at 2:08 PM

## 2022-03-04 ENCOUNTER — OFFICE VISIT (OUTPATIENT)
Dept: BEHAVIORAL HEALTH | Facility: CLINIC | Age: 27
End: 2022-03-04
Payer: COMMERCIAL

## 2022-03-04 VITALS — DIASTOLIC BLOOD PRESSURE: 85 MMHG | HEART RATE: 85 BPM | SYSTOLIC BLOOD PRESSURE: 136 MMHG

## 2022-03-04 DIAGNOSIS — F31.32 BIPOLAR AFFECTIVE DISORDER, CURRENTLY DEPRESSED, MODERATE (H): ICD-10-CM

## 2022-03-04 DIAGNOSIS — F15.90 STIMULANT USE DISORDER: ICD-10-CM

## 2022-03-04 DIAGNOSIS — F17.200 TOBACCO USE DISORDER: ICD-10-CM

## 2022-03-04 DIAGNOSIS — F10.20 ALCOHOL USE DISORDER, SEVERE, DEPENDENCE (H): ICD-10-CM

## 2022-03-04 DIAGNOSIS — F11.20 OPIOID USE DISORDER, SEVERE, DEPENDENCE (H): Primary | ICD-10-CM

## 2022-03-04 PROCEDURE — 99214 OFFICE O/P EST MOD 30 MIN: CPT | Performed by: FAMILY MEDICINE

## 2022-03-04 RX ORDER — BUPRENORPHINE AND NALOXONE 12; 3 MG/1; MG/1
1 FILM, SOLUBLE BUCCAL; SUBLINGUAL DAILY
Qty: 7 FILM | Refills: 0 | Status: SHIPPED | OUTPATIENT
Start: 2022-03-04 | End: 2022-03-11

## 2022-03-04 RX ORDER — POLYETHYLENE GLYCOL 3350 17 G/17G
1 POWDER, FOR SOLUTION ORAL DAILY
Qty: 578 G | Refills: 11 | Status: SHIPPED | OUTPATIENT
Start: 2022-03-04 | End: 2022-12-21

## 2022-03-04 NOTE — PROGRESS NOTES
[facility] Return Visit    ASSESSMENT/PLAN                                                    1. Opioid use disorder, severe, dependence (H)  Reporting one use of oxycodone on 3/2/22 with discontinuation of buprenorphine 3/1-3/2.  He is not interested in transferring back to  buprenorphine.   Continue buprenorphine 12mg/day  Miralax for OIC  Encouraged psychosocial treatment  - polyethylene glycol (MIRALAX) 17 GM/Dose powder; Take 17 g (1 capful) by mouth daily  Dispense: 578 g; Refill: 11  - Buprenorphine HCl-Naloxone HCl (SUBOXONE) 12-3 MG FILM per film; Place 1 Film under the tongue daily  Dispense: 7 Film; Refill: 0    2. Alcohol use disorder, severe, dependence (H)  Continue acamprosate 666mg tid and resume disulfiram 250mg/day per his request.  No rx's required  Encouraged psychosocial treatment    3. Stimulant use disorder  Encouraged psychosocial treatment    4. Tobacco use disorder  Not ready to quit, continue to evaluate    5. Bipolar affective disorder, currently depressed, moderate (H)  Continue fluoxetine 60mg/day, olanzapine 15mg at bedtime, and lamotrigine 200mg at bedtime as prescribed by Dr. Oconnor, psychiatry, and f/u as recommended    Return in about 1 week (around 3/11/2022) for Follow up, in person.      SUBJECTIVE                                                        CC/HPI:  Laci Hinkle Jr is a 26 year old male with PMH bipolar disorder, CEASAR, ADHD, AUD, stimulant use disorder, and OUD on buprenorphine who presents to the [facility] for return visit.      Brief History:  Pt was initially evaluated in Recovery Clinic on 7/22/21. Pt was referred by staff in [facility] due to pt's initial desire to taper off buprenorphine which he had been taking from nonprescription sources.   Pt left [facility] after 1-2 days, but continued with Recovery Clinic.  He eventually decided to continue buprenorphine therapy, and then transferred to [facility] with  initial injection 8/13/21.  He received a total of 3 Sublocade injections, most recently 100mg on 10/8/21.   Pt then indicated to  staff he wanted to discontinue Sublocade injections.  He was lost to follow up until he returned to  on 12/9/21 requesting to resume buprenorphine after brief return to use of alcohol and kratom.      He describes his opioid use history starting with heroin as a teenager, last use age 17.  He started methadone treatment age 17, dose up to 100mg, then tapered off to 6mg/day then stopped.  He began using kratom ~2016, eventually used amounts up to 100g daily.  He was first prescribed buprenorphine for OUD in 2019. He took as prescribed through 1/2021, then continued on buprenorphine through nonprescription sources.    IV drug use: No   History of overdose: No  Previous treatments : Yes: multiple, [facility], [facility] 11/2020, previous buprenorphine and methadone  Longest period of sobriety: few months  Medical complications related to substance use: exacerbation of  diagnoses  Hepatitis C Status  unknown  HIV Status unknown    Other recent substance use:  Stimulants: used methamphetamine ages [age], stopped for 5 years, then resumed in 2020. Has had periods of abstinence from meth while in residential treatment.   Sedatives/hypnotics/anxiolytics: has used in past, denies regular use  Alcohol: h/o drinking 1 L liquor daily, has had periods of abstinence in residential treatment and at times when not in treatment   Tobacco: 1 ppd and ENDS  Cannabis: occasional   Hallucinogens:has used in past, denies recent  Behavioral addictions: none      Pt was last seen in  on 2/24/22. At that time he reported he had stopped buprenorphine for a period of time, and returned to use of kratom, alcohol, and stimulants.  He had moved back in with his mother for greater accountability, and was considering returning to residential treatment.   A/P at that time was:  1. Opioid use disorder,  severe, dependence (H)  - Discussed restarting Suboxone. No currently withdrawal. Follow up in 1 week to monitor for use and cravings   - Buprenorphine HCl-Naloxone HCl (SUBOXONE) 12-3 MG FILM per film; Place 1 Film under the tongue daily  Dispense: 7 Film; Refill: 0     2. Alcohol use disorder, severe, dependence (H)  - Alcohol is reported as DOC. Discussed transitioning patient to [facility].   - Discussed other treatment options for AUD other than Antabuse. Patient would like to try Campral at this time for better craving control.   - Strongly encouraged patient complete Rule 25 and follow recommendations of . Discussed the need for psychosocial interventions as well as medication for successful recovery from polysubstance use.   - acamprosate (CAMPRAL) 333 MG EC tablet; Take 2 tablets (666 mg) by mouth 3 times daily  Dispense: 180 tablet; Refill: 1     3. Stimulant use disorder  - monitor for use and cravings   - interventions as above      4. Cigarette nicotine dependence without complication  - did not discuss today, follow up at next visit to assess for readiness to quit      5. Continuous cannabis use  - discouraged cannabis use, especially d/t paranoia and complex mental health hx.                    Return in about 1 week (around 3/3/2022) for Follow up, with me, in person.      Today, pt states he has been taking buprenorphine 12mg/day most days since his last visit, but did stop use 3/1-3/2 and used oxycodone on 3/2/22.   He reports last use of kratom ~2/25/22 and last use of methamphetamine 1/2022.  He states he had been drinking 1/2 of a 1.75L liquor daily, last use 3/2/22. Having difficulty taking acamprosate as prescribed, taking 2-3x/day.  Has been taking since 2/24.  Uncertain of effects on cravings for alcohol.  He is still considering returning to residential treatment.  He reports he has information needed to obtain a chemical health assessment when he is ready.      He has been more regularly taking his psychotropics for bipolar disorder since moving back to his mother's.          [place] Prescription Drug Monitoring Program Reviewed:  Yes; as expected        Past Medical History:   Diagnosis Date     ADHD (attention deficit hyperactivity disorder) 3/18/2011     Bipolar affective disorder (H) 3/18/2011     CEASAR (generalized anxiety disorder) 3/18/2011     GERD (gastroesophageal reflux disease)      Hyperlipidemia LDL goal <130      Impaired fasting glucose      Obesity      Substance abuse (H) 3/18/2011         PAST PSYCHIATRIC HISTORY:  Diagnoses- bipolar disorder, CEASAR, ADHD  Suicide Attempts: No   Hospitalizations: Yes     Past Surgical History:   Procedure Laterality Date     ENT SURGERY  2010    jaw surgery        Medications:  acamprosate (CAMPRAL) 333 MG EC tablet, Take 2 tablets (666 mg) by mouth 3 times daily  cloNIDine (CATAPRES) 0.1 MG tablet, Take 1 tablet (0.1 mg) by mouth 2 times daily AND 2 tablets (0.2 mg) At Bedtime.  disulfiram (ANTABUSE) 250 MG tablet, Take 1 tablet (250 mg) by mouth daily  divalproex sodium extended-release (DEPAKOTE ER) 500 MG 24 hr tablet, Take 4 tablets (2,000 mg) by mouth daily  FLUoxetine (PROZAC) 20 MG capsule, Take 2 capsules (40 mg) by mouth daily for 7 days, THEN 3 capsules (60 mg) daily.  lamoTRIgine (LAMICTAL) 100 MG tablet, Take 2 tablets (200 mg) by mouth At Bedtime  naloxone (NARCAN) 4 MG/0.1ML nasal spray, Spray 1 spray (4 mg) into one nostril alternating nostrils once as needed for opioid reversal every 2-3 minutes until assistance arrives (Patient not taking: Reported on 8/13/2021)  OLANZapine (ZYPREXA) 15 MG tablet, Take 1 tablet (15 mg) by mouth At Bedtime  senna-docusate (SENOKOT-S/PERICOLACE) 8.6-50 MG tablet, Take 2 tablets by mouth daily as needed for constipation  sildenafil (VIAGRA) 50 MG tablet, Take 1 tablet (50 mg) by mouth daily as needed (ED)    No current facility-administered medications on file prior to  visit.      Allergies   Allergen Reactions     Prednisone Other (See Comments)     psych problems       Family History   Problem Relation Age of Onset     Hypertension Father      Alcohol/Drug Father         various substances     Psychotic Disorder Father         bipolar, anxiety, ADHD         Social History  Housing status: with his mother   Employment status: unemployed, looking for work  Relationship status: Single  Children: no children           REVIEW OF SYSTEMS:  As above, no other concerns    OBJECTIVE                                                        /85   Pulse 85     Physical Exam  Constitutional:       Appearance: He is overweight.   HENT:      Head: Normocephalic and atraumatic.   Eyes:      General: No scleral icterus.     Conjunctiva/sclera: Conjunctivae normal.   Pulmonary:      Effort: Pulmonary effort is normal.   Neurological:      Mental Status: He is alert and oriented to person, place, and time.      Coordination: Coordination is intact.      Gait: Gait is intact.   Psychiatric:         Attention and Perception: Attention and perception normal.         Mood and Affect: Mood normal. Affect is not inappropriate.         Speech: Speech is rapid and pressured.         Behavior: Behavior is cooperative.         Thought Content: Thought content normal.               Labs:    UDS 3/4/22:  buprenorphine and THC   *[facility] urine drug screen does not screen for Fentanyl    No results found for this or any previous visit (from the past 240 hour(s)).      At least 30 min spent in review of medical record,  review, obtaining histories, reviewing symptoms, discussing pt's goals for treatment, counseling regarding importance of avoiding concurrent use of alcohol or other sedatives with opioids, harm reduction including keeping naloxone readily accessible    FRANK GALARZA MD  [facility]  [address] [place] [id number]  [phone]

## 2022-03-04 NOTE — NURSING NOTE
[facility]      Rooming information:  Approximate last use of illicit opioids: 2/28/22  Taking buprenorphine? Yes:  As prescribed? Yes:   Number of buprenorphine films/tablets remaining currently: 1  Side effects related to buprenorphine (constipation, dry mouth, sedation?) Yes: constipation   Narcan currently available: Yes  Other recent substance use:    Alcohol   NICOTINE-Yes:   If using nicotine, ready to quit? Yes:     Point of care urine drug screen positive for: buprenorphine and THC  *[facility] urine drug screen does not screen for Fentanyl      PHQ-2 Score:     PHQ-2 ( 1999 [employer]) 2/24/2022 12/21/2021   Q1: Little interest or pleasure in doing things 1 1   Q2: Feeling down, depressed or hopeless 1 0   PHQ-2 Score 2 1   PHQ-2 Total Score (12-17 Years)- Positive if 3 or more points; Administer PHQ-A if positive - -        If PHQ-2 score of 3 or higher, has [facility] Clinic therapist or provider been notified? N/A    Any current suicidal ideation? No  If yes, has [facility] therapist or provider been notified? N/A    Primary care provider: [facility] [place], operated by [facility]     Mental health provider: Yes in [place] (follow up on MH referral if needed)    Insurance needs: active    Housing needs: stable    Contact information up to date? yes    3rd Party Involvement none today (please obtain WU if pt would like to include)    Morena Mckeon CMA  March 4, 2022  2:37 PM

## 2022-03-04 NOTE — PROGRESS NOTES
Psychiatry Clinic Follow-up Note    TELEPHONE VISIT  Laci Hinkle Jr is a 24 year old pt. who is being evaluated via a billable telephone visit.      The patient has been notified of the following:    We have found that certain health care needs can be provided without the need for a physical exam. This service lets us provide the care you need with a short phone conversation. If a prescription is necessary we can send it directly to your pharmacy. If lab work is needed we can place an order for that and you can then stop by our lab to have the test done at a later time. Insurers are generally covering virtual visits as they would in-office visits so billing should not be different than normal.  If for some reason you do get billed incorrectly, you should contact the billing office to correct it and that number is in the AVS .    Patient has given verbal consent for a telephone visit?:  Yes   How would the pt like to obtain the AVS?:  Patient declined  AVS SmartPhrase [PsychAVS] has been placed in 'Patient Instructions':  Yes     Start Time:  2:29 PM          End Time: 2:55 pm      Laci Hinkle Jr. MRN# [id number]   Age: 24 year old YOB: 1995          Assessment:     Since the last visit, Laci has been adherent with Depakote 2000 mg at bedtime, and olanzapine 10 mg at bedtime.  He uses gabapentin 200 mg on a as needed basis.  He has reduced his dose of lamotrigine from 125 mg to 100 mg and wishes to stay at this dose.  He requests a refill for amitriptyline for sleep at a lower dose of 20 mg at bedtime as needed and I have agreed with this.  He has been using Suboxone, though not prescribed, to target drug cravings.  He has gotten the name of the physician who can prescribe Suboxone and plans to connect with that person.  He also plans to start inpatient CD treatment in a couple of weeks, planning to stay for 2 months.  I have encouraged him to continue working on his substance use.   HISTORY OF PRESENTING ILLNESS:  Chana Shah is a 56 year old female who presents for a history and physical at the request of Dr. Moody in plastic surgery for preoperative evaluation. She is scheduled to undergo a breast implant replacement with autologous fat grafting, tissue expander replacement, and mastopexy on 3/15/22 with general anesthesia. Surgical indication: malignant neoplasm of the left breast.    Caryl has tolerated anesthesia well in the past, but there is a history of PONV. There is no history of bleeding problems. No history of blood clot. No history of heart disease. Denies syncope, lightheadedness, chest pain, and palpitations. No underlying lung disease. No history of asthma, PIERCE or COPD. No history of tobacco use. Denies shortness of breath or cough. Currently, no fevers, chills or cold symptoms. No history of renal disease, diabetes or thyroid disease. Of note, she does have a L frozen shoulder and history of degenerative disc disease in L5/S1.     Her PCP is Dr. Courtney Andujar.     Functional Status:  Does patient get chest pain or unusual shortness of breath with the following activities:  Climbing up 2 flights of stairs at a normal speed? No  Walk on level ground at 4 mph (a mile in 15 minutes)? No  Perform heavy work around the house like scrubbing floors, moving furniture, or vacuuming? No    Past Surgical History:   Procedure Laterality Date   • Arthroplasty Left 02/08/2021    Left thumb carpometacarpal arthroplasty and palmaris longus tendon graft, Dr. Cj Samuels   • Bd dexa axial skeleton  01/06/2022    Osteopenia   • Breast biopsy Left 05/15/2003    Fibrocystic changes   • Carpal tunnel release Right 04/11/2016    Dr. Cj Samuels   • Colonoscopy diagnostic  05/21/2015    Normal, 10 year recall, Dr. Montague   • Hb ablation varicose vein laser addl Bilateral 2011   • Knee scope,diagnostic Left 11/19/2015    Major synovectomy; removal of multiple loose bodies; partial  Laci will continue his usual medications.  He will restart amitriptyline as needed.  He has still not gotten a blood test to check his Depakote level and I have encouraged him to do so.  He will return for follow-up in 3 months.    Attestation:  Patient has been evaluated by me, Bo Oconnor MD, PhD.         Diagnoses:     Axis I: Bipolar I disorder, currently euthymic; polysubstance dependence     Axis II: Deferred    Axis III: Medication-induced weight gain; hypertension    Axis IV: mild  psychosocial stressors     Axis V: Global Assessment of Functionin-60    Interim History     Since the last visit Laci has been taking his medications as outlined above.  He reports feeling well and denies symptoms of depression, minda, or psychosis.  He is still not sleeping as well as he would like and he wishes to restart amitriptyline at a lower dose of 20 mg at bedtime as needed (he previously took 25 mg).  I have agreed with his request.    Laci continues to struggle with substance abuse.  He has begun using Suboxone, though it is not prescribed for him.  He does find it helpful with reducing cravings.  Nonetheless, he notes that he will use Suboxone for a few days and otherwise remain clean, but then relapsed into his substance use.    Laci's counselor has given him the name of a physician who can prescribe Suboxone and Laci plans to connect with him.  He is also scheduled to start inpatient CD treatment in 2 weeks, and plans to stay there for 2 months.  I have encouraged him to follow through on getting treatment for his substance use.    Laci is agreeable to restart amitriptyline.  He will otherwise continue his usual medications.  I reminded him again to get a blood test to check his Depakote level and other routine labs.  He will return for follow-up in 3 months.    Review of Systems     A comprehensive review of systems was performed and is negative other than noted above.           lateral meniscectomy; medial femoral condyle, lateral femoral condyle chondroplasty, Dr. Drake García   • Lasik Bilateral 1999   • Mass excision Left 03/08/2018    knee - synovial chondromatosis. Dr Spencer. [facility]   • Mastectomy Left 12/14/2021    MASTECTOMY,SKIN SPARING, BIOPSY, LYMPH NODE, SENTINEL   • Tissue expander place in breast reconstruct inc subsequent expansions Left 12/14/2021    Dr. Moody left breast reconstruction with tissue expander and dermal matrix       Past Medical History:   Diagnosis Date   • Baker's cyst of knee, left     Removed   • Bilateral plantar fasciitis 2002   • Breast cyst 05/10/2010    Left breast cysts   • Chondromatosis 10/2015    Left knee   • Chronic low back pain 04/26/2016   • Chronic tension type headache    • Closed fracture of middle or proximal phalanx or phalanges of hand 2009    Left ring and fifth from volleyball, now a mallet finger   • Coccydynia 12/20/2016   • Congenital pes planus 03/28/2017   • Degeneration of intervertebral disc, site unspecified 1998    Herniated L5-S1 disc with right foot numbness    • Fibrocystic breast 05/06/2010   • Fracture of left 5th  toe 2011    Boot immobilizer   • Fracture of thumb, right, closed    • Frozen shoulder     left   • Laceration of foot, left 06/04/2017   • Latent tuberculosis infection 2004    CXR negative; treated with INH   • Lyme disease 06/21/2019   • Motion sickness    • Numbness and tingling in left hand 01/04/2021   • Osteopenia    • Plantar fascial fibromatosis 05/14/2013   • Plantar fascial fibromatosis 08/03/2016   • Pneumonia    • PONV (postoperative nausea and vomiting)    • Synovial chondromatoses 12/20/2017    Resolved with surgery, left knee         Outpatient Medications Marked as Taking for the 3/4/22 encounter (Office Visit) with Emma Urrutia PA-C   Medication Sig Dispense Refill   • ibuprofen (MOTRIN) 800 MG tablet Take 1 tablet by mouth every 8 hours as needed for Pain. 90 tablet 1   • scopolamine  (TRANSDERM_SCOP) 1 MG/3DAYS patch Place 1 patch onto the skin every 72 hours. 10 patch 3   • Cholecalciferol (VITAMIN D) 2000 UNITS tablet Take 2,000 Units by mouth daily.           ALLERGIES:   Allergen Reactions   • Adhesive   (Environmental) ERYTHEMA   • Sulfa Antibiotics HIVES and RASH         Social History     Tobacco Use   • Smoking status: Never Smoker   • Smokeless tobacco: Never Used   Vaping Use   • Vaping Use: never used   Substance Use Topics   • Alcohol use: Yes     Alcohol/week: 1.0 standard drink     Types: 1 Cans of beer per week     Comment: weekly   • Drug use: No       Family History   Problem Relation Age of Onset   • COPD Mother    • Cancer, Lung Mother 69         at age 72, mets to brain   • Bilateral breast cancer Mother 35        No chemo or radiation.   • Osteoarthritis Mother    • High cholesterol Father    • Kidney disease Father         CKD   • High blood pressure Father    • Hypertension Father         pulmonary   • Cancer, Prostate Father 70        early stage   • Coronary Artery Disease Father         PTCA   • Patient is unaware of any medical problems Brother    • Aneurysm Maternal Grandfather 64        ruptured   • Cancer, Prostate Paternal Grandfather 73        metastatic   • Migraine Daughter    • Anorexia nervosa Daughter         hx   • Sjogren's Syndrome Daughter    • Patient is unaware of any medical problems Maternal Aunt    • Cancer, Breast Maternal Aunt 60   • Aneurysm Maternal Aunt    • Atrial Fibrilliation Maternal Aunt    • Cancer, Prostate Paternal Uncle    • Lymphoma Paternal Uncle    • Cancer, Ovarian Neg Hx    • Cancer, Skin Melanoma Neg Hx        REVIEW OF SYSTEMS:  Eye Problem(s):negative  ENT Problem(s):see HPI, otherwise, negative  Cardiovascular problem(s):negative  Respiratory problem(s):see HPI, otherwise negative  Gastro-intestinal problem(s):negative GI  Genito-urinary problem(s):negative  Musculoskeletal problem(s):negative  Integumentary  Allergies:      Allergies   Allergen Reactions     Prednisone Other (See Comments)     psych problems     Current Medications     Depakote 2000 mg HS  Gabapentin 100 mg daily  Lamotrigine 125 mg HS  Olanzapine 10 mg HS    Mental Status Exam     Alertness: alert  and oriented  Behavior/Demeanor: cooperative, pleasant and calm,  Speech: normal  Language: intact  Psychomotor: normal or unremarkable  Mood: description consistent with euthymia  Affect: full-range; was congruent to mood  Thought Process/Associations: unremarkable  Thought Content:  Denies active/passive suicidal ideation  Perception:  Denies hallucinations  Insight: fair  Judgment: fair  Cognition:  does appear grossly intact.   problem(s):negative  Neurological problem(s):negative  Psychiatric problem(s):negative  Endocrine problem(s):negative  Hematologic and/or Lymphatic problem(s):negative    PHYSICAL EXAM:  Visit Vitals  /76   Pulse 64   Ht 5' 9.29\" (1.76 m)   Wt 64.3 kg (141 lb 12.1 oz)   LMP 02/01/2013   SpO2 99%   BMI 20.76 kg/m²     Wt Readings from Last 3 Encounters:   03/04/22 64.3 kg (141 lb 12.1 oz)   02/23/22 63 kg (138 lb 14.2 oz)   12/23/21 60.5 kg (133 lb 4.3 oz)     Constitutional:  Well developed, well nourished, no acute distress, non-toxic Appearance.  Eyes:  Pupils equal, round, reactive to light.  Conjunctivae normal.  HENT:  Atraumatic, external ears normal, nose normal, oropharynx moist.  No pharyngeal exudates.  Neck - Normal range of motion, no tenderness, supple. No thyromegaly.   Respiratory:  No respiratory distress, normal breath sounds.  No rales, no wheezing.  Cardiovascular:  Normal rate, normal rhythm.  No murmurs, no gallops, no rubs.  Gastrointestinal:  Soft, nondistended, normal bowel sounds, nontender, no HSM, no mass, no rebound, no guarding.  Genitourinary:  No costovertebral angle tenderness.    Integument:  Well hydrated, no rash.  Lymphatic:  No cervical  lymphadenopathy noted.  Neurologic:  Alert and oriented x3, normal motor function, normal sensory function, no focal deficits noted.  Psychiatric:  Speech and behavior appropriate.    ASSESSMENT:  1. Pre-op evaluation    2. Malignant neoplasm of overlapping sites of left breast in female, estrogen receptor positive (CMS/HCC)        PLAN:  According to the ACC/AHS guidelines, the patient has no cardiopulmonary symptoms to contraindicate surgery. Revised cardiac risk markers are negative for coronary artery disease/cerebrovascular accident/congestive heart failure/arrhythmia/chronic kidney disease/diabetes mellitus.    Patient refuses to complete preoperative EKG as requested by the surgeon. She did have a stable EKG in 12/2022 which is  acceptable for anesthesia guidelines, but she understands surgery may require a repeat EKG if required by the surgical team.  She is willing to proceed with preoperative CBC and BMP. Preoperative COVID testing to be coordinated by the surgical team. Pending unremarkable presurgical work up, I believe the patient is stable to undergo this surgery.    Thank you for allowing me to do the history and physical for this patient.  Please feel free to call with any questions.    The patient was seen and examined by me, Emma Urrutia PA-C, working in collaboration with Dr. Courtney Andujar.   Emma Urrutia PA-C

## 2022-03-11 ENCOUNTER — OFFICE VISIT (OUTPATIENT)
Dept: BEHAVIORAL HEALTH | Facility: CLINIC | Age: 27
End: 2022-03-11
Payer: COMMERCIAL

## 2022-03-11 VITALS — HEART RATE: 89 BPM | DIASTOLIC BLOOD PRESSURE: 103 MMHG | SYSTOLIC BLOOD PRESSURE: 152 MMHG

## 2022-03-11 DIAGNOSIS — F31.32 BIPOLAR AFFECTIVE DISORDER, CURRENTLY DEPRESSED, MODERATE (H): ICD-10-CM

## 2022-03-11 DIAGNOSIS — F17.200 TOBACCO USE DISORDER: ICD-10-CM

## 2022-03-11 DIAGNOSIS — F10.20 ALCOHOL USE DISORDER, SEVERE, DEPENDENCE (H): ICD-10-CM

## 2022-03-11 DIAGNOSIS — F15.90 STIMULANT USE DISORDER: ICD-10-CM

## 2022-03-11 DIAGNOSIS — F11.20 OPIOID USE DISORDER, SEVERE, DEPENDENCE (H): Primary | ICD-10-CM

## 2022-03-11 PROCEDURE — 99207 PR CDG-MDM COMPONENT: MEETS MODERATE - DOWN CODED: CPT | Performed by: FAMILY MEDICINE

## 2022-03-11 PROCEDURE — 99214 OFFICE O/P EST MOD 30 MIN: CPT | Performed by: FAMILY MEDICINE

## 2022-03-11 RX ORDER — BUPRENORPHINE AND NALOXONE 12; 3 MG/1; MG/1
1 FILM, SOLUBLE BUCCAL; SUBLINGUAL DAILY
Qty: 7 FILM | Refills: 0 | Status: SHIPPED | OUTPATIENT
Start: 2022-03-11 | End: 2022-03-25

## 2022-03-11 NOTE — NURSING NOTE
[facility]      Rooming information:  Approximate last use of illicit opioids: 3/10/22-[place]  Taking buprenorphine? Yes:  As prescribed? Yes:   Number of buprenorphine films/tablets remaining currently: 1  Side effects related to buprenorphine (constipation, dry mouth, sedation?) Yes: constipation    Narcan currently available: Yes  Other recent substance use:    Alcohol and THC  NICOTINE-Yes:   If using nicotine, ready to quit? Yes:     Point of care urine drug screen positive for: buprenorphine, benzodiazepines and THC  *POC urine drug screen does not screen for Fentanyl      PHQ-2 Score:     PHQ-2 ( 1999 Pfizer) 3/11/2022 3/4/2022   Q1: Little interest or pleasure in doing things 1 1   Q2: Feeling down, depressed or hopeless 1 1   PHQ-2 Score 2 2   PHQ-2 Total Score (12-17 Years)- Positive if 3 or more points; Administer PHQ-A if positive - -        If PHQ-2 score of 3 or higher, has [facility] therapist or provider been notified? Yes    Any current suicidal ideation? No  If yes, has [facility] therapist or provider been notified? N/A    [facility] care provider: [facility]     Mental health provider: yes (follow up on MH referral if needed)    Insurance needs: active    Housing needs: stable    Contact information up to date? yes    3rd Party Involvement none today (please obtain WU if pt would like to include)    Morena Mckeon CMA  March 11, 2022  2:47 PM

## 2022-03-11 NOTE — PROGRESS NOTES
[facility] Follow Up    ASSESSMENT/PLAN                                                        1. Opioid use disorder, severe, dependence (H)  Overall his cravings are well managed with Suboxone, his use of Kratom yesterday sounds purely impulsive.  Encouraged abstaining from further experimentation/use.  Continue Suboxone 12-3mg daily.  Encouraged him to pursue CD assessment and treatment.  - Buprenorphine HCl-Naloxone HCl (SUBOXONE) 12-3 MG FILM per film; Place 1 Film under the tongue daily  Dispense: 7 Film; Refill: 0    2. Alcohol use disorder, severe, dependence (H)  Continues to struggle with EtOH use though reports decreased intake with campral.  He will continue Campral.  He would like to resume Antabuse and has this at home.  Discussed with him that he is at risk for developing EtOH withdrawal and we reviewed that this can be very severe, leading to seizures and death which he is aware of.  I have asked him to monitor for signs of withdrawal and seek emergency care should they develop.  Encouraged him to pursue CD assessment and treatment.    3. Stimulant use disorder  Encouraged him to pursue CD assessment and treatment.    4. Tobacco use disorder  Not ready to quit.      5. Bipolar affective disorder, currently depressed, moderate (H)  Overall it sounds as though he is fairly stable.  He reports improved compliance with medications.  He will continue medications per psychiatry (Dr Oconnor).         Return in about 1 week (around 3/18/2022) for Follow up, in person.    SUBJECTIVE                                                          CC/HPI:  Laci Hinkle Jr is a 26 year old male with PMH bipolar disorder, CEASAR, ADHD, AUD, stimulant use disorder, and OUD on buprenorphine who presents to the Recovery Clinic for return visit.      Brief History:  Pt was initially evaluated in Recovery Clinic on 7/22/21. Pt was referred by staff in [facility] due to pt's initial desire to taper  off buprenorphine which he had been taking from nonprescription sources.   Pt left [facility] after 1-2 days, but continued with Recovery Clinic.  He eventually decided to continue buprenorphine therapy, and then transferred to [facility] with initial injection 8/13/21.  He received a total of 3 Sublocade injections, most recently 100mg on 10/8/21.   Pt then indicated to  staff he wanted to discontinue Sublocade injections.  He was lost to follow up until he returned to  on 12/9/21 requesting to resume buprenorphine after brief return to use of alcohol and kratom.       He describes his opioid use history starting with heroin as a teenager, last use age 17.  He started methadone treatment age 17, dose up to 100mg, then tapered off to 6mg/day then stopped.  He began using kratom ~2016, eventually used amounts up to 100g daily.  He was first prescribed buprenorphine for OUD in 2019. He took as prescribed through 1/2021, then continued on buprenorphine through nonprescription sources.    IV drug use: No   History of overdose: No  Previous treatments : Yes: multiple, [facility], [facility] 11/2020, previous buprenorphine and methadone  Longest period of sobriety: few months  Medical complications related to substance use: exacerbation of MH diagnoses  Hepatitis C Status  unknown  HIV Status unknown     Other recent substance use:  Stimulants: used methamphetamine ages [age], stopped for 5 years, then resumed in 2020. Has had periods of abstinence from meth while in residential treatment.   Sedatives/hypnotics/anxiolytics: has used in past, denies regular use  Alcohol: h/o drinking 1 L liquor daily, has had periods of abstinence in residential treatment and at times when not in treatment   Tobacco: 1 ppd and ENDS  Cannabis: occasional   Hallucinogens:has used in past, denies recent  Behavioral addictions: none      Most recent Recovery Clinic visit: 3/4/22    Important points and recommendations last  "visit:  1. Opioid use disorder, severe, dependence (H)  Reporting one use of oxycodone on 3/2/22 with discontinuation of buprenorphine 3/1-3/2.  He is not interested in transferring back to XR buprenorphine.   Continue buprenorphine 12mg/day  Miralax for OIC  Encouraged psychosocial treatment  - polyethylene glycol (MIRALAX) 17 GM/Dose powder; Take 17 g (1 capful) by mouth daily  Dispense: 578 g; Refill: 11  - Buprenorphine HCl-Naloxone HCl (SUBOXONE) 12-3 MG FILM per film; Place 1 Film under the tongue daily  Dispense: 7 Film; Refill: 0     2. Alcohol use disorder, severe, dependence (H)  Continue acamprosate 666mg tid and resume disulfiram 250mg/day per his request.  No rx's required  Encouraged psychosocial treatment      3. Stimulant use disorder  Encouraged psychosocial treatment     4. Tobacco use disorder  Not ready to quit, continue to evaluate     5. Bipolar affective disorder, currently depressed, moderate (H)  Continue fluoxetine 60mg/day, olanzapine 15mg at bedtime, and lamotrigine 200mg at bedtime as prescribed by Dr. Oconnor, psychiatry, and f/u as recommended    Today, patient reports he has been taking acamprosate for 2 weeks.  Continues to have cravings for EtOH.  No side effects.  Notices it is easier to stop drinking when he starts.  He reports he is definitely drinking less.  Staying with his mom is also helpful in decreasing his EtOH intake.  Antabuse has been helpful in past as well and he would like to reconsider this (decreases number of decisions he needs to make to avoid drinking).  Currently drinking 1-2x/week - 1/2 L of vodka.  He has experienced EtOH in past (\"I withdraw badly\").    Denies benzo use.  Using cannabis every other day.  Tried Kratom yesterday when it was offered by a friend. Wanted to see how it would feel if also taking Suboxone, no effect.  He denies cravings for opioids/kratom.  Suboxone helps all cravings and prevents effects of Kratom.  Feels this is a very effective " medication.  He is not interested in Sublocade due to worsening constipation.  He likes skipping a dose of Suboxone if he is constipated, does not feel that missing one dose increases cravings or causes withdrawal.      [facility].  Has been to [facility] previously and would be open to this.     [place] Prescription Drug Monitoring Program Reviewed:  Yes; as expected.     Medications:  acamprosate (CAMPRAL) 333 MG EC tablet, Take 2 tablets (666 mg) by mouth 3 times daily  cloNIDine (CATAPRES) 0.1 MG tablet, Take 1 tablet (0.1 mg) by mouth 2 times daily AND 2 tablets (0.2 mg) At Bedtime.  disulfiram (ANTABUSE) 250 MG tablet, Take 1 tablet (250 mg) by mouth daily  divalproex sodium extended-release (DEPAKOTE ER) 500 MG 24 hr tablet, Take 4 tablets (2,000 mg) by mouth daily  FLUoxetine (PROZAC) 20 MG capsule, Take 2 capsules (40 mg) by mouth daily for 7 days, THEN 3 capsules (60 mg) daily.  lamoTRIgine (LAMICTAL) 100 MG tablet, Take 2 tablets (200 mg) by mouth At Bedtime  naloxone (NARCAN) 4 MG/0.1ML nasal spray, Spray 1 spray (4 mg) into one nostril alternating nostrils once as needed for opioid reversal every 2-3 minutes until assistance arrives (Patient not taking: Reported on 8/13/2021)  OLANZapine (ZYPREXA) 15 MG tablet, Take 1 tablet (15 mg) by mouth At Bedtime  polyethylene glycol (MIRALAX) 17 GM/Dose powder, Take 17 g (1 capful) by mouth daily  senna-docusate (SENOKOT-S/PERICOLACE) 8.6-50 MG tablet, Take 2 tablets by mouth daily as needed for constipation  sildenafil (VIAGRA) 50 MG tablet, Take 1 tablet (50 mg) by mouth daily as needed (ED)    No current facility-administered medications on file prior to visit.      Allergies   Allergen Reactions     Prednisone Other (See Comments)     psych problems       PMH, PSH, FamHx reviewed    Social History  Housing status: with his mother   Employment status: unemployed, looking for work  Relationship status:  Single  Children: no children     OBJECTIVE                                                      BP (!) 152/103   Pulse 89     Physical Exam  Vitals and nursing note reviewed.   Constitutional:       General: He is not in acute distress.     Appearance: Normal appearance.   HENT:      Head: Normocephalic and atraumatic.   Eyes:      General: No scleral icterus.     Conjunctiva/sclera: Conjunctivae normal.   Cardiovascular:      Rate and Rhythm: Normal rate.   Pulmonary:      Effort: Pulmonary effort is normal. No respiratory distress.   Skin:     Coloration: Skin is not jaundiced or pale.   Neurological:      General: No focal deficit present.      Mental Status: He is alert and oriented to person, place, and time.      Gait: Gait normal.   Psychiatric:         Attention and Perception: Attention normal.         Mood and Affect: Mood and affect normal.         Speech: Speech is rapid and pressured.         Behavior: Behavior normal. Behavior is cooperative.         Judgment: Judgment is impulsive.         Labs:    UDS: buprenorphine, benzodiazepines and THC  *POC urine drug screen does not screen for Fentanyl    No results found for this or any previous visit (from the past 240 hour(s)).      Patient counseling completed today:  Discussed mechanism of action, potential risks/benefits/side effects of medications and other recommendations above.  Recommend pt keep naloxone in their possession and reviewed other aspects of harm reduction to reduce risk of overdose with return to use.   Recommended avoiding concurrent use of alcohol, benzodiazepines or other sedatives with buprenorphine or other opioids.  Discussed importance of avoiding isolation, building a network of supportive relationships, avoiding people/places/things associated with past use to reduce risk of relapse; including motivational interviewing regarding psychosocial treatment for addiction.       Kristina Mckeon, [facility]  [facility]  [address] [place] [id number]  [phone]

## 2022-03-25 ENCOUNTER — OFFICE VISIT (OUTPATIENT)
Dept: BEHAVIORAL HEALTH | Facility: CLINIC | Age: 27
End: 2022-03-25
Payer: COMMERCIAL

## 2022-03-25 VITALS — DIASTOLIC BLOOD PRESSURE: 84 MMHG | HEART RATE: 74 BPM | SYSTOLIC BLOOD PRESSURE: 145 MMHG

## 2022-03-25 DIAGNOSIS — F11.20 OPIOID USE DISORDER, SEVERE, DEPENDENCE (H): Primary | ICD-10-CM

## 2022-03-25 DIAGNOSIS — F31.0 BIPOLAR AFFECTIVE DISORDER, CURRENT EPISODE HYPOMANIC (H): ICD-10-CM

## 2022-03-25 DIAGNOSIS — F10.20 ALCOHOL USE DISORDER, SEVERE, DEPENDENCE (H): ICD-10-CM

## 2022-03-25 PROCEDURE — 99214 OFFICE O/P EST MOD 30 MIN: CPT | Performed by: FAMILY MEDICINE

## 2022-03-25 RX ORDER — BUPRENORPHINE AND NALOXONE 12; 3 MG/1; MG/1
1 FILM, SOLUBLE BUCCAL; SUBLINGUAL DAILY
Qty: 7 FILM | Refills: 0 | Status: SHIPPED | OUTPATIENT
Start: 2022-03-25 | End: 2022-04-01

## 2022-03-25 NOTE — PROGRESS NOTES
[facility] Follow Up    ASSESSMENT/PLAN                                                      1. Opioid use disorder, severe, dependence (H)  We discussed goals of Suboxone treatment for OUD (kratom).  Discussed risk of Kratom - opioid like dependence, other unknown active substances, unknown how it interacts with current medications and he ultimately decided he would like to continue.  I encouraged him to take consistently.  He agrees to try this approach.    - Buprenorphine HCl-Naloxone HCl (SUBOXONE) 12-3 MG FILM per film; Place 1 Film under the tongue daily  Dispense: 7 Film; Refill: 0    2. Alcohol use disorder, severe, dependence (H)  He is ready to stop drinking.  Last drink 4 days ago.  He was not feeling well 2 days after last drink but feeling better now.  Has resumed Antabuse.  Reviewed with him that EtOH withdrawal can occur even up to 2 weeks.  He verbalized understanding of seriousness and risk of death with EtOH withdrawal and agrees to seek care should he experience symptoms.      3. Bipolar affective disorder, current episode hypomanic (H)  Seems a bit hypomanic today though he says he feels less so than yesterday (and he took extra Zyprexa yesterday but plans to resume normal dosing).  No SI.       Return in about 1 week (around 4/1/2022) for Follow up, with me, in person.  He will call with questions or concerns.    SUBJECTIVE                                                          CC/HPI:  Laci Hinkle Jr is a 26 year old male with PMH bipolar disorder, CEASAR, ADHD, AUD, stimulant use disorder, and OUD on buprenorphine who presents to the Recovery Clinic for return visit.       Brief History:  Pt was initially evaluated in Recovery Clinic on 7/22/21. Pt was referred by staff in [facility] due to pt's initial desire to taper off buprenorphine which he had been taking from nonprescription sources.   Pt left [facility] after 1-2 days, but continued with Recovery  Clinic.  He eventually decided to continue buprenorphine therapy, and then transferred to Sublocade with initial injection 8/13/21.  He received a total of 3 Sublocade injections, most recently 100mg on 10/8/21.   Pt then indicated to  staff he wanted to discontinue Sublocade injections.  He was lost to follow up until he returned to  on 12/9/21 requesting to resume buprenorphine after brief return to use of alcohol and kratom.     He describes his opioid use history starting with heroin as a teenager, last use age 17.  He started methadone treatment age 17, dose up to 100mg, then tapered off to 6mg/day then stopped.  He began using kratom ~2016, eventually used amounts up to 100g daily.  He was first prescribed buprenorphine for OUD in 2019. He took as prescribed through 1/2021, then continued on buprenorphine through nonprescription sources.    IV drug use: No   History of overdose: No  Previous treatments : Yes: multiple, [facility], [facility] 11/2020, previous buprenorphine and methadone  Longest period of sobriety: few months  Medical complications related to substance use: exacerbation of MH diagnoses  Hepatitis C Status  unknown  HIV Status unknown     Other recent substance use:  Stimulants: used methamphetamine ages [age], stopped for 5 years, then resumed in 2020. Has had periods of abstinence from meth while in residential treatment.   Sedatives/hypnotics/anxiolytics: has used in past, denies regular use  Alcohol: h/o drinking 1 L liquor daily, has had periods of abstinence in residential treatment and at times when not in treatment   Tobacco: 1 ppd and ENDS  Cannabis: occasional   Hallucinogens:has used in past, denies recent  Behavioral addictions: none    Most recent Recovery Clinic visit: 3/11/22    Important points and recommendations last visit:  1. Opioid use disorder, severe, dependence (H)  Overall his cravings are well managed with Suboxone, his use of Kratom yesterday sounds purely  impulsive.  Encouraged abstaining from further experimentation/use.  Continue Suboxone 12-3mg daily.  Encouraged him to pursue CD assessment and treatment.  - Buprenorphine HCl-Naloxone HCl (SUBOXONE) 12-3 MG FILM per film; Place 1 Film under the tongue daily  Dispense: 7 Film; Refill: 0     2. Alcohol use disorder, severe, dependence (H)  Continues to struggle with EtOH use though reports decreased intake with campral.  He will continue Campral.  He would like to resume Antabuse and has this at home.  Discussed with him that he is at risk for developing EtOH withdrawal and we reviewed that this can be very severe, leading to seizures and death which he is aware of.  I have asked him to monitor for signs of withdrawal and seek emergency care should they develop.  Encouraged him to pursue CD assessment and treatment.     3. Stimulant use disorder  Encouraged him to pursue CD assessment and treatment.     4. Tobacco use disorder  Not ready to quit.       5. Bipolar affective disorder, currently depressed, moderate (H)  Overall it sounds as though he is fairly stable.  He reports improved compliance with medications.  He will continue medications per psychiatry (Dr Oconnor).      Today, pt states he is doing well.  He has decided he does not want to drink EtOH.  Last drink 4 days ago.  Felt a little sweaty and had some vomiting 2 days ago but that has resolved, feeling better today.  Stopped taking acamprosate - did not feel like it was helping.  Started taking Antabuse yesterday (after GI symptoms resolved), denies side effects.  Missed a few days of his psych meds so feels a bit hypomanic, less so today and he has resumed his regular medications.      Not sure he should be on Suboxone because now he is dependent on that instead of Kratom.  Has been taking every 12-3mg every 2-3 days and thinks this works well.  Thinks it helps with cravings somewhat though when asked if he thought he would return to kratom use without  it he said yes.      [place] Prescription Drug Monitoring Program Reviewed:  Yes; as expected.    Medications:  cloNIDine (CATAPRES) 0.1 MG tablet, Take 1 tablet (0.1 mg) by mouth 2 times daily AND 2 tablets (0.2 mg) At Bedtime.  disulfiram (ANTABUSE) 250 MG tablet, Take 1 tablet (250 mg) by mouth daily  divalproex sodium extended-release (DEPAKOTE ER) 500 MG 24 hr tablet, Take 4 tablets (2,000 mg) by mouth daily  FLUoxetine (PROZAC) 20 MG capsule, Take 2 capsules (40 mg) by mouth daily for 7 days, THEN 3 capsules (60 mg) daily.  lamoTRIgine (LAMICTAL) 100 MG tablet, Take 2 tablets (200 mg) by mouth At Bedtime  OLANZapine (ZYPREXA) 15 MG tablet, Take 1 tablet (15 mg) by mouth At Bedtime  naloxone (NARCAN) 4 MG/0.1ML nasal spray, Spray 1 spray (4 mg) into one nostril alternating nostrils once as needed for opioid reversal every 2-3 minutes until assistance arrives (Patient not taking: Reported on 8/13/2021)  polyethylene glycol (MIRALAX) 17 GM/Dose powder, Take 17 g (1 capful) by mouth daily (Patient not taking: Reported on 3/25/2022)  senna-docusate (SENOKOT-S/PERICOLACE) 8.6-50 MG tablet, Take 2 tablets by mouth daily as needed for constipation (Patient not taking: Reported on 3/25/2022)  sildenafil (VIAGRA) 50 MG tablet, Take 1 tablet (50 mg) by mouth daily as needed (ED) (Patient not taking: Reported on 3/25/2022)    No current facility-administered medications on file prior to visit.      Allergies   Allergen Reactions     Prednisone Other (See Comments)     psych problems       PMH, PSH, FamHx reviewed    Social History  Housing status: with his mother   Employment status: unemployed, looking for work  Relationship status: Single  Children: no children     OBJECTIVE                                                      BP (!) 145/84   Pulse 74     Physical Exam  Vitals reviewed.   Constitutional:       General: He is not in acute distress.     Appearance: Normal appearance. He is not ill-appearing or  diaphoretic.   HENT:      Head: Normocephalic and atraumatic.   Eyes:      General: No scleral icterus.     Conjunctiva/sclera: Conjunctivae normal.   Cardiovascular:      Rate and Rhythm: Normal rate.   Pulmonary:      Effort: Pulmonary effort is normal. No respiratory distress.   Skin:     General: Skin is warm and dry.      Coloration: Skin is not jaundiced or pale.   Neurological:      General: No focal deficit present.      Mental Status: He is alert and oriented to person, place, and time.      Motor: No tremor.      Gait: Gait normal.   Psychiatric:         Attention and Perception: Attention normal.         Mood and Affect: Mood normal.         Speech: Speech is rapid and pressured.         Behavior: Behavior is hyperactive (fidgety).      Comments: Judgment/insight fair         Labs:    UDS: buprenorphine and THC  *POC urine drug screen does not screen for Fentanyl    No results found for this or any previous visit (from the past 240 hour(s)).      Patient counseling completed today:  Discussed mechanism of action, potential risks/benefits/side effects of medications and other recommendations above.  Recommend pt keep naloxone in their possession and reviewed other aspects of harm reduction to reduce risk of overdose with return to use.   Recommended avoiding concurrent use of alcohol, benzodiazepines or other sedatives with buprenorphine or other opioids.  Discussed importance of avoiding isolation, building a network of supportive relationships, avoiding people/places/things associated with past use to reduce risk of relapse; including motivational interviewing regarding psychosocial treatment for addiction.       Kristina Mckeon DO  [facility]  [address] [place] [id number]  [phone]

## 2022-03-25 NOTE — NURSING NOTE
[facility]      Rooming information:  Approximate last use of illicit opioids: 3/10/22  Taking buprenorphine? Yes:  As prescribed? No  Number of buprenorphine films/tablets remaining currently: 0  Side effects related to buprenorphine (constipation, dry mouth, sedation?) No   Narcan currently available: Yes  Other recent substance use:    Alcohol  and Cannabis   NICOTINE-Yes:   If using nicotine, ready to quit? yes    Point of care urine drug screen positive for: buprenorphine and THC  *POC urine drug screen does not screen for Fentanyl      PHQ-2 Score:     PHQ-2 ( 1999 [employer]) 3/25/2022 3/11/2022   Q1: Little interest or pleasure in doing things 1 1   Q2: Feeling down, depressed or hopeless 1 1   PHQ-2 Score 2 2   PHQ-2 Total Score (12-17 Years)- Positive if 3 or more points; Administer PHQ-A if positive - -        If PHQ-2 score of 3 or higher, has Recovery Clinic therapist or provider been notified? N/A    Any current suicidal ideation? No  If yes, has Recovery Clinic therapist or provider been notified? N/A    [facility] care provider: [facility]     Mental health provider: Dr. Oconnor (follow up on MH referral if needed)    Insurance needs: active    Housing needs: stable    Contact information up to date? yes    3rd Party Involvement none today (please obtain WU if pt would like to include)    Morean Mckeon CMA  March 25, 2022  2:40 PM

## 2022-03-25 NOTE — PROGRESS NOTES
[facility]    Peer  met with Laci EMERSON Hinkle Jr in the Recovery Clinic to introduce himself, detail services provided and discuss current status of recovery. Pt appeared alert, oriented and open to feedback during our discussion.     Pt states recent binge of alcohol consumption, but has been sober for 4 days now. Pt states he needs to find a method to find sobriety in a long term manner. Pt states giving his money to mother to manage for him to avoid impulsive alcohol or substance purchases.   PRC and pt discussed his enrolling into a [facility] IOP program.  Pt may also look to move into sober housing during this treatment vs living with his mother as previously was not conducive to long period of sobriety. Pt states he is also starting anabuse again.      PRC commends pt on these decisions and encourages all of them as progress in addressing substance use issues.  PRC welcomes pt contact for support and resources as needed.     Patient Goal: To utilize suboxone assisted treatment for sobriety and long term recovery.     Goal Progress:   Ongoing.    Key Risk Factors to Recovery:   PRC encourages being aware of risk factors that can lead to re-use which include avoiding isolation, avoiding triggers and managing cravings in a healthy manner. being open and willing to acceptance and change on a daily basis.  PRC encourages pt to establish a sober network calling tree to reach out to when needed.  Continue to practice honesty with ourselves and trusted support person(s).   PRC encourages regular attendance at recovery based meetings as well as finding a sponsor for mentoring and accountability.   PRC encourages consideration of of other services such as counseling for mental health issues which can correlate with our substance use.      Support Needs:   Ongoing care, support and resources for opioid substance use disorder.     Follow up:   PRC provided pt with his contact information  for support and resource needs.  PRC and pt agree to meet during an upcoming RC appointment.       [facility]  [address], [address]   [place], [place], [address]  Clinic Phone: [phone]  Clinic Fax: [phone]  Peer  phone: [phone]    Open Monday - Friday  9:00am-4:00pm  Walk in hours: 9am-3pm      Stewart Chester  March 25, 2022  3:33 PM

## 2022-04-01 ENCOUNTER — OFFICE VISIT (OUTPATIENT)
Dept: BEHAVIORAL HEALTH | Facility: CLINIC | Age: 27
End: 2022-04-01
Payer: COMMERCIAL

## 2022-04-01 VITALS
DIASTOLIC BLOOD PRESSURE: 75 MMHG | BODY MASS INDEX: 35.78 KG/M2 | SYSTOLIC BLOOD PRESSURE: 121 MMHG | WEIGHT: 242.3 LBS | HEART RATE: 91 BPM

## 2022-04-01 DIAGNOSIS — F11.20 OPIOID USE DISORDER, SEVERE, DEPENDENCE (H): ICD-10-CM

## 2022-04-01 DIAGNOSIS — F10.20 ALCOHOL USE DISORDER, SEVERE, DEPENDENCE (H): ICD-10-CM

## 2022-04-01 PROCEDURE — 99214 OFFICE O/P EST MOD 30 MIN: CPT | Performed by: FAMILY MEDICINE

## 2022-04-01 RX ORDER — DISULFIRAM 250 MG/1
250 TABLET ORAL DAILY
Qty: 90 TABLET | Refills: 3 | Status: CANCELLED | OUTPATIENT
Start: 2022-04-01

## 2022-04-01 RX ORDER — BUPRENORPHINE AND NALOXONE 12; 3 MG/1; MG/1
1 FILM, SOLUBLE BUCCAL; SUBLINGUAL DAILY
Qty: 7 FILM | Refills: 0 | Status: SHIPPED | OUTPATIENT
Start: 2022-04-01 | End: 2022-04-08

## 2022-04-01 NOTE — PROGRESS NOTES
[facility]      Rooming information:  Approximate last substance use: 3/21/2022  Side effects related to medication for substance use (constipation, dry mouth, sedation?) No   Narcan currently available: Yes  Other recent substance use:     Cannabis    NICOTINE-Yes: cigarette   If using nicotine, ready to quit? No        Point of care urine drug screen positive for: buprenorphine and THC  *POC urine drug screen does not screen for Fentanyl    PHQ-2 Score:     PHQ-2 ( 1999 Pfizer) 4/1/2022 3/25/2022   Q1: Little interest or pleasure in doing things 1 1   Q2: Feeling down, depressed or hopeless 1 1   PHQ-2 Score 2 2   PHQ-2 Total Score (12-17 Years)- Positive if 3 or more points; Administer PHQ-A if positive - -        If PHQ-2 score of 3 or higher, has Recovery Clinic therapist or provider been notified? No    Any current suicidal ideation? No  If yes, has Recovery Clinic therapist or provider been notified? N/A        Primary care provider: [facility]     Mental health provider: Dr. Oconnor (follow up on MH referral if needed)    Insurance needs: no    Housing needs: stable    Contact information up to date? yes    3rd Party Involvement n/a (please obtain WU if pt would like to include)          Monique Kendrick MA  April 1, 2022  8:10 AM

## 2022-04-01 NOTE — PROGRESS NOTES
[facility] Follow Up    ASSESSMENT/PLAN                                                      1. Opioid use disorder, severe, dependence (H)  Overall he reports good control of symptoms with Suboxone.  Encouraged him to take daily and he is agreeable (has been taking more regularly since last visit but still missed 2/7 days).  Encouraged him to continue seeking psychosocial treatment - planning for [facility].    - Buprenorphine HCl-Naloxone HCl (SUBOXONE) 12-3 MG FILM per film; Place 1 Film under the tongue daily  Dispense: 7 Film; Refill: 0    2. Alcohol use disorder, severe, dependence (H)  Doing well since last visit - no EtOH since 3/21.  Continue Antabuse.  Encouraged him to continue seeking psychosocial treatment - planning for [facility].        Return in about 1 week (around 4/8/2022) for Follow up, in person.    SUBJECTIVE                                                          CC/HPI:  Laci Hinkle Jr is a 26 year old male with PMH bipolar disorder, CEASAR, ADHD, AUD, stimulant use disorder, and OUD on buprenorphine who presents to the Recovery Clinic for return visit.       Brief History:  Pt was initially evaluated in Recovery Clinic on 7/22/21. Pt was referred by staff in [facility] due to pt's initial desire to taper off buprenorphine which he had been taking from nonprescription sources.   Pt left [facility] after 1-2 days, but continued with Recovery Clinic.  He eventually decided to continue buprenorphine therapy, and then transferred to Sublocade with initial injection 8/13/21.  He received a total of 3 Sublocade injections, most recently 100mg on 10/8/21.   Pt then indicated to  staff he wanted to discontinue Sublocade injections.  He was lost to follow up until he returned to  on 12/9/21 requesting to resume buprenorphine after brief return to use of alcohol and kratom.      He describes his opioid use history starting with heroin as a teenager, last use age 17.   He started methadone treatment age 17, dose up to 100mg, then tapered off to 6mg/day then stopped.  He began using kratom ~2016, eventually used amounts up to 100g daily.  He was first prescribed buprenorphine for OUD in 2019. He took as prescribed through 1/2021, then continued on buprenorphine through nonprescription sources.    IV drug use: No   History of overdose: No  Previous treatments : Yes: multiple, [facility], [facility] 11/2020, previous buprenorphine and methadone  Longest period of sobriety: few months  Medical complications related to substance use: exacerbation of MH diagnoses  Hepatitis C Status  unknown  HIV Status unknown     Other recent substance use:  Stimulants: used methamphetamine ages [age], stopped for 5 years, then resumed in 2020. Has had periods of abstinence from meth while in residential treatment.   Sedatives/hypnotics/anxiolytics: has used in past, denies regular use  Alcohol: h/o drinking 1 L liquor daily, has had periods of abstinence in residential treatment and at times when not in treatment   Tobacco: 1 ppd and ENDS  Cannabis: occasional   Hallucinogens:has used in past, denies recent  Behavioral addictions: none      Most recent Recovery Clinic visit: 3/25/22   Important points and recommendations last visit:  1. Opioid use disorder, severe, dependence (H)  We discussed goals of Suboxone treatment for OUD (kratom).  Discussed risk of Kratom - opioid like dependence, other unknown active substances, unknown how it interacts with current medications and he ultimately decided he would like to continue.  I encouraged him to take consistently.  He agrees to try this approach.    - Buprenorphine HCl-Naloxone HCl (SUBOXONE) 12-3 MG FILM per film; Place 1 Film under the tongue daily  Dispense: 7 Film; Refill: 0     2. Alcohol use disorder, severe, dependence (H)  He is ready to stop drinking.  Last drink 4 days ago.  He was not feeling well 2 days after last drink but feeling  better now.  Has resumed Antabuse.  Reviewed with him that EtOH withdrawal can occur even up to 2 weeks.  He verbalized understanding of seriousness and risk of death with EtOH withdrawal and agrees to seek care should he experience symptoms.       3. Bipolar affective disorder, current episode hypomanic (H)  Seems a bit hypomanic today though he says he feels less so than yesterday (and he took extra Zyprexa yesterday but plans to resume normal dosing).  No SI.      Today, patient reports he just completed at [facility] and would like to go to [facility] in [facility] for IOP w/ sober housing - awaiting start date.  Likes the idea of sober housing because he can work and working has helped him achieve sobriety in the past.  Acknowledges that living in his mom's house has enabled him to drink.      Since last visit he has not used any alcohol, last drink was 3/21.  Continues Antabuse, no EtOH cravings.  Smoking marijuana helps EtOH cravings he has them.     Has been taking Suboxone most days, has 2 films left.  Does not want to take Suboxone every day but acknowledges that he would definitely start using kratom again.  No side effects, just doesn't want to take it every day.     [place] Prescription Drug Monitoring Program Reviewed:  Yes; as expected (last filled 3/25/22)    Medications:  cloNIDine (CATAPRES) 0.1 MG tablet, Take 1 tablet (0.1 mg) by mouth 2 times daily AND 2 tablets (0.2 mg) At Bedtime.  disulfiram (ANTABUSE) 250 MG tablet, Take 1 tablet (250 mg) by mouth daily  divalproex sodium extended-release (DEPAKOTE ER) 500 MG 24 hr tablet, Take 4 tablets (2,000 mg) by mouth daily  FLUoxetine (PROZAC) 20 MG capsule, Take 2 capsules (40 mg) by mouth daily for 7 days, THEN 3 capsules (60 mg) daily.  lamoTRIgine (LAMICTAL) 100 MG tablet, Take 2 tablets (200 mg) by mouth At Bedtime  naloxone (NARCAN) 4 MG/0.1ML nasal spray, Spray 1 spray (4 mg) into one nostril alternating nostrils once as needed for opioid  reversal every 2-3 minutes until assistance arrives (Patient not taking: Reported on 8/13/2021)  OLANZapine (ZYPREXA) 15 MG tablet, Take 1 tablet (15 mg) by mouth At Bedtime  polyethylene glycol (MIRALAX) 17 GM/Dose powder, Take 17 g (1 capful) by mouth daily (Patient not taking: Reported on 3/25/2022)  senna-docusate (SENOKOT-S/PERICOLACE) 8.6-50 MG tablet, Take 2 tablets by mouth daily as needed for constipation (Patient not taking: Reported on 3/25/2022)  sildenafil (VIAGRA) 50 MG tablet, Take 1 tablet (50 mg) by mouth daily as needed (ED) (Patient not taking: Reported on 3/25/2022)    No current facility-administered medications on file prior to visit.      Allergies   Allergen Reactions     Prednisone Other (See Comments)     psych problems       PMH, PSH, FamHx reviewed    Social History  Housing status: with his mother   Employment status: unemployed, looking for work  Relationship status: Single  Children: no children     OBJECTIVE                                                      /75 (BP Location: Left arm, Patient Position: Sitting, Cuff Size: Adult Large)   Pulse 91   Wt 109.9 kg (242 lb 4.8 oz)   BMI 35.78 kg/m      Physical Exam  Vitals and nursing note reviewed.   Constitutional:       General: He is not in acute distress.     Appearance: Normal appearance.   HENT:      Head: Normocephalic and atraumatic.   Eyes:      General: No scleral icterus.     Conjunctiva/sclera: Conjunctivae normal.   Cardiovascular:      Rate and Rhythm: Normal rate.   Pulmonary:      Effort: Pulmonary effort is normal. No respiratory distress.   Skin:     General: Skin is warm and dry.      Coloration: Skin is not jaundiced or pale.   Neurological:      General: No focal deficit present.      Mental Status: He is alert and oriented to person, place, and time.      Gait: Gait normal.   Psychiatric:         Attention and Perception: Attention normal.         Mood and Affect: Mood and affect normal.         Speech:  Speech normal.         Behavior: Behavior normal. Behavior is cooperative.      Comments: Does not seem hypomanic as he did a week ago.  Judgment/insight fair to good         Labs:    UDS: buprenorphine and THC  *POC urine drug screen does not screen for Fentanyl    No results found for this or any previous visit (from the past 240 hour(s)).      Patient counseling completed today:  Discussed mechanism of action, potential risks/benefits/side effects of medications and other recommendations above.  Recommend pt keep naloxone in their possession and reviewed other aspects of harm reduction to reduce risk of overdose with return to use.   Recommended avoiding concurrent use of alcohol, benzodiazepines or other sedatives with buprenorphine or other opioids.  Discussed importance of avoiding isolation, building a network of supportive relationships, avoiding people/places/things associated with past use to reduce risk of relapse; including motivational interviewing regarding psychosocial treatment for addiction.       Kristina Mckeon DO  [facility]  [address] [place] [address] [id number] [phone]

## 2022-04-08 ENCOUNTER — OFFICE VISIT (OUTPATIENT)
Dept: BEHAVIORAL HEALTH | Facility: CLINIC | Age: 27
End: 2022-04-08
Payer: COMMERCIAL

## 2022-04-08 VITALS — DIASTOLIC BLOOD PRESSURE: 129 MMHG | HEART RATE: 111 BPM | SYSTOLIC BLOOD PRESSURE: 184 MMHG

## 2022-04-08 DIAGNOSIS — F11.20 OPIOID USE DISORDER, SEVERE, DEPENDENCE (H): Primary | ICD-10-CM

## 2022-04-08 DIAGNOSIS — F15.90 STIMULANT USE DISORDER: ICD-10-CM

## 2022-04-08 DIAGNOSIS — F10.20 ALCOHOL USE DISORDER, SEVERE, DEPENDENCE (H): ICD-10-CM

## 2022-04-08 DIAGNOSIS — F17.200 TOBACCO USE DISORDER: ICD-10-CM

## 2022-04-08 PROCEDURE — 99214 OFFICE O/P EST MOD 30 MIN: CPT | Performed by: FAMILY MEDICINE

## 2022-04-08 RX ORDER — BUPRENORPHINE AND NALOXONE 12; 3 MG/1; MG/1
1 FILM, SOLUBLE BUCCAL; SUBLINGUAL DAILY
Qty: 7 FILM | Refills: 0 | Status: SHIPPED | OUTPATIENT
Start: 2022-04-08 | End: 2022-04-19

## 2022-04-08 NOTE — PROGRESS NOTES
[facility] Return Visit    ASSESSMENT/PLAN                                                    1. Opioid use disorder, severe, dependence (H)  Controlled  Continue buprenorphine 12mg/day  Proceed with plans to start outpatient programming with [facility]  Pt confirms he has naloxone  - Buprenorphine HCl-Naloxone HCl (SUBOXONE) 12-3 MG FILM per film; Place 1 Film under the tongue daily  Dispense: 7 Film; Refill: 0    2. Alcohol use disorder, severe, dependence (H)  Continue disulfiram, no rx required per pt  Proceed with plans to start outpatient programming with [facility]    3. Stimulant use disorder  Proceed with plans to start outpatient programming with [facility]    4. Tobacco use disorder  Not ready to quit at this time, continue to evaluate.      Return in about 1 week (around 4/15/2022) for Follow up, in person.      SUBJECTIVE                                                        CC/HPI:  Laci Hinkle Jr is a 26 year old male with PMH bipolar disorder, CEASAR, ADHD, and PSUD including opioids on buprenorphine who presents to the Recovery Clinic for return visit.      Brief History:  Pt was initially evaluated in Recovery Clinic on 7/22/21. Pt was referred by staff in [facility] due to pt's initial desire to taper off buprenorphine which he had been taking from nonprescription sources.   Pt decided to seek treatment at this time due to wanting to stop use of substances to allow him to take necessary steps to develop a career he enjoys.  Pt left [facility], but continued with Recovery Clinic.  He eventually decided to continue buprenorphine therapy, and then transferred to [facility] with initial injection 8/13/21.  He received a total of 3 Sublocade injections, most recently 100mg on 10/8/21.   Pt then indicated to  staff he wanted to discontinue Sublocade injections.  He was lost to follow up until he returned to  on 12/9/21 requesting to resume SL buprenorphine after brief return to  use of alcohol and kratom.      He describes his opioid use history starting with heroin as a teenager, last use age 17.  He started methadone treatment age 17, dose up to 100mg, then tapered off to 6mg/day then stopped.  He began using kratom ~2016, eventually used amounts up to 100g daily.  He was first prescribed buprenorphine for OUD in 2019. He took as prescribed through 1/2021, then continued on buprenorphine through nonprescription sources.    IV drug use: No   History of overdose: No  Previous treatments : Yes: multiple, [facility], [facility] 11/2020, previous buprenorphine and methadone  Longest period of sobriety: few months  Medical complications related to substance use: exacerbation of MH diagnoses  Hepatitis C Status  unknown  HIV Status unknown    Other recent substance use:  Stimulants: used methamphetamine ages [age], stopped for 5 years, then resumed age 24.    Sedatives/hypnotics/anxiolytics: has used in past, denies regular use  Alcohol: h/o drinking 1 L liquor daily  Tobacco: 1 ppd and ENDS  Cannabis: occasional   Hallucinogens:has used in past, denies recent  Behavioral addictions: none        Pt was last seen in  on 4/1/22.   A/P at that time was:  1. Opioid use disorder, severe, dependence (H)  Overall he reports good control of symptoms with Suboxone.  Encouraged him to take daily and he is agreeable (has been taking more regularly since last visit but still missed 2/7 days).  Encouraged him to continue seeking psychosocial treatment - planning for [facility].    - Buprenorphine HCl-Naloxone HCl (SUBOXONE) 12-3 MG FILM per film; Place 1 Film under the tongue daily  Dispense: 7 Film; Refill: 0     2. Alcohol use disorder, severe, dependence (H)  Doing well since last visit - no EtOH since 3/21.  Continue Antabuse.  Encouraged him to continue seeking psychosocial treatment - planning for [facility].      Return in about 1 week (around 4/8/2022) for Follow up, in person.      Today, pt  states he has been taking buprenorphine 12mg/day as prescribed since his last visit.  He denies significant opioid cravings.   Side effects he is experiencing including excessive sweating.  He also drinks large amounts of coffee throughout the day which he notices also makes him sweat a lot.  He has continued to take disulfiram which he states is helpful to prevent him from drinking.      He continues to see his psychiatrist, Dr. Oconnor, for treatment of bipolar 1 disorder. Pt reports he has been better about taking his psychotropic medications recently.          [place] Prescription Drug Monitoring Program Reviewed:  Yes; as expected        Past Medical History:   Diagnosis Date     ADHD (attention deficit hyperactivity disorder) 3/18/2011     Bipolar affective disorder (H) 3/18/2011     CEASAR (generalized anxiety disorder) 3/18/2011     GERD (gastroesophageal reflux disease)      Hyperlipidemia LDL goal <130      Impaired fasting glucose      Obesity      Substance abuse (H) 3/18/2011         PAST PSYCHIATRIC HISTORY:  Diagnoses- bipolar disorder, CEASAR, ADHD  Suicide Attempts: No   Hospitalizations: Yes     Past Surgical History:   Procedure Laterality Date     ENT SURGERY  2010    jaw surgery        Medications:  cloNIDine (CATAPRES) 0.1 MG tablet, Take 1 tablet (0.1 mg) by mouth 2 times daily AND 2 tablets (0.2 mg) At Bedtime.  disulfiram (ANTABUSE) 250 MG tablet, Take 1 tablet (250 mg) by mouth daily  divalproex sodium extended-release (DEPAKOTE ER) 500 MG 24 hr tablet, Take 4 tablets (2,000 mg) by mouth daily  FLUoxetine (PROZAC) 20 MG capsule, Take 2 capsules (40 mg) by mouth daily for 7 days, THEN 3 capsules (60 mg) daily.  lamoTRIgine (LAMICTAL) 100 MG tablet, Take 2 tablets (200 mg) by mouth At Bedtime  naloxone (NARCAN) 4 MG/0.1ML nasal spray, Spray 1 spray (4 mg) into one nostril alternating nostrils once as needed for opioid reversal every 2-3 minutes until assistance arrives (Patient not taking: Reported  on 8/13/2021)  OLANZapine (ZYPREXA) 15 MG tablet, Take 1 tablet (15 mg) by mouth At Bedtime  polyethylene glycol (MIRALAX) 17 GM/Dose powder, Take 17 g (1 capful) by mouth daily (Patient not taking: Reported on 3/25/2022)  senna-docusate (SENOKOT-S/PERICOLACE) 8.6-50 MG tablet, Take 2 tablets by mouth daily as needed for constipation (Patient not taking: Reported on 3/25/2022)  sildenafil (VIAGRA) 50 MG tablet, Take 1 tablet (50 mg) by mouth daily as needed (ED) (Patient not taking: Reported on 3/25/2022)    No current facility-administered medications on file prior to visit.      Allergies   Allergen Reactions     Prednisone Other (See Comments)     psych problems       Family History   Problem Relation Age of Onset     Hypertension Father      Alcohol/Drug Father         various substances     Psychotic Disorder Father         bipolar, anxiety, ADHD         Social History  Housing status: with his mother   Employment status: employed  Relationship status: Single, wants to start dating  Children: no children          REVIEW OF SYSTEMS:  No other concerns today    OBJECTIVE                                                        BP (!) 184/129   Pulse 111     Physical Exam  Constitutional:       Appearance: He is overweight.   HENT:      Head: Normocephalic and atraumatic.      Nose: No rhinorrhea.   Eyes:      General: No scleral icterus.     Extraocular Movements: Extraocular movements intact.      Conjunctiva/sclera: Conjunctivae normal.   Pulmonary:      Effort: Pulmonary effort is normal.   Neurological:      Mental Status: He is alert and oriented to person, place, and time.      Coordination: Coordination is intact.      Gait: Gait is intact.   Psychiatric:         Attention and Perception: Attention and perception normal.         Speech: Speech is rapid and pressured.         Behavior: Behavior is cooperative.         Thought Content: Thought content normal. Thought content does not include suicidal  ideation.             Labs:    UDS 4/8/22:  buprenorphine and THC   *POC urine drug screen does not screen for Fentanyl    No results found for this or any previous visit (from the past 240 hour(s)).      At least 30 min spent in review of medical record,  review, obtaining histories, discussing recommendations, MI regarding smoking cessation    FRANK GALARZA MD  [place] [id number] [place] [id number] [phone]

## 2022-04-08 NOTE — NURSING NOTE
[facility]      Rooming information:  Approximate last use of illicit opioids: 3/21/22  Taking buprenorphine? Yes:  As prescribed? Yes: does not see the needs to take this everyday    Number of buprenorphine films/tablets remaining currently: 0  Side effects related to buprenorphine (constipation, dry mouth, sedation?) yes, sweating  Narcan currently available: Yes  Other recent substance use:    Cannabis   NICOTINE-Yes:   If using nicotine, ready to quit? No    Point of care urine drug screen positive for: buprenorphine and THC  *POC urine drug screen does not screen for Fentanyl      PHQ-2 Score:     PHQ-2 ( 1999 Pfizer) 4/8/2022 4/1/2022   Q1: Little interest or pleasure in doing things 1 1   Q2: Feeling down, depressed or hopeless 1 1   PHQ-2 Score 2 2   PHQ-2 Total Score (12-17 Years)- Positive if 3 or more points; Administer PHQ-A if positive - -        If PHQ-2 score of 3 or higher, has Recovery Clinic therapist or provider been notified? No    Any current suicidal ideation? No  If yes, has Recovery Clinic therapist or provider been notified? N/A    [facility]     Mental health provider: Dr. spence (follow up on MH referral if needed)    Insurance needs: active    Housing needs: stable    Contact information up to date? yes    3rd Party Involvement none today (please obtain WU if pt would like to include)    Morena Mckeon CMA  April 8, 2022  3:05 PM

## 2022-04-08 NOTE — PROGRESS NOTES
[facility]    Peer  met with Laci Hinkle Jr in the Recovery Clinic to introduce himself, detail services provided and discuss current status of recovery. Pt appeared alert, oriented and open to feedback during our discussion.     Pt states recovery is going well.  Pt continues to tale suboxone as prescribed by  provider. Pt states being sober since last visit and states this is longest period of sobriety he has experienced in some time - approximately 2 weeks Pt remains on antabuse to avoid alcohol consumption.     Pt plans to gain admission into [facility] for daily IOP and will stay in a sober house for accountability and sober reinforcement.  Pt feels this change in living status may asssit him remaining sober as he previously lived with his mother when attending IOP.    PRC and pt discussed his thoughts on what a successful treatment program, counselor and sober house environment looks like for him.  PRC commends pt on his choices to return to a treatment environment and to engage in sober activities such as recovery meetings and sober house gatherings for networking and bonding experiences.    PRC welcomes pt contact for recovery support and resources.  PRC and pt agree to speak again during upcoming  visit.     Patient Goal: To utilize suboxone assisted treatment for sobriety and long term recovery.     Goal Progress:   Ongoing.    Key Risk Factors to Recovery:   PRC encourages being aware of risk factors that can lead to re-use which include avoiding isolation, avoiding triggers and managing cravings in a healthy manner. being open and willing to acceptance and change on a daily basis.  PRC encourages pt to establish a sober network calling tree to reach out to when needed.  Continue to practice honesty with ourselves and trusted support person(s).   PRC encourages regular attendance at recovery based meetings as well as finding a sponsor for mentoring and  accountability.   PRC encourages consideration of of other services such as counseling for mental health issues which can correlate with our substance use.      Support Needs:   Ongoing care, support and resources for opioid substance use disorder.     Follow up:   PRC provided pt with his contact information for support and resource needs.  PRC and pt agree to meet during an upcoming  appointment.       [facility]  [address], [address]   [place], [place], [address]  Clinic Phone: [phone]  Clinic Fax: [phone]  Peer  phone: [phone]    Open Monday - Friday  9:00am-4:00pm  Walk in hours: 9am-3pm      Stewart Chester  April 8, 2022  3:47 PM

## 2022-04-19 ENCOUNTER — OFFICE VISIT (OUTPATIENT)
Dept: BEHAVIORAL HEALTH | Facility: CLINIC | Age: 27
End: 2022-04-19

## 2022-04-19 ENCOUNTER — OFFICE VISIT (OUTPATIENT)
Dept: BEHAVIORAL HEALTH | Facility: CLINIC | Age: 27
End: 2022-04-19
Payer: COMMERCIAL

## 2022-04-19 VITALS — DIASTOLIC BLOOD PRESSURE: 126 MMHG | SYSTOLIC BLOOD PRESSURE: 177 MMHG | HEART RATE: 125 BPM

## 2022-04-19 DIAGNOSIS — F15.90 STIMULANT USE DISORDER: ICD-10-CM

## 2022-04-19 DIAGNOSIS — F12.20 CANNABIS DEPENDENCE (H): ICD-10-CM

## 2022-04-19 DIAGNOSIS — F11.20 OPIOID USE DISORDER, SEVERE, DEPENDENCE (H): ICD-10-CM

## 2022-04-19 DIAGNOSIS — F31.9 BIPOLAR 1 DISORDER (H): ICD-10-CM

## 2022-04-19 DIAGNOSIS — F10.20 ALCOHOL USE DISORDER, SEVERE, DEPENDENCE (H): Primary | ICD-10-CM

## 2022-04-19 DIAGNOSIS — F31.9 BIPOLAR 1 DISORDER (H): Primary | ICD-10-CM

## 2022-04-19 DIAGNOSIS — F43.23 ADJUSTMENT DISORDER WITH MIXED ANXIETY AND DEPRESSED MOOD: ICD-10-CM

## 2022-04-19 DIAGNOSIS — F17.200 TOBACCO USE DISORDER: ICD-10-CM

## 2022-04-19 PROCEDURE — 99215 OFFICE O/P EST HI 40 MIN: CPT | Performed by: NURSE PRACTITIONER

## 2022-04-19 RX ORDER — DISULFIRAM 250 MG/1
250 TABLET ORAL DAILY
Qty: 90 TABLET | Refills: 3 | Status: SHIPPED | OUTPATIENT
Start: 2022-04-19 | End: 2022-07-13

## 2022-04-19 RX ORDER — BUPRENORPHINE AND NALOXONE 12; 3 MG/1; MG/1
1 FILM, SOLUBLE BUCCAL; SUBLINGUAL DAILY
Qty: 7 FILM | Refills: 0 | Status: SHIPPED | OUTPATIENT
Start: 2022-04-19 | End: 2022-04-28

## 2022-04-19 NOTE — PROGRESS NOTES
[facility]  April 19, 2022      Behavioral Health Clinician Progress Note    Patient Name: Laci Hinkle Jr           Service Type:  Individual      Service Location:   Face to Face in Clinic     Session Start Time: 1:15pm  Session End Time: 1:40pm      Session Length: 16 - 37      Attendees: Patient     Service Modality:  In-person    Visit Activities (Refresh list every visit): co visit and psychotherapy    Diagnostic Assessment Date: Not completed yet  Treatment Plan Review Date: Not completed yet  See Flowsheets for today's PHQ-9 and CEASAR-7 results  Previous PHQ-9:   PHQ-9 SCORE 9/24/2019 1/7/2020 7/22/2021   PHQ-9 Total Score - - -   PHQ-9 Total Score 16 15 22     Previous CEASAR-7:   CEASAR-7 SCORE 3/14/2012 9/16/2014 7/22/2021   Total Score 17 18 -   Total Score - - 6       GLORIA LEVEL:  GLORIA Score (Last Two) 9/16/2014   GLORIA Raw Score 45   Activation Score 73.1   GLORIA Level 4       DATA  Extended Session (60+ minutes): No  Interactive Complexity: No  Crisis: No  [facility] Patient: No    Treatment Objective(s) Addressed in This Session:  Target Behavior(s): mental health and addiction    Depressed Mood: Increase interest, engagement, and pleasure in doing things  Decrease frequency and intensity of feeling down, depressed, hopeless  Identify negative self-talk and behaviors: challenge core beliefs, myths, and actions  Improve concentration, focus, and mindfulness in daily activities   Feel less fidgety, restless or slow in daily activities / interpersonal interactions  Anxiety: will experience a reduction in anxiety and will develop more effective coping skills to manage anxiety symptoms  Relationship Problems: will address relationship difficulties in a more adaptive manner  Alcohol / Substance Use: will make healthier choices in regard to substance use  will discuss/consider potential need for formal substance use evaluation, treatment and/or support    Current Stressors / Issues:  The  client was meeting with addiction medicine provider and then this writer was asked to come in the visit-the client appeared anxious as he was in constant movement bouncing his legs and moving his hands and body-he stated that he smokes marijuana and that he knows this is causing problems in his life and that he has difficulty stopping the use of the drug-he is taking medication to his with his issues with Kratum and alcohol use and he is in CD outpatient treatment and he believes that this is good for him as it is in person and it helps him be more accountable with his recovery when it is face to face treatment-he is dealing with significant anxiety in general and he is feeling fear that he will be kicked out of treatment due to his marijuana use and he knows that the random UA will happen and he is passively dealing with this as he wants to stay in treatment but he also does not feel supper motivated to stop marijuana use-he stated that marijuana use helps him feel more normal as it helps him be more present and socially appropropriate and less impulsive and have empathy and he also identifies that the marijuana use is getting in the way of him making and maintaining close relationships (which is important to him)-he agreed to meet with this writer to work on his mental health and giving him a better success with recovery-he reported having depressive symptoms and significant anxiety symptoms-has history of anger towards his father-he is sabatoging his success with his decisions walking around with a wall between him and his success putting him in a constant position to be frustrated with being  from his wants and desires in life and he has fear of being active to move the wall out of his life as he has gained comfort in the wall that is abusing him and hurting his life.  (what would I be without the wall)-also (what is the reason why he wants to attend CD treatment)      Progress on Treatment Objective(s)  / Homework:  Minimal progress - PREPARATION (Decided to change - considering how); Intervened by negotiating a change plan and determining options / strategies for behavior change, identifying triggers, exploring social supports, and working towards setting a date to begin behavior change    Motivational Interviewing    MI Intervention: Expressed Empathy/Understanding, Supported Autonomy, Collaboration, Evocation, Permission to raise concern or advise, Open-ended questions, Reflections: simple and complex and Change talk (evoked)     Change Talk Expressed by the Patient: Desire to change Reasons to change Activation Taking steps    Provider Response to Change Talk: E - Evoked more info from patient about behavior change, A - Affirmed patient's thoughts, decisions, or attempts at behavior change, R - Reflected patient's change talk and S - Summarized patient's change talk statements      Care Plan review completed: No    Medication Review:  No changes to current psychiatric medication(s)    Medication Compliance:  NA    Changes in Health Issues:   None reported    Chemical Use Review:   Substance Use: Problem use continues with no change since last session, Provided encouragement towards sobriety  Provided support and affirmation for steps taken towards sobriety         Tobacco Use: Not reported    Assessment: Current Emotional / Mental Status (status of significant symptoms):  Risk status (Self / Other harm or suicidal ideation)  Patient denies a history of suicidal ideation, suicide attempts, self-injurious behavior, homicidal ideation, homicidal behavior and and other safety concerns  Patient denies current fears or concerns for personal safety.  Patient denies current or recent suicidal ideation or behaviors.  Patient denies current or recent homicidal ideation or behaviors.  Patient denies current or recent self injurious behavior or ideation.  Patient denies other safety concerns.  A safety and risk management  plan has not been developed at this time, however patient was encouraged to call [place] [phone] should there be a change in any of these risk factors.    Appearance:   Appropriate   Eye Contact:   Good   Psychomotor Behavior: Normal  Restless   Attitude:   Cooperative  Indifferent  Orientation:   All  Speech   Rate / Production: Normal/ Responsive Normal    Volume:  Normal   Mood:    Anxious  Depressed  Normal  Affect:    Appropriate  Expansive   Thought Content:  Clear   Thought Form:  Coherent  Goal Directed  Logical   Insight:    Fair     Diagnoses:  1. Bipolar 1 disorder (H)    2. Opioid use disorder, severe, dependence (H)    3. Adjustment disorder with mixed anxiety and depressed mood        Collateral Reports Completed:  Not Applicable    Plan: (Homework, other):  Patient was given information about behavioral services and encouraged to schedule a follow up appointment with the clinic [facility] as needed.  He was also given information about mental health symptoms and treatment options  and strategies to maintain sobriety.  CD Recommendations: Practice Harm Reduction: wait 30 minutes before using .   KARSTEN Cespedes, [facility]      ______________________________________________________________________

## 2022-04-19 NOTE — NURSING NOTE
[facility]      Rooming information:  Approximate last use of illicit opioids: 3/21/22  Taking buprenorphine? Yes:  As prescribed? Yes:   Number of buprenorphine films/tablets remaining currently: 0  Side effects related to buprenorphine (constipation, dry mouth, sedation?) No   Narcan currently available: Yes  Other recent substance use:    Cannabis   NICOTINE-Yes:   If using nicotine, ready to quit? No    Point of care urine drug screen positive for: buprenorphine and THC  *POC urine drug screen does not screen for Fentanyl      PHQ-2 Score:     PHQ-2 ( 1999 Pfizer) 4/19/2022 4/8/2022   Q1: Little interest or pleasure in doing things 2 1   Q2: Feeling down, depressed or hopeless 2 1   PHQ-2 Score 4 2   PHQ-2 Total Score (12-17 Years)- Positive if 3 or more points; Administer PHQ-A if positive - -        If PHQ-2 score of 3 or higher, has Recovery Clinic therapist or provider been notified? Yes    Any current suicidal ideation? No  If yes, has [facility] Clinic therapist or provider been notified? N/A    [facility]     Mental health provider: Dr. spence (follow up on MH referral if needed)    Insurance needs: active    Housing needs: stable    Contact information up to date? yes    3rd Party Involvement none today (please obtain WU if pt would like to include)    Morena Mckeon CMA  April 19, 2022  1:06 PM

## 2022-04-19 NOTE — PROGRESS NOTES
[facility] Follow Up    ASSESSMENT/PLAN                                                      1. Opioid use disorder, severe, dependence (H)  Discussed Suboxone v. Sublocade. Recommended pt continue Suboxone 12 mg SL every day at this time as cravings have been controlled and he has been able to take it as prescribed. No recent kratom use. Pt reports decreased libido is more significant and bothersome with Sublocade injection.   - Pt verifies he has narcan at home   - Buprenorphine HCl-Naloxone HCl (SUBOXONE) 12-3 MG FILM per film; Place 1 Film under the tongue daily  Dispense: 7 Film; Refill: 0    2. Alcohol use disorder, severe, dependence (H)  - No recent alcohol use or cravings   - Continue taking disulfiram   - Encouraged outpatient treatment at [facility] (started this week)     3. Stimulant use disorder  - Encouraged outpatient treatment at [facility] (started this week)     4. Tobacco use disorder  - Pt reports he is not ready to quit at this time     5. Bipolar 1 disorder (H)  - Pt appears anxious, restless, rapid speech. No grandiose ideas. No thoughts of suicide. Recent cannabis use prior to visit.   - Reports he is taking all medications as prescribed. Depakote 2000 mg HS Lamotrigine 200mg HS, and Olanzapine 15 mg HS   - F/u appointment with Dr. Oconnor on 5/10/22    6. Cannabis dependence (H)  - Struggling with motivation to quit. MI utilized. Jose Roberto Jane was present for visit and evaluated the patient as well.   - Encouraged pt to continue meeting with Jose Roberto Jane, psychotherapist   - Encouraged outpatient treatment at [facility] (started this week)     No follow-ups on file.    SUBJECTIVE                                                        CC/HPI:  Laci Hinkle  is a 26 year old male with PMH bipolar disorder, CEASAR, ADHD, and PSUD including opioids on buprenorphine who presents to the Recovery Clinic for return visit.       Brief History:  Pt was initially evaluated in Recovery Clinic on  7/22/21. Pt was referred by staff in [facility] due to pt's initial desire to taper off buprenorphine which he had been taking from nonprescription sources.   Pt decided to seek treatment at this time due to wanting to stop use of substances to allow him to take necessary steps to develop a career he enjoys.  Pt left [facility], but continued with Recovery Clinic.  He eventually decided to continue buprenorphine therapy, and then transferred to [facility] with initial injection 8/13/21.  He received a total of 3 Sublocade injections, most recently 100mg on 10/8/21.   Pt then indicated to  staff he wanted to discontinue Sublocade injections.  He was lost to follow up until he returned to  on 12/9/21 requesting to resume SL buprenorphine after brief return to use of alcohol and kratom.       He describes his opioid use history starting with heroin as a teenager, last use age 17.  He started methadone treatment age 17, dose up to 100mg, then tapered off to 6mg/day then stopped.  He began using kratom ~2016, eventually used amounts up to 100g daily.  He was first prescribed buprenorphine for OUD in 2019. He took as prescribed through 1/2021, then continued on buprenorphine through nonprescription sources.    IV drug use: No   History of overdose: No  Previous treatments : Yes: multiple, [facility] 11/2020, previous buprenorphine and methadone  Longest period of sobriety: few months  Medical complications related to substance use: exacerbation of MH diagnoses  Hepatitis C Status  unknown  HIV Status unknown     Other recent substance use:  Stimulants: used methamphetamine ages [age], stopped for 5 years, then resumed age 24.    Sedatives/hypnotics/anxiolytics: has used in past, denies regular use  Alcohol: h/o drinking 1 L liquor daily  Tobacco: 1 ppd and ENDS  Cannabis: occasional   Hallucinogens:has used in past, denies recent  Behavioral addictions: none    Most recent [facility] visit  "4/8/22  A/P from that visit:     1. Opioid use disorder, severe, dependence (H)  Controlled  Continue buprenorphine 12mg/day  Proceed with plans to start outpatient programming with [facility]  Pt confirms he has naloxone  - Buprenorphine HCl-Naloxone HCl (SUBOXONE) 12-3 MG FILM per film; Place 1 Film under the tongue daily  Dispense: 7 Film; Refill: 0     2. Alcohol use disorder, severe, dependence (H)  Continue disulfiram, no rx required per pt  Proceed with plans to start outpatient programming with [facility]     3. Stimulant use disorder  Proceed with plans to start outpatient programming with [facility]     4. Tobacco use disorder  Not ready to quit at this time, continue to evaluate.       Return in about 1 week (around 4/15/2022) for Follow up, in person.    Today, pt states:   - Did not make it to outpatient today   - Feeling manic   - He smoked cannabis before the visit and it \"makes him anxious\"   - He wants to get a medical card for cannabis for sleep. Feels there are pros and cons to using cannabis. It makes him feel \"empathetic\" Slow down and think twice.   - He denies alcohol use, he is taking antabuse everyday   - \"The reason I do drugs is because I hate my job\"  - He really does not think he can quit cannabis. He is at [facility] and knows he will be asked to leave if he can't quit smoking cannabis. He is in outpatient treatment. He is happy to be in person. It was very hard during COVID when he was trying to get help and go to treatment but everything was online. Tells me he was drinking during the groups.   - Questioning if he should even be on suboxone. He is thinking he may want to persue Sublocade again. Reasoning is unclear.   - \"He does not know why he even tries,\" (to be sober) unsure why he even goes to CD treatment. \"  - He really wants relationships with people. Feels he has nothing, he wants a job he likes, to own something like property, and to have a relationship/partnership.    - Feels he has a \"co- " "dependant\" relationship with his mother.   - Tells me he hates his father, thinks he is the worst person that he knows. His father was the one who got him to try cannabis at age 14 yo.   - He saw a friend from high school post on face book, it has been bothering him ever since. He is feeling hopeless that his friend who is the same age has a job, a wife, and a family. He feels he has nothing in comparison to this person. He wants all of these things   - Met with Jose Roberto Jane during this visit   - He thought about buying xanax off the Internet. He didn't He smoked cannabis instead.   - He continues to see his psychiatrist, Dr. Oconnor, for treatment of bipolar 1 disorder. Pt reports he has been taking his psychotropic medications as prescribed.       [place] Prescription Drug Monitoring Program Reviewed:  Yes:     Medications:  cloNIDine (CATAPRES) 0.1 MG tablet, Take 1 tablet (0.1 mg) by mouth 2 times daily AND 2 tablets (0.2 mg) At Bedtime.  disulfiram (ANTABUSE) 250 MG tablet, Take 1 tablet (250 mg) by mouth daily  divalproex sodium extended-release (DEPAKOTE ER) 500 MG 24 hr tablet, Take 4 tablets (2,000 mg) by mouth daily  FLUoxetine (PROZAC) 20 MG capsule, Take 2 capsules (40 mg) by mouth daily for 7 days, THEN 3 capsules (60 mg) daily.  lamoTRIgine (LAMICTAL) 100 MG tablet, Take 2 tablets (200 mg) by mouth At Bedtime  naloxone (NARCAN) 4 MG/0.1ML nasal spray, Spray 1 spray (4 mg) into one nostril alternating nostrils once as needed for opioid reversal every 2-3 minutes until assistance arrives (Patient not taking: Reported on 8/13/2021)  OLANZapine (ZYPREXA) 15 MG tablet, Take 1 tablet (15 mg) by mouth At Bedtime  polyethylene glycol (MIRALAX) 17 GM/Dose powder, Take 17 g (1 capful) by mouth daily (Patient not taking: Reported on 3/25/2022)  senna-docusate (SENOKOT-S/PERICOLACE) 8.6-50 MG tablet, Take 2 tablets by mouth daily as needed for constipation (Patient not taking: Reported on 3/25/2022)  sildenafil " (VIAGRA) 50 MG tablet, Take 1 tablet (50 mg) by mouth daily as needed (ED) (Patient not taking: Reported on 3/25/2022)    No current facility-administered medications on file prior to visit.      Allergies   Allergen Reactions     Prednisone Other (See Comments)     psych problems       PMH, PSH, FamHx reviewed  PAST PSYCHIATRIC HISTORY:  Diagnoses- bipolar disorder, CEASAR, ADHD  Suicide Attempts: No   Hospitalizations: Yes     Social History  Housing status: with his mother   Employment status: employed  Relationship status: Single, wants to start dating  Children: no children    OBJECTIVE                                                      BP (!) 177/126   Pulse (!) 125     Physical Exam  Constitutional:       General: He is not in acute distress.     Appearance: Normal appearance. He is not ill-appearing or diaphoretic.   HENT:      Head: Normocephalic and atraumatic.      Nose: No rhinorrhea.   Eyes:      General: No scleral icterus.     Extraocular Movements: Extraocular movements intact.      Pupils: Pupils are equal, round, and reactive to light.   Cardiovascular:      Rate and Rhythm: Tachycardia present.   Pulmonary:      Effort: Pulmonary effort is normal.   Skin:     Coloration: Skin is not jaundiced.   Neurological:      General: No focal deficit present.      Mental Status: He is alert and oriented to person, place, and time.      Motor: No tremor.   Psychiatric:         Attention and Perception: Attention and perception normal.         Mood and Affect: Mood is anxious. Mood is not depressed. Affect is not flat or angry.         Speech: Speech is rapid and pressured.         Behavior: Behavior is hyperactive. Behavior is not agitated or combative. Behavior is cooperative.         Thought Content: Thought content normal. Thought content does not include suicidal ideation. Thought content does not include suicidal plan.         Cognition and Memory: Cognition and memory normal.      Comments: Insight and  judgment is fair        Labs:    UDS: positive for  buprenorphine and THC  *POC urine drug screen does not screen for Fentanyl    No results found for this or any previous visit (from the past 240 hour(s)).    Patient counseling completed today:  Recommend pt keep naloxone in their possession and reviewed other aspects of harm reduction to reduce risk of overdose with return to use.   Recommended avoiding concurrent use of alcohol, benzodiazepines or other sedatives with buprenorphine or other opioids.  Discussed importance of avoiding isolation, building a network of supportive relationships, avoiding people/places/things associated with past use to reduce risk of relapse; including motivational interviewing regarding psychosocial treatment for addiction.     Total time 43 minutes spent in review of medical record,  review, obtaining histories, reviewing symptoms, discussing pt's goals for treatment, counseling noted above.    SCOTT Teran [facility]  [address] [place] [id number] [phone]

## 2022-04-26 ENCOUNTER — TELEPHONE (OUTPATIENT)
Dept: BEHAVIORAL HEALTH | Facility: CLINIC | Age: 27
End: 2022-04-26
Payer: COMMERCIAL

## 2022-04-26 NOTE — TELEPHONE ENCOUNTER
Writer called patient due to no show today at the Recovery [facility], left message with clinic phone number and walk in hours

## 2022-04-28 ENCOUNTER — OFFICE VISIT (OUTPATIENT)
Dept: BEHAVIORAL HEALTH | Facility: CLINIC | Age: 27
End: 2022-04-28

## 2022-04-28 ENCOUNTER — OFFICE VISIT (OUTPATIENT)
Dept: BEHAVIORAL HEALTH | Facility: CLINIC | Age: 27
End: 2022-04-28
Payer: COMMERCIAL

## 2022-04-28 VITALS — SYSTOLIC BLOOD PRESSURE: 149 MMHG | DIASTOLIC BLOOD PRESSURE: 107 MMHG | HEART RATE: 109 BPM

## 2022-04-28 DIAGNOSIS — F17.200 TOBACCO USE DISORDER: ICD-10-CM

## 2022-04-28 DIAGNOSIS — F10.20 ALCOHOL USE DISORDER, SEVERE, DEPENDENCE (H): Primary | ICD-10-CM

## 2022-04-28 DIAGNOSIS — F15.90 STIMULANT USE DISORDER: ICD-10-CM

## 2022-04-28 DIAGNOSIS — F11.20 OPIOID USE DISORDER, SEVERE, DEPENDENCE (H): ICD-10-CM

## 2022-04-28 DIAGNOSIS — F11.20 OPIOID USE DISORDER, SEVERE, DEPENDENCE (H): Primary | ICD-10-CM

## 2022-04-28 DIAGNOSIS — F12.20 CANNABIS DEPENDENCE (H): ICD-10-CM

## 2022-04-28 DIAGNOSIS — F13.10 MILD BENZODIAZEPINE USE DISORDER (H): ICD-10-CM

## 2022-04-28 PROCEDURE — 99214 OFFICE O/P EST MOD 30 MIN: CPT | Performed by: NURSE PRACTITIONER

## 2022-04-28 RX ORDER — BUPRENORPHINE AND NALOXONE 12; 3 MG/1; MG/1
1 FILM, SOLUBLE BUCCAL; SUBLINGUAL DAILY
Qty: 7 FILM | Refills: 0 | Status: SHIPPED | OUTPATIENT
Start: 2022-04-28 | End: 2022-05-11

## 2022-04-28 NOTE — NURSING NOTE
[facility]    States he does not want to take Suboxone but does think he will return to [facility] information:  Approximate last use of illicit opioids: 3/21/22  Taking buprenorphine? Yes:  As prescribed? Yes:   Number of buprenorphine films/tablets remaining currently: 1  Side effects related to buprenorphine (constipation, dry mouth, sedation?) No   Narcan currently available: Yes  Other recent substance use:    Benzo from online, Adderall, ritalin   NICOTINE-Yes:   If using nicotine, ready to quit? No    Point of care urine drug screen positive for: amphetamines, buprenorphine, benzodiazepines and THC  *POC urine drug screen does not screen for Fentanyl      PHQ-2 Score:     PHQ-2 ( 1999 Pfizer) 4/28/2022 4/19/2022   Q1: Little interest or pleasure in doing things 2 2   Q2: Feeling down, depressed or hopeless 2 2   PHQ-2 Score 4 4   PHQ-2 Total Score (12-17 Years)- Positive if 3 or more points; Administer PHQ-A if positive - -        If PHQ-2 score of 3 or higher, has Recovery Clinic therapist or provider been notified? Yes    Any current suicidal ideation? No  If yes, has [facility] therapist or provider been notified? N/A    [facility] care provider: [facility]     Mental health provider: Dr. Oconnor (follow up on MH referral if needed)    Insurance needs: active    Housing needs: stable    Contact information up to date? yes    3rd Party Involvement none today (please obtain WU if pt would like to include)    Morena Mckeon CMA  April 28, 2022  10:58 AM

## 2022-04-28 NOTE — PROGRESS NOTES
[facility] - Recovery Clinic Follow Up    ASSESSMENT/PLAN                                                      1. Alcohol use disorder, severe, dependence (H)  - No recent alcohol use or cravings , encouraged and acknowledged this accomplishment.   - Continue taking disulfiram   - Encouraged outpatient treatment at [facility], attendance per pt has been poor, reiterated importance of psychosocial interventions for long term recovery.     2. Opioid use disorder, severe, dependence (H)  - Pt would like to continue suboxone, Sublocade would an option for pt going forward however pt reports decreased libido is more significant and bothersome with Sublocade injection. Cravings are controlled at 12 mg TDD.   - Encouraged outpatient treatment at [facility], attendance per pt has been poor, reiterated importance of psychosocial interventions for long term recovery.   - Pt verifies he has narcan at home   - Buprenorphine HCl-Naloxone HCl (SUBOXONE) 12-3 MG FILM per film; Place 1 Film under the tongue daily  Dispense: 7 Film; Refill: 0    3. Stimulant use disorder  - Reports use of Adderall and Ritalin this morning (not prescribed). Has been using stimulants intermittently. Denies methamphetamine use. Denies cravings, reports he is not sure if he wants to be in recovery or stop using stimulants.   - Encouraged outpatient treatment at [facility] and working with [facility], attendance per pt has been poor, reiterated importance of psychosocial interventions for long term recovery.     4. Mild benzodiazepine use disorder (H)  - Reports intermittent xanax use. Discussed risks of benzodiazepine abuse, especially in combination with prescribed buprenorphine, including respiratory depression and death. Pt verbally acknowledges risk, denies cravings for benzos, finds it to be very difficult not to buy various substances online. Again he is not sure if he wants to be recvoery or complete abstinence from this substance. Attendance has been poor at  outpatient treatment, working closely with [facility] will be very important. Encouraged participation and continued visits with Jose Roberto Jane for psychotherapy. Motivational interviewing utilized.     5. Cannabis dependence (H)  - continue intermittent cannabis use. UDS positive for THC today. Continue working with therapist, encouraged outpatient treatment at [facility].     6. Tobacco use disorder  - Pt reports he is not ready to quit at this time. Continue to encouraged cessation.     Patient counseling completed today:  Recommend pt keep naloxone in their possession and reviewed other aspects of harm reduction to reduce risk of overdose with return to use.   Recommended avoiding concurrent use of alcohol, benzodiazepines or other sedatives with buprenorphine or other opioids.  Discussed importance of avoiding isolation, building a network of supportive relationships, avoiding people/places/things associated with past use to reduce risk of relapse; including motivational interviewing regarding psychosocial treatment for addiction.      Return in about 1 week (around 5/5/2022) for Follow up, with me, in person.    SUBJECTIVE                                                        CC/HPI:  Laci Hinkle Jr is a 26 year old male with PMH bipolar disorder, CEASAR, ADHD, and PSUD including opioids on buprenorphine who presents to the Recovery Clinic for return visit.       Brief History:  Pt was initially evaluated in Recovery Clinic on 7/22/21. Pt was referred by staff in [facility] due to pt's initial desire to taper off buprenorphine which he had been taking from nonprescription sources.   Pt decided to seek treatment at this time due to wanting to stop use of substances to allow him to take necessary steps to develop a career he enjoys.  Pt left [facility], but continued with Recovery Clinic.  He eventually decided to continue buprenorphine therapy, and then transferred to [facility] with initial injection 8/13/21.  He  received a total of 3 Sublocade injections, most recently 100mg on 10/8/21.   Pt then indicated to  staff he wanted to discontinue Sublocade injections.  He was lost to follow up until he returned to  on 12/9/21 requesting to resume SL buprenorphine after brief return to use of alcohol and kratom.      He describes his opioid use history starting with heroin as a teenager, last use age 17.  He started methadone treatment age 17, dose up to 100mg, then tapered off to 6mg/day then stopped.  He began using kratom ~2016, eventually used amounts up to 100g daily.  He was first prescribed buprenorphine for OUD in 2019. He took as prescribed through 1/2021, then continued on buprenorphine through nonprescription sources.    IV drug use: No   History of overdose: No  Previous treatments : Yes: multiple, [facility], [facility] 11/2020, previous buprenorphine and methadone  Longest period of sobriety: few months  Medical complications related to substance use: exacerbation of MH diagnoses  Hepatitis C Status  unknown  HIV Status unknown     Other recent substance use:  Stimulants: used methamphetamine ages [age], stopped for 5 years, then resumed age 24.    Sedatives/hypnotics/anxiolytics: has used in past, denies regular use  Alcohol: h/o drinking 1 L liquor daily  Tobacco: 1 ppd and ENDS  Cannabis: occasional   Hallucinogens:has used in past, denies recent  Behavioral addictions: none    Most recent Recovery Clinic visit 4/19/22  A/P from that visit:     1. Opioid use disorder, severe, dependence (H)  Discussed Suboxone v. Sublocade. Recommended pt continue Suboxone 12 mg SL every day at this time as cravings have been controlled and he has been able to take it as prescribed. No recent kratom use. Pt reports decreased libido is more significant and bothersome with Sublocade injection.   - Pt verifies he has narcan at home   - Buprenorphine HCl-Naloxone HCl (SUBOXONE) 12-3 MG FILM per film; Place 1 Film under the  "tongue daily  Dispense: 7 Film; Refill: 0     2. Alcohol use disorder, severe, dependence (H)  - No recent alcohol use or cravings   - Continue taking disulfiram   - Encouraged outpatient treatment at [facility] (started this week)      3. Stimulant use disorder  - Encouraged outpatient treatment at [facility] (started this week)      4. Tobacco use disorder  - Pt reports he is not ready to quit at this time      5. Bipolar 1 disorder (H)  - Pt appears anxious, restless, rapid speech. No grandiose ideas. No thoughts of suicide. Recent cannabis use prior to visit.   - Reports he is taking all medications as prescribed. Depakote 2000 mg HS Lamotrigine 200mg HS, and Olanzapine 15 mg HS   - F/u appointment with Dr. Oconnor on 5/10/22     6. Cannabis dependence (H)  - Struggling with motivation to quit. MI utilized. Jose Roberto Jane was present for visit and evaluated the patient as well.   - Encouraged pt to continue meeting with Jose Roberto Jane, psychotherapist   - Encouraged outpatient treatment at [facility] (started this week)      No follow-ups on file.     Today, pt states :   - Took Adderall and Ritalin today, not prescribed. He has been using stimulants daily. Reports he is manic today because he used. He did not want to miss the visit. \"you should be proud I didn't use meth\"   - Denies suicidal thoughts or thoughts of hurting others   - He is still in treatment, went to group today and said he was high and could not be in group. His attendance in outpatient treatment has been poor.   -  thinking about going to [facility] for dual diagnosis treatment   - Reports he as used Xanax 3 times since his last visit  - He has not been drinking has been taking antabuse taking this daily.   - He is taking the suboxone, would like to get the sublocade injection now.   - No opioid use or kratom use.   - He is not driving, not working currently   - He is not sure if he wants to stop using stimulants   - Has goals for his life with long term " "sobriety, but not sure if he wants to stop using right now.    \"I am an addict, this is what happens, I relapse\" \"I am not sure if there is any hope for me\"   \" He feels doomed\"   - Following up with psychiatrist   - no seizure or DT withdrawal history from benzodiazepines.     [place] Prescription Drug Monitoring Program Reviewed:  Yes:     Medications:  cloNIDine (CATAPRES) 0.1 MG tablet, Take 1 tablet (0.1 mg) by mouth 2 times daily AND 2 tablets (0.2 mg) At Bedtime.  disulfiram (ANTABUSE) 250 MG tablet, Take 1 tablet (250 mg) by mouth daily  divalproex sodium extended-release (DEPAKOTE ER) 500 MG 24 hr tablet, Take 4 tablets (2,000 mg) by mouth daily  FLUoxetine (PROZAC) 20 MG capsule, Take 2 capsules (40 mg) by mouth daily for 7 days, THEN 3 capsules (60 mg) daily.  lamoTRIgine (LAMICTAL) 100 MG tablet, Take 2 tablets (200 mg) by mouth At Bedtime  naloxone (NARCAN) 4 MG/0.1ML nasal spray, Spray 1 spray (4 mg) into one nostril alternating nostrils once as needed for opioid reversal every 2-3 minutes until assistance arrives (Patient not taking: Reported on 8/13/2021)  OLANZapine (ZYPREXA) 15 MG tablet, Take 1 tablet (15 mg) by mouth At Bedtime  polyethylene glycol (MIRALAX) 17 GM/Dose powder, Take 17 g (1 capful) by mouth daily (Patient not taking: Reported on 3/25/2022)  senna-docusate (SENOKOT-S/PERICOLACE) 8.6-50 MG tablet, Take 2 tablets by mouth daily as needed for constipation (Patient not taking: Reported on 3/25/2022)  sildenafil (VIAGRA) 50 MG tablet, Take 1 tablet (50 mg) by mouth daily as needed (ED) (Patient not taking: Reported on 3/25/2022)    No current facility-administered medications on file prior to visit.      Allergies   Allergen Reactions     Prednisone Other (See Comments)     psych problems     PMH, PSH, FamHx reviewed    PAST PSYCHIATRIC HISTORY:  Diagnoses- bipolar disorder, CEASAR, ADHD  Suicide Attempts: No   Hospitalizations: Yes      Social History  Housing status: with his " mother   Employment status: employed  Relationship status: Single, wants to start dating  Children: no children    OBJECTIVE                                                      BP (!) 149/107   Pulse 109     Physical Exam  Constitutional:       General: He is not in acute distress.     Appearance: He is diaphoretic. He is not ill-appearing or toxic-appearing.   HENT:      Head: Normocephalic and atraumatic.      Nose: No rhinorrhea.   Eyes:      General: No scleral icterus.     Extraocular Movements: Extraocular movements intact.   Cardiovascular:      Rate and Rhythm: Tachycardia present.   Pulmonary:      Effort: Pulmonary effort is normal.   Skin:     Coloration: Skin is not jaundiced.   Neurological:      General: No focal deficit present.      Mental Status: He is alert and oriented to person, place, and time.      Motor: No tremor.      Coordination: Coordination normal.      Gait: Gait is intact.   Psychiatric:         Attention and Perception: Perception normal. He is inattentive.         Mood and Affect: Mood and affect normal.         Speech: Speech is rapid and pressured. Speech is not slurred.         Behavior: Behavior is hyperactive. Behavior is not agitated or aggressive. Behavior is cooperative.         Thought Content: Thought content does not include suicidal ideation. Thought content does not include suicidal plan.         Cognition and Memory: Cognition and memory normal.      Comments: Insight and judgment poor        Labs:  UDS: positive for amphetamines, buprenorphine, benzodiazepines and THC  *POC urine drug screen does not screen for Fentanyl    No results found for this or any previous visit (from the past 240 hour(s)).    SCOTT Teran CNP  [facility]  [address] [place] [id number]  [phone]

## 2022-04-29 NOTE — PROGRESS NOTES
[facility] Recovery Clinic    Peer  met with Laci Hinkle Jr in the Recovery Clinic to introduce himself, detail services provided and discuss current status of recovery. Pt appeared alert, anxious yet oriented and open to feedback during our discussion.     Pt states recovery has been challenging and admits to a re-use of substances since last visit. Pt reports remaining alcohol free as he continues to take antabuse. Pt admits to continued use of weed which he views as a non-issue in his overall substance use profile. Pt states he has spoken with staff at [facility] and a plan is being developed for next steps in his care which likely includes moving to a different sober living situation which accomodate his recovery beliefs.  PRC and pt discussed establishing respectful boundaries with others who actively use or may have differing opinions of what recovery looks like.  PRC encouraged pt to remain open to the many pathways in finding sobriety and recovery.     PRC welcomes contact for recovery based support and resources. PRC and pt agree to speak again during an upcoming  visit.     Patient Goal: To utilize suboxone assisted treatment for sobriety and long term recovery.     Goal Progress:   Ongoing.    Key Risk Factors to Recovery:   PRC encourages being aware of risk factors that can lead to re-use which include avoiding isolation, avoiding triggers and managing cravings in a healthy manner. being open and willing to acceptance and change on a daily basis.  PRC encourages pt to establish a sober network calling tree to reach out to when needed.  Continue to practice honesty with ourselves and trusted support person(s).   PRC encourages regular attendance at recovery based meetings as well as finding a sponsor for mentoring and accountability.   PRC encourages consideration of other services such as counseling for mental health issues which can correlate with our substance use.       Support Needs:   Ongoing care, support and resources for opioid substance use disorder.     Follow up:   PRC has provided pt with his contact information for support and resource needs.    PRC and pt agree to meet during an upcoming  visit.       [facility]  [address], [address]   [place], [place], [address]  Clinic Phone: [phone]  Clinic Fax: [phone]  Peer  phone: [phone]    Open Monday - Friday  9:00am-4:00pm  Walk in hours: 9am-3pm      Stewart Chester  April 29, 2022  12:35 PM

## 2022-05-09 ENCOUNTER — TELEPHONE (OUTPATIENT)
Dept: PSYCHIATRY | Facility: CLINIC | Age: 27
End: 2022-05-09
Payer: COMMERCIAL

## 2022-05-09 DIAGNOSIS — F31.10 BIPOLAR I DISORDER, MOST RECENT EPISODE (OR CURRENT) MANIC (H): ICD-10-CM

## 2022-05-09 RX ORDER — OLANZAPINE 15 MG/1
30 TABLET ORAL AT BEDTIME
Qty: 4 TABLET | Refills: 0 | Status: SHIPPED | OUTPATIENT
Start: 2022-05-09 | End: 2022-05-10

## 2022-05-09 NOTE — TELEPHONE ENCOUNTER
"Patient called the  re: a prescription refill for Olanzapine.  Patient states that he received 60 pills at his last refill and then decided he would double the dose and try 30 mg per day.  Patient has run out of Olanzapine.  He has an appointment tomorrow with Dr. Oconnor at 3:30 pm.    Patient states \"this 30 mg dose is really helping me.  I think it keeps me sane.  I feel good.\"  Patient denies any side effects and is concerned that if me misses too many doses, he will become manic.    Passed onto provider for review.    "

## 2022-05-10 ENCOUNTER — VIRTUAL VISIT (OUTPATIENT)
Dept: PSYCHIATRY | Facility: CLINIC | Age: 27
End: 2022-05-10
Attending: PSYCHIATRY & NEUROLOGY
Payer: COMMERCIAL

## 2022-05-10 DIAGNOSIS — F31.10 BIPOLAR I DISORDER, MOST RECENT EPISODE (OR CURRENT) MANIC (H): ICD-10-CM

## 2022-05-10 DIAGNOSIS — F31.61 BIPOLAR DISORDER, CURRENT EPISODE MIXED, MILD (H): ICD-10-CM

## 2022-05-10 DIAGNOSIS — F31.9 BIPOLAR 1 DISORDER (H): ICD-10-CM

## 2022-05-10 DIAGNOSIS — F31.81 BIPOLAR 2 DISORDER (H): ICD-10-CM

## 2022-05-10 PROCEDURE — 99214 OFFICE O/P EST MOD 30 MIN: CPT | Mod: TEL | Performed by: PSYCHIATRY & NEUROLOGY

## 2022-05-10 RX ORDER — DIVALPROEX SODIUM 500 MG/1
2000 TABLET, EXTENDED RELEASE ORAL DAILY
Qty: 120 TABLET | Refills: 2 | Status: SHIPPED | OUTPATIENT
Start: 2022-05-10 | End: 2022-07-25

## 2022-05-10 RX ORDER — LAMOTRIGINE 100 MG/1
200 TABLET ORAL AT BEDTIME
Qty: 60 TABLET | Refills: 2 | Status: SHIPPED | OUTPATIENT
Start: 2022-05-10 | End: 2022-07-21

## 2022-05-10 RX ORDER — OLANZAPINE 15 MG/1
15 TABLET ORAL AT BEDTIME
Qty: 30 TABLET | Refills: 2 | Status: SHIPPED | OUTPATIENT
Start: 2022-05-10 | End: 2022-07-25

## 2022-05-10 NOTE — PATIENT INSTRUCTIONS
Continue your medications as usual, including olanzapine 15 mg at bedtime.  I will not be able to do additional refills if you increase the dose of olanzapine again.    I strongly encourage you to get treatment for your substance use    I understand that he would like to find a psychiatrist on your own.  If you change her mind and would like help, please contact the clinic and our social work team can give you assistance.    **For crisis resources, please see the information at the end of this document**   Patient Education    Thank you for coming to the [facility] [place] [facility].    Lab Testing:  If you had lab testing today and your results are reassuring or normal they will be mailed to you or sent through Piqora within 7 days. If the lab tests need quick action we will call you with the results. The phone number we will call with results is # [phone]. If this is not the best number please call our clinic and change the number.     Medication Refills:  If you need any refills please call your pharmacy and they will contact us. Our fax number for refills is [phone]. Please allow three business days for refill processing.   If you need to change to a different pharmacy, please contact the new pharmacy directly. The new pharmacy will help you get your medications transferred.     Contact Us:  Please call [phone] during business hours (8-5:00 M-F).  If you have medication related questions after clinic hours, or on the weekend, please call [phone].    Financial Assistance [phone]  Medical Records [phone]       MENTAL HEALTH CRISIS RESOURCES:  For a emergency help, please call 911 or go to the nearest Emergency Department.     Emergency Walk-In Options:   EmPATH Unit @ [facility] ([place]): [phone] - Specialized mental health emergency area designed to be calming  [facility] ([place]): [phone]  [facility] ([place]): [phone]  [facility] ([place]): [phone]    [facility] Crisis Information:   [place]: [phone]  [place]: [phone]  [place] ([place]) - Adult: [phone]     Child: [phone]  [place] - Adult: [phone]     Child: [phone]  [place]: [phone]  List of all [facility] resources:   https://[place].[facility]/dhs/people-we-serve/adults/health-care/mental-health/resources/crisis-contacts.jsp    National Crisis Information:   Crisis Text Line: Text  [place]  to 736737  National Suicide Prevention Lifeline: 2-580-173-IMRL (1-705.538.6428)       For online chat options, visit https://suicidepreventionlifeline.org/chat/  Poison Control Center: 1-715.897.6433  Trans Lifeline: 1-989.162.3298 - Hotline for transgender people of all ages  The [employer] Project: 2-626-467-3614 - Hotline for LGBT youth     For Non-Emergency Support:   Fast Tracker: Mental Health & Substance Use Disorder Resources -   https://www.Datumaten.org/

## 2022-05-10 NOTE — PROGRESS NOTES
Laci Hinkle Jr is a 26 year old who has consented to receive services via billable phone visit.      What phone number would you prefer to be contacted at?: Mobile phone number on file:   Telephone Information:   Mobile [phone]     How would you prefer to obtain AVS?: Eduin

## 2022-05-10 NOTE — TELEPHONE ENCOUNTER
Provider sent 2 day rx for olanzapine 30 mg to pharmacy - and will discuss medication plan with patient at appointment 5/10.  Patient informed yesterday afternoon.

## 2022-05-10 NOTE — PROGRESS NOTES
" Psychiatry Clinic Follow-up Note    TELEPHONE VISIT  Laci Hinkle Jr is a 26 year old pt. who is being evaluated via a billable telephone visit.      The patient has been notified of the following:    We have found that certain health care needs can be provided without the need for a physical exam. This service lets us provide the care you need with a short phone conversation. If a prescription is necessary we can send it directly to your pharmacy. If lab work is needed we can place an order for that and you can then stop by our lab to have the test done at a later time. Insurers are generally covering virtual visits as they would in-office visits so billing should not be different than normal.  If for some reason you do get billed incorrectly, you should contact the billing office to correct it and that number is in the AVS .    Patient has given verbal consent for a telephone visit?:  Yes   How would the pt like to obtain the AVS?:  Contractor Copilot  AVS SmartPhrase [PsychAVS] has been placed in 'Patient Instructions':  Yes     Start Time:  3:38 PM          End Time:  353pm      Laci Hinkle Jr. MRN# [id number]   Age: 24 year old YOB: 1995          Assessment:     Since the last visit Laci reports that he, on his own, increased his dose of olanzapine from 15 mg to 30 mg at bedtime.  He reports the main reason he did this is because he has been using speed and olanzapine helps him to \"crash\" and get to sleep after using.  I let Laci know I am concerned about his drug use.  He uses marijuana and kratom also.  His family has very similar concerns.  He plans to go into a dual diagnosis residential program for a month, though he seems ambivalent about stopping substance use.  I have let him know I feel it is unwise for him to take a higher dose of olanzapine to deal with side effects of illicit drugs, and we will keep the dose of 15 mg.  Laci will continue his other usual medications also.  I " "will provide him with 3 months of refills.  Laci is aware that I am moving out of state as of .  I offered to connect him with our social work team to arrange follow-up with another psychiatrist, but he declines and says he can do it on his own through recommendations from friends.  I let him know if he changes his mind he can contact us.    Attestation:  Patient has been evaluated by me, Bo Oconnor MD, PhD.         Diagnoses:     Axis I: Bipolar I disorder, polysubstance dependence    Axis II: Deferred    Axis III: Medication-induced weight gain; hypertension    Axis IV: mild  psychosocial stressors     Axis V: Global Assessment of Functionin-60    Interim History     Since the last visit, Laci has increased his dose of olanzapine as outlined above.  He reports being adherent with his other usual medications.    Laci has been using substances in dangerous ways.  He admits to using speed on a regular basis, and also marijuana daily.  The reason he increased his dose of olanzapine is that he finds it helps him to settle and get to sleep after he has used speed.  I let him know I am concerned that his drug use is dangerous, and using psychiatric medications in this way is also not good for his health.    We had a lengthy conversation about Laci's substance use.  He recognizes that it has had negative effects on his life.  However he also notes \"I love using drugs\" and \"I hate that I love it\".  He clearly seems to have some ambivalence about his drug use and the need to quit.  He does plan to go into a residential dual diagnosis program for a month, though he reserves the right to leave if he chooses to do so.    Laci denies isabel symptoms of depression, minda, or psychosis.  He is tolerating his medications well.  He is agreeable to put the dose of olanzapine back to 15 mg.    Laci is aware that I am moving out of state in July.  He declines help in finding a new psychiatrist.  He " is aware that he can contact us if he changes his mind.    Review of Systems     A comprehensive review of systems was performed and is negative other than noted above.          Allergies:      Allergies   Allergen Reactions     Prednisone Other (See Comments)     psych problems     Current Medications     Depakote 2000 mg HS  Lamotrigine 200mg HS  Olanzapine 15 mg HS    Mental Status Exam     Alertness: alert  and oriented  Behavior/Demeanor: cooperative, pleasant and calm,  Speech: normal  Language: intact  Psychomotor: normal or unremarkable  Mood: description consistent with euthymia  Affect: full-range; was congruent to mood  Thought Process/Associations: unremarkable  Thought Content:  Denies active/passive suicidal ideation  Perception:  Denies hallucinations  Insight: fair  Judgment: fair  Cognition:  does appear grossly intact.

## 2022-05-11 ENCOUNTER — TELEPHONE (OUTPATIENT)
Dept: PSYCHIATRY | Facility: CLINIC | Age: 27
End: 2022-05-11

## 2022-05-11 ENCOUNTER — OFFICE VISIT (OUTPATIENT)
Dept: BEHAVIORAL HEALTH | Facility: CLINIC | Age: 27
End: 2022-05-11

## 2022-05-11 ENCOUNTER — OFFICE VISIT (OUTPATIENT)
Dept: BEHAVIORAL HEALTH | Facility: CLINIC | Age: 27
End: 2022-05-11
Payer: COMMERCIAL

## 2022-05-11 VITALS — SYSTOLIC BLOOD PRESSURE: 115 MMHG | HEART RATE: 52 BPM | DIASTOLIC BLOOD PRESSURE: 66 MMHG

## 2022-05-11 DIAGNOSIS — F15.90 STIMULANT USE DISORDER: ICD-10-CM

## 2022-05-11 DIAGNOSIS — F13.10 MILD BENZODIAZEPINE USE DISORDER (H): ICD-10-CM

## 2022-05-11 DIAGNOSIS — F43.23 ADJUSTMENT DISORDER WITH MIXED ANXIETY AND DEPRESSED MOOD: ICD-10-CM

## 2022-05-11 DIAGNOSIS — F31.9 BIPOLAR 1 DISORDER (H): Primary | ICD-10-CM

## 2022-05-11 DIAGNOSIS — F11.20 OPIOID USE DISORDER, SEVERE, DEPENDENCE (H): ICD-10-CM

## 2022-05-11 DIAGNOSIS — F10.20 ALCOHOL USE DISORDER, SEVERE, DEPENDENCE (H): ICD-10-CM

## 2022-05-11 DIAGNOSIS — F17.200 TOBACCO USE DISORDER: ICD-10-CM

## 2022-05-11 DIAGNOSIS — F12.20 CANNABIS DEPENDENCE (H): ICD-10-CM

## 2022-05-11 DIAGNOSIS — F11.20 OPIOID USE DISORDER, SEVERE, DEPENDENCE (H): Primary | ICD-10-CM

## 2022-05-11 PROCEDURE — 90832 PSYTX W PT 30 MINUTES: CPT | Performed by: SOCIAL WORKER

## 2022-05-11 RX ORDER — BUPRENORPHINE AND NALOXONE 12; 3 MG/1; MG/1
1 FILM, SOLUBLE BUCCAL; SUBLINGUAL DAILY
Qty: 14 FILM | Refills: 0 | Status: SHIPPED | OUTPATIENT
Start: 2022-05-11 | End: 2022-05-23

## 2022-05-11 RX ORDER — ACAMPROSATE CALCIUM 333 MG/1
666 TABLET, DELAYED RELEASE ORAL 3 TIMES DAILY
Qty: 180 TABLET | Refills: 0 | Status: SHIPPED | OUTPATIENT
Start: 2022-05-11 | End: 2022-06-01

## 2022-05-11 NOTE — PROGRESS NOTES
[facility] Recovery [facility] Follow Up    ASSESSMENT/PLAN                                                      1. Opioid use disorder, severe, dependence (H)  Stable, no recent opioid use or cravings. Continue 12 mg daily.   - Encouraged dual diagnosis treatment at [facility], pt has left outpatient program at [facility] d/t cannabis use and lack of overall  engagement with the program.    - Buprenorphine HCl-Naloxone HCl (SUBOXONE) 12-3 MG FILM per film; Place 1 Film under the tongue daily  Dispense: 14 Film; Refill: 0    2. Alcohol use disorder, severe, dependence (H)  - Once recent occasion of alcohol use. Pt reports he stopped disulfiram but has since restarted the medication. Does report alcohol cravings. Encouraged pt to take Campral. Discussed medications that can be used to treat AUD. Has tried gabapentin int he past without success.   - Encouraged dual diagnosis treatment at [facility], pt has left outpatient program at [facility] d/t cannabis use and lack of engagement with the program.    - Continue taking disulfiram   - Encouraged outpatient treatment   - acamprosate (CAMPRAL) 333 MG EC tablet; Take 2 tablets (666 mg) by mouth 3 times daily  Dispense: 180 tablet; Refill: 0    3. Mild benzodiazepine use disorder (H)  - Pt denies recent benzo use since last f/u visit. Encouraged abstinence, review risk of taking multiple CNS depressants.   - Encouraged dual diagnosis treatment at [facility], pt has left outpatient program at [facility] d/t cannabis use and lack of engagement with the program.      4. Stimulant use disorder  - Pt continue intermittent stimulant use, most recently crack. He is not sure he wants to stop using stimulants. Denies cravings. Was taking increase dose of olanzapine to treat insomnia from stimulant, this has been discussed by psychiatrist.   - Encouraged dual diagnosis treatment at [facility], pt has left outpatient program at [facility] d/t cannabis use and lack of engagement with the program.   reiterated importance of psychosocial interventions for long term recovery.     5. Cannabis dependence (H)  - continues intermittent cannabis use. UDS positive for THC today. Continue working with psychotherapist, encouraged inpatient dual diagnosis treatment and ongoing recovery supports.     6. Tobacco use disorder  - pt is not interested in quitting today     Patient counseling completed today:  Recommend pt keep naloxone in their possession and reviewed other aspects of harm reduction to reduce risk of overdose with return to use.   Recommended avoiding concurrent use of alcohol, benzodiazepines or other sedatives with buprenorphine or other opioids.  Discussed importance of avoiding isolation, building a network of supportive relationships, avoiding people/places/things associated with past use to reduce risk of relapse; including motivational interviewing regarding psychosocial treatment for addiction. Review severe nature of polysubstance use disorders. Pt continues to struggle with wanting to be in recovery.      Return in about 2 weeks (around 5/25/2022) for Follow up, with me, in person.    SUBJECTIVE                                                        CC/HPI:  Laci Hinkle Jr is a 26 year old male with PMH bipolar disorder, CEASAR, ADHD, and PSUD including opioids on buprenorphine who presents to the Recovery Clinic for return visit.       Brief History:  Pt was initially evaluated in Recovery Clinic on 7/22/21. Pt was referred by staff in [facility] due to pt's initial desire to taper off buprenorphine which he had been taking from nonprescription sources.   Pt decided to seek treatment at this time due to wanting to stop use of substances to allow him to take necessary steps to develop a career he enjoys.  Pt left [facility], but continued with Recovery Clinic.  He eventually decided to continue buprenorphine therapy, and then transferred to [facility] with initial injection 8/13/21.  He received  a total of 3 Sublocade injections, most recently 100mg on 10/8/21.   Pt then indicated to  staff he wanted to discontinue Sublocade injections.  He was lost to follow up until he returned to  on 12/9/21 requesting to resume [facility] buprenorphine after brief return to use of alcohol and kratom.      He describes his opioid use history starting with heroin as a teenager, last use age 17.  He started methadone treatment age 17, dose up to 100mg, then tapered off to 6mg/day then stopped.  He began using kratom ~2016, eventually used amounts up to 100g daily.  He was first prescribed buprenorphine for OUD in 2019. He took as prescribed through 1/2021, then continued on buprenorphine through nonprescription sources.    IV drug use: No   History of overdose: No  Previous treatments : Yes: multiple, [facility], [facility] 11/2020, previous buprenorphine and methadone  Longest period of sobriety: few months  Medical complications related to substance use: exacerbation of MH diagnoses  Hepatitis C Status  unknown  HIV Status unknown     Other recent substance use:  Stimulants: used methamphetamine ages [age], stopped for 5 years, then resumed age 24.    Sedatives/hypnotics/anxiolytics: has used in past, denies regular use  Alcohol: h/o drinking 1 L liquor daily  Tobacco: 1 ppd and ENDS  Cannabis: occasional   Hallucinogens:has used in past, denies recent  Behavioral addictions: none    Most recent Recovery Clinic visit 4/28/22  A/P from that visit:     1. Alcohol use disorder, severe, dependence (H)  - No recent alcohol use or cravings , encouraged and acknowledged this accomplishment.   - Continue taking disulfiram   - Encouraged outpatient treatment at [facility], attendance per pt has been poor, reiterated importance of psychosocial interventions for long term recovery.      2. Opioid use disorder, severe, dependence (H)  - Pt would like to continue suboxone, Sublocade would an option for pt going forward however pt  reports decreased libido is more significant and bothersome with Sublocade injection. Cravings are controlled at 12 mg TDD.   - Encouraged outpatient treatment at [facility], attendance per pt has been poor, reiterated importance of psychosocial interventions for long term recovery.   - Pt verifies he has narcan at home   - Buprenorphine HCl-Naloxone HCl (SUBOXONE) 12-3 MG FILM per film; Place 1 Film under the tongue daily  Dispense: 7 Film; Refill: 0     3. Stimulant use disorder  - Reports use of Adderall and Ritalin this morning (not prescribed). Has been using stimulants intermittently. Denies methamphetamine use. Denies cravings, reports he is not sure if he wants to be in recovery or stop using stimulants.   - Encouraged outpatient treatment at [facility] and working with [facility], attendance per pt has been poor, reiterated importance of psychosocial interventions for long term recovery.      4. Mild benzodiazepine use disorder (H)  - Reports intermittent xanax use. Discussed risks of benzodiazepine abuse, especially in combination with prescribed buprenorphine, including respiratory depression and death. Pt verbally acknowledges risk, denies cravings for benzos, finds it to be very difficult not to buy various substances online. Again he is not sure if he wants to be recovery or complete abstinence from this substance. Attendance has been poor at outpatient treatment, working closely with [facility] will be very important. Encouraged participation and continued visits with [facility] for psychotherapy. Motivational interviewing utilized.      5. Cannabis dependence (H)  - continue intermittent cannabis use. UDS positive for THC today. Continue working with therapist, encouraged outpatient treatment at [facility].      6. Tobacco use disorder  - Pt reports he is not ready to quit at this time. Continue to encouraged cessation.      Today, pt states :   - Last use of illicit opioids was in March, proud of this   - Had a follow up  "visit with psychiatrist yesterday, Dr. Oconnor. His provider is leaving the clinic and state on July 1st. They offered to schedule him with a new provider - pt reported he could do this on his own.   - Was taking up to 30 mg of olanzapine to help him fall asleep after using stimulants (speed, Adderall, Ritalin). He was instructed not to take the medication for this purpose and to return to taking original prescribed se of 15 mg PO at .   - He met with psychotherapist today as well.   - Plans to go to dual diagnosis treatment, he stopped going to [facility] because he was smoking cannabis daily. Reports he also relapsed on alcohol and speed. He stole money from his mother to buy drugs, \"I wanted to get high so bad\"   - He feels he needs a higher level of care than what he is receiving at [facility], planning to go to [facility].   - He has to take the Rule 25 again - plans to do this tomorrow at [facility].   - He also spoke with peer  today, talked about goals and plans for the future.   - He is trying to continue to involve family in his care, this is very helpful for him   - His mother came to a therapy appointment with him yesterday, they talked about his addiction and how it has effected his mother as well. Trying to rebuild trusting relationship. He believes in God, and that God loves him. This gives him hope  \"I love drugs \" I hate that I love drugs\"   - He has an agreement with mother that if he leaves treatment early he will not be able to return to her house   - He is hoping to get a sponsor as well   - Last drink was 4 days ago, had 2 four loco's.   - He stopped his antabuse, has restarted it - he is reporting alcohol cravings   - He tried crack for the first time a couple days ago, he is not sure why he tried this, reports he was high for 15 minutes and then it went away   - He has not used speed for over 1 week, reports he has not used any stimulants other than trying crack once over the past week. " "  - Reports when he takes Suboxone cravings for other substances decrease as well ( stimulants)   - He feels the only things that is helpful for AUD is antabuse, was successful with this for 5 months. He is open to trying another medication for craving control.   - He has tried gabapentin at high and low doses and it was NOT helpful   - Reports he has stopped using benzodiazepines, he tried \"mail order\" benzo's, tried to use it without blacking out, but that did not work, he feels he missed out on the \"high\"     [place] Prescription Drug Monitoring Program Reviewed:  Yes:     Medications:  cloNIDine (CATAPRES) 0.1 MG tablet, Take 1 tablet (0.1 mg) by mouth 2 times daily AND 2 tablets (0.2 mg) At Bedtime.  disulfiram (ANTABUSE) 250 MG tablet, Take 1 tablet (250 mg) by mouth daily  divalproex sodium extended-release (DEPAKOTE ER) 500 MG 24 hr tablet, Take 4 tablets (2,000 mg) by mouth daily  FLUoxetine (PROZAC) 20 MG capsule, Take 3 capsules (60 mg) by mouth daily  lamoTRIgine (LAMICTAL) 100 MG tablet, Take 2 tablets (200 mg) by mouth At Bedtime  naloxone (NARCAN) 4 MG/0.1ML nasal spray, Spray 1 spray (4 mg) into one nostril alternating nostrils once as needed for opioid reversal every 2-3 minutes until assistance arrives  OLANZapine (ZYPREXA) 15 MG tablet, Take 1 tablet (15 mg) by mouth At Bedtime  polyethylene glycol (MIRALAX) 17 GM/Dose powder, Take 17 g (1 capful) by mouth daily  senna-docusate (SENOKOT-S/PERICOLACE) 8.6-50 MG tablet, Take 2 tablets by mouth daily as needed for constipation  sildenafil (VIAGRA) 50 MG tablet, Take 1 tablet (50 mg) by mouth daily as needed (ED)    No current facility-administered medications on file prior to visit.      Allergies   Allergen Reactions     Prednisone Other (See Comments)     psych problems     PMH, PSH, FamHx reviewed    PAST PSYCHIATRIC HISTORY:  Diagnoses- bipolar disorder, CEASAR, ADHD  Suicide Attempts: No   Hospitalizations: Yes      Social History  Housing " status: with his mother   Employment status: employed  Relationship status: Single, wants to start dating  Children: no children    OBJECTIVE                                                      /66   Pulse 52   Physical Exam  Constitutional:       General: He is not in acute distress.     Appearance: Normal appearance. He is not diaphoretic.   HENT:      Head: Normocephalic and atraumatic.   Eyes:      General: No scleral icterus.     Extraocular Movements: Extraocular movements intact.   Cardiovascular:      Rate and Rhythm: Bradycardia present.   Pulmonary:      Effort: Pulmonary effort is normal.   Neurological:      General: No focal deficit present.      Mental Status: He is alert and oriented to person, place, and time.   Psychiatric:         Attention and Perception: Attention and perception normal.         Mood and Affect: Affect normal. Mood is not anxious or depressed. Affect is not flat.         Speech: Speech normal.         Behavior: Behavior is not agitated. Behavior is cooperative.         Thought Content: Thought content normal. Thought content does not include suicidal ideation.         Cognition and Memory: Cognition and memory normal.      Comments: Insight and judgment fair          Labs:    UDS: positive for THC and buprenorphine     No results found for this or any previous visit (from the past 240 hour(s)).    SCOTT Teran [facility]  [facility]  [address] [place] [id number]  [phone]

## 2022-05-11 NOTE — NURSING NOTE
[facility]      Rooming information:  Approximate last use of illicit opioids: 3/21/22  Taking buprenorphine? Yes:  As prescribed? Yes:   Number of buprenorphine films/tablets remaining currently: 2  Side effects related to buprenorphine (constipation, dry mouth, sedation?) No   Narcan currently available: Yes  Other recent substance use:    Alcohol, THC  and Methamphetamine   NICOTINE-Yes:   If using nicotine, ready to quit? No    Point of care urine drug screen positive for: buprenorphine and THC  *POC urine drug screen does not screen for Fentanyl      PHQ-2 Score:     PHQ-2 ( 1999 [employer]) 5/11/2022 4/28/2022   Q1: Little interest or pleasure in doing things 2 2   Q2: Feeling down, depressed or hopeless 2 2   PHQ-2 Score 4 4   PHQ-2 Total Score (12-17 Years)- Positive if 3 or more points; Administer PHQ-A if positive - -        If PHQ-2 score of 3 or higher, has Recovery Clinic therapist or provider been notified? Yes    Any current suicidal ideation? No  If yes, has Recovery Clinic therapist or provider been notified? N/A    Primary care provider: [facility]     Mental health provider: Dr. Oconnor (follow up on MH referral if needed)    Insurance needs: active    Housing needs: stable    Contact information up to date? yes    3rd Party Involvement none today (please obtain WU if pt would like to include)    Morena Mckeon CMA  May 11, 2022  11:42 AM

## 2022-05-11 NOTE — PROGRESS NOTES
[facility]  May 11, 2022      Behavioral Health Clinician Progress Note    Patient Name: Laci Hinkle Jr           Service Type:  Individual      Service Location:   Face to Face in Clinic     Session Start Time: 11:10am  Session End Time: 11:45am      Session Length: 16 - 37      Attendees: Patient     Service Modality:  In-person    Visit Activities (Refresh list every visit): co visit and psychotherapy    Diagnostic Assessment Date: Not completed yet  Treatment Plan Review Date: Not completed yet  See Flowsheets for today's PHQ-9 and CEASAR-7 results  Previous PHQ-9:   PHQ-9 SCORE 9/24/2019 1/7/2020 7/22/2021   PHQ-9 Total Score - - -   PHQ-9 Total Score 16 15 22     Previous CEASAR-7:   CEASAR-7 SCORE 3/14/2012 9/16/2014 7/22/2021   Total Score 17 18 -   Total Score - - 6       GLORIA LEVEL:  GLORIA Score (Last Two) 9/16/2014   GLORIA Raw Score 45   Activation Score 73.1   GLORIA Level 4       DATA  Extended Session (60+ minutes): No  Interactive Complexity: No  Crisis: No  [facility] Patient: No    Treatment Objective(s) Addressed in This Session:  Target Behavior(s): mental health and addiction    Depressed Mood: Increase interest, engagement, and pleasure in doing things  Decrease frequency and intensity of feeling down, depressed, hopeless  Identify negative self-talk and behaviors: challenge core beliefs, myths, and actions  Improve concentration, focus, and mindfulness in daily activities   Feel less fidgety, restless or slow in daily activities / interpersonal interactions  Anxiety: will experience a reduction in anxiety and will develop more effective coping skills to manage anxiety symptoms  Relationship Problems: will address relationship difficulties in a more adaptive manner  Alcohol / Substance Use: will make healthier choices in regard to substance use  will discuss/consider potential need for formal substance use evaluation, treatment and/or support    Current Stressors / Issues:  The  client came to the client as a walk in-he talked about his disagreement with the medical providers recommendation to use the suboxone strip as he is on the injection sublicade-he talked about why he used Kratom (it helped him to work harder)-he notices that it helps him work hard-he talked about the down side of using Kratom and the result is: the taste is bad-the process is complicated-sounds like a lot to be able to work harder-I do jobs that I don't like and that is why I go to school-I was in school and because of my use I was not doing so good-he talked about if he uses his pale grants this will be good for him-getting sober so that he is able to have success in school and ready the self to be able to have the kind of job that he 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-still living with his mother-I want to feel alive-you are were you need to be and to find comfort in where you are so that you can be present to do what you need to do to get to where you need to be-he finds socialization hard and this is why he used chemicals and he talked about his desire to be sober because he will not find a healthy partner if he is using-he is challenged with this desire to want to use-I wanted to be sober and then I wanted one drink and then I fell off-anxiety in social settings-I was in an acting class and I left and I feel bad about this-going to treatment will help as it will force me to have to interact and be around other people-when I went to residential treatment in the past it helped me with the socialization anxiety-he stated that he is going to [facility]-he needs to go complete a CD evaluation tomorrow-wants to find a fun place to work-you need something that is consistent and structured where you feel more stable to able to know what would be good for you-he stated that he is feeling more hopeful with this conversation.      Goal: get good job-have partner that is healthy-    Progress on Treatment Objective(s) /  Homework:  No improvement - PREPARATION (Decided to change - considering how); Intervened by negotiating a change plan and determining options / strategies for behavior change, identifying triggers, exploring social supports, and working towards setting a date to begin behavior change    Motivational Interviewing    MI Intervention: Expressed Empathy/Understanding, Supported Autonomy, Collaboration, Evocation, Permission to raise concern or advise, Open-ended questions, Reflections: simple and complex and Change talk (evoked)     Change Talk Expressed by the Patient: Desire to change Reasons to change Need to change Activation Taking steps    Provider Response to Change Talk: E - Evoked more info from patient about behavior change, A - Affirmed patient's thoughts, decisions, or attempts at behavior change, R - Reflected patient's change talk and S - Summarized patient's change talk statements      Care Plan review completed: No    Medication Review:  No changes to current psychiatric medication(s)    Medication Compliance:  NA    Changes in Health Issues:   None reported    Chemical Use Review:   Substance Use: Problem use continues with no change since last session, Provided encouragement towards sobriety  Provided support and affirmation for steps taken towards sobriety         Tobacco Use: Not reported    Assessment: Current Emotional / Mental Status (status of significant symptoms):  Risk status (Self / Other harm or suicidal ideation)  Patient denies a history of suicidal ideation, suicide attempts, self-injurious behavior, homicidal ideation, homicidal behavior and and other safety concerns  Patient denies current fears or concerns for personal safety.  Patient denies current or recent suicidal ideation or behaviors.  Patient denies current or recent homicidal ideation or behaviors.  Patient denies current or recent self injurious behavior or ideation.  Patient denies other safety concerns.  A safety and risk  management plan has not been developed at this time, however patient was encouraged to call [place] [phone] should there be a change in any of these risk factors.    Appearance:   Appropriate   Eye Contact:   Good   Psychomotor Behavior: Normal  Restless   Attitude:   Cooperative   Orientation:   All  Speech   Rate / Production: Normal/ Responsive Normal    Volume:  Normal   Mood:    Anxious  Depressed  Normal  Affect:    Appropriate  Expansive   Thought Content:  Clear   Thought Form:  Coherent  Goal Directed  Tangential  Circumstantial  Insight:    Fair     Diagnoses:  1. Bipolar 1 disorder (H)    2. Opioid use disorder, severe, dependence (H)    3. Adjustment disorder with mixed anxiety and depressed mood        Collateral Reports Completed:  Not Applicable    Plan: (Homework, other):  Patient was given information about behavioral services and encouraged to schedule a follow up appointment with the clinic [facility] as needed.  He was also given information about mental health symptoms and treatment options  and strategies to maintain sobriety.  CD Recommendations: Practice Harm Reduction: wait 30 minutes before using and complete the CD assessment and follow recommendations.   [facility], KARSTEN, [facility]      ______________________________________________________________________

## 2022-05-11 NOTE — TELEPHONE ENCOUNTER
----- Message from Traci Newton sent at 5/11/2022 12:38 PM CDT -----  Regarding: Pt Medication Refill Request - Dr. Oconnor  Contact: [phone]   Health Call Center    Phone Message    May a detailed message be left on voicemail: yes     Reason for Call: Medication Refill Request    Has the patient contacted the pharmacy for the refill? Yes   Name of medication being requested: Olanzapine - Zyprexa  Provider who prescribed the medication: Dr. Oconnor  Pharmacy: [id number], [place] [id number] [facility] [address] [place] [address] (Ph: [phone])  Date medication is needed: ASAP    PT stopped by in-person from Recovery clinic. He said that he checked with his pharmacy and they told him that his insurance won't cover the 90-day refill and that he needs Dr. Oconnor to resend a new one or authorize it--he mentioned it might have been denied because of compliance reasons because 'I had taken too much and I know that I shouldn't have done that'. He believes his insurance would cover the 30-day but thinks he was given 90 because Dr. Oconnor is leaving and he understands that he will need a new provider. He said he would check again with his pharmacy and wanted to pass along to Dr. Oconnor--he can be reached at #[phone] for clarification/follow-up.       Action Taken: Message routed to:  Other: P Psychiatry Nurse    Travel Screening: Not Applicable

## 2022-05-11 NOTE — TELEPHONE ENCOUNTER
Per pt's pharmacy, insurance is denying a refill of Zyprexa due it appearing as though pt is refilling a month early.  Writer contacted pt's insurance and notified them of patient incorrectly taking twice the amount for one month.  Insurance has approved the prescription and the pharmacy is filling it.  Writer left patient a voicemail with update.    [employer] - Pharmacy Administration [phone]

## 2022-05-11 NOTE — PROGRESS NOTES
"[facility]    Peer  met with Laci Hinkle Jr in the Recovery Clinic to introduce himself, detail services provided and discuss current status of recovery. Pt appeared alert, oriented and open to feedback during our discussion.     Pt states recovery has been challenging. Pt states arrival today to discuss suboxone assisted treatment with provider with regards to his recovery goals.   Pt states making a commitment with his therapist and his mother (whom provides him housing at present) that he will enter into an inpatient treatment program with an honest intention to stay for the entire program (pt reports leaving previous programs prior to completion)   Pt mentions hearing positive things about [facility] in [place] - and reports this is preferred facility for treatment.  Pt states leaving this facility a voicemail message for availability status.   Pt reports needing to update his Rule 25 and told PRC he intends to do a \"walk in\" at [facility] by the end of this week. PRC opined that [facility] may do them as part of their programming and encouraged pt to make a follow up call today.      Pt spoke of wanting to find claudine and aliveness without resorting to substances.  Pt wants to go back to school for a trade/occupation that will bring him purpose in life, and hopefully lead to a healthy supportive personal relationship with a significant other.  PRC states that finding and working within a recovery lifestyle can lead to enlightenment and aliveness the pt reports he is seeking.  PRC encouraged pt to not give up hope. PRC encourages focus and determination towards recovery goals.     PRC welcomes contact for recovery based support and resources. PRC and pt agree to speak again during an upcoming  visit.     Service Type:     Individual     Session Start Time:   11:00 AM                      Session End Time:    11:30 AM    Session Length:   30 " minutes    Patient Goal: To utilize suboxone assisted treatment for sobriety and long term recovery.     Goal Progress:   Ongoing.  Pt looks to admission into an inpatient treatment program to address substance use and mental health issues.     Key Risk Factors to Recovery:   PRC encourages being aware of risk factors that can lead to re-use which include avoiding isolation, avoiding triggers and managing cravings in a healthy manner. being open and willing to acceptance and change on a daily basis.  PRC encourages pt to establish a sober network calling tree to reach out to when needed.  Continue to practice honesty with ourselves and trusted support person(s).   PRC encourages regular attendance at recovery based meetings as well as finding a sponsor for mentoring and accountability.   PRC encourages consideration of other services such as counseling for mental health issues which can correlate with our substance use.      Support Needs:   Ongoing care, support and resources for opioid substance use disorder.     Follow up:   PRC has provided pt with his contact information for support and resource needs.    PRC and pt agree to meet during an upcoming  visit.       [facility]  [address], [address]   [place], [place], [address]  Clinic Phone: [phone]  Clinic Fax: [phone]  Peer  phone: [phone]    Open Monday - Friday  9:00am-4:00pm  Walk in hours: 9am-3pm      Stewart Chester  May 11, 2022  1:17 PM    Peer  is supervised by the program medical director.

## 2022-05-17 ENCOUNTER — TELEPHONE (OUTPATIENT)
Facility: CLINIC | Age: 27
End: 2022-05-17
Payer: COMMERCIAL

## 2022-05-17 NOTE — TELEPHONE ENCOUNTER
[facility] Call Center    Phone Message    May a detailed message be left on voicemail: yes     Reason for Call: Medication Question or concern regarding medication   Prescription Clarification  Name of Medication: Prozac  Prescribing Provider: Bond   Pharmacy: [id number] - [place], [place] - [address]     What on the order needs clarification?       Having issues with Prozac, stated the medication wore off and is affecting his vision.  He didn't take it for a couple of days and then took it again and he is reporting those symptoms with his vision.  Would like to stop taking prozac but would like advice from a medical professional.     Would like nurse to call him back [phone] ASAP since he is going to treatment center on Thursday.      He is going back to [facility] on Thursday (5/19/22) for the next 28 days.          Action Taken: Message routed to:  Other: Nursing pool    Travel Screening: Not Applicable

## 2022-05-18 ENCOUNTER — TELEPHONE (OUTPATIENT)
Dept: BEHAVIORAL HEALTH | Facility: CLINIC | Age: 27
End: 2022-05-18
Payer: COMMERCIAL

## 2022-05-18 NOTE — TELEPHONE ENCOUNTER
Writer spoke to patient - he reports blurry vision with prozac, and denies any other symptoms.  When patient stops taking the prozac, his vision improves.  Patient also reports that he feels as though it is no longer effective for his depression.      Patient is going to treatment at [facility] tomorrow for 28 days.  He would like to know if he has any other options besides prozac.

## 2022-05-23 ENCOUNTER — OFFICE VISIT (OUTPATIENT)
Dept: BEHAVIORAL HEALTH | Facility: CLINIC | Age: 27
End: 2022-05-23
Payer: COMMERCIAL

## 2022-05-23 ENCOUNTER — OFFICE VISIT (OUTPATIENT)
Dept: BEHAVIORAL HEALTH | Facility: CLINIC | Age: 27
End: 2022-05-23

## 2022-05-23 VITALS — DIASTOLIC BLOOD PRESSURE: 133 MMHG | SYSTOLIC BLOOD PRESSURE: 186 MMHG | HEART RATE: 94 BPM

## 2022-05-23 DIAGNOSIS — F17.200 TOBACCO USE DISORDER: ICD-10-CM

## 2022-05-23 DIAGNOSIS — F11.20 OPIOID USE DISORDER, SEVERE, DEPENDENCE (H): Primary | ICD-10-CM

## 2022-05-23 DIAGNOSIS — F15.90 STIMULANT USE DISORDER: ICD-10-CM

## 2022-05-23 DIAGNOSIS — F10.20 ALCOHOL USE DISORDER, SEVERE, DEPENDENCE (H): ICD-10-CM

## 2022-05-23 DIAGNOSIS — F31.9 BIPOLAR 1 DISORDER (H): ICD-10-CM

## 2022-05-23 PROCEDURE — 99215 OFFICE O/P EST HI 40 MIN: CPT | Performed by: FAMILY MEDICINE

## 2022-05-23 PROCEDURE — H0038 SELF-HELP/PEER SVC PER 15MIN: HCPCS

## 2022-05-23 RX ORDER — BUPRENORPHINE AND NALOXONE 8; 2 MG/1; MG/1
1 FILM, SOLUBLE BUCCAL; SUBLINGUAL 2 TIMES DAILY
Qty: 14 FILM | Refills: 0 | Status: SHIPPED | OUTPATIENT
Start: 2022-05-23 | End: 2022-06-01

## 2022-05-23 NOTE — PROGRESS NOTES
[facility] - Recovery Clinic Follow Up    ASSESSMENT/PLAN                                                      1. Opioid use disorder, severe, dependence (H)  Pt reporting advancing his dose on occasions in response to cravings.    Recommend he increase buprenorphine to 16mg/day and take regularly.    Pt confirms he has naloxone  Proceed with plans to start a dual diagnosis program, pt is working with  therapist to arrange  - buprenorphine HCl-naloxone HCl (SUBOXONE) 8-2 MG per film; Place 1 Film under the tongue 2 times daily  Dispense: 14 Film; Refill: 0    2. Alcohol use disorder, severe, dependence (H)  Pt is prescribed Depakote and clonidine.  No h/o withdrawal seizures.    Pt plans to resume Antabuse per his preference  Discussed acamprosate is most effective after a period of abstinence  Proceed with plans to start a dual diagnosis program, pt is working with  therapist to arrange    3. Stimulant use disorder  Proceed with plans to start a dual diagnosis program, pt is working with  therapist to arrange    4. Tobacco use disorder  Not ready to quit at this time    5. Bipolar 1 disorder (H)  Follow-up as scheduled with psychiatry and encouraged him to follow recommendations for referral from current psychiatrist following current psychiatrist's departure       Return in about 1 week (around 5/30/2022) for Follow up, in person.    SUBJECTIVE                                                        CC/HPI:  Laci Hinkle Jr is a 26 year old male with PMH bipolar disorder, CEASAR, ADHD, and PSUD including opioids on buprenorphine who presents to the Recovery Clinic for return visit.       Brief History:  Pt was initially evaluated in Recovery Clinic on 7/22/21. Pt was referred by staff in [facility] due to pt's initial desire to taper off buprenorphine which he had been taking from nonprescription sources.   Pt decided to seek treatment at this time due to wanting to stop use of substances to allow  him to take necessary steps to develop a career he enjoys.  Pt left [facility], but continued with Recovery Clinic.  He eventually decided to continue buprenorphine therapy, and then transferred to [facility] with initial injection 8/13/21.  He received a total of 3 Sublocade injections, most recently 100mg on 10/8/21.   Pt then indicated to  staff he wanted to discontinue Sublocade injections.  He was lost to follow up until he returned to  on 12/9/21 requesting to resume  buprenorphine after brief return to use of alcohol and kratom.      He describes his opioid use history starting with heroin as a teenager, last use age 17.  He started methadone treatment age 17, dose up to 100mg, then tapered off to 6mg/day then stopped.  He began using kratom ~2016, eventually used amounts up to 100g daily.  He was first prescribed buprenorphine for OUD in 2019. He took as prescribed through 1/2021, then continued on buprenorphine through nonprescription sources.    IV drug use: No   History of overdose: No  Previous treatments : Yes: multiple, [facility], [facility] 11/2020, previous buprenorphine and methadone  Longest period of sobriety: few months  Medical complications related to substance use: exacerbation of MH diagnoses  Hepatitis C Status  unknown  HIV Status unknown     Other recent substance use:  Stimulants: used methamphetamine ages [age], stopped for 5 years, then resumed age 24.    Sedatives/hypnotics/anxiolytics: has used in past, denies regular use  Alcohol: h/o drinking 1 L liquor daily  Tobacco: 1 ppd and ENDS  Cannabis: occasional   Hallucinogens:has used in past, denies recent  Behavioral addictions: none    Most recent Recovery Clinic visit 5/11/22  A/P from that visit:     1. Opioid use disorder, severe, dependence (H)  Stable, no recent opioid use or cravings. Continue 12 mg daily.   - [facility]our[facility], [facility] has [facility]t cannabis  use and lack of overall  engagement with the program.    - Buprenorphine HCl-Naloxone HCl (SUBOXONE) 12-3 MG FILM per film; Place 1 Film under the tongue daily  Dispense: 14 Film; Refill: 0     2. Alcohol use disorder, severe, dependence (H)  - Once recent occasion of alcohol use. Pt reports he stopped disulfiram but has since restarted the medication. Does report alcohol cravings. Encouraged pt to take Campral. Discussed medications that can be used to treat AUD. Has tried gabapentin int he past without success.   - Encouraged dual diagnosis treatment at [facility], pt has left outpatient program at [facility] d/t cannabis use and lack of engagement with the program.    - Continue taking disulfiram   - Encouraged outpatient treatment   - acamprosate (CAMPRAL) 333 MG EC tablet; Take 2 tablets (666 mg) by mouth 3 times daily  Dispense: 180 tablet; Refill: 0     3. Mild benzodiazepine use disorder (H)  - Pt denies recent benzo use since last f/u visit. Encouraged abstinence, review risk of taking multiple CNS depressants.   - Encouraged dual diagnosis treatment at [facility], pt has left outpatient program at [facility] d/t cannabis use and lack of engagement with the program.       4. Stimulant use disorder  - Pt continue intermittent stimulant use, most recently crack. He is not sure he wants to stop using stimulants. Denies cravings. Was taking increase dose of olanzapine to treat insomnia from stimulant, this has been discussed by psychiatrist.   - Encouraged dual diagnosis treatment at [facility], pt has left outpatient program at [facility] d/t cannabis use and lack of engagement with the program.  reiterated importance of psychosocial interventions for long term recovery.      5. Cannabis dependence (H)  - continues intermittent cannabis use. UDS positive for THC today. Continue working with psychotherapist, encouraged inpatient dual diagnosis treatment and ongoing recovery supports.      6. Tobacco use disorder  - pt is not  interested in quitting today      Patient counseling completed today:  Recommend pt keep naloxone in their possession and reviewed other aspects of harm reduction to reduce risk of overdose with return to use.   Recommended avoiding concurrent use of alcohol, benzodiazepines or other sedatives with buprenorphine or other opioids.  Discussed importance of avoiding isolation, building a network of supportive relationships, avoiding people/places/things associated with past use to reduce risk of relapse; including motivational interviewing regarding psychosocial treatment for addiction. Review severe nature of polysubstance use disorders. Pt continues to struggle with wanting to be in recovery.                 Return in about 2 weeks (around 5/25/2022) for Follow up, with me, in person..          5/23/22:  Pt presents to clinic 2 days before his schedule follow-up due to running out of buprenorphine early.  He states he has taken additional buprenorphine on multiple occasions due to cravings, mainly for stimulants, causing this to happen.  He states he has not used other opioids.   He describes drinking alcohol 3-4 days/week, last use 5/20/22.  He plans to start Antabuse today as well as acamprosate.  He reports he used crack cocaine for the first time since his last visit.  He continues to smoke cannabis.  He would like to enter a dual diagnosis program, and met with [facility] therapist today to take steps towards this.      He has stopped taking fluoxetine due to concerns this was affecting his vision.  He continues with some vision complaints.  He has follow-up with psychiatry soon.  He continues to take Depakote, Lamictal, and Zyprexa.      [place] Prescription Drug Monitoring Program Reviewed:  Yes:     Medications:  acamprosate (CAMPRAL) 333 MG EC tablet, Take 2 tablets (666 mg) by mouth 3 times daily  cloNIDine (CATAPRES) 0.1 MG tablet, Take 1 tablet (0.1 mg) by mouth 2 times daily AND 2 tablets (0.2 mg) At  Bedtime.  divalproex sodium extended-release (DEPAKOTE ER) 500 MG 24 hr tablet, Take 4 tablets (2,000 mg) by mouth daily  lamoTRIgine (LAMICTAL) 100 MG tablet, Take 2 tablets (200 mg) by mouth At Bedtime  OLANZapine (ZYPREXA) 15 MG tablet, Take 1 tablet (15 mg) by mouth At Bedtime  disulfiram (ANTABUSE) 250 MG tablet, Take 1 tablet (250 mg) by mouth daily (Patient not taking: Reported on 5/23/2022)  FLUoxetine (PROZAC) 20 MG capsule, Take 3 capsules (60 mg) by mouth daily (Patient not taking: Reported on 5/23/2022)  naloxone (NARCAN) 4 MG/0.1ML nasal spray, Spray 1 spray (4 mg) into one nostril alternating nostrils once as needed for opioid reversal every 2-3 minutes until assistance arrives (Patient not taking: Reported on 5/23/2022)  polyethylene glycol (MIRALAX) 17 GM/Dose powder, Take 17 g (1 capful) by mouth daily (Patient not taking: Reported on 5/23/2022)  senna-docusate (SENOKOT-S/PERICOLACE) 8.6-50 MG tablet, Take 2 tablets by mouth daily as needed for constipation (Patient not taking: Reported on 5/23/2022)  sildenafil (VIAGRA) 50 MG tablet, Take 1 tablet (50 mg) by mouth daily as needed (ED)    No current facility-administered medications on file prior to visit.      Allergies   Allergen Reactions     Prednisone Other (See Comments)     psych problems     PMH, PSH, FamHx reviewed    PAST PSYCHIATRIC HISTORY:  Diagnoses- bipolar disorder, CEASAR, ADHD  Suicide Attempts: No   Hospitalizations: Yes      Social History  Housing status: with his mother   Employment status: employed  Relationship status: Single  Children: no children    OBJECTIVE                                                      BP (!) 186/133   Pulse 94   Physical Exam  Vitals and nursing note reviewed.   Constitutional:       General: He is not in acute distress.     Appearance: Normal appearance. He is not diaphoretic.   HENT:      Head: Normocephalic and atraumatic.   Eyes:      General: No scleral icterus.     Extraocular Movements:  Extraocular movements intact.   Pulmonary:      Effort: Pulmonary effort is normal.   Neurological:      General: No focal deficit present.      Mental Status: He is alert and oriented to person, place, and time.   Psychiatric:         Attention and Perception: Attention and perception normal.         Mood and Affect: Affect normal. Mood is not anxious or depressed. Affect is not flat.         Speech: Speech is rapid and pressured.         Behavior: Behavior is not agitated. Behavior is cooperative.         Thought Content: Thought content normal. Thought content does not include suicidal ideation.         Cognition and Memory: Cognition and memory normal.      Comments: Insight and judgment fair to poor         Labs:    UDS 5/23/22: positive for THC and buprenorphine     No results found for this or any previous visit (from the past 240 hour(s)).      At least 40min spent in review of medical record,  review, obtaining histories, discussing recommendations, providing support    Asiya Hastings MD  [place] [id number] [place] [address] [id number] [phone]

## 2022-05-23 NOTE — NURSING NOTE
[facility] Recovery [facility]      Rooming information:  Approximate last use of illicit opioids: 3/21/22  Taking buprenorphine? Yes:  As prescribed? Yes:   Number of buprenorphine films/tablets remaining currently: 1  Side effects related to buprenorphine (constipation, dry mouth, sedation?) No   Narcan currently available: Yes  Other recent substance use:    Alcohol, Cannabis  and Cocaine   NICOTINE-Yes:   If using nicotine, ready to quit? No    Point of care urine drug screen positive for: buprenorphine and THC  *POC urine drug screen does not screen for Fentanyl      PHQ-2 Score:     PHQ-2 ( 1999 Pf[employer]) 5/23/2022 5/11/2022   Q1: Little interest or pleasure in doing things 1 2   Q2: Feeling down, depressed or hopeless 2 2   PHQ-2 Score 3 4   PHQ-2 Total Score (12-17 Years)- Positive if 3 or more points; Administer PHQ-A if positive - -        If PHQ-2 score of 3 or higher, has Recovery Clinic therapist or provider been notified? Yes    Any current suicidal ideation? No  If yes, has Recovery Clinic therapist or provider been notified? N/A    Primary care provider: [facility]     Mental health provider: Dr. Oconnor (follow up on MH referral if needed)    Insurance needs: active    Housing needs: stable    Contact information up to date? yes    3rd Party Involvement none today (please obtain WU if pt would like to include)    Morena Mckeon CMA  May 23, 2022  3:11 PM

## 2022-05-23 NOTE — PROGRESS NOTES
[facility] Recovery Clinic    Peer  met with Laci EMERSON Hinkle Jr in the Recovery Clinic to introduce himself, detail services provided and discuss current status of recovery. Pt appeared alert, oriented and open to feedback during our discussion.     Pt reports his recovery has been challenging and admits to use of weed and crack cocaine with a friend. Pt reports he is scheduled to enter [facility] soon and is looking forward to the services provided at this IRTS facility.  Pt seems a bit confused as to the admission process and  psychotherapist [facility] is meeting with him today to assist with this process.      PRC and pt discussed his level of interest in seeking treatment and pt is commended for making the choice to address substance use issues at this time.  PRC provided  welcoming contact for recovery based support and resources. PRC and pt agree to speak again during an upcoming  visit.           Service Type:     Individual     Session Start Time:     3:00 PM                    Session End Time:       3:15 PM    Session Length:       15 minutes      Patient Goal: To utilize suboxone assisted treatment for sobriety and long term recovery.     Goal Progress:   Ongoing.  Pt states awaiting admission [facility]son an IRTS facility to address his substance use issues.     Key Risk Factors to Recovery:   PRC encourages being aware of risk factors that can lead to re-use which include avoiding isolation, avoiding triggers and managing cravings in a healthy manner. being open and willing to acceptance and change on a daily basis.  PRC encourages pt to establish a sober network calling tree to reach out to when needed.  Continue to practice honesty with ourselves and trusted support person(s).   PRC encourages regular attendance at recovery based meetings as well as finding a sponsor for mentoring and accountability.   PRC encourages consideration of other services such as  counseling for mental health issues which can correlate with our substance use.      Support Needs:   Ongoing care, support and resources for opioid substance use disorder.     Follow up:   PRC has provided pt with his contact information for support and resource needs.    PRC and pt agree to meet during an upcoming  visit.       [facility]  [address], [address]   [place], [place], [address]  Clinic Phone: [phone]  Clinic Fax: [phone]  Peer  phone: [phone]    Open Monday - Friday  9:00am-4:00pm  Walk in hours: 9am-3pm      Stewart Chester  May 23, 2022  3:35 PM    Peer  is supervised by the program medical director.

## 2022-05-31 ENCOUNTER — TELEPHONE (OUTPATIENT)
Dept: PSYCHIATRY | Facility: CLINIC | Age: 27
End: 2022-05-31
Payer: COMMERCIAL

## 2022-05-31 ENCOUNTER — VIRTUAL VISIT (OUTPATIENT)
Dept: PSYCHIATRY | Facility: CLINIC | Age: 27
End: 2022-05-31
Attending: PSYCHIATRY & NEUROLOGY
Payer: COMMERCIAL

## 2022-05-31 DIAGNOSIS — F31.81 BIPOLAR 2 DISORDER (H): Primary | ICD-10-CM

## 2022-05-31 PROCEDURE — 99214 OFFICE O/P EST MOD 30 MIN: CPT | Mod: TEL | Performed by: PSYCHIATRY & NEUROLOGY

## 2022-05-31 ASSESSMENT — PATIENT HEALTH QUESTIONNAIRE - PHQ9
SUM OF ALL RESPONSES TO PHQ QUESTIONS 1-9: 19
SUM OF ALL RESPONSES TO PHQ QUESTIONS 1-9: 19
10. IF YOU CHECKED OFF ANY PROBLEMS, HOW DIFFICULT HAVE THESE PROBLEMS MADE IT FOR YOU TO DO YOUR WORK, TAKE CARE OF THINGS AT HOME, OR GET ALONG WITH OTHER PEOPLE: EXTREMELY DIFFICULT
SUM OF ALL RESPONSES TO PHQ QUESTIONS 1-9: 19
SUM OF ALL RESPONSES TO PHQ QUESTIONS 1-9: 19

## 2022-05-31 NOTE — PROGRESS NOTES
Laci Hinkle Jr is a 26 year old who has consented to receive services via billable phone visit.      What phone number would you prefer to be contacted at?: Mobile phone number on file:   Telephone Information:   Mobile [phone]     How would you prefer to obtain AVS?: Eduin

## 2022-05-31 NOTE — PATIENT INSTRUCTIONS
Continue your medications as prescribed, including olanzapine 15 mg at night    I strongly encourage you and your efforts to get clean and we should good luck in the treatment facility starting on Thursday    As you know I am moving out of [place] in early July.  The clinic will be in contact regarding follow-up options for when I leave.    **For crisis resources, please see the information at the end of this document**   Patient Education    Thank you for coming to the [facility] [place] [facility].    Lab Testing:  If you had lab testing today and your results are reassuring or normal they will be mailed to you or sent through [employer] within 7 days. If the lab tests need quick action we will call you with the results. The phone number we will call with results is # [phone]. If this is not the best number please call our clinic and change the number.     Medication Refills:  If you need any refills please call your pharmacy and they will contact us. Our fax number for refills is [phone].   Three business days of notice are needed for general medication refill requests.   Five business days of notice are needed for controlled substance refill requests.   If you need to change to a different pharmacy, please contact the new pharmacy directly. The new pharmacy will help you get your medications transferred.     Contact Us:  Please call [phone] during business hours (8-5:00 M-F).  If you have medication related questions after clinic hours, or on the weekend, please call [phone].    Financial Assistance [phone]  Medical Records [phone]       MENTAL HEALTH CRISIS RESOURCES:  For a emergency help, please call 911 or go to the nearest Emergency Department.     Emergency Walk-In Options:   Em[facility] Unit @ [facility] ([place]): [phone] - Specialized mental health emergency area designed to be calming  [facility] ([place]):  [phone]  [facility] ([place]): [phone]  [facility] ([place]): [phone]    [facility] Crisis Information:   [place]: [phone]  [place]: [phone]  [place] ([place]) - Adult: [phone]     Child: [phone]  [place] - Adult: [phone]     Child: [phone]  [place]: [phone]  List of all [facility] resources:   https://[place].gov/dhs/people-we-serve/adults/health-care/mental-health/resources/crisis-contacts.jsp    National Crisis Information:   Crisis Text Line: Text  [place]  to 669361  National Suicide Prevention Lifeline: 8-443-120-TALK (1-261.630.3929)       For online chat options, visit https://suicidepreventionlifeline.org/chat/  Poison Control Center: 1-744.714.6001  Trans Lifeline: 6-675-133-3342 - Hotline for transgender people of all ages  The [employer]sa Project: 1-437-432-8740 - Hotline for LGBT youth     For Non-Emergency Support:   Fast Tracker: Mental Health & Substance Use Disorder Resources -   https://www.Cvgram.men.org/

## 2022-05-31 NOTE — PROGRESS NOTES
Psychiatry Clinic Follow-up Note    TELEPHONE VISIT  Laci Hinkle Jr is a 26 year old pt. who is being evaluated via a billable telephone visit.      The patient has been notified of the following:    We have found that certain health care needs can be provided without the need for a physical exam. This service lets us provide the care you need with a short phone conversation. If a prescription is necessary we can send it directly to your pharmacy. If lab work is needed we can place an order for that and you can then stop by our lab to have the test done at a later time. Insurers are generally covering virtual visits as they would in-office visits so billing should not be different than normal.  If for some reason you do get billed incorrectly, you should contact the billing office to correct it and that number is in the AVS .    Patient has given verbal consent for a telephone visit?:  Yes   How would the pt like to obtain the AVS?:  NarrativeS SmartPhrase [PsychAVS] has been placed in 'Patient Instructions':  Yes     Start Time:  2:06 PM          End Time:  353pm      Laci Hinkle Jr. MRN# [id number]   Age: 24 year old YOB: 1995          Assessment:     Since the last visit Laci has again been on occasion taking more olanzapine than prescribed.  He is prescribed 15 mg at night and sometimes takes 30 mg at night to promote sleep.  I will otherwise 1 early refill for him, and he is aware that if this happens again then he will run out early and no early refills will be provided.  He is agreeable.  He is entering a dual diagnosis treatment facility on Thursday and hopeful that they will be able to help him stop using substances.  I have strongly encouraged him in this.  Laci is aware that I am moving out of state at the beginning of July and the clinic will be in contact regarding follow-up options for when I leave.  He will continue his other medications as  usual.    Attestation:  Patient has been evaluated by me, Bo Oconnor MD, PhD.         Diagnoses:     Axis I: Bipolar I disorder, polysubstance dependence    Axis II: Deferred    Axis III: Medication-induced weight gain; hypertension    Axis IV: mild  psychosocial stressors     Axis V: Global Assessment of Functionin-60    Interim History     Since the last visit, Laci reports that he has periodically been using extra olanzapine as outlined above.  He finds it helpful in promoting sleep.  I have let him know that I will authorize 1 early refill, as he will run out early due to overuse, and that if that happens again no additional early refills will be provided.  Laci is agreeable.    Laci is entering a dual diagnosis treatment facility on Thursday.  He plans to stay for 30 to 45 days.  He continues to show some ambivalence about his substance use.  On the one hand he really enjoys getting high, on the other hand he feels that he is wasting his life and wants to get clean.  I have strongly encouraged him to work on getting sober.    Laci reports some feelings of depression, including passive suicidal thoughts.  He denies any intent or plan.  He is forward looking and wanting to work on his sobriety.    Laci is aware that I am moving out of state at the beginning of July.  The clinic will be in contact regarding options for follow-up for when I leave.  I will authorize 1 early refill on his olanzapine.  He will continue his medications as usual.  He reports that the treatment facility will be administering them when he starts there on Thursday.    Review of Systems     A comprehensive review of systems was performed and is negative other than noted above.          Allergies:      Allergies   Allergen Reactions     Prednisone Other (See Comments)     psych problems     Current Medications     Depakote 2000 mg HS  Lamotrigine 200mg HS  Olanzapine 15 mg HS    Mental Status Exam     Alertness:  alert  and oriented  Behavior/Demeanor: cooperative, pleasant and calm,  Speech: normal  Language: intact  Psychomotor: normal or unremarkable  Mood: description consistent with euthymia  Affect: full-range; was congruent to mood  Thought Process/Associations: unremarkable  Thought Content:  Denies active/passive suicidal ideation  Perception:  Denies hallucinations  Insight: fair  Judgment: fair  Cognition:  does appear grossly intact.  Answers for HPI/ROS submitted by the patient on 5/31/2022  If you checked off any problems, how difficult have these problems made it for you to do your work, take care of things at home, or get along with other people?: Extremely difficult  PHQ9 TOTAL SCORE: 19

## 2022-05-31 NOTE — TELEPHONE ENCOUNTER
Writer left a voicemail with Patient's pharmacy approving olanzapine early refill per Dr. Oconnor.  (unable to speak to pharmacy due to 20 min hold time)

## 2022-06-01 ENCOUNTER — TELEPHONE (OUTPATIENT)
Dept: BEHAVIORAL HEALTH | Facility: CLINIC | Age: 27
End: 2022-06-01
Payer: COMMERCIAL

## 2022-06-01 ENCOUNTER — OFFICE VISIT (OUTPATIENT)
Dept: BEHAVIORAL HEALTH | Facility: CLINIC | Age: 27
End: 2022-06-01
Payer: COMMERCIAL

## 2022-06-01 ENCOUNTER — TELEPHONE (OUTPATIENT)
Dept: BEHAVIORAL HEALTH | Facility: CLINIC | Age: 27
End: 2022-06-01

## 2022-06-01 VITALS — HEART RATE: 71 BPM | DIASTOLIC BLOOD PRESSURE: 119 MMHG | SYSTOLIC BLOOD PRESSURE: 163 MMHG

## 2022-06-01 DIAGNOSIS — F15.90 STIMULANT USE DISORDER: ICD-10-CM

## 2022-06-01 DIAGNOSIS — F31.81 BIPOLAR 2 DISORDER (H): ICD-10-CM

## 2022-06-01 DIAGNOSIS — F11.20 OPIOID USE DISORDER, SEVERE, DEPENDENCE (H): Primary | ICD-10-CM

## 2022-06-01 DIAGNOSIS — F10.20 ALCOHOL USE DISORDER, SEVERE, DEPENDENCE (H): ICD-10-CM

## 2022-06-01 DIAGNOSIS — F17.200 TOBACCO USE DISORDER: ICD-10-CM

## 2022-06-01 DIAGNOSIS — F12.90 CONTINUOUS CANNABIS USE: ICD-10-CM

## 2022-06-01 PROCEDURE — 99215 OFFICE O/P EST HI 40 MIN: CPT | Performed by: FAMILY MEDICINE

## 2022-06-01 RX ORDER — BUPRENORPHINE AND NALOXONE 12; 3 MG/1; MG/1
2 FILM, SOLUBLE BUCCAL; SUBLINGUAL DAILY
Qty: 30 FILM | Refills: 0 | Status: SHIPPED | OUTPATIENT
Start: 2022-06-01 | End: 2022-06-17

## 2022-06-01 RX ORDER — ACAMPROSATE CALCIUM 333 MG/1
999 TABLET, DELAYED RELEASE ORAL 2 TIMES DAILY
Qty: 180 TABLET | Refills: 0 | COMMUNITY
Start: 2022-06-01 | End: 2022-07-13

## 2022-06-01 ASSESSMENT — PATIENT HEALTH QUESTIONNAIRE - PHQ9
10. IF YOU CHECKED OFF ANY PROBLEMS, HOW DIFFICULT HAVE THESE PROBLEMS MADE IT FOR YOU TO DO YOUR WORK, TAKE CARE OF THINGS AT HOME, OR GET ALONG WITH OTHER PEOPLE: EXTREMELY DIFFICULT
SUM OF ALL RESPONSES TO PHQ QUESTIONS 1-9: 19

## 2022-06-01 NOTE — TELEPHONE ENCOUNTER
Mental Health &Addiction (MH&A)Transition Clinic (TC):     Provides Patient Support While Waiting to Access Programmatic and Outpatient MH&A Care and Provides Select Crisis Assessment Services     NURSING Referral Review  _________________________________________    This RN has reviewed this Medication Management referral to the Transition Clinic and deemed the referral   [x] Appropriate  [] Inappropriate   []Consulting     Based on the following criteria:    Pt has a psychiatric provider (or pending plan) in place for future prescribing: Yes:  11/29/22   Provider Keyona Alcantar   [facility]     Timeframe until pt's scheduled psychiatry appointment is less than 6 months: Yes  ~6 months.       Pt takes psychiatric medications: Yes:acamprosate (CAMPRAL) 333 MG EC tablet, Buprenorphine HCl-Naloxone HCl (SUBOXONE) 12-3 MG,  OLANZapine (ZYPREXA) 15 MG tablet, lamoTRIgine (LAMICTAL) 100 MG tablet,FLUoxetine (PROZAC) 20 MG capsule,  divalproex sodium extended-release (DEPAKOTE ER) 500 MG 24 hr tablet, cloNIDine (CATAPRES) 0.1 MG tablet,  disulfiram (ANTABUSE) 250 MG tablet.          Pt's goals seem to align with this temporary service: Yes: Referred by Dr. Hastings.  Patient was recently at [facility].      Any additional pertinent information regarding this referral: Referred by Dr. Hastings.  Patient was recently at [facility].  Continuing with the Recovery Clinic. Bipolar 1 Disorder. Opiod Use, ETOH,  Stimulant Use Disorder,       Initial contact w/ patient/parent: RN made first attempt to contact this patient regarding scheduling an appointment with the Transition Clinic.  There was no answer.  Patient was left a voice message to contact the Transition Clinic at [phone].  Awaiting call back.      The Transition Clinic phone # is [phone].    Jose Russell RN on June 1, 2022 at 2:34 PM    [facility] Stephanie Juan,     Medication only-     Start Date for Next Level of  Care Medication (Required): 11/29/22   Provider Keyona Alcantar   [facility] [place]       Thank you!             Previous Messages       ----- Message -----   From: Jose Meyers   Sent: 6/1/2022   1:50 PM CDT   To: [facility]   Subject: Transition Clinic                                 Transition Clinic Referral   [place] Only   Limited [place] Availability     Type of Referral:       _____Therapy   _____Therapy & Medication (Psychiatry next level of care appointment needs to be scheduled)   ___X_Medication Only (Psychiatry next level of care appointment needs to be scheduled)   _____Diagnostic Assessment Only       Referring Provider Name: Asiya Hastings     Clinician completing the assessment.     Referring Provider: [facility] PROVIDER     If known, referring provider contact name: Asiya Hastings ; Phone Number:   Service Line/Location: [facility]     Reason for Transition Clinic Referral: Psychiatry appointment not available until November 2022.     Next Level of Care Patient Will Be Transitioned To: Long term psychiatry     Start Date for Next Level of Care Therapy (Required):   Provider   Location     Start Date for Next Level of Care Medication (Required): [facility] Rn [facility],     Medication only-     Start Date for Next Level of Care Medication (Required): 11/29/22   Provider Keyona Alcantar   [facility] [place]       Thank you!             Previous Messages       ----- Message -----   From: Jose Meyers   Sent: 6/1/2022   1:50 PM CDT   To: [facility]   Subject: Transition Clinic                                 Transition Clinic Referral   [place] Only   Limited [place] Availability     Type of Referral:       _____Therapy   _____Therapy & Medication (Psychiatry next level of care appointment needs to be scheduled)   ___X_Medication Only (Psychiatry next level of care appointment needs to  be scheduled)   _____Diagnostic Assessment Only       Referring Provider Name: Asiya Hastings     Clinician completing the assessment.     Referring Provider: [facility] PROVIDER     If known, referring provider contact name: Asiya Hastings ; Phone Number:   Service Line/Location: [facility]     Reason for Transition Clinic Referral: Psychiatry appointment not available until November 2022.     Next Level of Care Patient Will Be Transitioned To: Long term psychiatry     Start Date for Next Level of Care Therapy (Required):   Provider   Location     Start Date for Next Level of Care Medication (Required): 11/29/22   Provider Keyona Alcantar [facility] Psychiatry cannot see patients who do not have active medical insurance     What Would Be Helpful from the Transition Clinic: Interim care      Needs: NO     Does Patient Have Access to Technology: Yes     Patient E-mail Address: [email or web address]     Current Patient Phone Number: [phone];     Clinician Gender Preference (if applicable): NO     Jose Meyers    Psychiatry cannot see patients who do not have active medical insurance     What Would Be Helpful from the Transition Clinic: Interim care      Needs: NO     Does Patient Have Access to Technology: Yes     Patient E-mail Address: [email or web address]     Current Patient Phone Number: [phone];     Clinician Gender Preference (if applicable): NO     Jose Meyers

## 2022-06-01 NOTE — NURSING NOTE
[facility]      Rooming information:  Approximate last use of illicit opioids: 3/21/22  Taking buprenorphine? Yes:  As prescribed? Yes:   Number of buprenorphine films/tablets remaining currently: 0  Side effects related to buprenorphine (constipation, dry mouth, sedation?) No   Narcan currently available: Yes  Other recent substance use:    Cannabis   NICOTINE-Yes:   If using nicotine, ready to quit? No    Point of care urine drug screen positive for: buprenorphine and THC  *POC urine drug screen does not screen for Fentanyl      PHQ-2 Score:     PHQ-2 ( 1999 [employer]) 6/1/2022 5/23/2022   Q1: Little interest or pleasure in doing things 3 1   Q2: Feeling down, depressed or hopeless 3 2   PHQ-2 Score 6 3   PHQ-2 Total Score (12-17 Years)- Positive if 3 or more points; Administer PHQ-A if positive - -        If PHQ-2 score of 3 or higher, has Recovery Clinic therapist or provider been notified? Yes    Any current suicidal ideation? No  If yes, has Recovery Clinic therapist or provider been notified? N/A    [facility]     Mental health provider: Dr. Oconnor (follow up on MH referral if needed)    Insurance needs: active    Housing needs: stable    Contact information up to date? yes    3rd Party Involvement none today (please obtain WU if pt would like to include)    Morena Mckeon CMA  June 1, 2022  12:48 PM

## 2022-06-01 NOTE — TELEPHONE ENCOUNTER
This writer and the patient called behavioral access and the patient was able to scheduled a psychiatric appointment in November 2022 and the behavioral access worker stated that he would send the referral for transitions clinic and he agreed to copy the addiction medicine medical provider Dr. Hastings with the referral to transitions clinic.

## 2022-06-01 NOTE — TELEPHONE ENCOUNTER
Writer has fwd medication management referral only to TC RN pool as next level of care set and replied to referral source.    Mya Lester  06/01/22  153    ----- Message from Jose Meyers sent at 6/1/2022  1:46 PM CDT -----  Regarding: Transition Clinic  Transition Clinic Referral   [place] Only   Limited [place] Availability    Type of Referral:      _____Therapy  _____Therapy & Medication (Psychiatry next level of care appointment needs to be scheduled)  ___X_Medication Only (Psychiatry next level of care appointment needs to be scheduled)  _____Diagnostic Assessment Only      Referring Provider Name: Asiya Hastings    Clinician completing the assessment.     Referring Provider: [facility] PROVIDER    If known, referring provider contact name: Asiya Hastings ; Phone Number:  Service Line/Location: [facility]    Reason for Transition Clinic Referral: Psychiatry appointment not available until November 2022.    Next Level of Care Patient Will Be Transitioned To: Long term psychiatry    Start Date for Next Level of Care Therapy (Required):   Provider  Location     Start Date for Next Level of Care Medication (Required): 11/29/22  Provider Keyona Alcantar  Location [facility] Psychiatry cannot see patients who do not have active medical insurance    What Would Be Helpful from the Transition Clinic: Interim care     Needs: NO    Does Patient Have Access to Technology: Yes    Patient E-mail Address: [email or web address]    Current Patient Phone Number: [phone];     Clinician Gender Preference (if applicable): NO    Jose Meyers

## 2022-06-01 NOTE — PROGRESS NOTES
[facility] - Recovery Clinic Follow Up    ASSESSMENT/PLAN                                                      1. Opioid use disorder, severe, dependence (H)  Pt reporting continued daily cravings  Increase buprenorphine to 24mg/day  Proceed with plans to start residential treatment at [facility]; pt would benefit from long term engagement in programming including dual diagnosis programming for care after residential.  He states he plans to pursue this type of aftercare.    - Buprenorphine HCl-Naloxone HCl (SUBOXONE) 12-3 MG FILM per film; Place 2 Film under the tongue daily  Dispense: 30 Film; Refill: 0    2. Alcohol use disorder, severe, dependence (H)  Changing acamprosate to bid dosing to improve adherence to treatment dose  Pt states he continues on disulfiram by his choice  Residential treatment as noted above  - acamprosate (CAMPRAL) 333 MG EC tablet; Take 3 tablets (999 mg) by mouth 2 times daily  Dispense: 180 tablet; Refill: 0    3. Stimulant use disorder  Residential treatment as noted above    4. Continuous cannabis use  Starting NAC   Psychosocial treatment as described above  - acetylcysteine (N-ACETYL CYSTEINE) 600 MG CAPS capsule; Take 1 capsule (600 mg) by mouth 2 times daily  Dispense: 60 capsule; Refill: 3    5. Bipolar 2 disorder (H)  Referral to psychiatry to establish with new provider   - Adult Mental Health  Referral; Future    6. Tobacco use disorder  Not interested in quitting at this time     Return in about 2 weeks (around 6/15/2022) for Follow up, using a video visit; pt will be in residential treatment at [facility].    SUBJECTIVE                                                        CC/HPI:  Laci Hinkle Jr is a 26 year old male with PMH bipolar disorder, CEASAR, ADHD, and PSUD including opioids on buprenorphine who presents to the Recovery Clinic for return visit.       Brief History:  Pt first presented to Recovery Clinic on 7/22/21. Pt was referred by staff in  [facility] due to pt's initial desire to taper off buprenorphine which he had been taking from nonprescription sources.   Pt soon left [facility], but continued with Recovery Clinic.  He eventually decided to continue buprenorphine therapy, and then transferred to [facility] with initial injection 8/13/21.  He received a total of 3 Sublocade injections, most recently 100mg on 10/8/21.   Pt then indicated to  staff he wanted to discontinue Sublocade injections.  He was lost to follow up until he returned to  on 12/9/21 requesting to resume [facility] buprenorphine after brief return to use of alcohol and kratom.      He describes his opioid use history starting with heroin as a teenager, last use age 17.  He started methadone treatment age 17, dose up to 100mg, then tapered off to 6mg/day then stopped.  He began using kratom ~2016, eventually used amounts up to 100g daily.  He was first prescribed buprenorphine for OUD in 2019. He took as prescribed through 1/2021, then continued on buprenorphine through nonprescription sources.    IV drug use: No   History of overdose: No  Previous treatments : Yes: multiple, [facility], [facility] 11/2020, previous buprenorphine and methadone  Longest period of sobriety: few months  Medical complications related to substance use: exacerbation of MH diagnoses  Hepatitis C Status  no record of testing  HIV Status no record of testing     Other recent substance use:  Stimulants: used methamphetamine ages [age], stopped for 5 years, then resumed age 24.    Sedatives/hypnotics/anxiolytics: has used in past, denies regular use  Alcohol: h/o drinking 1 L liquor daily  Tobacco: 1 ppd and ENDS  Cannabis: occasional   Hallucinogens:has used in past, denies recent  Behavioral addictions: none      Most recent Recovery Clinic visit 5/23/22  A/P from that visit:     1. Opioid use disorder, severe, dependence (H)  Pt reporting advancing his dose on occasions in response to cravings.   "  Recommend he increase buprenorphine to 16mg/day and take regularly.    Pt confirms he has naloxone  Proceed with plans to start a dual diagnosis program, pt is working with  therapist to arrange  - buprenorphine HCl-naloxone HCl (SUBOXONE) 8-2 MG per film; Place 1 Film under the tongue 2 times daily  Dispense: 14 Film; Refill: 0     2. Alcohol use disorder, severe, dependence (H)  Pt is prescribed Depakote and clonidine.  No h/o withdrawal seizures.    Pt plans to resume Antabuse per his preference  Discussed acamprosate is most effective after a period of abstinence  Proceed with plans to start a dual diagnosis program, pt is working with  therapist to arrange     3. Stimulant use disorder  Proceed with plans to start a dual diagnosis program, pt is working with  therapist to arrange     4. Tobacco use disorder  Not ready to quit at this time     5. Bipolar 1 disorder (H)  Follow-up as scheduled with psychiatry and encouraged him to follow recommendations for referral from current psychiatrist following current psychiatrist's departure                   Return in about 1 week (around 5/30/2022) for Follow up, in person.            6/1/22:  Pt states he has been taking buprenorphine 16mg/day as prescribed without difficulties.  He continues to endorse cravings \"all the time.\"  He asks about increasing his dose of buprenorphine.  He talks at length about his extensive substance use history and ongoing self professed love of using drugs.  He expresses hopelessness about his ability to stop using drugs.  He is scheduled to start a residential treatment program with [facility] on 6/2/22.      He stopped taking fluoxetine in 5/2022 due to concerns this was affecting his vision.  He reports on 6/1/22, however, he noticed these effects on his vision after advancing his dose up to 100mg/day.  He also reported to psychiatry that he had been overusing his Zyprexa.  On 6/1/22 pt states he did this with the desire to " sleep all day due to feeling so depressed.   He continues to take Depakote, Lamictal, and Zyprexa.  He is in need of a new psychiatrist and is uncertain how to go about this.      [place] Prescription Drug Monitoring Program Reviewed:  Yes:     Medications:  acamprosate (CAMPRAL) 333 MG EC tablet, Take 2 tablets (666 mg) by mouth 3 times daily  buprenorphine HCl-naloxone HCl (SUBOXONE) 8-2 MG per film, Place 1 Film under the tongue 2 times daily  cloNIDine (CATAPRES) 0.1 MG tablet, Take 1 tablet (0.1 mg) by mouth 2 times daily AND 2 tablets (0.2 mg) At Bedtime.  disulfiram (ANTABUSE) 250 MG tablet, Take 1 tablet (250 mg) by mouth daily  divalproex sodium extended-release (DEPAKOTE ER) 500 MG 24 hr tablet, Take 4 tablets (2,000 mg) by mouth daily  FLUoxetine (PROZAC) 20 MG capsule, Take 3 capsules (60 mg) by mouth daily  lamoTRIgine (LAMICTAL) 100 MG tablet, Take 2 tablets (200 mg) by mouth At Bedtime  naloxone (NARCAN) 4 MG/0.1ML nasal spray, Spray 1 spray (4 mg) into one nostril alternating nostrils once as needed for opioid reversal every 2-3 minutes until assistance arrives  OLANZapine (ZYPREXA) 15 MG tablet, Take 1 tablet (15 mg) by mouth At Bedtime  polyethylene glycol (MIRALAX) 17 GM/Dose powder, Take 17 g (1 capful) by mouth daily  senna-docusate (SENOKOT-S/PERICOLACE) 8.6-50 MG tablet, Take 2 tablets by mouth daily as needed for constipation  sildenafil (VIAGRA) 50 MG tablet, Take 1 tablet (50 mg) by mouth daily as needed (ED)    No current facility-administered medications on file prior to visit.      Allergies   Allergen Reactions     Prednisone Other (See Comments)     psych problems     PMH, PSH, FamHx reviewed    PAST PSYCHIATRIC HISTORY:  Diagnoses- bipolar disorder, CEASAR, ADHD  Suicide Attempts: No   Hospitalizations: Yes      Social History  Housing status: with his mother   Employment status: not currently employed  Relationship status: Single  Children: no children    OBJECTIVE                                                       BP (!) 163/119   Pulse 71   Physical Exam  Vitals and nursing note reviewed.   Constitutional:       General: He is not in acute distress.     Appearance: Normal appearance. He is not diaphoretic.   HENT:      Head: Normocephalic and atraumatic.   Eyes:      General: No scleral icterus.     Extraocular Movements: Extraocular movements intact.   Pulmonary:      Effort: Pulmonary effort is normal.   Neurological:      General: No focal deficit present.      Mental Status: He is alert and oriented to person, place, and time.   Psychiatric:         Attention and Perception: Attention and perception normal.         Mood and Affect: Affect normal. Mood is not anxious or depressed. Affect is not flat.         Speech: Speech is rapid and pressured.         Behavior: Behavior is not agitated. Behavior is cooperative.         Thought Content: Thought content normal. Thought content does not include suicidal ideation.         Cognition and Memory: Cognition and memory normal.      Comments: Insight and judgment fair to poor         Labs:    UDS 6/1/22: positive for THC and buprenorphine     No results found for this or any previous visit (from the past 240 hour(s)).      At least 40min spent in review of medical record,  review, obtaining histories, discussing recommendations, providing support    Asiya Hastings MD  [facility]  [address] [place] [id number] [phone]    Answers for HPI/ROS submitted by the patient on 6/1/2022  If you checked off any problems, how difficult have these problems made it for you to do your work, take care of things at home, or get along with other people?: Extremely difficult  PHQ9 TOTAL SCORE: 19

## 2022-06-02 NOTE — TELEPHONE ENCOUNTER
TC RN made 2nd attempt to contact this patient regarding scheduling an appointment with the Transition Clinic.  RN phone this patient at [phone] and there was no answer.  A message was left for this patient to call the Transition Clinic at . Awaiting call back.

## 2022-06-07 NOTE — TELEPHONE ENCOUNTER
TC RN made 3rd attempt to contact this patient.  RN attempted to contact this patient at the telephone number indicated.  There was no answer.  Voice message was left for this patient to contact the [facility] at [phone].  Awaiting call back. A [email or web address] message was sent to the patient regarding scheduling.  This is the 3rd failed attempt to contact this patient.  This referral will be closed per policy.  If the patient contacts the [facility], this RN will review reopening the referral with the Provider.

## 2022-06-09 ENCOUNTER — LAB (OUTPATIENT)
Dept: LAB | Facility: HOSPITAL | Age: 27
End: 2022-06-09
Payer: COMMERCIAL

## 2022-06-09 DIAGNOSIS — Z79.899 DRUG THERAPY: Primary | ICD-10-CM

## 2022-06-09 LAB — VALPROATE SERPL-MCNC: 45.1 UG/ML

## 2022-06-09 PROCEDURE — 80164 ASSAY DIPROPYLACETIC ACD TOT: CPT

## 2022-06-09 PROCEDURE — 36415 COLL VENOUS BLD VENIPUNCTURE: CPT

## 2022-06-13 ENCOUNTER — TELEPHONE (OUTPATIENT)
Dept: BEHAVIORAL HEALTH | Facility: CLINIC | Age: 27
End: 2022-06-13
Payer: COMMERCIAL

## 2022-06-13 NOTE — TELEPHONE ENCOUNTER
RN called patient back at provided number but there was no answer. LVM to call [facility] back at [phone]. Patient appears to have enough medication to get him through until his 6/15/22 appointment here in the [facility]. WU is needed prior to any communication with the facility he is staying at. Will wait for return call from patient.     [facility] clinic is also waiting to here back from patient.     Francheska Quiroga RN on 6/13/2022 at 2:39 PM

## 2022-06-13 NOTE — TELEPHONE ENCOUNTER
Reason for call:  Medication   If this is a refill request, has the caller requested the refill from the pharmacy already? No  Will the patient be using a [facility]? No  Name of the pharmacy and phone number for the current request: [address] [facility] , [address] [phone]    Name of the medication requested: sbxn 12mg 2 times a day     Other request: none     Phone number to reach patient:  Home number on file [phone] (home)    Best Time:  Any     Can we leave a detailed message on this number?  YES    Travel screening: Negative

## 2022-06-17 ENCOUNTER — OFFICE VISIT (OUTPATIENT)
Dept: BEHAVIORAL HEALTH | Facility: CLINIC | Age: 27
End: 2022-06-17
Payer: COMMERCIAL

## 2022-06-17 VITALS — SYSTOLIC BLOOD PRESSURE: 152 MMHG | HEART RATE: 87 BPM | DIASTOLIC BLOOD PRESSURE: 95 MMHG

## 2022-06-17 DIAGNOSIS — F11.20 OPIOID USE DISORDER, SEVERE, DEPENDENCE (H): Primary | ICD-10-CM

## 2022-06-17 DIAGNOSIS — F15.90 STIMULANT USE DISORDER: ICD-10-CM

## 2022-06-17 DIAGNOSIS — F31.81 BIPOLAR 2 DISORDER (H): ICD-10-CM

## 2022-06-17 DIAGNOSIS — F17.200 TOBACCO USE DISORDER: ICD-10-CM

## 2022-06-17 DIAGNOSIS — F10.20 ALCOHOL USE DISORDER, SEVERE, DEPENDENCE (H): ICD-10-CM

## 2022-06-17 DIAGNOSIS — F12.90 CONTINUOUS CANNABIS USE: ICD-10-CM

## 2022-06-17 PROCEDURE — 99215 OFFICE O/P EST HI 40 MIN: CPT | Performed by: FAMILY MEDICINE

## 2022-06-17 RX ORDER — BUPRENORPHINE AND NALOXONE 12; 3 MG/1; MG/1
2 FILM, SOLUBLE BUCCAL; SUBLINGUAL DAILY
Qty: 14 FILM | Refills: 0 | Status: SHIPPED | OUTPATIENT
Start: 2022-06-17 | End: 2022-06-24

## 2022-06-17 NOTE — PROGRESS NOTES
[facility] Follow Up    ASSESSMENT/PLAN                                                      1. Opioid use disorder, severe, dependence (H)  Overall he is finding his symptoms well controlled with Suboxone 12-3mg BID and feels it is beneficial.  He denies side effects.  Encouraged outpatient treatment as planning.    - Buprenorphine HCl-Naloxone HCl (SUBOXONE) 12-3 MG FILM per film; Place 2 Film under the tongue daily  Dispense: 14 Film; Refill: 0    2. Alcohol use disorder, severe, dependence (H)  Overall he has seen improvement in the past year.  He finds acamprosate and antabuse helpful.  Encouraged outpatient treatment as planned.  Also encouraged AA.    3. Stimulant use disorder  Recent crack use and he reflects how negative the experience was for him and how much harm it could have caused.  Encouraged him to attend outpatient treatment as planning.    4. Continuous cannabis use  He has ongoing cannabis use.  Not ready to consider change.      5. Tobacco use disorder  Not ready to quit.      6. Bipolar 2 disorder (H)  Overall he reports his mood is stable.  He will need to establish with a new psychiatrist as Dr Oconnor is moving out of state.  He has appointment with Keyona Alcantar NP in November.  [facility] has attempted to reach patient, encouraged him to reach out to [facility], phone number provided.      I spent a total of 49 minutes on the day of the visit.   Time spent doing chart review, history and exam, documentation and further activities per the note     Return in about 1 week (around 6/24/2022) for Follow up, in person.    SUBJECTIVE                                                          CC/HPI:  Laci Hinkle Jr is a 26 year old male with PMH bipolar disorder, CEASAR, ADHD, and PSUD including opioids on buprenorphine who presents to the [facility] for return visit.       Brief History:  Pt first presented to [facility] on 7/22/21. Pt was referred  by staff in [facility] due to pt's initial desire to taper off buprenorphine which he had been taking from nonprescription sources.   Pt soon left [facility], but continued with [facility] Clinic.  He eventually decided to continue buprenorphine therapy, and then transferred to [facility] with initial injection 8/13/21.  He received a total of 3 Sublocade injections, most recently 100mg on 10/8/21.   Pt then indicated to  staff he wanted to discontinue Sublocade injections.  He was lost to follow up until he returned to  on 12/9/21 requesting to resume  buprenorphine after brief return to use of alcohol and kratom.      He describes his opioid use history starting with heroin as a teenager, last use age 17.  He started methadone treatment age 17, dose up to 100mg, then tapered off to 6mg/day then stopped.  He began using kratom ~2016, eventually used amounts up to 100g daily.  He was first prescribed buprenorphine for OUD in 2019. He took as prescribed through 1/2021, then continued on buprenorphine through nonprescription sources.    IV drug use: No   History of overdose: No  Previous treatments : Yes: multiple, [facility], [facility] 11/2020, previous buprenorphine and methadone  Longest period of sobriety: few months  Medical complications related to substance use: exacerbation of MH diagnoses  Hepatitis C Status  no record of testing  HIV Status no record of testing     Other recent substance use:  Stimulants: used methamphetamine ages [age], stopped for 5 years, then resumed age 24.    Sedatives/hypnotics/anxiolytics: has used in past, denies regular use  Alcohol: h/o drinking 1 L liquor daily  Tobacco: 1 ppd and ENDS  Cannabis: occasional   Hallucinogens:has used in past, denies recent  Behavioral addictions: none    Most recent [facility] visit: 6/1/22  Important points and recommendations last visit:  1. Opioid use disorder, severe, dependence (H)  Pt reporting continued daily  cravings  Increase buprenorphine to 24mg/day  Proceed with plans to start residential treatment at [facility]; pt would benefit from long term engagement in programming including dual diagnosis programming for care after residential.  He states he plans to pursue this type of aftercare.    - Buprenorphine HCl-Naloxone HCl (SUBOXONE) 12-3 MG FILM per film; Place 2 Film under the tongue daily  Dispense: 30 Film; Refill: 0     2. Alcohol use disorder, severe, dependence (H)  Changing acamprosate to bid dosing to improve adherence to treatment dose  Pt states he continues on disulfiram by his choice  Residential treatment as noted above  - acamprosate (CAMPRAL) 333 MG EC tablet; Take 3 tablets (999 mg) by mouth 2 times daily  Dispense: 180 tablet; Refill: 0     3. Stimulant use disorder  Residential treatment as noted above     4. Continuous cannabis use  Starting NAC   Psychosocial treatment as described above  - acetylcysteine (N-ACETYL CYSTEINE) 600 MG CAPS capsule; Take 1 capsule (600 mg) by mouth 2 times daily  Dispense: 60 capsule; Refill: 3     5. Bipolar 2 disorder (H)  Referral to psychiatry to establish with new provider   - Adult Mental [facility] Referral; Future     6. Tobacco use disorder  Not interested in quitting at this time    Today, patient states he is doing OK.  Feels stressed and anxious but reports mood is pretty good (in terms of depression).      At time of his last visit on 6/1 he was preparing for treatment at [facility].  Was feeling hopeless before treatment but feeling better now.  He did go to treatment but left after 13 days.  Decided he wanted to be a home and get a job (was offered one prior to entering treatment).  He is feeling good about this decision for the most part but anxious about to possibility of failing.  He is now working on getting into outpatient treatment at [facility].  Fears living in a sober house because he has a history of returning to use along with  "others he meets during treatment.  Wants to start going to AA meetings and get a sponsor.      After last visit he overused Suboxone (ran out 4 days ago).  States that he was hanging out with \"using friend\" before treatment and was using crack, after using he had very intense cravings and he ended up over-using Suboxone to try to stop the cravings.  No further crack/cocaine cravings.  He denies opioid/kratom cravings.  States that taking Suboxone as prescribed and consistently has been helpful (\"works better if taking every day\" and \"I think it improves my quality of life\").  Denies side effects of Suboxone.    Last use of methamphetamine was almost 6 months ago.  Use tends to be episodic.  Recognizes this is very risky in terms of compromising his mental health/cause psychosis which he has experienced in the past.      Last drink was 3-4 weeks ago.  Continues to find acamprosate and antabuse.  No cravings.    Endorses feeling tired all the time, trying to go for walks every day (1.1 miles around [facility]) and  journaling which seem to help.  Spending time with his sober friend (Chip).      Continues to use delta 8.  Wanting to get medical marijuana.  Does not express any desire to stop using, only sees as helpful.    Identifies himself as co-dependent.  Working with therapist ([facility] twice weekly for 5 years).  Trying to be more mindful of this.    Mom may be taking over conservatorship, he is open to this.      [place] Prescription Drug Monitoring Program Reviewed:  Yes; as expected    Medications:  acamprosate (CAMPRAL) 333 MG EC tablet, Take 3 tablets (999 mg) by mouth 2 times daily  acetylcysteine (N-ACETYL CYSTEINE) 600 MG CAPS capsule, Take 1 capsule (600 mg) by mouth 2 times daily  cloNIDine (CATAPRES) 0.1 MG tablet, Take 1 tablet (0.1 mg) by mouth 2 times daily AND 2 tablets (0.2 mg) At Bedtime.  disulfiram (ANTABUSE) 250 MG tablet, Take 1 tablet (250 mg) by mouth daily  divalproex sodium " extended-release (DEPAKOTE ER) 500 MG 24 hr tablet, Take 4 tablets (2,000 mg) by mouth daily  FLUoxetine (PROZAC) 20 MG capsule, Take 3 capsules (60 mg) by mouth daily  lamoTRIgine (LAMICTAL) 100 MG tablet, Take 2 tablets (200 mg) by mouth At Bedtime  naloxone (NARCAN) 4 MG/0.1ML nasal spray, Spray 1 spray (4 mg) into one nostril alternating nostrils once as needed for opioid reversal every 2-3 minutes until assistance arrives  OLANZapine (ZYPREXA) 15 MG tablet, Take 1 tablet (15 mg) by mouth At Bedtime  polyethylene glycol (MIRALAX) 17 GM/Dose powder, Take 17 g (1 capful) by mouth daily  senna-docusate (SENOKOT-S/PERICOLACE) 8.6-50 MG tablet, Take 2 tablets by mouth daily as needed for constipation  sildenafil (VIAGRA) 50 MG tablet, Take 1 tablet (50 mg) by mouth daily as needed (ED)    No current facility-administered medications on file prior to visit.      Allergies   Allergen Reactions     Prednisone Other (See Comments)     psych problems       PMH, PSH, FamHx reviewed    Social History  Housing status: with his mother   Employment status: not currently employed  Relationship status: Single  Children: no children    OBJECTIVE                                                      BP (!) 152/95   Pulse 87     Physical Exam  Constitutional:       General: He is not in acute distress.     Appearance: Normal appearance. He is diaphoretic (mild sweating ). He is not ill-appearing.   HENT:      Head: Normocephalic and atraumatic.      Nose: No congestion or rhinorrhea.   Eyes:      General: No scleral icterus.     Conjunctiva/sclera: Conjunctivae normal.   Cardiovascular:      Rate and Rhythm: Normal rate.   Pulmonary:      Effort: Pulmonary effort is normal. No respiratory distress.   Skin:     General: Skin is warm.      Coloration: Skin is not jaundiced or pale.   Neurological:      General: No focal deficit present.      Mental Status: He is alert and oriented to person, place, and time.      Gait: Gait normal.    Psychiatric:         Attention and Perception: Attention normal.         Mood and Affect: Mood is anxious. Mood is not depressed. Affect is inappropriate.         Speech: Speech normal.         Behavior: Behavior normal. Behavior is cooperative.         Thought Content: Thought content normal. Thought content does not include suicidal ideation.      Comments: Judgment/insight fair         Labs:    UDS: buprenorphine and THC  *[facility] urine drug screen does not screen for Fentanyl    Recent Results (from the past 240 hour(s))   Valproic acid    Collection Time: 06/09/22 10:20 AM   Result Value Ref Range    Valproic acid 45.1   ug/mL         Patient counseling completed today:  Discussed mechanism of action, potential risks/benefits/side effects of medications and other recommendations above.  Recommend pt keep naloxone in their possession and reviewed other aspects of harm reduction to reduce risk of overdose with return to use.   Recommended avoiding concurrent use of alcohol, benzodiazepines or other sedatives with buprenorphine or other opioids.  Discussed importance of avoiding isolation, building a network of supportive relationships, avoiding people/places/things associated with past use to reduce risk of relapse; including motivational interviewing regarding psychosocial treatment for addiction.       Kristina Mckeon, [place] [id number] [place] [id number]  [phone]

## 2022-06-17 NOTE — PATIENT INSTRUCTIONS
Continue Suboxone 12-3mg twice daily.      Call [facility].    If you find out you need a new Rule 25 you can call [facility] [phone].      Check out local AA as you planned.      Call [phone] to set up psychiatry appointment with the [facility] (within [place]) to bridge you until your appointment with long term provider in November.

## 2022-06-17 NOTE — NURSING NOTE
"[facility]      Rooming information:  Approximate last use of illicit opioids: 3/21/22  Taking buprenorphine? Yes:  As prescribed? No, over took a lot \"trying to fill a hole\"  Number of buprenorphine films/tablets remaining currently: 0  Narcan currently available: Yes  Other recent substance use:    Cannabis   NICOTINE-Yes:   If using nicotine, ready to quit? No    Point of care urine drug screen positive for: buprenorphine and THC  *POC urine drug screen does not screen for Fentanyl      PHQ-2 Score:     PHQ-2 ( 1999 [employer]) 6/17/2022 6/1/2022   Q1: Little interest or pleasure in doing things 3 3   Q2: Feeling down, depressed or hopeless 3 3   PHQ-2 Score 6 6   PHQ-2 Total Score (12-17 Years)- Positive if 3 or more points; Administer PHQ-A if positive - -        If PHQ-2 score of 3 or higher, has [facility] therapist or provider been notified? Yes    Any current suicidal ideation? No  If yes, has [facility] therapist or provider been notified? N/A    Primary care provider: [facility] [place]     Mental health provider: Dr. Oconnor (follow up on MH referral if needed)    Insurance needs: active    Housing needs: stable    Contact information up to date? yes    3rd Party Involvement none today (please obtain WU if pt would like to include)    Morena Mckeon CMA  June 17, 2022  1:43 PM    "

## 2022-06-22 ENCOUNTER — TELEPHONE (OUTPATIENT)
Dept: BEHAVIORAL HEALTH | Facility: CLINIC | Age: 27
End: 2022-06-22

## 2022-06-23 ENCOUNTER — TELEPHONE (OUTPATIENT)
Dept: BEHAVIORAL HEALTH | Facility: CLINIC | Age: 27
End: 2022-06-23

## 2022-06-24 ENCOUNTER — OFFICE VISIT (OUTPATIENT)
Dept: BEHAVIORAL HEALTH | Facility: CLINIC | Age: 27
End: 2022-06-24
Payer: COMMERCIAL

## 2022-06-24 VITALS — SYSTOLIC BLOOD PRESSURE: 136 MMHG | DIASTOLIC BLOOD PRESSURE: 89 MMHG | HEART RATE: 81 BPM

## 2022-06-24 DIAGNOSIS — F10.20 ALCOHOL USE DISORDER, SEVERE, DEPENDENCE (H): ICD-10-CM

## 2022-06-24 DIAGNOSIS — F12.90 CONTINUOUS CANNABIS USE: ICD-10-CM

## 2022-06-24 DIAGNOSIS — F11.20 OPIOID USE DISORDER, SEVERE, DEPENDENCE (H): Primary | ICD-10-CM

## 2022-06-24 DIAGNOSIS — F17.200 TOBACCO USE DISORDER: ICD-10-CM

## 2022-06-24 DIAGNOSIS — F31.9 BIPOLAR AFFECTIVE DISORDER, REMISSION STATUS UNSPECIFIED (H): ICD-10-CM

## 2022-06-24 DIAGNOSIS — F15.90 STIMULANT USE DISORDER: ICD-10-CM

## 2022-06-24 PROCEDURE — 99214 OFFICE O/P EST MOD 30 MIN: CPT | Performed by: FAMILY MEDICINE

## 2022-06-24 RX ORDER — BUPRENORPHINE AND NALOXONE 12; 3 MG/1; MG/1
2 FILM, SOLUBLE BUCCAL; SUBLINGUAL DAILY
Qty: 14 FILM | Refills: 0 | Status: SHIPPED | OUTPATIENT
Start: 2022-06-24 | End: 2022-07-01

## 2022-06-24 NOTE — PROGRESS NOTES
[facility] Follow Up    ASSESSMENT/PLAN                                                    1. Opioid use disorder, severe, dependence (H)  Reporting control of symptoms  Continue buprenorphine 24mg/day  Proceed with plans to engage in programming through [facility].   - Buprenorphine HCl-Naloxone HCl (SUBOXONE) 12-3 MG FILM per film; Place 2 Film under the tongue daily  Dispense: 14 Film; Refill: 0    2. Alcohol use disorder, severe, dependence (H)  Continue acamprosate 666mg tid and disulfiram 250mg daily.  Reporting control of cravings at this time and no use since 5/2022.     3. Stimulant use disorder  Denies use or cravings since last visit    4. Tobacco use disorder  Not interested in quitting at this time    5. Continuous cannabis use  Planning to obtain medical cannabis, not interested in quitting    6. Bipolar affective disorder, remission status unspecified (H)  Continue medications prescribed by psychiatry fluoxetine 60mg/day, lamotrigine 200mg at bedtime and olanzapine 15mg at bedtime.  He was given contact information for staff from [facility] who had been attempting to reach pt.  Recommend he call that number to have his referral to [facility] re-opened so he can continue with psychiatric provider until established with new long term provider.       Return in about 1 week (around 7/1/2022) for Follow up, in person.    SUBJECTIVE                                                          CC/HPI:  Laci Hinkle  is a 26 year old male with PMH bipolar disorder, CEASAR, ADHD, and PSUD including opioids on buprenorphine who presents to the [facility] for return visit.       Brief History:  Pt first presented to [facility] on 7/22/21. Pt was referred by staff in [facility] due to pt's initial desire to taper off buprenorphine which he had been taking from nonprescription sources.   Pt soon left [facility], but continued with [facility].  He  eventually decided to continue buprenorphine therapy, and then transferred to Sublocade with initial injection 8/13/21.  He received a total of 3 Sublocade injections, most recently 100mg on 10/8/21.   Pt then indicated to  staff he wanted to discontinue Sublocade injections.  He was lost to follow up until he returned to  on 12/9/21 requesting to resume SL buprenorphine after brief return to use of alcohol and kratom.   He has regained some stability with daily dosed SL buprenorphine, and last use of other opioids has remained 3/2022.       He describes his opioid use history starting with heroin as a teenager, last use age 17.  He started methadone treatment age 17, dose up to 100mg, then tapered off to 6mg/day then stopped.  He began using kratom ~2016, eventually used amounts up to 100g daily.  He was first prescribed buprenorphine for OUD in 2019. He took as prescribed through 1/2021, then continued on buprenorphine through nonprescription sources.    IV drug use: No   History of overdose: No  Previous treatments : Yes: multiple, [facility], [facility] 11/2020, previous buprenorphine and methadone  Longest period of sobriety: few months  Medical complications related to substance use: exacerbation of MH diagnoses  Hepatitis C Status  no record of testing  HIV Status no record of testing     Other recent substance use:  Stimulants: used methamphetamine ages [age], stopped for 5 years, then resumed age 24.    Sedatives/hypnotics/anxiolytics: has used in past, denies regular use  Alcohol: h/o drinking 1 L liquor daily  Tobacco: 1 ppd and ENDS  Cannabis: occasional   Hallucinogens:has used in past, denies recent  Behavioral addictions: none    Most recent [facility] visit: 6/17/22  A/P at that time was:  1. Opioid use disorder, severe, dependence (H)  Overall he is finding his symptoms well controlled with Suboxone 12-3mg BID and feels it is beneficial.  He denies side effects.  Encouraged  outpatient treatment as planning.    - Buprenorphine HCl-Naloxone HCl (SUBOXONE) 12-3 MG FILM per film; Place 2 Film under the tongue daily  Dispense: 14 Film; Refill: 0     2. Alcohol use disorder, severe, dependence (H)  Overall he has seen improvement in the past year.  He finds acamprosate and antabuse helpful.  Encouraged outpatient treatment as planned.  Also encouraged AA.     3. Stimulant use disorder  Recent crack use and he reflects how negative the experience was for him and how much harm it could have caused.  Encouraged him to attend outpatient treatment as planning.     4. Continuous cannabis use  He has ongoing cannabis use.  Not ready to consider change.       5. Tobacco use disorder  Not ready to quit.       6. Bipolar 2 disorder (H)  Overall he reports his mood is stable.  He will need to establish with a new psychiatrist as Dr Oconnor is moving out of state.  He has appointment with Keyona Alcantar NP in November.  [facility] attempted to reach patient, encouraged him to reach out to [facility], phone number provided.      Return in about 1 week (around 6/24/2022) for Follow up, in person.      6/24/22:  Pt reports he has been taking buprenorphine as prescribed 24mg/day.  He denies cravings for other opioids.  He has noticed he has been feeling more balanced, less depressed and attributes some of this to taking buprenorphine at therapeutic doses regularly.  He also continues working with his therapist through [facility] which he finds very helpful.  He is still working on getting into outpatient treatment at [facility].      Last use of methamphetamine remains ~1/2022.  No use of cocaine since last visit.   Use tends to be episodic.  Recognizes this is very risky in terms of compromising his mental health/cause psychosis which he has experienced in the past.      Has remained abstinent from alcohol since late 5/2022.  Continues to find acamprosate and antabuse helpful.  No  cravings.      Continues to use delta 8.  Wanting to get medical marijuana.  Does not express any desire to stop using, only sees as helpful.     Working with therapist ([facility] twice weekly for 5 years).  Trying to be more mindful of this.    Mom may be taking over conservatorship, he is open to this.      [place] Prescription Drug Monitoring Program Reviewed:  Yes; as expected    Medications:  acamprosate (CAMPRAL) 333 MG EC tablet, Take 3 tablets (999 mg) by mouth 2 times daily  acetylcysteine (N-ACETYL CYSTEINE) 600 MG CAPS capsule, Take 1 capsule (600 mg) by mouth 2 times daily  Buprenorphine HCl-Naloxone HCl (SUBOXONE) 12-3 MG FILM per film, Place 2 Film under the tongue daily  cloNIDine (CATAPRES) 0.1 MG tablet, Take 1 tablet (0.1 mg) by mouth 2 times daily AND 2 tablets (0.2 mg) At Bedtime.  disulfiram (ANTABUSE) 250 MG tablet, Take 1 tablet (250 mg) by mouth daily  divalproex sodium extended-release (DEPAKOTE ER) 500 MG 24 hr tablet, Take 4 tablets (2,000 mg) by mouth daily  FLUoxetine (PROZAC) 20 MG capsule, Take 3 capsules (60 mg) by mouth daily  lamoTRIgine (LAMICTAL) 100 MG tablet, Take 2 tablets (200 mg) by mouth At Bedtime  naloxone (NARCAN) 4 MG/0.1ML nasal spray, Spray 1 spray (4 mg) into one nostril alternating nostrils once as needed for opioid reversal every 2-3 minutes until assistance arrives  OLANZapine (ZYPREXA) 15 MG tablet, Take 1 tablet (15 mg) by mouth At Bedtime  polyethylene glycol (MIRALAX) 17 GM/Dose powder, Take 17 g (1 capful) by mouth daily  senna-docusate (SENOKOT-S/PERICOLACE) 8.6-50 MG tablet, Take 2 tablets by mouth daily as needed for constipation  sildenafil (VIAGRA) 50 MG tablet, Take 1 tablet (50 mg) by mouth daily as needed (ED)    No current facility-administered medications on file prior to visit.      Allergies   Allergen Reactions     Prednisone Other (See Comments)     psych problems       PMH, PSH, FamHx reviewed    Social History  Housing status:  with his mother   Employment status: not currently employed  Relationship status: Single  Children: no children    OBJECTIVE                                                      /89   Pulse 81     Physical Exam  Constitutional:       General: He is not in acute distress.     Appearance: Normal appearance. He is not ill-appearing. Diaphoretic: mild sweating    HENT:      Head: Normocephalic and atraumatic.      Nose: No congestion or rhinorrhea.   Eyes:      General: No scleral icterus.     Conjunctiva/sclera: Conjunctivae normal.   Cardiovascular:      Rate and Rhythm: Normal rate.   Pulmonary:      Effort: Pulmonary effort is normal. No respiratory distress.   Skin:     General: Skin is warm.      Coloration: Skin is not jaundiced or pale.   Neurological:      General: No focal deficit present.      Mental Status: He is alert and oriented to person, place, and time.      Gait: Gait normal.   Psychiatric:         Attention and Perception: Attention normal.         Speech: Speech normal.         Behavior: Behavior normal. Behavior is cooperative.         Thought Content: Thought content normal. Thought content does not include suicidal ideation.      Comments: Mood is neutral, affect congruent with mood and subject matter  Judgment/insight fair         Labs:  UDS 6/24/22: buprenorphine and THC  *[facility] urine drug screen does not screen for Fentanyl    No results found for this or any previous visit (from the past 240 hour(s)).      Patient counseling completed today:  Discussed mechanism of action, potential risks/benefits/side effects of medications and other recommendations above.  Recommend pt keep naloxone in their possession and reviewed other aspects of harm reduction to reduce risk of overdose with return to use.   Recommended avoiding concurrent use of alcohol, benzodiazepines or other sedatives with buprenorphine or other opioids.  Discussed importance of avoiding isolation, building a network of  supportive relationships, avoiding people/places/things associated with past use to reduce risk of relapse; including motivational interviewing regarding psychosocial treatment for addiction.     At least 30min spent in review of medical record,  review, obtaining histories, discussing recommendations, providing support.     Asiya Hastings MD  [facility]  [address] [place] [address] [id number] [phone]

## 2022-06-24 NOTE — NURSING NOTE
[facility]      Rooming information:  Approximate last use of illicit opioids: 3/21/22  Taking buprenorphine? Yes:  As prescribed? Yes:   Number of buprenorphine films/tablets remaining currently: 2  Side effects related to buprenorphine (constipation, dry mouth, sedation?) No   Narcan currently available: Yes  Other recent substance use:    Cannabis   NICOTINE-Yes:   If using nicotine, ready to quit? No    Point of care urine drug screen positive for: buprenorphine and THC  *[facility] urine drug screen does not screen for Fentanyl      PHQ-2 Score:     PHQ-2 ( 1999 [employer]) 6/24/2022 6/17/2022   Q1: Little interest or pleasure in doing things 1 3   Q2: Feeling down, depressed or hopeless 0 3   PHQ-2 Score 1 6   PHQ-2 Total Score (12-17 Years)- Positive if 3 or more points; Administer PHQ-A if positive - -        If PHQ-2 score of 3 or higher, has [facility] therapist or provider been notified? Yes    Any current suicidal ideation? No  If yes, has [facility] therapist or provider been notified? N/A    [facility]     Mental health provider: Dr. Oconnor (follow up on MH referral if needed)    Insurance needs: active    Housing needs: stable    Contact information up to date? yes    3rd Party Involvement none today (please obtain WU if pt would like to include)    Morena Mckeon CMA  June 24, 2022  2:21 PM

## 2022-06-27 ENCOUNTER — HOSPITAL ENCOUNTER (OUTPATIENT)
Dept: BEHAVIORAL HEALTH | Facility: CLINIC | Age: 27
Discharge: HOME | End: 2022-06-27
Attending: FAMILY MEDICINE | Admitting: FAMILY MEDICINE
Payer: COMMERCIAL

## 2022-06-27 VITALS — BODY MASS INDEX: 34.36 KG/M2 | HEIGHT: 70 IN | WEIGHT: 240 LBS

## 2022-06-27 PROCEDURE — H0001 ALCOHOL AND/OR DRUG ASSESS: HCPCS | Mod: TEL,95

## 2022-06-27 RX ORDER — AMLODIPINE BESYLATE AND BENAZEPRIL HYDROCHLORIDE 2.5; 1 MG/1; MG/1
1 CAPSULE ORAL DAILY
COMMUNITY
End: 2022-07-28

## 2022-06-27 ASSESSMENT — COLUMBIA-SUICIDE SEVERITY RATING SCALE - C-SSRS
1. IN THE PAST MONTH, HAVE YOU WISHED YOU WERE DEAD OR WISHED YOU COULD GO TO SLEEP AND NOT WAKE UP?: NO
3. HAVE YOU BEEN THINKING ABOUT HOW YOU MIGHT KILL YOURSELF?: NO
4. HAVE YOU HAD THESE THOUGHTS AND HAD SOME INTENTION OF ACTING ON THEM?: NO
2. HAVE YOU ACTUALLY HAD ANY THOUGHTS OF KILLING YOURSELF IN THE PAST MONTH?: NO
5. HAVE YOU STARTED TO WORK OUT OR WORKED OUT THE DETAILS OF HOW TO KILL YOURSELF? DO YOU INTEND TO CARRY OUT THIS PLAN?: NO

## 2022-06-27 ASSESSMENT — ANXIETY QUESTIONNAIRES
IF YOU CHECKED OFF ANY PROBLEMS ON THIS QUESTIONNAIRE, HOW DIFFICULT HAVE THESE PROBLEMS MADE IT FOR YOU TO DO YOUR WORK, TAKE CARE OF THINGS AT HOME, OR GET ALONG WITH OTHER PEOPLE: SOMEWHAT DIFFICULT
7. FEELING AFRAID AS IF SOMETHING AWFUL MIGHT HAPPEN: SEVERAL DAYS
5. BEING SO RESTLESS THAT IT IS HARD TO SIT STILL: NEARLY EVERY DAY
GAD7 TOTAL SCORE: 8
6. BECOMING EASILY ANNOYED OR IRRITABLE: NOT AT ALL
4. TROUBLE RELAXING: NOT AT ALL
3. WORRYING TOO MUCH ABOUT DIFFERENT THINGS: SEVERAL DAYS
2. NOT BEING ABLE TO STOP OR CONTROL WORRYING: SEVERAL DAYS
GAD7 TOTAL SCORE: 8
1. FEELING NERVOUS, ANXIOUS, OR ON EDGE: MORE THAN HALF THE DAYS

## 2022-06-27 ASSESSMENT — PAIN SCALES - GENERAL: PAINLEVEL: NO PAIN (0)

## 2022-06-27 ASSESSMENT — PATIENT HEALTH QUESTIONNAIRE - PHQ9: SUM OF ALL RESPONSES TO PHQ QUESTIONS 1-9: 17

## 2022-06-27 NOTE — PROGRESS NOTES
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 here: NA    Information in this assessment was obtained from the medical record and provided by patient who is a good historian.    Patient will have open access to their mental health medical record.    As evidenced by self report and criteria, client meets the following DSM5 Diagnoses:   (Sustained by DSM5 Criteria Listed Above)     Criteria for Diagnosis  DSM-5 Criteria for Substance Use Disorder  Instructions: Determine whether the client currently meets the criteria for Substance Use Disorder using the diagnostic criteria in the DSM-V pp.481-584. Current means during the most recent 12 months outside a facility that controls access to substances    Category of Substance Severity (ICD-10 Code / DSM 5 Code)     Alcohol Use Disorder Severe  (10.20) (303.90)   Cannabis Use Disorder The patient does not currently meet the criteria for an Cannabis use disorder, but has a history of fairly heavy use and current use with a desire to obtain a RX for medical marijuana.   Hallucinogen Use Disorder The patient does not currently meet the criteria for a Hallucinogen use disorder, but has a history of significant use.   Inhalant Use Disorder The patient does not meet the criteria for an Inhalant use disorder.   Opioid Use Disorder Severe   (F11.20) (304.00)    Sedative, Hypnotic, or Anxiolytic Use Disorder The patient does not currently meet the criteria for a Sedative/Hypnotic use disorder, but has a history of abuse.   Stimulant Related Disorder The patient does not currently meet the criteria for a Stimulant use disorder, but has a history of abuse.   Tobacco Use Disorder Mild    (Z72.0) (305.1)   Other (or unknown) Substance Use Disorder Moderate   (F19.20) (304.90) Kratom       Specify if: In early remission:  After full criteria for alcohol/drug use disorder were previously met, none of the criteria for alcohol/drug use disorder have been met for at least 3 months but for less than 12 months (with the exception that Criterion A4,  Craving or a strong desire or urge to use alcohol/drug  may be met).     In sustained remission:   After full criteria for alcohol use disorder were previously met, non of the criteria for alcohol/drug use disorder have been met at any time during a period of 12 months or longer (with the exception that Criterion A4,  Craving or strong desire or urge to use alcohol/drug  may be met).   Specify if:   This additional specifier is used if the individual is in an environment where access to alcohol is restricted.    Mild: Presence of 2-3 symptoms  Moderate: Presence of 4-5 symptoms  Severe: Presence of 6 or more symptoms    Diagnostic Criteria: 1.) Alcohol/drug is often taken in larger amounts or over a longer period than was intended.  Met for Alcohol, Amphetamines, Opiates,Tobacco and Kratom and Benzodiazepines.  2.) There is a persistent desire or unsuccessful efforts to cut down or control alcohol/drug use.  Met for Alcohol, Opiates, Sedative, hypnotic, or anxiolytics, Kratom and Amphetamines.  3.) A great deal of time is spent in activities necessary to obtain alcohol, use alcohol, or recover from its effects.  Met for Alcohol, Opiates, Amphetamines and Kratom.  4.) Craving, or a strong desire or urge to use alcohol/drug.  Met for  Alcohol and Tobacco.  5.) Recurrent alcohol/drug use resulting in a failure to fulfill major role obligations at work, school or home.  Met for Alcohol, Opiates, Sedative, hypnotic, or anxiolytics and Amphetamines.  6.) Continued alcohol use despite having persistent or recurrent social or interpersonal problems caused or exacerbated by the effects of alcohol/drug.  Met for Alcohol, Opiates, Amphetamines and Kratom.  7.) Important social, occupational, or recreational activities are given up or reduced because of alcohol/drug use.  Met for Alcohol, Opiates and Amphetamines and Kratom.  9.) Alcohol/drug use is continued despite knowledge of having a persistent or recurrent physical or psychological problem that is likely to have been caused or exacerbated by alcohol.  Met for Alcohol, Opiates, Sedative, hypnotic, or anxiolytics and Amphetamines.  10.) Tolerance, as defined by either of the following: A need for markedly increased amounts of alcohol/drug to achieve intoxication or desired effect..  Met for Alcohol, Opiates, Amphetamines and Kratom.    Recommendations:     1. Plan for Safety and Risk Management:  Recommended that patient call 911 or go to the local ED should there be a change in any of these risk factors..      Report to child / adult protection services was NA.     2. CLARIBEL Referrals:   Recommendations:      The pt meets criteria for an inpatient or IOP with lodging level of care.  He prefers to attend IOP and has requested his assessment be FAXed to [facility].     Patient reports they are willing to follow these recommendations.  Patient would like the following family or other support people involved in their treatment:  none. Patient has a history of opiate use and was give treatment options, including Medication Assisted Treatment, and information on the risks of opiod use disorder including recognizing and responding to opiod overdose.    3. Mental Health Referrals:  The pt currently is seeing  a therapist.     4. Patient's identified no special considerations needed..     5. Recommendations for treatment focus:     Bipolar-DX  PTSD-DX  CLARIBEL severe.  Relapse Prevention.    Clinical Substantiation:  The pt is at a high risk of relapse due to his co-occurring MH DX and CLARIBEL.  He also has abused several substances since the young age of 12.  He has attended several treatment programs in the past.  He has severe CLARIBEL and could benefit from an inpatient or IOP with lodging.   The pt prefers IOP.     DAANES record has been saved.   Assessment ID: [id number]    Provider Name/ Credentials:  MEERA Barrera  June 27, 2022

## 2022-06-28 NOTE — ADDENDUM NOTE
Encounter addended by: Shawnee Levine [facility] on: 6/28/2022 7:42 AM   Actions taken: Clinical Note Signed

## 2022-06-29 ENCOUNTER — TELEPHONE (OUTPATIENT)
Dept: BEHAVIORAL HEALTH | Facility: CLINIC | Age: 27
End: 2022-06-29

## 2022-06-29 NOTE — TELEPHONE ENCOUNTER
Sent an e-mail letting the pt know that signed ROIs have not been received and that this  cannot FAX his CLRAIBEL Assessment to [facility] as requested until the WU is signed and returned.

## 2022-06-29 NOTE — ADDENDUM NOTE
Encounter addended by: Shawnee Levine [facility] on: 6/29/2022 3:40 PM   Actions taken: Flowsheet accepted

## 2022-06-29 NOTE — TELEPHONE ENCOUNTER
"Called the pt and explained that in order to FAX his assessment to [facility] we would need a signed release.  Pt stated he would \"get it done by the end of the day\".   "

## 2022-07-01 ENCOUNTER — TELEPHONE (OUTPATIENT)
Dept: BEHAVIORAL HEALTH | Facility: CLINIC | Age: 27
End: 2022-07-01

## 2022-07-01 NOTE — TELEPHONE ENCOUNTER
Reason for call:  Medication   If this is a refill request, has the caller requested the refill from the pharmacy already? No  Will the patient be using a [place] [facility]? No  Name of the pharmacy and phone number for the current request:   [id number] - [facility] [place] [address] [facility] [address] [place] [address]         Name of the medication requested: Buprenorphine HCl-Naloxone HCl (SUBOXONE) 12-3 MG FILM per     Other request: no    Phone number to reach patient:  Home number on file [phone] (home)    Best Time:  Any     Can we leave a detailed message on this number?  YES    Travel screening: Negative

## 2022-07-05 ENCOUNTER — TELEPHONE (OUTPATIENT)
Dept: BEHAVIORAL HEALTH | Facility: CLINIC | Age: 27
End: 2022-07-05

## 2022-07-05 NOTE — TELEPHONE ENCOUNTER
Writer spoke with both [facility] pharmacy, [facility] and patient regarding Zyprexa prescription. [facility] not knowing that patient left the treatment facility on 06/15/2022. Patient reports he spoke with treatment facility whom reports they returned the prescription to [facility] pharmacy. [facility] return center is looking into this further and will call back to update [facility] later today. Patient reports he has been without     OLANZapine (ZYPREXA) 15 MG tablet 30 tablet 2 5/10/2022  No   Sig - Route: Take 1 tablet (15 mg) by mouth At Bedtime - Oral   For 2 days and has been unable to sleep. [facility] pharmacy reports said script is unable to be filled until 07/15/2022 [facility] at which time it will be available to be filled sooner. Patient reports he has not reached out to [facility] yet as he lost the information. Writer reminded patient he was to come into [facility] today after requesting a bridge of Suboxone on 07/01/22. Patient reported only getting a partial fill of Suboxone from the pharmacy on 07/01/2022. Patient will come in on a walk in basis today before 3:00 pm.     Mandi Hanna RN on 7/5/2022 at 2:11 PM

## 2022-07-05 NOTE — TELEPHONE ENCOUNTER
Reason for Call:  Other prescription    Detailed comments: Pt states he ran out of zyprexa. No longer has a psych provider as of 7/1. States it was refilled by pharmacy but his treatment center sent it back. Pt sounds frustrated. Requesting that it be refilled by [facility] or  if possible, otherwise would like resource on where would he able to get a refill of rx. Please further assist.    Phone Number Patient can be reached at: Cell number on file:    Telephone Information:   Mobile [phone]       Best Time: ASAP    Can we leave a detailed message on this number? YES    Call taken on 7/5/2022 at 12:47 PM by Usman Lopez

## 2022-07-05 NOTE — TELEPHONE ENCOUNTER
Return call from [facility] whom reports they did locate the returned Zyprexa script and have made it available to be filled again. Writer spoke with [facility]'s pharmacy whom verified they were able to get the script to go through insurance and will fill it today. Writer updated patient as well. Patient reported he is unable to come into [facility] today, and will try tomorrow 07/06/2022.    Mandi Hanna RN on 7/5/2022 at 2:57 PM

## 2022-07-06 ENCOUNTER — OFFICE VISIT (OUTPATIENT)
Dept: BEHAVIORAL HEALTH | Facility: CLINIC | Age: 27
End: 2022-07-06
Payer: COMMERCIAL

## 2022-07-06 ENCOUNTER — TELEPHONE (OUTPATIENT)
Dept: BEHAVIORAL HEALTH | Facility: CLINIC | Age: 27
End: 2022-07-06

## 2022-07-06 VITALS — DIASTOLIC BLOOD PRESSURE: 74 MMHG | SYSTOLIC BLOOD PRESSURE: 110 MMHG | HEART RATE: 93 BPM

## 2022-07-06 DIAGNOSIS — F10.20 ALCOHOL USE DISORDER, SEVERE, DEPENDENCE (H): ICD-10-CM

## 2022-07-06 DIAGNOSIS — F15.90 STIMULANT USE DISORDER: ICD-10-CM

## 2022-07-06 DIAGNOSIS — F17.200 TOBACCO USE DISORDER: ICD-10-CM

## 2022-07-06 DIAGNOSIS — F11.20 OPIOID USE DISORDER, SEVERE, DEPENDENCE (H): Primary | ICD-10-CM

## 2022-07-06 DIAGNOSIS — F31.9 BIPOLAR AFFECTIVE DISORDER, REMISSION STATUS UNSPECIFIED (H): ICD-10-CM

## 2022-07-06 PROCEDURE — 99215 OFFICE O/P EST HI 40 MIN: CPT | Performed by: FAMILY MEDICINE

## 2022-07-06 RX ORDER — BUPRENORPHINE AND NALOXONE 12; 3 MG/1; MG/1
2 FILM, SOLUBLE BUCCAL; SUBLINGUAL DAILY
Qty: 14 FILM | Refills: 0 | Status: SHIPPED | OUTPATIENT
Start: 2022-07-06 | End: 2022-07-13

## 2022-07-06 NOTE — NURSING NOTE
[facility]      Rooming information:  Approximate last use of illicit opioids: 3/21/22  Taking buprenorphine? Yes:  As prescribed? Yes:   Number of buprenorphine films/tablets remaining currently: 6  Side effects related to buprenorphine (constipation, dry mouth, sedation?) No   Narcan currently available: Yes  Other recent substance use:    Cannabis   NICOTINE-Yes:   If using nicotine, ready to quit? No    Point of care urine drug screen positive for: buprenorphine and THC  *POC urine drug screen does not screen for Fentanyl      PHQ-2 Score:     PHQ-2 ( 1999 [employer]) 7/6/2022 6/24/2022   Q1: Little interest or pleasure in doing things 1 1   Q2: Feeling down, depressed or hopeless 0 0   PHQ-2 Score 1 1   PHQ-2 Total Score (12-17 Years)- Positive if 3 or more points; Administer PHQ-A if positive - -        If PHQ-2 score of 3 or higher, has [facility] therapist or provider been notified? N/A    Any current suicidal ideation? No  If yes, has [facility] therapist or provider been notified? N/A    Primary care provider: [facility]     Mental health provider: Dr. Oconnor (follow up on MH referral if needed)    Insurance needs: active    Housing needs: stable    Contact information up to date? yes    3rd Party Involvement none today (please obtain WU if pt would like to include)    Morena Mckeon CMA  July 6, 2022  11:28 AM

## 2022-07-06 NOTE — TELEPHONE ENCOUNTER
Writer spoke with patient and scheduled tc psychiatry on 07/25/22 @ 10:30 am.    Mya Lester  07/06/22  [id number]    ----- Message from Jose Meyers sent at 6/1/2022  1:46 PM CDT -----  Regarding: [facility]  Transition Clinic Referral   [place] Only   Limited [place] Availability     Type of Referral:        _____Therapy  _____Therapy & Medication (Psychiatry next level of care appointment needs to be scheduled)  ___X_Medication Only (Psychiatry next level of care appointment needs to be scheduled)  _____Diagnostic Assessment Only        Referring Provider Name: Asiya Hastings     Clinician completing the assessment.      Referring Provider: [facility] PROVIDER     If known, referring provider contact name: Asiya Hastings ; Phone Number:  Service Line/Location: [facility]     Reason for Transition Clinic Referral: Psychiatry appointment not available until November 2022.     Next Level of Care Patient Will Be Transitioned To: Long term psychiatry     Start Date for Next Level of Care Therapy (Required):   Provider  Location      Start Date for Next Level of Care Medication (Required): 11/29/22  Provider Keyona Alcantar  Location [facility] Psychiatry cannot see patients who do not have active medical insurance     What Would Be Helpful from the Transition Clinic: Interim care      Needs: NO     Does Patient Have Access to Technology: Yes     Patient E-mail Address: [email or web address]     Current Patient Phone Number: [phone];      Clinician Gender Preference (if applicable): NO     Jose Meyers

## 2022-07-10 NOTE — PROGRESS NOTES
" [facility] - Recovery Clinic Follow Up    ASSESSMENT/PLAN                                                    1. Opioid use disorder, severe, dependence (H)  Reporting control of symptoms.   Again encouraged him to take buprenorphine regularly, avoiding the 2 day holidays he is taking from treatment every 2 weeks due to risk of returning to use, also to support stable internal environment to promote recovery and stable mental health.  Pt reports he plans to continue to take these medication holidays.  He does not want to return to XR buprenorphine.    - Buprenorphine HCl-Naloxone HCl (SUBOXONE) 12-3 MG FILM per film; Place 2 Film under the tongue daily  Dispense: 14 Film; Refill: 0    2. Alcohol use disorder, severe, dependence (H)  Pt reporting fairly low level of cravings.  Continue acamprosate and disulfiram.     3. Stimulant use disorder  Endorses some cravings; reviewed importance of avoiding people/places/things associated with use to decrease risk of return to use.  Pt recognizes these risks.  He recognizes detrimental effect of stimulant use on his mental health.     4. Tobacco use disorder  Not ready to quit    5. Bipolar affective disorder, remission status unspecified (H)  We called scheduling for [facility] today and scheduled pt for visit this month.  Pt reports he wants to discuss use of bupropion for depression.    Pt continues to report occasional overuse of various of his psych meds; today reports intermittently taking higher doses of clonidine \"when he is depressed and just wants to sleep all day.\"  He has overused Abilify in the past, see previous psychiatrist's notes regarding this.  Stressed importance of taking meds as prescribed, discussed risk of hypotension with overuse of clonidine, about which pt is aware.      Return in about 1 week (around 7/13/2022) for Follow up, in person.    SUBJECTIVE                                                          CC/HPI:  Laci Hinkle Jr " is a 26 year old male with PMH bipolar disorder, CEASAR, ADHD, and PSUD including opioids on buprenorphine who presents to the Recovery Clinic for return visit.       Brief History:  Pt first presented to Recovery Clinic on 7/22/21. Pt was referred by staff in [facility] due to pt's initial desire to taper off buprenorphine which he had been taking from nonprescription sources.   Pt soon left [facility], but continued with Recovery Clinic.  He eventually decided to continue buprenorphine therapy, and then transferred to [facility] with initial injection 8/13/21.  He received a total of 3 Sublocade injections, most recently 100mg on 10/8/21.   Pt then indicated to  staff he wanted to discontinue Sublocade injections.  He was lost to follow up until he returned to  on 12/9/21 requesting to resume SL buprenorphine after brief return to use of alcohol and kratom.   He has regained some stability with daily dosed SL buprenorphine, and last use of other opioids has remained 3/2022.       He describes his opioid use history starting with heroin as a teenager, last use age 17.  He started methadone treatment age 17, dose up to 100mg, then tapered off to 6mg/day then stopped.  He began using kratom ~2016, eventually used amounts up to 100g daily.  He was first prescribed buprenorphine for OUD in 2019. He took as prescribed through 1/2021, then continued on buprenorphine through nonprescription sources.    IV drug use: No   History of overdose: No  Previous treatments : Yes: multiple, [facility], [facility] 11/2020, previous buprenorphine and methadone  Longest period of sobriety: few months  Medical complications related to substance use: exacerbation of MH diagnoses  Hepatitis C Status  no record of testing  HIV Status no record of testing     Other recent substance use:  Stimulants: used methamphetamine ages [age], stopped for 5 years, then resumed age 24.    Sedatives/hypnotics/anxiolytics: has used in past,  "denies regular use  Alcohol: h/o drinking 1 L liquor daily  Tobacco: 1 ppd and ENDS  Cannabis: occasional   Hallucinogens:has used in past, denies recent  Behavioral addictions: none    Most recent Recovery Clinic visit: 6/17/22  A/P at that time was:  1. Opioid use disorder, severe, dependence (H)  Overall he is finding his symptoms well controlled with Suboxone 12-3mg BID and feels it is beneficial.  He denies side effects.  Encouraged outpatient treatment as planning.    - Buprenorphine HCl-Naloxone HCl (SUBOXONE) 12-3 MG FILM per film; Place 2 Film under the tongue daily  Dispense: 14 Film; Refill: 0     2. Alcohol use disorder, severe, dependence (H)  Overall he has seen improvement in the past year.  He finds acamprosate and antabuse helpful.  Encouraged outpatient treatment as planned.  Also encouraged AA.     3. Stimulant use disorder  Recent crack use and he reflects how negative the experience was for him and how much harm it could have caused.  Encouraged him to attend outpatient treatment as planning.     4. Continuous cannabis use  He has ongoing cannabis use.  Not ready to consider change.       5. Tobacco use disorder  Not ready to quit.       6. Bipolar 2 disorder (H)  Overall he reports his mood is stable.  He will need to establish with a new psychiatrist as Dr Oconnor is moving out of state.  He has appointment with Keyona Alcantar NP in November.  [facility] has attempted to reach patient, encouraged him to reach out to [facility], phone number provided.      Return in about 1 week (around 6/24/2022) for Follow up, in person.      6/24/22:  Pt reports he has been taking buprenorphine as prescribed 24mg/day, but continues to take 2 day breaks from buprenorphine every 2 weeks.  He reports he does this because he does not \"feel\" the buprenorphine if he takes it daily continuously.   He denies cravings for other opioids.      Last use of methamphetamine remains ~1/2022.  No use of cocaine " since last visit.   He continues to spend time with friends who use which he recognizes as risk factor for him returning to use.     Has remained abstinent from alcohol since late 5/2022.  Continues to find acamprosate and antabuse helpful.  No cravings.    Continues to use delta 8.  Wanting to get medical marijuana.  Does not express any desire to stop using, only sees as helpful.     Working with therapist ([facility] twice weekly for 5 years). Has not yet contacted [facility] to connect with psychiatric provider (appt with new psychiatrist Dr. Alcantar is not until 11/2022.)  Endorses feeling less depressed and anxious since he started a routine in the mornings which includes making his bed and taking a walk.     Mom may be taking over conservatorship, he is open to this.      [place] Prescription Drug Monitoring Program Reviewed:  Yes; as expected    Medications:  acamprosate (CAMPRAL) 333 MG EC tablet, Take 3 tablets (999 mg) by mouth 2 times daily  amLODIPine-benazepril (LOTREL) 2.5-10 MG capsule, Take 1 capsule by mouth daily  cloNIDine (CATAPRES) 0.1 MG tablet, Take 1 tablet (0.1 mg) by mouth 2 times daily AND 2 tablets (0.2 mg) At Bedtime.  disulfiram (ANTABUSE) 250 MG tablet, Take 1 tablet (250 mg) by mouth daily  divalproex sodium extended-release (DEPAKOTE ER) 500 MG 24 hr tablet, Take 4 tablets (2,000 mg) by mouth daily  lamoTRIgine (LAMICTAL) 100 MG tablet, Take 2 tablets (200 mg) by mouth At Bedtime  naloxone (NARCAN) 4 MG/0.1ML nasal spray, Spray 1 spray (4 mg) into one nostril alternating nostrils once as needed for opioid reversal every 2-3 minutes until assistance arrives  OLANZapine (ZYPREXA) 15 MG tablet, Take 1 tablet (15 mg) by mouth At Bedtime  polyethylene glycol (MIRALAX) 17 GM/Dose powder, Take 17 g (1 capful) by mouth daily (Patient not taking: Reported on 6/27/2022)  senna-docusate (SENOKOT-S/PERICOLACE) 8.6-50 MG tablet, Take 2 tablets by mouth daily as needed for  constipation (Patient not taking: Reported on 6/27/2022)  sildenafil (VIAGRA) 50 MG tablet, Take 1 tablet (50 mg) by mouth daily as needed (ED)    No current facility-administered medications on file prior to visit.      Allergies   Allergen Reactions     Prednisone Other (See Comments)     psych problems       PMH, PSH, FamHx reviewed    Social History  Housing status: with his mother   Employment status: not currently employed  Relationship status: Single  Children: no children    OBJECTIVE                                                      /74   Pulse 93     Physical Exam  Constitutional:       General: He is not in acute distress.     Appearance: Normal appearance. He is not ill-appearing or diaphoretic.   HENT:      Head: Normocephalic and atraumatic.      Nose: No congestion or rhinorrhea.   Eyes:      General: No scleral icterus.     Conjunctiva/sclera: Conjunctivae normal.   Pulmonary:      Effort: Pulmonary effort is normal.   Skin:     General: Skin is warm.      Coloration: Skin is not jaundiced or pale.   Neurological:      Mental Status: He is alert and oriented to person, place, and time.      Coordination: Coordination is intact.      Gait: Gait is intact.   Psychiatric:         Attention and Perception: Attention normal.         Mood and Affect: Affect is flat.         Speech: Speech is rapid and pressured.         Behavior: Behavior normal. Behavior is cooperative.         Thought Content: Thought content normal. Thought content does not include suicidal ideation.      Comments: Mood is described as less depressed and anxious overall  Judgment/insight fair         Labs:  UDS 7/6/22: buprenorphine and THC  *POC urine drug screen does not screen for Fentanyl    No results found for this or any previous visit (from the past 240 hour(s)).      Patient counseling completed today:  Discussed mechanism of action, potential risks/benefits/side effects of medications and other recommendations above.   Recommend pt keep naloxone in their possession and reviewed other aspects of harm reduction to reduce risk of overdose with return to use.   Recommended avoiding concurrent use of alcohol, benzodiazepines or other sedatives with buprenorphine or other opioids.  Discussed importance of avoiding isolation, building a network of supportive relationships, avoiding people/places/things associated with past use to reduce risk of relapse; including motivational interviewing regarding psychosocial treatment for addiction.     At least 40min spent in review of medical record,  review, obtaining histories, discussing recommendations, providing support and counseling     Asiya [place] MD  [place] [id number] [address] [address] [phone]

## 2022-07-13 ENCOUNTER — OFFICE VISIT (OUTPATIENT)
Dept: BEHAVIORAL HEALTH | Facility: CLINIC | Age: 27
End: 2022-07-13
Payer: COMMERCIAL

## 2022-07-13 ENCOUNTER — OFFICE VISIT (OUTPATIENT)
Dept: BEHAVIORAL HEALTH | Facility: CLINIC | Age: 27
End: 2022-07-13

## 2022-07-13 VITALS — DIASTOLIC BLOOD PRESSURE: 97 MMHG | HEART RATE: 80 BPM | SYSTOLIC BLOOD PRESSURE: 166 MMHG

## 2022-07-13 DIAGNOSIS — F10.20 ALCOHOL USE DISORDER, SEVERE, DEPENDENCE (H): ICD-10-CM

## 2022-07-13 DIAGNOSIS — F31.9 BIPOLAR AFFECTIVE DISORDER, REMISSION STATUS UNSPECIFIED (H): ICD-10-CM

## 2022-07-13 DIAGNOSIS — F11.20 OPIOID USE DISORDER, SEVERE, DEPENDENCE (H): Primary | ICD-10-CM

## 2022-07-13 DIAGNOSIS — F15.90 STIMULANT USE DISORDER: ICD-10-CM

## 2022-07-13 DIAGNOSIS — F17.200 TOBACCO USE DISORDER: ICD-10-CM

## 2022-07-13 PROCEDURE — 99214 OFFICE O/P EST MOD 30 MIN: CPT | Performed by: NURSE PRACTITIONER

## 2022-07-13 RX ORDER — DISULFIRAM 250 MG/1
250 TABLET ORAL DAILY
Qty: 90 TABLET | Refills: 3 | Status: SHIPPED | OUTPATIENT
Start: 2022-07-13 | End: 2022-08-26

## 2022-07-13 RX ORDER — ACAMPROSATE CALCIUM 333 MG/1
999 TABLET, DELAYED RELEASE ORAL 2 TIMES DAILY
Qty: 180 TABLET | Refills: 0 | Status: SHIPPED | OUTPATIENT
Start: 2022-07-13 | End: 2022-08-26

## 2022-07-13 RX ORDER — BUPRENORPHINE AND NALOXONE 12; 3 MG/1; MG/1
2 FILM, SOLUBLE BUCCAL; SUBLINGUAL DAILY
Qty: 14 FILM | Refills: 0 | Status: SHIPPED | OUTPATIENT
Start: 2022-07-13 | End: 2022-07-21

## 2022-07-13 NOTE — PROGRESS NOTES
[facility] - Recovery Clinic Follow Up    ASSESSMENT/PLAN                                                      1. Opioid use disorder, severe, dependence (H)  Controlled. Reiterated importance of taking Suboxone daily. Continue 24 mg per day.  - Plans to re-enter treatment at [facility] after leaving [place] after 15 days.   - Pt verified having narcan at home   - Buprenorphine HCl-Naloxone HCl (SUBOXONE) 12-3 MG FILM per film; Place 2 Film under the tongue daily  Dispense: 14 Film; Refill: 0    2. Alcohol use disorder, severe, dependence (H)  - Stable, cravings controlled. Continue acamprosate and disulfiram.   - Plans to re-enter treatment at [facility] after leaving [place] after 15 days.   - acamprosate (CAMPRAL) 333 MG EC tablet; Take 3 tablets (999 mg) by mouth 2 times daily  Dispense: 180 tablet; Refill: 0  - disulfiram (ANTABUSE) 250 MG tablet; Take 1 tablet (250 mg) by mouth daily  Dispense: 90 tablet; Refill: 3    3. Stimulant use disorder  - denies cravings or recent use. Pt was able to distance himself from previous people associated with stimulant use.   - Plans to re-enter treatment at [facility] after leaving [place] after 15 days.     4. Tobacco use disorder  - Pt is in pre-contemplation stage of change. Continue to evaluate readiness.     5. Bipolar affective disorder, remission status unspecified (H)  - Pt reports he is no longer overusing clonidine (mother is storing and administering). He has stopped clonidine at HS, sleeping well. Reports mood has been stable. Continue all medications, no changes today.   - Follow up with [facility] on July 25 th. Long term psych appointment in November.      Return in about 1 week (around 7/20/2022) for Follow up, with me, in person.    SUBJECTIVE                                                        CC/HPI:  Laci Hinkle Jr is a 26 year old male with PMH bipolar disorder, CEASAR, ADHD, and PSUD including opioids on  buprenorphine who presents to the Recovery Clinic for return visit.       Brief History:  Pt first presented to Recovery Clinic on 7/22/21. Pt was referred by staff in [facility] due to pt's initial desire to taper off buprenorphine which he had been taking from nonprescription sources.   Pt soon left [facility], but continued with Recovery Clinic.  He eventually decided to continue buprenorphine therapy, and then transferred to [facility] with initial injection 8/13/21.  He received a total of 3 Sublocade injections, most recently 100mg on 10/8/21.   Pt then indicated to  staff he wanted to discontinue Sublocade injections.  He was lost to follow up until he returned to  on 12/9/21 requesting to resume SL buprenorphine after brief return to use of alcohol and kratom.   He has regained some stability with daily dosed SL buprenorphine, and last use of other opioids has remained 3/2022.       He describes his opioid use history starting with heroin as a teenager, last use age 17.  He started methadone treatment age 17, dose up to 100mg, then tapered off to 6mg/day then stopped.  He began using kratom ~2016, eventually used amounts up to 100g daily.  He was first prescribed buprenorphine for OUD in 2019. He took as prescribed through 1/2021, then continued on buprenorphine through nonprescription sources.    IV drug use: No   History of overdose: No  Previous treatments : Yes: multiple, [facility], [facility] 11/2020, previous buprenorphine and methadone  Longest period of sobriety: few months  Medical complications related to substance use: exacerbation of MH diagnoses  Hepatitis C Status  no record of testing  HIV Status no record of testing     Other recent substance use:  Stimulants: used methamphetamine ages [age], stopped for 5 years, then resumed age 24.    Sedatives/hypnotics/anxiolytics: has used in past, denies regular use  Alcohol: h/o drinking 1 L liquor daily  Tobacco: 1 ppd and  "ENDS  Cannabis: occasional   Hallucinogens:has used in past, denies recent  Behavioral addictions: none     Most recent Recovery Clinic visit 7/6/22  A/P from that visit:     1. Opioid use disorder, severe, dependence (H)  Reporting control of symptoms.   Again encouraged him to take buprenorphine regularly, avoiding the 2 day holidays he is taking from treatment every 2 weeks due to risk of returning to use, also to support stable internal environment to promote recovery and stable mental health.  Pt reports he plans to continue to take these medication holidays.  He does not want to return to XR buprenorphine.    - Buprenorphine HCl-Naloxone HCl (SUBOXONE) 12-3 MG FILM per film; Place 2 Film under the tongue daily  Dispense: 14 Film; Refill: 0     2. Alcohol use disorder, severe, dependence (H)  Pt reporting fairly low level of cravings.  Continue acamprosate and disulfiram.      3. Stimulant use disorder  Endorses some cravings; reviewed importance of avoiding people/places/things associated with use to decrease risk of return to use.  Pt recognizes these risks.  He recognizes detrimental effect of stimulant use on his mental health.      4. Tobacco use disorder  Not ready to quit     5. Bipolar affective disorder, remission status unspecified (H)  We called scheduling for [facility] today and scheduled pt for visit this month.  Pt reports he wants to discuss use of bupropion for depression.    Pt continues to report occasional overuse of various of his psych meds; today reports intermittently taking higher doses of clonidine \"when he is depressed and just wants to sleep all day.\"  He has overused Abilify in the past, see previous psychiatrist's notes regarding this.  Stressed importance of taking meds as prescribed, discussed risk of hypotension with overuse of clonidine, about which pt is aware.       Return in about 1 week (around 7/13/2022) for Follow up, in person.    Today, pt states :   - \" I am " "good\" \" I have started a routine\" \"I am afraid if I don't clean the house or the task for that day I will never do it again\"   - Reports he left treatment early \"I don't know why, I should have stayed\" , he did enjoy the program he was at, reports he enjoyed the community there and felt the program was effective.   - He continues cannabis use   - He is taking Campral, feels this has improved cravings for alcohol. Reports his last drink was 2 weeks prior to starting treatment which was June 3rd. He feels antabuse is also beneficial as a deterrent.   - He \"thought about buying kratom the other day\"   - Reports he is taking Suboxone as prescribed but has days when he does not take it. Reports 6/7 times he will miss the Suboxone dose.   - Denies recent cocaine use, he stopped \"hanging out with that friend\" Last use of methamphetamines remains 1/2022.   - He is active with psychotherapy, [facility] twice weekly for 5 years  Mom will be taking over conservatorship once he gets job. He feels this will be very beneficial and he knows he is not at the point where he can manage money.   -  He was trying to resume treatment at [facility] but reports they have not returned his calls. He is now considering [facility].   - He has a visit with a provider from the [facility] July 25 th.   - His mother is giving him clonidine as needed, he was not able to take this medication as prescribed.   - sleeping well, mood has been stable. He has stopped taking the clonidine before bed sleeping well with out it.   - he has been exercising at least 30 minutes per day.   - He is smoking cigarettes, 2-3 pks per day. He is not interested in quitting or cutting down.     [place] Prescription Drug Monitoring Program Reviewed:  Yes  07/06/2022  1   07/06/2022  Suboxone 12 Mg-3 MG SL Film    14.00  7     Medications:  amLODIPine-benazepril (LOTREL) 2.5-10 MG capsule, Take 1 capsule by mouth daily  cloNIDine (CATAPRES) 0.1 MG " tablet, Take 1 tablet (0.1 mg) by mouth 2 times daily AND 2 tablets (0.2 mg) At Bedtime.  divalproex sodium extended-release (DEPAKOTE ER) 500 MG 24 hr tablet, Take 4 tablets (2,000 mg) by mouth daily  lamoTRIgine (LAMICTAL) 100 MG tablet, Take 2 tablets (200 mg) by mouth At Bedtime  naloxone (NARCAN) 4 MG/0.1ML nasal spray, Spray 1 spray (4 mg) into one nostril alternating nostrils once as needed for opioid reversal every 2-3 minutes until assistance arrives  OLANZapine (ZYPREXA) 15 MG tablet, Take 1 tablet (15 mg) by mouth At Bedtime  polyethylene glycol (MIRALAX) 17 GM/Dose powder, Take 17 g (1 capful) by mouth daily (Patient not taking: Reported on 6/27/2022)  senna-docusate (SENOKOT-S/PERICOLACE) 8.6-50 MG tablet, Take 2 tablets by mouth daily as needed for constipation (Patient not taking: Reported on 6/27/2022)  sildenafil (VIAGRA) 50 MG tablet, Take 1 tablet (50 mg) by mouth daily as needed (ED)    No current facility-administered medications on file prior to visit.      Allergies   Allergen Reactions     Prednisone Other (See Comments)     psych problems     PMH, PSH, FamHx reviewed  Social History  Housing status: with his mother   Employment status: not currently employed  Relationship status: Single  Children: no children    OBJECTIVE                                                      BP (!) 166/97   Pulse 80     Physical Exam  Constitutional:       General: He is not in acute distress.     Appearance: Normal appearance. He is not diaphoretic.   HENT:      Head: Normocephalic and atraumatic.   Eyes:      General: No scleral icterus.     Extraocular Movements: Extraocular movements intact.      Conjunctiva/sclera: Conjunctivae normal.   Cardiovascular:      Rate and Rhythm: Normal rate.   Pulmonary:      Effort: Pulmonary effort is normal.   Neurological:      General: No focal deficit present.      Mental Status: He is alert and oriented to person, place, and time.   Psychiatric:         Attention and  Perception: Perception normal. He is inattentive.         Mood and Affect: Mood and affect normal. Mood is not anxious or depressed. Affect is not flat.         Speech: Speech is rapid and pressured.         Behavior: Behavior is not hyperactive. Behavior is cooperative.         Thought Content: Thought content normal. Thought content does not include suicidal ideation. Thought content does not include suicidal plan.         Cognition and Memory: Cognition and memory normal.      Comments: Insight and judgment fair          Labs:    UDS: positive for buprenorphine and THC  *POC urine drug screen does not screen for Fentanyl    No results found for this or any previous visit (from the past 240 hour(s)).    Patient counseling completed today:  Discussed mechanism of action, potential risks/benefits/side effects of medications and other recommendations above.  Recommend pt keep naloxone in their possession and reviewed other aspects of harm reduction to reduce risk of overdose with return to use.   Recommended avoiding concurrent use of alcohol, benzodiazepines or other sedatives with buprenorphine or other opioids.  Discussed importance of avoiding isolation, building a network of supportive relationships, avoiding people/places/things associated with past use to reduce risk of relapse; including motivational interviewing regarding psychosocial treatment for addiction.     SCOTT Teran CNP  [facility]  [address] [place] [id number] [phone]

## 2022-07-13 NOTE — PROGRESS NOTES
"[facility]    Peer  met with Laci Hinkle Jr in the Recovery Clinic to introduce himself, detail services provided and discuss current status of recovery. Pt appeared alert, oriented and open to feedback during our discussion.     Pt reports recent attempt at inpatient treatment program resulted in leaving after a week.  Pt reports leaving as the environment and programming rules were not beneficial to his ability to find tools and methods for change.  Pt reports consideration to admission into [facility] during the mornings in order to have the remainder of day and evening to pursue other avenues of  life.    PRC encouraged pt to pursue IOP for the benefits of recovery instruction.  Pt will reside at his mother's home vs going to sober living as the rules and \"active using addicts\" in sober housing is not conducive to his sobriety.    Pt states no longer consuming alcohol. Pt reports lessening his weed use on a daily basis, and if he does smoke it is only a few hits to even up his emotions.  Pt admits that smoking weed to excess brings on anxiety and negative emotions.     PRC and pt discussed his recent establishing of a morning routine of showering, shaving, making his bed, prepping coffee / tea and then going for a 1 mile walk around a park near his mother's home.  PRC encouraged this routine as it would seem to start the morning off well prior to attending IOP.  Pt states a plan of going to a vocational / trade school in Spring 2023, and wants to get himself healthy mentally, emotionally and phasically before that time.  PRC encouraged pt to continue taking the steps to better himself on a daily basis.  PRC and pt discussed his returning to  or other recovery meetings.  Pt reports negative experiences at these meetings as he is singled out as being insincere about recovery given he smokes weed and takes suboxone.  PRC encouraged pt to try several locations " and meetings to find one he is comforabel with.  PRC encouraged pt to find a sponsor who will assist with sober minded guidance and mentoring of  recovery philosophies.    Pt reports he will speak to Jose Roberto Jane -  psychotherapist about these matters.  PRC commends pt on the steps he's taking, contemplating and encourages acting upon them in his ongoing recovery.  PRC welcomes contact for recovery based support and resources. PRC and pt agree to speak again during an upcoming  visit.           Service Type:     Individual     Session Start Time:         11:00 AM                Session End Time:           11:15 AM    Session Length:         15 minutes    Patient Goal: To utilize suboxone assisted treatment for sobriety and long term recovery.   Follow up on intent to enter into [facility]  Monitor amount of marijuana use  Consider attendance at recovery meetings  Consider finding a sponsor    Goal Progress:   Ongoing.    Key Risk Factors to Recovery:   PRC encourages being aware of risk factors that can lead to re-use which include avoiding isolation, avoiding triggers and managing cravings in a healthy manner. being open and willing to acceptance and change on a daily basis.  PRC encourages pt to establish a sober network calling tree to reach out to when needed.  Continue to practice honesty with ourselves and trusted support person(s).   PRC encourages regular attendance at recovery based meetings as well as finding a sponsor for mentoring and accountability.   PRC encourages consideration of other services such as counseling for mental health issues which can correlate with our substance use.      Support Needs:   Ongoing care, support and resources for opioid substance use disorder.     Follow up:   JULISA has provided pt with his contact information for support and resource needs.    PRC and pt agree to meet during an upcoming  visit.       [place] [id number],  [address]   [place], [place], [address]  Clinic Phone: [phone]  Clinic Fax: [phone]  Peer  phone: [phone]    Open Monday - Friday  9:00am-4:00pm  Walk in hours: 9am-3pm      Stewart Chester  July 13, 2022  1:03 PM    Peer  is supervised by the program medical director.

## 2022-07-13 NOTE — NURSING NOTE
[facility]      Rooming information:  Approximate last use of illicit opioids: 3/21/22  Taking buprenorphine? Yes:  As prescribed? Yes:   Number of buprenorphine films/tablets remaining currently: 2  Side effects related to buprenorphine (constipation, dry mouth, sedation?) No   Narcan currently available: Yes  Other recent substance use:    Cannabis   NICOTINE-Yes:   If using nicotine, ready to quit? No    Point of care urine drug screen positive for: buprenorphine and THC  *POC urine drug screen does not screen for Fentanyl      PHQ-2 Score:     PHQ-2 ( 1999 [employer]) 7/13/2022 7/6/2022   Q1: Little interest or pleasure in doing things 1 1   Q2: Feeling down, depressed or hopeless 1 0   PHQ-2 Score 2 1   PHQ-2 Total Score (12-17 Years)- Positive if 3 or more points; Administer PHQ-A if positive - -        If PHQ-2 score of 3 or higher, has Recovery Clinic therapist or provider been notified? N/A    Any current suicidal ideation? No  If yes, has Recovery Clinic therapist or provider been notified? N/A    [facility] care provider: [facility]     Mental health provider: Chiara Stewart  (follow up on MH referral if needed)    Insurance needs: active    Housing needs: stable    Contact information up to date? yes    3rd Party Involvement none today (please obtain WU if pt would like to include)    Morena Mckeon CMA  July 13, 2022  10:46 AM

## 2022-07-20 ENCOUNTER — TELEPHONE (OUTPATIENT)
Dept: BEHAVIORAL HEALTH | Facility: CLINIC | Age: 27
End: 2022-07-20

## 2022-07-20 NOTE — TELEPHONE ENCOUNTER
Reason for call:  Medication   If this is a refill request, has the caller requested the refill from the pharmacy already? No  Will the patient be using a [facility]? No  Name of the pharmacy and phone number for the current request:[id number] - [address], [place] [address] AT [place] [address]  [phone]    Name of the medication requested: Buprenorphine HCl-Naloxone HCl (SUBOXONE) 12-3 MG FILM per film    Other request: Patient is requesting to get a refilled. He will do a walk in tomorrow 7/21/22    Phone number to reach patient:  Cell number on file:    Telephone Information:   Mobile [phone]       Best Time:  Any    Can we leave a detailed message on this number?  YES    Travel screening: Not Applicable

## 2022-07-21 ENCOUNTER — OFFICE VISIT (OUTPATIENT)
Dept: BEHAVIORAL HEALTH | Facility: CLINIC | Age: 27
End: 2022-07-21

## 2022-07-21 ENCOUNTER — OFFICE VISIT (OUTPATIENT)
Dept: BEHAVIORAL HEALTH | Facility: CLINIC | Age: 27
End: 2022-07-21
Payer: COMMERCIAL

## 2022-07-21 VITALS — DIASTOLIC BLOOD PRESSURE: 111 MMHG | HEART RATE: 109 BPM | SYSTOLIC BLOOD PRESSURE: 158 MMHG

## 2022-07-21 DIAGNOSIS — F10.20 ALCOHOL USE DISORDER, SEVERE, DEPENDENCE (H): ICD-10-CM

## 2022-07-21 DIAGNOSIS — F11.20 OPIOID USE DISORDER, SEVERE, DEPENDENCE (H): Primary | ICD-10-CM

## 2022-07-21 DIAGNOSIS — F17.200 TOBACCO USE DISORDER: ICD-10-CM

## 2022-07-21 DIAGNOSIS — F12.90 CONTINUOUS CANNABIS USE: ICD-10-CM

## 2022-07-21 DIAGNOSIS — F15.90 STIMULANT USE DISORDER: ICD-10-CM

## 2022-07-21 DIAGNOSIS — F31.81 BIPOLAR 2 DISORDER (H): ICD-10-CM

## 2022-07-21 PROCEDURE — 99214 OFFICE O/P EST MOD 30 MIN: CPT | Performed by: FAMILY MEDICINE

## 2022-07-21 PROCEDURE — H0038 SELF-HELP/PEER SVC PER 15MIN: HCPCS

## 2022-07-21 RX ORDER — BUPRENORPHINE AND NALOXONE 12; 3 MG/1; MG/1
2 FILM, SOLUBLE BUCCAL; SUBLINGUAL DAILY
Qty: 30 FILM | Refills: 0 | Status: SHIPPED | OUTPATIENT
Start: 2022-07-21 | End: 2022-07-28

## 2022-07-21 RX ORDER — LAMOTRIGINE 100 MG/1
200 TABLET ORAL AT BEDTIME
Qty: 5 TABLET | Refills: 0 | Status: SHIPPED | OUTPATIENT
Start: 2022-07-21 | End: 2022-08-09 | Stop reason: DRUGHIGH

## 2022-07-21 ASSESSMENT — PATIENT HEALTH QUESTIONNAIRE - PHQ9: SUM OF ALL RESPONSES TO PHQ QUESTIONS 1-9: 11

## 2022-07-21 NOTE — NURSING NOTE
[facility]      Rooming information:  Approximate last use of illicit opioids: 3/21/22  Taking buprenorphine? Yes:  As prescribed? Yes:   Number of buprenorphine films/tablets remaining currently: 0  Side effects related to buprenorphine (constipation, dry mouth, sedation?) No   Narcan currently available: Yes  Other recent substance use:    Cannabis   NICOTINE-Yes:   If using nicotine, ready to quit? No    Point of care urine drug screen positive for: buprenorphine and THC  *POC urine drug screen does not screen for Fentanyl      PHQ Assesment Total Score(s) 7/21/2022   PHQ-9 Score 11   Some recent data might be hidden       If PHQ-9 score of 15 or higher, has Recovery Clinic therapist or provider been notified? N/A    Any current suicidal ideation? No  If yes, has Recovery Clinic therapist or provider been notified? N/A    Primary care provider: [facility] [place]     Mental health provider: Chiara Washington (follow up on MH referral if needed)    Insurance needs: active    Housing needs: stable    Contact information up to date? yes    3rd Party Involvement none today (please obtain WU if pt would like to include)    Morena Mckeon CMA  July 21, 2022  3:47 PM

## 2022-07-21 NOTE — PROGRESS NOTES
[facility] Follow Up    ASSESSMENT/PLAN                                                    1. Opioid use disorder, severe, dependence (H)  Controlled  Continue buprenorphine 24mg/day taken regularly  - Buprenorphine HCl-Naloxone HCl (SUBOXONE) 12-3 MG FILM per film; Place 2 Film under the tongue daily  Dispense: 30 Film; Refill: 0    2. Alcohol use disorder, severe, dependence (H)  Reports 2 months since last use  Continue acamprosate 999mg bid and disulfiram 250mg/day  Encouraged mutual support group participation    3. Stimulant use disorder  Reports no current use    4. Tobacco use disorder  Pt is not interested in quitting at this time    5. Continuous cannabis use  Pt is not interested in decreasing use    6. Bipolar 2 disorder (H)  [facility]; pt's previous psychiatrist no longer with [facility].    Bridge rx for lamotrigine to that appt  - lamoTRIgine (LAMICTAL) 100 MG tablet; Take 2 tablets (200 mg) by mouth At Bedtime  Dispense: 5 tablet; Refill: 0       Return in about 2 weeks (around 8/4/2022) for Follow up, in person.    SUBJECTIVE                                                        CC/HPI:  Laci Hinkle Jr is a 26 year old male with PMH bipolar disorder, CEASAR, ADHD, and PSUD including opioids on buprenorphine who presents to the Recovery Clinic for return visit.       Brief History:  Pt first presented to Recovery Clinic on 7/22/21. Pt was referred by staff in [facility] due to pt's initial desire to taper off buprenorphine which he had been taking from nonprescription sources.   Pt soon left [facility], but continued with Recovery Clinic.  He eventually decided to continue buprenorphine therapy, and then transferred to Sublocade with initial injection 8/13/21.  He received a total of 3 Sublocade injections, most recently 100mg on 10/8/21.   Pt then indicated to  staff he wanted to discontinue Sublocade injections.  He was  lost to follow up until he returned to  on 12/9/21 requesting to resume SL buprenorphine after brief return to use of alcohol and kratom.   He has regained some stability with daily dosed SL buprenorphine, and last use of other opioids has remained 3/2022.       He describes his opioid use history starting with heroin as a teenager, last use age 17.  He started methadone treatment age 17, dose up to 100mg, then tapered off to 6mg/day then stopped.  He began using kratom ~2016, eventually used amounts up to 100g daily.  He was first prescribed buprenorphine for OUD in 2019. He took as prescribed through 1/2021, then continued on buprenorphine through nonprescription sources.    IV drug use: No   History of overdose: No  Previous treatments : Yes: multiple, [facility], [facility] 11/2020, previous buprenorphine and methadone  Longest period of sobriety: few months  Medical complications related to substance use: exacerbation of MH diagnoses  Hepatitis C Status  no record of testing  HIV Status no record of testing     Other recent substance use:  Stimulants: used methamphetamine ages [age], stopped for 5 years, then resumed age 24.    Sedatives/hypnotics/anxiolytics: has used in past, denies regular use  Alcohol: h/o drinking 1 L liquor daily  Tobacco: 2-3 ppd and/or ENDS  Cannabis: occasional   Hallucinogens:has used in past, denies recent  Behavioral addictions: none     Most recent Recovery Clinic visit 7/13/22  A/P from that visit:     1. Opioid use disorder, severe, dependence (H)  Controlled. Reiterated importance of taking Suboxone daily. Continue 24 mg per day.  - Plans to re-enter treatment at [facility] after leaving [place] after 15 days.   - Pt verified having narcan at home   - Buprenorphine HCl-Naloxone HCl (SUBOXONE) 12-3 MG FILM per film; Place 2 Film under the tongue daily  Dispense: 14 Film; Refill: 0     2. Alcohol use disorder, severe, dependence (H)  - Stable, cravings controlled.  "Continue acamprosate and disulfiram.   - Plans to re-enter treatment at [facility] after leaving [facility] after 15 days.   - acamprosate (CAMPRAL) 333 MG EC tablet; Take 3 tablets (999 mg) by mouth 2 times daily  Dispense: 180 tablet; Refill: 0  - disulfiram (ANTABUSE) 250 MG tablet; Take 1 tablet (250 mg) by mouth daily  Dispense: 90 tablet; Refill: 3     3. Stimulant use disorder  - denies cravings or recent use. Pt was able to distance himself from previous people associated with stimulant use.   - Plans to re-enter treatment at [facility] after leaving [place] after 15 days.      4. Tobacco use disorder  - Pt is in pre-contemplation stage of change. Continue to evaluate readiness.      5. Bipolar affective disorder, remission status unspecified (H)  - Pt reports he is no longer overusing clonidine (mother is storing and administering). He has stopped clonidine at , sleeping well. Reports mood has been stable. Continue all medications, no changes today.   - [facility] 25 [facility]. Long term psych appointment in November.                 Return in about 1 week (around 7/20/2022) for Follow up, with me, in person.    7/21/22, pt states :   He is taking buprenorphine daily as prescribed.  States he stopped taking intermittent breaks from it, noticing it works better taken daily.   Denies alcohol use x2 months, continues to take acamprosate and disulfiram.    Last use of methamphetamines remains 1/2022.   - He is active with psychotherapy, [facility] mental health twice weekly for 5 years   - He is smoking cigarettes, 2-3 pks per day. He is not interested in quitting or cutting down.   Has decided against [facility], doesn't think outpatient treatment will help.  States he would go to residential treatment at [facility] if he needs to.    Got a job full time at [employer]  Wants to go to  and get a sponsor, \"do things differently\"    [place] Prescription Drug " Monitoring Program Reviewed:  Yes, as expected    Medications:  acamprosate (CAMPRAL) 333 MG EC tablet, Take 3 tablets (999 mg) by mouth 2 times daily  amLODIPine-benazepril (LOTREL) 2.5-10 MG capsule, Take 1 capsule by mouth daily  cloNIDine (CATAPRES) 0.1 MG tablet, Take 1 tablet (0.1 mg) by mouth 2 times daily AND 2 tablets (0.2 mg) At Bedtime.  disulfiram (ANTABUSE) 250 MG tablet, Take 1 tablet (250 mg) by mouth daily  divalproex sodium extended-release (DEPAKOTE ER) 500 MG 24 hr tablet, Take 4 tablets (2,000 mg) by mouth daily  naloxone (NARCAN) 4 MG/0.1ML nasal spray, Spray 1 spray (4 mg) into one nostril alternating nostrils once as needed for opioid reversal every 2-3 minutes until assistance arrives (Patient not taking: Reported on 7/21/2022)  OLANZapine (ZYPREXA) 15 MG tablet, Take 1 tablet (15 mg) by mouth At Bedtime  polyethylene glycol (MIRALAX) 17 GM/Dose powder, Take 17 g (1 capful) by mouth daily (Patient not taking: Reported on 6/27/2022)  senna-docusate (SENOKOT-S/PERICOLACE) 8.6-50 MG tablet, Take 2 tablets by mouth daily as needed for constipation (Patient not taking: Reported on 6/27/2022)  sildenafil (VIAGRA) 50 MG tablet, Take 1 tablet (50 mg) by mouth daily as needed (ED)    No current facility-administered medications on file prior to visit.      Allergies   Allergen Reactions     Prednisone Other (See Comments)     psych problems     PMH, PSH, FamHx reviewed  Social History  Housing status: with his mother   Employment status: not currently employed  Relationship status: Single  Children: no children    OBJECTIVE                                                      BP (!) 158/111   Pulse 109     Physical Exam  Constitutional:       General: He is not in acute distress.     Appearance: Normal appearance. He is not diaphoretic.   HENT:      Head: Normocephalic and atraumatic.   Eyes:      General: No scleral icterus.     Extraocular Movements: Extraocular movements intact.       Conjunctiva/sclera: Conjunctivae normal.   Cardiovascular:      Rate and Rhythm: Normal rate.   Pulmonary:      Effort: Pulmonary effort is normal.   Neurological:      General: No focal deficit present.      Mental Status: He is alert and oriented to person, place, and time.   Psychiatric:         Attention and Perception: Perception normal. He is inattentive.         Mood and Affect: Mood and affect normal. Mood is not anxious or depressed. Affect is not flat.         Speech: Speech is rapid and pressured.         Behavior: Behavior is not hyperactive. Behavior is cooperative.         Thought Content: Thought content normal. Thought content does not include suicidal ideation. Thought content does not include suicidal plan.         Cognition and Memory: Cognition and memory normal.      Comments: Insight and judgment fair          Labs:    UDS 7/21/22 : positive for buprenorphine and THC  *POC urine drug screen does not screen for Fentanyl    No results found for this or any previous visit (from the past 240 hour(s)).    Patient counseling completed today:  Discussed mechanism of action, potential risks/benefits/side effects of medications and other recommendations above.  Recommend pt keep naloxone in their possession and reviewed other aspects of harm reduction to reduce risk of overdose with return to use.   Recommended avoiding concurrent use of alcohol, benzodiazepines or other sedatives with buprenorphine or other opioids.  Discussed importance of avoiding isolation, building a network of supportive relationships, avoiding people/places/things associated with past use to reduce risk of relapse; including motivational interviewing regarding psychosocial treatment for addiction.     Asiya Hastings mD  [place] [id number] [place] [id number]  [phone]

## 2022-07-22 NOTE — PROGRESS NOTES
[facility]    Peer  met with Laci Hinkle Jr in the Recovery Clinic to introduce himself, detail services provided and discuss current status of recovery. Pt appeared alert, oriented and open to feedback during our discussion.     Pt arrives for visit with  provider for refill of suboxone. Pt reports taking suboxone as prescribed by the provider . Pt reports recovery is going well.  Pt remains on antabuse which prevents alcohol consumption. Pt reports he continues to limit the amount of weed he smokes - only a few times per day -as too much brings on depression.      Pt reports he was hired at a local [employer] ([employer]) and begins FT employment on 07/22/22. It is located near his mother's where he remains living so the commute will be a non factor.   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  Pt will provide paycheck to his mother for handling to prevent spending it on substances or other spur of the moment purchases.     PRC and pt discussed pt intention to attend [facility] during the daytime - which was in the works during his last  visit.  Pt reports he attended sessions but found them to be non-productive.  Pt reports speaking with the [facility] staff and agreed he will begin recovery meetings and focus on his job and the structure of those two as well as living with his mother will keep him dedicated to sober living.  Pt stated if a re-use occurs he will return to [facility] for inpatient treatment again.  PRC and pt discussed finding a sponsor at  and absorbing some of the discussions that might enter the rooms about smoking weed (even after he gets his marijuan prescription card in August ) as well as thoughts on suboxone.  Pt will also avoid newcomers to the AA program as previous experience befriending them finds he follows them into re-use.    PRC strongly encouraged pt to get a sponsor and begin work on the steps for additional  Recovery guidance.   PRC and pt discussed the role amada is playing in his current recovery. Pt states belief in God and the Plan he has for pt is key to his outlook being positive.  PRC encouraged pt to establish firm and consistent boundaries with new work force at the [employer] job and with AA newcomers.     PRC welcomes contact for recovery based support and resources. PRC and pt agree to speak again during an upcoming RC visit.           Service Type:     Individual     Session Start Time:     3:00 PM                    Session End Time:      3:15 PM    Session Length:         15 minutes    Patient Goal: To utilize suboxone assisted treatment for sobriety and long term recovery.     Goal Progress:   Ongoing.    Key Risk Factors to Recovery:   PRC encourages being aware of risk factors that can lead to re-use which include avoiding isolation, avoiding triggers and managing cravings in a healthy manner. being open and willing to acceptance and change on a daily basis.  PRC encourages pt to establish a sober network calling tree to reach out to when needed.  Continue to practice honesty with ourselves and trusted support person(s).   PRC encourages regular attendance at recovery based meetings as well as finding a sponsor for mentoring and accountability.   PRC encourages consideration of other services such as counseling for mental health issues which can correlate with our substance use.      Support Needs:   Ongoing care, support and resources for opioid substance use disorder.     Follow up:   JULISA has provided pt with his contact information for support and resource needs.    PRC and pt agree to meet during an upcoming  visit.       [facility]  [address], [address]   [place], [place], [address]  Clinic Phone: [phone]  Clinic Fax: [phone]  Peer  phone:  [phone]    Open Monday - Friday  9:00am-4:00pm  Walk in hours: 9am-3pm      Stewart Chester  July 22, 2022  10:07 AM    Peer  is supervised by the program medical director.

## 2022-07-25 ENCOUNTER — VIRTUAL VISIT (OUTPATIENT)
Dept: BEHAVIORAL HEALTH | Facility: CLINIC | Age: 27
End: 2022-07-25
Payer: COMMERCIAL

## 2022-07-25 DIAGNOSIS — F15.11 METHAMPHETAMINE ABUSE IN REMISSION (H): ICD-10-CM

## 2022-07-25 DIAGNOSIS — F19.10 POLYSUBSTANCE ABUSE (H): ICD-10-CM

## 2022-07-25 DIAGNOSIS — F43.10 PTSD (POST-TRAUMATIC STRESS DISORDER): ICD-10-CM

## 2022-07-25 DIAGNOSIS — F31.9 BIPOLAR I DISORDER (H): Primary | ICD-10-CM

## 2022-07-25 PROCEDURE — 99205 OFFICE O/P NEW HI 60 MIN: CPT | Mod: GT | Performed by: NURSE PRACTITIONER

## 2022-07-25 RX ORDER — BUPROPION HYDROCHLORIDE 150 MG/1
150 TABLET ORAL EVERY MORNING
Qty: 7 TABLET | Refills: 0 | Status: SHIPPED | OUTPATIENT
Start: 2022-07-25 | End: 2022-08-09 | Stop reason: DRUGHIGH

## 2022-07-25 RX ORDER — BUPROPION HYDROCHLORIDE 75 MG/1
75 TABLET ORAL EVERY MORNING
Qty: 7 TABLET | Refills: 0 | Status: SHIPPED | OUTPATIENT
Start: 2022-07-25 | End: 2022-08-09 | Stop reason: DRUGHIGH

## 2022-07-25 RX ORDER — LAMOTRIGINE 200 MG/1
200 TABLET ORAL DAILY
Qty: 30 TABLET | Refills: 2 | Status: SHIPPED | OUTPATIENT
Start: 2022-07-25 | End: 2022-10-24

## 2022-07-25 RX ORDER — OLANZAPINE 15 MG/1
15 TABLET ORAL AT BEDTIME
Qty: 30 TABLET | Refills: 2 | Status: SHIPPED | OUTPATIENT
Start: 2022-07-25 | End: 2022-10-24

## 2022-07-25 RX ORDER — BUPROPION HYDROCHLORIDE 300 MG/1
300 TABLET ORAL EVERY MORNING
Qty: 30 TABLET | Refills: 1 | Status: SHIPPED | OUTPATIENT
Start: 2022-07-25 | End: 2022-09-19

## 2022-07-25 RX ORDER — DIVALPROEX SODIUM 500 MG/1
2000 TABLET, EXTENDED RELEASE ORAL DAILY
Qty: 120 TABLET | Refills: 2 | Status: SHIPPED | OUTPATIENT
Start: 2022-07-25 | End: 2022-10-24

## 2022-07-25 ASSESSMENT — ANXIETY QUESTIONNAIRES
7. FEELING AFRAID AS IF SOMETHING AWFUL MIGHT HAPPEN: MORE THAN HALF THE DAYS
8. IF YOU CHECKED OFF ANY PROBLEMS, HOW DIFFICULT HAVE THESE MADE IT FOR YOU TO DO YOUR WORK, TAKE CARE OF THINGS AT HOME, OR GET ALONG WITH OTHER PEOPLE?: SOMEWHAT DIFFICULT
GAD7 TOTAL SCORE: 15
GAD7 TOTAL SCORE: 15
3. WORRYING TOO MUCH ABOUT DIFFERENT THINGS: MORE THAN HALF THE DAYS
GAD7 TOTAL SCORE: 15
1. FEELING NERVOUS, ANXIOUS, OR ON EDGE: MORE THAN HALF THE DAYS
6. BECOMING EASILY ANNOYED OR IRRITABLE: MORE THAN HALF THE DAYS
7. FEELING AFRAID AS IF SOMETHING AWFUL MIGHT HAPPEN: MORE THAN HALF THE DAYS
2. NOT BEING ABLE TO STOP OR CONTROL WORRYING: MORE THAN HALF THE DAYS
5. BEING SO RESTLESS THAT IT IS HARD TO SIT STILL: MORE THAN HALF THE DAYS
4. TROUBLE RELAXING: NEARLY EVERY DAY
IF YOU CHECKED OFF ANY PROBLEMS ON THIS QUESTIONNAIRE, HOW DIFFICULT HAVE THESE PROBLEMS MADE IT FOR YOU TO DO YOUR WORK, TAKE CARE OF THINGS AT HOME, OR GET ALONG WITH OTHER PEOPLE: SOMEWHAT DIFFICULT

## 2022-07-25 ASSESSMENT — PATIENT HEALTH QUESTIONNAIRE - PHQ9
SUM OF ALL RESPONSES TO PHQ QUESTIONS 1-9: 18
10. IF YOU CHECKED OFF ANY PROBLEMS, HOW DIFFICULT HAVE THESE PROBLEMS MADE IT FOR YOU TO DO YOUR WORK, TAKE CARE OF THINGS AT HOME, OR GET ALONG WITH OTHER PEOPLE: SOMEWHAT DIFFICULT
SUM OF ALL RESPONSES TO PHQ QUESTIONS 1-9: 18

## 2022-07-25 NOTE — PATIENT INSTRUCTIONS
Start:  Bupropion/Wellbutrin for 7 day increments:  -75 mg  -150 mg  -300 mg in the AM thereafter      Continue:  -Divalproex sodium/Depakote ER 2000 mg (4 tabs) at bedtime    -Lamotrigine/Lamictal 200 mg at bedtime    -Olanzapine/Zyprexa 15 mg at bedtime    Per Addiction Medicine:  -Acamprosate  -Clonidine  -Disulfiram        ------------------------------------    -It is very important to take the Amlodipine/Benazepril as an antihypertensive, especially while on the Wellbutrin    -Set an alarm as a reminder to take + get a weekly pill box if necessary
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
## 2022-07-25 NOTE — PROGRESS NOTES
" This video/telephone visit will be conducted virtually between you and your provider. We have found that certain health care needs can be provided without the need for an in-person physical exam. This service lets us provide the care you need with a video /telephone conversation. If a prescription is necessary we can send it directly to your pharmacy. If lab work is needed we can place an order for that and you can then stop by our lab to have the test done at a later time.\"   Just as we bill insurance for in-person visits, we also bill insurance for video/telephone visits. If you have questions about your insurance coverage, we recommend that you speak with your insurance company.      Patient/Parent has given verbal consent for video/Telephone visit? yes    Patient would like the video visit invitation sent by: [employer]  Patient verified allergies, medications and pharmacy via phone. Patient states they are ready for visit.      Mental Health &Addiction (MH&A)[facility] ([facility]):     Provides Patient Support While Waiting to Access Programmatic and Outpatient MH&A Care and Provides [facility] Crisis Assessment Services     INTAKE  ____________________________________________________    \"The [facility] is a temporary psychiatry service that helps to bridge the time to your next appointment. It is not intended to be a long-term service and you are expected to attend your scheduled appointment with your next provider.\"  [x] Patient/Parent verbalized understanding    If you need support between appointments, please call [phone] and let them know you're seen by [facility]. You may also send a MiName message to reach us.    General-     Most pressing MH needs at this time: To get something for depression. Pt c/o of hypersomnia.       Any physical health conditions or diagnoses we should be aware of or that are impacting you: none reported       Medications-     Injectable medications " currently prescribed: None  If yes, do you need an appointment for the next injection: NA    Any Controlled Substances that you are prescribed: Suboxone       Primary care provider: [facility]        CEASAR-7 scores:  15  CEASAR-7 SCORE 7/22/2021 6/27/2022   Total Score - -   Total Score 6 8       PHQ-9 scores: 18  PHQ-9 SCORE 6/27/2022 7/21/2022   PHQ-9 Total Score - -   PHQ-9 Total Score MyChart - -   PHQ-9 Total Score 17 11           Anything the provider should be aware of for today's appointment: pt is [facility] with [facility].     New (awaiting) Mental health provider or next programming: [facility]     Date of scheduled apt: 11/29/22        Rina Evangelista on July 25, 2022 at 9:07 AM     MH&A TC   NURSING Post-Appointment Chart-check:    Correct pharmacy verified with patient and updated in chart? [x] yes []no    Charge captured ? [] yes  [x] no    Medications ordered this visit were e-scribed.  Verified by order class [x] yes  [] no    List Medications:  (DEPAKOTE ER) 500 MG 24 hr tablet  OLANZapine (ZYPREXA) 15 MG tablet  lamoTRIgine (LAMICTAL) 200 MG tablet  buPROPion (WELLBUTRIN) 75 MG tablet  buPROPion (WELLBUTRIN XL) 150 MG 24 hr tablet  buPROPion (WELLBUTRIN XL) 300 MG 24 hr tablet    Medication changes or discontinuations were communicated to patient's pharmacy: [] yes  [x] no    UA collected [] yes  [] no  [x] n/a-virtual     Future appointment was made: [] yes  [x] no  [] n/a    Dictation completed at time of chart check: [] yes  [x] no    I have checked the documentation for today s encounters and the above information has been reviewed and completed.      Rina Evangelista on July 25, 2022 at 2:51 PM

## 2022-07-28 ENCOUNTER — OFFICE VISIT (OUTPATIENT)
Dept: BEHAVIORAL HEALTH | Facility: CLINIC | Age: 27
End: 2022-07-28
Payer: COMMERCIAL

## 2022-07-28 VITALS — SYSTOLIC BLOOD PRESSURE: 150 MMHG | HEART RATE: 99 BPM | DIASTOLIC BLOOD PRESSURE: 107 MMHG

## 2022-07-28 DIAGNOSIS — F10.20 ALCOHOL USE DISORDER, SEVERE, DEPENDENCE (H): ICD-10-CM

## 2022-07-28 DIAGNOSIS — F11.20 OPIOID USE DISORDER, SEVERE, DEPENDENCE (H): Primary | ICD-10-CM

## 2022-07-28 DIAGNOSIS — F15.90 STIMULANT USE DISORDER: ICD-10-CM

## 2022-07-28 DIAGNOSIS — I10 HYPERTENSION, UNSPECIFIED TYPE: ICD-10-CM

## 2022-07-28 PROCEDURE — 99214 OFFICE O/P EST MOD 30 MIN: CPT | Performed by: NURSE PRACTITIONER

## 2022-07-28 RX ORDER — AMLODIPINE BESYLATE AND BENAZEPRIL HYDROCHLORIDE 2.5; 1 MG/1; MG/1
1 CAPSULE ORAL DAILY
Qty: 30 CAPSULE | Refills: 0 | Status: SHIPPED | OUTPATIENT
Start: 2022-07-28 | End: 2022-10-24

## 2022-07-28 RX ORDER — BUPRENORPHINE AND NALOXONE 12; 3 MG/1; MG/1
2 FILM, SOLUBLE BUCCAL; SUBLINGUAL DAILY
Qty: 16 FILM | Refills: 0 | Status: SHIPPED | OUTPATIENT
Start: 2022-07-28 | End: 2022-08-09

## 2022-07-28 ASSESSMENT — PATIENT HEALTH QUESTIONNAIRE - PHQ9: SUM OF ALL RESPONSES TO PHQ QUESTIONS 1-9: 16

## 2022-07-28 NOTE — NURSING NOTE
[facility]    Wants to get back on Sublocade    Rooming information:  Approximate last use of illicit opioids: 3/21/22  Taking buprenorphine? Yes:  As prescribed? No  Number of buprenorphine films/tablets remaining currently: 2  Side effects related to buprenorphine (constipation, dry mouth, sedation?) No   Narcan currently available: Yes  Other recent substance use:    Cannabis   NICOTINE-Yes:   If using nicotine, ready to quit? No    Point of care urine drug screen positive for: buprenorphine and THC  *POC urine drug screen does not screen for Fentanyl      PHQ Assesment Total Score(s) 7/28/2022   PHQ-9 Score 16   Some recent data might be hidden       If PHQ-9 score of 15 or higher, has [facility] therapist or provider been notified? Yes    Any current suicidal ideation? No  If yes, has [facility] therapist or provider been notified? N/A    Primary care provider: [facility]     Mental health provider:  Chiara [facility]  (follow up on MH referral if needed)    Insurance needs: active    Housing needs: stable     Contact information up to date? yes    3rd Party Involvement none today (please obtain WU if pt would like to include)    Morena Mckeon CMA  July 28, 2022  11:43 AM

## 2022-07-28 NOTE — PROGRESS NOTES
[facility] Follow Up    ASSESSMENT/PLAN                                                      1. Opioid use disorder, severe, dependence (H)  - Controlled. No recent use or cravings. Pt reporting difficulty with medication adherence, taking as prescribed, running out early. Discussed XR Buprenorphine for improved adherence and long term management.   - Therapy order is active for Sublocade, however PA is pending. Will send message for update on approval.   - Ok to start Sublocade after PA approved. Discontinue SL films after first injection.   - pt verifies narcan rx at home   - Buprenorphine HCl-Naloxone HCl (SUBOXONE) 12-3 MG FILM per film; Place 2 Film under the tongue daily  Dispense: 16 Film; Refill: 0    2. Alcohol use disorder, severe, dependence (H)  - no recent use or cravings. Continue acamprosate 999mg bid and disulfiram 250mg/day  - encouraged ongoing recovery supports.     3. Stimulant use disorder  - stable. no recent use or cravings. Pt was start on Wellbutrin with instruction to titrate dose to 300 mg per day per Chiara Stewart ([facility]) . Discussed this may help with stimulant cravings as well.     4. Hypertension, unspecified type  - refilled for pt today, follow up with PCP. Discussed importance of taking BP medication and PCP follow up.   - amLODIPine-benazepril (LOTREL) 2.5-10 MG capsule; Take 1 capsule by mouth daily  Dispense: 30 capsule; Refill: 0      Return in about 1 week (around 8/4/2022) for Follow up, in person.    SUBJECTIVE                                                          CC/HPI:  Laci Hinkle  is a 26 year old male with PMH bipolar disorder, CEASAR, ADHD, and PSUD including opioids on buprenorphine who presents to the [facility] for return visit.       Brief History:  Pt first presented to [facility] on 7/22/21. Pt was referred by staff in [facility] due to pt's initial desire to taper off buprenorphine which he had been  taking from nonprescription sources.   Pt soon left [facility], but continued with [facility].  He eventually decided to continue buprenorphine therapy, and then transferred to [facility] with initial injection 8/13/21.  He received a total of 3 Sublocade injections, most recently 100mg on 10/8/21.   Pt then indicated to  staff he wanted to discontinue Sublocade injections.  He was lost to follow up until he returned to  on 12/9/21 requesting to resume SL buprenorphine after brief return to use of alcohol and kratom.   He has regained some stability with daily dosed SL buprenorphine, and last use of other opioids has remained 3/2022.       He describes his opioid use history starting with heroin as a teenager, last use age 17.  He started methadone treatment age 17, dose up to 100mg, then tapered off to 6mg/day then stopped.  He began using kratom ~2016, eventually used amounts up to 100g daily.  He was first prescribed buprenorphine for OUD in 2019. He took as prescribed through 1/2021, then continued on buprenorphine through nonprescription sources.    IV drug use: No   History of overdose: No  Previous treatments : Yes: multiple, [facility], [facility] 11/2020, previous buprenorphine and methadone  Longest period of sobriety: few months  Medical complications related to substance use: exacerbation of MH diagnoses  Hepatitis C Status  no record of testing  HIV Status no record of testing     Other recent substance use:  Stimulants: used methamphetamine ages [age], stopped for 5 years, then resumed age 24.    Sedatives/hypnotics/anxiolytics: has used in past, denies regular use  Alcohol: h/o drinking 1 L liquor daily  Tobacco: 2-3 ppd and/or ENDS  Cannabis: occasional   Hallucinogens:has used in past, denies recent  Behavioral addictions: none       Most recent [facility] visit 7/21/22  A/P from that visit:   1. Opioid use disorder, severe, dependence (H)  Controlled  Continue buprenorphine  "24mg/day taken regularly  - Buprenorphine HCl-Naloxone HCl (SUBOXONE) 12-3 MG FILM per film; Place 2 Film under the tongue daily  Dispense: 30 Film; Refill: 0     2. Alcohol use disorder, severe, dependence (H)  Reports 2 months since last use  Continue acamprosate 999mg bid and disulfiram 250mg/day  Encouraged mutual support group participation     3. Stimulant use disorder  Reports no current use     4. Tobacco use disorder  Pt is not interested in quitting at this time     5. Continuous cannabis use  Pt is not interested in decreasing use     6. Bipolar 2 disorder (H)  [facility] appt [facility] psych [facility] 7/25/[facility]; pt's previous psychiatrist no longer with [facility].    Bridge rx for lamotrigine to that appt  - lamoTRIgine (LAMICTAL) 100 MG tablet; Take 2 tablets (200 mg) by mouth At Bedtime  Dispense: 5 tablet; Refill: 0           Return in about 2 weeks (around 8/4/2022) for Follow up, in person.    Today, pt states :   - Pt is here today for a follow up visit. He has been doing well. Reports he saw Chiara Washington in the transition clinic. She will be managing his psychiatry care until he establishes with a new provider. Reports he was started on Wellbutrin, he does have some questions regarding the titration schedule and how much he is suppose to take daily. Reports he was \"bothered by being offered Vyvanse\". He felt this was triggering. He would like to have this communicated with his provider.   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   - Major goals for the future include starting college.   - Reports taking more Suboxone as prescribed. He is currently out of Suboxone. He is unsure why he took more than prescribed. Denies cravings or recent use. Reports he is not good at managing medications and he would like to start the sublocade injection. He wants \"consistency\"   - Denies adverse SE from Suboxone.   - No recent " stimulant use or cravings   - No recent alcohol use or cravings. Continues taking Campral 999 mg BID and disulfiram 250 mg per day.   - He is not currently in treatment     [place] Prescription Drug Monitoring Program Reviewed:  Yes  07/22/2022  3   07/21/2022  Suboxone 12 Mg-3 MG SL Film    20.00  15       Medications:  acamprosate (CAMPRAL) 333 MG EC tablet, Take 3 tablets (999 mg) by mouth 2 times daily  buPROPion (WELLBUTRIN XL) 150 MG 24 hr tablet, Take 1 tablet (150 mg) by mouth every morning  buPROPion (WELLBUTRIN XL) 300 MG 24 hr tablet, Take 1 tablet (300 mg) by mouth every morning  buPROPion (WELLBUTRIN) 75 MG tablet, Take 1 tablet (75 mg) by mouth every morning For 7 days  disulfiram (ANTABUSE) 250 MG tablet, Take 1 tablet (250 mg) by mouth daily  divalproex sodium extended-release (DEPAKOTE ER) 500 MG 24 hr tablet, Take 4 tablets (2,000 mg) by mouth daily  lamoTRIgine (LAMICTAL) 100 MG tablet, Take 2 tablets (200 mg) by mouth At Bedtime  lamoTRIgine (LAMICTAL) 200 MG tablet, Take 1 tablet (200 mg) by mouth daily  naloxone (NARCAN) 4 MG/0.1ML nasal spray, Spray 1 spray (4 mg) into one nostril alternating nostrils once as needed for opioid reversal every 2-3 minutes until assistance arrives (Patient not taking: Reported on 7/21/2022)  OLANZapine (ZYPREXA) 15 MG tablet, Take 1 tablet (15 mg) by mouth At Bedtime  polyethylene glycol (MIRALAX) 17 GM/Dose powder, Take 17 g (1 capful) by mouth daily (Patient not taking: Reported on 6/27/2022)  senna-docusate (SENOKOT-S/PERICOLACE) 8.6-50 MG tablet, Take 2 tablets by mouth daily as needed for constipation (Patient not taking: Reported on 6/27/2022)  sildenafil (VIAGRA) 50 MG tablet, Take 1 tablet (50 mg) by mouth daily as needed (ED)    No current facility-administered medications on file prior to visit.      Allergies   Allergen Reactions     Prednisone Other (See Comments)     psych problems     PMH, PSH, FamHx reviewed  Social History  Housing status: with  his mother   Employment status: not currently employed  Relationship status: Single  Children: no children    OBJECTIVE                                                      BP (!) 150/107   Pulse 99     Physical Exam  Constitutional:       General: He is not in acute distress.     Appearance: Normal appearance. He is not diaphoretic.   Eyes:      Extraocular Movements: Extraocular movements intact.   Pulmonary:      Effort: Pulmonary effort is normal.   Neurological:      General: No focal deficit present.      Mental Status: He is alert and oriented to person, place, and time.   Psychiatric:         Attention and Perception: Attention normal.         Mood and Affect: Mood is not anxious or depressed. Affect is not flat.         Speech: Speech is rapid and pressured.         Behavior: Behavior is cooperative.         Thought Content: Thought content normal.         Cognition and Memory: Cognition and memory normal.      Comments: Insight and judgment fair          Labs:    UDS 7/28/22: positive for  buprenorphine and THC  *[facility] urine drug screen does not screen for Fentanyl    No results found for this or any previous visit (from the past 240 hour(s)).      Patient counseling completed today:  Discussed mechanism of action, potential risks/benefits/side effects of medications and other recommendations above.  Recommend pt keep naloxone in their possession and reviewed other aspects of harm reduction to reduce risk of overdose with return to use.   Recommended avoiding concurrent use of alcohol, benzodiazepines or other sedatives with buprenorphine or other opioids.  Discussed importance of avoiding isolation, building a network of supportive relationships, avoiding people/places/things associated with past use to reduce risk of relapse; including motivational interviewing regarding psychosocial treatment for addiction.       SCOTT Teran CNP  [facility]  [address] [place] [address]  [id number]  [phone]

## 2022-08-02 ENCOUNTER — TELEPHONE (OUTPATIENT)
Dept: BEHAVIORAL HEALTH | Facility: CLINIC | Age: 27
End: 2022-08-02

## 2022-08-02 RX ORDER — METHYLPREDNISOLONE SODIUM SUCCINATE 125 MG/2ML
125 INJECTION, POWDER, LYOPHILIZED, FOR SOLUTION INTRAMUSCULAR; INTRAVENOUS
Status: CANCELLED
Start: 2022-08-02

## 2022-08-02 RX ORDER — LIDOCAINE HYDROCHLORIDE 10 MG/ML
2 INJECTION, SOLUTION EPIDURAL; INFILTRATION; INTRACAUDAL; PERINEURAL ONCE
Status: CANCELLED | OUTPATIENT
Start: 2022-08-02 | End: 2022-08-02

## 2022-08-02 RX ORDER — MEPERIDINE HYDROCHLORIDE 25 MG/ML
25 INJECTION INTRAMUSCULAR; INTRAVENOUS; SUBCUTANEOUS EVERY 30 MIN PRN
Status: CANCELLED | OUTPATIENT
Start: 2022-08-02

## 2022-08-02 RX ORDER — ALBUTEROL SULFATE 0.83 MG/ML
2.5 SOLUTION RESPIRATORY (INHALATION)
Status: CANCELLED | OUTPATIENT
Start: 2022-08-02

## 2022-08-02 RX ORDER — ALBUTEROL SULFATE 90 UG/1
1-2 AEROSOL, METERED RESPIRATORY (INHALATION)
Status: CANCELLED
Start: 2022-08-02

## 2022-08-02 RX ORDER — EPINEPHRINE 1 MG/ML
0.3 INJECTION, SOLUTION, CONCENTRATE INTRAVENOUS EVERY 5 MIN PRN
Status: CANCELLED | OUTPATIENT
Start: 2022-08-02

## 2022-08-02 RX ORDER — DIPHENHYDRAMINE HYDROCHLORIDE 50 MG/ML
50 INJECTION INTRAMUSCULAR; INTRAVENOUS
Status: CANCELLED
Start: 2022-08-02

## 2022-08-02 NOTE — TELEPHONE ENCOUNTER
Writer sent UDS results via E-mail to [facility]  and requested insurance coverage be secured for Sublocade.     Mandi Hanna RN on 8/2/2022 at 3:50 PM

## 2022-08-02 NOTE — TELEPHONE ENCOUNTER
Please secure insurance coverage for Sublocade for this patient.     Mandi Hanna RN on 8/2/2022 at 3:44 PM

## 2022-08-05 ENCOUNTER — TELEPHONE (OUTPATIENT)
Dept: BEHAVIORAL HEALTH | Facility: CLINIC | Age: 27
End: 2022-08-05

## 2022-08-05 NOTE — TELEPHONE ENCOUNTER
Writer informed patient via [employer] message that Sublocade prior authorization was approved. Provided scheduling # for [facility], [phone]. Reiterated to patient the importance of taking buprenorphine as prescribed without opiate use for at least 7 days leading up to Sublocade injection.     Mandi Hanna RN on 8/5/2022 at 1:26 PM

## 2022-08-09 ENCOUNTER — OFFICE VISIT (OUTPATIENT)
Dept: BEHAVIORAL HEALTH | Facility: CLINIC | Age: 27
End: 2022-08-09
Payer: COMMERCIAL

## 2022-08-09 VITALS — OXYGEN SATURATION: 99 % | SYSTOLIC BLOOD PRESSURE: 148 MMHG | HEART RATE: 83 BPM | DIASTOLIC BLOOD PRESSURE: 93 MMHG

## 2022-08-09 DIAGNOSIS — F15.90 STIMULANT USE DISORDER: ICD-10-CM

## 2022-08-09 DIAGNOSIS — F43.10 PTSD (POST-TRAUMATIC STRESS DISORDER): ICD-10-CM

## 2022-08-09 DIAGNOSIS — F31.81 BIPOLAR 2 DISORDER (H): ICD-10-CM

## 2022-08-09 DIAGNOSIS — F10.20 ALCOHOL USE DISORDER, SEVERE, DEPENDENCE (H): ICD-10-CM

## 2022-08-09 DIAGNOSIS — F17.200 TOBACCO USE DISORDER: ICD-10-CM

## 2022-08-09 DIAGNOSIS — F12.90 CONTINUOUS CANNABIS USE: ICD-10-CM

## 2022-08-09 DIAGNOSIS — F11.20 OPIOID USE DISORDER, SEVERE, DEPENDENCE (H): Primary | ICD-10-CM

## 2022-08-09 PROCEDURE — 99215 OFFICE O/P EST HI 40 MIN: CPT | Performed by: FAMILY MEDICINE

## 2022-08-09 RX ORDER — BUPRENORPHINE AND NALOXONE 12; 3 MG/1; MG/1
2 FILM, SOLUBLE BUCCAL; SUBLINGUAL DAILY
Qty: 14 FILM | Refills: 0 | Status: SHIPPED | OUTPATIENT
Start: 2022-08-09 | End: 2022-08-18

## 2022-08-09 ASSESSMENT — PATIENT HEALTH QUESTIONNAIRE - PHQ9: SUM OF ALL RESPONSES TO PHQ QUESTIONS 1-9: 12

## 2022-08-09 NOTE — PATIENT INSTRUCTIONS
After you get your Sublocade injection, decrease the amount of sublingual buprenorphine you take to 6-12mg (1/2 to 1 film) daily as needed for withdrawal symptoms or cravings.

## 2022-08-09 NOTE — NURSING NOTE
[facility] - Addiction Medicine     Sublocade approved. [facility] to [facility] after visit today to schedule.    Rooming information:  Approximate last substance use: none since last visit  Side effects related to medication for substance use (constipation, dry mouth, sedation?) No     Narcan currently available: Yes  Other recent substance use:     Denies   NICOTINE-Yes  If using nicotine, ready to quit? No    Primary care provider: none    Mental health provider: Chiara Stewart [facility]    Insurance needs: active     Housing needs: stable    Contact information up to date? yes    3rd Party Involvement: none    OBJECTIVE    Point of care urine drug screen positive for: buprenorphine and THC --medical marijuana  *[facility] urine drug screen does not screen for Fentanyl    Taking buprenorphine? Yes   As prescribed? Yes  Number of buprenorphine films/tablets remaining currently: 0    PHQ-9 Score:     PHQ Assesment Total Score(s) 8/9/2022   PHQ-9 Score 12   Some recent data might be hidden     If PHQ-9 score of 15 or higher, has [facility] therapist or provider been notified? N/A    Any current suicidal ideation? No  If yes, has [facility] therapist or provider been notified? N/A    Suzanne Sanchez RN  August 9, 2022  1:13 PM

## 2022-08-09 NOTE — LETTER
[place] [id number]  [facility] [address]  [phone]          August 9, 2022    RE:  Laci Hinkle Jr                                                                                                                                                       [address]   [place] [facility] [address]            Dear Mr. Hinkle:     You will be receiving Sublocade monthly injections (buprenorphine extended release) starting 8/11/22.   This will eventually replace the sublingual, daily dosed buprenorphine you are currently taking.     Sincerely,        Asiya Hastings MD

## 2022-08-09 NOTE — PROGRESS NOTES
[facility] Follow Up    ASSESSMENT/PLAN                                                      1. Opioid use disorder, severe, dependence (H)  Pt reports taking buprenorphine 24mg/day as prescribed since last visit, agreeable to recommendation to transfer back to XR buprenorphine.   Contacted infusion center during our visit; Sublocade scheduled for 8/11/22.   Discontinue scheduled SL buprenorphine 24 hrs after Sublocade  After Sublocade, change SL buprenorphine to 6-12mg SL daily prn withdrawal symptoms or cravings.   Pt confirms he has naloxone  Obtain follow-up alt/at and ID screening next visit.   - Buprenorphine HCl-Naloxone HCl (SUBOXONE) 12-3 MG FILM per film; Place 2 Film under the tongue daily  Dispense: 14 Film; Refill: 0    2. Stimulant use disorder  Prescribed bupropion by psychiatry  Reports no use since 5/2022    3. Alcohol use disorder, severe, dependence (H)  Denying cravings, no use since 5/2022    4. Tobacco use disorder  Not interested in quitting    5. Continuous cannabis use  Reports medical cannabis use    6. Bipolar 2 disorder (H)  7. PTSD (post-traumatic stress disorder)  Continue bupropion, Depakote, lamotrigine, olanzapine as prescribed by psychiatry and follow-up as recommended with [facility] until initial appt with Dr. Alcantar, psychiatry in 11/2022.       Return in about 1 week (around 8/16/2022) for Follow up, in person.    SUBJECTIVE                                                          CC/HPI:  Laci Hinkle Jr is a 26 year old male with PMH bipolar disorder, CEASAR, ADHD, and PSUD including opioids on buprenorphine who presents to the [facility] for return visit.       Brief History:  Pt first presented to [facility] on 7/22/21. Pt was referred by staff in [facility] due to pt's initial desire to taper off buprenorphine which he had been taking from nonprescription sources.   Pt soon left [facility], but continued with [facility].   He eventually decided to continue buprenorphine therapy, and then transferred to Sublocade with initial injection 8/13/21.  He received a total of 3 Sublocade injections, most recently 100mg on 10/8/21.   Pt then indicated to  staff he wanted to discontinue Sublocade injections.  He was lost to follow up until he returned to  on 12/9/21 requesting to resume SL buprenorphine after brief return to use of alcohol and kratom.   Despite some irregularities in buprenorphine use including skipping doses and advancing dose, last use of other opioids has remaines 3/2022.  8/2022 he agreed to recommendation of resuming Sublocade to improve adherence to treatment.        He describes his opioid use history starting with heroin as a teenager, last use age 17.  He started methadone treatment age 17, dose up to 100mg, then tapered off to 6mg/day then stopped.  He began using kratom ~2016, eventually used amounts up to 100g daily.  He was first prescribed buprenorphine for OUD in 2019. He took as prescribed through 1/2021, then continued on buprenorphine through nonprescription sources.    IV drug use: No   History of overdose: No  Previous treatments : Yes: multiple, [facility], [facility] 11/2020, previous buprenorphine and methadone  Longest period of sobriety: few months  Medical complications related to substance use: exacerbation of MH diagnoses  Hepatitis C Status  no record of testing  HIV Status no record of testing     Other recent substance use:  Stimulants: used methamphetamine ages [age], stopped for 5 years, then resumed age 24.    Sedatives/hypnotics/anxiolytics: has used in past, denies regular use  Alcohol: h/o drinking 1 L liquor daily  Tobacco: 2-3 ppd and/or ENDS  Cannabis: occasional   Hallucinogens:has used in past, denies recent  Behavioral addictions: none       Most recent [facility] visit 7/28/22  A/P from that visit:   1. Opioid use disorder, severe, dependence (H)  - Controlled. No  recent use or cravings. Pt reporting difficulty with medication adherence, taking as prescribed, running out early. Discussed XR Buprenorphine for improved adherence and long term management.   - Therapy order is active for Sublocade, however PA is pending. Will send message for update on approval.   - Ok to start Sublocade after PA approved. Discontinue SL films after first injection.   - pt verifies narcan rx at home   - Buprenorphine HCl-Naloxone HCl (SUBOXONE) 12-3 MG FILM per film; Place 2 Film under the tongue daily  Dispense: 16 Film; Refill: 0     2. Alcohol use disorder, severe, dependence (H)  - no recent use or cravings. Continue acamprosate 999mg bid and disulfiram 250mg/day  - encouraged ongoing recovery supports.      3. Stimulant use disorder  - stable. no recent use or cravings. Pt was start on Wellbutrin with instruction to titrate dose to 300 mg per day per Chiara Stewart ([facility]) . Discussed this may help with stimulant cravings as well.      4. Hypertension, unspecified type  - refilled for pt today, follow up with PCP. Discussed importance of taking BP medication and PCP follow up.   - amLODIPine-benazepril (LOTREL) 2.5-10 MG capsule; Take 1 capsule by mouth daily  Dispense: 30 capsule; Refill: 0     Return in about 1 week (around 8/4/2022) for Follow up, in person.      8/9/22, pt states :   He has taken buprenorphine 24mg/day as prescribed since last visit.  Wants to schedule Sublocade later this week.    Denies cravings for alcohol, reports taking acamprosate and disulfiram regularly.   States he has been more careful about not spending time with friends who are using, helping minimize cravings for stimulants.    Taking bupropion as prescribed, reiterates frustration with his last visit in [facility].  States he is taking other psychotropic meds as prescribed as well.    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   Mother's surgery coming up soon, he plans to help her while she recovers.  Also looking for a job.      [place] Prescription Drug Monitoring Program Reviewed:  Yes  As expected    Medications:  acamprosate (CAMPRAL) 333 MG EC tablet, Take 3 tablets (999 mg) by mouth 2 times daily  amLODIPine-benazepril (LOTREL) 2.5-10 MG capsule, Take 1 capsule by mouth daily  Buprenorphine HCl-Naloxone HCl (SUBOXONE) 12-3 MG FILM per film, Place 2 Film under the tongue daily  buPROPion (WELLBUTRIN XL) 300 MG 24 hr tablet, Take 1 tablet (300 mg) by mouth every morning  disulfiram (ANTABUSE) 250 MG tablet, Take 1 tablet (250 mg) by mouth daily  divalproex sodium extended-release (DEPAKOTE ER) 500 MG 24 hr tablet, Take 4 tablets (2,000 mg) by mouth daily  lamoTRIgine (LAMICTAL) 200 MG tablet, Take 1 tablet (200 mg) by mouth daily  naloxone (NARCAN) 4 MG/0.1ML nasal spray, Spray 1 spray (4 mg) into one nostril alternating nostrils once as needed for opioid reversal every 2-3 minutes until assistance arrives (Patient not taking: Reported on 7/21/2022)  OLANZapine (ZYPREXA) 15 MG tablet, Take 1 tablet (15 mg) by mouth At Bedtime  polyethylene glycol (MIRALAX) 17 GM/Dose powder, Take 17 g (1 capful) by mouth daily (Patient not taking: Reported on 6/27/2022)  senna-docusate (SENOKOT-S/PERICOLACE) 8.6-50 MG tablet, Take 2 tablets by mouth daily as needed for constipation (Patient not taking: Reported on 6/27/2022)  sildenafil (VIAGRA) 50 MG tablet, Take 1 tablet (50 mg) by mouth daily as needed (ED)    No current facility-administered medications on file prior to visit.      Allergies   Allergen Reactions     Prednisone Other (See Comments)     psych problems     PMH, PSH, FamHx reviewed  Social History  Housing status: with his mother   Employment status: not currently employed, seeking work  Relationship status: Single  Children: no children    OBJECTIVE                                                       BP (!) 148/93   Pulse 83   SpO2 99%     Physical Exam  Constitutional:       General: He is not in acute distress.     Appearance: Normal appearance. He is not diaphoretic.   Eyes:      Extraocular Movements: Extraocular movements intact.   Pulmonary:      Effort: Pulmonary effort is normal.   Neurological:      General: No focal deficit present.      Mental Status: He is alert and oriented to person, place, and time.   Psychiatric:         Attention and Perception: Attention normal.         Mood and Affect: Mood is not anxious or depressed. Affect is not flat.         Speech: Speech is rapid and pressured.         Behavior: Behavior is cooperative.         Thought Content: Thought content normal.         Cognition and Memory: Cognition and memory normal.      Comments: Insight and judgment fair          Labs:    UDS 7/28/22: positive for  buprenorphine and THC  *POC urine drug screen does not screen for Fentanyl    No results found for this or any previous visit (from the past 240 hour(s)).  Last Comprehensive Metabolic Panel:  Sodium   Date Value Ref Range Status   12/21/2021 136 133 - 144 mmol/L Final   11/10/2020 136 133 - 144 mmol/L Final     Potassium   Date Value Ref Range Status   12/21/2021 5.2 3.4 - 5.3 mmol/L Final     Comment:     Specimen moderately hemolyzed, Potassium may be falsely elevated.   11/10/2020 4.0 3.4 - 5.3 mmol/L Final     Comment:     Specimen slightly hemolyzed, potassium may be falsely elevated     Chloride   Date Value Ref Range Status   12/21/2021 104 94 - 109 mmol/L Final   11/10/2020 104 94 - 109 mmol/L Final     Carbon Dioxide   Date Value Ref Range Status   11/10/2020 24 20 - 32 mmol/L Final     Carbon Dioxide (CO2)   Date Value Ref Range Status   12/21/2021 24 20 - 32 mmol/L Final     Anion Gap   Date Value Ref Range Status   12/21/2021 8 3 - 14 mmol/L Final   11/10/2020 8 3 - 14 mmol/L Final     Glucose   Date Value Ref Range Status   12/21/2021 86 70 - 99 mg/dL Final    11/10/2020 107 (H) 70 - 99 mg/dL Final     Urea Nitrogen   Date Value Ref Range Status   12/21/2021 9 7 - 30 mg/dL Final   11/10/2020 10 7 - 30 mg/dL Final     Creatinine   Date Value Ref Range Status   12/21/2021 0.67 0.66 - 1.25 mg/dL Final   11/10/2020 0.75 0.66 - 1.25 mg/dL Final     GFR Estimate   Date Value Ref Range Status   12/21/2021 >90 >60 mL/min/1.73m2 Final     Comment:     Effective December 21, 2021 eGFRcr in adults is calculated using the 2021 CKD-EPI creatinine equation which includes age and gender (Robbin et al., NEJM, DOI: 10.1056/CLFIyd4789121)   11/10/2020 >90 >60 mL/min/[1.73_m2] Final     Comment:     Non  GFR Calc  Starting 12/18/2018, serum creatinine based estimated GFR (eGFR) will be   calculated using the Chronic Kidney Disease Epidemiology Collaboration   (CKD-EPI) equation.       Calcium   Date Value Ref Range Status   12/21/2021 9.9 8.5 - 10.1 mg/dL Final   11/10/2020 10.1 8.5 - 10.1 mg/dL Final     Bilirubin Total   Date Value Ref Range Status   12/21/2021 0.5 0.2 - 1.3 mg/dL Final   11/10/2020 0.3 0.2 - 1.3 mg/dL Final     Alkaline Phosphatase   Date Value Ref Range Status   12/21/2021 126 40 - 150 U/L Final   11/10/2020 101 40 - 150 U/L Final     ALT   Date Value Ref Range Status   12/21/2021 56 0 - 70 U/L Final   11/10/2020 43 0 - 70 U/L Final     AST   Date Value Ref Range Status   12/21/2021   Final     Comment:     Specimen is hemolyzed which can falsely elevate AST. Analysis of a non-hemolyzed specimen may result in a lower value.   11/10/2020 26 0 - 45 U/L Final     Comment:     Specimen is hemolyzed which can falsely elevate AST. Analysis of a   non-hemolyzed specimen may result in a lower value.                   Patient counseling completed today:  Discussed mechanism of action, potential risks/benefits/side effects of medications and other recommendations above.  Recommend pt keep naloxone in their possession and reviewed other aspects of harm reduction  to reduce risk of overdose with return to use.   Recommended avoiding concurrent use of alcohol, benzodiazepines or other sedatives with buprenorphine or other opioids.  Discussed importance of avoiding isolation, building a network of supportive relationships, avoiding people/places/things associated with past use to reduce risk of relapse; including motivational interviewing regarding psychosocial treatment for addiction.       At least 40min spent in review of medical record,  review, obtaining histories, discussing recommendations, coordinating care, providing support    Asiya Hastings MD  [facility]  [address] [place] [id number] [phone]

## 2022-08-12 ENCOUNTER — INFUSION THERAPY VISIT (OUTPATIENT)
Dept: INFUSION THERAPY | Facility: CLINIC | Age: 27
End: 2022-08-12
Attending: NURSE PRACTITIONER
Payer: COMMERCIAL

## 2022-08-12 VITALS — TEMPERATURE: 98.4 F | HEART RATE: 90 BPM | SYSTOLIC BLOOD PRESSURE: 142 MMHG | DIASTOLIC BLOOD PRESSURE: 102 MMHG

## 2022-08-12 DIAGNOSIS — F11.20 OPIOID USE DISORDER, SEVERE, DEPENDENCE (H): Primary | ICD-10-CM

## 2022-08-12 PROCEDURE — 250N000011 HC RX IP 250 OP 636: Performed by: NURSE PRACTITIONER

## 2022-08-12 PROCEDURE — 250N000009 HC RX 250: Performed by: NURSE PRACTITIONER

## 2022-08-12 PROCEDURE — 96372 THER/PROPH/DIAG INJ SC/IM: CPT | Performed by: NURSE PRACTITIONER

## 2022-08-12 PROCEDURE — 96374 THER/PROPH/DIAG INJ IV PUSH: CPT

## 2022-08-12 RX ORDER — LIDOCAINE HYDROCHLORIDE 10 MG/ML
2 INJECTION, SOLUTION EPIDURAL; INFILTRATION; INTRACAUDAL; PERINEURAL ONCE
Status: COMPLETED | OUTPATIENT
Start: 2022-08-12 | End: 2022-08-12

## 2022-08-12 RX ORDER — MEPERIDINE HYDROCHLORIDE 25 MG/ML
25 INJECTION INTRAMUSCULAR; INTRAVENOUS; SUBCUTANEOUS EVERY 30 MIN PRN
Status: CANCELLED | OUTPATIENT
Start: 2022-09-09

## 2022-08-12 RX ORDER — DIPHENHYDRAMINE HYDROCHLORIDE 50 MG/ML
50 INJECTION INTRAMUSCULAR; INTRAVENOUS
Status: CANCELLED
Start: 2022-09-09

## 2022-08-12 RX ORDER — LIDOCAINE HYDROCHLORIDE 10 MG/ML
2 INJECTION, SOLUTION EPIDURAL; INFILTRATION; INTRACAUDAL; PERINEURAL ONCE
Status: CANCELLED | OUTPATIENT
Start: 2022-09-09 | End: 2022-09-09

## 2022-08-12 RX ORDER — EPINEPHRINE 1 MG/ML
0.3 INJECTION, SOLUTION, CONCENTRATE INTRAVENOUS EVERY 5 MIN PRN
Status: CANCELLED | OUTPATIENT
Start: 2022-09-09

## 2022-08-12 RX ORDER — METHYLPREDNISOLONE SODIUM SUCCINATE 125 MG/2ML
125 INJECTION, POWDER, LYOPHILIZED, FOR SOLUTION INTRAMUSCULAR; INTRAVENOUS
Status: CANCELLED
Start: 2022-09-09

## 2022-08-12 RX ORDER — ALBUTEROL SULFATE 0.83 MG/ML
2.5 SOLUTION RESPIRATORY (INHALATION)
Status: CANCELLED | OUTPATIENT
Start: 2022-09-09

## 2022-08-12 RX ORDER — ALBUTEROL SULFATE 90 UG/1
1-2 AEROSOL, METERED RESPIRATORY (INHALATION)
Status: CANCELLED
Start: 2022-09-09

## 2022-08-12 RX ADMIN — BUPRENORPHINE 300 MG: 300 SOLUTION SUBCUTANEOUS at 14:56

## 2022-08-12 RX ADMIN — LIDOCAINE HYDROCHLORIDE 2 ML: 10 INJECTION, SOLUTION EPIDURAL; INFILTRATION; INTRACAUDAL; PERINEURAL at 14:49

## 2022-08-12 ASSESSMENT — PAIN SCALES - GENERAL: PAINLEVEL: NO PAIN (0)

## 2022-08-12 NOTE — PROGRESS NOTES
Infusion Nursing Note:  Laci EMERSON Hinkle Jr presents today for sublocade 300mg.    Patient seen by provider today: No   present during visit today: Not Applicable.    Note: Has had sublocade in the past.    Intravenous Access:  No Intravenous access/labs at this visit.    Treatment Conditions:  Denies relapse.    Post Infusion Assessment:  Patient tolerated injection without incident.  Given in RLQ after 2ml subcutaneous xylocaine administered for local anesthesia.     Discharge Plan:   Discharge instructions reviewed with: Patient.  Patient discharged in stable condition accompanied by: self.  Departure Mode: Ambulatory.  RTC 9/9/22.   Reminded patient that he will need to see his provider every couple of months.    Ximena Fermin

## 2022-08-26 ENCOUNTER — OFFICE VISIT (OUTPATIENT)
Dept: BEHAVIORAL HEALTH | Facility: CLINIC | Age: 27
End: 2022-08-26
Payer: COMMERCIAL

## 2022-08-26 VITALS — SYSTOLIC BLOOD PRESSURE: 138 MMHG | DIASTOLIC BLOOD PRESSURE: 95 MMHG | HEART RATE: 84 BPM

## 2022-08-26 DIAGNOSIS — F11.20 OPIOID USE DISORDER, SEVERE, DEPENDENCE (H): Primary | ICD-10-CM

## 2022-08-26 DIAGNOSIS — F17.200 TOBACCO USE DISORDER: ICD-10-CM

## 2022-08-26 DIAGNOSIS — F10.20 ALCOHOL USE DISORDER, SEVERE, DEPENDENCE (H): ICD-10-CM

## 2022-08-26 DIAGNOSIS — F15.90 STIMULANT USE DISORDER: ICD-10-CM

## 2022-08-26 DIAGNOSIS — F12.90 CONTINUOUS CANNABIS USE: ICD-10-CM

## 2022-08-26 LAB — FENTANYL UR QL: NORMAL

## 2022-08-26 PROCEDURE — 99000 SPECIMEN HANDLING OFFICE-LAB: CPT | Performed by: FAMILY MEDICINE

## 2022-08-26 PROCEDURE — 80307 DRUG TEST PRSMV CHEM ANLYZR: CPT | Performed by: FAMILY MEDICINE

## 2022-08-26 PROCEDURE — 80307 DRUG TEST PRSMV CHEM ANLYZR: CPT | Mod: 90 | Performed by: FAMILY MEDICINE

## 2022-08-26 PROCEDURE — 99214 OFFICE O/P EST MOD 30 MIN: CPT | Performed by: FAMILY MEDICINE

## 2022-08-26 RX ORDER — DISULFIRAM 250 MG/1
250 TABLET ORAL DAILY
Qty: 90 TABLET | Refills: 3 | Status: SHIPPED | OUTPATIENT
Start: 2022-08-26 | End: 2022-10-26

## 2022-08-26 RX ORDER — BUPRENORPHINE AND NALOXONE 8; 2 MG/1; MG/1
1 FILM, SOLUBLE BUCCAL; SUBLINGUAL DAILY
Qty: 7 FILM | Refills: 0 | Status: SHIPPED | OUTPATIENT
Start: 2022-08-26 | End: 2022-08-31

## 2022-08-26 RX ORDER — ACAMPROSATE CALCIUM 333 MG/1
999 TABLET, DELAYED RELEASE ORAL 2 TIMES DAILY
Qty: 180 TABLET | Refills: 11 | Status: SHIPPED | OUTPATIENT
Start: 2022-08-26 | End: 2022-10-26

## 2022-08-26 ASSESSMENT — PATIENT HEALTH QUESTIONNAIRE - PHQ9: SUM OF ALL RESPONSES TO PHQ QUESTIONS 1-9: 13

## 2022-08-26 NOTE — NURSING NOTE
[facility]      Rooming information:  Approximate last use of illicit opioids: 3/12/22  Taking buprenorphine? Yes:  As prescribed? Yes:   Number of buprenorphine films/tablets remaining currently: 0  Side effects related to buprenorphine (constipation, dry mouth, sedation?) No   Narcan currently available: Yes  Other recent substance use:    Cannabis   NICOTINE-Yes:   If using nicotine, ready to quit? No    Point of care urine drug screen positive for: buprenorphine and THC  *POC urine drug screen does not screen for Fentanyl      PHQ Assesment Total Score(s) 8/26/2022   PHQ-9 Score 13   Some recent data might be hidden       If PHQ-9 score of 15 or higher, has [facility] therapist or provider been notified? N/A    Any current suicidal ideation? No  If yes, has [facility] therapist or provider been notified? N/A    [facility] care provider: [facility]     Mental health provider: Chiara Washington (follow up on MH referral if needed)    Insurance needs: active    Housing needs: stable    Contact information up to date? yes    3rd Party Involvement none today (please obtain WU if pt would like to include)    Morena Mckeon CMA  August 26, 2022  1:10 PM

## 2022-08-26 NOTE — PROGRESS NOTES
[facility] Follow Up    ASSESSMENT/PLAN                                                      1. Opioid use disorder, severe, dependence (H)  Pt reporting control of symptoms.    S/p return to Sublocade with 300mg injection 8/12/22  Tapering SL buprenorphine dose, will remain at 8mg/day for now; pt did not want to make changes with starting new job this week.   Pt in need of ID screening and liver function testing; recommend next visit.   Pt confirms he has naloxone  - COMPREHENSIVE METABOLIC PANEL; Future  - HIV Antigen Antibody Combo; Future  - Hepatitis C Screen Reflex to HCV RNA Quant and Genotype; Future  - Fentanyl Qualitative with Reflex to Quant Urine; Future  - buprenorphine HCl-naloxone HCl (SUBOXONE) 8-2 MG per film; Place 1 Film under the tongue daily  Dispense: 7 Film; Refill: 0  - Fentanyl Qualitative with Reflex to Quant Urine    2. Stimulant use disorder  Denies use since 5/2022  Prescribed bupropion xl 300mg/day by psych provider    3. Alcohol use disorder, severe, dependence (H)  Denies use since 5/2022  Continue disulfiram and acamprosate  - Ethyl Glucuronide Screen with Reflex to Confirmation, Urine; Future  - disulfiram (ANTABUSE) 250 MG tablet; Take 1 tablet (250 mg) by mouth daily  Dispense: 90 tablet; Refill: 3  - acamprosate (CAMPRAL) 333 MG EC tablet; Take 3 tablets (999 mg) by mouth 2 times daily  Dispense: 180 tablet; Refill: 11  - Ethyl Glucuronide Screen with Reflex to Confirmation, Urine    4. Tobacco use disorder  Not interested in quitting at this time    5. Continuous cannabis use  Pt has medical cannabis certification    6. Bipolar disorder  Pt is prescribed olanzapine, lamotrigine, Depakote, bupropion by psych; follow-up as recommended    Return in about 1 week (around 9/2/2022) for Follow up, in person.    SUBJECTIVE                                                          CC/HPI:  Laci Hinkle  is a 26 year old male with PMH bipolar disorder, CEASAR,  ADHD, and PSUD including opioids on buprenorphine who presents to the [facility] for return visit.       Brief History:  Pt first presented to [facility] on 7/22/21. Pt was referred by staff in [facility] due to pt's initial desire to taper off buprenorphine which he had been taking from nonprescription sources.   Pt soon left [facility], but continued with [facility].  He eventually decided to continue buprenorphine therapy, and then transferred to [facility] with initial injection 8/13/21.  He received a total of 3 Sublocade injections, most recently 100mg on 10/8/21.   Pt then indicated to  staff he wanted to discontinue Sublocade injections.  He was lost to follow up until he returned to  on 12/9/21 requesting to resume SL buprenorphine after brief return to use of alcohol and kratom.   Despite some irregularities in buprenorphine use including skipping doses and advancing dose, last use of other opioids has remaines 3/2022.  8/2022 he agreed to recommendation of resuming Sublocade to improve adherence to treatment.   He received Sublocade 300mg on 8/12/22.      He describes his opioid use history starting with heroin as a teenager, last use age 17.  He started methadone treatment age 17, dose up to 100mg, then tapered off to 6mg/day then stopped.  He began using kratom ~2016, eventually used amounts up to 100g daily.  He was first prescribed buprenorphine for OUD in 2019. He took as prescribed through 1/2021, then continued on buprenorphine through nonprescription sources.    IV drug use: No   History of overdose: No  Previous treatments : Yes: multiple, [facility], [facility] 11/2020, previous buprenorphine and methadone  Longest period of sobriety: few months  Medical complications related to substance use: exacerbation of MH diagnoses  Hepatitis C Status  no record of testing  HIV Status no record of testing     Other recent substance use:  Stimulants: used methamphetamine ages  [age], stopped for 5 years, then resumed age 24.    Sedatives/hypnotics/anxiolytics: has used in past, denies regular use  Alcohol: h/o drinking 1 L liquor daily  Tobacco: 2-3 ppd and/or ENDS  Cannabis: occasional   Hallucinogens:has used in past, denies recent  Behavioral addictions: none       Most recent [facility] visit 8/9/22  A/P from that visit:   1. Opioid use disorder, severe, dependence (H)  Pt reports taking buprenorphine 24mg/day as prescribed since last visit, agreeable to recommendation to transfer back to XR buprenorphine.   Contacted infusion center during our visit; Sublocade scheduled for 8/11/22.   Discontinue scheduled SL buprenorphine 24 hrs after Sublocade  After Sublocade, change SL buprenorphine to 6-12mg SL daily prn withdrawal symptoms or cravings.   Pt confirms he has naloxone  Obtain follow-up alt/at and ID screening next visit.   - Buprenorphine HCl-Naloxone HCl (SUBOXONE) 12-3 MG FILM per film; Place 2 Film under the tongue daily  Dispense: 14 Film; Refill: 0     2. Stimulant use disorder  Prescribed bupropion by psychiatry  Reports no use since 5/2022     3. Alcohol use disorder, severe, dependence (H)  Denying cravings, no use since 5/2022     4. Tobacco use disorder  Not interested in quitting     5. Continuous cannabis use  Reports medical cannabis use     6. Bipolar 2 disorder (H)  7. PTSD (post-traumatic stress disorder)  Continue bupropion, Depakote, lamotrigine, olanzapine as prescribed by psychiatry and follow-up as recommended with [facility] until initial appt with Dr. Alcantar, psychiatry in 11/2022.         Return in about 1 week (around 8/16/2022) for Follow up, in person.      8/26/22:   Pt did receive Sublocade 300mg on 8/12/22.   Pt contacted clinic on 8/17/22 stating he had run out of 12mg buprenorphine films (had received #14 on 8/9/22) and requested 8mg films to continue self directed taper.  A rx for #7 8mg films was sent to his pharmacy, and it was  recommended he continue 8mg/day and return in the next week.   Pt reports he has been taking buprenorphine SL 8mg/day.  No problems with Sublocade injection/injection site.  He reports no cravings for any substances.  He is starting a new job, overnights 3 nights/week at [employer].  He wants to taper off SL buprenorphine, but does not want to make changes now due to new job.      [place] Prescription Drug Monitoring Program Reviewed:  Yes  As expected    Medications:  acamprosate (CAMPRAL) 333 MG EC tablet, Take 3 tablets (999 mg) by mouth 2 times daily  amLODIPine-benazepril (LOTREL) 2.5-10 MG capsule, Take 1 capsule by mouth daily  buprenorphine ER (SUBLOCADE) 300 MG/1.5ML prefilled syringe, Inject 300 mg Subcutaneous every 30 days  buPROPion (WELLBUTRIN XL) 300 MG 24 hr tablet, Take 1 tablet (300 mg) by mouth every morning  disulfiram (ANTABUSE) 250 MG tablet, Take 1 tablet (250 mg) by mouth daily  divalproex sodium extended-release (DEPAKOTE ER) 500 MG 24 hr tablet, Take 4 tablets (2,000 mg) by mouth daily  lamoTRIgine (LAMICTAL) 200 MG tablet, Take 1 tablet (200 mg) by mouth daily  naloxone (NARCAN) 4 MG/0.1ML nasal spray, Spray 1 spray (4 mg) into one nostril alternating nostrils once as needed for opioid reversal every 2-3 minutes until assistance arrives (Patient not taking: No sig reported)  OLANZapine (ZYPREXA) 15 MG tablet, Take 1 tablet (15 mg) by mouth At Bedtime  polyethylene glycol (MIRALAX) 17 GM/Dose powder, Take 17 g (1 capful) by mouth daily  senna-docusate (SENOKOT-S/PERICOLACE) 8.6-50 MG tablet, Take 2 tablets by mouth daily as needed for constipation  sildenafil (VIAGRA) 50 MG tablet, Take 1 tablet (50 mg) by mouth daily as needed (ED)    No current facility-administered medications on file prior to visit.      Allergies   Allergen Reactions     Prednisone Other (See Comments)     psych problems     PMH, PSH, FamHx reviewed  Social History  Housing status: with his mother   Employment  status: not currently employed, seeking work  Relationship status: Single  Children: no children    OBJECTIVE                                                      BP (!) 138/95   Pulse 84     Physical Exam  Constitutional:       General: He is not in acute distress.     Appearance: Normal appearance. He is not diaphoretic.   Eyes:      Extraocular Movements: Extraocular movements intact.   Pulmonary:      Effort: Pulmonary effort is normal.   Neurological:      General: No focal deficit present.      Mental Status: He is alert and oriented to person, place, and time.   Psychiatric:         Attention and Perception: Attention normal.         Mood and Affect: Mood is not anxious or depressed. Affect is not flat.         Speech: Speech is rapid and pressured.         Behavior: Behavior is cooperative.         Thought Content: Thought content normal.         Cognition and Memory: Cognition and memory normal.      Comments: Insight and judgment fair          Labs:    UDS 8/26/22: positive for  buprenorphine and THC  *POC urine drug screen does not screen for Fentanyl    No results found for this or any previous visit (from the past 240 hour(s)).  Last Comprehensive Metabolic Panel:  Sodium   Date Value Ref Range Status   12/21/2021 136 133 - 144 mmol/L Final   11/10/2020 136 133 - 144 mmol/L Final     Potassium   Date Value Ref Range Status   12/21/2021 5.2 3.4 - 5.3 mmol/L Final     Comment:     Specimen moderately hemolyzed, Potassium may be falsely elevated.   11/10/2020 4.0 3.4 - 5.3 mmol/L Final     Comment:     Specimen slightly hemolyzed, potassium may be falsely elevated     Chloride   Date Value Ref Range Status   12/21/2021 104 94 - 109 mmol/L Final   11/10/2020 104 94 - 109 mmol/L Final     Carbon Dioxide   Date Value Ref Range Status   11/10/2020 24 20 - 32 mmol/L Final     Carbon Dioxide (CO2)   Date Value Ref Range Status   12/21/2021 24 20 - 32 mmol/L Final     Anion Gap   Date Value Ref Range Status    12/21/2021 8 3 - 14 mmol/L Final   11/10/2020 8 3 - 14 mmol/L Final     Glucose   Date Value Ref Range Status   12/21/2021 86 70 - 99 mg/dL Final   11/10/2020 107 (H) 70 - 99 mg/dL Final     Urea Nitrogen   Date Value Ref Range Status   12/21/2021 9 7 - 30 mg/dL Final   11/10/2020 10 7 - 30 mg/dL Final     Creatinine   Date Value Ref Range Status   12/21/2021 0.67 0.66 - 1.25 mg/dL Final   11/10/2020 0.75 0.66 - 1.25 mg/dL Final     GFR Estimate   Date Value Ref Range Status   12/21/2021 >90 >60 mL/min/1.73m2 Final     Comment:     Effective December 21, 2021 eGFRcr in adults is calculated using the 2021 CKD-EPI creatinine equation which includes age and gender (Robbin et al., NEJM, DOI: 10.1056/ZLHHim1988260)   11/10/2020 >90 >60 mL/min/[1.73_m2] Final     Comment:     Non  GFR Calc  Starting 12/18/2018, serum creatinine based estimated GFR (eGFR) will be   calculated using the Chronic Kidney Disease Epidemiology Collaboration   (CKD-EPI) equation.       Calcium   Date Value Ref Range Status   12/21/2021 9.9 8.5 - 10.1 mg/dL Final   11/10/2020 10.1 8.5 - 10.1 mg/dL Final     Bilirubin Total   Date Value Ref Range Status   12/21/2021 0.5 0.2 - 1.3 mg/dL Final   11/10/2020 0.3 0.2 - 1.3 mg/dL Final     Alkaline Phosphatase   Date Value Ref Range Status   12/21/2021 126 40 - 150 U/L Final   11/10/2020 101 40 - 150 U/L Final     ALT   Date Value Ref Range Status   12/21/2021 56 0 - 70 U/L Final   11/10/2020 43 0 - 70 U/L Final     AST   Date Value Ref Range Status   12/21/2021   Final     Comment:     Specimen is hemolyzed which can falsely elevate AST. Analysis of a non-hemolyzed specimen may result in a lower value.   11/10/2020 26 0 - 45 U/L Final     Comment:     Specimen is hemolyzed which can falsely elevate AST. Analysis of a   non-hemolyzed specimen may result in a lower value.                   Patient counseling completed today:  Discussed mechanism of action, potential risks/benefits/side  effects of medications and other recommendations above.  Recommend pt keep naloxone in their possession and reviewed other aspects of harm reduction to reduce risk of overdose with return to use.   Recommended avoiding concurrent use of alcohol, benzodiazepines or other sedatives with buprenorphine or other opioids.  Discussed importance of avoiding isolation, building a network of supportive relationships, avoiding people/places/things associated with past use to reduce risk of relapse; including motivational interviewing regarding psychosocial treatment for addiction.         Asiya Hastings MD  [facility]  [address] [place] [id number] [phone]

## 2022-08-27 LAB — ETHYL GLUCURONIDE UR QL SCN: NEGATIVE NG/ML

## 2022-08-31 ENCOUNTER — MYC MEDICAL ADVICE (OUTPATIENT)
Dept: BEHAVIORAL HEALTH | Facility: CLINIC | Age: 27
End: 2022-08-31

## 2022-08-31 DIAGNOSIS — F11.20 OPIOID USE DISORDER, SEVERE, DEPENDENCE (H): ICD-10-CM

## 2022-08-31 RX ORDER — BUPRENORPHINE AND NALOXONE 8; 2 MG/1; MG/1
1 FILM, SOLUBLE BUCCAL; SUBLINGUAL DAILY
Qty: 3 FILM | Refills: 0 | Status: SHIPPED | OUTPATIENT
Start: 2022-08-31 | End: 2022-09-02

## 2022-08-31 NOTE — TELEPHONE ENCOUNTER
Patient is requesting a refill of Suboxone via [email or web address] message. Patient reports over using the script that he received on 08/26/2022. Patient recieved his first Sublocade injection on 08/12/2022. Patient was given a 7 day supply of Suboxone on 08/26/2022 and was recommended by the provider to:     -Return in about 1 week (around 9/2/2022) for Follow up, in person.    Writer recommended patient return to [facility] on a walk in basis tomorrow 09/01/22, message sent via [email or web address].     Mandi Hanna RN on 8/31/2022 at 3:12 PM

## 2022-08-31 NOTE — TELEPHONE ENCOUNTER
Patient reported via [employer] message that he will come into [facility] on a walk in basis either 09/01 or 09/02/22 and requested a refill of Suboxone. Writer replied that a bridge will be requested, however, this does not guarantee the provider will approve. Medication pended as intended and routing to provider to further assist.     Date of Last Office Visit: 08/26/2022  Date of Next Office Visit: None  No shows since last visit: None  Cancellations since last visit: None    Medication requested:   buprenorphine HCl-naloxone HCl (SUBOXONE) 8-2 MG per film 7 Film 0 8/26/2022  --   Sig - Route: Place 1 Film under the tongue daily - Sublingual      Date last ordered: Qty: 08/26/2022 Refills: 0     [place] of [place] ?: Yes  Medication last filled date: 08/26/2022 Qty filled: 7  Other [place] substance [place] ?: No    Lapse in medication adherence greater than 5 days?: No  If yes, call patient and gather details: See note above  Medication refill request verified as identical to current order?: Yes  Result of Last DAM, VPA, Li+ Level, CBC, or Carbamazepine Level (at or since last visit): N/A    Last visit treatment plan:     1. Opioid use disorder, severe, dependence (H)  Pt reporting control of symptoms.    S/p return to Sublocade with 300mg injection 8/12/22  Tapering SL buprenorphine dose, will remain at 8mg/day for now; pt did not want to make changes with starting new job this week.   Pt in need of ID screening and liver function testing; recommend next visit.   Pt confirms he has naloxone  - COMPREHENSIVE METABOLIC PANEL; Future  - HIV Antigen Antibody Combo; Future  - Hepatitis C Screen Reflex to HCV RNA Quant and Genotype; Future  - Fentanyl Qualitative with Reflex to Quant Urine; Future  - buprenorphine HCl-naloxone HCl (SUBOXONE) 8-2 MG per film; Place 1 Film under the tongue daily  Dispense: 7 Film; Refill: 0  - Fentanyl Qualitative with Reflex to Quant Urine     2. Stimulant use disorder  Denies use  since 5/2022  Prescribed bupropion xl 300mg/day by psych provider     3. Alcohol use disorder, severe, dependence (H)  Denies use since 5/2022  Continue disulfiram and acamprosate  - Ethyl Glucuronide Screen with Reflex to Confirmation, Urine; Future  - disulfiram (ANTABUSE) 250 MG tablet; Take 1 tablet (250 mg) by mouth daily  Dispense: 90 tablet; Refill: 3  - acamprosate (CAMPRAL) 333 MG EC tablet; Take 3 tablets (999 mg) by mouth 2 times daily  Dispense: 180 tablet; Refill: 11  - Ethyl Glucuronide Screen with Reflex to Confirmation, Urine     4. Tobacco use disorder  Not interested in quitting at this time     5. Continuous cannabis use  Pt has medical cannabis certification     6. Bipolar disorder  Pt is prescribed olanzapine, lamotrigine, Depakote, bupropion by psych; follow-up as recommended     Return in about 1 week (around 9/2/2022) for Follow up, in person.      []Medication refilled per  Medication Refill in Ambulatory Care  policy.  [x]Medication unable to be refilled by RN due to criteria not met as indicated below:    []Eligibility - not seen in the last year   []Supervision - no future appointment   []Compliance - no shows, cancellations or lapse in therapy   []Verification - order discrepancy   []Controlled medication   []Medication not included in policy   [x]90-day supply request   []Other

## 2022-08-31 NOTE — TELEPHONE ENCOUNTER
Patient of the [facility]. Routing to correct pool. Francheska Quiroga RN on 8/31/2022 at 2:00 PM

## 2022-09-02 ENCOUNTER — OFFICE VISIT (OUTPATIENT)
Dept: BEHAVIORAL HEALTH | Facility: CLINIC | Age: 27
End: 2022-09-02
Payer: COMMERCIAL

## 2022-09-02 VITALS — HEART RATE: 94 BPM | DIASTOLIC BLOOD PRESSURE: 106 MMHG | SYSTOLIC BLOOD PRESSURE: 149 MMHG

## 2022-09-02 DIAGNOSIS — F12.90 CONTINUOUS CANNABIS USE: ICD-10-CM

## 2022-09-02 DIAGNOSIS — F15.90 STIMULANT USE DISORDER: ICD-10-CM

## 2022-09-02 DIAGNOSIS — F11.20 OPIOID USE DISORDER, SEVERE, DEPENDENCE (H): Primary | ICD-10-CM

## 2022-09-02 DIAGNOSIS — F31.9 BIPOLAR AFFECTIVE DISORDER, REMISSION STATUS UNSPECIFIED (H): ICD-10-CM

## 2022-09-02 DIAGNOSIS — F17.200 TOBACCO USE DISORDER: ICD-10-CM

## 2022-09-02 DIAGNOSIS — F43.10 PTSD (POST-TRAUMATIC STRESS DISORDER): ICD-10-CM

## 2022-09-02 DIAGNOSIS — F10.20 ALCOHOL USE DISORDER, SEVERE, DEPENDENCE (H): ICD-10-CM

## 2022-09-02 PROCEDURE — 99214 OFFICE O/P EST MOD 30 MIN: CPT | Performed by: FAMILY MEDICINE

## 2022-09-02 RX ORDER — BUPRENORPHINE AND NALOXONE 8; 2 MG/1; MG/1
1 FILM, SOLUBLE BUCCAL; SUBLINGUAL DAILY
Qty: 7 FILM | Refills: 0 | Status: SHIPPED | OUTPATIENT
Start: 2022-09-02 | End: 2022-10-26 | Stop reason: ALTCHOICE

## 2022-09-02 ASSESSMENT — PATIENT HEALTH QUESTIONNAIRE - PHQ9: SUM OF ALL RESPONSES TO PHQ QUESTIONS 1-9: 11

## 2022-09-02 NOTE — NURSING NOTE
[facility]      Rooming information:  Approximate last use of illicit opioids: 3/12/22  Taking buprenorphine? Yes:  As prescribed? Yes:   Number of buprenorphine films/tablets remaining currently: 0  Side effects related to buprenorphine (constipation, dry mouth, sedation?) No   Narcan currently available: Yes  Other recent substance use:    Cannabis   NICOTINE-Yes:   If using nicotine, ready to quit? No    Point of care urine drug screen positive for: buprenorphine and THC  *POC urine drug screen does not screen for Fentanyl      PHQ Assesment Total Score(s) 9/2/2022   PHQ-9 Score 11   Some recent data might be hidden       If PHQ-9 score of 15 or higher, has [facility] therapist or provider been notified? N/A    Any current suicidal ideation? No  If yes, has [facility] therapist or provider been notified? N/A    Primary care provider: [facility] [place], operated [facility]     Mental health provider: Chiara Washington (follow up on MH referral if needed)    Insurance needs: active    Housing needs: stable    Contact information up to date? yes    3rd Party Involvement none  today (please obtain WU if pt would like to include)    Morena Mckeon CMA  September 2, 2022  2:33 PM

## 2022-09-02 NOTE — PROGRESS NOTES
[facility] Follow Up    ASSESSMENT/PLAN                                                    1. Opioid use disorder, severe, dependence (H)  Pt reporting overuse of SL buprenorphine, in agreement with taper off SL buprenorphine and continuing XR buprenorphine.   Will rx buprenorphine 8mg/day SL , follow-up in one week planning for decreased dose of buprenorphine.   Pt still in need of alt/ast and ID screening.  Due to time constraints pt did not have labs drawn today.  Discuss next visit.   - buprenorphine HCl-naloxone HCl (SUBOXONE) 8-2 MG per film; Place 1 Film under the tongue daily  Dispense: 7 Film; Refill: 0    2. Alcohol use disorder, severe, dependence (H)  Denies use since 5/2022  Continue disulfiram and acamprosate    3. Stimulant use disorder  Denies use since 5/2022  Prescribed bupropion xl 300mg/day by psych provider    4. Tobacco use disorder  Not ready to quit at this time    5. Continuous cannabis use  Reporting only use of medical cannabis     6. PTSD (post-traumatic stress disorder)  Pt reports he is prescribed medical cannabis    7. Bipolar affective disorder, remission status unspecified (H)  Pt has appt with Dr. Alcantar, psychiatry, in 11/2022.  Continue olanzapine, lamotrigine, Depakote, bupropion as currently prescribed by [facility] provider.       Return in about 1 week (around 9/9/2022) for Follow up, in person.  Next Sublocade scheduled 9/9/22  SUBJECTIVE                                                          CC/HPI:  Laci Hinkle Jr is a 26 year old male with PMH bipolar disorder, CEASAR, ADHD, and PSUD including opioids on buprenorphine who presents to the Recovery Clinic for return visit.       Brief History:  Pt first presented to Recovery Clinic on 7/22/21. Pt was referred by staff in [facility] due to pt's initial desire to taper off buprenorphine which he had been taking from nonprescription sources.   Pt soon left [facility], but continued with  Recovery Clinic.  He eventually decided to continue buprenorphine therapy, and then transferred to Sublocade with initial injection 8/13/21.  He received a total of 3 Sublocade injections, most recently 100mg on 10/8/21.   Pt then indicated to  staff he wanted to discontinue Sublocade injections.  He was lost to follow up until he returned to  on 12/9/21 requesting to resume SL buprenorphine after brief return to use of alcohol and kratom.   Despite some irregularities in buprenorphine use including skipping doses and advancing dose, last use of other opioids has remaines 3/2022.  8/2022 he agreed to recommendation of resuming Sublocade to improve adherence to treatment.   He received Sublocade 300mg on 8/12/22.      He describes his opioid use history starting with heroin as a teenager, last use age 17.  He started methadone treatment age 17, dose up to 100mg, then tapered off to 6mg/day then stopped.  He began using kratom ~2016, eventually used amounts up to 100g daily.  He was first prescribed buprenorphine for OUD in 2019. He took as prescribed through 1/2021, then continued on buprenorphine through nonprescription sources.    IV drug use: No   History of overdose: No  Previous treatments : Yes: multiple, [facility], [facility] 11/2020, previous buprenorphine and methadone  Longest period of sobriety: few months  Medical complications related to substance use: exacerbation of MH diagnoses  Hepatitis C Status  no record of testing  HIV Status no record of testing     Other recent substance use:  Stimulants: used methamphetamine ages [age], stopped for 5 years, then resumed age 24.    Sedatives/hypnotics/anxiolytics: has used in past, denies regular use  Alcohol: h/o drinking 1 L liquor daily  Tobacco: 2-3 ppd and/or ENDS  Cannabis: occasional   Hallucinogens:has used in past, denies recent  Behavioral addictions: none       Most recent Recovery Clinic visit 8/26/22  A/P from that visit:   1. Opioid  use disorder, severe, dependence (H)  Pt reporting control of symptoms.    S/p return to Sublocade with 300mg injection 8/12/22  Tapering SL buprenorphine dose, will remain at 8mg/day for now; pt did not want to make changes with starting new job this week.   Pt in need of ID screening and liver function testing; recommend next visit.   Pt confirms he has naloxone  - COMPREHENSIVE METABOLIC PANEL; Future  - HIV Antigen Antibody Combo; Future  - Hepatitis C Screen Reflex to HCV RNA Quant and Genotype; Future  - Fentanyl Qualitative with Reflex to Quant Urine; Future  - buprenorphine HCl-naloxone HCl (SUBOXONE) 8-2 MG per film; Place 1 Film under the tongue daily  Dispense: 7 Film; Refill: 0  - Fentanyl Qualitative with Reflex to Quant Urine     2. Stimulant use disorder  Denies use since 5/2022  Prescribed bupropion xl 300mg/day by psych provider     3. Alcohol use disorder, severe, dependence (H)  Denies use since 5/2022  Continue disulfiram and acamprosate  - Ethyl Glucuronide Screen with Reflex to Confirmation, Urine; Future  - disulfiram (ANTABUSE) 250 MG tablet; Take 1 tablet (250 mg) by mouth daily  Dispense: 90 tablet; Refill: 3  - acamprosate (CAMPRAL) 333 MG EC tablet; Take 3 tablets (999 mg) by mouth 2 times daily  Dispense: 180 tablet; Refill: 11  - Ethyl Glucuronide Screen with Reflex to Confirmation, Urine     4. Tobacco use disorder  Not interested in quitting at this time     5. Continuous cannabis use  Pt has medical cannabis certification     6. Bipolar disorder  Pt is prescribed olanzapine, lamotrigine, Depakote, bupropion by psych; follow-up as recommended     Return in about 1 week (around 9/2/2022) for Follow up, in person.      Interim History:  Pt communicated to clinic staff on 8/31/22 that he had overused his buprenorphine.  He requested refill on 8/31/22 with plans to return to clinic 9/2/22.     9/2/22 pt reports:  He took 16mg buprenorphine on several days in the last week.  He went 3 days  without taking any buprenorphine, then resumed 8mg/day 8/31/22.  He is motivated to discontinue SL buprenorphine after Sublocade 300mg #2 on 9/9/22.   Continues with cannabis and tobacco use, denies other substance use.    Reports no significant alcohol cravings.   States he continues to take other medications as prescribed.   Mood has been ok, tolerated first week of working nights ok.  Enjoying the independence of his new job.     [place] Prescription Drug Monitoring Program Reviewed:  Yes  As expected    Medications:  acamprosate (CAMPRAL) 333 MG EC tablet, Take 3 tablets (999 mg) by mouth 2 times daily  amLODIPine-benazepril (LOTREL) 2.5-10 MG capsule, Take 1 capsule by mouth daily  buprenorphine ER (SUBLOCADE) 300 MG/1.5ML prefilled syringe, Inject 300 mg Subcutaneous every 30 days  buprenorphine HCl-naloxone HCl (SUBOXONE) 8-2 MG per film, Place 1 Film under the tongue daily  buPROPion (WELLBUTRIN XL) 300 MG 24 hr tablet, Take 1 tablet (300 mg) by mouth every morning  disulfiram (ANTABUSE) 250 MG tablet, Take 1 tablet (250 mg) by mouth daily  divalproex sodium extended-release (DEPAKOTE ER) 500 MG 24 hr tablet, Take 4 tablets (2,000 mg) by mouth daily  lamoTRIgine (LAMICTAL) 200 MG tablet, Take 1 tablet (200 mg) by mouth daily  medical cannabis (Patient's own supply), See Admin Instructions (The purpose of this order is to document that the patient reports taking medical cannabis.  This is not a prescription, and is not used to certify that the patient has a qualifying medical condition.)  naloxone (NARCAN) 4 MG/0.1ML nasal spray, Spray 1 spray (4 mg) into one nostril alternating nostrils once as needed for opioid reversal every 2-3 minutes until assistance arrives (Patient not taking: Reported on 8/26/2022)  OLANZapine (ZYPREXA) 15 MG tablet, Take 1 tablet (15 mg) by mouth At Bedtime  polyethylene glycol (MIRALAX) 17 GM/Dose powder, Take 17 g (1 capful) by mouth daily  senna-docusate (SENOKOT-S/PERICOLACE)  8.6-50 MG tablet, Take 2 tablets by mouth daily as needed for constipation  sildenafil (VIAGRA) 50 MG tablet, Take 1 tablet (50 mg) by mouth daily as needed (ED)    No current facility-administered medications on file prior to visit.      Allergies   Allergen Reactions     Prednisone Other (See Comments)     psych problems     PMH, PSH, FamHx reviewed  Social History  Housing status: with his mother   Employment status: not currently employed, seeking work  Relationship status: Single  Children: no children    OBJECTIVE                                                      BP (!) 149/106   Pulse 94     Physical Exam  Constitutional:       General: He is not in acute distress.     Appearance: Normal appearance. He is not diaphoretic.   Eyes:      Extraocular Movements: Extraocular movements intact.   Pulmonary:      Effort: Pulmonary effort is normal.   Neurological:      General: No focal deficit present.      Mental Status: He is alert and oriented to person, place, and time.   Psychiatric:         Attention and Perception: Attention normal.         Mood and Affect: Mood is not anxious or depressed. Affect is not flat.         Speech: Speech is rapid and pressured.         Behavior: Behavior is cooperative.         Thought Content: Thought content normal.         Cognition and Memory: Cognition and memory normal.      Comments: Insight and judgment fair          Labs:    UDS 9/2/22: positive for  buprenorphine and THC  *POC urine drug screen does not screen for Fentanyl    Recent Results (from the past 240 hour(s))   Ethyl Glucuronide Screen with Reflex to Confirmation, Urine    Collection Time: 08/26/22  1:49 PM   Result Value Ref Range    Ethyl Glucuronide Urine Negative Cutoff 500 ng/mL   Fentanyl Qualitative with Reflex to Quant Urine    Collection Time: 08/26/22  1:49 PM   Result Value Ref Range    Fentanyl Qual Urine Screen Negative Screen Negative     Last Comprehensive Metabolic Panel:  Sodium   Date Value  Ref Range Status   12/21/2021 136 133 - 144 mmol/L Final   11/10/2020 136 133 - 144 mmol/L Final     Potassium   Date Value Ref Range Status   12/21/2021 5.2 3.4 - 5.3 mmol/L Final     Comment:     Specimen moderately hemolyzed, Potassium may be falsely elevated.   11/10/2020 4.0 3.4 - 5.3 mmol/L Final     Comment:     Specimen slightly hemolyzed, potassium may be falsely elevated     Chloride   Date Value Ref Range Status   12/21/2021 104 94 - 109 mmol/L Final   11/10/2020 104 94 - 109 mmol/L Final     Carbon Dioxide   Date Value Ref Range Status   11/10/2020 24 20 - 32 mmol/L Final     Carbon Dioxide (CO2)   Date Value Ref Range Status   12/21/2021 24 20 - 32 mmol/L Final     Anion Gap   Date Value Ref Range Status   12/21/2021 8 3 - 14 mmol/L Final   11/10/2020 8 3 - 14 mmol/L Final     Glucose   Date Value Ref Range Status   12/21/2021 86 70 - 99 mg/dL Final   11/10/2020 107 (H) 70 - 99 mg/dL Final     Urea Nitrogen   Date Value Ref Range Status   12/21/2021 9 7 - 30 mg/dL Final   11/10/2020 10 7 - 30 mg/dL Final     Creatinine   Date Value Ref Range Status   12/21/2021 0.67 0.66 - 1.25 mg/dL Final   11/10/2020 0.75 0.66 - 1.25 mg/dL Final     GFR Estimate   Date Value Ref Range Status   12/21/2021 >90 >60 mL/min/1.73m2 Final     Comment:     Effective December 21, 2021 eGFRcr in adults is calculated using the 2021 CKD-EPI creatinine equation which includes age and gender (Robbin et al., NEJM, DOI: 10.1056/MOUEie5843463)   11/10/2020 >90 >60 mL/min/[1.73_m2] Final     Comment:     Non  GFR Calc  Starting 12/18/2018, serum creatinine based estimated GFR (eGFR) will be   calculated using the Chronic Kidney Disease Epidemiology Collaboration   (CKD-EPI) equation.       Calcium   Date Value Ref Range Status   12/21/2021 9.9 8.5 - 10.1 mg/dL Final   11/10/2020 10.1 8.5 - 10.1 mg/dL Final     Bilirubin Total   Date Value Ref Range Status   12/21/2021 0.5 0.2 - 1.3 mg/dL Final   11/10/2020 0.3 0.2 - 1.3  mg/dL Final     Alkaline Phosphatase   Date Value Ref Range Status   12/21/2021 126 40 - 150 U/L Final   11/10/2020 101 40 - 150 U/L Final     ALT   Date Value Ref Range Status   12/21/2021 56 0 - 70 U/L Final   11/10/2020 43 0 - 70 U/L Final     AST   Date Value Ref Range Status   12/21/2021   Final     Comment:     Specimen is hemolyzed which can falsely elevate AST. Analysis of a non-hemolyzed specimen may result in a lower value.   11/10/2020 26 0 - 45 U/L Final     Comment:     Specimen is hemolyzed which can falsely elevate AST. Analysis of a   non-hemolyzed specimen may result in a lower value.                   Patient counseling completed today:  Discussed mechanism of action, potential risks/benefits/side effects of medications and other recommendations above.  Recommend pt keep naloxone in their possession and reviewed other aspects of harm reduction to reduce risk of overdose with return to use.   Recommended avoiding concurrent use of alcohol, benzodiazepines or other sedatives with buprenorphine or other opioids.  Discussed importance of avoiding isolation, building a network of supportive relationships, avoiding people/places/things associated with past use to reduce risk of relapse; including motivational interviewing regarding psychosocial treatment for addiction.     Billing based on complexity    Asiya Hastings MD  [facility]  [address] [place] [id number] [phone]

## 2022-09-09 ENCOUNTER — OFFICE VISIT (OUTPATIENT)
Dept: BEHAVIORAL HEALTH | Facility: CLINIC | Age: 27
End: 2022-09-09
Payer: COMMERCIAL

## 2022-09-09 ENCOUNTER — INFUSION THERAPY VISIT (OUTPATIENT)
Dept: INFUSION THERAPY | Facility: CLINIC | Age: 27
End: 2022-09-09
Attending: NURSE PRACTITIONER
Payer: COMMERCIAL

## 2022-09-09 VITALS
HEART RATE: 99 BPM | TEMPERATURE: 98.2 F | RESPIRATION RATE: 18 BRPM | SYSTOLIC BLOOD PRESSURE: 130 MMHG | DIASTOLIC BLOOD PRESSURE: 85 MMHG

## 2022-09-09 VITALS — DIASTOLIC BLOOD PRESSURE: 91 MMHG | HEART RATE: 101 BPM | SYSTOLIC BLOOD PRESSURE: 161 MMHG

## 2022-09-09 DIAGNOSIS — F11.20 OPIOID USE DISORDER, SEVERE, DEPENDENCE (H): ICD-10-CM

## 2022-09-09 DIAGNOSIS — F43.10 PTSD (POST-TRAUMATIC STRESS DISORDER): ICD-10-CM

## 2022-09-09 DIAGNOSIS — F10.20 ALCOHOL USE DISORDER, SEVERE, DEPENDENCE (H): ICD-10-CM

## 2022-09-09 DIAGNOSIS — F11.20 OPIOID USE DISORDER, SEVERE, DEPENDENCE (H): Primary | ICD-10-CM

## 2022-09-09 DIAGNOSIS — F12.90 CONTINUOUS CANNABIS USE: ICD-10-CM

## 2022-09-09 DIAGNOSIS — I10 HYPERTENSION, UNSPECIFIED TYPE: ICD-10-CM

## 2022-09-09 DIAGNOSIS — F31.9 BIPOLAR I DISORDER (H): ICD-10-CM

## 2022-09-09 DIAGNOSIS — F31.9 BIPOLAR I DISORDER (H): Primary | ICD-10-CM

## 2022-09-09 DIAGNOSIS — F17.200 TOBACCO USE DISORDER: ICD-10-CM

## 2022-09-09 PROCEDURE — 250N000011 HC RX IP 250 OP 636: Performed by: NURSE PRACTITIONER

## 2022-09-09 PROCEDURE — 99214 OFFICE O/P EST MOD 30 MIN: CPT | Performed by: FAMILY MEDICINE

## 2022-09-09 PROCEDURE — 96372 THER/PROPH/DIAG INJ SC/IM: CPT | Performed by: NURSE PRACTITIONER

## 2022-09-09 PROCEDURE — 90832 PSYTX W PT 30 MINUTES: CPT | Performed by: SOCIAL WORKER

## 2022-09-09 PROCEDURE — 250N000009 HC RX 250: Performed by: NURSE PRACTITIONER

## 2022-09-09 RX ORDER — ALBUTEROL SULFATE 0.83 MG/ML
2.5 SOLUTION RESPIRATORY (INHALATION)
Status: CANCELLED | OUTPATIENT
Start: 2022-10-06

## 2022-09-09 RX ORDER — METHYLPREDNISOLONE SODIUM SUCCINATE 125 MG/2ML
125 INJECTION, POWDER, LYOPHILIZED, FOR SOLUTION INTRAMUSCULAR; INTRAVENOUS
Status: CANCELLED
Start: 2022-10-06

## 2022-09-09 RX ORDER — MEPERIDINE HYDROCHLORIDE 25 MG/ML
25 INJECTION INTRAMUSCULAR; INTRAVENOUS; SUBCUTANEOUS EVERY 30 MIN PRN
Status: CANCELLED | OUTPATIENT
Start: 2022-10-06

## 2022-09-09 RX ORDER — LIDOCAINE HYDROCHLORIDE 10 MG/ML
2 INJECTION, SOLUTION EPIDURAL; INFILTRATION; INTRACAUDAL; PERINEURAL ONCE
Status: COMPLETED | OUTPATIENT
Start: 2022-09-09 | End: 2022-09-09

## 2022-09-09 RX ORDER — EPINEPHRINE 1 MG/ML
0.3 INJECTION, SOLUTION, CONCENTRATE INTRAVENOUS EVERY 5 MIN PRN
Status: CANCELLED | OUTPATIENT
Start: 2022-10-06

## 2022-09-09 RX ORDER — DIPHENHYDRAMINE HYDROCHLORIDE 50 MG/ML
50 INJECTION INTRAMUSCULAR; INTRAVENOUS
Status: CANCELLED
Start: 2022-10-06

## 2022-09-09 RX ORDER — ALBUTEROL SULFATE 90 UG/1
1-2 AEROSOL, METERED RESPIRATORY (INHALATION)
Status: CANCELLED
Start: 2022-10-06

## 2022-09-09 RX ORDER — LIDOCAINE HYDROCHLORIDE 10 MG/ML
2 INJECTION, SOLUTION EPIDURAL; INFILTRATION; INTRACAUDAL; PERINEURAL ONCE
Status: CANCELLED | OUTPATIENT
Start: 2022-10-06 | End: 2022-10-06

## 2022-09-09 RX ADMIN — BUPRENORPHINE 300 MG: 300 SOLUTION SUBCUTANEOUS at 15:00

## 2022-09-09 RX ADMIN — LIDOCAINE HYDROCHLORIDE 2 ML: 10 INJECTION, SOLUTION EPIDURAL; INFILTRATION; INTRACAUDAL; PERINEURAL at 14:54

## 2022-09-09 ASSESSMENT — PAIN SCALES - GENERAL: PAINLEVEL: NO PAIN (0)

## 2022-09-09 ASSESSMENT — PATIENT HEALTH QUESTIONNAIRE - PHQ9: SUM OF ALL RESPONSES TO PHQ QUESTIONS 1-9: 10

## 2022-09-09 NOTE — NURSING NOTE
Infusion Nursing Note:  Laci Hinkle  presents today for sublocade 300mg injection.    Patient seen by provider today: Yes: States he is on his way following injection.    present during visit today: Not Applicable.    Note: N/A.    Intravenous Access:  No Intravenous access/labs at this visit.    Treatment Conditions:  Denies relapse.  No s/s of infection or tampering at site.    Post Infusion Assessment:  Patient tolerated injection without incident.     Discharge Plan:   Patient discharged in stable condition accompanied by: self.  RTC 10/7/2022.      Peyton Villaseñor, RN

## 2022-09-09 NOTE — NURSING NOTE
[facility]      Rooming information:  Approximate last use of illicit opioids: 3/12/22  Taking buprenorphine? Yes:  As prescribed? Yes:   Number of buprenorphine films/tablets remaining currently: 0  Side effects related to buprenorphine (constipation, dry mouth, sedation?) No   Narcan currently available: Yes  Other recent substance use:    Cannabis   NICOTINE-Yes:   If using nicotine, ready to quit? No    Point of care urine drug screen positive for: buprenorphine and THC  *POC urine drug screen does not screen for Fentanyl      PHQ Assesment Total Score(s) 9/9/2022   PHQ-9 Score 10   Some recent data might be hidden       If PHQ-9 score of 15 or higher, has Recovery Clinic therapist or provider been notified? N/A    Any current suicidal ideation? No  If yes, has Recovery Clinic therapist or provider been notified? N/A    [facility] provider[facility]     Mental health provider: Chi[facility]  (follow up on MH referral if needed)    Insurance needs: active    Housing needs: stable    Contact information up to date? yes    3rd Party Involvement none today (please obtain WU if pt would like to include)    Morena Mckeon CMA  September 9, 2022  3:12 PM

## 2022-09-09 NOTE — PROGRESS NOTES
[facility]  September 9, 2022      Behavioral Health Clinician Progress Note    Patient Name: Laci Hinkle Jr           Service Type:  Individual      Service Location:   Face to Face in Clinic     Session Start Time: 2:00pm  Session End Time: 2:30pm      Session Length: 16 - 37      Attendees: Patient     Service Modality:  In-person    Visit Activities (Refresh list every visit): co visit and psychotherapy    Diagnostic Assessment Date: Not completed yet  Treatment Plan Review Date: Not completed yet  See Flowsheets for today's PHQ-9 and CEASAR-7 results  Previous PHQ-9:   PHQ-9 SCORE 8/26/2022 9/2/2022 9/9/2022   PHQ-9 Total Score - - -   PHQ-9 Total Score MyChart - - -   PHQ-9 Total Score 13 11 10     Previous CEASAR-7:   CEASAR-7 SCORE 7/22/2021 6/27/2022 7/25/2022   Total Score - - -   Total Score - - 15 (severe anxiety)   Total Score 6 8 15       GLORIA LEVEL:  GLORIA Score (Last Two) 9/16/2014   GLORIA Raw Score 45   Activation Score 73.1   GLORIA Level 4       DATA  Extended Session (60+ minutes): No  Interactive Complexity: No  Crisis: No  [facility] Patient: No    Treatment Objective(s) Addressed in This Session:  Target Behavior(s): mental health and addiction    Depressed Mood: Increase interest, engagement, and pleasure in doing things  Decrease frequency and intensity of feeling down, depressed, hopeless  Identify negative self-talk and behaviors: challenge core beliefs, myths, and actions  Improve concentration, focus, and mindfulness in daily activities   Feel less fidgety, restless or slow in daily activities / interpersonal interactions  Anxiety: will experience a reduction in anxiety and will develop more effective coping skills to manage anxiety symptoms  Alcohol / Substance Use: will discuss/consider potential need for formal substance use evaluation, treatment and/or support  continue to make healthy choices regarding substance use and engage in activities / supportive services that  promote sobriety    Current Stressors / Issues:  2:pm to 2:30pm    He has been sober for the past 4 months and he stated that he has some craving but he is able to think through it and navigate to stay sober as he is working towards his goal and dreams and using is not going to help that-she has been choosing to stay soberr as he has things that he is working towards as he is busy and exited about his future and knows that he is not have the future that he wants if he uses so to have the future that he wants the needs to stay sober and prepare the self for school-he wants to get his license and this has been a goal-he has been keeping up with cleaning the house and he has been setting the goals with his self in multiple areas of his life-these challenges that he keeps setting for the self-he has the medical marijuana card and he does not use it much-the things that create barriers in his life to his goals he is beginning to eliminate-the goal is to have a productive so he keeps the marijuana at home-focused on completing challenges each day-he is trying to train the self to study-I have to prepare for homework-he is excited about school starting the beginning of 2023-he wants to and is planing to study dentistry-and he is looking forward to the hands on aspects of the program he is going to attend-he had to interrupt the habit of spending money by getting his mother involved with his money and the goal is to save better-he stated that he has the ball and the momentum going and he stated that he needs to keep it going-he was feeling depressed recently and he starting a anti depressant and he stated it help and he has a good job at [employer] that he enjoys and he is trying to occupied-if he gets bored or if something happens that causes stress-he needs to organize what he will need in order to appeal the decision from his school with the argument with his chemical dependency and how it affected his academic  performance-he is going to meet with the person and he stated that he has high amada in God and he will hold onto this working out for him-    Progress on Treatment Objective(s) / Homework:  Minimal progress - ACTION (Actively working towards change); Intervened by reinforcing change plan / affirming steps taken    Motivational Interviewing    MI Intervention: Expressed Empathy/Understanding, Supported Autonomy, Collaboration, Evocation, Permission to raise concern or advise, Open-ended questions, Reflections: simple and complex and Change talk (evoked)     Change Talk Expressed by the Patient: Desire to change Ability to change Reasons to change Need to change Committment to change Activation Taking steps    Provider Response to Change Talk: E - Evoked more info from patient about behavior change, A - Affirmed patient's thoughts, decisions, or attempts at behavior change, R - Reflected patient's change talk and S - Summarized patient's change talk statements      Care Plan review completed: No    Medication Review:  No changes to current psychiatric medication(s)    Medication Compliance:  NA    Changes in Health Issues:   None reported    Chemical Use Review:   Substance Use: Chemical use reviewed, no active concerns identified      Tobacco Use: Not reported    Assessment: Current Emotional / Mental Status (status of significant symptoms):  Risk status (Self / Other harm or suicidal ideation)  Patient denies a history of suicidal ideation, suicide attempts, self-injurious behavior, homicidal ideation, homicidal behavior and and other safety concerns  Patient denies current fears or concerns for personal safety.  Patient denies current or recent suicidal ideation or behaviors.  Patient denies current or recent homicidal ideation or behaviors.  Patient denies current or recent self injurious behavior or ideation.  Patient denies other safety concerns.  A safety and risk management plan has not been developed at this  time, however patient was encouraged to call [place] [phone] should there be a change in any of these risk factors.    Appearance:   Appropriate   Eye Contact:   Good   Psychomotor Behavior: Normal  Restless   Attitude:   Cooperative   Orientation:   All  Speech   Rate / Production: Normal/ Responsive Normal    Volume:  Normal   Mood:    Anxious  Normal  Affect:    Appropriate   Thought Content:  Clear   Thought Form:  Coherent  Goal Directed  Logical   Insight:    Fair     Diagnoses:  1. Bipolar I disorder (H)    2. PTSD (post-traumatic stress disorder)    3. Opioid use disorder, severe, dependence (H)        Collateral Reports Completed:  Not Applicable    Plan: (Homework, other):  Patient was given information about behavioral services and encouraged to schedule a follow up appointment with the clinic [facility] as needed.  He was also given information about mental health symptoms and treatment options  and strategies to maintain sobriety.  CD Recommendations: Practice Harm Reduction: wait 30 minutes before using and complete the CD assessment and follow recommendations.   KARSTEN Cespedes, [facility]      ______________________________________________________________________

## 2022-09-09 NOTE — PATIENT INSTRUCTIONS
Recommend you establish care with a primary care doctor.  [facility] [phone] is a great option since you are already here.    Follow-up after next injection, sooner if needed.      Good luck with school!!

## 2022-09-09 NOTE — PROGRESS NOTES
[facility] Follow Up    ASSESSMENT/PLAN                                                      1. Opioid use disorder, severe, dependence (H)  Stable.  Continue Sublocade, plan for 100mg in 4 weeks.  He will notify us if any concerns (cravings, withdrawal) but I do not anticipate this.      2. Alcohol use disorder, severe, dependence (H)  Stable, early remission.  Continue disulfiram and acamprosate.      3. Continuous cannabis use  Reports decreasing use, encouraged him to continue self reflection on harm vs benefit.    4. Tobacco use disorder  Not ready to quit.      5. Bipolar I disorder (H)  Overall seems at baseline, no depression, tends towards hypomanic side.  Reports taking medications as per psychiatry.  Forward thinking, hopeful.      6. Hypertension, unspecified type  Discussed that his BP is elevated again today.  Recommend he establish with PCP to manage BP, ensure health screenings/immunizations are up to date, and have accessible care team for any acute needs.  He is interested in establishing with [facility] since he comes to this campus for visits routinely, I have provided him the phone number to contact them.       Return in about 4 weeks (around 10/7/2022) for Follow up, in person. After next Sublocade injection.    SUBJECTIVE                                                          CC/HPI:  Laci Hinkle Jr is a 26 year old male with PMH bipolar disorder, CEASAR, ADHD, and PSUD including opioids on buprenorphine who presents to the [facility] for return visit.       Brief History:  Pt first presented to [facility] on 7/22/21. Pt was referred by staff in [facility] due to pt's initial desire to taper off buprenorphine which he had been taking from nonprescription sources.   Pt soon left [facility], but continued with Recovery Clinic.  He eventually decided to continue buprenorphine therapy, and then transferred to [facility] with initial  injection 8/13/21.  He received a total of 3 Sublocade injections, most recently 100mg on 10/8/21.   Pt then indicated to  staff he wanted to discontinue Sublocade injections.  He was lost to follow up until he returned to  on 12/9/21 requesting to resume SL buprenorphine after brief return to use of alcohol and kratom.   Despite some irregularities in buprenorphine use including skipping doses and advancing dose, last use of other opioids has remaines 3/2022.  8/2022 he agreed to recommendation of resuming Sublocade to improve adherence to treatment.   He received Sublocade 300mg on 8/12/22 and 9/9/22.       He describes his opioid use history starting with heroin as a teenager, last use age 17.  He started methadone treatment age 17, dose up to 100mg, then tapered off to 6mg/day then stopped.  He began using kratom ~2016, eventually used amounts up to 100g daily.  He was first prescribed buprenorphine for OUD in 2019. He took as prescribed through 1/2021, then continued on buprenorphine through nonprescription sources.    IV drug use: No   History of overdose: No  Previous treatments : Yes: multiple, [facility], [facility] 11/2020, previous buprenorphine and methadone  Longest period of sobriety: few months  Medical complications related to substance use: exacerbation of MH diagnoses  Hepatitis C Status  no record of testing  HIV Status no record of testing     Other recent substance use:  Stimulants: used methamphetamine ages [age], stopped for 5 years, then resumed age 24.    Sedatives/hypnotics/anxiolytics: has used in past, denies regular use  Alcohol: h/o drinking 1 L liquor daily  Tobacco: 2-3 ppd and/or ENDS  Cannabis: occasional   Hallucinogens:has used in past, denies recent  Behavioral addictions: none    Most recent [facility] visit: 9/2/22   Important points and recommendations last visit:  Plan to continue Sublocade as scheduled on 9/9/22    Today, pt states he is doing well.  He is  "happy to report he has 3-4 months of sobriety and is feeling really good about this.  He is unsure about the date of his last drink because it helps him not to dwell on dates.  He continues disulfiram which he feels is the most helpful for avoiding EtOH, unsure if acamprosate is helpful but wants to continue because things are going so well.  He had his second 300mg Sublocade injection today.  No injection site issues.  He is hopeful he will not need further Suboxone films.  Also using medical cannabis less, finding is less helpful and negatively impacting ability to focus.  Using once or twice weekly, slowly reducing use.      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  Feeling really motivated and passionate about this decision.  On academic probation so needing to \"jump some hoops.\"      [place] Prescription Drug Monitoring Program Reviewed:  Yes; as expected    Medications:  acamprosate (CAMPRAL) 333 MG EC tablet, Take 3 tablets (999 mg) by mouth 2 times daily  amLODIPine-benazepril (LOTREL) 2.5-10 MG capsule, Take 1 capsule by mouth daily  buprenorphine ER (SUBLOCADE) 300 MG/1.5ML prefilled syringe, Inject 300 mg Subcutaneous every 30 days  buprenorphine HCl-naloxone HCl (SUBOXONE) 8-2 MG per film, Place 1 Film under the tongue daily  buPROPion (WELLBUTRIN XL) 300 MG 24 hr tablet, Take 1 tablet (300 mg) by mouth every morning  disulfiram (ANTABUSE) 250 MG tablet, Take 1 tablet (250 mg) by mouth daily  divalproex sodium extended-release (DEPAKOTE ER) 500 MG 24 hr tablet, Take 4 tablets (2,000 mg) by mouth daily  lamoTRIgine (LAMICTAL) 200 MG tablet, Take 1 tablet (200 mg) by mouth daily  medical cannabis (Patient's own supply), See Admin Instructions (The purpose of this order is to document that the patient reports taking medical cannabis.  This is not a prescription, and is not used to certify that the patient has a qualifying medical " condition.)  naloxone (NARCAN) 4 MG/0.1ML nasal spray, Spray 1 spray (4 mg) into one nostril alternating nostrils once as needed for opioid reversal every 2-3 minutes until assistance arrives (Patient not taking: No sig reported)  OLANZapine (ZYPREXA) 15 MG tablet, Take 1 tablet (15 mg) by mouth At Bedtime  polyethylene glycol (MIRALAX) 17 GM/Dose powder, Take 17 g (1 capful) by mouth daily (Patient not taking: Reported on 9/9/2022)  senna-docusate (SENOKOT-S/PERICOLACE) 8.6-50 MG tablet, Take 2 tablets by mouth daily as needed for constipation (Patient not taking: Reported on 9/9/2022)  sildenafil (VIAGRA) 50 MG tablet, Take 1 tablet (50 mg) by mouth daily as needed (ED) (Patient not taking: Reported on 9/9/2022)    No current facility-administered medications on file prior to visit.      Allergies   Allergen Reactions     Prednisone Other (See Comments)     psych problems       PMH, PSH, FamHx reviewed    Social History  Housing status: with his mother   Employment status: working part time at [employer] (overnights 3 nights/week)  Relationship status: Single  Children: no children    OBJECTIVE                                                      BP (!) 161/91   Pulse 101     Physical Exam  Vitals and nursing note reviewed.   Constitutional:       General: He is not in acute distress.     Appearance: Normal appearance. He is not ill-appearing or diaphoretic.   HENT:      Head: Normocephalic and atraumatic.   Eyes:      General: No scleral icterus.     Conjunctiva/sclera: Conjunctivae normal.   Cardiovascular:      Rate and Rhythm: Tachycardia present.   Pulmonary:      Effort: Pulmonary effort is normal. No respiratory distress.   Skin:     General: Skin is warm and dry.      Coloration: Skin is not jaundiced or pale.   Neurological:      General: No focal deficit present.      Mental Status: He is alert and oriented to person, place, and time.      Gait: Gait normal.   Psychiatric:         Attention and Perception:  Attention normal.         Mood and Affect: Mood is elated (hypomanic, seems at baseline).         Speech: Speech is rapid and pressured.         Behavior: Behavior normal. Behavior is cooperative.         Thought Content: Thought content normal.         Judgment: Judgment normal.         Labs:    UDS: buprenorphine and THC  *POC urine drug screen does not screen for Fentanyl    No results found for this or any previous visit (from the past 240 hour(s)).      Patient counseling completed today:  Discussed mechanism of action, potential risks/benefits/side effects of medications and other recommendations above.  Recommend pt keep naloxone in their possession and reviewed other aspects of harm reduction to reduce risk of overdose with return to use.   Recommended avoiding concurrent use of alcohol, benzodiazepines or other sedatives with buprenorphine or other opioids.  Discussed importance of avoiding isolation, building a network of supportive relationships, avoiding people/places/things associated with past use to reduce risk of relapse; including motivational interviewing regarding psychosocial treatment for addiction.       Kristina Mckeon, [place] [id number] [place] [id number] [phone]

## 2022-09-16 ENCOUNTER — TELEPHONE (OUTPATIENT)
Dept: BEHAVIORAL HEALTH | Facility: CLINIC | Age: 27
End: 2022-09-16

## 2022-09-16 NOTE — TELEPHONE ENCOUNTER
Writer spoke with patient and scheduled return visit on 10/06/2022 @ 9:30 am with Chiara. Patient requested a call back from RNs as will need a medication refill.    Mya Lester  09/16/22  350

## 2022-09-19 DIAGNOSIS — F15.11 METHAMPHETAMINE ABUSE IN REMISSION (H): ICD-10-CM

## 2022-09-19 DIAGNOSIS — F31.9 BIPOLAR I DISORDER (H): ICD-10-CM

## 2022-09-19 RX ORDER — BUPROPION HYDROCHLORIDE 300 MG/1
300 TABLET ORAL EVERY MORNING
Qty: 30 TABLET | Refills: 1 | Status: SHIPPED | OUTPATIENT
Start: 2022-09-19 | End: 2022-10-24

## 2022-09-19 NOTE — TELEPHONE ENCOUNTER
Date of Last Office Visit: 7/25/22  Date of Next Office Visit: 10/6/22  No shows since last visit: none  Cancellations since last visit: none    Medication requested: Wellbutrin  mg  Date last ordered: 7/25/22 Qty: 30 Refills: 1       Lapse in medication adherence greater than 5 days?: No  If yes, call patient and gather details: TC RN called this patient and he has approximately 2 weeks left.    Medication refill request verified as identical to current order?: yes  Result of Last DAM, VPA, Li+ Level, CBC, or Carbamazepine Level (at or since last visit): N/A    Last visit treatment plan: Other Notes     All notes      Instructions         Return in about 4 weeks (around 8/22/2022) for Follow up, with me, using a video visit.    Start:  Bupropion/Wellbutrin for 7 day increments:  -75 mg  -150 mg  -300 mg in the AM thereafter        Continue:  -Divalproex sodium/Depakote ER 2000 mg (4 tabs) at bedtime     -Lamotrigine/Lamictal 200 mg at bedtime     -Olanzapine/Zyprexa 15 mg at bedtime     Per Addiction Medicine:  -Acamprosate  -Clonidine  -Disulfiram              []Medication refilled per  Medication Refill in Ambulatory Care  policy.  [x]Medication unable to be refilled by RN due to criteria not met as indicated below:    []Eligibility - not seen in the last year   []Supervision - no future appointment   []Compliance - no shows, cancellations or lapse in therapy   []Verification - order discrepancy   []Controlled medication   [x]Medication not included in policy   []90-day supply request   []Other

## 2022-09-25 ENCOUNTER — TELEPHONE (OUTPATIENT)
Dept: BEHAVIORAL HEALTH | Facility: CLINIC | Age: 27
End: 2022-09-25

## 2022-09-25 NOTE — TELEPHONE ENCOUNTER
Reason for call:  Other   Patient called regarding (reason for call): call back  Additional comments: Left voicemail re: refill on a prescription would like a call back with RNs.    Phone number to reach patient:  Home number on file [phone] (home)    Best Time:  Any    Can we leave a detailed message on this number?  YES    Travel screening: Not Applicable     Omayra Workman  Care Coordinator  9.25

## 2022-09-27 ENCOUNTER — TELEPHONE (OUTPATIENT)
Dept: BEHAVIORAL HEALTH | Facility: CLINIC | Age: 27
End: 2022-09-27

## 2022-09-27 NOTE — TELEPHONE ENCOUNTER
VIRGIL RN attempted to call this patient twice to address his concerns regarding Wellbutrin dosing.  There was no answer. TC RN LVM for this patient to call the TC at [phone].  Awaiting call back.

## 2022-09-27 NOTE — TELEPHONE ENCOUNTER
Patient calling asking to speak with RN about if medicaiton Wellbutrin can be increased. Stated having deep depression, feeling a little better now but afraid it is going to get bad again. Said feels different than a mood swing. Said is sober now and was coping with drugs but now having to manage without drugs. Wondering if increased dose will help to not experience it so hard. Requesting call back from TC RN at [phone] and can leave a voicemail if do not answer, prefers voicemail over mychart.    Anne Adame  Transition Clinic Coordinator  Date and Time: 09/27/22 10:36 AM

## 2022-10-04 ENCOUNTER — TELEPHONE (OUTPATIENT)
Dept: BEHAVIORAL HEALTH | Facility: CLINIC | Age: 27
End: 2022-10-04

## 2022-10-04 NOTE — TELEPHONE ENCOUNTER
"VIRGIL RN returned call to this patient as requested.  The patient wanted Chiara Stewart to know the following prior to his 9:30 a.m. appointment on 10/6/22:    1.  He has been experiencing more frequent mood swings lately.  This concerns him. He has been using a emotions tracker.  He has noticed that his mood has been vacillating and he has at least 2 down days during the week.       2.  He has been sober from drugs for 4 months and is beginning to be more aware of his emotions.  He likes this but feels that this is new territory and can be difficult at times.  This patient demonstrated good insight regarding sobriety.   Sobriety is a essential goal for him.      3.  He has been sleepy and feels a decrease in energy.  He works nights.  Transitioning from night work to getting errands done on his days off has been difficult.      4.  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  He hopes to have his mood regulated more effectively before tackling his course work.     5.  He experiences mood changes during the winter.  He is concerned about this as the winter is approaching.       6.  He hopes that no new medications be added.  He stated, \"I hope that Chiara Stewart can make adjustments to my current medication regime to help me feel more stable.\"        "

## 2022-10-04 NOTE — TELEPHONE ENCOUNTER
Reason for call:  Other   Patient called regarding (reason for call): call back  Additional comments: pt is in need of medication adjustment / questions about meds    Phone number to reach patient:  Cell number on file:    Telephone Information:   Mobile [phone]       Best Time:  asap    Can we leave a detailed message on this number?  YES    Travel screening: Negative

## 2022-10-06 ENCOUNTER — VIRTUAL VISIT (OUTPATIENT)
Dept: BEHAVIORAL HEALTH | Facility: CLINIC | Age: 27
End: 2022-10-06
Payer: COMMERCIAL

## 2022-10-06 DIAGNOSIS — F31.9 BIPOLAR I DISORDER (H): Primary | ICD-10-CM

## 2022-10-06 DIAGNOSIS — F15.11 METHAMPHETAMINE ABUSE IN REMISSION (H): ICD-10-CM

## 2022-10-06 DIAGNOSIS — F90.2 ATTENTION DEFICIT HYPERACTIVITY DISORDER (ADHD), COMBINED TYPE: ICD-10-CM

## 2022-10-06 PROCEDURE — 99215 OFFICE O/P EST HI 40 MIN: CPT | Mod: GT | Performed by: NURSE PRACTITIONER

## 2022-10-06 RX ORDER — MODAFINIL 100 MG/1
100 TABLET ORAL DAILY
Qty: 30 TABLET | Refills: 0 | Status: SHIPPED | OUTPATIENT
Start: 2022-10-06 | End: 2022-10-24 | Stop reason: DRUGHIGH

## 2022-10-06 RX ORDER — CLONIDINE HYDROCHLORIDE 0.1 MG/1
0.1 TABLET ORAL AT BEDTIME
Qty: 30 TABLET | Refills: 1 | Status: SHIPPED | OUTPATIENT
Start: 2022-10-06 | End: 2022-10-24

## 2022-10-06 ASSESSMENT — ANXIETY QUESTIONNAIRES
3. WORRYING TOO MUCH ABOUT DIFFERENT THINGS: SEVERAL DAYS
6. BECOMING EASILY ANNOYED OR IRRITABLE: SEVERAL DAYS
GAD7 TOTAL SCORE: 15
IF YOU CHECKED OFF ANY PROBLEMS ON THIS QUESTIONNAIRE, HOW DIFFICULT HAVE THESE PROBLEMS MADE IT FOR YOU TO DO YOUR WORK, TAKE CARE OF THINGS AT HOME, OR GET ALONG WITH OTHER PEOPLE: EXTREMELY DIFFICULT
6. BECOMING EASILY ANNOYED OR IRRITABLE: MORE THAN HALF THE DAYS
7. FEELING AFRAID AS IF SOMETHING AWFUL MIGHT HAPPEN: NEARLY EVERY DAY
3. WORRYING TOO MUCH ABOUT DIFFERENT THINGS: NEARLY EVERY DAY
5. BEING SO RESTLESS THAT IT IS HARD TO SIT STILL: SEVERAL DAYS
1. FEELING NERVOUS, ANXIOUS, OR ON EDGE: NEARLY EVERY DAY
1. FEELING NERVOUS, ANXIOUS, OR ON EDGE: NEARLY EVERY DAY
GAD7 TOTAL SCORE: 15
2. NOT BEING ABLE TO STOP OR CONTROL WORRYING: NEARLY EVERY DAY
7. FEELING AFRAID AS IF SOMETHING AWFUL MIGHT HAPPEN: NEARLY EVERY DAY
5. BEING SO RESTLESS THAT IT IS HARD TO SIT STILL: SEVERAL DAYS
2. NOT BEING ABLE TO STOP OR CONTROL WORRYING: SEVERAL DAYS
GAD7 TOTAL SCORE: 12
8. IF YOU CHECKED OFF ANY PROBLEMS, HOW DIFFICULT HAVE THESE MADE IT FOR YOU TO DO YOUR WORK, TAKE CARE OF THINGS AT HOME, OR GET ALONG WITH OTHER PEOPLE?: EXTREMELY DIFFICULT
GAD7 TOTAL SCORE: 15
IF YOU CHECKED OFF ANY PROBLEMS ON THIS QUESTIONNAIRE, HOW DIFFICULT HAVE THESE PROBLEMS MADE IT FOR YOU TO DO YOUR WORK, TAKE CARE OF THINGS AT HOME, OR GET ALONG WITH OTHER PEOPLE: EXTREMELY DIFFICULT
4. TROUBLE RELAXING: SEVERAL DAYS
7. FEELING AFRAID AS IF SOMETHING AWFUL MIGHT HAPPEN: NEARLY EVERY DAY

## 2022-10-06 ASSESSMENT — PATIENT HEALTH QUESTIONNAIRE - PHQ9
SUM OF ALL RESPONSES TO PHQ QUESTIONS 1-9: 23
10. IF YOU CHECKED OFF ANY PROBLEMS, HOW DIFFICULT HAVE THESE PROBLEMS MADE IT FOR YOU TO DO YOUR WORK, TAKE CARE OF THINGS AT HOME, OR GET ALONG WITH OTHER PEOPLE: EXTREMELY DIFFICULT
5. POOR APPETITE OR OVEREATING: SEVERAL DAYS
SUM OF ALL RESPONSES TO PHQ QUESTIONS 1-9: 23

## 2022-10-06 NOTE — PROGRESS NOTES
[facility]      Mental Health & Addiction Service Line    Transition Clinic: Psychiatry Progress Note  Medication Management                Charts/documentation read prior to the appointment:  -2022        VISIT INFORMATION      Date:  2022       Number:  -2nd      Referral source:  -Addiction Medicine  -Bridging to long term outpatient psychiatry      Patient Identifying Information:  Legal name: Laci Hinkle Jr  Preferred name: Nathalie  : 1995  Preferred pronouns: He/him      Participants:   -Patient  -Provider        Telehealth visit details:  Type of service:   Video  Patient location:   At home  Provider Location: [facility]  Platform utilized: [employer]    Start time:   9:38 am  End time:  10:13 am          INTERIM HX      -Have missed work because I've been so tired and depressed  -When I first got sober it's like I had a ton of energy  -Now I'm dragging constantly  -Work 3x per week from 10:00 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     -When I return to school, don't plan on working at the same time  -The first semester is all online          PSYCHIATRIC ROS      Sleep:  -Sleeping all the time      Mood/Anxiety:  -See PHQ-9 score    -See CEASAR-7 score    -Very depressed, apathetic, low mood, poor motivation        Ju/Hypomania:    Denies in the interim hx:  -Need for less sleep with an increase in goal directed activities  -Inflated self esteem, grandiose thoughts  -Increased spending or gambling  -Engaging in high risk behaviors  -Excessive feelings of irritability or impulsivity      Psychosis:    Denies in the interim hx:  -AH/VH  -Paranoia   -Thought insertion or broadcasting      Suicidal ideations:  +Passive SI  -Denies intent or plan  -Sometimes questioning if things are worth it        SIB:  -Denies engaging in self harm         Side effects:  -None reported  -Unclear if Wellbutrin is  contributing to elevated bps        PSYCHIATRIC HISTORY      Suicide attempts:  -None reported      Inpatient psychiatric hospitalizations:  -Multiple  -Most recent: [facility], 10/24/2015  -No hx of commitments      ECT:  -None reported            Medication Trials:     SSRIs:  -Prozac: initially worked, but then I stopped and when I restarted      Other antidepressants:  -Mirtazapine     Antipsychotics:  -Abilify  -Latuda: it's terrible and was fickle  -Seroquel: oversedation, an appetite   -Risperdal: oversedation and fell asleep in public     Alpha receptors:  -Clonidine: increased fatigue        CURRENT SUBSTANCE USE  -See 9/9/2022 encounter for further details            MEDICAL HISTORY    -The problem list was reviewed via the EMR prior to the appointment    -The patient denies any concerning physical and or medical symptoms during the interviewing process          MEDICATIONS      Current Outpatient Medications:      acamprosate (CAMPRAL) 333 MG EC tablet, Take 3 tablets (999 mg) by mouth 2 times daily, Disp: 180 tablet, Rfl: 11     amLODIPine-benazepril (LOTREL) 2.5-10 MG capsule, Take 1 capsule by mouth daily, Disp: 30 capsule, Rfl: 0     buprenorphine ER (SUBLOCADE) 300 MG/1.5ML prefilled syringe, Inject 300 mg Subcutaneous every 30 days, Disp: , Rfl:      buprenorphine HCl-naloxone HCl (SUBOXONE) 8-2 MG per film, Place 1 Film under the tongue daily, Disp: 7 Film, Rfl: 0     buPROPion (WELLBUTRIN XL) 300 MG 24 hr tablet, Take 1 tablet (300 mg) by mouth every morning, Disp: 30 tablet, Rfl: 1     disulfiram (ANTABUSE) 250 MG tablet, Take 1 tablet (250 mg) by mouth daily, Disp: 90 tablet, Rfl: 3     divalproex sodium extended-release (DEPAKOTE ER) 500 MG 24 hr tablet, Take 4 tablets (2,000 mg) by mouth daily, Disp: 120 tablet, Rfl: 2     lamoTRIgine (LAMICTAL) 200 MG tablet, Take 1 tablet (200 mg) by mouth daily, Disp: 30 tablet, Rfl: 2     medical cannabis (Patient's own supply), See Admin Instructions  (The purpose of this order is to document that the patient reports taking medical cannabis.  This is not a prescription, and is not used to certify that the patient has a qualifying medical condition.), Disp: , Rfl:      naloxone (NARCAN) 4 MG/0.1ML nasal spray, Spray 1 spray (4 mg) into one nostril alternating nostrils once as needed for opioid reversal every 2-3 minutes until assistance arrives (Patient not taking: No sig reported), Disp: 0.2 mL, Rfl: 11     OLANZapine (ZYPREXA) 15 MG tablet, Take 1 tablet (15 mg) by mouth At Bedtime, Disp: 30 tablet, Rfl: 2     polyethylene glycol (MIRALAX) 17 GM/Dose powder, Take 17 g (1 capful) by mouth daily (Patient not taking: Reported on 9/9/2022), Disp: 578 g, Rfl: 11     senna-docusate (SENOKOT-S/PERICOLACE) 8.6-50 MG tablet, Take 2 tablets by mouth daily as needed for constipation (Patient not taking: Reported on 9/9/2022), Disp: , Rfl:      sildenafil (VIAGRA) 50 MG tablet, Take 1 tablet (50 mg) by mouth daily as needed (ED) (Patient not taking: Reported on 9/9/2022), Disp: 30 tablet, Rfl: 3      If a controlled substance is prescribed during today's appointment:    -The [place] Prescription Monitoring Program has been reviewed and there are no current concerns with: diversionary activity, early refill requests, and or obtaining the medication from multiple providers.        VITALS    BP Readings from Last 3 Encounters:   09/09/22 (!) 161/91   09/09/22 130/85   09/02/22 (!) 149/106       Pulse Readings from Last 3 Encounters:   09/09/22 101   09/09/22 99   09/02/22 94       Wt Readings from Last 3 Encounters:   06/27/22 108.9 kg (240 lb)   04/01/22 109.9 kg (242 lb 4.8 oz)   12/21/21 110.7 kg (244 lb)         LABS    The following labs were reviewed prior to or during the appointment:  -None        SCALES    PHQ 8/26/2022 9/2/2022 9/9/2022   PHQ-9 Total Score 13 11 10   Q9: Thoughts of better off dead/self-harm past 2 weeks Not at all Not at all Not at all   F/U:  Thoughts of suicide or self-harm - - -   F/U: Safety concerns - - -        CEASAR-7 SCORE 7/22/2021 6/27/2022 7/25/2022   Total Score - - -   Total Score - - 15 (severe anxiety)   Total Score 6 8 15       Answers for HPI/ROS submitted by the patient on 10/6/2022  If you checked off any problems, how difficult have these problems made it for you to do your work, take care of things at home, or get along with other people?: Extremely difficult  PHQ9 TOTAL SCORE: 23  CEASAR 7 TOTAL SCORE: 15        MENTAL STATUS EXAMINATION    Appearance: Adequately Groomed, Attire Appropriate for the Season  General Behavior:  Cooperative, Direct Eye Contact  Speech: Fluent, Normal rate and volume  Musculoskeletal:    -Gait not observed during t.h. visit  -No facial tics/tremors observed   -Motor coordination is grossly intact   Mood: Depressed, Down  Affect: Congruent with mood, Tired  Attention: Intact  Orientation:  Person, Place, Time, Situation  Thought Associations:  Intact  Thought Content: Reality based   Thought Processes: Organized, Normal rate  Memory: No overt impairment; no screenings or formal testing performed  Language: Intact  Judgement: Fair to Good  Insight:  Fair to Good            ASSESSMENT/CLINICAL IMPRESSIONS    Summary:    Laci/Nathalie Hinkle Jr. is  a 27 y/o male with a history of:  Adverse Childhood Events (ACEs), PTSD, ADHD, CEASAR, Bipolar I, II (both listed per chart review) and Polysubstance abuse including [facility].     He [facility] for management of psychotropics/bridging to long term outpatient psychiatry services.    ----------------------    This is the first time Nathalie is following up.  He outlines becoming increasingly depressed, tired, experiencing anhedonia and poor motivation.    He outlines it doesn't seem like the Wellbutrin improved his mood much, and he is reluctant to maximize dosing.   Nathalie prefers to try  Wellbutrin + Provigil and writer is in agreement to trial as a means to promote sobriety, possible relapse of methamphetamines (has been sober 4 months).    Previously Pat has utilized Clonidine while going through withdrawals to stabilize blood pressure and he is willing to temporarily restart this again since most recent bp was elevated: 161/91.       We also discussed the Wellbutrin + Provigil both have the potential adverse effects of htn.   Pat hasn't followed up with a PCP as recommended per Dr. JEOVANNY Mckeon DO on 9/9/2022, however he comments he will make an effort to do so as a means to review an appropriate antihypertensive regimen.    Presentation is depressed and tired but is forward thinking about eventually starting school in Jan/2023.    Mood is pretty low + passive ideations for the past 2 weeks, however Pat isn't having any immediate thoughts of harm towards himself or others during the interview.          DSM-V and or working diagnosis:      1. Bipolar I     2. 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 History of Polysubstance abuse     4. PTSD     5. ADHD per hx/problem list           SAFETY EVALUATION:  Suicidal ideations:  -denies (right now)  +passive intermittent thoughts; no intent or plan  Homicidal ideations:  -denies  Access to guns:   -none reported  Risk factors:  -male  -hx of abuse/trauma, polysubstance use disorder  Protective and mitigating factors:  -mother  -engagement in outpatient mental health and addiction medicine services  Risk assessment:  -low to medium                 TREATMENT PLAN        Medications:  Start:  -Provigil 100 mg daily    -Clonidine .1 mg at bedtime        Continue:  -Bupropion/Wellbutrin 300 mg in the AM     -Divalproex sodium/Depakote ER 2000 mg (4 tabs) at bedtime     -Lamotrigine/Lamictal 200 mg at bedtime     -Olanzapine/Zyprexa 15 mg at bedtime        Per Addiction Medicine:  -Acamprosate  -Disulfiram       Labs:  -None  obtained          Therapy:  -C/D programming and or individual therapy as needed        Non-pharmacological modalities:  -Continue care at the [facility]        Return to Clinic or Referrals:  -Please follow up at the [facility] for medication management in:   3 weeks                Total time:  41 minutes per:    -Review of EMR  -Appointment time   -Documentation          Chiara RAMIREZ [facility]    --------------------------------------------------------------------------------------------------------------------------        TREATMENT RISK STATEMENT    The risks, benefits, alternatives, and potential adverse effects have been explained and are understood by the patient.  The patient agrees to the treatment plan with their ability to do so.      The patient knows to call the clinic: [phone]  for any problems or concerns until the next psychiatry visit, regardless if it is within or outside of the [facility] system.     If unable to reach clinic staff (via phone call or medical messaging) during the normal business hours: 8:00 am to 4:30 pm then it is recommended accessing the nearest: emergency department, urgent care facility, or utilizing local (varies based on county of residence) and national crisis #'s or text messaging services for immediate assistance.          --------------------------------------------------------------------------------------------------------------------------        If applicable the following has been discussed with the patient, parent/guardian, and or attending family member during the appointment:      1. Risks of polypharmacy and possible drug interactions with current medication list + common OTC products, herbs, and supplements.    Moving forward, it is suggested to intermittently check-in with a clinic or retail pharmacist whenever new medications or OTC/h/s are consumed.    2. Recommendation to adhere to CDC guidelines as it relates alcohol  consumption.  If taking benzodiazepines, you should abstain from alcohol intake due to increased risks of CNS and respiratory depression, as well as psychomotor impairment.    3. If possible, it is recommended to avoid concurrent use of prescribed:  opioids  +  benzodiazepines due to increased risks of CNS and respiratory depression, as well as the increased risk of overdose.     4. Recommendation to minimize and or abstain from THC use (unless the pt. is prescribed medical marijuana).    5. Recommendation to abstain from illicit substances including but not limited to the following: heroin, street fentanyl, cocaine, methamphetamines, and bath salts.    6. Do not take opioids, stimulants, and or other prescription medications unless they are specifically prescribed for you.    7. Recommendation to abstain from tobacco/smoking, alcohol, THC, and all illicit substances if trying to become or are pregnant.    8. Black Box Warnings associated with the prescribed psychotropic(s).    9. Potential adverse effects of antipsychotics including but not limited to the following: weight gain, metabolic syndrome, EPS/Tardive Dyskinesias.    10. Potential CV and neurological adverse effects of stimulants including but not limited to the following:  sudden death, MI, stroke, HTN, cardiomyopathy (long term use) as well as seizures.

## 2022-10-06 NOTE — PATIENT INSTRUCTIONS
Start:  -Provigil 100 mg daily     -Clonidine .1 mg at bedtime         Continue:  -Bupropion/Wellbutrin 300 mg in the AM      -Divalproex sodium/Depakote ER 2000 mg (4 tabs) at bedtime     -Lamotrigine/Lamictal 200 mg at bedtime     -Olanzapine/Zyprexa 15 mg at bedtime

## 2022-10-06 NOTE — PROGRESS NOTES
" This video/telephone visit will be conducted virtually between you and your provider. We have found that certain health care needs can be provided without the need for an in-person physical exam. This service lets us provide the care you need with a video /telephone conversation. If a prescription is necessary we can send it directly to your pharmacy. If lab work is needed we can place an order for that and you can then stop by our lab to have the test done at a later time.\"   Just as we bill insurance for in-person visits, we also bill insurance for video/telephone visits. If you have questions about your insurance coverage, we recommend that you speak with your insurance company.      Patient/Parent has given verbal consent for video/Telephone visit? yes    Patient would like the video visit invitation sent by:  TIM Grouptoy    Patient verified allergies, medications and pharmacy via phone. Patient states they are ready for visit.       Mental Health &Addiction ([facility] (TC):     Provides Patient Support While Waiting to Access Programmatic and Outpatient MH&A Care and Provides [facility] Crisis Assessment Services     FOLLOW-UP VISIT  ____________________________________________      \"The [facility] is a temporary psychiatry service that helps to bridge the time to your next appointment. It is not intended to be a long-term service and you are expected to attend your scheduled appointment with your next provider.\"  [x] Patient/Parent verbalized understanding    If you need support between appointments, please call [phone] and let them know you're seen by [facility]. You may also send a [employer] message to reach us.    General-     Most pressing needs at this time:    1.  He has been experiencing more frequent mood swings lately.  This concerns him. He has been using a emotions tracker.  He has noticed that his mood has been vacillating and he has at least 2 down days during the " "week.        2.  He has been sober from drugs for 4 months and is beginning to be more aware of his emotions.  He likes this but feels that this is new territory and can be difficult at times.  This patient demonstrated good insight regarding sobriety.   Sobriety is a essential goal for him.       3.  He has been sleepy and feels a decrease in energy.  He works nights.  Transitioning from night work to getting errands done on his days off has been difficult.       4.  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  He hopes to have his mood regulated more effectively before tackling his course work.      5.  He experiences mood changes during the winter.  He is concerned about this as the winter is approaching.        6.  He hopes that no new medications be added.  He stated, \"I hope that Chiara Stewart can make adjustments to my current medication regime to help me feel more stable.\"         Any changes to your physical health: no         Medications-     Injectable medications currently prescribed: no    If yes, do you need an appointment for the next injection: n/a     Any Controlled Substances that you are prescribed:  Suboxone    Any refills you are aware that you'll need soon: pt stated he does not need any refills at this time. Still has plenty       Primary care provider: [facility]        CEASAR-7 scores:    CEASAR-7 SCORE 7/25/2022 10/6/2022   Total Score - -   Total Score 15 (severe anxiety) -   Total Score 15 12       PHQ-9 scores:   PHQ-9 SCORE 9/9/2022 10/6/2022   PHQ-9 Total Score - -   PHQ-9 Total Score MyChart - -   PHQ-9 Total Score 10 22         Anything the provider should be aware of for today's appointment: none    New (awaiting) Mental health provider or next programming: ANA LUISA Alcantar      Date of scheduled apt:   11/29/2022      Monique Kendrick MA on October 6, 2022 at 9:13 AM         "

## 2022-10-07 ENCOUNTER — OFFICE VISIT (OUTPATIENT)
Dept: BEHAVIORAL HEALTH | Facility: CLINIC | Age: 27
End: 2022-10-07

## 2022-10-07 ENCOUNTER — INFUSION THERAPY VISIT (OUTPATIENT)
Dept: INFUSION THERAPY | Facility: CLINIC | Age: 27
End: 2022-10-07
Attending: NURSE PRACTITIONER
Payer: COMMERCIAL

## 2022-10-07 ENCOUNTER — OFFICE VISIT (OUTPATIENT)
Dept: BEHAVIORAL HEALTH | Facility: CLINIC | Age: 27
End: 2022-10-07
Payer: COMMERCIAL

## 2022-10-07 VITALS
TEMPERATURE: 98.5 F | DIASTOLIC BLOOD PRESSURE: 90 MMHG | RESPIRATION RATE: 20 BRPM | HEART RATE: 99 BPM | OXYGEN SATURATION: 98 % | SYSTOLIC BLOOD PRESSURE: 149 MMHG

## 2022-10-07 VITALS — SYSTOLIC BLOOD PRESSURE: 132 MMHG | HEART RATE: 94 BPM | DIASTOLIC BLOOD PRESSURE: 78 MMHG

## 2022-10-07 DIAGNOSIS — F31.9 BIPOLAR I DISORDER (H): ICD-10-CM

## 2022-10-07 DIAGNOSIS — F15.11 METHAMPHETAMINE ABUSE IN REMISSION (H): ICD-10-CM

## 2022-10-07 DIAGNOSIS — F11.20 OPIOID USE DISORDER, SEVERE, DEPENDENCE (H): Primary | ICD-10-CM

## 2022-10-07 DIAGNOSIS — F12.90 CONTINUOUS CANNABIS USE: ICD-10-CM

## 2022-10-07 DIAGNOSIS — F17.200 TOBACCO USE DISORDER: ICD-10-CM

## 2022-10-07 DIAGNOSIS — F10.20 ALCOHOL USE DISORDER, SEVERE, DEPENDENCE (H): ICD-10-CM

## 2022-10-07 LAB — CREAT UR-MCNC: 145 MG/DL

## 2022-10-07 PROCEDURE — 99214 OFFICE O/P EST MOD 30 MIN: CPT | Performed by: FAMILY MEDICINE

## 2022-10-07 PROCEDURE — 250N000011 HC RX IP 250 OP 636: Performed by: NURSE PRACTITIONER

## 2022-10-07 PROCEDURE — H0038 SELF-HELP/PEER SVC PER 15MIN: HCPCS

## 2022-10-07 PROCEDURE — 96372 THER/PROPH/DIAG INJ SC/IM: CPT | Performed by: NURSE PRACTITIONER

## 2022-10-07 PROCEDURE — 80349 CANNABINOIDS NATURAL: CPT | Performed by: FAMILY MEDICINE

## 2022-10-07 PROCEDURE — 250N000009 HC RX 250: Performed by: NURSE PRACTITIONER

## 2022-10-07 RX ORDER — EPINEPHRINE 1 MG/ML
0.3 INJECTION, SOLUTION, CONCENTRATE INTRAVENOUS EVERY 5 MIN PRN
Status: CANCELLED | OUTPATIENT
Start: 2022-11-03

## 2022-10-07 RX ORDER — METHYLPREDNISOLONE SODIUM SUCCINATE 125 MG/2ML
125 INJECTION, POWDER, LYOPHILIZED, FOR SOLUTION INTRAMUSCULAR; INTRAVENOUS
Status: CANCELLED
Start: 2022-11-03

## 2022-10-07 RX ORDER — DIPHENHYDRAMINE HYDROCHLORIDE 50 MG/ML
50 INJECTION INTRAMUSCULAR; INTRAVENOUS
Status: CANCELLED
Start: 2022-11-03

## 2022-10-07 RX ORDER — LIDOCAINE HYDROCHLORIDE 10 MG/ML
2 INJECTION, SOLUTION EPIDURAL; INFILTRATION; INTRACAUDAL; PERINEURAL ONCE
Status: COMPLETED | OUTPATIENT
Start: 2022-10-07 | End: 2022-10-07

## 2022-10-07 RX ORDER — MEPERIDINE HYDROCHLORIDE 25 MG/ML
25 INJECTION INTRAMUSCULAR; INTRAVENOUS; SUBCUTANEOUS EVERY 30 MIN PRN
Status: CANCELLED | OUTPATIENT
Start: 2022-11-03

## 2022-10-07 RX ORDER — ALBUTEROL SULFATE 90 UG/1
1-2 AEROSOL, METERED RESPIRATORY (INHALATION)
Status: CANCELLED
Start: 2022-11-03

## 2022-10-07 RX ORDER — LIDOCAINE HYDROCHLORIDE 10 MG/ML
2 INJECTION, SOLUTION EPIDURAL; INFILTRATION; INTRACAUDAL; PERINEURAL ONCE
Status: CANCELLED | OUTPATIENT
Start: 2022-11-03 | End: 2022-11-03

## 2022-10-07 RX ORDER — ALBUTEROL SULFATE 0.83 MG/ML
2.5 SOLUTION RESPIRATORY (INHALATION)
Status: CANCELLED | OUTPATIENT
Start: 2022-11-03

## 2022-10-07 RX ADMIN — LIDOCAINE HYDROCHLORIDE 2 ML: 10 INJECTION, SOLUTION EPIDURAL; INFILTRATION; INTRACAUDAL; PERINEURAL at 15:12

## 2022-10-07 RX ADMIN — BUPRENORPHINE 100 MG: 100 SOLUTION SUBCUTANEOUS at 15:16

## 2022-10-07 ASSESSMENT — COLUMBIA-SUICIDE SEVERITY RATING SCALE - C-SSRS
4. HAVE YOU HAD THESE THOUGHTS AND HAD SOME INTENTION OF ACTING ON THEM?: NO
3. HAVE YOU BEEN THINKING ABOUT HOW YOU MIGHT KILL YOURSELF?: YES
ATTEMPT LIFETIME: NO
TOTAL  NUMBER OF ABORTED OR SELF INTERRUPTED ATTEMPTS LIFETIME: NO
2. HAVE YOU ACTUALLY HAD ANY THOUGHTS OF KILLING YOURSELF?: DEPRESSION
5. HAVE YOU STARTED TO WORK OUT OR WORKED OUT THE DETAILS OF HOW TO KILL YOURSELF? DO YOU INTEND TO CARRY OUT THIS PLAN?: NO
1. HAVE YOU WISHED YOU WERE DEAD OR WISHED YOU COULD GO TO SLEEP AND NOT WAKE UP?: YES
1. IN THE PAST MONTH, HAVE YOU WISHED YOU WERE DEAD OR WISHED YOU COULD GO TO SLEEP AND NOT WAKE UP?: YES
6. HAVE YOU EVER DONE ANYTHING, STARTED TO DO ANYTHING, OR PREPARED TO DO ANYTHING TO END YOUR LIFE?: NO
4. HAVE YOU HAD THESE THOUGHTS AND HAD SOME INTENTION OF ACTING ON THEM?: NO
5. HAVE YOU STARTED TO WORK OUT OR WORKED OUT THE DETAILS OF HOW TO KILL YOURSELF? DO YOU INTEND TO CARRY OUT THIS PLAN?: NO
2. HAVE YOU ACTUALLY HAD ANY THOUGHTS OF KILLING YOURSELF?: YES
REASONS FOR IDEATION PAST MONTH: MOSTLY TO END OR STOP THE PAIN (YOU COULDN'T GO ON LIVING WITH THE PAIN OR HOW YOU WERE FEELING)
REASONS FOR IDEATION LIFETIME: MOSTLY TO END OR STOP THE PAIN (YOU COULDN'T GO ON LIVING WITH THE PAIN OR HOW YOU WERE FEELING)
TOTAL  NUMBER OF INTERRUPTED ATTEMPTS LIFETIME: NO
2. HAVE YOU ACTUALLY HAD ANY THOUGHTS OF KILLING YOURSELF?: YES

## 2022-10-07 ASSESSMENT — PATIENT HEALTH QUESTIONNAIRE - PHQ9: SUM OF ALL RESPONSES TO PHQ QUESTIONS 1-9: 22

## 2022-10-07 NOTE — PROGRESS NOTES
[facility] Follow Up    ASSESSMENT/PLAN                                                      1. Opioid use disorder, severe, dependence (H)  Stable.  Continue Sublocade 100mg every month.  No longer needing SL supplemental buprenorphine. Reminded him to have labs done as ordered by Dr Hastings in August (CMP, hep C, and HIV).    2. Alcohol use disorder, severe, dependence (H)  Stable.  Early remission.  Continue disulfiram and acamprosate.    3. 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  No recent cravings or use.      4. Continuous cannabis use  Decrease use.  Would like to have labs done to check THC levels.  Discussed that clinically not necessary but he would find this information motivating and therefore I have agreed as I would like to encourage him in any way to continue decreasing use.  - THC Confirmation Quantitative Urine; Future  - THC Confirmation Quantitative Urine    5. Tobacco use disorder  Not ready to quit.    6. Bipolar I disorder (H)  Overall seems like he had some depression and this is improving with Provigil.  He will continue with psychiatry.    Recommend evaluation in UC or ER regarding swollen lip/jaw and blistering lesion of lip.  Does not appear to have other mucus membrane involvement and has not progressed per his report but would worry about SJS given his medication profile.  Could also be recurrent dental infection.  He plans to seek care today.       Return in about 4 weeks (around 11/4/2022) for Follow up, in person.    SUBJECTIVE                                                          CC/HPI:  Laci Hinkle  is a 26 year old male with PMH bipolar disorder, CEASAR, ADHD, and PSUD including opioids on buprenorphine who presents to the Recovery Clinic for return visit.       Brief History:  Pt first presented to Recovery Clinic on 7/22/21. Pt was referred by staff in [facility] due to pt's initial desire to taper off buprenorphine which he had been  taking from nonprescription sources.   Pt soon left [facility], but continued with Recovery Clinic.  He eventually decided to continue buprenorphine therapy, and then transferred to [facility] with initial injection 8/13/21.  He received a total of 3 Sublocade injections, most recently 100mg on 10/8/21.   Pt then indicated to  staff he wanted to discontinue Sublocade injections.  He was lost to follow up until he returned to  on 12/9/21 requesting to resume SL buprenorphine after brief return to use of alcohol and kratom.   Despite some irregularities in buprenorphine use including skipping doses and advancing dose, last use of other opioids has remaines 3/2022.  8/2022 he agreed to recommendation of resuming Sublocade to improve adherence to treatment.   He received Sublocade 300mg on 8/12/22 and 9/9/22 and 100mg on 10/7/22.       He describes his opioid use history starting with heroin as a teenager, last use age 17.  He started methadone treatment age 17, dose up to 100mg, then tapered off to 6mg/day then stopped.  He began using kratom ~2016, eventually used amounts up to 100g daily.  He was first prescribed buprenorphine for OUD in 2019. He took as prescribed through 1/2021, then continued on buprenorphine through nonprescription sources.    IV drug use: No   History of overdose: No  Previous treatments : Yes: multiple, [facility], [facility] 11/2020, previous buprenorphine and methadone  Longest period of sobriety: few months  Medical complications related to substance use: exacerbation of MH diagnoses  Hepatitis C Status  no record of testing  HIV Status no record of testing     Other recent substance use:  Stimulants: used methamphetamine ages [age], stopped for 5 years, then resumed age 24.    Sedatives/hypnotics/anxiolytics: has used in past, denies regular use  Alcohol: h/o drinking 1 L liquor daily  Tobacco: 2-3 ppd and/or ENDS  Cannabis: occasional   Hallucinogens:has used in past, denies  recent  Behavioral addictions: none    Most recent Recovery Clinic visit:  9/9/22   Important points and recommendations last visit:  1. Opioid use disorder, severe, dependence (H)  Stable.  Continue Sublocade, plan for 100mg in 4 weeks.  He will notify us if any concerns (cravings, withdrawal) but I do not anticipate this.       2. Alcohol use disorder, severe, dependence (H)  Stable, early remission.  Continue disulfiram and acamprosate.       3. Continuous cannabis use  Reports decreasing use, encouraged him to continue self reflection on harm vs benefit.     4. Tobacco use disorder  Not ready to quit.       5. Bipolar I disorder (H)  Overall seems at baseline, no depression, tends towards hypomanic side.  Reports taking medications as per psychiatry.  Forward thinking, hopeful.       6. Hypertension, unspecified type  Discussed that his BP is elevated again today.  Recommend he establish with PCP to manage BP, ensure health screenings/immunizations are up to date, and have accessible care team for any acute needs.  He is interested in establishing with [facility] since he comes to this campus for visits routinely, I have provided him the phone number to contact them.      Today, pt states he is doing pretty well today but the month has been hard.  Having a lot of fatigue and feeling more depressed.  Missed work this week.  He brought this up to his psychiatry provider and was started on Provigil.  This has been really helpful and he has not had any side effects or issues.  Sleeping well, energy is better, mood is better.  Working with school to address his financial aid suspension.  Likes his advisor and is excited to get going on school.  Wants to be successful.    Hasn't been smoking marijuana as much.  Only 12 times in past 4 months.  Reflects on how it has impacted his mental health (better!).  Interested in seeing how his THC levels have dropped.    Had Sublocade 100mg today.  No  injection site issues or cravings.      Notes that his right lip/cheek swollen for since yesterday.  Recent detnal infection.  Started yesterday morning, worsened during day and then seems to have be unchanged.        [place] Prescription Drug Monitoring Program Reviewed:  Yes; as expected    Medications:  acamprosate (CAMPRAL) 333 MG EC tablet, Take 3 tablets (999 mg) by mouth 2 times daily  amLODIPine-benazepril (LOTREL) 2.5-10 MG capsule, Take 1 capsule by mouth daily  buprenorphine ER (SUBLOCADE) 300 MG/1.5ML prefilled syringe, Inject 300 mg Subcutaneous every 30 days  buprenorphine HCl-naloxone HCl (SUBOXONE) 8-2 MG per film, Place 1 Film under the tongue daily  buPROPion (WELLBUTRIN XL) 300 MG 24 hr tablet, Take 1 tablet (300 mg) by mouth every morning  cloNIDine (CATAPRES) 0.1 MG tablet, Take 1 tablet (0.1 mg) by mouth At Bedtime  disulfiram (ANTABUSE) 250 MG tablet, Take 1 tablet (250 mg) by mouth daily  divalproex sodium extended-release (DEPAKOTE ER) 500 MG 24 hr tablet, Take 4 tablets (2,000 mg) by mouth daily  lamoTRIgine (LAMICTAL) 200 MG tablet, Take 1 tablet (200 mg) by mouth daily  medical cannabis (Patient's own supply), See Admin Instructions (The purpose of this order is to document that the patient reports taking medical cannabis.  This is not a prescription, and is not used to certify that the patient has a qualifying medical condition.)  modafinil (PROVIGIL) 100 MG tablet, Take 1 tablet (100 mg) by mouth daily  OLANZapine (ZYPREXA) 15 MG tablet, Take 1 tablet (15 mg) by mouth At Bedtime  polyethylene glycol (MIRALAX) 17 GM/Dose powder, Take 17 g (1 capful) by mouth daily  senna-docusate (SENOKOT-S/PERICOLACE) 8.6-50 MG tablet, Take 2 tablets by mouth daily as needed for constipation  sildenafil (VIAGRA) 50 MG tablet, Take 1 tablet (50 mg) by mouth daily as needed (ED)  naloxone (NARCAN) 4 MG/0.1ML nasal spray, Spray 1 spray (4 mg) into one nostril alternating nostrils once as needed for  opioid reversal every 2-3 minutes until assistance arrives (Patient not taking: Reported on 10/7/2022)    No current facility-administered medications on file prior to visit.      Allergies   Allergen Reactions     Prednisone Other (See Comments)     psych problems       PMH, PSH, FamHx reviewed    Social History  Housing status: with his mother   Employment status: working part time at [employer] (overnights 3 nights/week)  Relationship status: Single  Children: no children    OBJECTIVE                                                      /78   Pulse 94     Physical Exam  Vitals and nursing note reviewed.   Constitutional:       General: She is not in acute distress.     Appearance: Normal appearance. She is not ill-appearing or diaphoretic.   HENT:      Head: Normocephalic and atraumatic.      Mouth/Throat:      Comments: Right lower lip and jaw appear mildly swollen but no palpable mass/abscess or tenderness.  His right lower lip appears to have a blister or sloughing of skin but no other areas of the mouth appear affected.  Eyes:      General: No scleral icterus.     Conjunctiva/sclera: Conjunctivae normal.   Cardiovascular:      Rate and Rhythm: Normal rate.   Pulmonary:      Effort: Pulmonary effort is normal. No respiratory distress.   Skin:     General: Skin is warm and dry.      Coloration: Skin is not jaundiced or pale.   Neurological:      General: No focal deficit present.      Mental Status: She is alert and oriented to person, place, and time.      Gait: Gait normal.   Psychiatric:         Attention and Perception: Attention normal.         Mood and Affect: Mood normal. Affect is not inappropriate.         Speech: Speech is rapid and pressured (less than usual).         Behavior: Behavior normal. Behavior is cooperative.         Thought Content: Thought content normal.         Judgment: Judgment normal.      Comments: Seems less hyperverbal today, more calm, thought process is more linear           Labs:    UDS: buprenorphine and THC  *POC urine drug screen does not screen for Fentanyl    No results found for this or any previous visit (from the past 240 hour(s)).      Patient counseling completed today:  Discussed mechanism of action, potential risks/benefits/side effects of medications and other recommendations above.  Recommend pt keep naloxone in their possession and reviewed other aspects of harm reduction to reduce risk of overdose with return to use.   Recommended avoiding concurrent use of alcohol, benzodiazepines or other sedatives with buprenorphine or other opioids.  Discussed importance of avoiding isolation, building a network of supportive relationships, avoiding people/places/things associated with past use to reduce risk of relapse; including motivational interviewing regarding psychosocial treatment for addiction.       Kristina Mckeon, DO  [facility]  [address] [place] [id number] [phone]

## 2022-10-07 NOTE — NURSING NOTE
RN was notified by rooming staff that the patient had an elevated PHQ-9 score and answered greater than 0 on question 9 indicating thoughts that they would be better off dead, or hurting themself in some way. RN met with patient to complete the C-SSRS. Patient scored as low risk for suicide. Patient states he has been sober for 4 months and is dealing with lifes issues without substance use. He states he answered the questionnaire in the context of prior to starting a new medication yesterday and feels much better today. He states the medication is Provigil and it has helped give him energy to do the tasks of life such as cleaning his home and getting up this morning. He said he had otherwise been depressed, tired and could not function well. Denies any suicide attempts in his lifetime.     Current stressor include:    Depression    Current protective factors include:    Knows his family loves him   Medication helps prevent feelings from getting to that place  Has a therapist  Feels he has people he can express his feelings to   God - would not want to insult all the help He has provided for Pat    Patient is here in the clinic seeking help for addiction. They deny current thoughts of wanting to kill themself.     Patient was given the number for the National Suicide Prevention Line (988) along with a list of crisis resources and numbers.  RN updated the provider with low risk level and verbal jake for safety. Patient also met with Peer .     Francheska Quiroga RN on 10/7/2022 at 3:14 PM

## 2022-10-07 NOTE — PROGRESS NOTES
Infusion Nursing Note:  Laci EMERSON Hinkle Jr presents today for sublocade 100mg.    Patient seen by provider today: Yes: Kristina Mckeon following injection   present during visit today: Not Applicable.    Note: Patient denies relapse or withdrawal/cravings.    Intravenous Access:  No Intravenous access/labs at this visit.    Treatment Conditions:  Not Applicable.    Post Infusion Assessment:  Patient tolerated injection without incident. Lidocaine given prior to sublocade.  sublocade injection given in the same trajectory without difficulty.    Discharge Plan:   Patient discharged in stable condition accompanied by: self.  Departure Mode: Ambulatory.  Will call to schedule next appointment.    Vania De La Vega RN

## 2022-10-07 NOTE — NURSING NOTE
[facility]      Rooming information:  Approximate last use of illicit opioids: 3/12/22  Taking buprenorphine? Yes sublocade:  As prescribed? Yes:   Number of buprenorphine films/tablets remaining currently: NA  Side effects related to buprenorphine (constipation, dry mouth, sedation?) Yes: possible swollen bottom right side of face/lip   Narcan currently available: Yes  Other recent substance use:    Denies  NICOTINE-Yes:   If using nicotine, ready to quit? No    Point of care urine drug screen positive for:  Urine Drug Screen Results  AMP: Negative  BAR: Negative  BUP: Positive  BZO: Negative  JOSE: Negative  mAMP: Negative  MDMA : Negative  MTD: Negative  [id number]: Negative  OXY: Negative  PCP : Negative  THC : Positive  *[facility] urine drug screen does not screen for Fentanyl    *    PHQ Assesment Total Score(s) 10/7/2022   PHQ-9 Score 22   Some recent data might be hidden       If PHQ-9 score of 15 or higher, has Recovery Clinic therapist or provider been notified? Yes    Any current suicidal ideation? Yes  If yes, has Recovery Clinic therapist or provider been notified? Yes    Primary care provider: [facility]     Mental health provider: Chiara Washington (follow up on MH referral if needed)    Insurance needs: active     Housing needs: stable    Contact information up to date? yes    3rd Party Involvement not today(please obtain WU if pt would like to include)    Karon Núñez MA  October 7, 2022  2:16 PM

## 2022-10-10 NOTE — PROGRESS NOTES
"[facility]    Peer  met with Laci Hinkle Jr in the Recovery Clinic to introduce himself, detail services provided and discuss current status of recovery. Pt appeared alert, oriented and open to feedback during our discussion. Pt is noted to have a more controlled temperament in terms of speaking tone and volume during our meeting.    Pt arrives for visit with  provider in conjunction with receipt of sublocade injection in [facility] today.  Pt reports his recovery has been going better.  Pt reports having more focus since a change in his medications.  Pt reports less \"highs and lows\" in daily living experience.  Pt reports feeling more optimistic about planning and reaching goals in life.   PRC and pt discussed having heightened hope and spiritually in daily living as a factor in his feeling more optimistic.    PRC welcomes contact for recovery based support and resources. PRC and pt agree to speak again during an upcoming  visit.           Service Type:     Individual     Session Start Time:      3:00 PM                   Session End Time:        3:15 PM    Session Length:         15 minutes    Patient Goal: To utilize sublocade assisted treatment for sobriety and long term recovery.     Goal Progress:   Ongoing.    Key Risk Factors to Recovery:   PRC encourages being aware of risk factors that can lead to re-use which include avoiding isolation, avoiding triggers and managing cravings in a healthy manner. being open and willing to acceptance and change on a daily basis.  PRC encourages pt to establish a sober network calling tree to reach out to when needed.  Continue to practice honesty with ourselves and trusted support person(s).   PRC encourages regular attendance at recovery based meetings as well as finding a sponsor for mentoring and accountability.   PRC encourages consideration of other services such as counseling for mental health issues which " can correlate with our substance use.      Support Needs:   Ongoing care, support and resources for opioid substance use disorder.     Follow up:   PRC has provided pt with his contact information for support and resource needs.    PRC and pt agree to meet during an upcoming  visit.       [place] [id number], [address]   [place], [place], [address]  Clinic Phone: [phone]  Clinic Fax: [phone]  Peer  phone: [phone]    Open Monday - Friday  9:00am-4:00pm  Walk in hours: 9am-3pm      Stewart Chester  October 10, 2022  10:11 AM    Zuly Tsang MA, [facility] provides clinical  oversite and supervision of care.

## 2022-10-12 LAB
CANNABINOIDS UR CFM-MCNC: 66 NG/ML
CARBOXYTHC/CREAT UR: 46 NG/MG CREAT

## 2022-10-21 ENCOUNTER — TELEPHONE (OUTPATIENT)
Dept: BEHAVIORAL HEALTH | Facility: CLINIC | Age: 27
End: 2022-10-21

## 2022-10-21 NOTE — TELEPHONE ENCOUNTER
Writer spoke with patient on que and provided TC address and appointment information as patient was setting up cab ride.    Mya Lester  10/21/22  252

## 2022-10-23 NOTE — PROGRESS NOTES
[facility]      Mental Health & Addiction Service Line    Transition Clinic: Psychiatry Progress Note  Medication Management                Charts/documentation read prior to the appointment:  -10/7/2022        VISIT INFORMATION      Date:  2022       Number:  -3rd      Referral source:  -Addiction Medicine  -Bridging to long term outpatient psychiatry      Patient Identifying Information:  Legal name: Laci Hinkle Jr  Preferred name: Nathalie  : 1995  Preferred pronouns: He/him      Participants:   -Patient  -Provider  -In person, On site        Start time: 9:08 am  End time:  9:46 am          INTERIM HX      -Able to get up and started on my day    -I quit my job because it was making me depressed  -I had to be up late at night and then I kept having to switch my sleep schedule  -Also it was a job where it everyone seemed to know what was going on but me which made me feel like an idiot    -Nov 2nd need to have a tooth extracted  -Need to go under general anethesia  -Very stressed/worried    -Might get some seasonal work in Dec/2022    -Still living with mom  -Have a friend I declined living with because the friend is an addict even though the rent is cheap $200.00      -School is starting in   -Got a new computer    -Mouth lesion resolved; see 10/7/2022 encounter for further details        PSYCHIATRIC ROS      Sleep:  -It's better since I quit my job  -Also it's improved since I am getting up in the AM consistently right now      Mood/Anxiety:  -See PHQ-9 score    -See CEASAR-7 score    -Going to therapy 2x/week  -It's a good tool for me      Ju/Hypomania:    Denies in the interim hx:  -Need for less sleep with an increase in goal directed activities  -Inflated self esteem, grandiose thoughts  -Increased spending or gambling  -Engaging in high risk behaviors  -Excessive feelings of irritability or impulsivity      Psychosis:    Denies in the interim hx:  -AH/VH  -Paranoia    -Thought insertion or broadcasting      Suicidal ideations:  -Denies passive or active thoughts at this time        SIB:  -Denies engaging in self harm         Side effects:  -None reported             PSYCHIATRIC HISTORY    Suicide attempts:  -None reported      Inpatient psychiatric hospitalizations:  -Multiple  -Most recent: [facility], 10/24/2015  -No hx of commitments      ECT:  -None reported            Medication Trials:     SSRIs:  -Prozac: initially worked, but then I stopped and when I restarted      Other antidepressants:  -Mirtazapine     Antipsychotics:  -Abilify  -Latuda: it's terrible and was fickle  -Seroquel: oversedation, an appetite   -Risperdal: oversedation and fell asleep in public           CURRENT SUBSTANCE USE  -See 10/7/2022 encounter for current summary           MEDICAL HISTORY    -The problem list was reviewed via the EMR prior to the appointment    -The patient denies any concerning physical and or medical symptoms during the interviewing process          MEDICATIONS      Current Outpatient Medications:      acamprosate (CAMPRAL) 333 MG EC tablet, Take 3 tablets (999 mg) by mouth 2 times daily, Disp: 180 tablet, Rfl: 11     amLODIPine-benazepril (LOTREL) 2.5-10 MG capsule, Take 1 capsule by mouth daily, Disp: 30 capsule, Rfl: 0     buprenorphine ER (SUBLOCADE) 300 MG/1.5ML prefilled syringe, Inject 300 mg Subcutaneous every 30 days, Disp: , Rfl:      buprenorphine HCl-naloxone HCl (SUBOXONE) 8-2 MG per film, Place 1 Film under the tongue daily, Disp: 7 Film, Rfl: 0     buPROPion (WELLBUTRIN XL) 300 MG 24 hr tablet, Take 1 tablet (300 mg) by mouth every morning, Disp: 30 tablet, Rfl: 1     cloNIDine (CATAPRES) 0.1 MG tablet, Take 1 tablet (0.1 mg) by mouth At Bedtime, Disp: 30 tablet, Rfl: 1     disulfiram (ANTABUSE) 250 MG tablet, Take 1 tablet (250 mg) by mouth daily, Disp: 90 tablet, Rfl: 3     divalproex sodium extended-release (DEPAKOTE ER) 500 MG 24 hr tablet, Take 4 tablets  (2,000 mg) by mouth daily, Disp: 120 tablet, Rfl: 2     lamoTRIgine (LAMICTAL) 200 MG tablet, Take 1 tablet (200 mg) by mouth daily, Disp: 30 tablet, Rfl: 2     medical cannabis (Patient's own supply), See Admin Instructions (The purpose of this order is to document that the patient reports taking medical cannabis.  This is not a prescription, and is not used to certify that the patient has a qualifying medical condition.), Disp: , Rfl:      modafinil (PROVIGIL) 100 MG tablet, Take 1 tablet (100 mg) by mouth daily, Disp: 30 tablet, Rfl: 0     naloxone (NARCAN) 4 MG/0.1ML nasal spray, Spray 1 spray (4 mg) into one nostril alternating nostrils once as needed for opioid reversal every 2-3 minutes until assistance arrives (Patient not taking: Reported on 10/7/2022), Disp: 0.2 mL, Rfl: 11     OLANZapine (ZYPREXA) 15 MG tablet, Take 1 tablet (15 mg) by mouth At Bedtime, Disp: 30 tablet, Rfl: 2     polyethylene glycol (MIRALAX) 17 GM/Dose powder, Take 17 g (1 capful) by mouth daily, Disp: 578 g, Rfl: 11     senna-docusate (SENOKOT-S/PERICOLACE) 8.6-50 MG tablet, Take 2 tablets by mouth daily as needed for constipation, Disp: , Rfl:      sildenafil (VIAGRA) 50 MG tablet, Take 1 tablet (50 mg) by mouth daily as needed (ED), Disp: 30 tablet, Rfl: 3      If a controlled substance is prescribed during today's appointment:    -The [place] Prescription Monitoring Program has been reviewed and there are no current concerns with: diversionary activity, early refill requests, and or obtaining the medication from multiple providers.        VITALS    BP Readings from Last 3 Encounters:   10/07/22 132/78   10/07/22 (!) 149/90   09/09/22 (!) 161/91       Pulse Readings from Last 3 Encounters:   10/07/22 94   10/07/22 99   09/09/22 101       Wt Readings from Last 3 Encounters:   06/27/22 108.9 kg (240 lb)   04/01/22 109.9 kg (242 lb 4.8 oz)   12/21/21 110.7 kg (244 lb)         LABS    The following labs were reviewed prior to or during  the appointment:  -10/7/2022        SCALES    PHQ 10/6/2022 10/6/2022 10/7/2022   PHQ-9 Total Score 22 23 22   Q9: Thoughts of better off dead/self-harm past 2 weeks Several days Several days Several days   F/U: Thoughts of suicide or self-harm - Yes -   F/U: Self harm-plan - No -   F/U: Self-harm action - No -   F/U: Safety concerns - No -        CEASAR-7 SCORE 7/25/2022 10/6/2022 10/6/2022   Total Score - - -   Total Score 15 (severe anxiety) - 15 (severe anxiety)   Total Score 15 12 15               MENTAL STATUS EXAMINATION    Appearance: Adequately Groomed, Attire Appropriate for the Season, Pallor  General Behavior:  Cooperative, Direct Eye Contact  Speech: Fluent, Normal rate and volume  Musculoskeletal:    -Normal gait/station  -No facial tics/tremors observed   -Motor coordination is grossly intact   Mood: Getting better  Affect: Appropriate to Content of Speech and Circumstances;   Attention: Intact  Orientation:  Person, Place, Time, Situation  Thought Associations:  Intact  Thought Content: Reality based   Thought Processes: Organized, Normal rate  Memory: No overt impairment; no screenings or formal testing performed  Language: Intact  Judgement: Fair to Good  Insight:  Fair to Good        ASSESSMENT/CLINICAL IMPRESSIONS    Summary:    Laci/Nathalie Hinkle Jr. is  a 27 y/o male with a history of:  Adverse Childhood Events (ACEs), PTSD, ADHD, CEASAR, Bipolar I, II (both listed per chart review) and Polysubstance abuse including [facility] per [facility].     He [facility] care at the [facility] on 7/25/2022  and attended follow up on 10/6/2022 for management of psychotropics/bridging to long term outpatient psychiatry services.    -------------------    Pat outlines he is less depressed and energy levels aren't as problematic since going on Modafinil (which he did increase from 100 to 200 mg on his own).   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    Pat is forward thinking about starting school in Jan/2023.    During the interview is mildly upset nursing staff performed DISCUS screening (writer provided rationale), however was able to self regulate.   Did not demonstrate any   overt s/s of minda, hypomania, or psychosis.  Has some anticipatory anxiety surrounding an upcoming dental procedure 11/2/2022.    Provided refill of Amlodipine-benazepril until able to re-establish a PCP + will continue Clonidine for the time being.    Pat currently denies suicidal ideations/thoughts of harm towards others/and is not engaging in SIB.        DSM-V and or working diagnosis:    1. Bipolar I     2. 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 History of Polysubstance abuse     4. PTSD     5. ADHD combined type    6. HTN Unspecified            SAFETY EVALUATION:  Suicidal ideations:  -denies  Homicidal ideations:  -denies  Access to guns:   -none reported  Risk factors:  -male  -hx of abuse/trauma, polysubstance use disorder  Protective and mitigating factors:  -mother  -engagement in outpatient mental health and addiction medicine services  Risk assessment:  -low to medium                 TREATMENT PLAN        Medications:  Start:  -Modafinil/Provigil 200 mg daily; increased from 100 mg       Continue:  -Bupropion/Wellbutrin 300 mg in the AM     -Clonidine/Catapres .1 mg at bedtime      -Divalproex sodium/Depakote ER 2000 mg (4 tabs) at bedtime     -Lamotrigine/Lamictal 200 mg at bedtime    -Olanzapine/Zyprexa 15 mg at bedtime      Per Addiction Medicine:  -Acamprosate  -Disulfiram       Labs:  -None obtained          Therapy:  -Individual therapy 2x/week        Non-pharmacological modalities:  -Continue care at the [facility]         Return to Clinic or Referrals:  -Please follow up at the [facility] for medication management in:    6 weeks                Total time:  46 minutes per:    -Review of EMR  -Appointment time   -Documentation          Chiara Stewart APRN-CNP, [facility]    --------------------------------------------------------------------------------------------------------------------------        TREATMENT RISK STATEMENT    The risks, benefits, alternatives, and potential adverse effects have been explained and are understood by the patient.  The patient agrees to the treatment plan with their ability to do so.      The patient knows to call the clinic: [phone]  for any problems or concerns until the next psychiatry visit, regardless if it is within or outside of the [facility] system.     If unable to reach clinic staff (via phone call or medical messaging) during the normal business hours: 8:00 am to 4:30 pm then it is recommended accessing the nearest: emergency department, urgent care facility, or utilizing local (varies based on county of residence) and national crisis #'s or text messaging services for immediate assistance.          --------------------------------------------------------------------------------------------------------------------------        If applicable the following has been discussed with the patient, parent/guardian, and or attending family member during the appointment:      1. Risks of polypharmacy and possible drug interactions with current medication list + common OTC products, herbs, and supplements.    Moving forward, it is suggested to intermittently check-in with a clinic or retail pharmacist whenever new medications or OTC/h/s are consumed.    2. Recommendation to adhere to CDC guidelines as it relates alcohol consumption.  If taking benzodiazepines, you should abstain from alcohol intake due to increased risks of CNS and respiratory depression, as well as psychomotor impairment.    3. If possible, it is recommended to avoid concurrent use of prescribed:  opioids  +  benzodiazepines  due to increased risks of CNS and respiratory depression, as well as the increased risk of overdose.     4. Recommendation to minimize and or abstain from THC use (unless the pt. is prescribed medical marijuana).    5. Recommendation to abstain from illicit substances including but not limited to the following: heroin, street fentanyl, cocaine, methamphetamines, and bath salts.    6. Do not take opioids, stimulants, and or other prescription medications unless they are specifically prescribed for you.    7. Recommendation to abstain from tobacco/smoking, alcohol, THC, and all illicit substances if trying to become or are pregnant.    8. Black Box Warnings associated with the prescribed psychotropic(s).    9. Potential adverse effects of antipsychotics including but not limited to the following: weight gain, metabolic syndrome, EPS/Tardive Dyskinesias.    10. Potential CV and neurological adverse effects of stimulants including but not limited to the following:  sudden death, MI, stroke, HTN, cardiomyopathy (long term use) as well as seizures.

## 2022-10-24 ENCOUNTER — OFFICE VISIT (OUTPATIENT)
Dept: BEHAVIORAL HEALTH | Facility: CLINIC | Age: 27
End: 2022-10-24
Payer: COMMERCIAL

## 2022-10-24 VITALS
WEIGHT: 248 LBS | SYSTOLIC BLOOD PRESSURE: 137 MMHG | HEART RATE: 86 BPM | DIASTOLIC BLOOD PRESSURE: 67 MMHG | BODY MASS INDEX: 35.58 KG/M2

## 2022-10-24 DIAGNOSIS — F15.11 METHAMPHETAMINE ABUSE IN REMISSION (H): ICD-10-CM

## 2022-10-24 DIAGNOSIS — F90.2 ADHD (ATTENTION DEFICIT HYPERACTIVITY DISORDER), COMBINED TYPE: ICD-10-CM

## 2022-10-24 DIAGNOSIS — F19.10 POLYSUBSTANCE ABUSE (H): ICD-10-CM

## 2022-10-24 DIAGNOSIS — F31.9 BIPOLAR I DISORDER (H): Primary | ICD-10-CM

## 2022-10-24 DIAGNOSIS — F43.10 PTSD (POST-TRAUMATIC STRESS DISORDER): ICD-10-CM

## 2022-10-24 DIAGNOSIS — I10 HYPERTENSION, UNSPECIFIED TYPE: ICD-10-CM

## 2022-10-24 RX ORDER — MODAFINIL 100 MG/1
100 TABLET ORAL DAILY
Qty: 30 TABLET | Refills: 0 | Status: CANCELLED | OUTPATIENT
Start: 2022-10-24

## 2022-10-24 RX ORDER — AMLODIPINE BESYLATE AND BENAZEPRIL HYDROCHLORIDE 2.5; 1 MG/1; MG/1
1 CAPSULE ORAL DAILY
Qty: 30 CAPSULE | Refills: 2 | Status: SHIPPED | OUTPATIENT
Start: 2022-10-24 | End: 2024-04-10

## 2022-10-24 RX ORDER — MODAFINIL 200 MG/1
200 TABLET ORAL DAILY
Qty: 30 TABLET | Refills: 1 | Status: SHIPPED | OUTPATIENT
Start: 2022-10-24 | End: 2022-12-05 | Stop reason: DRUGHIGH

## 2022-10-24 RX ORDER — LAMOTRIGINE 200 MG/1
200 TABLET ORAL DAILY
Qty: 30 TABLET | Refills: 1 | Status: SHIPPED | OUTPATIENT
Start: 2022-10-24 | End: 2022-12-19

## 2022-10-24 RX ORDER — DIVALPROEX SODIUM 500 MG/1
2000 TABLET, EXTENDED RELEASE ORAL DAILY
Qty: 120 TABLET | Refills: 1 | Status: SHIPPED | OUTPATIENT
Start: 2022-10-24 | End: 2022-12-29

## 2022-10-24 RX ORDER — CLONIDINE HYDROCHLORIDE 0.1 MG/1
0.1 TABLET ORAL AT BEDTIME
Qty: 30 TABLET | Refills: 1 | Status: SHIPPED | OUTPATIENT
Start: 2022-10-24 | End: 2022-12-29 | Stop reason: DRUGHIGH

## 2022-10-24 RX ORDER — BUPROPION HYDROCHLORIDE 300 MG/1
300 TABLET ORAL EVERY MORNING
Qty: 30 TABLET | Refills: 1 | Status: SHIPPED | OUTPATIENT
Start: 2022-10-24 | End: 2022-11-23

## 2022-10-24 RX ORDER — OLANZAPINE 15 MG/1
15 TABLET ORAL AT BEDTIME
Qty: 30 TABLET | Refills: 1 | Status: SHIPPED | OUTPATIENT
Start: 2022-10-24 | End: 2022-12-19

## 2022-10-24 ASSESSMENT — ANXIETY QUESTIONNAIRES
1. FEELING NERVOUS, ANXIOUS, OR ON EDGE: SEVERAL DAYS
5. BEING SO RESTLESS THAT IT IS HARD TO SIT STILL: NOT AT ALL
6. BECOMING EASILY ANNOYED OR IRRITABLE: NOT AT ALL
7. FEELING AFRAID AS IF SOMETHING AWFUL MIGHT HAPPEN: NOT AT ALL
GAD7 TOTAL SCORE: 4
GAD7 TOTAL SCORE: 4
IF YOU CHECKED OFF ANY PROBLEMS ON THIS QUESTIONNAIRE, HOW DIFFICULT HAVE THESE PROBLEMS MADE IT FOR YOU TO DO YOUR WORK, TAKE CARE OF THINGS AT HOME, OR GET ALONG WITH OTHER PEOPLE: SOMEWHAT DIFFICULT
2. NOT BEING ABLE TO STOP OR CONTROL WORRYING: SEVERAL DAYS
4. TROUBLE RELAXING: SEVERAL DAYS
3. WORRYING TOO MUCH ABOUT DIFFERENT THINGS: SEVERAL DAYS

## 2022-10-24 ASSESSMENT — PATIENT HEALTH QUESTIONNAIRE - PHQ9: SUM OF ALL RESPONSES TO PHQ QUESTIONS 1-9: 11

## 2022-10-24 NOTE — Clinical Note
Just DIANNA.   We cancelled the 11/29/2022 appointment with Katharine since she is resigning shortly thereafter (12/5 or 12/10/2022 ... have heard multiple dates).  I'll keep working with Nathalie until he can get into long term psych (placed referral today).  Chiara

## 2022-10-24 NOTE — PROGRESS NOTES
" Mental Health &Addiction ([facility]&A)[facility] (TC):     Provides Patient Support While Waiting to Access Programmatic and Outpatient MH&A Care and Provides [facility] Crisis Assessment Services     FOLLOW-UP VISIT  ____________________________________________      \"The [facility] is a temporary psychiatry service that helps to bridge the time to your next appointment. It is not intended to be a long-term service and you are expected to attend your scheduled appointment with your next provider.\"  [x] Patient/Parent verbalized understanding    If you need support between appointments, please call [phone] and let them know you're seen by [facility] Psychiat[facility]. You may also send a [facility] message to reach us.    General-     Most pressing needs at this time: running out of Modafinil    Mental Health currently: \" it's OK\"    Any changes to your physical health: denies         Medications-     Injectable medications currently prescribed: None    If yes, do you need an appointment for the next injection: NA    Any Controlled Substances that you are prescribed: Suboxone    Any refills you are aware that you'll need soon: pended for provider       [facility]      DISCUS:     CEASAR-7 scores:  4  CEASAR-7 SCORE 10/6/2022 10/6/2022   Total Score - -   Total Score - 15 (severe anxiety)   Total Score 12 15       PHQ-9 scores: 11  PHQ-9 SCORE 10/6/2022 10/7/2022   PHQ-9 Total Score - -   PHQ-9 Total Score MyChart 23 (Severe depression) -   PHQ-9 Total Score 23 22         Anything the provider should be aware of for today's appointment: Pt is following up w/Dr. Mckeon for AM. Pt self increased Provigil to 200 mg a day     New (awaiting) Mental health provider or next programming: [facility] w/Katharine    Date of scheduled apt: 11/29/22 w/Katharine Evangelista on October 24, 2022 at 8:45 AM   "

## 2022-10-24 NOTE — PATIENT INSTRUCTIONS
Start:  -Modafinil/Provigil 200 mg daily       Continue:  -Bupropion/Wellbutrin 300 mg in the AM     -Clonidine/Catapres .1 mg at bedtime      -Divalproex sodium/Depakote ER 2000 mg (4 tabs) at bedtime     -Lamotrigine/Lamictal 200 mg at bedtime    -Olanzapine/Zyprexa 15 mg at bedtime      Per Addiction Medicine:  -Acamprosate  -Disulfiram

## 2022-10-26 ENCOUNTER — OFFICE VISIT (OUTPATIENT)
Dept: BEHAVIORAL HEALTH | Facility: CLINIC | Age: 27
End: 2022-10-26
Payer: COMMERCIAL

## 2022-10-26 VITALS — SYSTOLIC BLOOD PRESSURE: 143 MMHG | HEART RATE: 111 BPM | DIASTOLIC BLOOD PRESSURE: 97 MMHG

## 2022-10-26 DIAGNOSIS — F10.20 ALCOHOL USE DISORDER, SEVERE, DEPENDENCE (H): ICD-10-CM

## 2022-10-26 DIAGNOSIS — F12.90 CONTINUOUS CANNABIS USE: ICD-10-CM

## 2022-10-26 DIAGNOSIS — N52.9 ERECTILE DYSFUNCTION, UNSPECIFIED ERECTILE DYSFUNCTION TYPE: ICD-10-CM

## 2022-10-26 DIAGNOSIS — F17.200 TOBACCO USE DISORDER: ICD-10-CM

## 2022-10-26 DIAGNOSIS — F15.11 METHAMPHETAMINE ABUSE IN REMISSION (H): ICD-10-CM

## 2022-10-26 DIAGNOSIS — F11.20 OPIOID USE DISORDER, SEVERE, DEPENDENCE (H): Primary | ICD-10-CM

## 2022-10-26 PROCEDURE — 99214 OFFICE O/P EST MOD 30 MIN: CPT | Performed by: NURSE PRACTITIONER

## 2022-10-26 RX ORDER — VARDENAFIL HYDROCHLORIDE 10 MG/1
10 TABLET ORAL DAILY PRN
Qty: 30 TABLET | Refills: 1 | Status: SHIPPED | OUTPATIENT
Start: 2022-10-26 | End: 2022-10-26

## 2022-10-26 RX ORDER — ACAMPROSATE CALCIUM 333 MG/1
999 TABLET, DELAYED RELEASE ORAL 2 TIMES DAILY
Qty: 180 TABLET | Refills: 3 | Status: SHIPPED | OUTPATIENT
Start: 2022-10-26 | End: 2022-11-14

## 2022-10-26 RX ORDER — DISULFIRAM 250 MG/1
250 TABLET ORAL DAILY
Qty: 90 TABLET | Refills: 1 | Status: SHIPPED | OUTPATIENT
Start: 2022-10-26 | End: 2023-03-07

## 2022-10-26 ASSESSMENT — PATIENT HEALTH QUESTIONNAIRE - PHQ9: SUM OF ALL RESPONSES TO PHQ QUESTIONS 1-9: 12

## 2022-10-26 NOTE — NURSING NOTE
[facility]    Patient did not sign WU for oral surgeon. States he also did not tell the surgeon that he is on Sublocade. States he doesn't want them to deny him pain medication if he needs it.     Rooming information:  Approximate last use of full opioid agonist: 3/12/22  Taking buprenorphine? Yes:  As prescribed? Yes:   Number of buprenorphine films/tablets remaining currently: 0  Side effects related to buprenorphine (constipation, dry mouth, sedation?) No   Narcan currently available: Yes  Other recent substance use:    THC  NICOTINE-Yes:   If using nicotine, ready to quit? No    Point of care urine drug screen positive for:  Urine Drug Screen Results  AMP: Negative  BAR: Negative  BUP: Positive  BZO: Negative  JOSE: Negative  mAMP: Negative  MDMA : Negative  MTD: Negative  QLE312: Negative  OXY: Negative  PCP : Negative  THC : Positive  *POC urine drug screen does not screen for Fentanyl      PHQ Assesment Total Score(s) 10/26/2022   PHQ-9 Score 12   Some recent data might be hidden       If PHQ-9 score of 15 or higher, has [facility] therapist or provider been notified? N/A    Any current suicidal ideation? No  If yes, has [facility] therapist or provider been notified? N/A    Primary care provider: [facility]     Mental health provider: Chiara Washington (follow up on MH referral if needed)    Insurance needs: active    Housing needs: stable    Contact information up to date? yes    3rd Party Involvement none today (please obtain WU if pt would like to include)    Morena Mckeon CMA  October 26, 2022  1:07 PM

## 2022-10-26 NOTE — PROGRESS NOTES
[facility] Recovery [facility] Follow Up    ASSESSMENT/PLAN                                                      1. Opioid use disorder, severe, dependence (H)  - Controlled. Continue monthly Sublocade injections, 100 mg dose.   - Pt considering discontinuation of buprenorphine maintenance. Agrees to follow up in clinic prior to discontinuing. Recommended 6 month - 1 year prior to discontinuation. States last opioid use as 3/12/22  - Confirms access to narcan   - Continue individual psychotherapy and ongoing recovery supports.  - Reminded pt to have labs drawn   - Discussed use of full opioid agonists following dental surgery. Ok to take while on Sublocade injection. Discussed taking as prescribed and alternative pain relieving medications such as NSAIDs. Pt reports dental surgeon is aware he on buprenorphine maintenance. Pt declined WU at this time. Advised pt not to take unprescribed benzodiazapine's to treat anxiety prior to procedure, encouraged him to discuss this with surgeon preforming the procedure and the potential for adverse SE. Strongly encouraged pt to discuss his concerns with the surgeon prior to the procedure regarding pain control post procedure as well as anxiety pre procedure.     2. Alcohol use disorder, severe, dependence (H)  - Controlled. Continue Campral 666 mg po tid and disulfiram 250 mg po every day.   - Continue individual psychotherapy and ongoing recovery supports.  - acamprosate (CAMPRAL) 333 MG EC tablet; Take 3 tablets (999 mg) by mouth 2 times daily  Dispense: 180 tablet; Refill: 3  - disulfiram (ANTABUSE) 250 MG tablet; Take 1 tablet (250 mg) by mouth daily  Dispense: 90 tablet; Refill: 1    3. 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 Continue individual psychotherapy and ongoing recovery supports.     4. Continuous cannabis use  - contemplation stage of change. Working to decrease daily use. Currently using THC tablets.     5. Tobacco use  disorder  - pre-contemplation stage of change. Continue to evaluate readiness.     6. Erectile dysfunction, unspecified erectile dysfunction type  - vardenafil (LEVITRA) 10 MG tablet; Take 1 tablet (10 mg) by mouth daily as needed (Take ~ 60 minutes prior to sexual intercourse)  Dispense: 30 tablet; Refill: 1       Return in about 4 weeks (around 11/23/2022) for Follow up, with me, in person.    SUBJECTIVE                                                        CC/HPI:  Laci Hinkle Jr is a 26 year old male with PMH bipolar disorder, CEASAR, ADHD, and PSUD including opioids on buprenorphine who presents to the Recovery Clinic for return visit.       Brief History:  Pt first presented to Recovery Clinic on 7/22/21. Pt was referred by staff in [facility] due to pt's initial desire to taper off buprenorphine which he had been taking from nonprescription sources.   Pt soon left [facility], but continued with Recovery Clinic.  He eventually decided to continue buprenorphine therapy, and then transferred to Sublocade with initial injection 8/13/21.  He received a total of 3 Sublocade injections, most recently 100mg on 10/8/21.   Pt then indicated to  staff he wanted to discontinue Sublocade injections.  He was lost to follow up until he returned to  on 12/9/21 requesting to resume SL buprenorphine after brief return to use of alcohol and kratom.   Despite some irregularities in buprenorphine use including skipping doses and advancing dose, last use of other opioids has remaines 3/2022.  8/2022 he agreed to recommendation of resuming Sublocade to improve adherence to treatment.   He received Sublocade 300mg on 8/12/22 and 9/9/22 and 100mg on 10/7/22.       He describes his opioid use history starting with heroin as a teenager, last use age 17.  He started methadone treatment age 17, dose up to 100mg, then tapered off to 6mg/day then stopped.  He began using kratom ~2016, eventually used amounts up to 100g daily.  He  was first prescribed buprenorphine for OUD in 2019. He took as prescribed through 1/2021, then continued on buprenorphine through nonprescription sources.      IV drug use: No   History of overdose: No  Previous treatments : Yes: multiple, [facility], [facility] 11/2020, previous buprenorphine and methadone  Longest period of sobriety: few months  Medical complications related to substance use: exacerbation of MH diagnoses  Hepatitis C Status  no record of testing  HIV Status no record of testing     Other recent substance use:  Stimulants: used methamphetamine ages [age], stopped for 5 years, then resumed age 24.    Sedatives/hypnotics/anxiolytics: has used in past, denies regular use  Alcohol: h/o drinking 1 L liquor daily  Tobacco: 2-3 ppd and/or ENDS  Cannabis: occasional   Hallucinogens:has used in past, denies recent  Behavioral addictions: none      Most recent Recovery Clinic visit 10/7/2022    A/P last visit:  1. Opioid use disorder, severe, dependence (H)  Stable.  Continue Sublocade 100mg every month.  No longer needing SL supplemental buprenorphine. Reminded him to have labs done as ordered by Dr Hastings in August (CMP, hep C, and HIV).     2. Alcohol use disorder, severe, dependence (H)  Stable.  Early remission.  Continue disulfiram and acamprosate.     3. 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  No recent cravings or use.       4. Continuous cannabis use  Decrease use.  Would like to have labs done to check THC levels.  Discussed that clinically not necessary but he would find this information motivating and therefore I have agreed as I would like to encourage him in any way to continue decreasing use.  - THC Confirmation Quantitative Urine; Future  - THC Confirmation Quantitative Urine     5. Tobacco use disorder  Not ready to quit.     6. Bipolar I disorder (H)  Overall seems like he had some depression and this is improving with Provigil.  He will continue with  "psychiatry.     Recommend evaluation in UC or ER regarding swollen lip/jaw and blistering lesion of lip.  Does not appear to have other mucus membrane involvement and has not progressed per his report but would worry about SJS given his medication profile.  Could also be recurrent dental infection.  He plans to seek care today.      Interval Hx:   - 10/24/22 [facility] :   Start:  -Modafinil/Provigil 200 mg daily; increased from 100 mg  Continue:  -Bupropion/Wellbutrin 300 mg in the AM   -Clonidine/Catapres .1 mg at bedtime   -Divalproex sodium/Depakote ER 2000 mg (4 tabs) at bedtime  -Lamotrigine/Lamictal 200 mg at bedtime  -Olanzapine/Zyprexa 15 mg at bedtime      10/26/22, pt states:   - most recent Sublocade injection on 10/7/22. Reports cravings are controlled. No recent use. Denies adverse SE. No injection site reactions. Has had 3 injections. Unsure if he would like to continue this. No recent kratom use. Acknowledged he would want to use if he were no longer on Sublocade. Reports erectile dysfunction as SE from sildenafil.     - Expresses concern regarding upcoming dental appointment. Planning to have 3 tooth extraction on 11/2. Reports dental surgeon said he would receive ibuprofen and \"a stronger pain medication\" following the procedure. He is concerns about telling his surgeon about current buprenorphine maintenance. Reports he did report he is on buprenorphine on his paperwork. Worried about stigma. Reports he obtained klonopin 2 mg 1 tablet from a friend. He plans to take this on the day of the procedure. He will have local sedation. No nitrous oxide or general sedation. He declines WU today.     - Reports no recent alcohol use or cravings. He is taking Antabuse and Campral. Was able to be around someone who was drinking and it was not triggering.     - Reports he is taking Modafinil as prescribed. He is taking 200 mg per day. Attending therapy twice per week. " "    - Reports adverse SE from cannabis including depression, anxiety, feeling self conscious, inability to focus. Has been \"experimenting\" with different products now. Now using CBG/THC tablets 5 mg. Reports he will take he is taking 1 tablet per day. He is considering cessation of cannabis.     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     [place] Prescription Drug Monitoring Program Reviewed:  Yes  10/06/2022  2   10/06/2022  Modafinil 100 MG Tablet  30.00  30 Al Kru     09/02/2022  3   09/02/2022  Suboxone 8 Mg-2 MG SL Film  7.00  7         Medications:  acamprosate (CAMPRAL) 333 MG EC tablet, Take 3 tablets (999 mg) by mouth 2 times daily  amLODIPine-benazepril (LOTREL) 2.5-10 MG capsule, Take 1 capsule by mouth daily  buprenorphine ER (SUBLOCADE) 300 MG/1.5ML prefilled syringe, Inject 300 mg Subcutaneous every 30 days  buPROPion (WELLBUTRIN XL) 300 MG 24 hr tablet, Take 1 tablet (300 mg) by mouth every morning  cloNIDine (CATAPRES) 0.1 MG tablet, Take 1 tablet (0.1 mg) by mouth At Bedtime  disulfiram (ANTABUSE) 250 MG tablet, Take 1 tablet (250 mg) by mouth daily  divalproex sodium extended-release (DEPAKOTE ER) 500 MG 24 hr tablet, Take 4 tablets (2,000 mg) by mouth daily  lamoTRIgine (LAMICTAL) 200 MG tablet, Take 1 tablet (200 mg) by mouth daily  medical cannabis (Patient's own supply), See Admin Instructions (The purpose of this order is to document that the patient reports taking medical cannabis.  This is not a prescription, and is not used to certify that the patient has a qualifying medical condition.)  modafinil (PROVIGIL) 200 MG tablet, Take 1 tablet (200 mg) by mouth daily  naloxone (NARCAN) 4 MG/0.1ML nasal spray, Spray 1 spray (4 mg) into one nostril alternating nostrils once as needed for opioid reversal every 2-3 minutes until assistance arrives  OLANZapine (ZYPREXA) 15 MG tablet, Take 1 tablet (15 mg) by mouth At Bedtime  polyethylene glycol (MIRALAX) 17 GM/Dose powder, " Take 17 g (1 capful) by mouth daily (Patient not taking: Reported on 10/24/2022)  senna-docusate (SENOKOT-S/PERICOLACE) 8.6-50 MG tablet, Take 2 tablets by mouth daily as needed for constipation    No current facility-administered medications on file prior to visit.      Allergies   Allergen Reactions     Prednisone Other (See Comments)     psych problems       PMH, PSH, FamHx reviewed    Social History  Housing status: with his mother   Employment status: working part time at [employer] (overnights 3 nights/week)  Relationship status: Single  Children: no children    OBJECTIVE                                                      BP (!) 143/97   Pulse 111     Physical Exam  Constitutional:       General: She is not in acute distress.     Appearance: Normal appearance.   HENT:      Head: Normocephalic and atraumatic.   Eyes:      Extraocular Movements: Extraocular movements intact.   Pulmonary:      Effort: Pulmonary effort is normal.   Neurological:      Mental Status: She is alert and oriented to person, place, and time.   Psychiatric:         Attention and Perception: Attention and perception normal.         Mood and Affect: Mood is anxious. Mood is not depressed. Affect is not flat.         Speech: Speech is rapid and pressured.         Behavior: Behavior is cooperative.         Thought Content: Thought content normal.         Cognition and Memory: Cognition and memory normal.      Comments: Insight and judgment fair          Labs:    UDS: positive for buprenorphine and THC  *POC urine drug screen does not screen for Fentanyl    No results found for this or any previous visit (from the past 240 hour(s)).      Patient counseling completed today:  Discussed mechanism of action, potential risks/benefits/side effects of medications and other recommendations above.  Recommend pt keep naloxone in their possession and reviewed other aspects of harm reduction to reduce risk of overdose with return to use.   Recommended  avoiding concurrent use of alcohol, benzodiazepines or other sedatives with buprenorphine or other opioids.  Discussed importance of avoiding isolation, building a network of supportive relationships, avoiding people/places/things associated with past use to reduce risk of relapse; including motivational interviewing regarding psychosocial treatment for addiction.     This note was created with the help of Dragon dictation system.  All grammatical/typing errors or context distortion are unintentional and inherent to software.      SCOTT Teran [facility]  [address] [place] [id number] [phone]

## 2022-11-14 ENCOUNTER — OFFICE VISIT (OUTPATIENT)
Dept: BEHAVIORAL HEALTH | Facility: CLINIC | Age: 27
End: 2022-11-14
Payer: COMMERCIAL

## 2022-11-14 VITALS — HEART RATE: 111 BPM | SYSTOLIC BLOOD PRESSURE: 147 MMHG | DIASTOLIC BLOOD PRESSURE: 80 MMHG

## 2022-11-14 DIAGNOSIS — F15.11 METHAMPHETAMINE ABUSE IN REMISSION (H): ICD-10-CM

## 2022-11-14 DIAGNOSIS — F43.10 PTSD (POST-TRAUMATIC STRESS DISORDER): ICD-10-CM

## 2022-11-14 DIAGNOSIS — F17.200 TOBACCO USE DISORDER: ICD-10-CM

## 2022-11-14 DIAGNOSIS — F11.20 OPIOID USE DISORDER, SEVERE, DEPENDENCE (H): ICD-10-CM

## 2022-11-14 DIAGNOSIS — F11.20 OPIOID USE DISORDER, SEVERE, DEPENDENCE (H): Primary | ICD-10-CM

## 2022-11-14 DIAGNOSIS — F31.9 BIPOLAR I DISORDER (H): Primary | ICD-10-CM

## 2022-11-14 DIAGNOSIS — F10.20 ALCOHOL USE DISORDER, SEVERE, DEPENDENCE (H): ICD-10-CM

## 2022-11-14 PROCEDURE — 90834 PSYTX W PT 45 MINUTES: CPT | Performed by: SOCIAL WORKER

## 2022-11-14 PROCEDURE — 99214 OFFICE O/P EST MOD 30 MIN: CPT | Performed by: FAMILY MEDICINE

## 2022-11-14 RX ORDER — ACAMPROSATE CALCIUM 333 MG/1
999 TABLET, DELAYED RELEASE ORAL 2 TIMES DAILY
Qty: 180 TABLET | Refills: 3 | Status: SHIPPED | OUTPATIENT
Start: 2022-11-14 | End: 2022-12-21

## 2022-11-14 ASSESSMENT — PATIENT HEALTH QUESTIONNAIRE - PHQ9: SUM OF ALL RESPONSES TO PHQ QUESTIONS 1-9: 13

## 2022-11-14 NOTE — NURSING NOTE
[facility]      Rooming information:  Approximate last use of full opioid agonist: 3/12/22  Taking buprenorphine? Yes: sublocade As prescribed? Yes:   Side effects related to buprenorphine (constipation, dry mouth, sedation?) No   Other recent substance use:    Denies  NICOTINE-Yes:   If using nicotine, ready to quit? Yes:     Point of care urine drug screen positive for:  Urine Drug Screen Results  AMP: Negative  BAR: Negative  BUP: Positive  BZO: Negative  JOSE: Negative  mAMP: Negative  MDMA : Negative  MTD: Negative  BXS082: Negative  OXY: Negative  PCP : Negative  THC : Positive  *POC urine drug screen does not screen for Fentanyl    PHQ Assesment Total Score(s) 11/14/2022   PHQ-9 Score 13   Some recent data might be hidden       If PHQ-9 score of 15 or higher, has Recovery Clinic therapist or provider been notified? No    Any current suicidal ideation? No  If yes, has Recovery Clinic therapist or provider been notified? N/A    Primary care provider: [facility]     Mental health provider: [facility]  (follow up on MH referral if needed)    Insurance needs: active    Housing needs: stable    Contact information up to date? yes    3rd Party Involvement not today (please obtain WU if pt would like to include)    Karon Núñez MA  November 14, 2022  2:11 PM

## 2022-11-14 NOTE — PROGRESS NOTES
[facility]  November 14, 2022      Behavioral Health Clinician Progress Note    Patient Name: Laci Hinkle Jr           Service Type:  Individual      Service Location:   Face to Face in Clinic     Session Start Time: 2:55pm  Session End Time: 3:40pm      Session Length: 38 - 52      Attendees: Patient     Service Modality:  In-person    Visit Activities (Refresh list every visit): psychotherapy    Diagnostic Assessment Date: Not completed yet  Treatment Plan Review Date: Not completed yet  See Flowsheets for today's PHQ-9 and CEASAR-7 results  Previous PHQ-9:   PHQ-9 SCORE 10/24/2022 10/26/2022 11/14/2022   PHQ-9 Total Score - - -   PHQ-9 Total Score MyChart - - -   PHQ-9 Total Score 11 12 13     Previous CEASAR-7:   CEASAR-7 SCORE 10/6/2022 10/6/2022 10/24/2022   Total Score - - -   Total Score - 15 (severe anxiety) -   Total Score 12 15 4       GLORIA LEVEL:  GLORIA Score (Last Two) 9/16/2014   GLORIA Raw Score 45   Activation Score 73.1   GLORIA Level 4       DATA  Extended Session (60+ minutes): No  Interactive Complexity: No  Crisis: No  [facility] Patient: No    Treatment Objective(s) Addressed in This Session:  Target Behavior(s): mental health and recovery    Depressed Mood: Increase interest, engagement, and pleasure in doing things  Decrease frequency and intensity of feeling down, depressed, hopeless  Identify negative self-talk and behaviors: challenge core beliefs, myths, and actions  Improve concentration, focus, and mindfulness in daily activities   Feel less fidgety, restless or slow in daily activities / interpersonal interactions  Anxiety: will experience a reduction in anxiety and will develop more effective coping skills to manage anxiety symptoms  Alcohol / Substance Use: will discuss/consider potential need for formal substance use evaluation, treatment and/or support  continue to make healthy choices regarding substance use and engage in activities / supportive services that promote  sobriety    Current Stressors / Issues:  He talked about having triggers and he wants to plan around this winter as he last relapsed in the winter time-we framed it that he is planning for success-he is planning his life to stay in recovery long term-he is 100% certain that his life would go down hill and not have success if he starts using again-he is able to see that if he uses it starts something in motion-he stated that the one drink can send him over-he stated that he has heard stories of others getting successful and that it makes sense that he could have success with being sober and in recovery-if he falls we talked about does he stay down or get back up and keep moving-he stated that he has been sober for 6 months-and we talked about how amazing this is for him-he stated that he is going to move out from his mother's home-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-he worries that when he moves out of his mothers home and he is worried that it will create stress and that he will get triggered-I know I can do better and I' am not-I like challenging myself-what have you been doing to prepare your self for being on your own-I have learned that I have to have a routine-he stated that he has been less active in his routine and he is feeling that he needs to get back to his routine-he needs to get a job-learning to make small talk-I want to be busy-sleeping around all day is not satisfying-I have not gotten them yet and I 'am happy for them and I' am in the progress towards success-talking to myself in the mirror-hanging out with friend that uses drugs and alcohol-his decisions to engage some supports for his recovery has led him to have 6 months of recovery-he uses the marijuana less and he is thinking about stopping the marijuana if he will harm his success-     email a letter stating that he comes to this clinic and that he has passed all of his UAs for the past 6 months-so he can give it to  AURELIANOPop    [email or web address]          Progress on Treatment Objective(s) / Homework:  Minimal progress - ACTION (Actively working towards change); Intervened by reinforcing change plan / affirming steps taken    Motivational Interviewing    MI Intervention: Expressed Empathy/Understanding, Supported Autonomy, Collaboration, Evocation, Permission to raise concern or advise, Open-ended questions, Reflections: simple and complex and Change talk (evoked)     Change Talk Expressed by the Patient: Desire to change Ability to change Reasons to change Need to change Committment to change Activation Taking steps    Provider Response to Change Talk: E - Evoked more info from patient about behavior change, A - Affirmed patient's thoughts, decisions, or attempts at behavior change, R - Reflected patient's change talk and S - Summarized patient's change talk statements      Care Plan review completed: No    Medication Review:  No changes to current psychiatric medication(s)    Medication Compliance:  NA    Changes in Health Issues:   None reported    Chemical Use Review:   Substance Use: Chemical use reviewed, no active concerns identified      Tobacco Use: Not reported    Assessment: Current Emotional / Mental Status (status of significant symptoms):  Risk status (Self / Other harm or suicidal ideation)  Patient denies a history of suicidal ideation, suicide attempts, self-injurious behavior, homicidal ideation, homicidal behavior and and other safety concerns  Patient denies current fears or concerns for personal safety.  Patient denies current or recent suicidal ideation or behaviors.  Patient denies current or recent homicidal ideation or behaviors.  Patient denies current or recent self injurious behavior or ideation.  Patient denies other safety concerns.  A safety and risk management plan has not been developed at this time, however patient was encouraged to call [facility] should there be a change in any  of these risk factors.    Appearance:   Appropriate   Eye Contact:   Good   Psychomotor Behavior: Normal  Hyperactive   Attitude:   Cooperative   Orientation:   All  Speech   Rate / Production: Normal/ Responsive Pressured  Talkative Normal    Volume:  Normal   Mood:    Anxious  Depressed  Normal  Affect:    Appropriate   Thought Content:  Clear   Thought Form:  Coherent  Goal Directed  Logical   Insight:    Fair     Diagnoses:  1. Bipolar I disorder (H)    2. PTSD (post-traumatic stress disorder)    3. Opioid use disorder, severe, dependence (H)        Collateral Reports Completed:  Not Applicable    Plan: (Homework, other):  Patient was given information about behavioral services and encouraged to schedule a follow up appointment with the clinic [facility] as needed.  She was also given information about mental health symptoms and treatment options  and strategies to maintain sobriety.  CD Recommendations: Maintain Sobriety.   KARSTEN Cespedes      ______________________________________________________________________

## 2022-11-14 NOTE — LETTER
[facility]  [facility] [address] [id number] [phone]        Re: Laci Hinkle   : 1995      Laci is a patient at this clinic ([facility]) and he has been fighting for his sobriety and recovery for sometime.  He has been attending all of his scheduled appointments at this clinic and has demonstrates the ability to stay sober and active with his recovery plan for an extended amount of time.  He has made progress with this mental health and recovery over the course of this year.  He will have a good prognosis with his continued efforts to stay active with his supports to manage his chemical dependency and mental health symptoms.  He is a very intelligent young man and he is motivated to have a health and successful life and is doing the work of recovery and we are proud to call him a model patient in this clinic.          Jose Roberto Bower [facility]        Asiya Hastings MD

## 2022-11-14 NOTE — PROGRESS NOTES
[facility] Follow Up    ASSESSMENT/PLAN                                                      1. Opioid use disorder, severe, dependence (H)  S/p Sublocade #3 10/7/22.  Pt reporting he wants to discontinue Sublocade injections and begin taper off buprenorphine.  Counseled on risk of return to use with discontinuation of treatment, advised maintaining stable  treatment at this time, pt wants to proceed with taper.  He did agree to continue follow-up with Addiction Medicine to monitor symptoms, he understands tapering is optional and decision can be changed at any time.      2. Alcohol use disorder, severe, dependence (H)  Recommend he resume acamprosate (had been off for ~2 weeks) to address cravings  Pt states he continues to take disulfiram daily, no rx required  - acamprosate (CAMPRAL) 333 MG EC tablet; Take 3 tablets (999 mg) by mouth 2 times daily  Dispense: 180 tablet; Refill: 3    3. 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 Tobacco use disorder  Pt reporting he is interested in quitting.  Is prescribed bupropion.  Discuss NRT next visit    Return in about 4 weeks (around 12/12/2022) for Follow up, in person with CHRISSY Noyola in Addiction Medicine.    SUBJECTIVE                                                        CC/HPI:  Laci Hinkle Jr is a 26 year old male with PMH bipolar disorder, CEASAR, ADHD, and PSUD including opioids on buprenorphine who presents to the Recovery Clinic for return visit.       Brief History:  Pt first presented to Recovery Clinic on 7/22/21. Pt was referred by staff in [facility] due to pt's initial desire to taper off buprenorphine which he had been taking from nonprescription sources.   Pt soon left [facility], but continued with Recovery Clinic.  He eventually decided to continue buprenorphine therapy, and  then transferred to Sublocade with initial injection 8/13/21.  He received a total of 3 Sublocade injections, most recently 100mg on 10/8/21.   Pt then indicated to  staff he wanted to discontinue Sublocade injections.  He was lost to follow up until he returned to  on 12/9/21 requesting to resume SL buprenorphine after brief return to use of alcohol and kratom.   Despite some irregularities in buprenorphine use including skipping doses and advancing dose, last use of other opioids has remaines 3/2022.  8/2022 he agreed to recommendation of resuming Sublocade to improve adherence to treatment.   He received Sublocade 300mg on 8/12/22 and 9/9/22 and 100mg on 10/7/22.       He describes his opioid use history starting with heroin as a teenager, last use age 17.  He started methadone treatment age 17, dose up to 100mg, then tapered off to 6mg/day then stopped.  He began using kratom ~2016, eventually used amounts up to 100g daily.  He was first prescribed buprenorphine for OUD in 2019. He took as prescribed through 1/2021, then continued on buprenorphine through nonprescription sources.      IV drug use: No   History of overdose: No  Previous treatments : Yes: multiple, [facility], [facility] 11/2020, previous buprenorphine and methadone  Longest period of sobriety: few months  Medical complications related to substance use: exacerbation of MH diagnoses  Hepatitis C Status  no record of testing  HIV Status no record of testing     Other recent substance use:  Stimulants: used methamphetamine ages [age], stopped for 5 years, then resumed age 24.    Sedatives/hypnotics/anxiolytics: has used in past, denies regular use  Alcohol: h/o drinking 1 L liquor daily  Tobacco: 2-3 ppd and/or ENDS  Cannabis: occasional   Hallucinogens:has used in past, denies recent  Behavioral addictions: none      Most recent Recovery [facility] visit 10/7/2022    A/P last visit:  1. Opioid use disorder, severe, dependence (H)  -  Controlled. Continue monthly Sublocade injections, 100 mg dose.   - Pt considering discontinuation of buprenorphine maintenance. Agrees to follow up in clinic prior to discontinuing. Recommended 6 month - 1 year prior to discontinuation. States last opioid use as 3/12/22  - Confirms access to narcan   - Continue individual psychotherapy and ongoing recovery supports.  - Reminded pt to have labs drawn   - Discussed use of full opioid agonists following dental surgery. Ok to take while on Sublocade injection. Discussed taking as prescribed and alternative pain relieving medications such as NSAIDs. Pt reports dental surgeon is aware he on buprenorphine maintenance. Pt declined WU at this time. Advised pt not to take unprescribed benzodiazapine's to treat anxiety prior to procedure, encouraged him to discuss this with surgeon preforming the procedure and the potential for adverse SE. Strongly encouraged pt to discuss his concerns with the surgeon prior to the procedure regarding pain control post procedure as well as anxiety pre procedure.      2. Alcohol use disorder, severe, dependence (H)  - Controlled. Continue Campral 666 mg po tid and disulfiram 250 mg po every day.   - Continue individual psychotherapy and ongoing recovery supports.  - acamprosate (CAMPRAL) 333 MG EC tablet; Take 3 tablets (999 mg) by mouth 2 times daily  Dispense: 180 tablet; Refill: 3  - disulfiram (ANTABUSE) 250 MG tablet; Take 1 tablet (250 mg) by mouth daily  Dispense: 90 tablet; Refill: 1     3. 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 Continue individual psychotherapy and ongoing recovery supports.      4. Continuous cannabis use  - contemplation stage of change. Working to decrease daily use. Currently using THC tablets.      5. Tobacco use disorder  - pre-contemplation stage of change. Continue to evaluate readiness.      6. Erectile dysfunction, unspecified erectile dysfunction type  - vardenafil (LEVITRA)  "10 MG tablet; Take 1 tablet (10 mg) by mouth daily as needed (Take ~ 60 minutes prior to sexual intercourse)  Dispense: 30 tablet; Refill: 1                Return in about 4 weeks (around 11/23/2022) for Follow up, with me, in person.      11/14/22 visit:   - most recent Sublocade injection on 10/7/22. He states he does not want to continue Sublocade. He denies significant cravings for opioids. Does not want to resume SL buprenorphine.  Reiterates his goal of tapering off buprenorphine.      - Pt underwent dental extractions 11/2/22.  Was prescribed #8 hydrocodone 5/325 on 11/2 and #10 on 11/5.  States he took clonazepam from a friend before the procedure.     - Reports no recent alcohol use, but has noticed more cravings. He has not been taking Campral x2 weeks or so.  Agrees he should resume this.   States he does take Antabuse.     - Reports he is taking Modafinil per psychiatry. States the effects are wearing off during the day, plans to talk about this with his psych NP Pat.   Then goes on to say he has \"had experiences taking more than prescribed,\" which made him feel \"high,\" and has been the \"closest thing to a slip up\" he has had.  Attending therapy twice per week.      - Regarding cannabis use, he states he continues to use decreasing doses of medical cannabis. He is considering recommendation of discontinuing use.  Recognizes this may interfere with school.     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       [place] Prescription Drug Monitoring Program Reviewed:  Yes  11/05/2022  2   11/04/2022  Hydrocodone-Acetamin 5-325 MG  10.00  4 Le Luis   [id number]   [facility] ([id number])   0/0  12.50 MME  [place]icaid [place]   11/02/2022  2   11/02/2022  Hydrocodone-Acetamin 5-325 MG  8.00  4 Le Luis   [id number]   [facility] ([id number])   0/0  10.00 MME  [place]icaid [place]   10/24/2022  2   10/24/2022  Modafinil 200 MG Tablet  30.00  30 Al Osmar   [id number]   [facility] ([id number])   0/1   [place]icaid [place]   10/06/2022  2   10/06/2022  " Modafinil 100 MG Tablet  30.00  30 Al [facility]   [id number]   [facility] ([id number])   0/0   [place]icaid   [place]   09/02/2022  3   09/02/2022  Suboxone 8 Mg-2 MG SL Film  7.00  7 [facility]   [id number]   [facility] ([id number])   0/0  8.00 mg            Medications:  acamprosate (CAMPRAL) 333 MG EC tablet, Take 3 tablets (999 mg) by mouth 2 times daily  amLODIPine-benazepril (LOTREL) 2.5-10 MG capsule, Take 1 capsule by mouth daily  buprenorphine ER (SUBLOCADE) 300 MG/1.5ML prefilled syringe, Inject 300 mg Subcutaneous every 30 days  buPROPion (WELLBUTRIN XL) 300 MG 24 hr tablet, Take 1 tablet (300 mg) by mouth every morning  cloNIDine (CATAPRES) 0.1 MG tablet, Take 1 tablet (0.1 mg) by mouth At Bedtime  disulfiram (ANTABUSE) 250 MG tablet, Take 1 tablet (250 mg) by mouth daily  divalproex sodium extended-release (DEPAKOTE ER) 500 MG 24 hr tablet, Take 4 tablets (2,000 mg) by mouth daily  lamoTRIgine (LAMICTAL) 200 MG tablet, Take 1 tablet (200 mg) by mouth daily  medical cannabis (Patient's own supply), See Admin Instructions (The purpose of this order is to document that the patient reports taking medical cannabis.  This is not a prescription, and is not used to certify that the patient has a qualifying medical condition.)  modafinil (PROVIGIL) 200 MG tablet, Take 1 tablet (200 mg) by mouth daily  naloxone (NARCAN) 4 MG/0.1ML nasal spray, Spray 1 spray (4 mg) into one nostril alternating nostrils once as needed for opioid reversal every 2-3 minutes until assistance arrives  OLANZapine (ZYPREXA) 15 MG tablet, Take 1 tablet (15 mg) by mouth At Bedtime  polyethylene glycol (MIRALAX) 17 GM/Dose powder, Take 17 g (1 capful) by mouth daily (Patient not taking: Reported on 10/24/2022)  senna-docusate (SENOKOT-S/PERICOLACE) 8.6-50 MG tablet, Take 2 tablets by mouth daily as needed for constipation  sildenafil (VIAGRA) 25 MG tablet, Take 2 tablets (50 mg) by mouth daily as needed (ED) Take ~1 hour prior to sexual intercourse    No current facility-administered  medications on file prior to visit.      Allergies   Allergen Reactions     Prednisone Other (See Comments)     psych problems       PMH, PSH, FamHx reviewed    Social History  Housing status: with his mother   Employment status: working part time at [employer] (overnights 3 nights/week)  Relationship status: Single  Children: no children    OBJECTIVE                                                      BP (!) 147/80   Pulse 111     Physical Exam  Constitutional:       Appearance: Normal appearance. She is overweight.   HENT:      Head: Normocephalic and atraumatic.   Eyes:      Extraocular Movements: Extraocular movements intact.   Pulmonary:      Effort: Pulmonary effort is normal.   Neurological:      Mental Status: She is alert and oriented to person, place, and time.   Psychiatric:         Attention and Perception: Attention and perception normal.         Mood and Affect: Mood is anxious. Mood is not depressed. Affect is not flat.         Speech: Speech is rapid and pressured.         Behavior: Behavior is cooperative.         Thought Content: Thought content normal.         Cognition and Memory: Cognition and memory normal.      Comments: Insight and judgment fair          Labs:    UDS 11/14/22: positive for buprenorphine and THC  *POC urine drug screen does not screen for Fentanyl    No results found for this or any previous visit (from the past 240 hour(s)).      Patient counseling completed today:  Discussed mechanism of action, potential risks/benefits/side effects of medications and other recommendations above.  Recommend pt keep naloxone in their possession and reviewed other aspects of harm reduction to reduce risk of overdose with return to use. Discussed importance of avoiding isolation, building a network of supportive relationships, avoiding people/places/things associated with past use to reduce risk of relapse; including motivational interviewing regarding psychosocial treatment for addiction.     At  least 30 min spent in review of medical record,  review, obtaining histories, discussing recommendations, providing support.     Asiya Hastings MD  Addiction Medicine  [place] [id number] [place] [id number] [phone]

## 2022-11-14 NOTE — TELEPHONE ENCOUNTER
Records show pt discharged from [facility] this weekend.    Please contact him to arrange f/u in recovery clinic.  ED record show he has expressed interest in Sublocade.  Pt was requesting taper off buprenorphine .  
Writer called patient in attempt to schedule appointment at Recovery Clinic, message left with clinic phone number and clinic hours.     
Patient/Caregiver provided printed discharge information.

## 2022-11-18 ENCOUNTER — TELEPHONE (OUTPATIENT)
Dept: BEHAVIORAL HEALTH | Facility: CLINIC | Age: 27
End: 2022-11-18

## 2022-11-18 NOTE — TELEPHONE ENCOUNTER
Reason for call:  Other   Patient called regarding (reason for call): call back  Additional comments: pt is requesting to speak with therapist. Jose Roberto Jane . regardign a deadline on forms for school .     Phone number to reach patient:  Home number on file [phone] (home)    Best Time:  Any     Can we leave a detailed message on this number?  YES    Travel screening: Negative

## 2022-11-18 NOTE — TELEPHONE ENCOUNTER
This writer completed a letter for the client for his student financial company to access his ability to acquire his grants back to go back to school-then faxed the letter to the patient and called the patient and he stated that he received the letter and he stated (thank you so much I read the letter and like what you said about me and I really felt that.)

## 2022-11-20 ENCOUNTER — MYC MEDICAL ADVICE (OUTPATIENT)
Dept: BEHAVIORAL HEALTH | Facility: CLINIC | Age: 27
End: 2022-11-20

## 2022-11-20 ENCOUNTER — HEALTH MAINTENANCE LETTER (OUTPATIENT)
Age: 27
End: 2022-11-20

## 2022-11-20 DIAGNOSIS — T50.905A DRUG-INDUCED WEIGHT GAIN: Primary | ICD-10-CM

## 2022-11-20 DIAGNOSIS — R63.5 DRUG-INDUCED WEIGHT GAIN: Primary | ICD-10-CM

## 2022-11-23 ENCOUNTER — TELEPHONE (OUTPATIENT)
Dept: BEHAVIORAL HEALTH | Facility: CLINIC | Age: 27
End: 2022-11-23

## 2022-11-23 NOTE — TELEPHONE ENCOUNTER
Reason for call:  Other   Patient called regarding (reason for call): call back  Additional comments: Pt left VM, would like call back re: refill. Pt did not specify what pills are needing to be refilled.    Phone number to reach patient:  Home number on file [phone] (home)    Best Time:  any    Can we leave a detailed message on this number?  YES    Travel screening: Not Applicable

## 2022-11-23 NOTE — TELEPHONE ENCOUNTER
RN attempted to contact Pat at  [phone].     Purpose of call is to check if metformin order was sent to the correct pharmacy.    The call states the call not get completed on x3 attempts. RN unable to reach patient through this number. RN attempted call to number listed on chart Adriana Hinkle (Sister) [phone] (Mobile. Adriana picked the phone and stated that she did not know why Pat had listed her as a contact. Adriana stated she would take the clinic number [phone] and pass it on to [facility] and inform her the clinic has attempted to reach him.     RN awaiting call from patient.    Roby Savage RN on 11/23/2022 at 10:53 AM

## 2022-11-28 NOTE — TELEPHONE ENCOUNTER
"VIRGIL RN returned call to Pat.  To confirm the Metformin was sent to the correct pharmacy and return call about VM he left requesting refills.  Pat informs VIRGIL AMAYA that he had called about his condition. He was really depressed bad for a while. sleeping alot. He states \"I'm better now\". For a while was taking extra provigil and that has made him feel better.     I'm quiting the wellbutrill it doesn't work now.    Pat wants to switch to nuvigil. Unsure about vraylar. saphris. Wants to switch to nuvigil. He want to discuss this with Chiara Washington CNP. He states he will wait till appointment on 11/5/22 to discuss this since he is doing well now. He states it's a new medication that lasts longer. He reports he is worried about Vraylar and depressive episode with this medication for when he starts school next year.     3. He reports he can transfer metformin to [facility] of own choice since its not a controlled substance. He states he knows how to do the script transfer.    4. He does not need any refills at this time.    Roby RODNEY RN.  "
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
## 2022-12-05 ENCOUNTER — VIRTUAL VISIT (OUTPATIENT)
Dept: BEHAVIORAL HEALTH | Facility: CLINIC | Age: 27
End: 2022-12-05
Payer: COMMERCIAL

## 2022-12-05 DIAGNOSIS — F43.10 PTSD (POST-TRAUMATIC STRESS DISORDER): ICD-10-CM

## 2022-12-05 DIAGNOSIS — F31.9 BIPOLAR I DISORDER (H): Primary | ICD-10-CM

## 2022-12-05 DIAGNOSIS — F15.11 METHAMPHETAMINE ABUSE IN REMISSION (H): ICD-10-CM

## 2022-12-05 DIAGNOSIS — F90.2 ADHD (ATTENTION DEFICIT HYPERACTIVITY DISORDER), COMBINED TYPE: ICD-10-CM

## 2022-12-05 PROCEDURE — 99214 OFFICE O/P EST MOD 30 MIN: CPT | Mod: GT | Performed by: NURSE PRACTITIONER

## 2022-12-05 RX ORDER — ARMODAFINIL 250 MG/1
250 TABLET ORAL EVERY MORNING
Qty: 30 TABLET | Refills: 0 | Status: SHIPPED | OUTPATIENT
Start: 2022-12-18 | End: 2022-12-29

## 2022-12-05 NOTE — PROGRESS NOTES
" This video/telephone visit will be conducted virtually between you and your provider. We have found that certain health care needs can be provided without the need for an in-person physical exam. This service lets us provide the care you need with a video /telephone conversation. If a prescription is necessary we can send it directly to your pharmacy. If lab work is needed we can place an order for that and you can then stop by our lab to have the test done at a later time.\"   Just as we bill insurance for in-person visits, we also bill insurance for video/telephone visits. If you have questions about your insurance coverage, we recommend that you speak with your insurance company.      Patient/Parent has given verbal consent for video/Telephone visit? Yes     Patient would like the video visit invitation sent by: Send Text invite: [phone]    Patient verified allergies, medications and pharmacy via phone. Patient states they are ready for visit.     Mental Health &Addiction (MH&A)[facility] (TC):     Provides Patient Support While Waiting to Access Programmatic and Outpatient MH&A Care and Provides [facility] Crisis Assessment Services     FOLLOW-UP VISIT  ____________________________________________      \"The [facility] Clinic is a temporary psychiatry service that helps to bridge the time to your next appointment. It is not intended to be a long-term service and you are expected to attend your scheduled appointment with your next provider.\"  [x] Patient/Parent verbalized understanding    If you need support between appointments, please call [phone] and let them know you're seen by [facility] Psychiatry. You may also send a [place] message to reach us.    General-     Most pressing needs at this time:  Depression - modafinil (PROVIGIL) 200 MG tablet  Believes this wears off after about 5-6 hours, Pharmaceutist suggested Nuvigial      Mental Health currently: Depression present     Any changes " to your physical health:           Medications-     Injectable medications currently prescribed: Yes     If yes, do you need an appointment for the next injection:  Need to schedule    Any Controlled Substances that you are prescribed: Yes modafinil (PROVIGIL    Any refills you are aware that you'll need soon:        [facility] care provider: [facility]        CEASAR-7 scores:    CEASAR-7 SCORE 10/6/2022 10/24/2022   Total Score - -   Total Score 15 (severe anxiety) -   Total Score 15 4       PHQ-9 scores:   PHQ-9 SCORE 10/26/2022 11/14/2022   PHQ-9 Total Score - -   PHQ-9 Total Score MyChart - -   PHQ-9 Total Score 12 13         Anything the provider should be aware of for today's appointment: Baldev oliveira and discuss medications     New (awaiting) Mental health provider or next programming:  Psychiatry [facility]  Wilda Burgess NP      Date of scheduled apt: 03/07/2023 @ 10:15 AM       Annette Lepe on December 5, 2022 at 3:20  PM

## 2022-12-05 NOTE — PATIENT INSTRUCTIONS
Start:  Armodafinil/Nuvigil 250 mg in the AM; start on 12/18/2022, #30    Cariprazine/Vraylar 1.5 mg daily        Continue:  -Clonidine/Catapres .1 mg at bedtime      -Divalproex sodium/Depakote ER 2000 mg (4 tabs) at bedtime     -Lamotrigine/Lamictal 200 mg at bedtime    -Metformin/Glucophage 500 mg twice daily; TDD = 1000 mg    -Olanzapine/Zyprexa 15 mg at bedtime

## 2022-12-06 NOTE — PROGRESS NOTES
MH&A TC   NURSING Post-Appointment Chart-check:    Correct pharmacy verified with patient and updated in chart? [x] yes []no    Medications ordered this visit were e-scribed.  Verified by order class [x] yes  [] no    List Medications: cariprazine (VRAYLAR) 1.5 MG capsule;   POST DATED 12/18/2022: armodafinil (NUVIGIL) 250 MG TABS tablet    Medication changes or discontinuations were communicated to patient's pharmacy: [] yes  [x] no    UA collected [] yes  [] no  [x] n/a-virtual     Future appointment was made: [x] yes  [] no  [] n/a   12/29/2022     Dictation completed at time of chart check: [x] yes  [] no    I have checked the documentation for today s encounters and the above information has been reviewed and completed.      Annette Lepe on December 6, 2022 at 11:28 AM

## 2022-12-16 ENCOUNTER — MYC MEDICAL ADVICE (OUTPATIENT)
Dept: BEHAVIORAL HEALTH | Facility: CLINIC | Age: 27
End: 2022-12-16

## 2022-12-19 DIAGNOSIS — F31.9 BIPOLAR I DISORDER (H): ICD-10-CM

## 2022-12-19 RX ORDER — OLANZAPINE 15 MG/1
15 TABLET ORAL AT BEDTIME
Qty: 30 TABLET | Refills: 0 | Status: SHIPPED | OUTPATIENT
Start: 2022-12-19 | End: 2023-01-05 | Stop reason: DRUGHIGH

## 2022-12-19 RX ORDER — LAMOTRIGINE 200 MG/1
200 TABLET ORAL DAILY
Qty: 30 TABLET | Refills: 0 | Status: SHIPPED | OUTPATIENT
Start: 2022-12-19 | End: 2023-01-05

## 2022-12-19 NOTE — TELEPHONE ENCOUNTER
Date of Last Office Visit: 12/5/22 [place]  Date of Next Office Visit: 12/29/22 [place]  No shows since last visit: 0  Cancellations since last visit: 0    Medication requested:   lamoTRIgine (LAMICTAL) 200 MG tablet Date last ordered: 10/24/2022 Qty: 30 Refills: 1  OLANZapine (ZYPREXA) 15 MG tablet Date last ordered: 10/24/2022 Qty: 30 Refills: 1     Review of [place] ?: NA    Lapse in medication adherence greater than 5 days?: No  If yes, call patient and gather details: NA  Medication refill request verified as identical to current order?: yes  Result of Last DAM, VPA, Li+ Level, CBC, or Carbamazepine Level (at or since last visit): N/A    Last visit treatment plan:   TREATMENT PLAN        Medications:  Start:  Armodafinil/Nuvigil 250 mg in the AM; start on 12/18/2022, #30     Cariprazine/Vraylar 1.5 mg daily        Continue:  -Clonidine/Catapres .1 mg at bedtime      -Divalproex sodium/Depakote ER 2000 mg (4 tabs) at bedtime     -Lamotrigine/Lamictal 200 mg at bedtime     -Metformin/Glucophage 500 mg twice daily; TDD = 1000 mg     -Olanzapine/Zyprexa 15 mg at bedtime           Per Addiction Medicine:  -Acamprosate  -Disulfiram              Labs:  -None obtained              Therapy:  -Continue individual therapy 2x/week           Non-pharmacological modalities:  -Did not discuss in detail           Return to Clinic or Referrals:  -Please follow up at the [facility] for medication management in: 3 to 4 weeks                          Total time:  48 minutes per:     -Review of EMR  -Appointment time   -Documentation              Chiara Stewart APRN-CNP, PM-BC    []Medication refilled per  Medication Refill in Ambulatory Care  policy.  [x]Medication unable to be refilled by RN due to criteria not met as indicated below:    []Eligibility - not seen in the last year   []Supervision - no future appointment   []Compliance - no shows, cancellations or lapse in therapy   []Verification - order  discrepancy   []Controlled medication   [x]Medication not included in policy   []90-day supply request   []Other

## 2022-12-21 ENCOUNTER — VIRTUAL VISIT (OUTPATIENT)
Dept: ADDICTION MEDICINE | Facility: CLINIC | Age: 27
End: 2022-12-21
Payer: COMMERCIAL

## 2022-12-21 ENCOUNTER — MYC MEDICAL ADVICE (OUTPATIENT)
Dept: BEHAVIORAL HEALTH | Facility: CLINIC | Age: 27
End: 2022-12-21

## 2022-12-21 DIAGNOSIS — F10.20 ALCOHOL USE DISORDER, SEVERE, DEPENDENCE (H): ICD-10-CM

## 2022-12-21 DIAGNOSIS — F15.11 METHAMPHETAMINE ABUSE IN REMISSION (H): ICD-10-CM

## 2022-12-21 DIAGNOSIS — F17.200 TOBACCO USE DISORDER: ICD-10-CM

## 2022-12-21 DIAGNOSIS — F11.20 OPIOID USE DISORDER, SEVERE, DEPENDENCE (H): Primary | ICD-10-CM

## 2022-12-21 PROCEDURE — 99214 OFFICE O/P EST MOD 30 MIN: CPT | Performed by: NURSE PRACTITIONER

## 2022-12-21 ASSESSMENT — ANXIETY QUESTIONNAIRES
2. NOT BEING ABLE TO STOP OR CONTROL WORRYING: NOT AT ALL
GAD7 TOTAL SCORE: 10
7. FEELING AFRAID AS IF SOMETHING AWFUL MIGHT HAPPEN: NEARLY EVERY DAY
1. FEELING NERVOUS, ANXIOUS, OR ON EDGE: NOT AT ALL
GAD7 TOTAL SCORE: 10
6. BECOMING EASILY ANNOYED OR IRRITABLE: SEVERAL DAYS
5. BEING SO RESTLESS THAT IT IS HARD TO SIT STILL: NEARLY EVERY DAY
3. WORRYING TOO MUCH ABOUT DIFFERENT THINGS: NOT AT ALL
4. TROUBLE RELAXING: NEARLY EVERY DAY
IF YOU CHECKED OFF ANY PROBLEMS ON THIS QUESTIONNAIRE, HOW DIFFICULT HAVE THESE PROBLEMS MADE IT FOR YOU TO DO YOUR WORK, TAKE CARE OF THINGS AT HOME, OR GET ALONG WITH OTHER PEOPLE: SOMEWHAT DIFFICULT
7. FEELING AFRAID AS IF SOMETHING AWFUL MIGHT HAPPEN: NEARLY EVERY DAY
8. IF YOU CHECKED OFF ANY PROBLEMS, HOW DIFFICULT HAVE THESE MADE IT FOR YOU TO DO YOUR WORK, TAKE CARE OF THINGS AT HOME, OR GET ALONG WITH OTHER PEOPLE?: SOMEWHAT DIFFICULT
GAD7 TOTAL SCORE: 10

## 2022-12-21 ASSESSMENT — PATIENT HEALTH QUESTIONNAIRE - PHQ9
10. IF YOU CHECKED OFF ANY PROBLEMS, HOW DIFFICULT HAVE THESE PROBLEMS MADE IT FOR YOU TO DO YOUR WORK, TAKE CARE OF THINGS AT HOME, OR GET ALONG WITH OTHER PEOPLE: VERY DIFFICULT
SUM OF ALL RESPONSES TO PHQ QUESTIONS 1-9: 13
SUM OF ALL RESPONSES TO PHQ QUESTIONS 1-9: 13

## 2022-12-21 NOTE — PROGRESS NOTES
Nathalie is a 27 year old who is being evaluated via a billable video visit.      How would you like to obtain your AVS? MyChart  If the video visit is dropped, the invitation should be resent by: Text to cell phone: [phone]  Will anyone else be joining your video visit? No

## 2022-12-21 NOTE — PROGRESS NOTES
[facility] Addiction Medicine    A/P                                                    ASSESSMENT/PLAN    1. Opioid use disorder, severe, dependence (H)  - S/p 3 x monthly Sublocade injections (last injection on 10/7/22). Denies opioid withdrawal symptoms or cravings. Reviewed pt is welcome to resume buprenorphine treatment at anytime if symptoms are not well controlled.   - confirms access to narcan   - Counseled the patient on the importance of having a recovery program in addition to medication to manage recovery. Encouraged other services such as counseling, 12 step or other self-help organizations.     2. Alcohol use disorder, severe, dependence (H)  - Denies recent use or cravings. Continue Antabuse, no refill needed today. Pt has discontinued Campral, not effective per pt reports.   - Counseled the patient on the importance of having a recovery program in addition to medication to manage recovery. Encouraged other services such as counseling, 12 step or other self-help organizations.     3. 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 Pt is prescribed Armodafinil, reports taking more than prescribed. Strongly encouraged pt to discuss this with psychiatry provider, reviewed safety concerns with patient today. Pt agreed to reach out to psychiatry provider to discuss by the end of this week.      4. Tobacco use disorder  - pre contemplation stage of change. Continue to evaluate readiness. Pt does have NRT at home if needed.       RTC  Return in about 6 weeks (around 2/1/2023) for Follow up, with me, in person.    Risk of overdose following a period of abstinence due to decrease tolerance was discussed including risk of death.  Strongly recommended abstain from alcohol, benzodiazepines, THC, opioids and other drugs of abuse.       Last encounter A/P  1. Opioid use disorder, severe, dependence (H)  S/p Sublocade #3 10/7/22.  Pt reporting he wants to discontinue Sublocade  injections and begin taper off buprenorphine.  Counseled on risk of return to use with discontinuation of treatment, advised maintaining stable  treatment at this time, pt wants to proceed with taper.  He did agree to continue follow-up with Addiction Medicine to monitor symptoms, he understands tapering is optional and decision can be changed at any time.       2. Alcohol use disorder, severe, dependence (H)  Recommend he resume acamprosate (had been off for ~2 weeks) to address cravings  Pt states he continues to take disulfiram daily, no rx required  - acamprosate (CAMPRAL) 333 MG EC tablet; Take 3 tablets (999 mg) by mouth 2 times daily  Dispense: 180 tablet; Refill: 3     3. 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 Tobacco use disorder  Pt reporting he is interested in quitting.  Is prescribed bupropion.  Discuss NRT next visit      PDMP Review       Value Time User    State PDMP site checked  Yes 12/21/2022  8:25 AM Kamla Noyola APRN CNP        12/05/2022  2   12/05/2022  Armodafinil 250 MG Tablet  30.00  30     11/05/2022  2   11/04/2022  Hydrocodone-Acetamin 5-325 MG  10.00  4     11/02/2022  2   11/02/2022  Hydrocodone-Acetamin 5-325 MG  8.00  4     09/02/2022  3   09/02/2022  Suboxone 8 Mg-2 MG SL Film  7.00  7       SUBJECTIVE                                                    Laci EMERSON Manan Brown is a 26 year old male with PMH bipolar disorder, CEASAR, ADHD, and PSUD including opioids on buprenorphine who presents to the [facility] for return visit.      Telehealth visit detail:   Type of service: Video   Patient location: Home  Provider Location: [facility]- onsite   [facility]     Start time: 8:30 AM   End time: 8:56 AM    Brief History:  Pt first presented to [facility] on 7/22/21. Pt  was referred by staff in [facility] due to pt's initial desire to taper off buprenorphine which he had been taking from nonprescription sources.   Pt soon left [facility], but continued with Recovery Clinic.  He eventually decided to continue buprenorphine therapy, and then transferred to [facility] with initial injection 8/13/21.  He received a total of 3 Sublocade injections, most recently 100mg on 10/8/21.   Pt then indicated to  staff he wanted to discontinue Sublocade injections.  He was lost to follow up until he returned to  on 12/9/21 requesting to resume SL buprenorphine after brief return to use of alcohol and kratom.   Despite some irregularities in buprenorphine use including skipping doses and advancing dose, last use of other opioids has remaines 3/2022.  8/2022 he agreed to recommendation of resuming Sublocade to improve adherence to treatment.   He received Sublocade 300 mg on 8/12/22 and 9/9/22 and 100mg on 10/7/22.       He describes his opioid use history starting with heroin as a teenager, last use age 17.  He started methadone treatment age 17, dose up to 100mg, then tapered off to 6 mg/day then stopped.  He began using kratom ~2016, eventually used amounts up to 100mg daily.  He was first prescribed buprenorphine for OUD in 2019. He took as prescribed through 1/2021, then continued on buprenorphine through nonprescription sources.       IV drug use: No   History of overdose: No  Previous treatments : Yes: multiple, [facility], [facility] 11/2020, previous buprenorphine and methadone  Longest period of sobriety: few months  Medical complications related to substance use: exacerbation of MH diagnoses  Hepatitis C Status  no record of testing  HIV Status no record of testing     Other recent substance use:  Stimulants: used methamphetamine ages [age], stopped for 5 years, then resumed age 24.    Sedatives/hypnotics/anxiolytics: has used in past, denies regular use  Alcohol: h/o  "drinking 1 L liquor daily  Tobacco: 2-3 ppd and/or ENDS  Cannabis: occasional   Hallucinogens:has used in past, denies recent  Behavioral addictions: none    Interval Hx:   - Follow up with psychiatry 12/5/22 A/P from that visit :   Started on Armodafinil/Nuvigil 250 mg in the AM; start on 12/18/2022, #30  Cariprazine/Vraylar 1.5 mg daily  Continue:  -Clonidine/Catapres .1 mg at bedtime   -Divalproex sodium/Depakote ER 2000 mg (4 tabs) at bedtime  -Lamotrigine/Lamictal 200 mg at bedtime  -Metformin/Glucophage 500 mg twice daily; TDD = 1000 mg  -Olanzapine/Zyprexa 15 mg at bedtime       TODAY'S VISIT  HPI Dec 21, 2022  - Here today for follow up and reports \"There is something I wish to discuss but I can't have you disclose it\" Reports he is having difficulty with prescription medication misuse. He was taking modafinil and reports it was causing significant stimulant cravings. He would try to take it as prescribed but reports it would wear off and then he would take more. He is now taking Armodafinil. Reports he is taking 3 times the prescribed amount of this medication, 750 mg once daily. He is taking Vraylar as prescribed.   - He is attending dual diagnosis NA meetings. There are virtual and he wishes he was going in person. Does feel he is gaining benefit from this. Good recovery support.   - Reports he has discontinued Sublocade injections. Denies adverse SE. No withdrawal symptoms. Denies opioid cravings or recent use.   - Taking antabuse, denies alcohol cravings. Denies recent use.   - He is not taking Campral, reports he did not resume the medication because he struggling to remember to take it, also felt it had effect on cravings. Denies adverse SE.   - Denies methamphetamine use.   - \" I want to be sober and I want to go to school\" \"If I don't use I have a slight change of winning\"  Reflects on goals and aspirations.   - No cannabis use in 3 months.       Social History  Housing status: with his " mother   Employment status: working part time at [employer] (overnights 3 nights/week)  Relationship status: Single  Children: no children    OBJECTIVE  PHYSICAL EXAM:  There were no vitals taken for this visit.  *video visit     Physical Exam  Constitutional:       General: She is not in acute distress.     Appearance: Normal appearance.   Eyes:      General: No scleral icterus.     Extraocular Movements: Extraocular movements intact.      Conjunctiva/sclera: Conjunctivae normal.   Pulmonary:      Effort: Pulmonary effort is normal.   Neurological:      Mental Status: She is alert and oriented to person, place, and time.   Psychiatric:         Attention and Perception: Attention and perception normal.         Speech: Speech normal. Speech is not rapid and pressured or slurred.         Behavior: Behavior is cooperative.         Thought Content: Thought content normal.         Cognition and Memory: Cognition and memory normal.      Comments: Mood and affect congruent with subject matter   Insight and judgment fair          PHQ-9 Score:   PHQ 10/26/2022 11/14/2022 12/21/2022   PHQ-9 Total Score 12 13 13   Q9: Thoughts of better off dead/self-harm past 2 weeks Not at all Not at all Not at all   F/U: Thoughts of suicide or self-harm - - -   F/U: Self harm-plan - - -   F/U: Self-harm action - - -   F/U: Safety concerns - - -     CEASAR-7 Score:  CEASAR-7 SCORE 10/6/2022 10/24/2022 12/21/2022   Total Score - - -   Total Score 15 (severe anxiety) - 10 (moderate anxiety)   Total Score 15 4 10     Answers for HPI/ROS submitted by the patient on 12/21/2022  If you checked off any problems, how difficult have these problems made it for you to do your work, take care of things at home, or get along with other people?: Very difficult  PHQ9 TOTAL SCORE: 13  CEASAR 7 TOTAL SCORE: 10      LABS (may not contain today's labs)                                                      UDS 11/14/22: positive for buprenorphine and THC    Today's lab  data  No results found for any visits on 12/21/22.      HISTORY                                                    Problem list reviewed & adjusted, as indicated.  Patient Active Problem List   Diagnosis     Bipolar affective disorder (H)     ADHD (attention deficit hyperactivity disorder)     Substance abuse (H)     Hyperlipidemia LDL goal <130     Impaired fasting glucose     Tobacco abuse     GERD (gastroesophageal reflux disease)     MENTAL HEALTH     Obesity (BMI 30-39.9)     CEASAR (generalized anxiety disorder)     Hypertension goal BP (blood pressure) < 140/90     Chemical dependency (H)     Opioid use disorder, severe, dependence (H)         MEDICATION LIST (prior to visit)  amLODIPine-benazepril (LOTREL) 2.5-10 MG capsule, Take 1 capsule by mouth daily  armodafinil (NUVIGIL) 250 MG TABS tablet, Take 1 tablet (250 mg) by mouth every morning  cariprazine (VRAYLAR) 1.5 MG capsule, Take 1 capsule (1.5 mg) by mouth daily  cloNIDine (CATAPRES) 0.1 MG tablet, Take 1 tablet (0.1 mg) by mouth At Bedtime  disulfiram (ANTABUSE) 250 MG tablet, Take 1 tablet (250 mg) by mouth daily  divalproex sodium extended-release (DEPAKOTE ER) 500 MG 24 hr tablet, Take 4 tablets (2,000 mg) by mouth daily  lamoTRIgine (LAMICTAL) 200 MG tablet, Take 1 tablet (200 mg) by mouth daily  metFORMIN (GLUCOPHAGE) 500 MG tablet, Take 1 tablet (500 mg) by mouth 2 times daily (with meals)  OLANZapine (ZYPREXA) 15 MG tablet, Take 1 tablet (15 mg) by mouth At Bedtime  naloxone (NARCAN) 4 MG/0.1ML nasal spray, Spray 1 spray (4 mg) into one nostril alternating nostrils once as needed for opioid reversal every 2-3 minutes until assistance arrives (Patient not taking: Reported on 12/5/2022)    No current facility-administered medications on file prior to visit.      MEDICATION LIST (after visit)  Current Outpatient Medications   Medication     amLODIPine-benazepril (LOTREL) 2.5-10 MG capsule     armodafinil (NUVIGIL) 250 MG TABS tablet     cariprazine  (VRAYLAR) 1.5 MG capsule     cloNIDine (CATAPRES) 0.1 MG tablet     disulfiram (ANTABUSE) 250 MG tablet     divalproex sodium extended-release (DEPAKOTE ER) 500 MG 24 hr tablet     lamoTRIgine (LAMICTAL) 200 MG tablet     metFORMIN (GLUCOPHAGE) 500 MG tablet     OLANZapine (ZYPREXA) 15 MG tablet     naloxone (NARCAN) 4 MG/0.1ML nasal spray     No current facility-administered medications for this visit.     Allergies   Allergen Reactions     Prednisone Other (See Comments)     psych problems     SCOTT Teran [facility] Addiction Medicine  [phone]

## 2022-12-26 DIAGNOSIS — T50.905A DRUG-INDUCED WEIGHT GAIN: ICD-10-CM

## 2022-12-26 DIAGNOSIS — R63.5 DRUG-INDUCED WEIGHT GAIN: ICD-10-CM

## 2022-12-26 NOTE — TELEPHONE ENCOUNTER
TC RN received refill request via fax for metFORMIN (GLUCOPHAGE) 500 MG tablet from [facility].    Date of Last Office Visit: 12/05/2022 [place]  Date of Next Office Visit: 12/29/2022 [place]  No shows since last visit: 0  Cancellations since last visit: 0    Medication requested:  metFORMIN (GLUCOPHAGE) 500 MG  Date last ordered: 11/22/2022 Qty: 60 Refills: 0     Review of MN ?: NA    Lapse in medication adherence greater than 5 days?: No  If yes, call patient and gather details: NA  Medication refill request verified as identical to current order?: Yes  Result of Last DAM, VPA, Li+ Level, CBC, or Carbamazepine Level (at or since last visit): N/A    Last visit treatment plan:   TREATMENT PLAN        Medications:  Start:  Armodafinil/Nuvigil 250 mg in the AM; start on 12/18/2022, #30     Cariprazine/Vraylar 1.5 mg daily        Continue:  -Clonidine/Catapres .1 mg at bedtime      -Divalproex sodium/Depakote ER 2000 mg (4 tabs) at bedtime     -Lamotrigine/Lamictal 200 mg at bedtime     -Metformin/Glucophage 500 mg twice daily; TDD = 1000 mg     -Olanzapine/Zyprexa 15 mg at bedtime           Per Addiction Medicine:  -Acamprosate  -Disulfiram              Labs:  -None obtained              Therapy:  -Continue individual therapy 2x/week           Non-pharmacological modalities:  -Did not discuss in detail           Return to Clinic or Referrals:  -Please [facility] at [facility] for medication management in: 3 to 4 weeks                          Total time:  48 minutes per:     -Review of EMR  -Appointment time   -Documentation              Chiara Stewart APRN-CNP, [facility]    []Medication refilled per  Medication Refill in Ambulatory Care  policy.  [x]Medication unable to be refilled by RN due to criteria not met as indicated below:    []Eligibility - not seen in the last year   []Supervision - no future appointment   []Compliance - no shows, cancellations or lapse in therapy   []Verification - order  discrepancy   []Controlled medication   [x]Medication not included in policy   []90-day supply request   []Other

## 2022-12-29 ENCOUNTER — VIRTUAL VISIT (OUTPATIENT)
Dept: BEHAVIORAL HEALTH | Facility: CLINIC | Age: 27
End: 2022-12-29
Payer: COMMERCIAL

## 2022-12-29 DIAGNOSIS — F15.11 METHAMPHETAMINE ABUSE IN REMISSION (H): ICD-10-CM

## 2022-12-29 DIAGNOSIS — F31.9 BIPOLAR I DISORDER (H): Primary | ICD-10-CM

## 2022-12-29 DIAGNOSIS — G47.9 SLEEP DISTURBANCES: ICD-10-CM

## 2022-12-29 DIAGNOSIS — F43.10 PTSD (POST-TRAUMATIC STRESS DISORDER): ICD-10-CM

## 2022-12-29 DIAGNOSIS — F90.2 ADHD (ATTENTION DEFICIT HYPERACTIVITY DISORDER), COMBINED TYPE: ICD-10-CM

## 2022-12-29 PROCEDURE — 99215 OFFICE O/P EST HI 40 MIN: CPT | Performed by: NURSE PRACTITIONER

## 2022-12-29 RX ORDER — CLONIDINE HYDROCHLORIDE 0.1 MG/1
0.1 TABLET ORAL AT BEDTIME
Qty: 30 TABLET | Refills: 1 | Status: CANCELLED | OUTPATIENT
Start: 2022-12-29

## 2022-12-29 RX ORDER — ARMODAFINIL 250 MG/1
250 TABLET ORAL EVERY MORNING
Qty: 30 TABLET | Refills: 1 | Status: SHIPPED | OUTPATIENT
Start: 2023-01-04 | End: 2023-02-01

## 2022-12-29 RX ORDER — DIVALPROEX SODIUM 500 MG/1
2000 TABLET, EXTENDED RELEASE ORAL DAILY
Qty: 120 TABLET | Refills: 1 | Status: CANCELLED | OUTPATIENT
Start: 2022-12-29

## 2022-12-29 RX ORDER — DIVALPROEX SODIUM 500 MG/1
2000 TABLET, EXTENDED RELEASE ORAL DAILY
Qty: 120 TABLET | Refills: 1 | Status: SHIPPED | OUTPATIENT
Start: 2022-12-29 | End: 2023-02-01

## 2022-12-29 RX ORDER — ESZOPICLONE 1 MG/1
1 TABLET, FILM COATED ORAL
Qty: 30 TABLET | Refills: 0 | Status: SHIPPED | OUTPATIENT
Start: 2022-12-29 | End: 2023-01-03 | Stop reason: DRUGHIGH

## 2022-12-29 RX ORDER — CLONIDINE HYDROCHLORIDE 0.1 MG/1
0.1 TABLET ORAL 3 TIMES DAILY PRN
Qty: 90 TABLET | Refills: 1 | Status: SHIPPED | OUTPATIENT
Start: 2022-12-29 | End: 2023-03-07

## 2022-12-29 ASSESSMENT — PATIENT HEALTH QUESTIONNAIRE - PHQ9
SUM OF ALL RESPONSES TO PHQ QUESTIONS 1-9: 13
SUM OF ALL RESPONSES TO PHQ QUESTIONS 1-9: 13
10. IF YOU CHECKED OFF ANY PROBLEMS, HOW DIFFICULT HAVE THESE PROBLEMS MADE IT FOR YOU TO DO YOUR WORK, TAKE CARE OF THINGS AT HOME, OR GET ALONG WITH OTHER PEOPLE: VERY DIFFICULT
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
## 2022-12-29 NOTE — PROGRESS NOTES
"MH&A Post-Appointment Chart -check      Medications ordered this visit were e-scribed.  Verified by order class [x] yes  [] no    List Medications:      Medication changes or discontinuations were communicated to patient's pharmacy: [] yes  [x] no    UA collected [] yes  [] no  [x] n/a-virtual     Outside referrals / labs, etc support staff to follow up: [] yes  [x] no    Future appointment was made: [x] yes  [] no  [] n/a  Future Appointments 1/2/2023 - 7/1/2023      Date Visit Type Length Department Provider     2/1/2023  3:30 PM ADULT PSYCHIATRY RETURN 30 min [facility] TRANSITION [facility] Chiara Stewart APRN CNP              3/7/2023 10:15 AM ADULT PSYCHIATRY NEW 60 min [facility] PSYCHIATRY Wilda Burgess NP                   Dictation completed at time of chart check: [x] yes  [] no    I have checked the documentation for today s encounters and the above information has been reviewed and completed.      Bree Oreilly CMA on January 2, 2023 at 9:07 AM            This video/telephone visit will be conducted virtually between you and your provider. We have found that certain health care needs can be provided without the need for an in-person physical exam. This service lets us provide the care you need with a video /telephone conversation. If a prescription is necessary we can send it directly to your pharmacy. If lab work is needed we can place an order for that and you can then stop by our lab to have the test done at a later time.\"   Just as we bill insurance for in-person visits, we also bill insurance for video/telephone visits. If you have questions about your insurance coverage, we recommend that you speak with your insurance company.      Patient/Parent has given verbal consent for video/Telephone visit? Yes    Patient would like the video visit invitation sent by: [employer], if unable to connect call [phone]    Patient verified allergies, medications and pharmacy via phone. Patient " "states they are ready for visit.     Mental Health &Addiction (MH&A)Transition Clinic (TC):     Provides Patient Support While Waiting to Access Programmatic and Outpatient MH&A Care and Provides [facility] Crisis Assessment Services     FOLLOW-UP VISIT  ____________________________________________      \"The Transition Clinic is a temporary psychiatry service that helps to bridge the time to your next appointment. It is not intended to be a long-term service and you are expected to attend your scheduled appointment with your next provider.\"  [x] Patient/Parent verbalized understanding    If you need support between appointments, please call [phone] and let them know you're seen by [facility] Psychiatry. You may also send a [employer] message to reach us.    General-     Most pressing needs at this time: Follow up for medications, Discuss increasing clonidine.     Mental Health currently: Stable but wants to increase dose on some medications     Any changes to your physical health: No          Medications-     Injectable medications currently prescribed: None   If yes, do you need an appointment for the next injection:     Any Controlled Substances that you are prescribed: None     Any refills you are aware that you'll need soon: Yes, discuss with provider, depends when next appt is        Primary care provider: [facility]        CEASAR-7 scores:    CEASAR-7 SCORE 10/24/2022 12/21/2022   Total Score - -   Total Score - 10 (moderate anxiety)   Total Score 4 10       PHQ-9 scores:   PHQ-9 SCORE 12/21/2022 12/29/2022   PHQ-9 Total Score - -   PHQ-9 Total Score [facility] 13 (Moderate depression) 13 (Moderate depression)   PHQ-9 Total Score 13 13         Anything the provider should be aware of for today's appointment: No       New (awaiting) Mental health provider or next programming:  Psychiatry [facility] Wilda Odonnell NP     Date of scheduled apt: 03/07/2023 @ 10:15 AM    Bree Oreilly, " CMA on December 29, 2022 at 8:07 AM     Answers for HPI/ROS submitted by the patient on 12/29/2022  If you checked off any problems, how difficult have these problems made it for you to do your work, take care of things at home, or get along with other people?: Very difficult  PHQ9 TOTAL SCORE: 13

## 2022-12-29 NOTE — PATIENT INSTRUCTIONS
Start:  -Cariprazine/Vraylar 3 mg daily; increased from 1.5 mg    -Clonidine/Catapres .1 mg (1 tab) up to three times per day as needed for anxiety    -Eszopiclone/Lunesta 1 mg at bedtime as needed; #30         Continue:  Armodafinil/Nuvigil 250 mg in the AM  -1/4/2023, #30, 1 refill     -Divalproex sodium/Depakote ER 2000 mg (4 tabs) at bedtime     -Lamotrigine/Lamictal 200 mg at bedtime     -Metformin/Glucophage 500 mg twice daily; TDD = 1000 mg     -Olanzapine/Zyprexa 15 mg at bedtime           Per Addiction Medicine:  -Acamprosate  -Disulfiram

## 2023-01-02 ENCOUNTER — MYC MEDICAL ADVICE (OUTPATIENT)
Dept: BEHAVIORAL HEALTH | Facility: CLINIC | Age: 28
End: 2023-01-02

## 2023-01-02 DIAGNOSIS — F31.9 BIPOLAR I DISORDER (H): Primary | ICD-10-CM

## 2023-01-02 DIAGNOSIS — G47.9 SLEEP DISTURBANCES: ICD-10-CM

## 2023-01-03 RX ORDER — ESZOPICLONE 3 MG/1
3 TABLET, FILM COATED ORAL AT BEDTIME
Qty: 30 TABLET | Refills: 1 | Status: SHIPPED | OUTPATIENT
Start: 2023-01-03 | End: 2023-02-01

## 2023-01-05 ENCOUNTER — TELEPHONE (OUTPATIENT)
Dept: BEHAVIORAL HEALTH | Facility: CLINIC | Age: 28
End: 2023-01-05

## 2023-01-05 DIAGNOSIS — F31.9 BIPOLAR I DISORDER (H): ICD-10-CM

## 2023-01-05 RX ORDER — LAMOTRIGINE 200 MG/1
200 TABLET ORAL DAILY
Qty: 30 TABLET | Refills: 2 | Status: SHIPPED | OUTPATIENT
Start: 2023-01-05 | End: 2023-03-07

## 2023-01-05 RX ORDER — OLANZAPINE 10 MG/1
10 TABLET ORAL AT BEDTIME
Qty: 30 TABLET | Refills: 0 | Status: SHIPPED | OUTPATIENT
Start: 2023-01-05 | End: 2023-01-30

## 2023-01-05 NOTE — TELEPHONE ENCOUNTER
TC RN returned call to [facility]. He was welcoming to the call.     TC RN updated patient that he can reduce Zyprexa to 10 mg at bedtime and that a new script has been sent to the pharmacy. He stated that he appreciated the medication change and script.     He verbalized an understanding on how to take the medication and stated that he should know in about a week how well he is doing on the medication and will update the TC.

## 2023-01-05 NOTE — TELEPHONE ENCOUNTER
Reason for call:  Other   Patient called regarding (reason for call): call back  Additional comments: medication related question- lowering doses    Phone number to reach patient:  Cell number on file:    Telephone Information:   Mobile [phone]       Best Time:  Today- please call before 130 pm.    Can we leave a detailed message on this number?  YES    Travel screening: Negative

## 2023-01-05 NOTE — TELEPHONE ENCOUNTER
1.    When calling the patient back can discuss:    -Fine to reduce the Olanzapine/Zyprexa to 10 mg at bedtime  -New script sent to the pharmacy    2.    -Medication list has been updated

## 2023-01-05 NOTE — TELEPHONE ENCOUNTER
TC RN returned patient to patient Pat.     He reported that he is considering requesting his Zyprexa dose from 15 mg to 10mg as he feels that his new medication Vraylar that he started taking one month ago is a strong medication. He states that if his Vraylar will be increased in the future he would like to decrease his Zyprexa dose. He also reports that he wants to continue being on Zyprexa at some dose since he feels it helps with keeping his weight stable.    He denies any new symptoms and states that he has been doing well on current medications and that during the last five years he has not had manic symptoms.    The call got dropped, TC RN called back patient. He informed TC RN that he was in a cab and could no talk until after 2.30pm when he requests a call back from VIRGIL AMAYA.

## 2023-01-09 ENCOUNTER — MYC MEDICAL ADVICE (OUTPATIENT)
Dept: BEHAVIORAL HEALTH | Facility: CLINIC | Age: 28
End: 2023-01-09

## 2023-01-09 DIAGNOSIS — F31.9 BIPOLAR I DISORDER (H): Primary | ICD-10-CM

## 2023-01-09 RX ORDER — OLANZAPINE 2.5 MG/1
2.5 TABLET, FILM COATED ORAL AT BEDTIME
Qty: 30 TABLET | Refills: 0 | Status: SHIPPED | OUTPATIENT
Start: 2023-01-09 | End: 2023-01-09

## 2023-01-09 RX ORDER — OLANZAPINE 2.5 MG/1
2.5 TABLET, FILM COATED ORAL AT BEDTIME
Qty: 30 TABLET | Refills: 0 | Status: SHIPPED | OUTPATIENT
Start: 2023-01-09 | End: 2023-02-01

## 2023-01-09 NOTE — TELEPHONE ENCOUNTER
1.     Called Nathalie and discussed the following:    -For the time being he will keep taking Lunesta 3 mg at bedtime  -If needed he will message to possibly return to 1 mg prn, #10 + 3 mg prn #20      2.    -Sent Zyprexa 2.5 mg at bedtime to take in addition to 10 mg for 14 to 30 days, then decrease to 10 mg/day      3.    Pat comments he is working on starting some hobbies + focus on school:  -Smoking a pipe  -Tarantulas  -He will consider starting to walk on a regular basis    Wants to move out of mother's house but also needs to consider starting to generate some income via a part time job so I have enough funds to eat + do things

## 2023-01-09 NOTE — TELEPHONE ENCOUNTER
"VIRGIL RN received message to prescriber fax from [facility] [address] [place] [address]. Tel [phone] and Fax [phone]    Message to prescriber fax from [facility] pharmacy with the following note:    Plan requires specific directions to process the prescription. Directions were cut off. Please fax back with frequency of how patient will be using the medication and days supply limitations.    OLANZapine (ZYPREXA) 2.5 MG tablet 30 tablet 0 1/9/2023  --   Sig - Route: Take 1 tablet (2.5 mg) by mouth At Bedtime in addition to 10 mg for 14 to 30 days, then decrease to 10 mg at bedtime thereafter - Oral   Sent to pharmacy as: OLANZapine 2.5 MG Oral Tablet (zyPREXA)   Class: E-Prescribe   Notes to Pharmacy: To replace all other 1/9/2023 scripts of Zyprexa 2.5 mg   Order: [id number]   E-Prescribing Status: Receipt confirmed by pharmacy (1/9/2023  2:01 PM CST)     VIRGIL RN notes sig does not include \"-Oral\" and is cut off from note faxed to clinic.    VIRGIL RN forwarded note to TC provider for her review.  "

## 2023-01-10 NOTE — TELEPHONE ENCOUNTER
"TC RN called back [facility] pharmacy at [phone] to clarify pharmacy request. TC RN spoke with pharmacist and explained Olanzapine is being tapered down from 12.5 mg to 10 mg.    Pharmacist explained that the initial script that had been sent to the pharmacy had been cut off at instruction starting at \"decrease to\".     Pharmacist states that new script is clear and complete and that the medication will be made available to the patient.  "

## 2023-01-24 ENCOUNTER — MYC MEDICAL ADVICE (OUTPATIENT)
Dept: BEHAVIORAL HEALTH | Facility: CLINIC | Age: 28
End: 2023-01-24
Payer: COMMERCIAL

## 2023-01-26 ENCOUNTER — MYC MEDICAL ADVICE (OUTPATIENT)
Dept: BEHAVIORAL HEALTH | Facility: CLINIC | Age: 28
End: 2023-01-26
Payer: COMMERCIAL

## 2023-01-26 DIAGNOSIS — F31.9 BIPOLAR I DISORDER (H): ICD-10-CM

## 2023-01-27 ENCOUNTER — MYC MEDICAL ADVICE (OUTPATIENT)
Dept: BEHAVIORAL HEALTH | Facility: CLINIC | Age: 28
End: 2023-01-27
Payer: COMMERCIAL

## 2023-01-27 NOTE — TELEPHONE ENCOUNTER
VIRGIL RN read through [employer] messages from patient. VIRGIL RN called patient to gather more information on patient concerns.    VIRGIL RN reached patient Nathalie. He reports that he took Olanzapine with dose change from 15mg to 12.5mg and felt everything was good for 12 days.    Pat reports that when he took Olanzapine 10mg he felt that is all over the place. He was shaking. He then started taking 15 mg last night. He wonders if he has to start taper again.    He states that Chiara Stewart NP was right and he will stay on Olanzapine 12.5mg for one month before changing how he is taking his medication.     VIRGIL RN routed TE encounter to TC provider for update.

## 2023-01-30 DIAGNOSIS — F31.9 BIPOLAR I DISORDER (H): ICD-10-CM

## 2023-01-30 RX ORDER — OLANZAPINE 10 MG/1
10 TABLET ORAL AT BEDTIME
Qty: 30 TABLET | Refills: 0 | Status: SHIPPED | OUTPATIENT
Start: 2023-01-30 | End: 2023-02-01

## 2023-01-30 NOTE — TELEPHONE ENCOUNTER
Date of Last Office Visit: 12/29/23 Alba DUNNE  Date of Next Office Visit: 2/1/23 Alba DUNNE  No shows since last visit: 0  Cancellations since last visit: 0    Medication requested: OLANZapine (ZYPREXA) 10 MG tablet Date last ordered: 1/5/23 Qty: 30 Refills: 0     Review of [place] ?: Na    Lapse in medication adherence greater than 5 days?: No  If yes, call patient and gather details: NA  Medication refill request verified as identical to current order?: Yes - order differs from treatment plan.  Result of Last DAM, VPA, Li+ Level, CBC, or Carbamazepine Level (at or since last visit): N/A    Last visit treatment plan: 12/29/23  TREATMENT PLAN        Medications:  Start:  -Cariprazine/Vraylar 3 mg daily; increased from 1.5 mg     -Clonidine/Catapres .1 mg (1 tab) up to three times per day as needed for anxiety;   increased from .1 mg at HS     -Eszopiclone/Lunesta 1 mg at bedtime as needed; #30         Continue:     Armodafinil/Nuvigil 250 mg in the AM  -1/4/2023, #30, 1 refill     -Divalproex sodium/Depakote ER 2000 mg (4 tabs) at bedtime     -Lamotrigine/Lamictal 200 mg at bedtime     -Metformin/Glucophage 500 mg twice daily; TDD = 1000 mg     -Olanzapine/Zyprexa 15 mg at bedtime           Per Addiction Medicine:  -Acamprosate  -Disulfiram           Labs:  -None obtained              Therapy:  -Continue individual therapy           Non-pharmacological modalities:  -Great job on staying sober!!           Return to Clinic or Referrals:  -Please follow up at the [facility] for medication management in:  4 weeks                        Total time:  44 minutes per:     -Review of EMR  -Appointment time   -Documentation  -Care coordination              Chiara CHAVEZ-CNP, [facility]    []Medication refilled per  Medication Refill in Ambulatory Care  policy.  [x]Medication unable to be refilled by RN due to criteria not met as indicated below:    []Eligibility - not seen in the last year   []Supervision - no  future appointment   []Compliance - no shows, cancellations or lapse in therapy   [x]Verification - order discrepancy   []Controlled medication   [x]Medication not included in policy   []90-day supply request   []Other

## 2023-02-01 ENCOUNTER — VIRTUAL VISIT (OUTPATIENT)
Dept: BEHAVIORAL HEALTH | Facility: CLINIC | Age: 28
End: 2023-02-01
Payer: COMMERCIAL

## 2023-02-01 DIAGNOSIS — F43.10 PTSD (POST-TRAUMATIC STRESS DISORDER): ICD-10-CM

## 2023-02-01 DIAGNOSIS — G47.9 SLEEP DISTURBANCES: ICD-10-CM

## 2023-02-01 DIAGNOSIS — F31.9 BIPOLAR I DISORDER (H): Primary | ICD-10-CM

## 2023-02-01 DIAGNOSIS — F15.11 METHAMPHETAMINE ABUSE IN REMISSION (H): ICD-10-CM

## 2023-02-01 DIAGNOSIS — F90.2 ATTENTION DEFICIT HYPERACTIVITY DISORDER (ADHD), COMBINED TYPE: ICD-10-CM

## 2023-02-01 PROCEDURE — 99215 OFFICE O/P EST HI 40 MIN: CPT | Mod: GT | Performed by: NURSE PRACTITIONER

## 2023-02-01 RX ORDER — ESZOPICLONE 3 MG/1
3 TABLET, FILM COATED ORAL AT BEDTIME
Qty: 30 TABLET | Refills: 0 | Status: SHIPPED | OUTPATIENT
Start: 2023-03-05 | End: 2023-02-20

## 2023-02-01 RX ORDER — DIVALPROEX SODIUM 500 MG/1
2000 TABLET, EXTENDED RELEASE ORAL DAILY
Qty: 120 TABLET | Refills: 1 | Status: SHIPPED | OUTPATIENT
Start: 2023-02-01 | End: 2023-03-07

## 2023-02-01 RX ORDER — ARMODAFINIL 250 MG/1
250 TABLET ORAL EVERY MORNING
Qty: 30 TABLET | Refills: 0 | Status: SHIPPED | OUTPATIENT
Start: 2023-03-05 | End: 2023-02-20

## 2023-02-01 RX ORDER — OLANZAPINE 10 MG/1
10 TABLET ORAL AT BEDTIME
Qty: 30 TABLET | Refills: 0 | Status: SHIPPED | OUTPATIENT
Start: 2023-03-01 | End: 2023-03-07

## 2023-02-01 RX ORDER — OLANZAPINE 5 MG/1
5 TABLET ORAL DAILY PRN
Qty: 30 TABLET | Refills: 1 | Status: SHIPPED | OUTPATIENT
Start: 2023-02-01 | End: 2023-03-07 | Stop reason: DRUGHIGH

## 2023-02-01 RX ORDER — OLANZAPINE 2.5 MG/1
2.5 TABLET, FILM COATED ORAL AT BEDTIME
Qty: 30 TABLET | Refills: 0 | Status: SHIPPED | OUTPATIENT
Start: 2023-02-01 | End: 2023-03-07 | Stop reason: DRUGHIGH

## 2023-02-01 NOTE — PROGRESS NOTES
" This video/telephone visit will be conducted virtually between you and your provider. We have found that certain health care needs can be provided without the need for an in-person physical exam. This service lets us provide the care you need with a video /telephone conversation. If a prescription is necessary we can send it directly to your pharmacy. If lab work is needed we can place an order for that and you can then stop by our lab to have the test done at a later time.\"   Just as we bill insurance for in-person visits, we also bill insurance for video/telephone visits. If you have questions about your insurance coverage, we recommend that you speak with your insurance company.      Patient/Parent has given verbal consent for video/Telephone visit? Yes    Patient would like the video visit invitation sent by: Send SMS:  [phone]    Patient verified allergies, medications and pharmacy via phone. Patient states they are ready for visit.     Mental Health &Addiction ([facility]&A)[facility] (TC):     Provides Patient Support While Waiting to Access Programmatic and Outpatient MH&A Care and Provides [facility] Crisis Assessment Services     FOLLOW-UP VISIT  ____________________________________________      \"The [facility] is a temporary psychiatry service that helps to bridge the time to your next appointment. It is not intended to be a long-term service and you are expected to attend your scheduled appointment with your next provider.\"  [x] Patient/Parent verbalized understanding    If you need support between appointments, please call [phone] and let them know you're seen by [facility] Psychiat[facility]. You may also send a [employer] message to reach us.    General-     Most pressing needs at this time: Will discuss with provider     Mental Health currently:  Stable right now     Any changes to your physical health: Seeing Primary care soon for General exam.          Medications-     Injectable " medications currently prescribed: No     If yes, do you need an appointment for the next injection: NA     Any Controlled Substances that you are prescribed: Yes-Lunesta 3 mg; Nuvigil 250 mg      Any refills you are aware that you'll need soon: possible        Primary care provider: [facility]        CEASAR-7 scores:    CEASAR-7 SCORE 10/24/2022 12/21/2022   Total Score - -   Total Score - 10 (moderate anxiety)   Total Score 4 10       PHQ-9 scores:   PHQ-9 SCORE 12/21/2022 12/29/2022   PHQ-9 Total Score - -   PHQ-9 Total Score MyChart 13 (Moderate depression) 13 (Moderate depression)   PHQ-9 Total Score 13 13         Anything the provider should be aware of for today's appointment: Does better when Zyprexa is at 12.5 mg daily dose.  Some medications are pended for refill.       New (awaiting) Mental health provider or next programming: Psychiatry [facility]  Wilda Burgess NP      Date of scheduled apt: 03/07/2023 @ 10:15 AM       Annette Lepe CMA on February 1, 2023 at  3:10 PM

## 2023-02-01 NOTE — Clinical Note
"Katerina Astudillo-  Just a head up/FYI about the followin. Pat is wondering if you would be willing to prescribe Disulfiram versus needing to get it from Addiction Medicine.    He wants to discuss this on 3/7/2023.  2. Pat prefers providers who listen to his perspectives + have a discussion of the treatment plan versus rushed appointments because it just seems like all \"psychiatry are like drug dealers.\"  3. Let me know if there are any questions or concerns related to the progress note.   Overall he is doing really well in his sobriety + sleep is back on track with the Lunesta.  Thanks for your coordination in care.  Chiara"
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
## 2023-02-02 NOTE — PROGRESS NOTES
MH&A TC   NURSING Post-Appointment Chart-check:    Correct pharmacy verified with patient and updated in chart? [x] yes []no    Medications ordered this visit were e-scribed.  Verified by order class [x] yes  [] no    List Medications: OLANZapine (ZYPREXA) 5 MG tablet; OLANZapine (ZYPREXA) 2.5 MG tablet; divalproex sodium extended-release (DEPAKOTE ER) 500 MG 24 hr tablet: POST DATED 3/01/2023: OLANZapine (ZYPREXA) 10 MG tablet   3/05/2023: eszopiclone (LUNESTA) 3 MG tablet; armodafinil (NUVIGIL) 250 MG TABS tablet  Medication changes or discontinuations were communicated to patient's pharmacy: [] yes  [x] no    UA collected [] yes  [] no  [x] n/a-virtual     Future appointment was made: [] yes  [] no  [x] n/a  Plan: You do not need to return to the [facility] for medication management  -Please make sure to keep the appointment for longitudinal outpatient psychiatry services (see below)    Provider: ASHLEY Beatty  Location/Clinic: [facility], Mental Health Addiction Medicine service line  Visit type: Telehealth, video  Date/Time: 3/7/2023 @ 10:00 am    Dictation completed at time of chart check: [x] yes  [] no     I have checked the documentation for today s encounters and the above information has been reviewed and completed.      Annette Lepe, JAVIER on February 2, 2023 at 8:58 AM

## 2023-02-02 NOTE — PATIENT INSTRUCTIONS
Start:  -Olanzapine/Zyprexa 5 mg (1 tab) per day as needed for hypomania or anxiety      Continue:   Armodafinil/Nuvigil 250 mg in the AM  -3/5/2023, #30    -Cariprazine/Vraylar 3 mg daily     -Clonidine/Catapres .1 mg (1 tab) up to three times per day as needed for anxiety;     -Divalproex sodium/Depakote ER 2000 mg (4 tabs) at bedtime     Eszopiclone/Lunesta 3 mg at bedtime as needed  -3/5/2023, #30     -Lamotrigine/Lamictal 200 mg at bedtime     -Metformin/Glucophage 500 mg twice daily; TDD = 1000 mg     Olanzapine/Zyprexa at bedtime:  -12.5 mg until 3/1/2023  -10 mg   -Long term outpatient psychiatry to provide remainder of taper to discontinue schedule      -------------------------------      -You do not need to return to the [facility] for medication management  -Please make sure to keep the appointment for longitudinal outpatient psychiatry services (see below)    Provider: ASHLEY Beatty  Location/Clinic: [facility], Mental Health Addiction Medicine service line  Visit type: Telehealth, video  Date/Time: 3/7/2023 @ 10:00 am

## 2023-02-13 ENCOUNTER — TELEPHONE (OUTPATIENT)
Dept: BEHAVIORAL HEALTH | Facility: CLINIC | Age: 28
End: 2023-02-13
Payer: COMMERCIAL

## 2023-02-13 DIAGNOSIS — F31.9 BIPOLAR I DISORDER (H): ICD-10-CM

## 2023-02-13 NOTE — TELEPHONE ENCOUNTER
TC RN received refill request for:    cariprazine (VRAYLAR) 1.5 MG capsule (Discontinued) 30 capsule 1 12/5/2022 1/3/2023 --   Sig - Route: Take 1 capsule (1.5 mg) by mouth daily - Oral   Sent to pharmacy as: Cariprazine HCl 1.5 MG Oral Capsule (Vraylar)   Class: E-Prescribe   Reason for Discontinue: Dose adjustment   Order: [id number]   E-Prescribing Status: Receipt confirmed by pharmacy (12/6/2022 10:38 AM CST)     VIRGIL RN faxed the refill back to [facility] pharmacy as medication at this dose discontinued.

## 2023-02-19 ENCOUNTER — MYC MEDICAL ADVICE (OUTPATIENT)
Dept: BEHAVIORAL HEALTH | Facility: CLINIC | Age: 28
End: 2023-02-19
Payer: COMMERCIAL

## 2023-02-19 DIAGNOSIS — F90.2 ATTENTION DEFICIT HYPERACTIVITY DISORDER (ADHD), COMBINED TYPE: ICD-10-CM

## 2023-02-19 DIAGNOSIS — F15.11 METHAMPHETAMINE ABUSE IN REMISSION (H): ICD-10-CM

## 2023-02-19 DIAGNOSIS — F31.9 BIPOLAR I DISORDER (H): ICD-10-CM

## 2023-02-20 RX ORDER — ARMODAFINIL 250 MG/1
250 TABLET ORAL EVERY MORNING
Qty: 30 TABLET | Refills: 0 | Status: SHIPPED | OUTPATIENT
Start: 2023-03-05 | End: 2023-03-07

## 2023-02-20 RX ORDER — ESZOPICLONE 3 MG/1
3 TABLET, FILM COATED ORAL AT BEDTIME
Qty: 30 TABLET | Refills: 0 | Status: SHIPPED | OUTPATIENT
Start: 2023-03-05 | End: 2023-03-07

## 2023-02-20 NOTE — TELEPHONE ENCOUNTER
TC RN received change in pharmacy request from patient. New pharmacy [facility] on [address] [facility], [place] [address].    TC RN pending medication refill request for Armodafinil and Lunesta TC provider review.    TC RN requesting authorization from TC provider to cancel script from old pharmacy  [id number] - [place]APHAEL ROBERTSON, [address] AT [facility] & [facility] 10 once new scripts sent to new pharmacy.       Date of Last Office Visit: 2/1/23 Alba CNP  Date of Next Office Visit: 3/7/23 Vanessa CNP  No shows since last visit: 0  Cancellations since last visit: 0    Medication requested:   eszopiclone (LUNESTA) 3 MG tablet Date last ordered: 2/1/23 Qty: 30 Refills: 0 (Start 3/5/23)  armodafinil (NUVIGIL) 250 MG TABS tablet Date last ordered: 2/1/23 Qty: 30 Refills: 0 (Start 3/5/23)       [place] of [place] ?: eszopiclone 3 mg, armodafinil 250 mg   Eszopiclone 3 Mg Medication last filled date: 01/31/23  Qty filled: 30   Armodafinil 250 Mg Tablet Medication last filled date: 01/31/23  Qty filled: 30    Other controlled substance on [place] ?: Hydrocodone-Acetamin 5-325 Mg (11/5/22 #10, Modafinil 200 Mg Tablet (11/19/2022 #30), Suboxone 8 Mg-2 Mg Sl Film (9/2/22 #7)  If yes, is this a new medication?: No  If yes, name of medication: No and date filled: NA    Lapse in medication adherence greater than 5 days?: No  If yes, call patient and gather details: NA  Medication refill request verified as identical to current order?: Yes  Result of Last DAM, VPA, Li+ Level, CBC, or Carbamazepine Level (at or since last visit): N/A    Last visit treatment plan: 2/1/23  TREATMENT PLAN        Medications:     Start:  -Olanzapine/Zyprexa 5 mg (1 tab) per day as needed for hypomania or anxiety        Continue:   Armodafinil/Nuvigil 250 mg in the AM  -3/5/2023, #30     -Cariprazine/Vraylar 3 mg daily     -Clonidine/Catapres .1 mg (1 tab) up to three times per day as  needed for anxiety;      -Divalproex sodium/Depakote ER 2000 mg (4 tabs) at bedtime     Eszopiclone/Lunesta 3 mg at bedtime as needed  -3/5/2023, #30     -Lamotrigine/Lamictal 200 mg at bedtime     -Metformin/Glucophage 500 mg twice daily; TDD = 1000 mg     Olanzapine/Zyprexa at bedtime:  -12.5 mg until 3/1/2023  -10 mg   -Long term outpatient psychiatry to provide remainder of taper to discontinue schedule        Per Addiction Medicine:  -Disulfiram           Labs:  -None obtained              Therapy and or Non-pharmacological modalities :  -Continue individual therapy              Return to Clinic or Referrals:  -You do not need to return to the [facility] for medication management  -Please make sure to keep the appointment for longitudinal outpatient psychiatry services (see below)     Provider: ASHLEY Beatty  Location/Clinic: [facility], [facility] Addiction Medicine service line  Visit type: Telehealth, video  Date/Time: 3/7/2023 @ 10:00 am           Total time:  63 minutes per:     -Review of EMR  -Appointment time   -Documentation              Chiara RAMIREZ, [facility]-BC    []Medication refilled per  Medication Refill in Ambulatory Care  policy.  [x]Medication unable to be refilled by RN due to criteria not met as indicated below:    []Eligibility - not seen in the last year   []Supervision - no future appointment   []Compliance - no shows, cancellations or lapse in therapy   []Verification - order discrepancy   [x]Controlled medication   [x]Medication not included in policy   []90-day supply request   [x]Other Change in pharmacy request for controlled substances.

## 2023-02-20 NOTE — TELEPHONE ENCOUNTER
VIRGIL RN called and confirmed cancellation of scripts for controlled substances sent to [id number] [place] - [address] AT [facility] [address] [facility] 10      eszopiclone (LUNESTA) 3 MG tablet (Discontinued) 30 tablet 0 3/5/2023 2/20/2023 No   Sig - Route: Take 1 tablet (3 mg) by mouth At Bedtime - Oral   Sent to pharmacy as: Eszopiclone 3 MG Oral Tablet (LUNESTA)   Class: E-Prescribe   Notes to Pharmacy: DO NOT REFILL UNTIL UNTIL: 3/5/203   Reason for Discontinue: Reorder (No AVS / No eCancel)   Order: [id number]   E-Prescribing Status: Receipt confirmed by pharmacy (2/1/2023  4:31 PM CST)       armodafinil (NUVIGIL) 250 MG TABS tablet (Discontinued) 30 tablet 0 3/5/2023 2/20/2023 No   Sig - Route: Take 1 tablet (250 mg) by mouth every morning - Oral   Sent to pharmacy as: Armodafinil 250 MG Oral Tablet (NUVIGIL)   Class: E-Prescribe   Notes to Pharmacy: DO NOT REFILL UNTIL: 3/5/2023   Reason for Discontinue: Reorder (No AVS / No eCancel)   Order: [id number]   E-Prescribing Status: Receipt confirmed by pharmacy (2/1/2023  8:27 PM CST)       VIRGIL RN spoke with pharmacist and informed pharmacist who confirmed that he has closed out those scripts as patient scripts sent to a pharmacy that is closer to his bus route.

## 2023-03-06 ASSESSMENT — PATIENT HEALTH QUESTIONNAIRE - PHQ9
10. IF YOU CHECKED OFF ANY PROBLEMS, HOW DIFFICULT HAVE THESE PROBLEMS MADE IT FOR YOU TO DO YOUR WORK, TAKE CARE OF THINGS AT HOME, OR GET ALONG WITH OTHER PEOPLE: SOMEWHAT DIFFICULT
SUM OF ALL RESPONSES TO PHQ QUESTIONS 1-9: 7
SUM OF ALL RESPONSES TO PHQ QUESTIONS 1-9: 7

## 2023-03-07 ENCOUNTER — VIRTUAL VISIT (OUTPATIENT)
Dept: PSYCHIATRY | Facility: CLINIC | Age: 28
End: 2023-03-07
Attending: NURSE PRACTITIONER
Payer: COMMERCIAL

## 2023-03-07 DIAGNOSIS — F15.11 METHAMPHETAMINE ABUSE IN REMISSION (H): ICD-10-CM

## 2023-03-07 DIAGNOSIS — F31.9 BIPOLAR I DISORDER (H): Primary | ICD-10-CM

## 2023-03-07 DIAGNOSIS — F10.20 ALCOHOL USE DISORDER, SEVERE, DEPENDENCE (H): ICD-10-CM

## 2023-03-07 DIAGNOSIS — F90.2 ADHD (ATTENTION DEFICIT HYPERACTIVITY DISORDER), COMBINED TYPE: ICD-10-CM

## 2023-03-07 PROCEDURE — 99214 OFFICE O/P EST MOD 30 MIN: CPT | Mod: VID | Performed by: NURSE PRACTITIONER

## 2023-03-07 RX ORDER — ARMODAFINIL 250 MG/1
250 TABLET ORAL EVERY MORNING
Qty: 30 TABLET | Refills: 0 | Status: SHIPPED | OUTPATIENT
Start: 2023-04-02 | End: 2023-04-17

## 2023-03-07 RX ORDER — OLANZAPINE 10 MG/1
10 TABLET ORAL AT BEDTIME
Qty: 30 TABLET | Refills: 1 | Status: SHIPPED | OUTPATIENT
Start: 2023-03-07 | End: 2023-05-05

## 2023-03-07 RX ORDER — CLONIDINE HYDROCHLORIDE 0.1 MG/1
0.1 TABLET ORAL 3 TIMES DAILY PRN
Qty: 90 TABLET | Refills: 1 | Status: SHIPPED | OUTPATIENT
Start: 2023-03-07 | End: 2023-05-05

## 2023-03-07 RX ORDER — DISULFIRAM 250 MG/1
250 TABLET ORAL DAILY
Qty: 90 TABLET | Refills: 1 | Status: SHIPPED | OUTPATIENT
Start: 2023-03-07 | End: 2023-05-05

## 2023-03-07 RX ORDER — DIVALPROEX SODIUM 500 MG/1
2000 TABLET, EXTENDED RELEASE ORAL DAILY
Qty: 120 TABLET | Refills: 1 | Status: SHIPPED | OUTPATIENT
Start: 2023-03-07 | End: 2023-05-05

## 2023-03-07 RX ORDER — ESZOPICLONE 3 MG/1
3 TABLET, FILM COATED ORAL AT BEDTIME
Qty: 30 TABLET | Refills: 0 | Status: SHIPPED | OUTPATIENT
Start: 2023-04-02 | End: 2023-04-28

## 2023-03-07 RX ORDER — LAMOTRIGINE 200 MG/1
200 TABLET ORAL DAILY
Qty: 30 TABLET | Refills: 2 | Status: SHIPPED | OUTPATIENT
Start: 2023-03-07 | End: 2023-05-05

## 2023-03-07 NOTE — NURSING NOTE
Is the patient currently in the state of [place]? YES    Visit mode:VIDEO    If the visit is dropped, the patient can be reconnected by: VIDEO VISIT: Text to cell phone:   Telephone Information:   Mobile [phone]       Will anyone else be joining the visit? No  (If patient encounters technical issues they should call [phone])    How would you like to obtain your AVS? MyChart    Are changes needed to the allergy or medication list? NO    Rooming Documentation:      Reason for visit:  Treatment    JD Cabral
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
## 2023-03-07 NOTE — PATIENT INSTRUCTIONS
1.  Decrease olanzapine to 10 mg at bedtime  2.  Continue lamotrigine 200 mg at bedtime  3.  Continue Antabuse 250 mg in the morning  4.  Continue Depakote 2000 mg at bedtime  5.  Continue Vraylar 3 mg at bedtime  6.  Continue clonidine 0.1 mg 3 times daily as needed for anxiety  7.  Continue Lunesta 3 mg at bedtime  8.  Continue talk therapy to learn skills to manage life stressors  9.  Continue armodafinil 250 mg in the morning    Continue all other medical directions per primary care provider.   Continue all other medications as reviewed per electronic medical record today.   Safety plan reviewed. To the Emergency Department as needed or call after hours crisis line at [phone] or [phone]. [place] Crisis Text [place]: Text [place] to 835911  or  Suicide LifeLine Chat: [place].org/chat/  To schedule individual or family therapy, call [facility] at [phone].   Schedule an appointment with me in 6 weeks or sooner as needed.  Call [facility] at [phone] to schedule.  Follow up with primary care provider as planned or for acute medical concerns.  Call the psychiatric nurse line with medication questions or concerns at [phone].  G[place] may be used to communicate with your provider, but this is not intended to be used for emergencies.    Crisis Resources:    National Suicide Prevention Lifeline: 706.518.3153 (TTY: 646.325.3206). Call anytime for help.  (www.suicidepreventionlifeline.org)  National [place] on Mental Illness (www.ute.org): 154.542.1602 or 303-757-4938.   Mental Health Association (www.mentalhealth.org): 784.911.5167 or 156-611-6441.  [place] Crisis Text [place]: Text [place] to 448058  Suicide LifeLine Chat: [place].org/chat

## 2023-03-23 ENCOUNTER — MYC MEDICAL ADVICE (OUTPATIENT)
Dept: PSYCHIATRY | Facility: CLINIC | Age: 28
End: 2023-03-23
Payer: MEDICAID

## 2023-03-23 DIAGNOSIS — F31.9 BIPOLAR I DISORDER (H): ICD-10-CM

## 2023-03-23 DIAGNOSIS — F15.11 METHAMPHETAMINE ABUSE IN REMISSION (H): ICD-10-CM

## 2023-03-23 RX ORDER — ESZOPICLONE 3 MG/1
3 TABLET, FILM COATED ORAL AT BEDTIME
Qty: 30 TABLET | Refills: 0 | Status: CANCELLED | OUTPATIENT
Start: 2023-03-28

## 2023-03-23 RX ORDER — ARMODAFINIL 250 MG/1
250 TABLET ORAL EVERY MORNING
Qty: 30 TABLET | Refills: 0 | Status: CANCELLED | OUTPATIENT
Start: 2023-03-28

## 2023-03-23 NOTE — TELEPHONE ENCOUNTER
"    1) RN reviewed [facility] Medical Advice message: \"I was originally told I would have my Armodafilnil and Lunesta filled on the 27th,which makes sense because that's a month from my fill date. Now they're saying they don't have permission to fill til April 2nd. Can you verify this?\"    2) Per Chart Review, armodafinil (NUVIGIL) 250 MG TABS tablet and eszopiclone (LUNESTA) 3 MG tablet were last prescribed on 3/7/23 with start dates of 4/2/23.     3) Per [place] of [place] :  Name of medication:  armodafinil 250 MG tablet and date filled: 2/27/23 Qty filled: 30  Other controlled substance on [place] ?: yes  If yes, is this a new medication?: no  If yes, name of medication: Eszopiclone 3 MG tablet and date filled: 2/27/23 Qty filled: 30    Given last fill date, pt will be out of these medications on 3/29/23.     4) Cuing up identical prescriptions with 3/29/23 start date to replace previous prescriptions with 4/2/23 start date for provider review.    Kusum Chamberlain RN on 3/23/2023 at 2:50 PM      "

## 2023-03-24 NOTE — TELEPHONE ENCOUNTER
Wilda Burgess NP  [facility] 6 hours ago (9:15 AM)     [place]  If he is experiencing excessive sleepiness, he should stop the Lunesta.  The soonest he can fill the armodafinil is March 27.  Thanks.  Bel

## 2023-03-28 ENCOUNTER — TELEPHONE (OUTPATIENT)
Dept: BEHAVIORAL HEALTH | Facility: CLINIC | Age: 28
End: 2023-03-28
Payer: MEDICAID

## 2023-03-28 NOTE — TELEPHONE ENCOUNTER
Transition Clinic received voicemail from Ranjeet Gallego NP at [facility] 3/27/2023 at 3:42pm. Stated patient was at [facility] and they know he is followed by VIRGIL Menchaca. Patient was admitted to our hospital for accidental Benzo overdose, he was buying brolazapam on the street. They wanted to provide update. Addiction med and Psychiatry did eval and decided not to do any MI or CD commitment. Both teams were nervous and wanted to make sure that there was close follow up with patient. Can call back [phone] if any questions.    Anne Adame  Transition Clinic Coordinator  Date and Time: 03/28/23 8:06 AM

## 2023-03-28 NOTE — TELEPHONE ENCOUNTER
RN received a refill request from [facility] for amLODIPine-benazepril (LOTREL) 2.5-10 MG capsule .  This refill request was faxed DENIED back to  Pharmacy for the following reasons:    1. This patient is no longer under VIBHA Stewart's care.

## 2023-04-03 NOTE — TELEPHONE ENCOUNTER
Call placed to patient, he said he was back home. Reports he is taking his meds. Talked about his accidental benzo overdose. Speech rambling. Expressed concern that his overdose was serious and that he could have .     Reviewed upcoming appt with Wilda Burgess NP 23. Encouraged patient to call [facility] if he has any questions or concerns.

## 2023-04-04 DIAGNOSIS — I10 HYPERTENSION, UNSPECIFIED TYPE: ICD-10-CM

## 2023-04-04 NOTE — TELEPHONE ENCOUNTER
Date of Last Office Visit: 3/7/23  Date of Next Office Visit: 5/5/23  No shows since last visit: 0  Cancellations since last visit: 0    Medication requested:amLODIPine-benazepril (LOTREL) 2.5-10 MG capsule  Date last ordered: 10/24/23 Qty: 30 Refills: 2  Last prescribed by TALI Stewart     amLODIPine-benazepril (LOTREL) 2.5-10 MG capsule 30 capsule 2 10/24/2022  No   Sig - Route: Take 1 capsule by mouth daily - Oral   Sent to pharmacy as: amLODIPine Besy-Benazepril HCl 2.5-10 MG Oral Capsule (LOTREL)   Class: E-Prescribe         Lapse in medication adherence greater than 5 days?: Appears yes. As per pharmacy info, last filled on 1/17/2023  Medication refill request verified as identical to current order?: yes  Result of Last DAM, VPA, Li+ Level, CBC, or Carbamazepine Level (at or since last visit): N/A    Last visit treatment plan:     Treatment Plan:     1.  Decrease olanzapine to 10 mg at bedtime  2.  Continue lamotrigine 200 mg at bedtime  3.  Continue Antabuse 250 mg in the morning  4.  Continue Depakote 2000 mg at bedtime  5.  Continue Vraylar 3 mg at bedtime  6.  Continue clonidine 0.1 mg 3 times daily as needed for anxiety  7.  Continue Lunesta 3 mg at bedtime  8.  Continue talk therapy to learn skills to manage life stressors  9.  Continue armodafinil 250 mg in the morning    []Medication refilled per  Medication Refill in Ambulatory Care  policy.  [x]Medication unable to be refilled by RN due to criteria not met as indicated below:    []Eligibility - not seen in the last year   []Supervision - no future appointment   [x]Compliance - no shows, cancellations or lapse in therapy   []Verification - order discrepancy   []Controlled medication   [x]Medication not included in policy   []90-day supply request   [x]Other: LPN is processing request

## 2023-04-06 RX ORDER — AMLODIPINE BESYLATE AND BENAZEPRIL HYDROCHLORIDE 2.5; 1 MG/1; MG/1
1 CAPSULE ORAL DAILY
Qty: 30 CAPSULE | Refills: 2 | OUTPATIENT
Start: 2023-04-06

## 2023-04-06 NOTE — TELEPHONE ENCOUNTER
Per Provider this should be sent to PCP and refill was refused.   Called pharmacy and notified them.     Lynn Ocampo RN on 4/6/2023 at 1:47 PM

## 2023-04-17 ENCOUNTER — MYC MEDICAL ADVICE (OUTPATIENT)
Dept: PSYCHIATRY | Facility: CLINIC | Age: 28
End: 2023-04-17
Payer: COMMERCIAL

## 2023-04-18 NOTE — TELEPHONE ENCOUNTER
Wilda Burgess pt. Routing to [facility] support staff for follow up.    Suzanne Sanchez RN on 4/18/2023 at 11:05 AM

## 2023-04-19 NOTE — TELEPHONE ENCOUNTER
Patient sent 2 different encounters on My chart updating about his past and his current medication regime.   Will forward to provider.     Last visit 3/7/23:   1.  Decrease olanzapine to 10 mg at bedtime  2.  Continue lamotrigine 200 mg at bedtime  3.  Continue Antabuse 250 mg in the morning  4.  Continue Depakote 2000 mg at bedtime  5.  Continue Vraylar 3 mg at bedtime  6.  Continue clonidine 0.1 mg 3 times daily as needed for anxiety  7.  Continue Lunesta 3 mg at bedtime  8.  Continue talk therapy to learn skills to manage life stressors  9.  Continue armodafinil 250 mg in the morning       Lynn Ocampo RN on 4/19/2023 at 9:48 AM

## 2023-04-25 DIAGNOSIS — F31.9 BIPOLAR I DISORDER (H): ICD-10-CM

## 2023-04-26 NOTE — CONFIDENTIAL NOTE
Date of Last Office Visit: 3/7/23  Date of Next Office Visit: 5/5/23  No shows since last visit: 0  Cancellations since last visit: 0    Medication requested: eszopiclone (LUNESTA) 3 MG tablet Date last ordered: 4/2/23 Qty: 30 Refills: 0     [place] of [place] ?: Writer is not a delegate for provider      Lapse in medication adherence greater than 5 days?: no  If yes, call patient and gather details: na  Medication refill request verified as identical to current order?: yes  Result of Last DAM, VPA, Li+ Level, CBC, or Carbamazepine Level (at or since last visit): N/A    Last visit treatment plan:   1.  Decrease olanzapine to 10 mg at bedtime  2.  Continue lamotrigine 200 mg at bedtime  3.  Continue Antabuse 250 mg in the morning  4.  Continue Depakote 2000 mg at bedtime  5.  Continue Vraylar 3 mg at bedtime  6.  Continue clonidine 0.1 mg 3 times daily as needed for anxiety  7.  Continue Lunesta 3 mg at bedtime  8.  Continue talk therapy to learn skills to manage life stressors  9.  Continue armodafinil 250 mg in the morning       Continue all other medical directions per primary care provider.     Continue all other medications as reviewed per electronic medical record today.     Safety plan reviewed. To the Emergency Department as needed or call after hours crisis line at [phone] or [phone]. [place] Crisis [place]: Text [place] to 186772  or  Suicide LifeLine Chat: suicidepreventionlifeline.org/chat/    To schedule individual or family therapy, call [facility] at [phone].     Schedule an appointment with me in 6 weeks or sooner as needed.  Call [facility] at [phone] to schedule.    Follow up with primary care provider as planned or for acute medical concerns.    Call the psychiatric nurse line with medication questions or concerns at [phone].    MyChart may be used to communicate with your provider, but this is not intended to be used for  emergencies.    []Medication refilled per  Medication Refill in Ambulatory Care  policy.  [x]Medication unable to be refilled by RN due to criteria not met as indicated below:    []Eligibility - not seen in the last year   []Supervision - no future appointment   []Compliance - no shows, cancellations or lapse in therapy   []Verification - order discrepancy   []Controlled medication   []Medication not included in policy   []90-day supply request   [x]Other- out of scope of CMA

## 2023-04-27 NOTE — TELEPHONE ENCOUNTER
Routing comment from Wilda Burgess:    Responded to patient in an earlier in basket message. Wilda Sanchez RN on 4/27/2023 at 12:23 PM

## 2023-04-28 RX ORDER — ESZOPICLONE 3 MG/1
3 TABLET, FILM COATED ORAL AT BEDTIME
Qty: 7 TABLET | Refills: 0 | Status: SHIPPED | OUTPATIENT
Start: 2023-05-01 | End: 2023-05-05

## 2023-05-05 ENCOUNTER — TELEPHONE (OUTPATIENT)
Dept: PSYCHIATRY | Facility: CLINIC | Age: 28
End: 2023-05-05

## 2023-05-05 ENCOUNTER — VIRTUAL VISIT (OUTPATIENT)
Dept: PSYCHIATRY | Facility: CLINIC | Age: 28
End: 2023-05-05
Payer: COMMERCIAL

## 2023-05-05 DIAGNOSIS — F15.11 METHAMPHETAMINE ABUSE IN REMISSION (H): ICD-10-CM

## 2023-05-05 DIAGNOSIS — F10.20 ALCOHOL USE DISORDER, SEVERE, DEPENDENCE (H): ICD-10-CM

## 2023-05-05 DIAGNOSIS — F31.10 BIPOLAR I DISORDER, MOST RECENT EPISODE (OR CURRENT) MANIC (H): Primary | ICD-10-CM

## 2023-05-05 DIAGNOSIS — G47.10 HYPERSOMNOLENCE: ICD-10-CM

## 2023-05-05 PROCEDURE — 99214 OFFICE O/P EST MOD 30 MIN: CPT | Mod: VID | Performed by: NURSE PRACTITIONER

## 2023-05-05 RX ORDER — ESZOPICLONE 1 MG/1
1 TABLET, FILM COATED ORAL AT BEDTIME
Qty: 30 TABLET | Refills: 0 | Status: SHIPPED | OUTPATIENT
Start: 2023-05-05 | End: 2023-06-08

## 2023-05-05 RX ORDER — DIVALPROEX SODIUM 500 MG/1
2000 TABLET, EXTENDED RELEASE ORAL DAILY
Qty: 120 TABLET | Refills: 1 | Status: SHIPPED | OUTPATIENT
Start: 2023-05-05 | End: 2023-07-03

## 2023-05-05 RX ORDER — SOLRIAMFETOL 75 MG/1
75 TABLET, FILM COATED ORAL DAILY
Qty: 30 TABLET | Refills: 0 | Status: SHIPPED | OUTPATIENT
Start: 2023-05-05 | End: 2023-08-24

## 2023-05-05 RX ORDER — DISULFIRAM 250 MG/1
250 TABLET ORAL DAILY
Qty: 90 TABLET | Refills: 0 | Status: SHIPPED | OUTPATIENT
Start: 2023-05-05 | End: 2023-08-24

## 2023-05-05 RX ORDER — OLANZAPINE 7.5 MG/1
7.5 TABLET, FILM COATED ORAL AT BEDTIME
Qty: 30 TABLET | Refills: 0 | Status: SHIPPED | OUTPATIENT
Start: 2023-05-05 | End: 2023-06-06

## 2023-05-05 RX ORDER — LAMOTRIGINE 200 MG/1
200 TABLET ORAL DAILY
Qty: 30 TABLET | Refills: 2 | Status: SHIPPED | OUTPATIENT
Start: 2023-05-05 | End: 2023-09-12

## 2023-05-05 RX ORDER — CLONIDINE HYDROCHLORIDE 0.1 MG/1
0.1 TABLET ORAL DAILY PRN
Qty: 90 TABLET | Refills: 1 | Status: SHIPPED | OUTPATIENT
Start: 2023-05-05 | End: 2024-01-23

## 2023-05-05 ASSESSMENT — PATIENT HEALTH QUESTIONNAIRE - PHQ9
SUM OF ALL RESPONSES TO PHQ QUESTIONS 1-9: 12
10. IF YOU CHECKED OFF ANY PROBLEMS, HOW DIFFICULT HAVE THESE PROBLEMS MADE IT FOR YOU TO DO YOUR WORK, TAKE CARE OF THINGS AT HOME, OR GET ALONG WITH OTHER PEOPLE: SOMEWHAT DIFFICULT
SUM OF ALL RESPONSES TO PHQ QUESTIONS 1-9: 12

## 2023-05-05 NOTE — TELEPHONE ENCOUNTER
PRIOR AUTHORIZATION DENIED    Medication: Solriamfetol HCl (SOLRIAMFETOL) 75 MG TABS - EPA DENIED    Denial Date: 5/5/2023    Denial Rational:       Appeal Information:

## 2023-05-05 NOTE — NURSING NOTE
Is the patient currently in the state of [place]? YES    Visit mode:VIDEO    If the visit is dropped, the patient can be reconnected by: VIDEO VISIT: Send to e-mail at: [email or web address]    Will anyone else be joining the visit? YES: How would they like to receive their invitation? Other e-mail: Aunt is joining      How would you like to obtain your AVS? MyChart    Are changes needed to the allergy or medication list? YES: Wants to discuss some changes    Reason for visit: Video Visit

## 2023-05-05 NOTE — PROGRESS NOTES
"Virtual Visit Details    Type of service:  Video Visit   Video Start Time: 1:57 PM  Video and time: 2:20 PM    Patient location: Home  Provider location: Off-site           Outpatient Psychiatric Progress Note    Name: Laci Hinkle Jr   : 1995                    Primary Care Provider: [facility]   Therapist: Yes    PHQ-9 scores:      2022     8:08 AM 2022     8:43 AM 3/6/2023     2:04 PM   PHQ-9 SCORE   PHQ-9 Total Score MyChart 13 (Moderate depression) 13 (Moderate depression) 7 (Mild depression)   PHQ-9 Total Score 13 13 7       CEASAR-7 scores:      10/6/2022     9:18 AM 10/24/2022     8:00 AM 2022     8:09 AM   CEASAR-7 SCORE   Total Score 15 (severe anxiety)  10 (moderate anxiety)   Total Score 15 4 10       Patient Identification:    Patient is a 27 year old year old, single  White Not  or  male  who presents for return visit with me.  Patient is currently a student. Patient attended the session with His aunt , who they agreed to have interview with. Patient prefers to be called: \" Pat\".    Current medications include: amLODIPine-benazepril (LOTREL) 2.5-10 MG capsule, Take 1 capsule by mouth daily  armodafinil (NUVIGIL) 250 MG TABS tablet, Take 1 tablet (250 mg) by mouth every morning  cariprazine (VRAYLAR) 3 MG capsule, Take 1 capsule (3 mg) by mouth daily  cloNIDine (CATAPRES) 0.1 MG tablet, Take 1 tablet (0.1 mg) by mouth 3 times daily as needed (anxiety)  disulfiram (ANTABUSE) 250 MG tablet, Take 1 tablet (250 mg) by mouth daily  divalproex sodium extended-release (DEPAKOTE ER) 500 MG 24 hr tablet, Take 4 tablets (2,000 mg) by mouth daily  eszopiclone (LUNESTA) 3 MG tablet, Take 1 tablet (3 mg) by mouth At Bedtime Fill   lamoTRIgine (LAMICTAL) 200 MG tablet, Take 1 tablet (200 mg) by mouth daily  metFORMIN (GLUCOPHAGE) 500 MG tablet, Take 1 tablet (500 mg) by mouth 2 times daily (with meals)  naloxone (NARCAN) 4 MG/0.1ML nasal spray, Spray 1 " spray (4 mg) into one nostril alternating nostrils once as needed for opioid reversal every 2-3 minutes until assistance arrives  OLANZapine (ZYPREXA) 10 MG tablet, Take 1 tablet (10 mg) by mouth At Bedtime    No current facility-administered medications on file prior to visit.       The [place] Prescription Monitoring Program has been reviewed and there are no concerns about diversionary activity for controlled substances at this time.      I was able to review most recent Primary Care Provider, specialty provider, and therapy visit notes that I have access to.     Today, patient reports that his aunt is going to be helping him dispense his medications to avoid overuse.  The plan is to have him and her  his medications and she can dispense them to him 1 week at a time.  He is trying to get back on track with school and would like changes in his dose of Lunesta to a lower dose.  He tells me he would like to try Sunosi as he has read about this being effective in combating daytime sleepiness.  He tells me he has this condition due to long-term use of methamphetamines which Pat tells me he is no longer using.  Throughout this interview he presented with heightened emotions, rapid pressured speech, and irritability.  He was changeable in his request for me to prescribe medications for him.  Later in the conversation he asked about increasing this dose of modafinil despite requesting Sunosi.  He continues with talk therapy.  We talked about a recent visit to the emergency room where he had altered consciousness that was suspected for drug use.  He admitted to having difficulties taking his armodafinil as prescribed.     has a past medical history of ADHD (attention deficit hyperactivity disorder) (3/18/2011), Bipolar affective disorder (H) (3/18/2011), CEASAR (generalized anxiety disorder) (3/18/2011), GERD (gastroesophageal reflux disease), Hyperlipidemia LDL goal <130, Impaired fasting glucose, Obesity, and  Substance abuse (H) (3/18/2011).    Social history updates:    Nathalei lives alone.  He continues to work on college studies.    Substance use updates:    He denies use of alcohol and other street drugs substances  Tobacco use: Yes Cigarettes  Ready to quit?  No  Nicotine Replacement Therapy tried: None    Vital Signs:   There were no vitals taken for this visit.    Labs:    Most recent laboratory results reviewed and no new labs.     Mental Status Examination:  Appearance: awake, alert, casual dress and moderate distress  Attitude: guarded and uncooperative  Eye Contact:  adequate  Gait and Station: No dizziness or falls  Psychomotor Behavior:  fidgeting  Oriented to:  time, person, and place  Attention Span and Concentration:  Poor  Speech:   vtspeech: pressured speech and Speaks: English  Mood:  : angry, anxious and labile  Affect:  : intensity is heightened  Associations:  no loose associations  Thought Process:  disorganized  Thought Content:  no evidence of suicidal ideation or homicidal ideation, no auditory hallucinations present and no visual hallucinations present  Recent and Remote Memory:  intact Not formally assessed. No amnesia.  Fund of Knowledge: appropriate  Insight:  fair  Judgment: fair  Impulse Control:  fair    Suicide Risk Assessment:  Today Laci Hinkle Jr reports no thoughts to harm themself or others. In addition, there are notable risk factors for self-harm, including single status, anxiety, substance abuse, anger/rage and mood change. However, risk is mitigated by history of seeking help when needed, future oriented, denies suicidal intent or plan and denies homicidal ideation, intent, or plan. Therefore, based on all available evidence including the factors cited above, Laci Hinkle Jr does not appear to be at imminent risk for self-harm, does not meet criteria for a 72-hr hold, and therefore remains appropriate for ongoing outpatient level of care.  A thorough assessment of risk  factors related to suicide and self-harm have been reviewed and are noted above. The patient convincingly denies suicidality on several occasions. Local community safety resources printed and reviewed for patient to use if needed. There was no deceit detected, and the patient presented in a manner that was believable.     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, Amphetamine type substance    Medical comorbidities include:   Patient Active Problem List    Diagnosis Date Noted     Opioid use disorder, severe, dependence (H) 07/27/2021     Priority: Medium     Chemical dependency (H) 07/23/2021     Priority: Medium     Hypertension goal BP (blood pressure) < 140/90 10/28/2015     Priority: Medium     CEASAR (generalized anxiety disorder) 09/16/2014     Priority: Medium     MENTAL HEALTH 10/07/2012     Priority: Medium     Obesity (BMI 30-39.9) 10/07/2012     Priority: Medium     GERD (gastroesophageal reflux disease) 04/20/2012     Priority: Medium     Tobacco abuse 03/30/2012     Priority: Medium     Hyperlipidemia LDL goal <130 03/14/2012     Priority: Medium     Impaired fasting glucose 03/14/2012     Priority: Medium     Bipolar affective disorder (H) 03/18/2011     Priority: Medium     ADHD (attention deficit hyperactivity disorder) 03/18/2011     Priority: Medium     Substance abuse (H) 03/18/2011     Priority: Medium       Assessment:    Laci Hinkle Jr your and will be dispensing your medications for you.:    1.  Bipolar 1 disorder   Vraylar 4.5 mg daily   Depakote 2000 mg daily   Lamotrigine 200 mg daily   Olanzapine 7.5 mg at bedtime  2.  Alcohol use disorder   Disulfiram 250 mg daily  3.  Generalized anxiety  disorder   Clonidine 0.1 mg daily as needed  4.  Reports of hypersomnolence   Armodafinil 250 mg daily   If approved.  Armodafinil and take Solriamfetol  75 mg daily  5.  Insomnia   Decreased Lunesta to 1 mg at bedtime    Medication side effects and alternatives were reviewed. Health promotion activities recommended and reviewed today. All questions addressed. Education and counseling completed regarding risks and benefits of medications and psychotherapy options.    Treatment Plan:        1.  Depakote 2000 mg at bedtime    2.  Olanzapine 7.5 mg at bedtime    3.  Vraylar 4.5 mg daily    4.  Armodafinil 250 mg daily until approved for Sunosi then stop armodafinil and take Sunosi 75 mg daily    5.  Decrease Lunesta to 1 mg at bedtime    6.  Lamotrigine 200 mg daily    7.  Antabuse 250 mg daily    8.  Clonidine 0.1 mg daily as needed for anxiety        Continue all other medications as reviewed per electronic medical record today.     Safety plan reviewed. To the Emergency Department as needed or call after hours crisis line at [phone] or [phone]. [place] Crisis Text Line. Text [place] to 219779 or Suicide LifeLine Chat: suicidepreventionlifeline.org/chat/    To schedule individual or family therapy, call [facility] at [phone]    Schedule an appointment with me in 4 weeks or sooner as needed. Call [facility] at [phone] to schedule.    Follow up with primary care provider as planned or for acute medical concerns.    Call the psychiatric nurse line with medication questions or concerns at [phone]    MyChart may be used to communicate with your provider, but this is not intended to be used for emergencies.    Crisis Resources:    National Suicide Prevention Lifeline: 192.489.5165 (TTY: 646.717.5181). Call anytime for help.  (www.suicidepreventionlifeline.org)  National [place] on Mental Illness (www.ute.org): 495.959.3051 or 329-168-2958.   Mental Health  Association (www.mentalhealth.org): 959-635-7285 or 041-913-1095.  [place] Crisis Text Line: Text [place] to 501562  Suicide LifeLine Chat: suicidepreventionlifeline.org/chat    Administrative Billing:   Time spent with patient includes counseling and coordination of care regarding above diagnoses and treatment plan.    Patient Status:  This is a continuous care patient and medications will be prescribed by the psychiatric provider until further indicated.    Signed:   RAY Lowe-BC   Psychiatry

## 2023-05-19 NOTE — PATIENT INSTRUCTIONS
"Patient Education   The Panel [facility] Program  What to Expect  Here's what to expect in the Panel Psychiatry Program.   About the program  You'll be meeting with a psychiatric doctor to check your mental health. A psychiatric doctor helps you deal with troubling thoughts and feelings by giving you medicine. They'll make sure you know the plan for your care. You may see them for a long time. When you're feeling better, they may refer you back to seeing your family doctor.   If you have any questions, we'll be glad to talk to you.  About visits  Be open  At your visits, please talk openly about your problems. It may feel hard, but it's the best way for us to help you.  Cancelling visits  If you can't come to your visit, please call us right away at [phone]. If you don't cancel at least 24 hours (1 full day) before your visit, that's \"late cancellation.\"  Not showing up for your visits  Being very late is the same as not showing up. You'll be a \"no show\" if:  You're more than 15 minutes late for a 30-minute (half hour) visit.  You're more than 30 minutes late for a 60-minute (full hour) visit.  If you cancel late or don't show up 2 times within 6 months, we may end your care.  Getting help between visits  If you need help between visits, you can call us Monday to Friday from 8 a.m. to 4:30 p.m. at [phone].  Emergency care  Call 911 or go to the nearest emergency department if your life or someone else's life is in danger.  Call 988 anytime to reach the national Suicide and Crisis hotline.  Medicine refills  To refill your medicine, call your pharmacy. You can also call [facility]'s [facility] at [phone], Monday to Friday, 8 a.m. to 4:30 p.m. It can take 1 to 3 business days to get a refill.   Forms, letters, and tests  You may have papers to fill out, like FMLA, short-term disability, and workability. We can help you with these forms at your visits, but you must have an " appointment. You may need more than 1 visit for this, to be in an intensive therapy program, or both.  Before we can give you medicine for ADHD, we may refer you to get tested for it or confirm it another way.  We may not be able to give you an emotional support animal letter.  We don't do mental health checks ordered by the court.   We don't do mental health testing, but we can refer you to get tested.   Thank you for choosing us for your care.  For informational purposes only. Not to replace the advice of your health care provider. Copyright   2022 [facility]. All rights reserved. 5211game 890074 - 12/22.        1.  Depakote 2000 mg at bedtime  2.  Olanzapine 7.5 mg at bedtime  3.  Vraylar 4.5 mg daily  4.  Armodafinil 250 mg daily until approved for Sunosi then stop armodafinil and take Sunosi 75 mg daily  5.  Decrease Lunesta to 1 mg at bedtime  6.  Lamotrigine 200 mg daily  7.  Antabuse 250 mg daily  8.  Clonidine 0.1 mg daily as needed for anxiety    Continue all other medications as reviewed per electronic medical record today.   Safety plan reviewed. To the Emergency Department as needed or call after hours crisis line at [phone] or [phone]. [place] Crisis Text Line. Text [place] to 354074 or Suicide LifeLine Chat: suicidepreventionlifeline.org/chat/  To schedule individual or family therapy, call [facility] at [phone]  Schedule an appointment with me in 4 weeks or sooner as needed. Call [facility] at [phone] to schedule.  Follow up with primary care provider as planned or for acute medical concerns.  Call the psychiatric nurse line with medication questions or concerns at [phone]  MyChart may be used to communicate with your provider, but this is not intended to be used for emergencies.    Crisis Resources:    National Suicide Prevention Lifeline: 185.581.8398 (TTY: 501.327.2901). Call anytime for help.   (www.suicidepreventionlifeline.org)  National [place] on Mental Illness (www.ute.org): 874-979-8420 or 667-039-2681.   Mental Health Association (www.mentalhealth.org): 851.581.5534 or 899-508-1635.  [place] Crisis Text Line: Text [place] to 589582  Suicide LifeLine Chat: suicidepreventionWearPointline.org/chat

## 2023-05-22 ENCOUNTER — TELEPHONE (OUTPATIENT)
Dept: PSYCHIATRY | Facility: CLINIC | Age: 28
End: 2023-05-22
Payer: COMMERCIAL

## 2023-05-22 NOTE — TELEPHONE ENCOUNTER
Reason for Call:  Other     Detailed comments: Pt was wondering if Wilda Ronykenneth could vouch for pt's Financial Aid Form for school - would like a call back before Thursday, May 25.     Phone Number Patient can be reached at: Cell number on file:    Telephone Information:   Mobile [phone]       Best Time: ASAP    Can we leave a detailed message on this number? YES    Call taken on 5/22/2023 at 11:52 AM by Nenita Leach

## 2023-05-25 ENCOUNTER — TELEPHONE (OUTPATIENT)
Dept: PSYCHIATRY | Facility: CLINIC | Age: 28
End: 2023-05-25
Payer: COMMERCIAL

## 2023-05-25 NOTE — TELEPHONE ENCOUNTER
[facility] pharmacy called with a question about pt's vraylar rx.  Please return call at [phone]

## 2023-05-30 NOTE — TELEPHONE ENCOUNTER
Called and spoke to pharmacist and let them know he is stopping the Vraylar. Also informed them that he was increasing his Olanzapine to 15 mg but we haven't sent in new script yet due to patient having enough tabs.     Lynn Ocampo RN on 5/30/2023 at 9:48 AM

## 2023-06-07 ENCOUNTER — MYC MEDICAL ADVICE (OUTPATIENT)
Dept: PSYCHIATRY | Facility: CLINIC | Age: 28
End: 2023-06-07
Payer: COMMERCIAL

## 2023-06-07 DIAGNOSIS — F51.05 INSOMNIA DUE TO OTHER MENTAL DISORDER: Primary | ICD-10-CM

## 2023-06-07 DIAGNOSIS — F99 INSOMNIA DUE TO OTHER MENTAL DISORDER: Primary | ICD-10-CM

## 2023-06-08 RX ORDER — ESZOPICLONE 1 MG/1
1 TABLET, FILM COATED ORAL AT BEDTIME
Qty: 30 TABLET | Refills: 1 | Status: SHIPPED | OUTPATIENT
Start: 2023-06-08 | End: 2023-08-22

## 2023-06-08 NOTE — TELEPHONE ENCOUNTER
Date of Last Office Visit: 5/5/23  Date of Next Office Visit: 7/17/23  No shows since last visit: 0  Cancellations since last visit: 0    Medication requested: eszopiclone (LUNESTA) 1 MG tablet Date last ordered: 5/5/23 Qty: 30 Refills: 0     Review of [place] ?: Yes  Medication last filled date: 5/8/23 Qty filled: 30  Other controlled substance on [place] ?: yes  If yes, is this a new medication?: no  If yes, name of medication: armodafinil and date filled: 5/22    Lapse in medication adherence greater than 5 days?: No  If yes, call patient and gather details: na  Medication refill request verified as identical to current order?: yes  Result of Last DAM, VPA, Li+ Level, CBC, or Carbamazepine Level (at or since last visit): N/A      Last visit treatment plan: 1.  Depakote 2000 mg at bedtime    2.  Olanzapine 7.5 mg at bedtime    3.  Vraylar 4.5 mg daily    4.  Armodafinil 250 mg daily until approved for Sunosi then stop armodafinil and take Sunosi 75 mg daily    5.  Decrease Lunesta to 1 mg at bedtime    6.  Lamotrigine 200 mg daily    7.  Antabuse 250 mg daily  8.  Clonidine 0.1 mg daily as needed for anxiety  Changed Olanzapine to 15 mg and d/c'd the Vraylar.     []Medication refilled per  Medication Refill in Ambulatory Care  policy.  [x]Medication unable to be refilled by RN due to criteria not met as indicated below:    []Eligibility - not seen in the last year   []Supervision - no future appointment   []Compliance - no shows, cancellations or lapse in therapy   []Verification - order discrepancy   [x]Controlled medication   []Medication not included in policy   []90-day supply request   []Other

## 2023-06-09 NOTE — TELEPHONE ENCOUNTER
Communication continued in 6/9/23 [facility] Medical Advice encounter per patient choice.     Kusum Chamberlain RN on 6/9/2023 at 3:15 PM

## 2023-07-17 ENCOUNTER — TELEPHONE (OUTPATIENT)
Dept: PSYCHIATRY | Facility: CLINIC | Age: 28
End: 2023-07-17
Payer: COMMERCIAL

## 2023-07-19 ASSESSMENT — ENCOUNTER SYMPTOMS
HEADACHES: 0
HEARTBURN: 1
COUGH: 0
DIARRHEA: 0
SHORTNESS OF BREATH: 1
HEMATURIA: 0
FREQUENCY: 0
ABDOMINAL PAIN: 0
CHILLS: 0
PALPITATIONS: 0
SORE THROAT: 0
DYSURIA: 0
MYALGIAS: 0
CONSTIPATION: 0
EYE PAIN: 0
FEVER: 0
ARTHRALGIAS: 0
DIZZINESS: 0
NERVOUS/ANXIOUS: 1
HEMATOCHEZIA: 0
JOINT SWELLING: 0
PARESTHESIAS: 0
NAUSEA: 0
WEAKNESS: 0

## 2023-07-25 ENCOUNTER — OFFICE VISIT (OUTPATIENT)
Dept: FAMILY MEDICINE | Facility: CLINIC | Age: 28
End: 2023-07-25
Payer: COMMERCIAL

## 2023-07-25 VITALS
SYSTOLIC BLOOD PRESSURE: 144 MMHG | HEART RATE: 90 BPM | OXYGEN SATURATION: 97 % | DIASTOLIC BLOOD PRESSURE: 92 MMHG | WEIGHT: 230 LBS | RESPIRATION RATE: 20 BRPM | BODY MASS INDEX: 34.07 KG/M2 | HEIGHT: 69 IN | TEMPERATURE: 98.8 F

## 2023-07-25 DIAGNOSIS — Z11.3 ROUTINE SCREENING FOR STI (SEXUALLY TRANSMITTED INFECTION): ICD-10-CM

## 2023-07-25 DIAGNOSIS — F31.0 BIPOLAR AFFECTIVE DISORDER, CURRENT EPISODE HYPOMANIC (H): ICD-10-CM

## 2023-07-25 DIAGNOSIS — F90.2 ATTENTION DEFICIT HYPERACTIVITY DISORDER (ADHD), COMBINED TYPE: ICD-10-CM

## 2023-07-25 DIAGNOSIS — R06.83 SNORING: ICD-10-CM

## 2023-07-25 DIAGNOSIS — I10 HYPERTENSION GOAL BP (BLOOD PRESSURE) < 140/90: ICD-10-CM

## 2023-07-25 DIAGNOSIS — F40.10 SOCIAL ANXIETY DISORDER: ICD-10-CM

## 2023-07-25 DIAGNOSIS — E78.5 HYPERLIPIDEMIA LDL GOAL <130: ICD-10-CM

## 2023-07-25 DIAGNOSIS — Z00.00 PHYSICAL EXAM, ANNUAL: Primary | ICD-10-CM

## 2023-07-25 PROCEDURE — 90471 IMMUNIZATION ADMIN: CPT | Performed by: PHYSICIAN ASSISTANT

## 2023-07-25 PROCEDURE — 99214 OFFICE O/P EST MOD 30 MIN: CPT | Mod: 25 | Performed by: PHYSICIAN ASSISTANT

## 2023-07-25 PROCEDURE — 99395 PREV VISIT EST AGE 18-39: CPT | Mod: 25 | Performed by: PHYSICIAN ASSISTANT

## 2023-07-25 PROCEDURE — 90677 PCV20 VACCINE IM: CPT | Performed by: PHYSICIAN ASSISTANT

## 2023-07-25 PROCEDURE — 91313 COVID-19 BIVALENT 18+ (MODERNA): CPT | Performed by: PHYSICIAN ASSISTANT

## 2023-07-25 PROCEDURE — 0134A COVID-19 BIVALENT 18+ (MODERNA): CPT | Performed by: PHYSICIAN ASSISTANT

## 2023-07-25 RX ORDER — PROPRANOLOL HYDROCHLORIDE 10 MG/1
10 TABLET ORAL 3 TIMES DAILY
Qty: 90 TABLET | Refills: 3 | Status: SHIPPED | OUTPATIENT
Start: 2023-07-25 | End: 2024-01-23

## 2023-07-25 ASSESSMENT — ASTHMA QUESTIONNAIRES: ACT_TOTALSCORE: 12

## 2023-07-25 ASSESSMENT — PAIN SCALES - GENERAL: PAINLEVEL: NO PAIN (0)

## 2023-07-25 ASSESSMENT — PATIENT HEALTH QUESTIONNAIRE - PHQ9
SUM OF ALL RESPONSES TO PHQ QUESTIONS 1-9: 18
10. IF YOU CHECKED OFF ANY PROBLEMS, HOW DIFFICULT HAVE THESE PROBLEMS MADE IT FOR YOU TO DO YOUR WORK, TAKE CARE OF THINGS AT HOME, OR GET ALONG WITH OTHER PEOPLE: VERY DIFFICULT
SUM OF ALL RESPONSES TO PHQ QUESTIONS 1-9: 18

## 2023-07-25 NOTE — PROGRESS NOTES
SUBJECTIVE:   CC: Nathalie is an 27 year old who presents for preventative health visit.       7/25/2023     2:50 PM   Additional Questions   Roomed by luca arguelles pt to the clinic, here for CPE    -has significant mental health issues and it working with psych.  Also sees a therapist multiple times a week  -h/o bipolar, PTSD, CLARIBEL  -complains of significant social anxiety is curious about PRN medications to try.      -snores very loudly, tired during the day.  No known MADELEINE hx    -had a high risk behavior episode where there was a possible HIV exposure.  Would like screening for this    -BP elevated today.  Has heard this in the past.    -Patient denies any exertional chest pain, dyspnea, palpitations, syncope, orthopnea, edema or paroxysmal nocturnal dyspnea.        Healthy Habits:     Getting at least 3 servings of Calcium per day:  Yes    Bi-annual eye exam:  NO    Dental care twice a year:  Yes    Sleep apnea or symptoms of sleep apnea:  Daytime drowsiness    Diet:  Regular (no restrictions)    Frequency of exercise:  1 day/week    Duration of exercise:  15-30 minutes    Taking medications regularly:  Yes    Medication side effects:  None    Additional concerns today:  Yes      Today's PHQ-9 Score:       7/25/2023     2:56 PM   PHQ-9 SCORE   PHQ-9 Total Score 17                       Social History     Tobacco Use    Smoking status: Every Day     Packs/day: 1.00     Types: Cigarettes    Smokeless tobacco: Former     Types: Snuff, Chew    Tobacco comments:     and e-cig,    Substance Use Topics    Alcohol use: Not Currently     Comment: last use 7/20/21 7/19/2023     4:43 PM   Alcohol Use   Prescreen: >3 drinks/day or >7 drinks/week? Not Applicable     Last PSA: No results found for: PSA    Reviewed orders with patient. Reviewed health maintenance and updated orders accordingly - Yes  Lab work is in process  BP Readings from Last 3 Encounters:   07/25/23 (!) 144/92   11/14/22 (!) 147/80   10/26/22 (!)  143/97    Wt Readings from Last 3 Encounters:   07/25/23 104.3 kg (230 lb)   10/24/22 112.5 kg (248 lb)   06/27/22 108.9 kg (240 lb)                  Current Outpatient Medications   Medication Sig Dispense Refill    armodafinil (NUVIGIL) 250 MG TABS tablet Take 1 tablet (250 mg) by mouth every morning 30 tablet 0    cloNIDine (CATAPRES) 0.1 MG tablet Take 1 tablet (0.1 mg) by mouth daily as needed (anxiety) 90 tablet 1    disulfiram (ANTABUSE) 250 MG tablet Take 1 tablet (250 mg) by mouth daily 90 tablet 0    divalproex sodium extended-release (DEPAKOTE ER) 500 MG 24 hr tablet Take 4 tablets (2,000 mg) by mouth daily 120 tablet 1    eszopiclone (LUNESTA) 1 MG tablet Take 1 tablet (1 mg) by mouth At Bedtime Fill May1 30 tablet 1    lamoTRIgine (LAMICTAL) 200 MG tablet Take 1 tablet (200 mg) by mouth daily 30 tablet 2    OLANZapine (ZYPREXA) 15 MG tablet Take 1 tablet (15 mg) by mouth At Bedtime 30 tablet 1    propranolol (INDERAL) 10 MG tablet Take 1 tablet (10 mg) by mouth 3 times daily 90 tablet 3    amLODIPine-benazepril (LOTREL) 2.5-10 MG capsule Take 1 capsule by mouth daily 30 capsule 2    cariprazine (VRAYLAR) 4.5 MG capsule Take 1 capsule (4.5 mg) by mouth daily 30 capsule 1    naloxone (NARCAN) 4 MG/0.1ML nasal spray Spray 1 spray (4 mg) into one nostril alternating nostrils once as needed for opioid reversal every 2-3 minutes until assistance arrives (Patient not taking: Reported on 7/25/2023) 0.2 mL 11    Solriamfetol HCl (SOLRIAMFETOL) 75 MG TABS Take 75 mg by mouth daily (Patient not taking: Reported on 7/25/2023) 30 tablet 0       Reviewed and updated as needed this visit by clinical staff   Tobacco  Allergies  Meds              Reviewed and updated as needed this visit by Provider                   Review of Systems   Constitutional:  Negative for chills and fever.   HENT:  Negative for congestion, ear pain, hearing loss and sore throat.    Eyes:  Negative for pain and visual disturbance.  "  Respiratory:  Positive for shortness of breath. Negative for cough.    Cardiovascular:  Negative for chest pain, palpitations and peripheral edema.   Gastrointestinal:  Positive for heartburn. Negative for abdominal pain, constipation, diarrhea, hematochezia and nausea.   Genitourinary:  Positive for impotence. Negative for dysuria, frequency, genital sores, hematuria, penile discharge and urgency.   Musculoskeletal:  Negative for arthralgias, joint swelling and myalgias.   Skin:  Negative for rash.   Neurological:  Negative for dizziness, weakness, headaches and paresthesias.   Psychiatric/Behavioral:  Negative for mood changes. The patient is nervous/anxious.      OBJECTIVE:   BP (!) 144/92 (BP Location: Right arm, Patient Position: Sitting, Cuff Size: Adult Large)   Pulse 90   Temp 98.8  F (37.1  C) (Temporal)   Resp 20   Ht 1.753 m (5' 9\")   Wt 104.3 kg (230 lb)   SpO2 97%   BMI 33.97 kg/m      Physical Exam  GENERAL: healthy, alert and no distress  EYES: Eyes grossly normal to inspection, PERRL and conjunctivae and sclerae normal  HENT: ear canals and TM's normal, nose and mouth without ulcers or lesions  NECK: no adenopathy, no asymmetry, masses, or scars and thyroid normal to palpation  RESP: lungs clear to auscultation - no rales, rhonchi or wheezes  CV: regular rate and rhythm, normal S1 S2, no S3 or S4, no murmur, click or rub, no peripheral edema and peripheral pulses strong  ABDOMEN: soft, nontender, no hepatosplenomegaly, no masses and bowel sounds normal  MS: no gross musculoskeletal defects noted, no edema  SKIN: no suspicious lesions or rashes  NEURO: Normal strength and tone, mentation intact and speech normal    Diagnostic Test Results:  Labs reviewed in Epic    ASSESSMENT/PLAN:   (Z00.00) Physical exam, annual  (primary encounter diagnosis)  Comment:   Plan: Hemoglobin A1c, Comprehensive metabolic panel         (BMP + Alb, Alk Phos, ALT, AST, Total. Bili,         TP), CBC with platelets, " "Hepatitis C antibody,         CBC with platelets, Hemoglobin A1c            (E78.5) Hyperlipidemia LDL goal <130  Comment:   Plan: Lipid panel reflex to direct LDL Non-fasting            (F31.0) Bipolar affective disorder, current episode hypomanic (H)  Comment:   -sees psych, doing well  Plan:     (F90.2) Attention deficit hyperactivity disorder (ADHD), combined type  Comment:   -managed by psych  Plan:     (I10) Hypertension goal BP (blood pressure) < 140/90  Comment:   -elevated today.  Starting propranolol for anxiety.  Recheck in clinic in 2 weeks.  Low threshold for starting meds.    Plan:     (F40.10) Social anxiety disorder  Comment:   -trial of propranolol TID prn  --Side effects of medication(s) discussed as well as risks/benefits of taking -   -follow-up in 2 weeks.    Plan: propranolol (INDERAL) 10 MG tablet            (Z11.3) Routine screening for STI (sexually transmitted infection)  Comment:   Plan: HIV Antigen Antibody Combo, Treponema Abs w         Reflex to [facility] and Titer, Chlamydia & Gonorrhea         by PCR, [facility] Collect, Hepatitis         B surface antigen, Hepatitis B core antibody            (R06.83) Snoring  Comment:   -sleep study referral sent.  Discussed importance of getting this   Plan: Adult Sleep Eval & Management          Referral          Depression Screening Follow Up        7/25/2023     2:56 PM   PHQ   PHQ-9 Total Score 17   Q9: Thoughts of better off dead/self-harm past 2 weeks Not at all         Follow Up Actions Taken  Patient counseled, no additional follow up at this time.  Pt sees therapist weekly and psych for med management        COUNSELING:   Reviewed preventive health counseling, as reflected in patient instructions      BMI:   Estimated body mass index is 33.97 kg/m  as calculated from the following:    Height as of this encounter: 1.753 m (5' 9\").    Weight as of this encounter: 104.3 kg (230 lb).   Weight management plan: Discussed healthy diet " and exercise guidelines      She reports that she has been smoking cigarettes. She has been smoking an average of 1 pack per day. She has quit using smokeless tobacco.  Her smokeless tobacco use included snuff and chew.  Nicotine/Tobacco Cessation Plan:   Information offered: Patient not interested at this time            Lita Quintero PA-C  [facility]

## 2023-07-25 NOTE — PROGRESS NOTES
Answers submitted by the patient for this visit:  Patient Health Questionnaire (Submitted on 7/25/2023)  If you checked off any problems, how difficult have these problems made it for you to do your work, take care of things at home, or get along with other people?: Very difficult  PHQ9 TOTAL SCORE: 18  Annual Preventive Visit (Submitted on 7/19/2023)  Chief Complaint: Annual Exam:  Frequency of exercise:: 1 day/week  Getting at least 3 servings of Calcium per day:: Yes  Diet:: Regular (no restrictions)  Taking medications regularly:: Yes  Medication side effects:: None  Bi-annual eye exam:: NO  Dental care twice a year:: Yes  Sleep apnea or symptoms of sleep apnea:: Daytime drowsiness  abdominal pain: No  Blood in stool: No  Blood in urine: No  chest pain: No  chills: No  congestion: No  constipation: No  cough: No  diarrhea: No  dizziness: No  ear pain: No  eye pain: No  nervous/anxious: Yes  fever: No  frequency: No  genital sores: No  headaches: No  hearing loss: No  heartburn: Yes  arthralgias: No  joint swelling: No  peripheral edema: No  mood changes: No  myalgias: No  nausea: No  dysuria: No  palpitations: No  Skin sensation changes: No  sore throat: No  urgency: No  rash: No  shortness of breath: Yes  visual disturbance: No  weakness: No  impotence: Yes  penile discharge: No  Additional concerns today:: Yes  Exercise outside of work (Submitted on 7/19/2023)  Chief Complaint: Annual Exam:  Duration of exercise:: 15-30 minutes

## 2023-07-26 ASSESSMENT — ENCOUNTER SYMPTOMS
ABDOMINAL PAIN: 0
DIZZINESS: 0
HEADACHES: 0
FEVER: 0
HEARTBURN: 1
CHILLS: 0
EYE PAIN: 0
HEMATURIA: 0
MYALGIAS: 0
DYSURIA: 0
ARTHRALGIAS: 0
COUGH: 0
NERVOUS/ANXIOUS: 1
SORE THROAT: 0
NAUSEA: 0
PARESTHESIAS: 0
HEMATOCHEZIA: 0
PALPITATIONS: 0
FREQUENCY: 0
JOINT SWELLING: 0
CONSTIPATION: 0
WEAKNESS: 0
DIARRHEA: 0
SHORTNESS OF BREATH: 1

## 2023-08-18 ENCOUNTER — MYC MEDICAL ADVICE (OUTPATIENT)
Dept: FAMILY MEDICINE | Facility: CLINIC | Age: 28
End: 2023-08-18
Payer: COMMERCIAL

## 2023-08-18 ENCOUNTER — MYC REFILL (OUTPATIENT)
Dept: PSYCHIATRY | Facility: CLINIC | Age: 28
End: 2023-08-18
Payer: COMMERCIAL

## 2023-08-18 DIAGNOSIS — Z79.899 MEDICATION MANAGEMENT: Primary | ICD-10-CM

## 2023-08-18 DIAGNOSIS — F31.10 BIPOLAR I DISORDER, MOST RECENT EPISODE (OR CURRENT) MANIC (H): ICD-10-CM

## 2023-08-18 NOTE — CONFIDENTIAL NOTE
Date of Last Office Visit: 5/5/23  Date of Next Office Visit: 8/24/23  No shows since last visit: 1  Cancellations since last visit: 1    Medication requested: OLANZapine (ZYPREXA) 15 MG tablet  Date last ordered: 6/6/23 Qty: 30 Refills: 1      Lapse in medication adherence greater than 5 days?: no, per pharmacy last filled on 7/21/23  If yes, call patient and gather details: na  Medication refill request verified as identical to current order?: yes  Result of Last DAM, VPA, Li+ Level, CBC, or Carbamazepine Level (at or since last visit): N/A    Last visit treatment plan:   1.  Depakote 2000 mg at bedtime  2.  Olanzapine 7.5 mg at bedtime  3.  Vraylar 4.5 mg daily  4.  Armodafinil 250 mg daily until approved for Sunosi then stop armodafinil and take Sunosi 75 mg daily  5.  Decrease Lunesta to 1 mg at bedtime  6.  Lamotrigine 200 mg daily  7.  Antabuse 250 mg daily  8.  Clonidine 0.1 mg daily as needed for anxiety     Continue all other medications as reviewed per electronic medical record today.   Safety plan reviewed. To the Emergency Department as needed or call after hours crisis line at [phone] or [phone]. [place] Crisis Text Line. Text [place] to 968107 or Suicide LifeLine Chat: suicidepreventionlifeline.org/chat/  To schedule individual or family therapy, call [facility] at [phone]  Schedule an appointment with me in 4 weeks or sooner as needed. Call [facility] at [phone] to schedule.  Follow up with primary care provider as planned or for acute medical concerns.  Call the psychiatric nurse line with medication questions or concerns at [phone]  MyChart may be used to communicate with your provider, but this is not intended to be used for emergencies.    []Medication refilled per  Medication Refill in Ambulatory Care  policy.  [x]Medication unable to be refilled by RN due to criteria not met as indicated below:    []Eligibility - not seen in the last  year   []Supervision - no future appointment   []Compliance - no shows, cancellations or lapse in therapy   []Verification - order discrepancy   []Controlled medication   []Medication not included in policy   []90-day supply request   [x]Other out of scope of CMA

## 2023-08-21 RX ORDER — OLANZAPINE 15 MG/1
15 TABLET ORAL AT BEDTIME
Qty: 30 TABLET | Refills: 0 | Status: SHIPPED | OUTPATIENT
Start: 2023-08-21 | End: 2023-08-24
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
## 2023-08-24 ENCOUNTER — VIRTUAL VISIT (OUTPATIENT)
Dept: PSYCHIATRY | Facility: CLINIC | Age: 28
End: 2023-08-24
Payer: COMMERCIAL

## 2023-08-24 DIAGNOSIS — F31.10 BIPOLAR I DISORDER, MOST RECENT EPISODE (OR CURRENT) MANIC (H): Primary | ICD-10-CM

## 2023-08-24 DIAGNOSIS — F10.20 ALCOHOL USE DISORDER, SEVERE, DEPENDENCE (H): ICD-10-CM

## 2023-08-24 DIAGNOSIS — F99 INSOMNIA DUE TO OTHER MENTAL DISORDER: ICD-10-CM

## 2023-08-24 DIAGNOSIS — F51.05 INSOMNIA DUE TO OTHER MENTAL DISORDER: ICD-10-CM

## 2023-08-24 PROCEDURE — 99214 OFFICE O/P EST MOD 30 MIN: CPT | Mod: VID | Performed by: NURSE PRACTITIONER

## 2023-08-24 RX ORDER — DISULFIRAM 250 MG/1
250 TABLET ORAL DAILY
Qty: 90 TABLET | Refills: 1 | Status: SHIPPED | OUTPATIENT
Start: 2023-08-24 | End: 2024-01-23

## 2023-08-24 RX ORDER — ESZOPICLONE 1 MG/1
1 TABLET, FILM COATED ORAL AT BEDTIME
Qty: 30 TABLET | Refills: 0 | Status: SHIPPED | OUTPATIENT
Start: 2023-08-24 | End: 2023-09-18

## 2023-08-24 RX ORDER — OLANZAPINE 15 MG/1
15 TABLET ORAL AT BEDTIME
Qty: 90 TABLET | Refills: 1 | Status: SHIPPED | OUTPATIENT
Start: 2023-08-24 | End: 2024-01-23

## 2023-08-24 ASSESSMENT — PAIN SCALES - GENERAL: PAINLEVEL: NO PAIN (0)

## 2023-08-24 ASSESSMENT — PATIENT HEALTH QUESTIONNAIRE - PHQ9
SUM OF ALL RESPONSES TO PHQ QUESTIONS 1-9: 4
10. IF YOU CHECKED OFF ANY PROBLEMS, HOW DIFFICULT HAVE THESE PROBLEMS MADE IT FOR YOU TO DO YOUR WORK, TAKE CARE OF THINGS AT HOME, OR GET ALONG WITH OTHER PEOPLE: SOMEWHAT DIFFICULT
SUM OF ALL RESPONSES TO PHQ QUESTIONS 1-9: 4

## 2023-08-24 NOTE — NURSING NOTE
Is the patient currently in the state of [place]? YES    Visit mode:VIDEO    If the visit is dropped, the patient can be reconnected by: VIDEO VISIT: Text to cell phone:   Telephone Information:   Mobile [phone]       Will anyone else be joining the visit? No  (If patient encounters technical issues they should call [phone])    How would you like to obtain your AVS? MyChart    Are changes needed to the allergy or medication list? No    Rooming Documentation: Assigned questionnaire(s) completed .    Reason for visit: RECHECK     [facility]      Care team has reviewed attendance agreement with patient. Patient advised that two failed appointments within 6 months may lead to termination of current episode of care.

## 2023-08-24 NOTE — PATIENT INSTRUCTIONS
"Patient Education   The [facility]  What to Expect  Here's what to expect in the [facility] Psych[facility] Program.   About the program  You'll be meeting with a psychiatric doctor to check your mental health. A psychiatric doctor helps you deal with troubling thoughts and feelings by giving you medicine. They'll make sure you know the plan for your care. You may see them for a long time. When you're feeling better, they may refer you back to seeing your family doctor.   If you have any questions, we'll be glad to talk to you.  About visits  Be open  At your visits, please talk openly about your problems. It may feel hard, but it's the best way for us to help you.  Cancelling visits  If you can't come to your visit, please call us right away at [phone]. If you don't cancel at least 24 hours (1 full day) before your visit, that's \"late cancellation.\"  Not showing up for your visits  Being very late is the same as not showing up. You'll be a \"no show\" if:  You're more than 15 minutes late for a 30-minute (half hour) visit.  You're more than 30 minutes late for a 60-minute (full hour) visit.  If you cancel late or don't show up 2 times within 6 months, we may end your care.  Getting help between visits  If you need help between visits, you can call us Monday to Friday from 8 a.m. to 4:30 p.m. at [phone].  Emergency care  Call 911 or go to the nearest emergency department if your life or someone else's life is in danger.  Call 988 anytime to reach the national Suicide and Crisis hotline.  Medicine refills  To refill your medicine, call your pharmacy. You can also call [facility] at [phone], Monday to Friday, 8 a.m. to 4:30 p.m. It can take 1 to 3 business days to get a refill.   Forms, letters, and tests  You may have papers to fill out, like FMLA, short-term disability, and workability. We can help you with these forms at your visits, but you must have an " appointment. You may need more than 1 visit for this, to be in an intensive therapy program, or both.  Before we can give you medicine for ADHD, we may refer you to get tested for it or confirm it another way.  We may not be able to give you an emotional support animal letter.  We don't do mental health checks ordered by the court.   We don't do mental health testing, but we can refer you to get tested.   Thank you for choosing us for your care.  For informational purposes only. Not to replace the advice of your health care provider. Copyright   2022 [employer]. All rights reserved. [employer] 281895 - 12/22.         Treatment Plan:      1.  Armodafinil 250 mg daily  2.  Consider discontinuing clonidine but take sparingly since you are on propranolol 10 mg 3 times daily for anxiety  3.  Continue disulfiram 250 mg daily  4.  Continue Depakote 2000 mg daily  5.  Continue Lunesta 1 mg at bedtime  6.  Continue lamotrigine 200 mg daily  7.  Continue olanzapine 15 mg at bedtime  8.  Continue talk therapy to learn skills to manage life stressors  9.  Follow-up with sleep medicine to refill armodafinil and Lunesta, if appropriate, in the future      Continue all other medical directions per primary care provider.   Continue all other medications as reviewed per electronic medical record today.   Safety plan reviewed. To the Emergency Department as needed or call after hours crisis line at [phone] or [phone]. [place] Crisis [place]: Text [place] to 533267  or  Suicide LifeLine Chat: suicidepreventionlifeline.org/chat/  To schedule individual or family therapy, call [facility] at [phone].   Schedule an appointment with me in November or sooner as needed.  Call [facility] at [phone] to schedule.  Follow up with primary care provider as planned or for acute medical concerns.  Call the psychiatric nurse line with medication questions or concerns at  [phone].  MyChart may be used to communicate with your provider, but this is not intended to be used for emergencies.    Crisis Resources:    National Suicide Prevention Lifeline: 625.325.4991 (TTY: 223.306.5190). Call anytime for help.  (www.suicidepreventionlifeline.org)  National [place] on Mental Illness (www.ute.org): 610.272.8627 or 089-790-9163.   Mental Health Association (www.mentalhealth.org): 197.548.7606 or 281-099-4414.  [place] Crisis [place]: Text [place] to 369603  Suicide LifeLine Chat: suicidepreventionlifeline.org/chat

## 2023-08-24 NOTE — PROGRESS NOTES
"Virtual Visit Details    Type of service:  Video Visit   Video Start Time: 8:46 AM  Video End Time:9:11 AM    Originating Location (pt. Location): Home    Distant Location (provider location):  On-site  Platform used for Video Visit: [facility]         Outpatient Psychiatric Progress Note    Name: Laci Hinkle Jr   : 1995                    Primary Care Provider: [facility]   Therapist: Yes    PHQ-9 scores:      2023     1:35 PM 2023     2:35 PM 2023     2:56 PM   PHQ-9 SCORE   PHQ-9 Total Score MyChart 12 (Moderate depression) 18 (Moderately severe depression)    PHQ-9 Total Score 12 18 17       CEASAR-7 scores:      10/6/2022     9:18 AM 10/24/2022     8:00 AM 2022     8:09 AM   CEASAR-7 SCORE   Total Score 15 (severe anxiety)  10 (moderate anxiety)   Total Score 15 4 10       Patient Identification:    Patient is a 27 year old year old, single  White Not  or  male  who presents for return visit with me.  Patient is currently unemployed. Patient attended the session alone. Patient prefers to be called: \" Pat\".    Current medications include: amLODIPine-benazepril (LOTREL) 2.5-10 MG capsule, Take 1 capsule by mouth daily  armodafinil (NUVIGIL) 250 MG TABS tablet, Take 1 tablet (250 mg) by mouth every morning  cloNIDine (CATAPRES) 0.1 MG tablet, Take 1 tablet (0.1 mg) by mouth daily as needed (anxiety)  disulfiram (ANTABUSE) 250 MG tablet, Take 1 tablet (250 mg) by mouth daily  divalproex sodium extended-release (DEPAKOTE ER) 500 MG 24 hr tablet, Take 4 tablets (2,000 mg) by mouth daily  eszopiclone (LUNESTA) 1 MG tablet, Take 1 tablet (1 mg) by mouth At Bedtime Fill   lamoTRIgine (LAMICTAL) 200 MG tablet, Take 1 tablet (200 mg) by mouth daily  naloxone (NARCAN) 4 MG/0.1ML nasal spray, Spray 1 spray (4 mg) into one nostril alternating nostrils once as needed for opioid reversal every 2-3 minutes until assistance arrives (Patient not taking: Reported on " 7/25/2023)  OLANZapine (ZYPREXA) 15 MG tablet, Take 1 tablet (15 mg) by mouth At Bedtime  propranolol (INDERAL) 10 MG tablet, Take 1 tablet (10 mg) by mouth 3 times daily  Solriamfetol HCl (SOLRIAMFETOL) 75 MG TABS, Take 75 mg by mouth daily (Patient not taking: Reported on 7/25/2023)    No current facility-administered medications on file prior to visit.       The [place] Prescription Monitoring Program has been reviewed and there are no concerns about diversionary activity for controlled substances at this time.      I was able to review most recent Primary Care Provider, specialty provider, and therapy visit notes that I have access to.     Today, patient reports He got suspended from school and now has to  look for a job.  [employer] informed him that he cannot be accepted back to the school at this time due to reporting to them too many appeals.  He will try to go back in the spring.  In the meantime, he is thinking of applying for a health care entry job.  He has been putting together a resume.  He sleeps at night - he ran out of lunesta.  He has been seeing a sleep medicine provider.  He takes the clonidine occasionally.  He takes propranolol 2-3 times daily when he is out of the house. He takes antabuse even though he does not drink Alcohol now.  He feels like he does not need any accommodations at school. 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  He feels like he needs help navigating the [employer] system rather than having people  his ability.       has a past medical history of ADHD (attention deficit hyperactivity disorder) (03/18/2011), Alcohol use disorder, Bipolar affective disorder (H) (03/18/2011), CEASAR (generalized anxiety disorder) (03/18/2011), GERD (gastroesophageal reflux disease), Hyperlipidemia LDL goal <130, Impaired fasting glucose, Obesity, Opioid use disorder, and Substance abuse (H) (03/18/2011).    Social history updates:    His aunt is  identified as support for  him.    Substance use updates:    Vague about substance use  Tobacco use: Yes Cigarettes  Ready to quit?  No  Nicotine Replacement Therapy tried: None    Vital Signs:   There were no vitals taken for this visit.    Labs:    Most recent laboratory results reviewed and no new labs.     Mental Status Examination:  Appearance: awake, alert, casual dress, and mild distress  Attitude: cooperative  Eye Contact:  adequate  Gait and Station: No dizziness or falls  Psychomotor Behavior:  intact station, gait and muscle tone  Oriented to:  time, person, and place  Attention Span and Concentration:  Normal  Speech:   vtspeech: clear, coherent and Speaks: English  Mood:  anxious and depressed  Affect:  mood congruent  Associations:  no loose associations  Thought Process:  goal oriented  Thought Content:  no evidence of suicidal ideation or homicidal ideation, no auditory hallucinations present, and no visual hallucinations present  Recent and Remote Memory:  intact Not formally assessed. No amnesia.  Fund of Knowledge: delayed  Insight:  fair  Judgment: fair  Impulse Control:  fair    Suicide Risk Assessment:  Today Laci Hinkle Jr reports no thoughts to harm themself or others. In addition, there are notable risk factors for self-harm, including single status, anxiety, substance abuse, and mood change. However, risk is mitigated by commitment to family, history of seeking help when needed, future oriented, denies suicidal intent or plan, and denies homicidal ideation, intent, or plan. Therefore, based on all available evidence including the factors cited above, Laci Hinkle Jr does not appear to be at imminent risk for self-harm, does not meet criteria for a 72-hr hold, and therefore remains appropriate for ongoing outpatient level of care.  A thorough assessment of risk factors related to suicide and self-harm have been reviewed and are noted above. The patient convincingly denies suicidality  on several occasions. Local community safety resources printed and reviewed for patient to use if needed. There was no deceit detected, and the patient presented in a manner that was believable.   296.41 Bipolar I Disorder Current or Most Recent Episode Manic, Mild   780.52 (G47.00) Insomnia Disorder   With non-sleep disorder mental comorbidity  Episodic    Substance-Related & Addictive Disorders Alcohol Use Disorder   303.90 (F10.20) Severe In early remission,   DSM5 Diagnosis:      Medical comorbidities include:   Patient Active Problem List    Diagnosis Date Noted    Social anxiety disorder 07/25/2023     Priority: Medium    Opioid use disorder, severe, dependence (H) 07/27/2021     Priority: Medium    Chemical dependency (H) 07/23/2021     Priority: Medium    Hypertension goal BP (blood pressure) < 140/90 10/28/2015     Priority: Medium    CEASAR (generalized anxiety disorder) 09/16/2014     Priority: Medium    MENTAL HEALTH 10/07/2012     Priority: Medium    Obesity (BMI 30-39.9) 10/07/2012     Priority: Medium    GERD (gastroesophageal reflux disease) 04/20/2012     Priority: Medium    Tobacco abuse 03/30/2012     Priority: Medium    Hyperlipidemia LDL goal <130 03/14/2012     Priority: Medium    Impaired fasting glucose 03/14/2012     Priority: Medium    Bipolar affective disorder (H) 03/18/2011     Priority: Medium    ADHD (attention deficit hyperactivity disorder) 03/18/2011     Priority: Medium    Substance abuse (H) 03/18/2011     Priority: Medium       Assessment:    Laci Hinkle Jr reported today that he has been denied an appeal that he submitted to return to college.  He plans on getting a job in the near future instead and will reapply for college in the spring.  At this point he desires no changes in his medications.  He is planning to seek guidance from sleep medicine with regards to his armodafinil and Lunesta.  Mood has been stable with the Depakote lamotrigine and olanzapine, as he has been  taking these for many years.  Talk therapy will continue to help him learn skills to manage life stressors.  He prefers to continue the disulfiram to keep him from drinking alcohol as he tells me he is sober for now.  He had another provider prescribed propranolol for him 3 times a day.  He still has the clonidine available but take sparingly.      Medication side effects and alternatives were reviewed. Health promotion activities recommended and reviewed today. All questions addressed. Education and counseling completed regarding risks and benefits of medications and psychotherapy options.    Treatment Plan:      1.  Armodafinil 250 mg daily  2.  Consider discontinuing clonidine but take sparingly since you are on propranolol 10 mg 3 times daily for anxiety  3.  Continue disulfiram 250 mg daily  4.  Continue Depakote 2000 mg daily  5.  Continue Lunesta 1 mg at bedtime  6.  Continue lamotrigine 200 mg daily  7.  Continue olanzapine 15 mg at bedtime  8.  Continue talk therapy to learn skills to manage life stressors  9.  Follow-up with sleep medicine to refill armodafinil and Lunesta, if appropriate, in the future    Continue all other medications as reviewed per electronic medical record today.   Safety plan reviewed. To the Emergency Department as needed or call after hours crisis line at [phone] or [phone]. [place] Crisis Text [place]. Text [place] to 363566 or Suicide LifeLine Chat: suicidepreventionlifeline.org/chat/  To schedule individual or family therapy, call [facility] at [phone]  Schedule an appointment with me in November or sooner as needed. Call [facility] at [phone] to schedule.  Follow up with primary care provider as planned or for acute medical concerns.  Call the psychiatric nurse line with medication questions or concerns at [phone]  MyChart may be used to communicate with your provider, but this is not intended to be used for  emergencies.    Crisis Resources:    National Suicide Prevention Lifeline: 216.209.8638 (TTY: 621.870.3358). Call anytime for help.  (www.suicidepreventionlifeline.org)  National [place] on Mental Illness (www.ute.org): 384.768.6694 or 132-826-3861.   Mental Health Association (www.mentalhealth.org): 183.818.3366 or 186-188-7102.  [place] Crisis [place]: Text [place] to 667629  Suicide LifeLine Chat: [employer]Spreadtrum Communications.org/chat    Administrative Billing:   Time spent with patient includes counseling and coordination of care regarding above diagnoses and treatment plan.    Patient Status:  This is a continuous care patient and medications will be prescribed by the psychiatric provider until further indicated.    Signed:   RAY Lowe-BC   Psychiatry

## 2023-08-30 NOTE — PROGRESS NOTES
Left detailed msg for pt with provider response.  Encouraged pt to give it one more week to see if cravings improve.  If they are not improving, or worsening, instructed pt to contact clinic.  Clinic number provided.   Solaraze Counseling:  I discussed with the patient the risks of Solaraze including but not limited to erythema, scaling, itching, weeping, crusting, and pain.

## 2023-09-12 DIAGNOSIS — F31.10 BIPOLAR I DISORDER, MOST RECENT EPISODE (OR CURRENT) MANIC (H): Primary | ICD-10-CM

## 2023-09-12 RX ORDER — LAMOTRIGINE 200 MG/1
200 TABLET ORAL DAILY
Qty: 30 TABLET | Refills: 2 | Status: SHIPPED | OUTPATIENT
Start: 2023-09-12 | End: 2024-01-23

## 2023-09-12 NOTE — TELEPHONE ENCOUNTER
Date of Last Office Visit: 08/24/23  Date of Next Office Visit: 11/21/23  No shows since last visit: 0  Cancellations since last visit: 0    Medication requested: lamoTRIgine (LAMICTAL) 200 MG tablet  Date last ordered: 5/5/23 Qty: 30 Refills: 2     [place] of [place] ?: na  Medication last sold date: na Qty filled: na  Other [place] substance on [place] ?: na  If yes, is this a new medication?: na  If yes, name of medication: na and date filled: na    Lapse in medication adherence greater than 5 days?: yes  If yes, call patient and gather details: yes; pt stated they had a previous surplus and have been taking medication as prescribed.  Medication refill request verified as identical to current order?: yes  Result of Last DAM, VPA, Li+ Level, CBC, or Carbamazepine Level (at or since last visit): N/A    Last visit treatment plan:     1.  Armodafinil 250 mg daily  2.  Consider discontinuing clonidine but take sparingly since you are on propranolol 10 mg 3 times daily for anxiety  3.  Continue disulfiram 250 mg daily  4.  Continue Depakote 2000 mg daily  5.  Continue Lunesta 1 mg at bedtime  6.  Continue lamotrigine 200 mg daily  7.  Continue olanzapine 15 mg at bedtime  8.  Continue talk therapy to learn skills to manage life stressors  9.  Follow-up with sleep medicine to refill armodafinil and Lunesta, if appropriate, in the future    []Medication refilled per  Medication Refill in Ambulatory Care  policy.  [x]Medication unable to be refilled by RN due to criteria not met as indicated below:    []Eligibility - not seen in the last year   []Supervision - no future appointment   []Compliance - no shows, cancellations or lapse in therapy   []Verification - order discrepancy   []Controlled medication   []Medication not included in policy   []90-day supply request   [x]Other LPN reviewed

## 2023-09-28 DIAGNOSIS — F31.10 BIPOLAR I DISORDER, MOST RECENT EPISODE (OR CURRENT) MANIC (H): ICD-10-CM

## 2023-09-28 RX ORDER — DIVALPROEX SODIUM 500 MG/1
2000 TABLET, EXTENDED RELEASE ORAL DAILY
Qty: 120 TABLET | Refills: 0 | Status: SHIPPED | OUTPATIENT
Start: 2023-09-28 | End: 2024-01-23

## 2023-09-28 NOTE — TELEPHONE ENCOUNTER
Request for refill of divalproex sodium extended-release (DEPAKOTE ER) 500 MG 24 hr tablet  was received via right fax from [facility]     Date of Last Office Visit: 8/24/2023  Date of Next Office Visit: 11/21/2023  No shows since last visit: None  Cancellations since last visit: None    Medication requested: divalproex sodium extended-release (DEPAKOTE ER) 500 MG 24 hr tablet  Date last ordered: 7/3/2023 Qty: 120 Refills: 1     [place] of [place] ?: No    Lapse in medication adherence greater than 5 days?: Possibly     RN left a message for pt to call back to verify,  RN then called the pharmacy to verify when the patient picked up the last prescription of this medication, per staff member, last prescription was picked up on 8/24/2023    If yes, call patient and gather details: See above  Medication refill request verified as identical to current order?: Yes  Result of Last DAM, VPA, Li+ Level, CBC, or Carbamazepine Level (at or since last visit):  No    Last visit treatment plan:   1.  Armodafinil 250 mg daily  2.  Consider discontinuing clonidine but take sparingly since you are on propranolol 10 mg 3 times daily for anxiety  3.  Continue disulfiram 250 mg daily  4.  Continue Depakote 2000 mg daily  5.  Continue Lunesta 1 mg at bedtime  6.  Continue lamotrigine 200 mg daily  7.  Continue olanzapine 15 mg at bedtime  8.  Continue talk therapy to learn skills to manage life stressors  9.  Follow-up with sleep medicine to refill armodafinil and Lunesta, if appropriate, in the future  Continue all other medications as reviewed per electronic medical record today.   Safety plan reviewed. To the Emergency Department as needed or call after hours crisis line at [phone] or [phone]. [place] Crisis [place]. Text [place] to 884720 or Suicide LifeLine Chat: suicidepreventionlifeline.org/chat/  To schedule individual or family therapy, call [facility] at [phone]  Schedule an  appointment with me in November or sooner as needed. Call [facility] at [phone] to schedule.  Follow up with primary care provider as planned or for acute medical concerns.  Call the psychiatric nurse line with medication questions or concerns at [phone]  MyChart may be used to communicate with your provider, but this is not intended to be used for emergencies.      []Medication refilled per  Medication Refill in Ambulatory Care  policy.  [x]Medication unable to be refilled by RN due to criteria not met as indicated below:    []Eligibility - not seen in the last year   []Supervision - no future appointment   [x]Compliance - no shows, cancellations or lapse in therapy   []Verification - order discrepancy   []Controlled medication   []Medication not included in policy   []90-day supply request   []Other

## 2023-10-10 ENCOUNTER — MYC MEDICAL ADVICE (OUTPATIENT)
Dept: PSYCHIATRY | Facility: CLINIC | Age: 28
End: 2023-10-10
Payer: COMMERCIAL

## 2023-10-10 DIAGNOSIS — F51.05 INSOMNIA DUE TO OTHER MENTAL DISORDER: Primary | ICD-10-CM

## 2023-10-10 DIAGNOSIS — F31.10 BIPOLAR I DISORDER, MOST RECENT EPISODE (OR CURRENT) MANIC (H): ICD-10-CM

## 2023-10-10 DIAGNOSIS — F99 INSOMNIA DUE TO OTHER MENTAL DISORDER: Primary | ICD-10-CM

## 2023-10-11 RX ORDER — RAMELTEON 8 MG/1
8 TABLET ORAL
Qty: 15 TABLET | Refills: 0 | Status: SHIPPED | OUTPATIENT
Start: 2023-10-11 | End: 2024-01-23

## 2023-10-11 RX ORDER — OLANZAPINE 5 MG/1
5 TABLET ORAL
Qty: 30 TABLET | Refills: 0 | Status: SHIPPED | OUTPATIENT
Start: 2023-10-11 | End: 2024-01-30

## 2023-10-11 NOTE — TELEPHONE ENCOUNTER
Update at 11:46 AM  - the patient sent a [employer] message indicating he does not see the sleep provider until 11/9/23    RN reviewed patient MyChart message and will route to on-call provider for review.     Per visit note from 8/24/23:  Assessment:     Laci Hinkle Jr reported today that he has been denied an appeal that he submitted to return to college.  He plans on getting a job in the near future instead and will reapply for college in the spring.  At this point he desires no changes in his medications.  He is planning to seek guidance from sleep medicine with regards to his armodafinil and Lunesta.  Mood has been stable with the Depakote lamotrigine and olanzapine, as he has been taking these for many years.  Talk therapy will continue to help him learn skills to manage life stressors.  He prefers to continue the disulfiram to keep him from drinking alcohol as he tells me he is sober for now.  He had another provider prescribed propranolol for him 3 times a day.  He still has the clonidine available but take sparingly.       Medication side effects and alternatives were reviewed. Health promotion activities recommended and reviewed today. All questions addressed. Education and counseling completed regarding risks and benefits of medications and psychotherapy options.     Treatment Plan:  1.  Armodafinil 250 mg daily  2.  Consider discontinuing clonidine but take sparingly since you are on propranolol 10 mg 3 times daily for anxiety  3.  Continue disulfiram 250 mg daily  4.  Continue Depakote 2000 mg daily  5.  Continue Lunesta 1 mg at bedtime  6.  Continue lamotrigine 200 mg daily  7.  Continue olanzapine 15 mg at bedtime  8.  Continue talk therapy to learn skills to manage life stressors  9.  Follow-up with sleep medicine to refill armodafinil and Lunesta, if appropriate, in the future  Continue all other medications as reviewed per electronic medical record today.   Safety plan reviewed. To the Emergency  Department as needed or call after hours crisis line at [phone] or [phone]. [place] Crisis Text [place]. Text [place] to 093662 or Suicide LifeLine Chat: suicidepreventionlifeline.org/chat/  To schedule individual or family therapy, call [facility] at [phone]  Schedule an appointment with me in November or sooner as needed. Call [facility] at [phone] to schedule.  Follow up with primary care provider as planned or for acute medical concerns.  Call the psychiatric nurse line with medication questions or concerns at [phone]  MyChart may be used to communicate with your provider, but this is not intended to be used for emergencies.

## 2023-10-11 NOTE — CONFIDENTIAL NOTE
Please let pt know additional olanzapine 5 mg as needed can be added to bedtime 15 mg dose for now to help with sleep. Also can trial ramelteon for sleep as needed instead of Lunesta and until VIBHA Astudillo returns and/or until pt sees sleep medicine.     Nicole Mayo, DO  [facility] Psychiatry  [facility]

## 2023-10-12 NOTE — TELEPHONE ENCOUNTER
RN reviewed Dr. Mayo's recommendations of:  Please let pt know additional olanzapine 5 mg as needed can be added to bedtime 15 mg dose for now to help with sleep. Also can trial ramelteon for sleep as needed instead of Lunesta and until VIBHA Astudillo returns and/or until pt sees sleep medicine.     RN sent [employer] message to the patient with the above recommendations     Ruthie Briceno RN on 10/12/2023 at 8:58 AM

## 2023-10-19 ENCOUNTER — MYC MEDICAL ADVICE (OUTPATIENT)
Dept: PSYCHIATRY | Facility: CLINIC | Age: 28
End: 2023-10-19
Payer: COMMERCIAL

## 2023-10-19 DIAGNOSIS — F15.11 METHAMPHETAMINE ABUSE IN REMISSION (H): ICD-10-CM

## 2023-10-19 NOTE — TELEPHONE ENCOUNTER
Date of Last Office Visit: 8/24/23  Date of Next Office Visit: 11/21/23  No shows since last visit: No  Cancellations since last visit: No    Medication requested: armodafinil (NUVIGIL) 250 MG TABS tablet Date last ordered: 9/19/23 Qty: 30 Refills: 0     [place] of [place] ?: yes  Medication last sold date: 9/20/23 Qty filled: 30  Other [place] on [place] ?: yes  If yes, is this a new medication?: no  If yes, name of medication: na and date filled: na    Lapse in medication adherence greater than 5 days?: no  If yes, call patient and gather details: na  Medication refill request verified as identical to current order?: yes  Result of Last DAM, VPA, Li+ Level, CBC, or Carbamazepine Level (at or since last visit): N/A    Last visit treatment plan:   Treatment Plan:        1.  Armodafinil 250 mg daily  2.  Consider discontinuing clonidine but take sparingly since you are on propranolol 10 mg 3 times daily for anxiety  3.  Continue disulfiram 250 mg daily  4.  Continue Depakote 2000 mg daily  5.  Continue Lunesta 1 mg at bedtime  6.  Continue lamotrigine 200 mg daily  7.  Continue olanzapine 15 mg at bedtime  8.  Continue talk therapy to learn skills to manage life stressors  9.  Follow-up with sleep medicine to refill armodafinil and Lunesta, if appropriate, in the future     Continue all other medications as reviewed per electronic medical record today.   Safety plan reviewed. To the Emergency Department as needed or call after hours crisis line at [phone] or [phone]. [place] Crisis Text Line. Text [place] to 430134 or Suicide LifeLine Chat: suicidepreventionlifeline.org/chat/  To schedule individual or family therapy, call [facility] at [phone]  Schedule an appointment with me in November or sooner as needed. Call [facility] at [phone] to schedule.  Follow up with primary care provider as planned or for acute medical concerns.  Call the psychiatric nurse line  with medication questions or concerns at [phone]  MyChart may be used to communicate with your provider, but this is not intended to be used for emergencies.       []Medication refilled per  Medication Refill in Ambulatory Care  policy.  [x]Medication unable to be refilled by RN due to criteria not met as indicated below:    []Eligibility - not seen in the last year   []Supervision - no future appointment   []Compliance - no shows, cancellations or lapse in therapy   []Verification - order discrepancy   [x]Controlled medication   []Medication not included in policy   []90-day supply request   []Other

## 2023-10-20 ENCOUNTER — TELEPHONE (OUTPATIENT)
Dept: BEHAVIORAL HEALTH | Facility: CLINIC | Age: 28
End: 2023-10-20
Payer: COMMERCIAL

## 2023-10-20 RX ORDER — ARMODAFINIL 250 MG/1
250 TABLET ORAL EVERY MORNING
Qty: 30 TABLET | Refills: 0 | Status: SHIPPED | OUTPATIENT
Start: 2023-10-20 | End: 2023-11-16

## 2023-10-20 NOTE — TELEPHONE ENCOUNTER
Reason for call:  Medication   If this is a refill request, has the caller requested the refill from the pharmacy already? No  Will the patient be using a [facility]? No  Name of [facility] [id number]     Other request: Wants to know if provider has signed off on medications    Phone number to reach patient:  Home number on file [phone] (home)    Best Time:  Any time    Can we leave a detailed message on this number?  YES    Travel screening: Not Applicable

## 2023-10-23 NOTE — TELEPHONE ENCOUNTER
"Reviewed message from [facility] coordinator to address patient concern \"wants to know if provider has signed off on medication.\"    RN phoned patient to discuss his concerns about refills. He stated that he would prefer to talk on the phone tomorrow and would call back.     Per review of the medical record, [employer] message on 10/10/23 instructions included:           1). RN to call patient and determine which medications he needs refilled.     2) RN to inquire if patient has started taking increased dose of zyprexa or started taking Ramelteon and if he has noticed any side effects    3) RN to inquire if Ramelteon or increased dose of zyprexa has been helpful    4) RN to inquire if patient has missed any doses of medications needing to be refilled.         "

## 2023-10-24 NOTE — TELEPHONE ENCOUNTER
RN attempted to call patient to review previous documentation in this encounter. LVM to return a call to the clinic.     Will postpone this encounter to be addressed on 10/25/23.         NEISHA HUFF RN on 10/24/2023 at 9:30 AM

## 2023-10-24 NOTE — TELEPHONE ENCOUNTER
Patient called [facility] and asked if RN could reach out via [facility] because it is too difficult for patient to take a phone call while at work.

## 2023-10-25 NOTE — TELEPHONE ENCOUNTER
Reviewed patient's detailed message in [employer] medical advice encounter from 10/24/23. Will close this encounter to allow for continued communication via [employer].

## 2023-11-03 NOTE — TELEPHONE ENCOUNTER
"Medication requested: divalproex sodium extended-release (DEPAKOTE ER) 500 MG 24 hr tablet  Take 4 tablets (2,000 mg) by mouth daily  Last refilled: 6/18/20  Qty: 120/0      Last seen: 7/7/20 \" He has still not gotten a blood test to check his Depakote level and I have encouraged him to do so.\"  RTC: 4 weeks  Cancel: 0  No-show: 0  Next appt: 8/7/20    Refill decision:   Refilled for 30 days per protocol.    Overridden by Gisela Kaba RN on Jun 18, 2020 4:44 PM    Drug-Drug    1. VALPROIC ACID / LAMOTRIGINE [Level: Major] [Reason: Benefit outweighs risk]    Other Orders:  lamoTRIgine (LAMICTAL) 25 MG tablet       2. VALPROIC ACID / LAMOTRIGINE [Level: Major] [Reason: Benefit outweighs risk]    Other Orders:  lamoTRIgine (LAMICTAL) 100 MG tablet           " room air

## 2023-11-15 ENCOUNTER — MYC MEDICAL ADVICE (OUTPATIENT)
Dept: PSYCHIATRY | Facility: CLINIC | Age: 28
End: 2023-11-15
Payer: COMMERCIAL

## 2023-11-15 DIAGNOSIS — F15.11 METHAMPHETAMINE ABUSE IN REMISSION (H): ICD-10-CM

## 2023-11-16 RX ORDER — ARMODAFINIL 250 MG/1
250 TABLET ORAL EVERY MORNING
Qty: 30 TABLET | Refills: 0 | Status: SHIPPED | OUTPATIENT
Start: 2023-11-16 | End: 2023-12-12

## 2023-11-16 NOTE — TELEPHONE ENCOUNTER
RN reviewed MyC message asking for a refill of Armodafinil     Date of Last Office Visit: 8/24/23  Date of Next Office Visit: 11/21/23  No shows since last visit: n/a  Cancellations since last visit: n/a    Medication requested: armodafinil (NUVIGIL) 250 MG TABS   Date last ordered: 10/20/2023 Qty: 30 Refills: 0     [place] of [place] ?: [place]  Medication last sold date: 10/21/23 Qty filled: 30  [place] ?: [place], Eszopiclone sold #30 on 9/20/23  If yes, is this a new medication?: No  If yes, name of medication: n/a and date filled: n/a    Lapse in medication adherence greater than 5 days?: No  If yes, call patient and gather details: n/a  Medication refill request verified as identical to current order?: Yes  Result of Last DAM, VPA, Li+ Level, CBC, or Carbamazepine Level (at or since last visit): N/A    Last visit treatment plan:     Assessment:     Laci Hinkle  reported today that he has been denied an appeal that he submitted to return to college.  He plans on getting a job in the near future instead and will reapply for college in the spring.  At this point he desires no changes in his medications.  He is planning to seek guidance from sleep medicine with regards to his armodafinil and Lunesta.  Mood has been stable with the Depakote lamotrigine and olanzapine, as he has been taking these for many years.  Talk therapy will continue to help him learn skills to manage life stressors.  He prefers to continue the disulfiram to keep him from drinking alcohol as he tells me he is sober for now.  He had another provider prescribed propranolol for him 3 times a day.  He still has the clonidine available but take sparingly.       Medication side effects and alternatives were reviewed. Health promotion activities recommended and reviewed today. All questions addressed. Education and counseling completed regarding risks and benefits of medications and psychotherapy options.     Treatment Plan:      1.  Armodafinil 250 mg daily  2.  Consider discontinuing clonidine but take sparingly since you are on propranolol 10 mg 3 times daily for anxiety  3.  Continue disulfiram 250 mg daily  4.  Continue Depakote 2000 mg daily  5.  Continue Lunesta 1 mg at bedtime  6.  Continue lamotrigine 200 mg daily  7.  Continue olanzapine 15 mg at bedtime  8.  Continue talk therapy to learn skills to manage life stressors  9.  Follow-up with sleep medicine to refill armodafinil and Lunesta, if appropriate, in the future     Continue all other medications as reviewed per electronic medical record today.   Safety plan reviewed. To the Emergency Department as needed or call after hours crisis line at [phone] or [phone]. [place] Crisis Text Line. Text [place] to 994029 or Suicide LifeLine Chat: suicidepreventionlifeline.org/chat/  To schedule individual or family therapy, call [facility] at [phone]  Schedule an appointment with me in November or sooner as needed. Call [facility] at [phone] to schedule.  Follow up with primary care provider as planned or for acute medical concerns.  Call the psychiatric nurse line with medication questions or concerns at [phone]  MyChart may be used to communicate with your provider, but this is not intended to be used for emergencies.    []Medication refilled per  Medication Refill in Ambulatory Care  policy.  [x]Medication unable to be refilled by RN due to criteria not met as indicated below:    []Eligibility - not seen in the last year   []Supervision - no future appointment   []Compliance - no shows, cancellations or lapse in therapy   []Verification - order discrepancy   [x]Controlled medication   []Medication not included in policy   []90-day supply request   []Other    RN pended 30 day refill for provider review an approval.     Ruthie Briceno RN on 11/16/2023 at 9:20 AM

## 2023-11-21 NOTE — TELEPHONE ENCOUNTER
RN received a refill request from [facility] for Olanzapine 2.5 mg tablets.  This refill request was faxed DENIED back to [facility] for the following reasons:Fax machine indicated job successful. The request was as follows:        1.  The patient is no longer on this dose.  This 2.5 mg dose was discontinued 3/7/23.  The patient has new Zyprexa orders.      Jose Russell RN on 11/21/2023 at 3:45 PM     _______________________________________________________________________________    The following was not faxed to the pharmacy.      Outpatient Medication Detail     Disp Refills Start End SERGIO   OLANZapine (ZYPREXA) 2.5 MG tablet (Discontinued) 30 tablet 0 2/1/2023 3/7/2023 No   Sig - Route: Take 1 tablet (2.5 mg) by mouth At Bedtime in addition to 10 mg for ttl: 12.5 mg/day until 3/1/2023 - Oral   Sent to pharmacy as: OLANZapine 2.5 MG Oral Tablet (zyPREXA)   Class: E-Prescribe   Notes to Pharmacy: To replace all other Zyprexa 2.5 mg scripts   Reason for Discontinue: Dose adjustment   Order: [id number]   E-Prescribing Status: Receipt confirmed by pharmacy (2/1/2023  4:31 PM CST)     Current dosing:      Outpatient Medication Detail     Disp Refills Start End SERGIO   OLANZapine (ZYPREXA) 5 MG tablet 30 tablet 0 10/11/2023  No   Sig - Route: Take 1 tablet (5 mg) by mouth nightly as needed (anxiety, sleep) - Oral   Sent to pharmacy as: OLANZapine 5 MG Oral Tablet (zyPREXA)   Class: E-Prescribe   Order: [id number]   E-Prescribing Status: Receipt confirmed by pharmacy (10/11/2023  4:48 PM CDT)     Outpatient Medication Detail     Disp Refills Start End SERGIO   OLANZapine (ZYPREXA) 15 MG tablet 90 tablet 1 8/24/2023  No   Sig - Route: Take 1 tablet (15 mg) by mouth At Bedtime - Oral   Sent to pharmacy as: OLANZapine 15 MG Oral Tablet (zyPREXA)   Class: E-Prescribe   Order: [id number]   E-Prescribing Status: Receipt confirmed by pharmacy (8/24/2023  9:09 AM CDT)     Jose Russell,  RN on 11/21/2023 at 3:47 PM

## 2023-12-05 DIAGNOSIS — F31.61 BIPOLAR DISORDER, CURRENT EPISODE MIXED, MILD (H): ICD-10-CM

## 2023-12-05 NOTE — TELEPHONE ENCOUNTER
Date of Last Office Visit: 08/24/2023  Date of Next Office Visit: 01/23/2024  No shows since last visit: 11/21/2023  Cancellations since last visit: 0    Medication requested: FLUoxetine (PROZAC) 20 MG capsule Date last ordered: 11/03/2023 Qty: 30 capsule Refills: 0    Lapse in medication adherence greater than 5 days?: no  If yes, call patient and gather details: n/a  Medication refill request verified as identical to current order?: Order discrepancy between treatment plan and medication prescribed.   Result of Last DAM, VPA, Li+ Level, CBC, or Carbamazepine Level (at or since last visit): N/A    Last visit treatment plan:     Treatment Plan:     1.  Armodafinil 250 mg daily  2.  Consider discontinuing clonidine but take sparingly since you are on propranolol 10 mg 3 times daily for anxiety  3.  Continue disulfiram 250 mg daily  4.  Continue Depakote 2000 mg daily  5.  Continue Lunesta 1 mg at bedtime  6.  Continue lamotrigine 200 mg daily  7.  Continue olanzapine 15 mg at bedtime  8.  Continue talk therapy to learn skills to manage life stressors  9.  Follow-up with sleep medicine to refill armodafinil and Lunesta, if appropriate, in the future     Continue all other medications as reviewed per electronic medical record today.   Safety plan reviewed. To the Emergency Department as needed or call after hours crisis line at [phone] or [phone]. [place] Crisis Text Line. Text [place] to 863792 or Suicide LifeLine Chat: suicidepreventionlifeline.org/chat/  To schedule individual or family therapy, call [facility] at [phone]  Schedule an appointment with me in November or sooner as needed. Call [facility] at [phone] to schedule.  Follow up with primary care provider as planned or for acute medical concerns.  Call the psychiatric nurse line with medication questions or concerns at [phone]  MyChart may be used to communicate with your provider, but this is not  intended to be used for emergencies.       []Medication refilled per  Medication Refill in Ambulatory Care  policy.  [x]Medication unable to be refilled by RN due to criteria not met as indicated below:    []Eligibility - not seen in the last year   []Supervision - no future appointment   [x]Compliance - no shows, cancellations or lapse in therapy   [x]Verification - order discrepancy   []Controlled medication   []Medication not included in policy   []90-day supply request   [x]Other- MA processed    Pérez Price MA on 12/5/2023 at 1:55 PM

## 2023-12-12 DIAGNOSIS — F15.11 METHAMPHETAMINE ABUSE IN REMISSION (H): ICD-10-CM

## 2023-12-12 RX ORDER — ARMODAFINIL 250 MG/1
250 TABLET ORAL EVERY MORNING
Qty: 30 TABLET | Refills: 0 | Status: SHIPPED | OUTPATIENT
Start: 2023-12-15 | End: 2024-01-10

## 2023-12-12 NOTE — TELEPHONE ENCOUNTER
Date of Last Office Visit: 8/24/23  Date of Next Office Visit: 1/23/24  No shows since last visit: 1  Cancellations since last visit: 0    Medication requested: armodafinil (NUVIGIL) 250 MG TABS tablet  Date last ordered: 11/16/23 Qty: 30 Refills: 0     [place] ?: [place]  Medication last sold date: 11/18/23 Qty filled: 30  [place] ?: [place]  If yes, is this a new medication?: no   If yes, name of medication:  and date filled: na    Lapse in medication adherence greater than 5 days?: no  If yes, call patient and gather details: NA  Medication refill request verified as identical to current order?: yes  Result of Last DAM, VPA, Li+ Level, CBC, or Carbamazepine Level (at or since last visit): N/A    Last visit treatment plan:     Treatment Plan:        1.  Armodafinil 250 mg daily  2.  Consider discontinuing clonidine but take sparingly since you are on propranolol 10 mg 3 times daily for anxiety  3.  Continue disulfiram 250 mg daily  4.  Continue Depakote 2000 mg daily  5.  Continue Lunesta 1 mg at bedtime  6.  Continue lamotrigine 200 mg daily  7.  Continue olanzapine 15 mg at bedtime  8.  Continue talk therapy to learn skills to manage life stressors  9.  Follow-up with sleep medicine to refill armodafinil and Lunesta, if appropriate, in the future     Continue all other medications as reviewed per electronic medical record today.   Safety plan reviewed. To the Emergency Department as needed or call after hours crisis line at [phone] or [phone]. [place] Crisis Text Line. Text [place] to 430818 or Suicide LifeLine Chat: suicidepreventionlifeline.org/chat/  To schedule individual or family therapy, call [facility] at [phone]  Schedule an appointment with me in November or sooner as needed. Call [facility] at [phone] to schedule.    []Medication refilled per  Medication Refill in Ambulatory Care  policy.  [x]Medication unable to be  refilled by RN due to criteria not met as indicated below:    []Eligibility - not seen in the last year   []Supervision - no future appointment   [x]Compliance - no shows, cancellations or lapse in therapy   []Verification - order discrepancy   [x]Controlled medication   []Medication not included in policy   []90-day supply request   []Other

## 2023-12-12 NOTE — TELEPHONE ENCOUNTER
Reason for call:  Medication   If this is a refill request, has the caller requested the refill from the pharmacy already? N/A  Will the patient be using a [facility]? No  Name of the pharmacy and phone number for the current request: [facility]    Name of the medication requested: armodafinial    Other request: na    Phone number to reach patient:  Home number on file [phone] (home)    Best Time:  any    Can we leave a detailed message on this number?  YES    Travel screening: Not Applicable

## 2023-12-19 DIAGNOSIS — F10.20 ALCOHOL USE DISORDER, SEVERE, DEPENDENCE (H): ICD-10-CM

## 2023-12-19 RX ORDER — DISULFIRAM 250 MG/1
250 TABLET ORAL DAILY
Qty: 90 TABLET | Refills: 1 | OUTPATIENT
Start: 2023-12-19

## 2023-12-19 NOTE — TELEPHONE ENCOUNTER
"Received a refill request for Antabuse. Per chart review, last script for this medication was provided by psychiatry Wilda Burgess NP.    Patient has not seen Kamla Noyola CNP since 12/21/22 with follow-up recommended around 2/1/23.    RN responded to patient via MyC to call and make a follow-up appointment if he would like Kamla Noyola CNP involved with his care.     RN also notified him that he appears to already have refills for this medication still on file.     RN denied refill request, \"should already have refills on file\".    Francheska Hare RN on 12/19/2023 at 2:05 PM      "

## 2023-12-26 ENCOUNTER — OFFICE VISIT (OUTPATIENT)
Dept: BEHAVIORAL HEALTH | Facility: CLINIC | Age: 28
End: 2023-12-26
Payer: COMMERCIAL

## 2023-12-26 VITALS — SYSTOLIC BLOOD PRESSURE: 155 MMHG | HEART RATE: 92 BPM | DIASTOLIC BLOOD PRESSURE: 85 MMHG

## 2023-12-26 DIAGNOSIS — F10.20 ALCOHOL USE DISORDER, SEVERE, DEPENDENCE (H): ICD-10-CM

## 2023-12-26 DIAGNOSIS — F13.20 SEVERE BENZODIAZEPINE USE DISORDER (H): ICD-10-CM

## 2023-12-26 DIAGNOSIS — N52.9 ERECTILE DYSFUNCTION, UNSPECIFIED ERECTILE DYSFUNCTION TYPE: ICD-10-CM

## 2023-12-26 DIAGNOSIS — F17.200 TOBACCO USE DISORDER: ICD-10-CM

## 2023-12-26 DIAGNOSIS — F12.90 CONTINUOUS CANNABIS USE: ICD-10-CM

## 2023-12-26 DIAGNOSIS — R69 DIAGNOSIS DEFERRED: Primary | ICD-10-CM

## 2023-12-26 DIAGNOSIS — F15.90 STIMULANT USE DISORDER: ICD-10-CM

## 2023-12-26 DIAGNOSIS — F11.20 OPIOID USE DISORDER, SEVERE, DEPENDENCE (H): Primary | ICD-10-CM

## 2023-12-26 LAB
AMPHETAMINE QUAL URINE POCT: NEGATIVE
BARBITURATE QUAL URINE POCT: NEGATIVE
BENZODIAZEPINE QUAL URINE POCT: NEGATIVE
BUPRENORPHINE QUAL URINE POCT: NEGATIVE
COCAINE QUAL URINE POCT: NEGATIVE
CREATININE QUAL URINE POCT: NORMAL
FENTANYL UR QL: NORMAL
INTERNAL QC QUAL URINE POCT: NORMAL
MDMA QUAL URINE POCT: NEGATIVE
METHADONE QUAL URINE POCT: NEGATIVE
METHAMPHETAMINE QUAL URINE POCT: NEGATIVE
OPIATE QUAL URINE POCT: NEGATIVE
OXYCODONE QUAL URINE POCT: NEGATIVE
PH QUAL URINE POCT: NORMAL
PHENCYCLIDINE QUAL URINE POCT: NEGATIVE
POCT KIT EXPIRATION DATE: NORMAL
POCT KIT LOT NUMBER: NORMAL
SPECIFIC GRAVITY POCT: 1.02
TEMPERATURE URINE POCT: NORMAL
THC QUAL URINE POCT: NEGATIVE

## 2023-12-26 PROCEDURE — 99207 PR NO CHARGE LOS: CPT

## 2023-12-26 PROCEDURE — 99214 OFFICE O/P EST MOD 30 MIN: CPT | Mod: GC | Performed by: STUDENT IN AN ORGANIZED HEALTH CARE EDUCATION/TRAINING PROGRAM

## 2023-12-26 PROCEDURE — 80307 DRUG TEST PRSMV CHEM ANLYZR: CPT | Mod: GC | Performed by: STUDENT IN AN ORGANIZED HEALTH CARE EDUCATION/TRAINING PROGRAM

## 2023-12-26 RX ORDER — BUPRENORPHINE AND NALOXONE 8; 2 MG/1; MG/1
1 FILM, SOLUBLE BUCCAL; SUBLINGUAL 2 TIMES DAILY
Qty: 28 FILM | Refills: 0 | Status: SHIPPED | OUTPATIENT
Start: 2023-12-26 | End: 2024-01-02 | Stop reason: DRUGHIGH

## 2023-12-26 ASSESSMENT — PATIENT HEALTH QUESTIONNAIRE - PHQ9: SUM OF ALL RESPONSES TO PHQ QUESTIONS 1-9: 14

## 2023-12-26 NOTE — PROGRESS NOTES
[facility]    Peer  met with Laci EMERSON Hinkle Jr in the Recovery Clinic to introduce herself, detail services provided and discuss current status of recovery. Pt appeared alert, oriented and open to feedback during our discussion.     Pt arrives for visit with provider for suboxone.  PRC sees patient today under provider diagnosis of opioid substance disorder, severe, dependence  (H)   Met with Pt who was uncomfortable with withdrawals from Ketamine last used a few days ago.  Pt stated that he has a new job that he doesn't want to lose so he's hoping he can get on Suboxone quickly and it will help enough for him to work.      PRS asked Pt what he was doing besides medication to stay clean.  Pt said he had some methadone and that he had tried AA but it didn't work for him because he felt like the people int he meeting were so early in sobriety that it wasn't helpful.  Pt indicated that he relapsed with someone he met at .  PRS shared a little about her experience with  and how it was different and Pt said he didn't want to talk about it if I disagreed.     PRS asked about what he was hoping for the next few weeks and Pt said that the Recovery Clinic had helped him get clean and stop drinking and he stayed clean for a year.  Pt indicated that he has had multiple extended periods of recovery and feels good about working with the recovery clinic again.  PRS mention the sublicade shot and Pt said maybe but he has no sex drive on the shot and didn't for more than a year after he finished taking it so he's not sure he wants to make that commitment but it is an option.      [facility] provided business card to pt welcoming contact for recovery based support and resources. PRC and pt agree to speak again during an upcoming  visit.           Service Type:     Individual     Session Start Time:      9:45                 Session End Time:  10:15      Session Length:             Patient  Goal:   To utilize suboxone assisted treatment for sobriety and long term recovery.     Goal Progress:   Ongoing.    Key Risk Factors to Recovery:   PRC encourages being aware of risk factors that can lead to re-use which include avoiding isolation, avoiding triggers and managing cravings in a healthy manner. being open and willing to acceptance and change on a daily basis.  PRC encourages pt to establish a sober network calling tree to reach out to when needed.  Continue to practice honesty with ourselves and trusted support person(s).   PRC encourages regular attendance at recovery based meetings as well as finding a sponsor for mentoring and accountability.   PRC encourages consideration of other services such as counseling for mental health issues which can correlate with our substance use.      Support Needs:   Ongoing care, support and resources for opioid substance use disorder.     Follow up:   [facility] contact information for support and resource needs.    PRC and pt agree to meet during an upcoming  visit.       [facility] [id number], [address]   [place], [place], [address]  Clinic Phone: [phone]  Clinic Fax: [phone]  Peer  phone: [phone]    Open Monday - Friday  9:00am-4:00pm  Walk in hours: 9am-3pm      Liya Caraballo  December 26, 2023  4:14 PM    MEERA Turner provides clinical oversite and supervision of care.

## 2023-12-26 NOTE — LETTER
[address] [address]  [phone]  Dept: [phone]      December 26, 2023      Patient: Laci Hinkle Jr   YOB: 1995   Date of Visit: 12/26/2023       To Whom It May Concern:    Laci Hinkle Jr was seen and treated in our clinic on 12/26/2023 and should be excused from work.           Sincerely,   Juan David Wong MD

## 2023-12-26 NOTE — PROGRESS NOTES
[facility] - Recovery Clinic Follow Up    ASSESSMENT/PLAN                                                      Opioid use disorder, severe, dependence (H)  Recently resumed Kratom use without other opioid use. Today with symptoms of mild opioid withdrawal desiring to resume Suboxone for withdrawal and to manage cravings/impulsive behavior. He plans to consider resuming Sublocade as next step given inconsistent adherence in the past however he is concerned about sexual dysfunction.   - Drugs of Abuse Screen Urine (POC CUPS) POCT  - Fentanyl Qualitative with Reflex to Quant Urine  - naloxone (NARCAN) 4 MG/0.1ML nasal spray  Dispense: 0.2 mL; Refill: 11  - buprenorphine HCl-naloxone HCl (SUBOXONE) 8-2 MG per film  Dispense: 28 Film; Refill: 0  - Fentanyl Qualitative with Reflex to Quant Urine    Severe benzodiazepine use disorder (H)  Infrequent benzodiazepine use but concerning history of recklessly taking a large amount of illicitly manufactured benzo that resulted in hospitalization. No recent use and thus no concern for possible withdrawal or overdose currently. Patient does not appear interested in psychosocial treatments for various substance use disorders (eg to address impulsivity) such as an outpatient or inpatient treatment program.     Stimulant use disorder  Infrequent use, denies any acute concerns.     Alcohol use disorder, severe, dependence (H)  No recent alcohol use. Will continue disulfiram, has outside prescriber.    Tobacco use disorder  Continuous cannabis use  Not addressed today, can discuss at future visits and offer cessation support if desired.    Follow-up 2 wks  No follow-ups on file.    SUBJECTIVE                                                        CC/HPI:  Laci Hinkle Jr is a 26 year old male with PMH bipolar disorder, CEASAR, ADHD, and PSUD including opioids on buprenorphine who presents to the Recovery Clinic for return visit.       Brief History:  Pt first presented to  Recovery Clinic on 7/22/21. Pt was referred by staff in [facility] due to pt's initial desire to taper off buprenorphine which he had been taking from nonprescription sources.   Pt soon left [facility], but continued with Recovery Clinic.  He eventually decided to continue buprenorphine therapy, and then transferred to [facility] with initial injection 8/13/21.  He received a total of 3 Sublocade injections, most recently 100mg on 10/8/21.   Pt then indicated to  staff he wanted to discontinue Sublocade injections.  He was lost to follow up until he returned to  on 12/9/21 requesting to resume SL buprenorphine after brief return to use of alcohol and kratom.  Displays irregularities in buprenorphine use including skipping doses and advancing dose. 8/2022 he agreed to recommendation of resuming Sublocade to improve adherence to treatment.   He received Sublocade 300mg on 8/12/22 and 9/9/22 and 100mg on 10/7/22.  He was lost to follow-up again after Dec 2022 Sublocade injection and returned to  Dec 2023 after recent Kratom use requesting to resume Suboxone.     He describes his opioid use history starting with heroin as a teenager, last use age 17.  He started methadone treatment age 17, dose up to 100mg, then tapered off to 6mg/day then stopped.  He began using kratom ~2016, eventually used amounts up to 100g daily.  He was first prescribed buprenorphine for OUD in 2019. He took as prescribed through 1/2021, then continued on buprenorphine through nonprescription sources.      IV drug use: No   History of overdose: No  Previous treatments : Yes: multiple, [facility], [facility] 11/2020, previous buprenorphine and methadone  Longest period of sobriety: few months  Medical complications related to substance use: exacerbation of MH diagnoses  Hepatitis C Status  no record of testing  HIV Status no record of testing     Other recent substance use:  Stimulants: used methamphetamine ages [age], stopped for  5 years, then resumed age 24.    Sedatives/hypnotics/anxiolytics: has used in past, denies regular use  Alcohol: h/o drinking 1 L liquor daily  Tobacco: 2-3 ppd and/or ENDS  Cannabis: occasional   Hallucinogens:has used in past, denies recent  Behavioral addictions: none      Most recent Recovery Clinic visit 11/14/2022    A/P last visit:  1. Opioid use disorder, severe, dependence (H)  S/p Sublocade #3 10/7/22.  Pt reporting he wants to discontinue Sublocade injections and begin taper off buprenorphine.  Counseled on risk of return to use with discontinuation of treatment, advised maintaining stable  treatment at this time, pt wants to proceed with taper.  He did agree to continue follow-up with Addiction Medicine to monitor symptoms, he understands tapering is optional and decision can be changed at any time.      2. Alcohol use disorder, severe, dependence (H)  Recommend he resume acamprosate (had been off for ~2 weeks) to address cravings  Pt states he continues to take disulfiram daily, no rx required  - acamprosate (CAMPRAL) 333 MG EC tablet; Take 3 tablets (999 mg) by mouth 2 times daily  Dispense: 180 tablet; Refill: 3    3. 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 Tobacco use disorder  Pt reporting he is interested in quitting.  Is prescribed bupropion.  Discuss NRT next visit      12/26/23 visit:     He states 'things have been good except the occasional meth relapse' (last 2 mo, use every few months). He has enjoyed being 'mostly sober.'  He started using Kratom this month and wants to resume Suboxone, noting that it is costing him a lot of money. He isn't sure if he is committed to Sublocade because of concerns for sexual dysfunction which is dehumanizing', but thinks this is where he will eventually go. He is thinking of starting with an 8 mg dose. He  says he has a friend who is a  and produces Cialis powder which he has a large amount of - it works 'fine.' This friend also provided bromazelam earlier in the year which eventually resulted in a hospital stay for an overdose. He doesn't remember what happened but wonders if he might have tried to kill himself and then called 911. He reports he has never had withdrawal from benzos. He has access to benzos but doesn't want to use them bc he doesn't want to mess up his job. He thinks the key to avoiding substance use is to be satisfied with his life which his current job is helpful with. 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 He is planning to register for college in 2024. He requests a work excuse letter today but wants it to be discrete so that he is not reported to his employer for substance use.     He says he impulsively returned to Kratom use without necessarily a reason. He likes having a stimulant during the day.     Last etoh was May 2022. He takes antabuse daily and thinks he probably needs a refill. He isn't interested in acamprosate because of lack of effect. He says he treats alcohol as if he had a 'Islam restriction.'     He missed his last apptmt with psychiatry bc his cat , and he feels better after starting Prozac.    For withdrawal symptoms today he reports he has restless legs, 'tense-ness,' sweating, body pain.     [place] Prescription Drug Monitoring Program Reviewed:  Yes    Filled  Written  ID  Drug  QTY  Days  Prescriber  RX #  Dispenser  Refill  Daily Dose*  Pymt Type      12/15/2023 2023 2 Armodafinil 250 Mg Tablet 30.00 30 Vi The [id number] [facility] ([id number]) 0/0  [place]   2023 2 Armodafinil 250 Mg Tablet 30.00 30 Vi The [id number] [facility] ([id number]) 0/0  [place]   10/20/2023 10/20/2023 2 Armodafinil 250 Mg Tablet 30.00 30                Medications:  amLODIPine-benazepril (LOTREL) 2.5-10 MG capsule, Take 1  capsule by mouth daily  armodafinil (NUVIGIL) 250 MG TABS tablet, Take 1 tablet (250 mg) by mouth every morning  cloNIDine (CATAPRES) 0.1 MG tablet, Take 1 tablet (0.1 mg) by mouth daily as needed (anxiety)  disulfiram (ANTABUSE) 250 MG tablet, Take 1 tablet (250 mg) by mouth daily  divalproex sodium extended-release (DEPAKOTE ER) 500 MG 24 hr tablet, Take 4 tablets (2,000 mg) by mouth daily  FLUoxetine (PROZAC) 20 MG capsule, Take 1 capsule (20 mg) by mouth daily  lamoTRIgine (LAMICTAL) 200 MG tablet, Take 1 tablet (200 mg) by mouth daily  OLANZapine (ZYPREXA) 15 MG tablet, Take 1 tablet (15 mg) by mouth At Bedtime  OLANZapine (ZYPREXA) 5 MG tablet, Take 1 tablet (5 mg) by mouth nightly as needed (anxiety, sleep)  propranolol (INDERAL) 10 MG tablet, Take 1 tablet (10 mg) by mouth 3 times daily  ramelteon (ROZEREM) 8 MG tablet, Take 1 tablet (8 mg) by mouth nightly as needed for sleep    No current facility-administered medications on file prior to visit.      Allergies   Allergen Reactions    Prednisone Other (See Comments)     psych problems       PMH, PSH, FamHx reviewed    Social History  Housing status: with his mother (last update 12/2022)  Employment status: working as behavioral tech  Relationship status: Single  Children: no children    OBJECTIVE                                                      BP (!) 155/85   Pulse 92     Physical Exam  Constitutional:       Appearance: Normal appearance. She is overweight.   HENT:      Head: Normocephalic and atraumatic.   Eyes:      Extraocular Movements: Extraocular movements intact.   Pulmonary:      Effort: Pulmonary effort is normal.   Neurological:      Mental Status: She is alert and oriented to person, place, and time.   Psychiatric:         Attention and Perception: Attention and perception normal.         Mood and Affect: Mood is anxious. Mood is not depressed. Affect is not flat.         Speech: Speech is rapid and pressured.         Behavior: Behavior is  cooperative.         Thought Content: Thought content normal.         Cognition and Memory: Cognition and memory normal.         Judgment: Judgment is impulsive.      Comments: Eye contact poor         Labs:    UDS 12/26/23: negative for all per RN report results not yet uploaded  *POC urine drug screen does not screen for Fentanyl    Recent Results (from the past 240 hour(s))   Drugs of Abuse Screen Urine (POC CUPS) POCT    Collection Time: 12/26/23 10:15 AM   Result Value Ref Range    POCT Kit Lot Number [id number]     POCT Kit Expiration Date 8/22/25     Temperature Urine POCT 92 F 90 F, 92 F, 94 F, 96 F, 98 F, 100 F    Specific [place] POCT 1.025 1.005, 1.015, 1.025    pH Qual Urine POCT 5 pH 4 pH, 5 pH, 7 pH, 9 pH    Creatinine Qual Urine POCT 200 mg/dL 20 mg/dL, 50 mg/dL, 100 mg/dL, 200 mg/dL    Internal QC Qual Urine POCT Valid Valid    Amphetamine Qual Urine POCT Negative Negative    Barbiturate Qual Urine POCT Negative Negative    Buprenorphine Qual Urine POCT Negative Negative    Benzodiazepine Qual Urine POCT Negative Negative    Cocaine Qual Urine POCT Negative Negative    Methamphetamine Qual Urine POCT Negative Negative    MDMA Qual Urine POCT Negative Negative    Methadone Qual Urine POCT Negative Negative    Opiate Qual Urine POCT Negative Negative    Oxycodone Qual Urine POCT Negative Negative    Phencyclidine Qual Urine POCT Negative Negative    THC Qual Urine POCT Negative Negative         Patient counseling completed today:  Discussed mechanism of action, potential risks/benefits/side effects of medications and other recommendations above.  Recommend pt keep naloxone in their possession and reviewed other aspects of harm reduction to reduce risk of overdose with return to use. Discussed importance of avoiding isolation, building a network of supportive relationships, avoiding people/places/things associated with past use to reduce risk of relapse; including motivational interviewing regarding  psychosocial treatment for addiction.     Juan David Wong MD  Addiction Medicine  [facility]  [address] [facility], [address] [id number] [phone]    I saw the patient with the fellow and participated in key portions of the service including the mental status examination and developing the plan of care. I reviewed key portions of the history with the fellow. I agree with the findings and plan as documented in this note.     Asiya Hastings MD  Addiction Medicine  [facility]  [address] [facility], [address] [address] [phone]

## 2023-12-26 NOTE — PROGRESS NOTES
[facility] - [facility] Follow Up    ASSESSMENT/PLAN                                                      ***    No follow-ups on file.    SUBJECTIVE                                                        CC/HPI:  Laci Hinkel Jr is a 26 year old male with PMH bipolar disorder, CEASAR, ADHD, and PSUD including opioids on buprenorphine who presents to the Recovery Clinic for return visit.       Brief History:  Pt first presented to Recovery Clinic on 7/22/21. Pt was referred by staff in [facility] due to pt's initial desire to taper off buprenorphine which he had been taking from nonprescription sources.   Pt soon left [facility], but continued with Recovery Clinic.  He eventually decided to continue buprenorphine therapy, and then transferred to [facility] with initial injection 8/13/21.  He received a total of 3 Sublocade injections, most recently 100mg on 10/8/21.   Pt then indicated to  staff he wanted to discontinue Sublocade injections.  He was lost to follow up until he returned to  on 12/9/21 requesting to resume SL buprenorphine after brief return to use of alcohol and kratom.   Despite some irregularities in buprenorphine use including skipping doses and advancing dose, last use of other opioids has remaines 3/2022.  8/2022 he agreed to recommendation of resuming Sublocade to improve adherence to treatment.   He received Sublocade 300mg on 8/12/22 and 9/9/22 and 100mg on 10/7/22.       He describes his opioid use history starting with heroin as a teenager, last use age 17.  He started methadone treatment age 17, dose up to 100mg, then tapered off to 6mg/day then stopped.  He began using kratom ~2016, eventually used amounts up to 100g daily.  He was first prescribed buprenorphine for OUD in 2019. He took as prescribed through 1/2021, then continued on buprenorphine through nonprescription sources.      IV drug use: No   History of overdose: No  Previous treatments : Yes: multiple,  [facility], [facility] 11/2020, previous buprenorphine and methadone  Longest period of sobriety: few months  Medical complications related to substance use: exacerbation of  diagnoses  Hepatitis C Status  no record of testing  HIV Status no record of testing     Other recent substance use:  Stimulants: used methamphetamine ages [age], stopped for 5 years, then resumed age 24.    Sedatives/hypnotics/anxiolytics: has used in past, denies regular use  Alcohol: h/o drinking 1 L liquor daily  Tobacco: 2-3 ppd and/or ENDS  Cannabis: occasional   Hallucinogens:has used in past, denies recent  Behavioral addictions: none      [place] Prescription Drug Monitoring Program Reviewed:  Yes  11/05/2022  2   11/04/2022  Hydrocodone-Acetamin 5-325 MG  10.00  4 Le Luis   [id number]   [facility] ([id number])   0/0  12.50 MME  [place]icaid [place]   11/02/2022  2   11/02/2022  Hydrocodone-Acetamin 5-325 MG  8.00  4 Le Luis   [id number]   [facility] ([id number])   0/0  10.00 MME  [place]   10/24/2022  2   10/24/2022  Modafinil 200 MG Tablet  30.00  30 Al Kru   [id number]   [facility] ([id number])   0/1   [place]   10/06/2022  2   10/06/2022  Modafinil 100 MG Tablet  30.00  30 Al Kru   [id number]   [facility] ([id number])   0/0   [place]   09/02/2022  3   09/02/2022  Suboxone 8 Mg-2 MG SL Film  7.00  7 Li Vol   [id number]   Steven ([id number])   0/0  8.00 mg            Medications:  amLODIPine-benazepril (LOTREL) 2.5-10 MG capsule, Take 1 capsule by mouth daily  armodafinil (NUVIGIL) 250 MG TABS tablet, Take 1 tablet (250 mg) by mouth every morning  cloNIDine (CATAPRES) 0.1 MG tablet, Take 1 tablet (0.1 mg) by mouth daily as needed (anxiety)  disulfiram (ANTABUSE) 250 MG tablet, Take 1 tablet (250 mg) by mouth daily  divalproex sodium extended-release (DEPAKOTE ER) 500 MG 24 hr tablet, Take 4 tablets (2,000 mg) by mouth daily  FLUoxetine (PROZAC) 20 MG capsule, Take 1 capsule (20 mg) by mouth daily  lamoTRIgine (LAMICTAL) 200 MG tablet, Take 1 tablet (200 mg) by mouth daily  naloxone  (NARCAN) 4 MG/0.1ML nasal spray, Spray 1 spray (4 mg) into one nostril alternating nostrils once as needed for opioid reversal every 2-3 minutes until assistance arrives (Patient not taking: Reported on 7/25/2023)  OLANZapine (ZYPREXA) 15 MG tablet, Take 1 tablet (15 mg) by mouth At Bedtime  OLANZapine (ZYPREXA) 5 MG tablet, Take 1 tablet (5 mg) by mouth nightly as needed (anxiety, sleep)  propranolol (INDERAL) 10 MG tablet, Take 1 tablet (10 mg) by mouth 3 times daily  ramelteon (ROZEREM) 8 MG tablet, Take 1 tablet (8 mg) by mouth nightly as needed for sleep    No current facility-administered medications on file prior to visit.      Allergies   Allergen Reactions    Prednisone Other (See Comments)     psych problems       PMH, PSH, FamHx reviewed    Social History  Housing status: with his mother   Employment status: working part time at [employer] (overnights 3 nights/week)  Relationship status: Single  Children: no children    OBJECTIVE                                                      BP (!) 155/85   Pulse 92     Physical Exam  Constitutional:       Appearance: Normal appearance. She is overweight.   HENT:      Head: Normocephalic and atraumatic.   Eyes:      Extraocular Movements: Extraocular movements intact.   Pulmonary:      Effort: Pulmonary effort is normal.   Neurological:      Mental Status: She is alert and oriented to person, place, and time.   Psychiatric:         Attention and Perception: Attention and perception normal.         Mood and Affect: Mood is anxious. Mood is not depressed. Affect is not flat.         Speech: Speech is rapid and pressured.         Behavior: Behavior is cooperative.         Thought Content: Thought content normal.         Cognition and Memory: Cognition and memory normal.      Comments: Insight and judgment fair          Labs:    UDS 11/14/22: positive for buprenorphine and THC  *POC urine drug screen does not screen for Fentanyl    No results found for this or any  previous visit (from the past 240 hour(s)).      Patient counseling completed today:  Discussed mechanism of action, potential risks/benefits/side effects of medications and other recommendations above.  Recommend pt keep naloxone in their possession and reviewed other aspects of harm reduction to reduce risk of overdose with return to use. Discussed importance of avoiding isolation, building a network of supportive relationships, avoiding people/places/things associated with past use to reduce risk of relapse; including motivational interviewing regarding psychosocial treatment for addiction.     At least 30 min spent in review of medical record,  review, obtaining histories, discussing recommendations, providing support.     Asiya Hastings MD  Addiction Medicine  [facility]  [address] [place] [address] [address] [place] [id number] [phone]

## 2023-12-26 NOTE — NURSING NOTE
[facility] - Recovery Clinic    Having withdrawal symptoms; restlessness, skin itching and pain     Rooming information:  Approximate last use of full opioid agonist: Kratom; yesterday   Taking buprenorphine? No    Narcan currently available: Yes  Other recent substance use:    Methamphetamine   NICOTINE-Yes:   If using nicotine, ready to quit? No    Point of care urine drug screen positive for:  Lab Results   Component Value Date    BUP Negative 12/26/2023    BZO Negative 12/26/2023    BAR Negative 12/26/2023    JOSE Negative 12/26/2023    MAMP Negative 12/26/2023    AMP Negative 12/26/2023    MDMA Negative 12/26/2023    MTD Negative 12/26/2023    [id number] Negative 12/26/2023    OXY Negative 12/26/2023    PCP Negative 12/26/2023    THC Negative 12/26/2023    TEMP 92 F 12/26/2023    SGPOCT 1.025 12/26/2023       *POC urine drug screen does not screen for Fentanyl        12/26/2023    10:00 AM   PHQ Assesment Total Score(s)   PHQ-9 Score 14       If PHQ-9 score of 15 or higher, has Recovery Clinic therapist or provider been notified? N/A    Any current suicidal ideation? No  If yes, has Recovery [facility] therapist or provider been notified? N/A    [facility] care provider: [facility]     Mental health provider: yes (follow up on MH referral if needed)    Insurance needs: active    Housing needs: stable    Current legal issues: none    Contact information up to date? yes    3rd Party Involvement none (please obtain WU if pt would like to include)    Nikhil Tenorio MA  December 26, 2023  10:12 AM

## 2024-01-02 ENCOUNTER — TELEPHONE (OUTPATIENT)
Dept: BEHAVIORAL HEALTH | Facility: CLINIC | Age: 29
End: 2024-01-02
Payer: COMMERCIAL

## 2024-01-02 ENCOUNTER — TELEPHONE (OUTPATIENT)
Dept: BEHAVIORAL HEALTH | Facility: CLINIC | Age: 29
End: 2024-01-02

## 2024-01-02 ENCOUNTER — OFFICE VISIT (OUTPATIENT)
Dept: BEHAVIORAL HEALTH | Facility: CLINIC | Age: 29
End: 2024-01-02
Payer: COMMERCIAL

## 2024-01-02 VITALS
HEIGHT: 70 IN | SYSTOLIC BLOOD PRESSURE: 139 MMHG | OXYGEN SATURATION: 100 % | BODY MASS INDEX: 32.84 KG/M2 | WEIGHT: 229.4 LBS | DIASTOLIC BLOOD PRESSURE: 95 MMHG | HEART RATE: 56 BPM

## 2024-01-02 DIAGNOSIS — F11.20 OPIOID USE DISORDER, SEVERE, DEPENDENCE (H): Primary | ICD-10-CM

## 2024-01-02 DIAGNOSIS — F15.90 STIMULANT USE DISORDER: ICD-10-CM

## 2024-01-02 DIAGNOSIS — F10.20 ALCOHOL USE DISORDER, SEVERE, DEPENDENCE (H): ICD-10-CM

## 2024-01-02 DIAGNOSIS — F13.20 SEVERE BENZODIAZEPINE USE DISORDER (H): ICD-10-CM

## 2024-01-02 LAB
AMPHETAMINE QUAL URINE POCT: NEGATIVE
BARBITURATE QUAL URINE POCT: NEGATIVE
BENZODIAZEPINE QUAL URINE POCT: NEGATIVE
BUPRENORPHINE QUAL URINE POCT: ABNORMAL
COCAINE QUAL URINE POCT: NEGATIVE
CREAT UR-MCNC: 368 MG/DL
CREATININE QUAL URINE POCT: ABNORMAL
INTERNAL QC QUAL URINE POCT: ABNORMAL
MDMA QUAL URINE POCT: NEGATIVE
METHADONE QUAL URINE POCT: NEGATIVE
METHAMPHETAMINE QUAL URINE POCT: ABNORMAL
OPIATE QUAL URINE POCT: ABNORMAL
OXYCODONE QUAL URINE POCT: NEGATIVE
PH QUAL URINE POCT: ABNORMAL
PHENCYCLIDINE QUAL URINE POCT: NEGATIVE
POCT KIT EXPIRATION DATE: ABNORMAL
POCT KIT LOT NUMBER: ABNORMAL
SPECIFIC GRAVITY POCT: 1.02
TEMPERATURE URINE POCT: ABNORMAL
THC QUAL URINE POCT: NEGATIVE

## 2024-01-02 PROCEDURE — 99214 OFFICE O/P EST MOD 30 MIN: CPT | Performed by: NURSE PRACTITIONER

## 2024-01-02 PROCEDURE — 80305 DRUG TEST PRSMV DIR OPT OBS: CPT | Performed by: NURSE PRACTITIONER

## 2024-01-02 PROCEDURE — G0480 DRUG TEST DEF 1-7 CLASSES: HCPCS | Performed by: NURSE PRACTITIONER

## 2024-01-02 RX ORDER — DIPHENHYDRAMINE HYDROCHLORIDE 50 MG/ML
50 INJECTION INTRAMUSCULAR; INTRAVENOUS
Status: CANCELLED
Start: 2024-01-02

## 2024-01-02 RX ORDER — METHYLPREDNISOLONE SODIUM SUCCINATE 125 MG/2ML
125 INJECTION, POWDER, LYOPHILIZED, FOR SOLUTION INTRAMUSCULAR; INTRAVENOUS
Status: CANCELLED
Start: 2024-01-02

## 2024-01-02 RX ORDER — LIDOCAINE HYDROCHLORIDE 10 MG/ML
2 INJECTION, SOLUTION EPIDURAL; INFILTRATION; INTRACAUDAL; PERINEURAL ONCE
Status: CANCELLED | OUTPATIENT
Start: 2024-01-02 | End: 2024-01-02

## 2024-01-02 RX ORDER — BUPRENORPHINE AND NALOXONE 12; 3 MG/1; MG/1
1 FILM, SOLUBLE BUCCAL; SUBLINGUAL 2 TIMES DAILY
Qty: 12 FILM | Refills: 0 | Status: SHIPPED | OUTPATIENT
Start: 2024-01-02 | End: 2024-01-08

## 2024-01-02 RX ORDER — ALBUTEROL SULFATE 0.83 MG/ML
2.5 SOLUTION RESPIRATORY (INHALATION)
Status: CANCELLED | OUTPATIENT
Start: 2024-01-02

## 2024-01-02 RX ORDER — EPINEPHRINE 1 MG/ML
0.3 INJECTION, SOLUTION, CONCENTRATE INTRAVENOUS EVERY 5 MIN PRN
Status: CANCELLED | OUTPATIENT
Start: 2024-01-02

## 2024-01-02 RX ORDER — MEPERIDINE HYDROCHLORIDE 25 MG/ML
25 INJECTION INTRAMUSCULAR; INTRAVENOUS; SUBCUTANEOUS EVERY 30 MIN PRN
Status: CANCELLED | OUTPATIENT
Start: 2024-01-02

## 2024-01-02 RX ORDER — ALBUTEROL SULFATE 90 UG/1
1-2 AEROSOL, METERED RESPIRATORY (INHALATION)
Status: CANCELLED
Start: 2024-01-02

## 2024-01-02 ASSESSMENT — PAIN SCALES - GENERAL: PAINLEVEL: NO PAIN (1)

## 2024-01-02 ASSESSMENT — PATIENT HEALTH QUESTIONNAIRE - PHQ9: SUM OF ALL RESPONSES TO PHQ QUESTIONS 1-9: 12

## 2024-01-02 NOTE — PROGRESS NOTES
[facility]      Rooming information:  Self-medicated and was overusing Suboxone dt pain  Would like to restart Sublocade  Wants to go up on his dose to 12 mg BID    Approximate last use of full opioid agonist: Would be a long time ago  Taking buprenorphine? Yes:   How much per day? 4 strips a day  Number of buprenorphine films/tablets remaining currently: took his last two today  Side effects related to buprenorphine (constipation, dry mouth, sedation?) Yes: constipation on large doses  Narcan currently available: Yes  Other recent substance use:     NICOTINE-Yes:   If using nicotine, ready to quit? No    Point of care urine drug screen positive for:  Lab Results   Component Value Date    BUP Screen Positive (A) 01/02/2024    BZO Negative 01/02/2024    BAR Negative 01/02/2024    JOSE Negative 01/02/2024    MAMP Screen Positive (A) 01/02/2024    AMP Negative 01/02/2024    MDMA Negative 01/02/2024    MTD Negative 01/02/2024    [id number] Screen Positive (A) 01/02/2024    OXY Negative 01/02/2024    PCP Negative 01/02/2024    THC Negative 01/02/2024    TEMP Invalid (A) 01/02/2024    SGPOCT 1.025 01/02/2024       *POC urine drug screen does not screen for Fentanyl          1/2/2024     9:00 AM   PHQ Assesment Total Score(s)   PHQ-9 Score 12       If PHQ-9 score of 15 or higher, has Recovery [facility] therapist or provider been notified? No    Any current suicidal ideation? No  If yes, has [facility] therapist or provider been notified? N/A    Primary care provider: [facility]     Mental health provider: has a therapist and psychiatrist JULISSA Sheriff (follow up on MH referral if needed)    Insurance needs: none    Housing needs: denies    Current legal issues: none    Contact information up to date? yes    3rd Party Involvement NA (please obtain WU if pt would like to include)    Rina Evangelista LPN  January 2, 2024  9:01 AM

## 2024-01-02 NOTE — TELEPHONE ENCOUNTER
- My HI number is [id number]   - I have reviewed recommendations for comprehensive treatment plan with the patient  - I have reviewed the patient's medications comprehensively  and provided education to the patient on risks associated with concurrent use of benzodiazepines, alcohol, other sedatives with opioids  - I have recommended concomitant psychosocial support  - I have complied with all aspects of REMS program for Sublocade. [facility] where Sublocade will be administered is in compliance with all aspects of REMS program.   - Patient meets DSM-5 criteria for moderate or severe opioid use disorder  - Patient has been prescribed buprenorphine 8-24mg/day for at least one 1 week at time of Sublocade, demonstrating tolerance of sublingual buprenorphine and control of withdrawal symptoms and cravings.   - Patient will discontinue sublingual buprenorphine when Sublocade is started  - Patient does not have severe hepatic impairment.   - Patient does not have adrenal insufficiency.   - Patient does not have a history of long QT syndrome  - Patient does not take any antiarrhythmic medications or other medications known to significantly prolong QT interval   - Urine Drug Screen on 1/2/24 was positive for buprenorphine  - Patient will not be receiving methadone while on Sublocade  - Patient will not be receiving any other long acting products for the treatment of opioid use disorder while on Sublocade    SCOTT Teran CNP on 1/2/2024 at 3:19 PM

## 2024-01-02 NOTE — TELEPHONE ENCOUNTER
Patient presented to the [facility] and needs were addressed.     Francheska Hare RN on 1/2/2024 at 10:39 AM

## 2024-01-02 NOTE — PROGRESS NOTES
[facility] Follow Up    ASSESSMENT/PLAN                                                        1. Opioid use disorder, severe, dependence (H)  - Pt reporting self titration of dose up to 32 mg/day for cravings (kratom). Plan to increase dose to 12 mg BID and then transition to Sublocade. Reviewed common SE and MOA. All questions answered. Pt will need to schedule and then PA will start, reviewed this process today. Discontinue Suboxone after initial injection.   - POC UDS positive for opiates today, pt denies use, confirmation sent   - confirms access to narcan   - strongly encouraged involvement in psychosocial interventions to support recovery.   - fentanyl screening from 12/26/23 negative   - Drugs of Abuse Screen Urine (POC CUPS) POCT  - Buprenorphine HCl-Naloxone HCl (SUBOXONE) 12-3 MG FILM per film; Place 1 Film under the tongue 2 times daily for 6 days  Dispense: 12 Film; Refill: 0  - Drug Confirmation Panel Urine with Creatinine; Future  - Drug Confirmation Panel Urine with Creatinine    2. Alcohol use disorder, severe, dependence (H)  - no recent use.   - continue Antabuse, no rx needed   - interventions as above     3. Stimulant use disorder  - Reports infrequent use of ephedrine, POC UDS positive for methamphetamine. Denies use. Will send for confirmation testing today.       4. Severe benzodiazepine use disorder (H)  - denies recent use or cravings. Monitor.   - Interventions as above, interested in Sabianism or spiritually based recovery program. May benefit from working with PRS as well. Will discuss at follow up.        Return in about 6 days (around 1/8/2024) for Follow up. Scheduled with Dr. Hastings at 11 AM       Patient counseling completed today:  Discussed mechanism of action, potential risks/benefits/side effects of medications and other recommendations above.    Harm reduction counseling including never use alone, availability of naloxone, risks associated with  concurrent use of opioids and benzodiazepines, alcohol, or other sedatives, safer administration as applicable.  Discussed importance of avoiding isolation, building a network of supportive relationships, avoiding people/places/things associated with past use to reduce risk of relapse; including motivational interviewing regarding psychosocial interventions.   SUBJECTIVE                                                          CC/HPI:  Laci Hinkle Jr is a 28 year old male with PMH bipolar disorder, CEASAR, ADHD, and PSUD including opioids on buprenorphine who presents to the [facility] for return visit.       Brief History:  Pt first presented to [facility] on 7/22/21. Pt was referred by staff in [facility] due to pt's initial desire to taper off buprenorphine which he had been taking from nonprescription sources.   Pt soon left [facility], but continued with Recovery Clinic.  He eventually decided to continue buprenorphine therapy, and then transferred to Sublocade with initial injection 8/13/21.  He received a total of 3 Sublocade injections, most recently 100mg on 10/8/21.   Pt then indicated to  staff he wanted to discontinue Sublocade injections.  He was lost to follow up until he returned to  on 12/9/21 requesting to resume SL buprenorphine after brief return to use of alcohol and kratom.  Displays irregularities in buprenorphine use including skipping doses and advancing dose. 8/2022 he agreed to recommendation of resuming Sublocade to improve adherence to treatment.   He received Sublocade 300mg on 8/12/22 and 9/9/22 and 100mg on 10/7/22.  He was lost to follow-up again after Dec 2022 Sublocade injection and returned to  Dec 2023 after recent Kratom use requesting to resume Suboxone.     He describes his opioid use history starting with heroin as a teenager, last use age 17.  He started methadone treatment age 17, dose up to 100mg, then tapered off to 6mg/day then stopped.  He began  using kratom ~2016, eventually used amounts up to 100g daily.  He was first prescribed buprenorphine for OUD in 2019. He took as prescribed through 1/2021, then continued on buprenorphine through nonprescription sources.       IV drug use: No   History of overdose: No  Previous treatments : Yes: multiple, [facility], [facility] 11/2020, previous buprenorphine and methadone  Longest period of sobriety: few months  Medical complications related to substance use: exacerbation of MH diagnoses  Hepatitis C Status  no record of testing  HIV Status no record of testing     Other recent substance use:  Stimulants: used methamphetamine ages [age], stopped for 5 years, then resumed age 24.    Sedatives/hypnotics/anxiolytics: has used in past, denies regular use  Alcohol: h/o drinking 1 L liquor daily  Tobacco: 2-3 ppd and/or ENDS  Cannabis: occasional   Hallucinogens:has used in past, denies recent  Behavioral addictions: none      8/24/2023     8:18 AM 12/26/2023    10:00 AM 1/2/2024     9:00 AM   PHQ   PHQ-9 Total Score 4 14 12   Q9: Thoughts of better off dead/self-harm past 2 weeks Not at all Not at all Not at all         Most recent [facility] visit 12/26/23.    A/P last visit:  Opioid use disorder, severe, dependence (H)  Recently resumed Kratom use without other opioid use. Today with symptoms of mild opioid withdrawal desiring to resume Suboxone for withdrawal and to manage cravings/impulsive behavior. He plans to consider resuming Sublocade as next step given inconsistent adherence in the past however he is concerned about sexual dysfunction.   - Drugs of Abuse Screen Urine (POC CUPS) POCT  - Fentanyl Qualitative with Reflex to Quant Urine  - naloxone (NARCAN) 4 MG/0.1ML nasal spray  Dispense: 0.2 mL; Refill: 11  - buprenorphine HCl-naloxone HCl (SUBOXONE) 8-2 MG per film  Dispense: 28 Film; Refill: 0  - Fentanyl Qualitative with Reflex to Quant Urine     Severe benzodiazepine use disorder (H)  Infrequent  benzodiazepine use but concerning history of recklessly taking a large amount of illicitly manufactured benzo that resulted in hospitalization. No recent use and thus no concern for possible withdrawal or overdose currently. Patient does not appear interested in psychosocial treatments for various substance use disorders (eg to address impulsivity) such as an outpatient or inpatient treatment program.      Stimulant use disorder  Infrequent use, denies any acute concerns.      Alcohol use disorder, severe, dependence (H)  No recent alcohol use. Will continue disulfiram, has outside prescriber.     Tobacco use disorder  Continuous cannabis use  Not addressed today, can discuss at future visits and offer cessation support if desired.    01/02/24 visit:  - hoping to start sublocade   - denies methamphetamine use in the past month, reports he has been using ephedrine  - using nicotine gum at work, cannot use patches at work d/t risk of falling off and having children at his work touching/eating   [facility]   - interested in Congregational and spiritual activities to support recovery   - has been taking more suboxone than prescribed, up to 32 mg per day for opioid cravings   - no full opioid agonist use per pt, does reports eating poppy seeds   - no recent kratom use, taking more suboxone to address cravings.   - strong desire for sobriety, very motivated by his job   - due for suboxone refill on 1/9/23    Labs discussed with patient?  Yes      [place] Prescription Drug Monitoring Program Reviewed:  Yes    12/26/2023 12/26/2023 3 Suboxone 8 Mg-2 Mg Sl Film 28.00 14     12/15/2023 12/12/2023 2 Armodafinil 250 Mg Tablet 30.00 30     Medications:  armodafinil (NUVIGIL) 250 MG TABS tablet, Take 1 tablet (250 mg) by mouth every morning  disulfiram (ANTABUSE) 250 MG tablet, Take 1 tablet (250 mg) by mouth daily  divalproex sodium extended-release (DEPAKOTE ER) 500 MG 24 hr tablet, Take 4 tablets (2,000 mg)  "by mouth daily  FLUoxetine (PROZAC) 20 MG capsule, Take 1 capsule (20 mg) by mouth daily  lamoTRIgine (LAMICTAL) 200 MG tablet, Take 1 tablet (200 mg) by mouth daily  OLANZapine (ZYPREXA) 15 MG tablet, Take 1 tablet (15 mg) by mouth At Bedtime  OLANZapine (ZYPREXA) 5 MG tablet, Take 1 tablet (5 mg) by mouth nightly as needed (anxiety, sleep)  propranolol (INDERAL) 10 MG tablet, Take 1 tablet (10 mg) by mouth 3 times daily  amLODIPine-benazepril (LOTREL) 2.5-10 MG capsule, Take 1 capsule by mouth daily (Patient not taking: Reported on 1/2/2024)  cloNIDine (CATAPRES) 0.1 MG tablet, Take 1 tablet (0.1 mg) by mouth daily as needed (anxiety) (Patient not taking: Reported on 1/2/2024)  naloxone (NARCAN) 4 MG/0.1ML nasal spray, Spray 1 spray (4 mg) into one nostril alternating nostrils once as needed for opioid reversal every 2-3 minutes until assistance arrives  ramelteon (ROZEREM) 8 MG tablet, Take 1 tablet (8 mg) by mouth nightly as needed for sleep (Patient not taking: Reported on 1/2/2024)    No current facility-administered medications on file prior to visit.      Allergies   Allergen Reactions    Prednisone Other (See Comments)     psych problems       PMH, PSH, FamHx reviewed      OBJECTIVE                                                      BP (!) 139/95 (BP Location: Left arm, Patient Position: Sitting, Cuff Size: Adult Regular)   Pulse 56   Ht 1.778 m (5' 10\")   Wt 104.1 kg (229 lb 6.4 oz)   SpO2 100%   BMI 32.92 kg/m      Physical Exam  Constitutional:       General: She is not in acute distress.     Appearance: She is diaphoretic.   Eyes:      General: No scleral icterus.     Extraocular Movements: Extraocular movements intact.      Conjunctiva/sclera: Conjunctivae normal.   Pulmonary:      Effort: Pulmonary effort is normal.   Neurological:      Mental Status: She is alert and oriented to person, place, and time.   Psychiatric:         Attention and Perception: Attention and perception normal.         " Mood and Affect: Mood is anxious.         Speech: Speech normal.         Thought Content: Thought content normal.         Cognition and Memory: Cognition and memory normal.      Comments: Insight and judgment fair   Judgment impulsive         Labs:    UDS:    Lab Results   Component Value Date    BUP Screen Positive (A) 01/02/2024    BZO Negative 01/02/2024    BAR Negative 01/02/2024    JOSE Negative 01/02/2024    MAMP Screen Positive (A) 01/02/2024    AMP Negative 01/02/2024    MDMA Negative 01/02/2024    MTD Negative 01/02/2024    BEZ205 Screen Positive (A) 01/02/2024    OXY Negative 01/02/2024    PCP Negative 01/02/2024    THC Negative 01/02/2024    TEMP Invalid (A) 01/02/2024    SGPOCT 1.025 01/02/2024     *POC urine drug screen does not screen for Fentanyl      Recent Results (from the past 240 hour(s))   Drugs of Abuse Screen Urine (POC CUPS) POCT    Collection Time: 12/26/23 10:15 AM   Result Value Ref Range    POCT Kit Lot Number [id number]     POCT Kit Expiration Date 8/22/25     Temperature Urine POCT 92 F 90 F, 92 F, 94 F, 96 F, 98 F, 100 F    Specific [place] POCT 1.025 1.005, 1.015, 1.025    pH Qual Urine POCT 5 pH 4 pH, 5 pH, 7 pH, 9 pH    Creatinine Qual Urine POCT 200 mg/dL 20 mg/dL, 50 mg/dL, 100 mg/dL, 200 mg/dL    Internal QC Qual Urine POCT Valid Valid    Amphetamine Qual Urine POCT Negative Negative    Barbiturate Qual Urine POCT Negative Negative    Buprenorphine Qual Urine POCT Negative Negative    Benzodiazepine Qual Urine POCT Negative Negative    Cocaine Qual Urine POCT Negative Negative    Methamphetamine Qual Urine POCT Negative Negative    MDMA Qual Urine POCT Negative Negative    Methadone Qual Urine POCT Negative Negative    Opiate Qual Urine POCT Negative Negative    Oxycodone Qual Urine POCT Negative Negative    Phencyclidine Qual Urine POCT Negative Negative    THC Qual Urine POCT Negative Negative   Fentanyl Qualitative with Reflex to Quant Urine    Collection Time: 12/26/23 12:04 PM    Result Value Ref Range    Fentanyl Qual Urine Screen Negative Screen Negative   Drugs of Abuse Screen Urine (POC CUPS) POCT    Collection Time: 01/02/24  9:09 AM   Result Value Ref Range    POCT Kit Lot Number [id number]     POCT Kit Expiration Date 2025-08-22     Temperature Urine POCT Invalid (A) 90 F, 92 F, 94 F, 96 F, 98 F, 100 F    Specific [place] POCT 1.025 1.005, 1.015, 1.025    pH Qual Urine POCT 5 pH 4 pH, 5 pH, 7 pH, 9 pH    Creatinine Qual Urine POCT 200 mg/dL 20 mg/dL, 50 mg/dL, 100 mg/dL, 200 mg/dL    Internal QC Qual Urine POCT Valid Valid    Amphetamine Qual Urine POCT Negative Negative    Barbiturate Qual Urine POCT Negative Negative    Buprenorphine Qual Urine POCT Screen Positive (A) Negative    Benzodiazepine Qual Urine POCT Negative Negative    Cocaine Qual Urine POCT Negative Negative    Methamphetamine Qual Urine POCT Screen Positive (A) Negative    MDMA Qual Urine POCT Negative Negative    Methadone Qual Urine POCT Negative Negative    Opiate Qual Urine POCT Screen Positive (A) Negative    Oxycodone Qual Urine POCT Negative Negative    Phencyclidine Qual Urine POCT Negative Negative    THC Qual Urine POCT Negative Negative         SCOTT Teran [place] [id number] [address] [address] [phone]

## 2024-01-02 NOTE — TELEPHONE ENCOUNTER
Reason for Call:  Other prescription    Detailed comments: Pt left vm requesting subxone refills. Reports to have taken more than prescribed due to cravings and back pain. Would like to get back on sublocade  instead, however requesting a refill ASAP until he can get in on 8th or sometimes this week. Per pt no transportation until Wednesday and he works at noon. A doctor's note will not work. Pt has 4 doses left.     Routing to  Pool to further assist.    Phone Number Patient can be reached at: Cell number on file:    Telephone Information:   Mobile [phone]       Best Time: Any    Can we leave a detailed message on this number? YES    Call taken on 1/2/2024 at 8:19 AM by Usman Lopez

## 2024-01-08 ENCOUNTER — OFFICE VISIT (OUTPATIENT)
Dept: BEHAVIORAL HEALTH | Facility: CLINIC | Age: 29
End: 2024-01-08
Payer: COMMERCIAL

## 2024-01-08 VITALS
BODY MASS INDEX: 32.93 KG/M2 | HEIGHT: 70 IN | WEIGHT: 230 LBS | DIASTOLIC BLOOD PRESSURE: 90 MMHG | HEART RATE: 75 BPM | OXYGEN SATURATION: 96 % | SYSTOLIC BLOOD PRESSURE: 144 MMHG

## 2024-01-08 DIAGNOSIS — F10.20 ALCOHOL USE DISORDER, SEVERE, DEPENDENCE (H): ICD-10-CM

## 2024-01-08 DIAGNOSIS — N52.9 ERECTILE DYSFUNCTION, UNSPECIFIED ERECTILE DYSFUNCTION TYPE: ICD-10-CM

## 2024-01-08 DIAGNOSIS — F11.20 OPIOID USE DISORDER, SEVERE, DEPENDENCE (H): Primary | ICD-10-CM

## 2024-01-08 DIAGNOSIS — F15.90 STIMULANT USE DISORDER: ICD-10-CM

## 2024-01-08 DIAGNOSIS — F13.20 SEVERE BENZODIAZEPINE USE DISORDER (H): ICD-10-CM

## 2024-01-08 LAB
AMPHETAMINE QUAL URINE POCT: NEGATIVE
BARBITURATE QUAL URINE POCT: NEGATIVE
BENZODIAZEPINE QUAL URINE POCT: NEGATIVE
BUPRENORPHINE QUAL URINE POCT: ABNORMAL
BUPRENORPHINE UR CFM-MCNC: 1580 NG/ML
BUPRENORPHINE/CREAT UR: 429 NG/MG {CREAT}
COCAINE QUAL URINE POCT: NEGATIVE
CODEINE UR CFM-MCNC: 56 NG/ML
CODEINE/CREAT UR: 15 NG/MG {CREAT}
CREATININE QUAL URINE POCT: ABNORMAL
INTERNAL QC QUAL URINE POCT: ABNORMAL
MDMA QUAL URINE POCT: NEGATIVE
METHADONE QUAL URINE POCT: NEGATIVE
METHAMPHETAMINE QUAL URINE POCT: NEGATIVE
NALOXONE UR CFM-MCNC: 4160 NG/ML
NALOXONE: 1130 NG/MG {CREAT}
NORBUPRENORPHINE UR CFM-MCNC: 7800 NG/ML
NORBUPRENORPHINE/CREAT UR: 2120 NG/MG {CREAT}
OPIATE QUAL URINE POCT: NEGATIVE
OXYCODONE QUAL URINE POCT: NEGATIVE
PH QUAL URINE POCT: ABNORMAL
PHENCYCLIDINE QUAL URINE POCT: NEGATIVE
POCT KIT EXPIRATION DATE: ABNORMAL
POCT KIT LOT NUMBER: ABNORMAL
SPECIFIC GRAVITY POCT: 1.02
TEMPERATURE URINE POCT: ABNORMAL
THC QUAL URINE POCT: NEGATIVE

## 2024-01-08 PROCEDURE — 80305 DRUG TEST PRSMV DIR OPT OBS: CPT | Performed by: FAMILY MEDICINE

## 2024-01-08 PROCEDURE — 99214 OFFICE O/P EST MOD 30 MIN: CPT | Performed by: FAMILY MEDICINE

## 2024-01-08 RX ORDER — BUPRENORPHINE AND NALOXONE 12; 3 MG/1; MG/1
1 FILM, SOLUBLE BUCCAL; SUBLINGUAL 2 TIMES DAILY
Qty: 16 FILM | Refills: 0 | Status: SHIPPED | OUTPATIENT
Start: 2024-01-08 | End: 2024-04-10

## 2024-01-08 ASSESSMENT — PAIN SCALES - GENERAL: PAINLEVEL: MODERATE PAIN (4)

## 2024-01-08 ASSESSMENT — PATIENT HEALTH QUESTIONNAIRE - PHQ9: SUM OF ALL RESPONSES TO PHQ QUESTIONS 1-9: 11

## 2024-01-08 NOTE — PROGRESS NOTES
"[facility] Follow Up    ASSESSMENT/PLAN                                                      Pat was seen today for recheck.    Diagnoses and all orders for this visit:    Opioid use disorder, severe, dependence (H)  -     Drugs of Abuse Screen Urine (POC CUPS) POCT  -     Buprenorphine HCl-Naloxone HCl (SUBOXONE) 12-3 MG FILM per film; Place 1 Film under the tongue 2 times daily    - Patient recently re-initiated buprenorphine after return to Kratom use, now doing well on 24mg TDD (12mg BID). Plan to transition to Sublocade, has appointment scheduled 1/17/24. Refilled for one week supply and will see patient for one more visit prior to scheduled injection to ensure smooth transition.   - Patient noticing new adverse effect of difficulty urinating, suspect this is likely related to rapid dose titration. No warning signs of more severe underlying issue, patient will monitor symptoms.  - POC UDS positive for meth (negative amphetamine) and opiates last visit, pt denied used use at that time, confirmation not yet resulted. Urine is as expected today.    Alcohol use disorder, severe, dependence (H)    Denies cravings or recent use, continue on current dose of Antabuse.    Stimulant use disorder    Denies recent cravings or use. Was using ephedrine for allergy symptoms, denies overuse. Last UDS positive for methamphetamine but not amphetamine, suspect cross-reaction though confirmation is still pending.    Severe benzodiazepine use disorder (H)    Denies recent cravings or use.    Erectile dysfunction, unspecified erectile dysfunction type    Patient notes large supply of \"powdered Cialis\" at home, currently not in a relationship but notes that if he were he would plan to use this to manage his ED/decreased libido. Discussed risks of using non-prescribed pharmaceuticals, offered prescription for Cialis however patient declines due to cost in the past. Of note, generic Cialis is now available at " much reduced cost, continue to offer if needed.    RTC 1 week    Patient counseling completed today:  Discussed mechanism of action, potential risks/benefits/side effects of medications and other recommendations above.    Harm reduction counseling including never use alone, availability of naloxone, risks associated with concurrent use of opioids and benzodiazepines, alcohol, or other sedatives, safer administration as applicable.  Discussed importance of avoiding isolation, building a network of supportive relationships, avoiding people/places/things associated with past use to reduce risk of relapse; including motivational interviewing regarding psychosocial interventions.     SUBJECTIVE                                                      CC/HPI:  Laci Hinkle Jr is a 28 year old male with PMH bipolar disorder, CEASAR, ADHD, and PSUD including opioids on buprenorphine who presents to the Recovery Clinic for return visit.       Brief History:  Pt first presented to Recovery Clinic on 7/22/21. Pt was referred by staff in [facility] due to pt's initial desire to taper off buprenorphine which he had been taking from nonprescription sources.   Pt soon left [facility], but continued with Recovery Clinic.  He eventually decided to continue buprenorphine therapy, and then transferred to [facility] with initial injection 8/13/21.  He received a total of 3 Sublocade injections, most recently 100mg on 10/8/21.   Pt then indicated to  staff he wanted to discontinue Sublocade injections.  He was lost to follow up until he returned to  on 12/9/21 requesting to resume SL buprenorphine after brief return to use of alcohol and kratom.  Displays irregularities in buprenorphine use including skipping doses and advancing dose. 8/2022 he agreed to recommendation of resuming Sublocade to improve adherence to treatment.   He received Sublocade 300mg on 8/12/22 and 9/9/22 and 100mg on 10/7/22.  He was lost to follow-up again  after Dec 2022 Sublocade injection and returned to  Dec 2023 after recent Kratom use requesting to resume Suboxone. Now back on Suboxone, planning to transition back to Sublocade again.     He describes his opioid use history starting with heroin as a teenager, last use age 17.  He started methadone treatment age 17, dose up to 100mg, then tapered off to 6mg/day then stopped.  He began using kratom ~2016, eventually used amounts up to 100g daily.  He was first prescribed buprenorphine for OUD in 2019. He took as prescribed through 1/2021, then continued on buprenorphine through nonprescription sources.       IV drug use: No   History of overdose: No  Previous treatments : Yes: multiple, [facility], [facility] 11/2020, previous buprenorphine and methadone  Longest period of sobriety: few months  Medical complications related to substance use: exacerbation of MH diagnoses  Hepatitis C Status  no record of testing  HIV Status no record of testing     Other recent substance use:  Stimulants: used methamphetamine ages [age], stopped for 5 years, then resumed age 24.    Sedatives/hypnotics/anxiolytics: has used in past, denies regular use  Alcohol: h/o drinking 1 L liquor daily  Tobacco: 2-3 ppd and/or ENDS  Cannabis: occasional   Hallucinogens:has used in past, denies recent  Behavioral addictions: none      12/26/2023    10:00 AM 1/2/2024     9:00 AM 1/8/2024     9:00 AM   PHQ   PHQ-9 Total Score 14 12 11   Q9: Thoughts of better off dead/self-harm past 2 weeks Not at all Not at all Not at all         Most recent [facility] visit 1/2/24.    A/P last visit:  1. Opioid use disorder, severe, dependence (H)  - Pt reporting self titration of dose up to 32 mg/day for cravings (kratom). Plan to increase dose to 12 mg BID and then transition to Sublocade. Reviewed common SE and MOA. All questions answered. Pt will need to schedule and then PA will start, reviewed this process today. Discontinue Suboxone after  "initial injection.   - POC UDS positive for opiates today, pt denies use, confirmation sent   - confirms access to narcan   - strongly encouraged involvement in psychosocial interventions to support recovery.   - fentanyl screening from 12/26/23 negative   - Drugs of Abuse Screen Urine (POC CUPS) POCT  - Buprenorphine HCl-Naloxone HCl (SUBOXONE) 12-3 MG FILM per film; Place 1 Film under the tongue 2 times daily for 6 days  Dispense: 12 Film; Refill: 0  - Drug Confirmation Panel Urine with Creatinine; Future  - Drug Confirmation Panel Urine with Creatinine     2. Alcohol use disorder, severe, dependence (H)  - no recent use.   - continue Antabuse, no rx needed   - interventions as above      3. Stimulant use disorder  - Reports infrequent use of ephedrine, POC UDS positive for methamphetamine. Denies use. Will send for confirmation testing today.         4. Severe benzodiazepine use disorder (H)  - denies recent use or cravings. Monitor.   - Interventions as above, interested in Jainism or spiritually based recovery program. May benefit from working with PRS as well. Will discuss at follow up.    01/08/24 visit:  Doing well overall this week   - Has been taking Suboxone 12mg BID, doing well at this dosing.    Noticing decreased libido and difficulty urinating   - Has had in the past with other opioids but not buprenorphine. Difficulty initiating stream, but managing okay   - No pain with urination, hematuria, or other concerning symptoms   - Received a large quantity of Cialis from a friend (\"like a thousand doses\"), would want to use that if he were in a relationship    Planning on transitioning to Sublocade next week   - In the past has used Kratom and can tell how much Sublocade is in his system based on how he feels   - Feels like Sublocade usually lasts about a year for him, much prefers Sublocade to Suboxone films    No stimulant, opioid, or alcohol cravings  Currently has no intention of using  No " benzodiazepine use  Was taking ephedrine (BronchAid or Primatine) last week for congestion, not taking this week.     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  Yes    [place] Prescription Drug Monitoring Program Reviewed:  Yes, appropriate.    Medications:  amLODIPine-benazepril (LOTREL) 2.5-10 MG capsule, Take 1 capsule by mouth daily  armodafinil (NUVIGIL) 250 MG TABS tablet, Take 1 tablet (250 mg) by mouth every morning  Buprenorphine HCl-Naloxone HCl (SUBOXONE) 12-3 MG FILM per film, Place 1 Film under the tongue 2 times daily for 6 days  disulfiram (ANTABUSE) 250 MG tablet, Take 1 tablet (250 mg) by mouth daily  divalproex sodium extended-release (DEPAKOTE ER) 500 MG 24 hr tablet, Take 4 tablets (2,000 mg) by mouth daily  FLUoxetine (PROZAC) 20 MG capsule, Take 1 capsule (20 mg) by mouth daily  lamoTRIgine (LAMICTAL) 200 MG tablet, Take 1 tablet (200 mg) by mouth daily  OLANZapine (ZYPREXA) 15 MG tablet, Take 1 tablet (15 mg) by mouth At Bedtime  OLANZapine (ZYPREXA) 5 MG tablet, Take 1 tablet (5 mg) by mouth nightly as needed (anxiety, sleep)  propranolol (INDERAL) 10 MG tablet, Take 1 tablet (10 mg) by mouth 3 times daily  cloNIDine (CATAPRES) 0.1 MG tablet, Take 1 tablet (0.1 mg) by mouth daily as needed (anxiety) (Patient not taking: Reported on 1/2/2024)  naloxone (NARCAN) 4 MG/0.1ML nasal spray, Spray 1 spray (4 mg) into one nostril alternating nostrils once as needed for opioid reversal every 2-3 minutes until assistance arrives  ramelteon (ROZEREM) 8 MG tablet, Take 1 tablet (8 mg) by mouth nightly as needed for sleep (Patient not taking: Reported on 1/8/2024)    No current facility-administered medications on file prior to visit.      Allergies   Allergen Reactions    Prednisone Other (See  "Comments)     psych problems       PMH, PSH, FamHx reviewed      OBJECTIVE                                                      BP (!) 144/90 (BP Location: Left arm, Patient Position: Sitting, Cuff Size: Adult Regular)   Pulse 75   Ht 1.778 m (5' 10\")   Wt 104.3 kg (230 lb)   SpO2 96%   BMI 33.00 kg/m      Of note, chart indicates patient prefers she/her pronouns, though last several notes have not used these pronouns and gender identity is listed as male. Below text follows that preference, though should be confirmed at next visit.    Physical Exam  Constitutional:       General: She is not in acute distress.     Appearance: Normal appearance.   Eyes:      Extraocular Movements: Extraocular movements intact.      Conjunctiva/sclera: Conjunctivae normal.   Pulmonary:      Effort: Pulmonary effort is normal. No respiratory distress.   Abdominal:      General: Abdomen is flat.   Skin:     General: Skin is warm and dry.      Coloration: Skin is not jaundiced.   Neurological:      General: No focal deficit present.      Mental Status: She is alert and oriented to person, place, and time.   Psychiatric:         Mood and Affect: Mood normal.         Behavior: Behavior normal.         Labs:    UDS:    Lab Results   Component Value Date    BUP Screen Positive (A) 01/08/2024    BZO Negative 01/08/2024    BAR Negative 01/08/2024    JOSE Negative 01/08/2024    MAMP Negative 01/08/2024    AMP Negative 01/08/2024    MDMA Negative 01/08/2024    MTD Negative 01/08/2024    [id number] Negative 01/08/2024    OXY Negative 01/08/2024    PCP Negative 01/08/2024    THC Negative 01/08/2024    TEMP 90 F 01/08/2024    SGPOCT 1.025 01/08/2024     *POC urine drug screen does not screen for Fentanyl      Recent Results (from the past 240 hour(s))   Drugs of Abuse Screen Urine (POC CUPS) POCT    Collection Time: 01/02/24  9:09 AM   Result Value Ref Range    POCT Kit Lot Number [id number]     POCT Kit Expiration Date 2025-08-22     Temperature " Urine POCT Invalid (A) 90 F, 92 F, 94 F, 96 F, 98 F, 100 F    Specific Center POCT 1.025 1.005, 1.015, 1.025    pH Qual Urine POCT 5 pH 4 pH, 5 pH, 7 pH, 9 pH    Creatinine Qual Urine POCT 200 mg/dL 20 mg/dL, 50 mg/dL, 100 mg/dL, 200 mg/dL    Internal QC Qual Urine POCT Valid Valid    Amphetamine Qual Urine POCT Negative Negative    Barbiturate Qual Urine POCT Negative Negative    Buprenorphine Qual Urine POCT Screen Positive (A) Negative    Benzodiazepine Qual Urine POCT Negative Negative    Cocaine Qual Urine POCT Negative Negative    Methamphetamine Qual Urine POCT Screen Positive (A) Negative    MDMA Qual Urine POCT Negative Negative    Methadone Qual Urine POCT Negative Negative    Opiate Qual Urine POCT Screen Positive (A) Negative    Oxycodone Qual Urine POCT Negative Negative    Phencyclidine Qual Urine POCT Negative Negative    THC Qual Urine POCT Negative Negative   Urine Creatinine for Drug Screen Panel    Collection Time: 01/02/24 10:16 AM   Result Value Ref Range    Creatinine Urine for Drug Screen 368 mg/dL   Drugs of Abuse Screen Urine (POC CUPS) POCT    Collection Time: 01/08/24  9:38 AM   Result Value Ref Range    POCT Kit Lot Number [id number]     POCT Kit Expiration Date 2025-08-22     Temperature Urine POCT 90 F 90 F, 92 F, 94 F, 96 F, 98 F, 100 F    Specific Center POCT 1.025 1.005, 1.015, 1.025    pH Qual Urine POCT 5 pH 4 pH, 5 pH, 7 pH, 9 pH    Creatinine Qual Urine POCT 100 mg/dL 20 mg/dL, 50 mg/dL, 100 mg/dL, 200 mg/dL    Internal QC Qual Urine POCT Valid Valid    Amphetamine Qual Urine POCT Negative Negative    Barbiturate Qual Urine POCT Negative Negative    Buprenorphine Qual Urine POCT Screen Positive (A) Negative    Benzodiazepine Qual Urine POCT Negative Negative    Cocaine Qual Urine POCT Negative Negative    Methamphetamine Qual Urine POCT Negative Negative    MDMA Qual Urine POCT Negative Negative    Methadone Qual Urine POCT Negative Negative    Opiate Qual Urine POCT Negative  Negative    Oxycodone Qual Urine POCT Negative Negative    Phencyclidine Qual Urine POCT Negative Negative    THC Qual Urine POCT Negative Negative       Marcos Gonzales MD  Addiction Medicine Fellow  Care discussed with Dr. Mckeon

## 2024-01-08 NOTE — PROGRESS NOTES
[facility]      Rooming information:  Approximate last use of full opioid agonist: ~ 3 month ago  Taking buprenorphine? Yes:   How much per day? 24mg  Number of buprenorphine films/tablets remaining currently: 0  Side effects related to buprenorphine (constipation, dry mouth, sedation?) Yes: decreased libido and diffculties urinating  Narcan currently available: Yes  Other recent substance use:    Denies  NICOTINE-Yes: but a lot less  If using nicotine, ready to quit? No    Point of care urine drug screen positive for:  Lab Results   Component Value Date    BUP Screen Positive (A) 01/08/2024    BZO Negative 01/08/2024    BAR Negative 01/08/2024    JOSE Negative 01/08/2024    MAMP Negative 01/08/2024    AMP Negative 01/08/2024    MDMA Negative 01/08/2024    MTD Negative 01/08/2024    [id number] Negative 01/08/2024    OXY Negative 01/08/2024    PCP Negative 01/08/2024    THC Negative 01/08/2024    TEMP 90 F 01/08/2024    SGPOCT 1.025 01/08/2024       *POC urine drug screen does not screen for Fentanyl        1/8/2024     9:00 AM   PHQ Assesment Total Score(s)   PHQ-9 Score 11       If PHQ-9 score of 15 or higher, has Recovery Clinic therapist or provider been notified? No    Any current suicidal ideation? No  If yes, has Recovery Clinic therapist or provider been notified? N/A    [facility] care provider: [facility]     Mental health provider: therapist and JULISSA Sheriff (follow up on MH referral if needed)    Insurance needs: still got to renew it    Housing needs: stable    Current legal issues: none    Contact information up to date? yes    3rd Party Involvement NA (please obtain WU if pt would like to include)    Rina Evangelista LPN  January 8, 2024  9:37 AM

## 2024-01-10 ENCOUNTER — MYC MEDICAL ADVICE (OUTPATIENT)
Dept: PSYCHIATRY | Facility: CLINIC | Age: 29
End: 2024-01-10
Payer: COMMERCIAL

## 2024-01-10 DIAGNOSIS — F15.11 METHAMPHETAMINE ABUSE IN REMISSION (H): ICD-10-CM

## 2024-01-10 NOTE — TELEPHONE ENCOUNTER
Date of Last Office Visit: 8/24/23  Date of Next Office Visit: 1/23/24  No shows since last visit: (1) 11/21/23  Cancellations since last visit: 0    Medication requested: armodafinil (NUVIGIL) 250 MG TABS tablet  Date last ordered: 12/15/23 Qty: 30 Refills: 0     [place] of [place] ?: Yes  Medication last sold date: 12/15/23 Qty filled: #30 (30 days)  Other controlled substance on [place] ?: Yes. Suboxone  If yes, is this a new medication?: No    Lapse in medication adherence greater than 5 days?: No    Medication refill request verified as identical to current order?: Yes  Result of Last DAM, VPA, Li+ Level, CBC, or Carbamazepine Level (at or since last visit):  POC UDS + buprenorphine ; 1/2 POC UDS + opiates, buprenorphine and methamphetamines     Last visit treatment plan:   Assessment:     Laci Hinkle Jr reported today that he has been denied an appeal that he submitted to return to college.  He plans on getting a job in the near future instead and will reapply for college in the spring.  At this point he desires no changes in his medications.  He is planning to seek guidance from sleep medicine with regards to his armodafinil and Lunesta.  Mood has been stable with the Depakote lamotrigine and olanzapine, as he has been taking these for many years.  Talk therapy will continue to help him learn skills to manage life stressors.  He prefers to continue the disulfiram to keep him from drinking alcohol as he tells me he is sober for now.  He had another provider prescribed propranolol for him 3 times a day.  He still has the clonidine available but take sparingly.       Medication side effects and alternatives were reviewed. Health promotion activities recommended and reviewed today. All questions addressed. Education and counseling completed regarding risks and benefits of medications and psychotherapy options.     Treatment Plan:        1.  Armodafinil 250 mg daily  2.  Consider discontinuing clonidine but take  sparingly since you are on propranolol 10 mg 3 times daily for anxiety  3.  Continue disulfiram 250 mg daily  4.  Continue Depakote 2000 mg daily  5.  Continue Lunesta 1 mg at bedtime  6.  Continue lamotrigine 200 mg daily  7.  Continue olanzapine 15 mg at bedtime  8.  Continue talk therapy to learn skills to manage life stressors  9.  Follow-up with sleep medicine to refill armodafinil and Lunesta, if appropriate, in the future     Continue all other medications as reviewed per electronic medical record today.   Safety plan reviewed. To the Emergency Department as needed or call after hours crisis line at [phone] or [phone]. [place] Crisis Text Line. Text [place] to 676071 or Suicide LifeLine Chat: suicidepreventionlifeline.org/chat/  To schedule individual or family therapy, call [facility] at [phone]  Schedule an appointment with me in November or sooner as needed.    []Medication refilled per  Medication Refill in Ambulatory Care  policy.  [x]Medication unable to be refilled by RN due to criteria not met as indicated below:    []Eligibility - not seen in the last year   []Supervision - no future appointment   []Compliance - no shows, cancellations or lapse in therapy   []Verification - order discrepancy   [x]Controlled medication   []Medication not included in policy   []90-day supply request   []Other

## 2024-01-11 RX ORDER — ARMODAFINIL 250 MG/1
250 TABLET ORAL EVERY MORNING
Qty: 30 TABLET | Refills: 0 | Status: SHIPPED | OUTPATIENT
Start: 2024-01-13 | End: 2024-01-23

## 2024-01-16 ENCOUNTER — TELEPHONE (OUTPATIENT)
Dept: BEHAVIORAL HEALTH | Facility: CLINIC | Age: 29
End: 2024-01-16
Payer: COMMERCIAL

## 2024-01-16 NOTE — TELEPHONE ENCOUNTER
Reason for call:  Other   Patient called regarding (reason for call): appointment and call back  Additional comments: Patient call today asking to speak to a provider regarding his shot. He would like a phone call before 12pm.     Phone number to reach patient:  Cell number on file:    Telephone Information:   Mobile [phone]       Best Time:  ASAP    Can we leave a detailed message on this number?  YES    Travel screening: Not Applicable

## 2024-01-16 NOTE — TELEPHONE ENCOUNTER
Phone call from patient. Inquiring re: stopping Sublocade after a 300mg and subsequent 100mg injections. Encouraged patient to be seen at the Recovery Clinic tomorrow after his Sublocade injection at 8:30am. Patient verbalized understanding and stated he would need a note for work from provider.     [facility]  [facility] [address], [address]   [place], [place], [address]  Phone: [phone]  Fax: [phone]    Open Monday-Friday  Closed over lunch hour  Walk in hours: 9am-11:30am and 12:30-3pm    Suzanne Sanchez RN on 1/16/2024 at 1:11 PM

## 2024-01-16 NOTE — TELEPHONE ENCOUNTER
Writer attempted to contact patient, no answer; left VM message asking for a return call to the Recovery Clinic.     [facility]  [address], [address]   [place], [address]  Phone: [phone]  Fax: [phone]    Open Monday-Friday  Closed over lunch hour  Walk in hours: 9am-11:30am and 12:30-3pm    Mandi Hanna RN on 1/16/2024 at 10:14 AM

## 2024-01-17 ENCOUNTER — INFUSION THERAPY VISIT (OUTPATIENT)
Dept: INFUSION THERAPY | Facility: CLINIC | Age: 29
End: 2024-01-17
Attending: NURSE PRACTITIONER
Payer: COMMERCIAL

## 2024-01-17 ENCOUNTER — MYC MEDICAL ADVICE (OUTPATIENT)
Dept: BEHAVIORAL HEALTH | Facility: CLINIC | Age: 29
End: 2024-01-17

## 2024-01-17 ENCOUNTER — OFFICE VISIT (OUTPATIENT)
Dept: BEHAVIORAL HEALTH | Facility: CLINIC | Age: 29
End: 2024-01-17
Payer: COMMERCIAL

## 2024-01-17 VITALS — HEART RATE: 84 BPM | DIASTOLIC BLOOD PRESSURE: 99 MMHG | SYSTOLIC BLOOD PRESSURE: 149 MMHG

## 2024-01-17 DIAGNOSIS — F15.90 STIMULANT USE DISORDER: ICD-10-CM

## 2024-01-17 DIAGNOSIS — F31.9 BIPOLAR I DISORDER (H): ICD-10-CM

## 2024-01-17 DIAGNOSIS — F10.20 ALCOHOL USE DISORDER, SEVERE, DEPENDENCE (H): ICD-10-CM

## 2024-01-17 DIAGNOSIS — F17.200 TOBACCO USE DISORDER: ICD-10-CM

## 2024-01-17 DIAGNOSIS — F11.20 OPIOID USE DISORDER, SEVERE, DEPENDENCE (H): Primary | ICD-10-CM

## 2024-01-17 DIAGNOSIS — F13.20 SEVERE BENZODIAZEPINE USE DISORDER (H): ICD-10-CM

## 2024-01-17 LAB
AMPHETAMINE QUAL URINE POCT: NEGATIVE
BARBITURATE QUAL URINE POCT: NEGATIVE
BENZODIAZEPINE QUAL URINE POCT: NEGATIVE
BUPRENORPHINE QUAL URINE POCT: ABNORMAL
COCAINE QUAL URINE POCT: NEGATIVE
CREATININE QUAL URINE POCT: ABNORMAL
INTERNAL QC QUAL URINE POCT: ABNORMAL
MDMA QUAL URINE POCT: NEGATIVE
METHADONE QUAL URINE POCT: NEGATIVE
METHAMPHETAMINE QUAL URINE POCT: NEGATIVE
OPIATE QUAL URINE POCT: NEGATIVE
OXYCODONE QUAL URINE POCT: NEGATIVE
PH QUAL URINE POCT: ABNORMAL
PHENCYCLIDINE QUAL URINE POCT: NEGATIVE
POCT KIT EXPIRATION DATE: ABNORMAL
POCT KIT LOT NUMBER: ABNORMAL
SPECIFIC GRAVITY POCT: 1.02
TEMPERATURE URINE POCT: ABNORMAL
THC QUAL URINE POCT: ABNORMAL

## 2024-01-17 PROCEDURE — 250N000011 HC RX IP 250 OP 636: Mod: JZ | Performed by: FAMILY MEDICINE

## 2024-01-17 PROCEDURE — 80305 DRUG TEST PRSMV DIR OPT OBS: CPT | Performed by: FAMILY MEDICINE

## 2024-01-17 PROCEDURE — 96372 THER/PROPH/DIAG INJ SC/IM: CPT | Performed by: FAMILY MEDICINE

## 2024-01-17 PROCEDURE — 250N000009 HC RX 250: Performed by: NURSE PRACTITIONER

## 2024-01-17 PROCEDURE — 99214 OFFICE O/P EST MOD 30 MIN: CPT | Performed by: FAMILY MEDICINE

## 2024-01-17 RX ORDER — ALBUTEROL SULFATE 0.83 MG/ML
2.5 SOLUTION RESPIRATORY (INHALATION)
OUTPATIENT
Start: 2024-02-14

## 2024-01-17 RX ORDER — LIDOCAINE HYDROCHLORIDE 10 MG/ML
2 INJECTION, SOLUTION EPIDURAL; INFILTRATION; INTRACAUDAL; PERINEURAL ONCE
OUTPATIENT
Start: 2024-02-14 | End: 2024-02-14

## 2024-01-17 RX ORDER — DIPHENHYDRAMINE HYDROCHLORIDE 50 MG/ML
50 INJECTION INTRAMUSCULAR; INTRAVENOUS
Start: 2024-02-14

## 2024-01-17 RX ORDER — MEPERIDINE HYDROCHLORIDE 25 MG/ML
25 INJECTION INTRAMUSCULAR; INTRAVENOUS; SUBCUTANEOUS EVERY 30 MIN PRN
OUTPATIENT
Start: 2024-02-14

## 2024-01-17 RX ORDER — EPINEPHRINE 1 MG/ML
0.3 INJECTION, SOLUTION, CONCENTRATE INTRAVENOUS EVERY 5 MIN PRN
OUTPATIENT
Start: 2024-02-14

## 2024-01-17 RX ORDER — METHYLPREDNISOLONE SODIUM SUCCINATE 125 MG/2ML
125 INJECTION, POWDER, LYOPHILIZED, FOR SOLUTION INTRAMUSCULAR; INTRAVENOUS
Start: 2024-02-14

## 2024-01-17 RX ORDER — ALBUTEROL SULFATE 90 UG/1
1-2 AEROSOL, METERED RESPIRATORY (INHALATION)
Start: 2024-02-14

## 2024-01-17 RX ORDER — LIDOCAINE HYDROCHLORIDE 10 MG/ML
2 INJECTION, SOLUTION EPIDURAL; INFILTRATION; INTRACAUDAL; PERINEURAL ONCE
Status: COMPLETED | OUTPATIENT
Start: 2024-01-17 | End: 2024-01-17

## 2024-01-17 RX ADMIN — LIDOCAINE HYDROCHLORIDE 2 ML: 10 INJECTION, SOLUTION EPIDURAL; INFILTRATION; INTRACAUDAL; PERINEURAL at 12:15

## 2024-01-17 RX ADMIN — BUPRENORPHINE 300 MG: 300 SOLUTION SUBCUTANEOUS at 12:21

## 2024-01-17 ASSESSMENT — PATIENT HEALTH QUESTIONNAIRE - PHQ9: SUM OF ALL RESPONSES TO PHQ QUESTIONS 1-9: 11

## 2024-01-17 NOTE — PROGRESS NOTES
[facility] Follow Up    ASSESSMENT/PLAN                                                    1. Opioid use disorder, severe, dependence (H)  Last use of kratom 12/25/23 per pt.  Has been taking buprenorphine 12-24mg/day, most recently 12mg/day, and states cravings are controlled.  Interested in resuming Sublocade.   Sublocade 300mg today.  Pt reportins considering only getting 2 injections, decreasing to 100mg with 2nd injection, also concerned about returning to use.    Reinforced value of long term treatment to reduce risk of return to use.  Pt agreeable to discussing further plans at his return in 1 month.   - Drugs of Abuse Screen Urine (POC CUPS) POCT    2. Alcohol use disorder, severe, dependence (H)  Pt taking disulfiram.  Denies recent use.     3. Stimulant use disorder  Denies recent use.  Prescribed Nuvigil by psychiatry.     4. Severe benzodiazepine use disorder (H)  Denies recent use    5. Tobacco use disorder  Not ready to quit at this time    6. Bipolar I disorder (H)  Follow-up with psychiatry as recommended  Continue individual therapy      Patient counseling completed today:  Discussed mechanism of action, potential risks/benefits/side effects of medications and other recommendations above.    Harm reduction counseling including never use alone, availability of naloxone, risks associated with concurrent use of opioids and benzodiazepines, alcohol, or other sedatives, safer administration as applicable.  Discussed importance of avoiding isolation, building a network of supportive relationships, avoiding people/places/things associated with past use to reduce risk of relapse; including motivational interviewing regarding psychosocial interventions.     SUBJECTIVE                                                      CC/HPI:  Laci Pisanotiki Brown is a 28 year old male with PMH bipolar disorder, CEASAR, ADHD, and PSUD including opioids on buprenorphine who presents to the Recovery  Clinic for return visit.       Brief History:  Pt first presented to Recovery Clinic on 7/22/21. Pt was referred by staff in [facility] due to pt's initial desire to taper off buprenorphine which he had been taking from nonprescription sources.   Pt soon left [facility], but continued with Recovery Clinic.  He eventually decided to continue buprenorphine therapy, and then [facility] 8[facility]13[facility].  He received a total of 3 Sublocade injections, most recently 100mg on 10/8/21.   Pt then indicated to  staff he wanted to discontinue Sublocade injections.  He was lost to follow up until he returned to  on 12/9/21 requesting to resume SL buprenorphine after brief return to use of alcohol and kratom.  Displays irregularities in buprenorphine use including skipping doses and advancing dose.   8/2022 he agreed to recommendation of resuming Sublocade to improve adherence to treatment.  He received Sublocade x4 8/2022-12/2022.   He was lost to follow-up again, RTC Dec 2023 after recent Kratom use requesting to resume Suboxone. Now back on Suboxone, planning to transition back to Sublocade again.    1/17/24 visit:  Pt returns to clinic before Sublocade.  Last seen on 1/8/24, continued buprenorphine 24mg/day following recent return to kratom use.  Last use of kratom 12/25/23.    Pt states he has decreased buprenorphine to 12mg/day over the last several days, and cravings remain controlled.  Considering discontinuing Sublocade after 2 injections.  Only wants to receive one 300mg injection.    Enjoying his new job as an , states this is a strong motivator for sobriety.  Feels supported by his supervisor.         Substance Use History:  Opioids: He describes his opioid use history starting with heroin as a teenager, last use age 17.  He started methadone treatment age 17, dose up to 100mg, then tapered off to 6mg/day then stopped.  He began using kratom ~2016, eventually  used amounts up to 100g daily.    He was first prescribed buprenorphine for OUD in 2019. He took as prescribed through 1/2021, then continued on buprenorphine through nonprescription sources.       IV drug use: No   History of overdose: No  Previous treatments : Yes: multiple, [facility], [facility] 11/2020, previous buprenorphine and methadone  Longest period of sobriety: few months  Medical complications related to substance use: exacerbation of MH diagnoses  Hepatitis C Status  no record of testing  HIV Status no record of testing     Other recent substance use:  Stimulants: used methamphetamine ages [age], stopped for 5 years, then resumed age 24.    Sedatives/hypnotics/anxiolytics: has used in past, denies regular use  Alcohol: h/o drinking 1 L liquor daily  Tobacco: 2-3 ppd and/or ENDS  Cannabis: occasional   Hallucinogens:has used in past, denies recent  Behavioral addictions: none      1/2/2024     9:00 AM 1/8/2024     9:00 AM 1/17/2024    10:00 AM   PHQ   PHQ-9 Total Score 12 11 11   Q9: Thoughts of better off dead/self-harm past 2 weeks Not at all Not at all Not at all     Labs discussed with patient?  Yes    [place] Prescription Drug Monitoring Program Reviewed:  Yes, appropriate.    Medications:  amLODIPine-benazepril (LOTREL) 2.5-10 MG capsule, Take 1 capsule by mouth daily  armodafinil (NUVIGIL) 250 MG TABS tablet, Take 1 tablet (250 mg) by mouth every morning Fill no sooner than January 13  Buprenorphine HCl-Naloxone HCl (SUBOXONE) 12-3 MG FILM per film, Place 1 Film under the tongue 2 times daily  cloNIDine (CATAPRES) 0.1 MG tablet, Take 1 tablet (0.1 mg) by mouth daily as needed (anxiety) (Patient not taking: Reported on 1/2/2024)  disulfiram (ANTABUSE) 250 MG tablet, Take 1 tablet (250 mg) by mouth daily  divalproex sodium extended-release (DEPAKOTE ER) 500 MG 24 hr tablet, Take 4 tablets (2,000 mg) by mouth daily  FLUoxetine (PROZAC) 20 MG capsule, Take 1 capsule (20 mg) by mouth  daily  lamoTRIgine (LAMICTAL) 200 MG tablet, Take 1 tablet (200 mg) by mouth daily  naloxone (NARCAN) 4 MG/0.1ML nasal spray, Spray 1 spray (4 mg) into one nostril alternating nostrils once as needed for opioid reversal every 2-3 minutes until assistance arrives  OLANZapine (ZYPREXA) 15 MG tablet, Take 1 tablet (15 mg) by mouth At Bedtime  OLANZapine (ZYPREXA) 5 MG tablet, Take 1 tablet (5 mg) by mouth nightly as needed (anxiety, sleep)  propranolol (INDERAL) 10 MG tablet, Take 1 tablet (10 mg) by mouth 3 times daily  ramelteon (ROZEREM) 8 MG tablet, Take 1 tablet (8 mg) by mouth nightly as needed for sleep (Patient not taking: Reported on 1/8/2024)    buprenorphine ER (SUBLOCADE) syringe 300 mg  [COMPLETED] lidocaine (PF) (XYLOCAINE) 1 % injection 2 mL        Allergies   Allergen Reactions    Prednisone Other (See Comments)     psych problems       PMH, PSH, FamHx reviewed      OBJECTIVE                                                      BP (!) 149/99   Pulse 84     Of note, chart indicates patient prefers she/her pronouns, though last several notes have not used these pronouns and gender identity is listed as male. Below text follows that preference, though should be confirmed at next visit.    Physical Exam  Constitutional:       General: She is not in acute distress.     Appearance: Normal appearance.   Eyes:      Extraocular Movements: Extraocular movements intact.      Conjunctiva/sclera: Conjunctivae normal.   Pulmonary:      Effort: Pulmonary effort is normal. No respiratory distress.   Abdominal:      General: Abdomen is flat.   Skin:     General: Skin is warm and dry.      Coloration: Skin is not jaundiced.   Neurological:      General: No focal deficit present.      Mental Status: She is alert and oriented to person, place, and time.   Psychiatric:         Mood and Affect: Mood normal.         Behavior: Behavior normal.         Labs:    UDS:    Lab Results   Component Value Date    BUP Screen Positive  (A) 01/17/2024    BZO Negative 01/17/2024    BAR Negative 01/17/2024    JOSE Negative 01/17/2024    MAMP Negative 01/17/2024    AMP Negative 01/17/2024    MDMA Negative 01/17/2024    MTD Negative 01/17/2024    PHY836 Negative 01/17/2024    OXY Negative 01/17/2024    PCP Negative 01/17/2024    THC Screen Positive (A) 01/17/2024    TEMP 94 F 01/17/2024    SGPOCT 1.025 01/17/2024     *POC urine drug screen does not screen for Fentanyl      Recent Results (from the past 240 hour(s))   Drugs of Abuse Screen Urine (POC CUPS) POCT    Collection Time: 01/08/24  9:38 AM   Result Value Ref Range    POCT Kit Lot Number [id number]     POCT Kit Expiration Date 2025-08-22     Temperature Urine POCT 90 F 90 F, 92 F, 94 F, 96 F, 98 F, 100 F    Specific [place] POCT 1.025 1.005, 1.015, 1.025    pH Qual Urine POCT 5 pH 4 pH, 5 pH, 7 pH, 9 pH    Creatinine Qual Urine POCT 100 mg/dL 20 mg/dL, 50 mg/dL, 100 mg/dL, 200 mg/dL    Internal QC Qual Urine POCT Valid Valid    Amphetamine Qual Urine POCT Negative Negative    Barbiturate Qual Urine POCT Negative Negative    Buprenorphine Qual Urine POCT Screen Positive (A) Negative    Benzodiazepine Qual Urine POCT Negative Negative    Cocaine Qual Urine POCT Negative Negative    Methamphetamine Qual Urine POCT Negative Negative    MDMA Qual Urine POCT Negative Negative    Methadone Qual Urine POCT Negative Negative    Opiate Qual Urine POCT Negative Negative    Oxycodone Qual Urine POCT Negative Negative    Phencyclidine Qual Urine POCT Negative Negative    THC Qual Urine POCT Negative Negative   Drugs of Abuse Screen Urine (POC CUPS) POCT    Collection Time: 01/17/24 10:35 AM   Result Value Ref Range    POCT Kit Lot Number [id number]     POCT Kit Expiration Date 8/22/25     Temperature Urine POCT 94 F 90 F, 92 F, 94 F, 96 F, 98 F, 100 F    Specific [place] POCT 1.025 1.005, 1.015, 1.025    pH Qual Urine POCT 5 pH 4 pH, 5 pH, 7 pH, 9 pH    Creatinine Qual Urine POCT 100 mg/dL 20 mg/dL, 50 mg/dL,  100 mg/dL, 200 mg/dL    Internal QC Qual Urine POCT Valid Valid    Amphetamine Qual Urine POCT Negative Negative    Barbiturate Qual Urine POCT Negative Negative    Buprenorphine Qual Urine POCT Screen Positive (A) Negative    Benzodiazepine Qual Urine POCT Negative Negative    Cocaine Qual Urine POCT Negative Negative    Methamphetamine Qual Urine POCT Negative Negative    MDMA Qual Urine POCT Negative Negative    Methadone Qual Urine POCT Negative Negative    Opiate Qual Urine POCT Negative Negative    Oxycodone Qual Urine POCT Negative Negative    Phencyclidine Qual Urine POCT Negative Negative    THC Qual Urine POCT Screen Positive (A) Negative       Asiya Hastings MD  Addiction Medicine  [place] [id number] [address] [address] [phone]

## 2024-01-17 NOTE — PROGRESS NOTES
Infusion Nursing Note:  Laci EMERSON Hinkle Jr presents today for Sublocade 300 mg.    Patient seen by provider today: No   present during visit today: Not Applicable.    Note: Lidocaine then Sublocade injected into abd.      Intravenous Access:  No Intravenous access/labs at this visit.    Treatment Conditions:  Denies opioid use.      Post Infusion Assessment:  Patient tolerated injection without incident.       Discharge Plan:   Patient and/or family verbalized understanding of discharge instructions and all questions answered.  Patient discharged in stable condition accompanied by: self.      FRANK GROVE RN

## 2024-01-17 NOTE — NURSING NOTE
[facility]      Rooming information:  Approximate last use of full opioid agonist: APROX October 2023  Taking buprenorphine? Yes: suboxone  How much per day? 24mg   Number of buprenorphine films/tablets remaining currently: 0  Side effects related to buprenorphine (constipation, dry mouth, sedation?) Yes: difficulty urinating, libido issues   Narcan currently available: Yes  Other recent substance use:    Cannabis   NICOTINE-Yes:   If using nicotine, ready to quit? No    Point of care urine drug screen positive for:  Lab Results   Component Value Date    BUP Screen Positive (A) 01/17/2024    BZO Negative 01/17/2024    BAR Negative 01/17/2024    JOSE Negative 01/17/2024    MAMP Negative 01/17/2024    AMP Negative 01/17/2024    MDMA Negative 01/17/2024    MTD Negative 01/17/2024    MHG163 Negative 01/17/2024    OXY Negative 01/17/2024    PCP Negative 01/17/2024    THC Screen Positive (A) 01/17/2024    TEMP 94 F 01/17/2024    SGPOCT 1.025 01/17/2024       *POC urine drug screen does not screen for Fentanyl        1/17/2024    10:00 AM   PHQ Assesment Total Score(s)   PHQ-9 Score 11       If PHQ-9 score of 15 or higher, has Recovery Clinic therapist or provider been notified? N/A    Any current suicidal ideation? No  If yes, has Recovery Clinic therapist or provider been notified? N/A    Primary care provider: [facility]     Mental health provider: yes (follow up on MH referral if needed)    Insurance needs: active; needs to renew     Housing needs: stable     Current legal issues: none     Contact information up to date? yes    3rd Party Involvement none  (please obtain WU if pt would like to include)    Nikhil Tenorio MA  January 17, 2024  10:28 AM

## 2024-01-17 NOTE — LETTER
January 17, 2024      Laci Hinkle Jr  [address]   [place] [address]        To Whom It May Concern:    Laci Hinkle Jr was seen in our clinic and may return to work without restrictions.      Sincerely,        FRANK GALARZA MD

## 2024-01-17 NOTE — TELEPHONE ENCOUNTER
Routing letter request to Dr. Hastings. [employer] message sent to patient informing him the letter will be available via [employer].     Suzanne Sanchez RN on 1/17/2024 at 12:07 PM

## 2024-01-19 ENCOUNTER — MYC MEDICAL ADVICE (OUTPATIENT)
Dept: PSYCHIATRY | Facility: CLINIC | Age: 29
End: 2024-01-19
Payer: COMMERCIAL

## 2024-01-19 NOTE — TELEPHONE ENCOUNTER
See Patient's My chart message. Has an appt on 1/23/24.     Lynn Ocampo RN on 1/19/2024 at 3:57 PM

## 2024-01-23 ENCOUNTER — VIRTUAL VISIT (OUTPATIENT)
Dept: PSYCHIATRY | Facility: CLINIC | Age: 29
End: 2024-01-23
Payer: COMMERCIAL

## 2024-01-23 DIAGNOSIS — F10.20 ALCOHOL USE DISORDER, SEVERE, DEPENDENCE (H): ICD-10-CM

## 2024-01-23 DIAGNOSIS — F15.11 METHAMPHETAMINE ABUSE IN REMISSION (H): ICD-10-CM

## 2024-01-23 DIAGNOSIS — F43.10 PTSD (POST-TRAUMATIC STRESS DISORDER): Primary | ICD-10-CM

## 2024-01-23 DIAGNOSIS — F31.10 BIPOLAR I DISORDER, MOST RECENT EPISODE (OR CURRENT) MANIC (H): ICD-10-CM

## 2024-01-23 DIAGNOSIS — F40.10 SOCIAL ANXIETY DISORDER: ICD-10-CM

## 2024-01-23 PROCEDURE — 99214 OFFICE O/P EST MOD 30 MIN: CPT | Mod: 95 | Performed by: NURSE PRACTITIONER

## 2024-01-23 RX ORDER — PAROXETINE 20 MG/1
20 TABLET, FILM COATED ORAL EVERY MORNING
Qty: 30 TABLET | Refills: 3 | Status: SHIPPED | OUTPATIENT
Start: 2024-01-23 | End: 2024-04-10

## 2024-01-23 RX ORDER — LAMOTRIGINE 200 MG/1
200 TABLET ORAL DAILY
Qty: 30 TABLET | Refills: 2 | Status: SHIPPED | OUTPATIENT
Start: 2024-01-23 | End: 2024-04-10

## 2024-01-23 RX ORDER — PROPRANOLOL HYDROCHLORIDE 20 MG/1
20 TABLET ORAL 3 TIMES DAILY
Qty: 90 TABLET | Refills: 0 | Status: SHIPPED | OUTPATIENT
Start: 2024-01-23 | End: 2024-01-23

## 2024-01-23 RX ORDER — DIVALPROEX SODIUM 500 MG/1
2000 TABLET, EXTENDED RELEASE ORAL DAILY
Qty: 120 TABLET | Refills: 0 | Status: SHIPPED | OUTPATIENT
Start: 2024-01-23 | End: 2024-01-23

## 2024-01-23 RX ORDER — OLANZAPINE 15 MG/1
15 TABLET ORAL AT BEDTIME
Qty: 90 TABLET | Refills: 1 | Status: SHIPPED | OUTPATIENT
Start: 2024-01-23 | End: 2024-04-10

## 2024-01-23 RX ORDER — ARMODAFINIL 250 MG/1
250 TABLET ORAL EVERY MORNING
Qty: 30 TABLET | Refills: 0 | Status: SHIPPED | OUTPATIENT
Start: 2024-02-10 | End: 2024-04-10

## 2024-01-23 RX ORDER — DISULFIRAM 250 MG/1
250 TABLET ORAL DAILY
Qty: 90 TABLET | Refills: 1 | Status: SHIPPED | OUTPATIENT
Start: 2024-01-23 | End: 2024-04-10

## 2024-01-23 ASSESSMENT — PATIENT HEALTH QUESTIONNAIRE - PHQ9
10. IF YOU CHECKED OFF ANY PROBLEMS, HOW DIFFICULT HAVE THESE PROBLEMS MADE IT FOR YOU TO DO YOUR WORK, TAKE CARE OF THINGS AT HOME, OR GET ALONG WITH OTHER PEOPLE: VERY DIFFICULT
SUM OF ALL RESPONSES TO PHQ QUESTIONS 1-9: 12
SUM OF ALL RESPONSES TO PHQ QUESTIONS 1-9: 12

## 2024-01-23 ASSESSMENT — ANXIETY QUESTIONNAIRES
2. NOT BEING ABLE TO STOP OR CONTROL WORRYING: MORE THAN HALF THE DAYS
GAD7 TOTAL SCORE: 14
1. FEELING NERVOUS, ANXIOUS, OR ON EDGE: NEARLY EVERY DAY
7. FEELING AFRAID AS IF SOMETHING AWFUL MIGHT HAPPEN: NEARLY EVERY DAY
7. FEELING AFRAID AS IF SOMETHING AWFUL MIGHT HAPPEN: NEARLY EVERY DAY
3. WORRYING TOO MUCH ABOUT DIFFERENT THINGS: NEARLY EVERY DAY
5. BEING SO RESTLESS THAT IT IS HARD TO SIT STILL: SEVERAL DAYS
8. IF YOU CHECKED OFF ANY PROBLEMS, HOW DIFFICULT HAVE THESE MADE IT FOR YOU TO DO YOUR WORK, TAKE CARE OF THINGS AT HOME, OR GET ALONG WITH OTHER PEOPLE?: EXTREMELY DIFFICULT
6. BECOMING EASILY ANNOYED OR IRRITABLE: NOT AT ALL
IF YOU CHECKED OFF ANY PROBLEMS ON THIS QUESTIONNAIRE, HOW DIFFICULT HAVE THESE PROBLEMS MADE IT FOR YOU TO DO YOUR WORK, TAKE CARE OF THINGS AT HOME, OR GET ALONG WITH OTHER PEOPLE: EXTREMELY DIFFICULT
GAD7 TOTAL SCORE: 14
4. TROUBLE RELAXING: MORE THAN HALF THE DAYS
GAD7 TOTAL SCORE: 14

## 2024-01-23 ASSESSMENT — PAIN SCALES - GENERAL: PAINLEVEL: NO PAIN (0)

## 2024-01-23 NOTE — PATIENT INSTRUCTIONS
"Patient Education   The Panel Psychiatry Program  What to Expect  Here's what to expect in the Panel Psychiatry Program.   About the program  You'll be meeting with a psychiatric doctor to check your mental health. A psychiatric doctor helps you deal with troubling thoughts and feelings by giving you medicine. They'll make sure you know the plan for your care. You may see them for a long time. When you're feeling better, they may refer you back to seeing your family doctor.   If you have any questions, we'll be glad to talk to you.  About visits  Be open  At your visits, please talk openly about your problems. It may feel hard, but it's the best way for us to help you.  Cancelling visits  If you can't come to your visit, please call us right away at [phone]. If you don't cancel at least 24 hours (1 full day) before your visit, that's \"late cancellation.\"  Not showing up for your visits  Being very late is the same as not showing up. You'll be a \"no show\" if:  You're more than 15 minutes late for a 30-minute (half hour) visit.  You're more than 30 minutes late for a 60-minute (full hour) visit.  If you cancel late or don't show up 2 times within 6 months, we may end your care.  Getting help between visits  If you need help between visits, you can call us Monday to Friday from 8 a.m. to 4:30 p.m. at [phone].  Emergency care  Call 911 or go to the nearest emergency department if your life or someone else's life is in danger.  Call 988 anytime to reach the national Suicide and Crisis hotline.  Medicine refills  To refill your medicine, call your pharmacy. You can also call [facility] [place] at [phone], Monday to Friday, 8 a.m. to 4:30 p.m. It can take 1 to 3 business days to get a refill.   Forms, letters, and tests  You may have papers to fill out, like FMLA, short-term disability, and workability. We can help you with these forms at your visits, but you must have an " appointment. You may need more than 1 visit for this, to be in an intensive therapy program, or both.  Before we can give you medicine for ADHD, we may refer you to get tested for it or confirm it another way.  We may not be able to give you an emotional support animal letter.  We don't do mental health checks ordered by the court.   We don't do mental health testing, but we can refer you to get tested.   Thank you for choosing us for your care.  For informational purposes only. Not to replace the advice of your health care provider. Copyright   2022 [facility]. All rights reserved. [employer] 615589 - 12/22.    Treatment Plan:      1.  Discontinue fluoxetine  2.  Add paroxetine 20 mg daily  3.  Increase propranolol to 20 mg 3 times daily  4.  Continue armodafinil 250 mg every morning  5.  Discontinue clonidine  6.  Continue disulfiram 250 mg daily  7.  Continue divalproex ER 2000 mg daily  8.  Continue lamotrigine 200 mg daily  9.  Continue olanzapine 15 mg at bedtime  10.  Continue olanzapine 5 mg at bedtime as needed for anxiety or insomnia  11.  Ramelteon discontinued  12.  Continue talk therapies    Continue all other medical directions per primary care provider.   Continue all other medications as reviewed per electronic medical record today.   Safety plan reviewed. To the Emergency Department as needed or call after hours crisis line at [phone] or [phone]. [place] Crisis Text Line: Text [place] to 354957  or  Suicide LifeLine Chat: suicidepreventionlifeline.org/chat/  To schedule individual or family therapy, call [facility] at [phone].   Schedule an appointment with me in 6 weeks or sooner as needed.  Call [facility] at [phone] to schedule.  Follow up with primary care provider as planned or for acute medical concerns.  Call the psychiatric nurse line with medication questions or concerns at [phone].  SaveUp may be used to communicate  with your provider, but this is not intended to be used for emergencies.        Crisis Resources   The [facility]Path is an adults only unit located at [facility] in [place] is a short term (generally less than 23 hour stay) designed for crisis intervention and stabilization. Pts have the opportunity to meet quickly with a behavioral health team for evaluation in a calm and peaceful therapuetic environment. To be evaluated for admission pts are triaged throught the [facility] ED.      The following hotlines are for both adults and children. The and are open 24 hours a day, 7 days a week unless noted otherwise.        Crisis Lines      Crisis Text Line  Text 919478  You will be connected with a trained live crisis counselor to provide support.        Gambling Hotline  7.357.557.6525 [hope]        línea de crisis española  940.899.2472        [facility] Helpline  681.558.2200        National Hope Line  9.468.779.0962 [hope]        National Suicide Prevention Lifeline  Free and confidential support  988 or 1.902.860.TALK [8255]  http://suicidepreventionlifeline.org        The Esa Project (LGBTQ Youth Crisis Line)  4.796.493.2796  text START to 795-897        [place] Crisis Line  1.987.099.5419 (Press 1)  or text 309246    [facility] Mental Health Crisis Response  Within [place], call **CRISIS [**637395] to be connected to a mental health professional who can assist you.        [facility] Crisis  [phone]      [facility] Mobile Crisis  [phone]      [facility] Crisis  [phone]      [facility] Mobile Crisis  [phone] (adults)  [phone] (children)      [facility] Mobile Crisis  [phone] (adults)  [phone] (children)      [facility] Mobile Crisis  [phone]      [facility] Mobile Crisis  [phone]    Community Resources      Fast Tracker  Linking people to mental health and substance use disorder resources  fasttrackermn.org         [place] Mental Health Warmline  Peer to peer support  5 pm to 9 am 7 days/week  8.021.930.3156  https://mnwitw.org/gregor        National [place]rottoes on Mental Illness (GILMAR)  820.230.5933 or 1.888.GILMAR.HELPS  https://namimn.org/        [facility]  [facility] residents [address]  [phone]        [facility]  Free mental health counseling  https://walkin.org/  612.870.0565 X2    Mental Health Apps      Calm Harm  https://calmharm.co.uk/      My3  https://my3app.org/      [place] Safety Plan  https://www.mysafetyplan.org/

## 2024-01-23 NOTE — PROGRESS NOTES
"Virtual Visit Details    Type of service:  Video Visit   Video Start Time: 9:12 AM  Video End Time: 9:42 AM    Originating Location (pt. Location): Home    Distant Location (provider location):  Off-site  Platform used for Video Visit: [facility]         Outpatient Psychiatric Progress Note    Name: Laci Hinkle Jr   : 1995                    Primary Care Provider: [facility]   Therapist: Yes    PHQ-9 scores:      2024     9:00 AM 2024     9:00 AM 2024    10:00 AM   PHQ-9 SCORE   PHQ-9 Total Score 12 11 11       CEASAR-7 scores:      10/6/2022     9:18 AM 10/24/2022     8:00 AM 2022     8:09 AM   CEASAR-7 SCORE   Total Score 15 (severe anxiety)  10 (moderate anxiety)   Total Score 15 4 10       Patient Identification:    Patient is a 28 year old year old, single  White Not  or  male  who presents for return visit with me.  Patient is currently employed full time. Patient attended the session alone. Patient prefers to be called: \" Pat\".    Current medications include: amLODIPine-benazepril (LOTREL) 2.5-10 MG capsule, Take 1 capsule by mouth daily  armodafinil (NUVIGIL) 250 MG TABS tablet, Take 1 tablet (250 mg) by mouth every morning Fill no sooner than   Buprenorphine HCl-Naloxone HCl (SUBOXONE) 12-3 MG FILM per film, Place 1 Film under the tongue 2 times daily  cloNIDine (CATAPRES) 0.1 MG tablet, Take 1 tablet (0.1 mg) by mouth daily as needed (anxiety) (Patient not taking: Reported on 2024)  disulfiram (ANTABUSE) 250 MG tablet, Take 1 tablet (250 mg) by mouth daily  divalproex sodium extended-release (DEPAKOTE ER) 500 MG 24 hr tablet, Take 4 tablets (2,000 mg) by mouth daily  FLUoxetine (PROZAC) 20 MG capsule, Take 1 capsule (20 mg) by mouth daily  lamoTRIgine (LAMICTAL) 200 MG tablet, Take 1 tablet (200 mg) by mouth daily  naloxone (NARCAN) 4 MG/0.1ML nasal spray, Spray 1 spray (4 mg) into one nostril alternating nostrils once as needed for " opioid reversal every 2-3 minutes until assistance arrives  OLANZapine (ZYPREXA) 15 MG tablet, Take 1 tablet (15 mg) by mouth At Bedtime  OLANZapine (ZYPREXA) 5 MG tablet, Take 1 tablet (5 mg) by mouth nightly as needed (anxiety, sleep)  propranolol (INDERAL) 10 MG tablet, Take 1 tablet (10 mg) by mouth 3 times daily  ramelteon (ROZEREM) 8 MG tablet, Take 1 tablet (8 mg) by mouth nightly as needed for sleep (Patient not taking: Reported on 1/8/2024)    No current facility-administered medications on file prior to visit.       The [place] Prescription Monitoring Program has been reviewed and there are no concerns about diversionary activity for controlled substances at this time.      I was able to review most recent Primary Care Provider, specialty provider, and therapy visit notes that I have access to.     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  He gets triggered by childhood trauma - dad a sex offender.    Talks to therapists and has been remembering things from the past that he has not remembered before.  His boss is working with him through this.  He got his suboxone injection on Wednesday.  On Thursday he went to see the kids again - he started to get sick and dizzy.  He felt like he was freaking out.  He felt like he had to escapte - went outside.  He is going to start doing EMDR in addition to his current therapist.  Working with recovery clinic.       has a past medical history of ADHD (attention deficit hyperactivity disorder) (03/18/2011), Alcohol use disorder, Bipolar affective disorder (H) (03/18/2011), CEASAR (generalized anxiety disorder) (03/18/2011), GERD (gastroesophageal reflux disease), Hyperlipidemia LDL goal <130, Impaired fasting glucose, Obesity, Opioid use disorder, and Substance abuse (H) (03/18/2011).    Social history updates:    Nathalie lives alone.  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    Substance use  updates:    Using Kratom extract  Tobacco use: Yes Cigarettes  Ready to quit?  No  Nicotine Replacement Therapy tried: none     Vital Signs:   There were no vitals taken for this visit.    Labs:    Most recent laboratory results reviewed and no new labs.     Mental Status Examination:  Appearance: awake, alert, casual dress, and mild distress  Attitude: cooperative  Eye Contact:  adequate  Gait and Station: No dizziness or falls  Psychomotor Behavior:  fidgeting and intact station, gait and muscle tone  Oriented to:  time, person, and place  Attention Span and Concentration:  Fair  Speech:   vtspeech: pressured speech, rambling, and Speaks: English  Mood:  anxious, depressed, and labile  Affect:  mood congruent and intensity is heightened  Associations:  no loose associations  Thought Process:  goal oriented  Thought Content:  no evidence of suicidal ideation or homicidal ideation, no auditory hallucinations present, and no visual hallucinations present  Recent and Remote Memory:  intact Not formally assessed. No amnesia.  Fund of Knowledge: appropriate  Insight:  good  Judgment: fair  Impulse Control:  fair    Suicide Risk Assessment:  Today Laci Hinkle Jr reports no thoughts to harm themself or others. In addition, there are notable risk factors for self-harm, including single status, anxiety, substance abuse, anger/rage, and mood change. However, risk is mitigated by commitment to family, history of seeking help when needed, future oriented, denies suicidal intent or plan, and denies homicidal ideation, intent, or plan. Therefore, based on all available evidence including the factors cited above, Laci Hinkle Jr does not appear to be at imminent risk for self-harm, does not meet criteria for a 72-hr hold, and therefore remains appropriate for ongoing outpatient level of care.  A thorough assessment of risk factors related to suicide and self-harm have been reviewed and are noted above. The patient  convincingly denies suicidality on several occasions. Local community safety resources printed and reviewed for patient to use if needed. There was no deceit detected, and the patient presented in a manner that was believable.     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, Amphetamine type substance    Medical comorbidities include:   Patient Active Problem List    Diagnosis Date Noted    Social anxiety disorder 07/25/2023     Priority: Medium    Opioid use disorder, severe, dependence (H) 07/27/2021     Priority: Medium    Chemical dependency (H) 07/23/2021     Priority: Medium    Hypertension goal BP (blood pressure) < 140/90 10/28/2015     Priority: Medium    CEASAR (generalized anxiety disorder) 09/16/2014     Priority: Medium    MENTAL HEALTH 10/07/2012     Priority: Medium    Obesity (BMI 30-39.9) 10/07/2012     Priority: Medium    GERD (gastroesophageal reflux disease) 04/20/2012     Priority: Medium    Tobacco abuse 03/30/2012     Priority: Medium    Hyperlipidemia LDL goal <130 03/14/2012     Priority: Medium    Impaired fasting glucose 03/14/2012     Priority: Medium    Bipolar affective disorder (H) 03/18/2011     Priority: Medium    ADHD (attention deficit hyperactivity disorder) 03/18/2011     Priority: Medium    Substance abuse (H) 03/18/2011     Priority: Medium       Assessment:    Laci EMERSON Hinkle Jr is reporting increased anxiety and flashback memories of childhood trauma 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  I discontinued fluoxetine and added paroxetine instead for his PTSD  "symptoms.  For his anxiety I increased his dose of propranolol to 2 decrease symptoms of \"fight or flight\".  Armodafinil continues in the morning to help with his depression and fatigue.  Clonidine has been discontinued.  He tells me he has been remaining sober from alcohol and will continue with Antabuse.  For mood stabilization he will continue with Depakote, lamotrigine, and olanzapine.  He stopped taking the ramelteon.  Talk therapies are continuing and he informed me he will be starting EMDR therapy soon..    Medication side effects and alternatives were reviewed. Health promotion activities recommended and reviewed today. All questions addressed. Education and counseling completed regarding risks and benefits of medications and psychotherapy options.    Treatment Plan:      1.  Discontinue fluoxetine  2.  Add paroxetine 20 mg daily  3.  Increase propranolol to 20 mg 3 times daily  4.  Continue armodafinil 250 mg every morning  5.  Discontinue clonidine  6.  Continue disulfiram 250 mg daily  7.  Continue divalproex ER 2000 mg daily  8.  Continue lamotrigine 200 mg daily  9.  Continue olanzapine 15 mg at bedtime  10.  Continue olanzapine 5 mg at bedtime as needed for anxiety or insomnia  11.  Ramelteon discontinued  12.  Continue talk therapies    Continue all other medications as reviewed per electronic medical record today.   Safety plan reviewed. To the Emergency Department as needed or call after hours crisis line at [phone] or [phone]. [place] Crisis Text Line. Text [place] to 770314 or Suicide LifeLine Chat: suicidepreventionlifeline.org/chat/  To schedule individual or family therapy, call [facility] at [phone]  Schedule an appointment with me in 6 weeks or sooner as needed. Call [facility] at [phone] to schedule.  Follow up with primary care provider as planned or for acute medical concerns.  Call the psychiatric nurse line with medication questions or " concerns at [phone]  Coyhart may be used to communicate with your provider, but this is not intended to be used for emergencies.        Crisis Resources   The EmPath is an adults only unit located at [facility] is a short term (generally less than 23 hour stay) designed for crisis intervention and stabilization. Pts have the opportunity to meet quickly with a behavioral health team for evaluation in a calm and peaceful therapuetic environment. To be evaluated for admission pts are triaged throught the [facility] ED.      The following hotlines are for both adults and children. The and are open 24 hours a day, 7 days a week unless noted otherwise.        Crisis Lines      Crisis Text Line  Text 845709  You will be connected with a trained live crisis counselor to provide support.        Gambling Hotline  6.478.168.2349 [hope]        línea de crisis española  327.012.4949        [facility] MailWriter [facility] Helpline  [phone]        National Hope Line  9.303.048.0475 [hope]        National Suicide Prevention Lifeline  Free and confidential support  988 or 1.759.285.TALK [8255]  http://suicidepreventionlifeline.org        The Esa Project (LGBTQ Youth Crisis Line)  1.660.465.1254  text START to 097-807        's Crisis Line  0.261.821.4128 (Press 1)  or text 741572    [facility] Mental Health Crisis Response  Within [place], call **CRISIS [**293996] to be connected to a mental health professional who can assist you.        [facility] Crisis  [phone]      [facility] Mobile Crisis  [phone]      [facility] Crisis  [phone]      [facility] Mobile Crisis  [phone] (adults)  [phone] (children)      [facility] Mobile Crisis  [phone] (adults)  [phone] (children)      [facility] Mobile Crisis  [phone]      [facility] Mobile Crisis  [phone]    Community Resources      Fast Tracker  Linking people to mental health  and substance use disorder resources  ZINK Imagingtrackermn.org        [place] Mental Health Warmline  Peer to peer support  5 pm to 9 am 7 days/week  1.815.205.2666  https://mnwitw.org/gregor        National Davis on Mental Illness (GILMAR)  598.668.7543 or 1.888.GILMAR.HELPS  https://namimn.org/        [facility] for Adult Mental [facility]  [address]  [phone]        [facility]  Free mental health counseling  https://walkin.org/  612.870.0565 X2    Mental Health Apps      Calm Harm  https://LuckyCal.co.uk/      My3  https://my3app.org/      Devin Safety Plan  https://www.mysafetyplan.org/   Administrative Billing:   Time spent with patient includes counseling and coordination of care regarding above diagnoses and treatment plan.    Patient Status:  This is a continuous care patient and medications will be prescribed by the psychiatric provider until further indicated.    Signed:   RAY Lowe-BC   Psychiatry

## 2024-01-23 NOTE — NURSING NOTE
Is the patient currently in the state of [place]? YES    Visit mode:VIDEO    If the visit is dropped, the patient can be reconnected by: VIDEO VISIT: Text to cell phone:   Telephone Information:   Mobile [phone]       Will anyone else be joining the visit? No  (If patient encounters technical issues they should call [phone])    How would you like to obtain your AVS? MyChart    Are changes needed to the allergy or medication list? No    Rooming Documentation: Assigned questionnaire(s) completed .    Reason for visit: RECHECK     JD Renee

## 2024-01-30 ENCOUNTER — TELEPHONE (OUTPATIENT)
Dept: PSYCHIATRY | Facility: CLINIC | Age: 29
End: 2024-01-30
Payer: COMMERCIAL

## 2024-01-30 ENCOUNTER — MYC REFILL (OUTPATIENT)
Dept: PSYCHIATRY | Facility: CLINIC | Age: 29
End: 2024-01-30
Payer: COMMERCIAL

## 2024-01-30 DIAGNOSIS — F31.10 BIPOLAR I DISORDER, MOST RECENT EPISODE (OR CURRENT) MANIC (H): ICD-10-CM

## 2024-01-30 DIAGNOSIS — F99 INSOMNIA DUE TO OTHER MENTAL DISORDER: ICD-10-CM

## 2024-01-30 DIAGNOSIS — F51.05 INSOMNIA DUE TO OTHER MENTAL DISORDER: ICD-10-CM

## 2024-01-30 RX ORDER — OLANZAPINE 5 MG/1
5 TABLET ORAL
Qty: 30 TABLET | Refills: 0 | Status: SHIPPED | OUTPATIENT
Start: 2024-01-30 | End: 2024-04-10

## 2024-01-30 NOTE — TELEPHONE ENCOUNTER
- RN received refill request for Eszopiclone 1 MG (Lunesta). RN reviewed chart noting this medication was discontinued on 10/11/23. Reason for Discontinue: Alternate therapy by Nicole Mayo DO.       - Per current treatment plan addressing Lunesta therapy on 8/24/23. Most recent treatment plan  1/23/24 does not address Lunesta.    Treatment Plan:        1.  Armodafinil 250 mg daily  2.  Consider discontinuing clonidine but take sparingly since you are on propranolol 10 mg 3 times daily for anxiety  3.  Continue disulfiram 250 mg daily  4.  Continue Depakote 2000 mg daily  5.  Continue Lunesta 1 mg at bedtime  6.  Continue lamotrigine 200 mg daily  7.  Continue olanzapine 15 mg at bedtime  8.  Continue talk therapy to learn skills to manage life stressors  9.  Follow-up with sleep medicine to refill armodafinil and Lunesta, if appropriate, in the future      - RN called Pat to inform him of refill request and to check whether this medication is being refilled by sleep medicine. RN reached Voice mail that does not identify patient. RN did not leave Voice mail.    - RN reviewed PMPD noting medication last sold on 9/20/23 #30, prescribed by Dr. Rivera.     - RN sent [employer] message requesting clarification from Pat whether he is still wanting this medication refilled by Wilda Rooney CNP or whether the refill request should be directed to his sleep medicine provider.    - RN routing update to Wilda Rooney CNP for awareness.

## 2024-01-30 NOTE — TELEPHONE ENCOUNTER
Date of Last Office Visit: 1/23/24  Date of Next Office Visit: 3/7/24  No shows since last visit: 0  Cancellations since last visit: 0    Medication requested: OLANZapine (ZYPREXA) 5 MG tablet  Date last ordered: 10/11/23 Qty: 30 Refills: 0     Review of [place] ?: NA    Lapse in medication adherence greater than 5 days?: No, prn  If yes, call patient and gather details: na  Medication refill request verified as identical to current order?: yes  Result of Last DAM, VPA, Li+ Level, CBC, or Carbamazepine Level (at or since last visit): N/A    Last visit treatment plan: 1.  Discontinue fluoxetine  2.  Add paroxetine 20 mg daily  3.  Increase propranolol to 20 mg 3 times daily  4.  Continue armodafinil 250 mg every morning  5.  Discontinue clonidine  6.  Continue disulfiram 250 mg daily  7.  Continue divalproex ER 2000 mg daily  8.  Continue lamotrigine 200 mg daily  9.  Continue olanzapine 15 mg at bedtime  10.  Continue olanzapine 5 mg at bedtime as needed for anxiety or insomnia  11.  Ramelteon discontinued  12.  Continue talk therapies    [x]Medication refilled per  Medication Refill in Ambulatory Care  policy.  []Medication unable to be refilled by RN due to criteria not met as indicated below:    []Eligibility - not seen in the last year   []Supervision - no future appointment   []Compliance - no shows, cancellations or lapse in therapy   []Verification - order discrepancy   []Controlled medication   []Medication not included in policy   []90-day supply request   []Other

## 2024-01-31 ENCOUNTER — OFFICE VISIT (OUTPATIENT)
Dept: FAMILY MEDICINE | Facility: CLINIC | Age: 29
End: 2024-01-31
Payer: COMMERCIAL

## 2024-01-31 VITALS
HEIGHT: 68 IN | WEIGHT: 218 LBS | BODY MASS INDEX: 33.04 KG/M2 | DIASTOLIC BLOOD PRESSURE: 75 MMHG | SYSTOLIC BLOOD PRESSURE: 121 MMHG | HEART RATE: 60 BPM | OXYGEN SATURATION: 100 % | TEMPERATURE: 97.5 F | RESPIRATION RATE: 17 BRPM

## 2024-01-31 DIAGNOSIS — Z00.00 PHYSICAL EXAM, ANNUAL: Primary | ICD-10-CM

## 2024-01-31 DIAGNOSIS — J45.40 MODERATE PERSISTENT ASTHMA WITHOUT COMPLICATION: ICD-10-CM

## 2024-01-31 DIAGNOSIS — Z72.0 TOBACCO ABUSE: ICD-10-CM

## 2024-01-31 DIAGNOSIS — R73.01 IMPAIRED FASTING GLUCOSE: ICD-10-CM

## 2024-01-31 DIAGNOSIS — Z11.3 ROUTINE SCREENING FOR STI (SEXUALLY TRANSMITTED INFECTION): ICD-10-CM

## 2024-01-31 LAB
ERYTHROCYTE [DISTWIDTH] IN BLOOD BY AUTOMATED COUNT: 12.9 % (ref 10–15)
HBA1C MFR BLD: 5 % (ref 0–5.6)
HCT VFR BLD AUTO: 42 % (ref 40–53)
HGB BLD-MCNC: 14.1 G/DL (ref 13.3–17.7)
MCH RBC QN AUTO: 30.5 PG (ref 26.5–33)
MCHC RBC AUTO-ENTMCNC: 33.6 G/DL (ref 31.5–36.5)
MCV RBC AUTO: 91 FL (ref 78–100)
PLATELET # BLD AUTO: 357 10E3/UL (ref 150–450)
RBC # BLD AUTO: 4.63 10E6/UL (ref 4.4–5.9)
T PALLIDUM AB SER QL: NONREACTIVE
WBC # BLD AUTO: 7.8 10E3/UL (ref 4–11)

## 2024-01-31 PROCEDURE — 86780 TREPONEMA PALLIDUM: CPT | Performed by: PHYSICIAN ASSISTANT

## 2024-01-31 PROCEDURE — 91320 SARSCV2 VAC 30MCG TRS-SUC IM: CPT | Performed by: PHYSICIAN ASSISTANT

## 2024-01-31 PROCEDURE — 99213 OFFICE O/P EST LOW 20 MIN: CPT | Mod: 25 | Performed by: PHYSICIAN ASSISTANT

## 2024-01-31 PROCEDURE — 99395 PREV VISIT EST AGE 18-39: CPT | Mod: 25 | Performed by: PHYSICIAN ASSISTANT

## 2024-01-31 PROCEDURE — 86803 HEPATITIS C AB TEST: CPT | Performed by: PHYSICIAN ASSISTANT

## 2024-01-31 PROCEDURE — 90686 IIV4 VACC NO PRSV 0.5 ML IM: CPT | Performed by: PHYSICIAN ASSISTANT

## 2024-01-31 PROCEDURE — 90471 IMMUNIZATION ADMIN: CPT | Performed by: PHYSICIAN ASSISTANT

## 2024-01-31 PROCEDURE — 87389 HIV-1 AG W/HIV-1&-2 AB AG IA: CPT | Performed by: PHYSICIAN ASSISTANT

## 2024-01-31 PROCEDURE — 90480 ADMN SARSCOV2 VAC 1/ONLY CMP: CPT | Performed by: PHYSICIAN ASSISTANT

## 2024-01-31 PROCEDURE — 85027 COMPLETE CBC AUTOMATED: CPT | Performed by: PHYSICIAN ASSISTANT

## 2024-01-31 PROCEDURE — 36415 COLL VENOUS BLD VENIPUNCTURE: CPT | Performed by: PHYSICIAN ASSISTANT

## 2024-01-31 PROCEDURE — 83036 HEMOGLOBIN GLYCOSYLATED A1C: CPT | Performed by: PHYSICIAN ASSISTANT

## 2024-01-31 RX ORDER — FLUTICASONE PROPIONATE AND SALMETEROL XINAFOATE 230; 21 UG/1; UG/1
2 AEROSOL, METERED RESPIRATORY (INHALATION) 2 TIMES DAILY
Qty: 12 G | Refills: 3 | Status: SHIPPED | OUTPATIENT
Start: 2024-01-31 | End: 2024-04-10

## 2024-01-31 NOTE — COMMUNITY RESOURCES LIST (ENGLISH)
01/31/2024   [facility] [employer]  N/A  For questions about this resource list or additional care needs, please contact your primary care clinic or care manager.  Phone: [phone]   Email: N/A   Address: [address] [place] [address]   Hours: N/A        Financial Stability       Rent and mortgage payment assistance  1  [employer] - [place] Distance: 1.1 miles      Phone/Virtual   [address] [place] [address]  Language: English  Hours: Mon - Thu 9:00 AM - 5:00 PM  Fees: Free   Phone: (134) 500-3648 Email: general.questions@[employer] Website: https://www.[employer]     2  [facility] Assistance Distance: 2.17 miles      In-Person   [address] [place] [address]  Language: English  Hours: Mon - Fri 9:00 AM - 5:00 PM  Fees: Free   Phone: (169) 163-4011 Email: info@caitlyn.[employer]set. Website: http://www.[employer]uu [employer]ine/          Food and Nutrition       Food pantry  3  [facility] - Food Shelf Distance: 0.94 miles      Pickup   [address] [place] [id number]  Language: English, Russian  Hours: Mon 12:00 PM - 6:00 PM , Tue 12:00 PM - 3:00 PM , Wed 12:00 PM - 4:00 PM , Fri 12:00 PM - 4:00 PM  Fees: Free   Phone: (359) 108-3699 Website: https://www.[facility] Ada[facility]anjimnNovel SuperTVorg/loren     4  [facility] Distance: 1.15 miles      Pickup   [address] [place] [address]  Language: English  Hours: Mon 10:00 AM - 12:30 PM  Fees: Free   Phone: (260) 560-7379 Email: tbrxoseu4066@cVidya     SNAP application assistance  5  [facility] Distance: 0.94 miles      Phone/Virtual   [address] [place] [address]  Language: English, Russian  Hours: Mon - Fri 9:00 AM - 8:00 PM  Fees: Free   Phone: (316) 553-8273 Website: https://www.[facility] Ada[facility]anjimn.org/loren     6  [facility] Human Services and Public Health Department - Chemical Dependency Services (CDS) Distance: 1.59 miles       In-Person, Phone/Virtual   [address] [place] [address]  Language: English  Hours: Mon - Fri 9:00 AM - 9:00 PM  Fees: Free   Phone: (276) 184-9165 Website: https://www.[place]./residents/human-services/treatment-substance-use-disorders     Soup kitchen or free meals  7  [facility] - Food [place] Public Meals Distance: 1.26 miles      Pickup   [address] [place] [address]  Language: English  Hours: Mon - Sun 8:30 AM - 9:00 AM , Mon - Sun 12:00 PM - 1:00 PM  Fees: Free   Phone: (252) 692-9870 Email: info@[employer] Website: https://[employer]/     8  [facility] Distance: 1.49 miles      In-Person   [address] [place] [address]  Language: English, Slovak  Hours: Mon - Sat 8:00 AM - 9:00 AM , Mon - Sat 11:30 AM - 12:30 PM  Fees: Free   Phone: (502) 382-4852 Email: info@[employer]arChile Website: https://www.[employer].org/locations/opportunity-center/          Transportation       Free or low-cost transportation  9  [facility] Distance: 1.41 miles      In-Person   [address] [place] [address]  Language: English  Hours: Mon - Wed 9:30 AM - 12:00 PM , Mon - Wed 1:00 PM - 2:00 PM Appt. Only  Fees: Free   Phone: (982) 184-8846 Email: info@saintolaf.org Website: http://www.saintolaf.org/     10  [facility] - Bus Passes - Free or low-cost transportation Distance: 2.17 miles      In-Person   [address] [place] [address]  Language: English  Hours: Tue 9:30 AM - 11:30 AM , Thu 9:30 AM - 11:30 AM  Fees: Free   Phone: (539) 913-4318 Email: info@[employer]ar[employer]ile Website: http://www.[employer]arChile/     Transportation to medical appointments  11  [employer] Distance: 7.02 miles      In-Person   [address] [address]  Language: English  Hours: Mon - Thu 6:00 AM - 6:00 PM , Fri 6:00 AM - 5:00 PM  Fees: Insurance, Self Pay   Phone: (911) 121-3719 Email:  office@[employer] Website: https://www.[employer]/     12  [facility] - Non-Emergency Medical Transportation Distance: 7.31 miles      In-Person   [address] [place] [address]  Language: English  Hours: Mon - Fri 8:00 AM - 4:00 PM Appt. Only  Fees: Self Pay   Phone: (390) 278-1421 Website: http://www.Monitor.org/Medical-Services/Emergency-medical-services/Non-emergency-transportation/          Important Numbers & Websites       Emergency Services   911  [facility] Services   311  Poison Control   (813) 587-4802  Suicide Prevention Lifeline   (625) 546-8440 (TALK)  Child Abuse Hotline   (251) 377-6916 (4-A-Child)  Sexual Assault Hotline   (717) 777-1276 (HOPE)  National Runaway Safeline   (631) 793-4916 (RUNAWAY)  All-Options Talkline   (299) 394-3633  Substance Abuse Referral   (468) 574-7008 (HELP)

## 2024-01-31 NOTE — PROGRESS NOTES
"Smoking Preventive Care Visit  [facility]  Lita Quintero PA-C, Family Medicine  Jan 31, 2024    Assessment & Plan     Impaired fasting glucose  -recheck labs today  - Hemoglobin A1c; Future    Tobacco abuse  -not ready to quit  - fluticasone-salmeterol (ADVAIR-HFA) 230-21 MCG/ACT inhaler; Inhale 2 puffs into the lungs 2 times daily    Moderate persistent asthma without complication  -start daily Advair  -Side effects of medication(s) discussed as well as risks/benefits of taking    - fluticasone-salmeterol (ADVAIR-HFA) 230-21 MCG/ACT inhaler; Inhale 2 puffs into the lungs 2 times daily    Physical exam, annual  - CBC with platelets  - Hemoglobin A1c    Routine screening for STI (sexually transmitted infection)  - HIV Antigen Antibody Combo  - Treponema Abs w Reflex to RPR and Titer  - Hepatitis C antibody      BMI  Estimated body mass index is 33.04 kg/m  as calculated from the following:    Height as of this encounter: 1.73 m (5' 8.11\").    Weight as of this encounter: 98.9 kg (218 lb).         Subjective   Pat is a 28 year old, presenting for the following:  Smoking Cessation (Pt is here for smoking cession  and is wanting lab work done.)        1/31/2024     9:26 AM   Additional Questions   Roomed by Radha   Accompanied by self        Health Care Directive  Patient does not have a Health Care Directive or Living Will: Discussed advance care planning with patient; however, patient declined at this time.  -    History of Present Illness     Asthma:  She presents for follow up of asthma.  She has no cough, some wheezing, and some shortness of breath.  She is using a relief medication a few times a month. She does not miss any doses of her controller medication throughout the week. Patient is aware of the following triggers: smoke. The patient has not had a visit to the Emergency Room, Urgent Care or Hospital due to asthma since the last clinic visit.     Mental Health Follow-up:  " Patient presents to follow-up on Depression & Anxiety.Patient's depression since last visit has been:  Better  The patient is not having other symptoms associated with depression.  Patient's anxiety since last visit has been:  Better  The patient is having other symptoms associated with anxiety.  Any significant life events: grief or loss  Patient is feeling anxious or having panic attacks.  Patient has no concerns about alcohol or drug use.    Diabetes:   She presents for follow up of diabetes.    She is not checking blood glucose.        She is concerned about other.    She is not experiencing numbness or burning in feet, excessive thirst, blurry vision, weight changes or redness, sores or blisters on feet.           Hyperlipidemia:  She presents for follow up of hyperlipidemia.   She is not taking medication to lower cholesterol. She is not having myalgia or other side effects to statin medications.    Hypertension: She presents for follow up of hypertension.  She does check blood pressure  regularly outside of the clinic. Outside blood pressures have been over 140/90. She does not follow a low salt diet.     Reason for visit:  Check prediabeties,check asthma,update anxiety(gurvinder)check bupe level,check bp high cholestral    She eats 0-1 servings of fruits and vegetables daily.She consumes 7 sweetened beverage(s) daily.She exercises with enough effort to increase her heart rate 10 to 19 minutes per day.  She exercises with enough effort to increase her heart rate 3 or less days per week.   She is taking medications regularly.         No data to display                  7/19/2023   Nutrition   Three or more servings of calcium each day? Yes   Diet: regular (no restrictions)         7/19/2023   Exercise   Frequency of exercise: 1 day/week         1/31/2024   Social Factors   Worry food won't last until get money to buy more Yes   Food not last or not have enough money for food? Yes   Do you have housing?  Yes   Are  "you worried about losing your housing? Yes   Lack of transportation? Yes   Unable to get utilities (heat,electricity)? No   Want help with housing or utility concern? (!) YES   (!) FOOD SECURITY CONCERN PRESENT (!) TRANSPORTATION CONCERN PRESENT(!) HOUSING CONCERN PRESENT      7/19/2023   Dental   Dentist two times every year? Yes          No data to display                  Today's PHQ-2 Score:       1/31/2024     8:55 AM   PHQ-2 ( 1999 [employer])   Q1: Little interest or pleasure in doing things 0   Q2: Feeling down, depressed or hopeless 0   PHQ-2 Score 0   Q1: Little interest or pleasure in doing things Not at all   Q2: Feeling down, depressed or hopeless Not at all   PHQ-2 Score 0           1/31/2024   Substance Use   If I could quit smoking, I would Completely disagree   I want to quit somking, worry about health affects Completely disagree   Willing to make a plan to quit smoking Completely disagree   Willing to cut down before quitting Somewhat disagree     Social History     Tobacco Use    Smoking status: Every Day     Packs/day: 1     Types: Cigarettes     Passive exposure: Past    Smokeless tobacco: Former     Types: Snuff, Chew    Tobacco comments:     and e-cig, and smoking 1 pack or more day   Vaping Use    Vaping Use: Never used   Substance Use Topics    Alcohol use: Not Currently     Comment: last use 7/20/21    Drug use: Not Currently              No data to display                 Reviewed and updated as needed this visit by Provider                      Review of Systems    Review of Systems  Constitutional, HEENT, cardiovascular, pulmonary, gi and gu systems are negative, except as otherwise noted.     Objective    Exam  BP (!) 143/83 (BP Location: Right arm, Patient Position: Sitting, Cuff Size: Adult Regular)   Pulse 60   Temp 97.5  F (36.4  C) (Temporal)   Resp 17   Ht 1.73 m (5' 8.11\")   Wt 98.9 kg (218 lb)   SpO2 100%   BMI 33.04 kg/m     Estimated body mass index is 33.04 kg/m  as " "calculated from the following:    Height as of this encounter: 1.73 m (5' 8.11\").    Weight as of this encounter: 98.9 kg (218 lb).  Physical Exam  GENERAL: alert and no distress  NECK: no adenopathy, no asymmetry, masses, or scars  RESP: lungs clear to auscultation - no rales, rhonchi or wheezes  CV: regular rate and rhythm, normal S1 S2, no S3 or S4, no murmur, click or rub, no peripheral edema  ABDOMEN: soft, nontender, no hepatosplenomegaly, no masses and bowel sounds normal  MS: no gross musculoskeletal defects noted, no edema      Signed Electronically by: Lita Quintero PA-C    "

## 2024-02-01 LAB
HCV AB SERPL QL IA: NONREACTIVE
HIV 1+2 AB+HIV1 P24 AG SERPL QL IA: NONREACTIVE

## 2024-02-01 NOTE — DISCHARGE INSTRUCTIONS
[facility] Staff: Thank you for caring for Mr Manan Brown. Please abort his current taper off suboxone and revert to previous dosing (2mg/day) until [facility] Monday morning. Please don't hesitate to call Dr Bowers in the ER ([phone]) for any clarification needed.    
I personally spent

## 2024-02-02 ENCOUNTER — MYC MEDICAL ADVICE (OUTPATIENT)
Dept: PSYCHIATRY | Facility: CLINIC | Age: 29
End: 2024-02-02
Payer: COMMERCIAL

## 2024-02-02 NOTE — LETTER
February 8, 2024      Laci Hinkle Jr  [address]   [place] [address]        To Whom It May Concern:    Laci Hinkle Jr  was seen on January 24, 2024.  Please excuse her  Through February 8, 2024 due to illness.        Sincerely,        Wilda Burgess NP

## 2024-02-02 NOTE — TELEPHONE ENCOUNTER
"RN reviewed MyC messages from the patient -   He needs a note for work - he missed work 1/24, 1/25 and 1/26 d/t Paxil side effects that \"were intense\", but have improved.  The wants to continue taking the Paxil as it,\"help A LOT!!! i am greatful for the Paxil. I am optimistic I won't need Lunesta anymore after the week supply and I Propranolol does help now with side effects.\"  His job is willing to take away the points he lost and pay him for the days he was missing if he can get a doctor's note that he states he is on Paxil.   He sent another MyC message indicating -   \"Also that I have Bi-polar disorder and my therapist who diagnosed me with PTSD gave me a note on her end     Issue it to [facility]. 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     Ruthie Briceno RN on 2/2/2024 at 3:34 PM    "

## 2024-02-02 NOTE — LETTER
February 6, 2024      Laci Pisanotiki Brown  [address]   [place] [address]        To Whom It May Concern:    Laci Hinkle Jr  was seen on January 23.  Please excuse him from work January 25, 25, and 26.  .        Sincerely,        Wilda Burgess NP

## 2024-02-05 ENCOUNTER — TELEPHONE (OUTPATIENT)
Dept: PSYCHIATRY | Facility: CLINIC | Age: 29
End: 2024-02-05
Payer: COMMERCIAL

## 2024-02-05 NOTE — TELEPHONE ENCOUNTER
Reason for call:  Other   Patient called regarding (reason for call): call back  Additional comments: Pt reports taking both Prozac and Paxil at same time and reports that's probably why he hasn't felt good since last week. Pt also reports needing a note written from provider to show he has Bipolar disorder to show work why he hasn't been in from last Wednesday to this upcoming Wednesday.     Phone number to reach patient:  Home number on file [phone] (home)    Best Time:  any    Can we leave a detailed message on this number?  YES    Travel screening: Not Applicable

## 2024-02-06 ENCOUNTER — MYC MEDICAL ADVICE (OUTPATIENT)
Dept: PSYCHIATRY | Facility: CLINIC | Age: 29
End: 2024-02-06
Payer: COMMERCIAL

## 2024-02-06 ENCOUNTER — HOSPITAL ENCOUNTER (EMERGENCY)
Facility: CLINIC | Age: 29
Discharge: HOME | End: 2024-02-06
Attending: PSYCHIATRY & NEUROLOGY | Admitting: PSYCHIATRY & NEUROLOGY
Payer: COMMERCIAL

## 2024-02-06 VITALS
OXYGEN SATURATION: 99 % | RESPIRATION RATE: 16 BRPM | SYSTOLIC BLOOD PRESSURE: 134 MMHG | TEMPERATURE: 98.9 F | HEART RATE: 68 BPM | DIASTOLIC BLOOD PRESSURE: 84 MMHG

## 2024-02-06 DIAGNOSIS — F31.11 BIPOLAR AFFECTIVE DISORDER, CURRENTLY MANIC, MILD (H): ICD-10-CM

## 2024-02-06 PROBLEM — F15.20 AMPHETAMINE DEPENDENCE (H): Status: ACTIVE | Noted: 2024-02-06

## 2024-02-06 PROBLEM — F43.10 PTSD (POST-TRAUMATIC STRESS DISORDER): Status: ACTIVE | Noted: 2024-02-06

## 2024-02-06 PROBLEM — F31.13: Status: ACTIVE | Noted: 2024-02-06

## 2024-02-06 PROBLEM — F10.20 ALCOHOL DEPENDENCE (H): Status: ACTIVE | Noted: 2024-02-06

## 2024-02-06 LAB
AMPHETAMINES UR QL SCN: ABNORMAL
BARBITURATES UR QL SCN: ABNORMAL
BENZODIAZ UR QL SCN: ABNORMAL
BZE UR QL SCN: ABNORMAL
CANNABINOIDS UR QL SCN: ABNORMAL
FENTANYL UR QL: ABNORMAL
OPIATES UR QL SCN: ABNORMAL
PCP QUAL URINE (ROCHE): ABNORMAL

## 2024-02-06 PROCEDURE — 250N000013 HC RX MED GY IP 250 OP 250 PS 637: Performed by: PSYCHIATRY & NEUROLOGY

## 2024-02-06 PROCEDURE — 80307 DRUG TEST PRSMV CHEM ANLYZR: CPT | Performed by: PSYCHIATRY & NEUROLOGY

## 2024-02-06 PROCEDURE — 99284 EMERGENCY DEPT VISIT MOD MDM: CPT | Performed by: PSYCHIATRY & NEUROLOGY

## 2024-02-06 RX ORDER — OLANZAPINE 5 MG/1
5 TABLET, ORALLY DISINTEGRATING ORAL ONCE
Status: DISCONTINUED | OUTPATIENT
Start: 2024-02-06 | End: 2024-02-06

## 2024-02-06 RX ORDER — DIVALPROEX SODIUM 250 MG/1
1000 TABLET, EXTENDED RELEASE ORAL ONCE
Status: COMPLETED | OUTPATIENT
Start: 2024-02-06 | End: 2024-02-06

## 2024-02-06 RX ORDER — LAMOTRIGINE 100 MG/1
200 TABLET ORAL ONCE
Status: COMPLETED | OUTPATIENT
Start: 2024-02-06 | End: 2024-02-06

## 2024-02-06 RX ORDER — OLANZAPINE 10 MG/1
10 TABLET, ORALLY DISINTEGRATING ORAL ONCE
Status: COMPLETED | OUTPATIENT
Start: 2024-02-06 | End: 2024-02-06

## 2024-02-06 RX ADMIN — OLANZAPINE 15 MG: 10 TABLET, ORALLY DISINTEGRATING ORAL at 21:08

## 2024-02-06 RX ADMIN — OLANZAPINE 10 MG: 10 TABLET, ORALLY DISINTEGRATING ORAL at 18:12

## 2024-02-06 RX ADMIN — LAMOTRIGINE 200 MG: 100 TABLET ORAL at 21:08

## 2024-02-06 RX ADMIN — DIVALPROEX SODIUM 1000 MG: 250 TABLET, FILM COATED, EXTENDED RELEASE ORAL at 21:08

## 2024-02-06 ASSESSMENT — ACTIVITIES OF DAILY LIVING (ADL)
ADLS_ACUITY_SCORE: 35

## 2024-02-06 NOTE — TELEPHONE ENCOUNTER
RN attempted to call patient back but the call went to a generic . RN left a general message to call the clinic back and ask for Wilda Burgess's nurse or check MyC message RN sent.     Patient appears to have been taking both fluoxetine and Paxil at the same time. Per last visit note from 1/23/24:    Treatment Plan:        1.  Discontinue fluoxetine  2.  Add paroxetine 20 mg daily  3.  Increase propranolol to 20 mg 3 times daily  4.  Continue armodafinil 250 mg every morning  5.  Discontinue clonidine  6.  Continue disulfiram 250 mg daily  7.  Continue divalproex ER 2000 mg daily  8.  Continue lamotrigine 200 mg daily  9.  Continue olanzapine 15 mg at bedtime  10.  Continue olanzapine 5 mg at bedtime as needed for anxiety or insomnia  11.  Ramelteon discontinued  12.  Continue talk therapies     Continue all other medications as reviewed per electronic medical record today.   Safety plan reviewed. To the Emergency Department as needed or call after hours crisis line at [phone] or [phone]. [place] Crisis Text Line. Text [place] to 494102 or Suicide LifeLine Chat: suicidepreventionlifeline.org/chat/  To schedule individual or family therapy, call [facility] at [phone]  Schedule an appointment with me in 6 weeks or sooner as needed.    Patient is also requesting a letter for his missed work days. This request has been sent to provider per chart review in 2/2/24 MyC encounter.     RN sent a MyC message to help assess side effects from taking both medications and concerns for serotonin syndrome. RN requested patient stop taking both medications until we can connect regarding symptoms.     Routing high priority to provider for review and recommendation.     Francheska Hare RN on 2/6/2024 at 10:10 AM

## 2024-02-06 NOTE — TELEPHONE ENCOUNTER
Per chart review, patient did present to Emergency room.    2/6/2024 - present [facility] Emergency Department    Francheska Hare RN on 2/6/2024 at 1:51 PM

## 2024-02-06 NOTE — TELEPHONE ENCOUNTER
RN attempted to call the patient. LVM to return a call to the clinic.     NEISHA HUFF RN on 2/6/2024 at 11:26 AM

## 2024-02-06 NOTE — ED TRIAGE NOTES
Triage Assessment (Adult)       Row Name 02/06/24 1241          Triage Assessment    Airway WDL WDL        Skin Circulation/Temperature WDL    Skin Circulation/Temperature WDL WDL        Cardiac WDL    Cardiac WDL WDL        Peripheral/Neurovascular WDL    Peripheral Neurovascular WDL WDL        Cognitive/Neuro/Behavioral WDL    Cognitive/Neuro/Behavioral WDL WDL
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
## 2024-02-07 NOTE — TELEPHONE ENCOUNTER
Reviewed patient's ip.accesst message. He reports that he needs a work note, excusing him from work from 1/24/24 to present.     He reports that he stopped prozac and paxil at the direction of ED provider.     Routing to Wilda Burgess for review and direction.           NEISHA HUFF RN on 2/7/2024 at 12:54 PM
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
## 2024-02-07 NOTE — DISCHARGE INSTRUCTIONS
Please stop Paxil, Prozac and Nuvigil along with marijuana as all can exacerbate your minda which will impair your ability to function. Stay with family for support as your work allows you to have the rest of the week off. Follow-up established care and services.    Aftercare Plan    Follow up with established providers and supports as scheduled. Continue taking medications as prescribed. Abstain from drugs and alcohol. Utilize your [facility] crisis team as needed. They are available 24/7. Contact information is listed below.     The following appointment(s) and/or referral(s) were made on your behalf. If you need to make changes or cancel please contact the clinic/provider directly.     Medication Management    Date: Friday, 2/16/2024  Time: 9:00 am - 10:00 am  Provider: Jaz Silva DNP, CNP,RN  Location: [place] [address]  Phone: [phone]  Type: Medication Mgmt - Initial (In-Person)    Patient Instructions:  Before your appointment, you must speak with our Intake Department. Our intake team will attempt to contact you. If you do not hear from them within 24 hours, please call them at [phone] and tell them you are a [facility] referral. If you do not speak with our Intake Department and complete the necessary paperwork they send you, we cannot see you at your scheduled appointment time.    Remember: give the referrals 3 sessions prior to calling it quits. Do you trust them? Do you feel understood? Do you think they can help? Check in with yourself after each session    If I am feeling unsafe or I am in a crisis, I will:   Contact my established care providers   Call the National Suicide Prevention Lifeline: 988  Call [facility] at [phone]  Go to the nearest emergency room   Call 411     Warning signs that I or other people might notice when a crisis is developing for me: changes to sleep, appetite or mood, increased anger, agitation  or irritability, feeling depressed or hopeless, spending more time alone or talking less, increased crying, decreased productivity, seeing or hearing things that aren't there, thoughts of not wanting to live anymore or of actually killing myself, thoughts of hurting others    Things I am able to do on my own to cope or help me feel better: watching a favorite tv show or movie, listening to music I enjoy, going outside and breathing fresh air, going for a walk or exercising, taking a shower or bath, a cold or hot beverage, a healthy snack, drawing/coloring/painting, journaling, singing or dancing, deep breathing     I can try practicing square breathing when I begin to feel anxious - inhale through the nose for the count of 4 and the first line on the square. Exhale through the mouth for the count of 4 for the second line of the square. Repeat to complete the square. Repeat the square as many times as needed.    I can also use my five senses to practice mindfulness and grounding. What are five things I can see, four things I can hear, three things I can feel, two things I can smell, and one thing I can taste.     Things that I am able to do with others to cope or help me feel better: sometimes just talking or spending time with someone else, sharing a meal or having coffee, watching a movie or playing a game, going for a walk or exercising    I can also use community resources including mental health hotlines, [facility] crisis teams, or apps.     Things I can use or do for distraction: movies/tv, music, reading, games, drawing/coloring/painting or other art, essential oils, exercise, cleaning/organizing, puzzles, crossword puzzles, word search, Sudoku       I can also download a meditation or relaxation gilbert, like Calm, Headspace, or Insight Timer (all three offer a free version)    Changes I can make to support my mental health and wellness: Attend scheduled mental health therapy and psychiatric appointments. Take my  "medications as prescribed. Maintain a daily schedule/routine. Abstain from all mood altering substances, including drugs, alcohol, or medications not currently prescribed to me. Implement a self-care routine.      People in my life that I can ask for help: friends or family, trusted teachers/staff/colleagues, trusted members of my community or place of [employer], mental health crisis lines, or 911    Your [facility] has a mental health crisis team you can call 24/7: [facility], [phone]    Other things that are important when I m in crisis: to remember that the feelings I am having right now are temporary, and it won't feel like this forever, and that it is okay and important to ask for help    Crisis Lines  Crisis Text Line  Text 777899  You will be connected with a trained live crisis counselor to provide support.    Por aurelia, texto  CRYSTAL a 525037 o texto a 442-AYUDAME en WhatsApp    National Hope Line  1.800.SUICIDE [6292894]      Community Resources  Fast Tracker  Linking people to mental health and substance use disorder resources  fasttrackYPlann.org     [place] Mental Health Warm Line  Peer to peer support  Monday thru Saturday, 12 pm to 10 pm  299.713.0143 or 8.939.012.1571  Text \"Support\" to 51971    National [place] on Mental Illness (GILMAR)  476.471.2499 or 1.888.GILMAR.HELPS      Mental Health Apps  My3  https://mySPEEDELOpp.org/    VirtualHopeBox  https://globalscholar.com.org/apps/virtual-hope-box/      Additional Information  Today you were seen by a licensed mental health professional through Triage and Transition services, [facility] ([facility])  for a crisis assessment in the Emergency Department at [facility].  It is recommended that you follow up with your established providers (psychiatrist, mental health therapist, and/or primary care doctor - as relevant) as soon as possible. Coordinators from P will be calling you in the next 24-48 hours to ensure that you " have the resources you need.  You can also contact [facility] coordinators directly at [phone]. You may have been scheduled for or offered an appointment with a mental health provider. [facility] maintains an extensive network of licensed behavioral health providers to connect patients with the services they need.  We do not charge providers a fee to participate in our referral network.  We match patients with providers based on a patient's specific needs, insurance coverage, and location.  Our first effort will be to refer you to a provider within your care system, and will utilize providers outside your care system as needed.

## 2024-02-21 ENCOUNTER — TELEPHONE (OUTPATIENT)
Dept: BEHAVIORAL HEALTH | Facility: CLINIC | Age: 29
End: 2024-02-21
Payer: COMMERCIAL

## 2024-02-21 NOTE — TELEPHONE ENCOUNTER
----- Message from SCOTT Teran CNP sent at 2/20/2024  4:02 PM CST -----  Regarding: FW: No show  FYI - please see below     SCOTT Teran CNP on 2/20/2024 at 4:03 PM    ----- Message -----  From: iDana Alcala  Sent: 2/20/2024   3:37 PM CST  To: SCOTT Teran CNP  Subject: No show                                          Alvin Cason,    We are just reaching out to let you know that this patient no showed to their appointment yesterday 02/19/24    Total number of sublocade no shows/cancels: 1    Last completed appointment: 01/17/24    Thank you.

## 2024-02-22 NOTE — TELEPHONE ENCOUNTER
RN attempted to call patient. The call went straight to a generic . RN left a general message to call [facility]/Dr. Hastings's office back or review MyC message RN sent.     Last Sublocade: 1/17/2024 300 mg  Last visit note 1/17/2024:  ASSESSMENT/PLAN                                                    1. Opioid use disorder, severe, dependence (H)  Last use of kratom 12/25/23 per pt.  Has been taking buprenorphine 12-24mg/day, most recently 12mg/day, and states cravings are controlled.  Interested in resuming Sublocade.   Sublocade 300mg today.  Pt reportins considering only getting 2 injections, decreasing to 100mg with 2nd injection, also concerned about returning to use.    Reinforced value of long term treatment to reduce risk of return to use.  Pt agreeable to discussing further plans at his return in 1 month.   - Drugs of Abuse Screen Urine (POC CUPS) POCT     2. Alcohol use disorder, severe, dependence (H)  Pt taking disulfiram.  Denies recent use.      3. Stimulant use disorder  Denies recent use.  Prescribed Nuvigil by psychiatry.      4. Severe benzodiazepine use disorder (H)  Denies recent use     5. Tobacco use disorder  Not ready to quit at this time     6. Bipolar I disorder (H)  Follow-up with psychiatry as recommended  Continue individual therapy      Awaiting return call or response to MyC message.   Francheska Hare RN on 2/22/2024 at 11:07 AM

## 2024-04-10 ENCOUNTER — OFFICE VISIT (OUTPATIENT)
Dept: BEHAVIORAL HEALTH | Facility: CLINIC | Age: 29
End: 2024-04-10
Payer: COMMERCIAL

## 2024-04-10 VITALS — HEART RATE: 89 BPM | DIASTOLIC BLOOD PRESSURE: 85 MMHG | SYSTOLIC BLOOD PRESSURE: 137 MMHG

## 2024-04-10 DIAGNOSIS — F25.0 SCHIZOAFFECTIVE DISORDER, BIPOLAR TYPE (H): ICD-10-CM

## 2024-04-10 DIAGNOSIS — F10.20 ALCOHOL USE DISORDER, SEVERE, DEPENDENCE (H): ICD-10-CM

## 2024-04-10 DIAGNOSIS — F11.20 OPIOID USE DISORDER, SEVERE, DEPENDENCE (H): Primary | ICD-10-CM

## 2024-04-10 DIAGNOSIS — F13.20 SEVERE BENZODIAZEPINE USE DISORDER (H): ICD-10-CM

## 2024-04-10 DIAGNOSIS — F15.90 STIMULANT USE DISORDER: ICD-10-CM

## 2024-04-10 DIAGNOSIS — F17.200 TOBACCO USE DISORDER: ICD-10-CM

## 2024-04-10 LAB
AMPHETAMINE QUAL URINE POCT: NEGATIVE
BARBITURATE QUAL URINE POCT: NEGATIVE
BENZODIAZEPINE QUAL URINE POCT: NEGATIVE
BUPRENORPHINE QUAL URINE POCT: ABNORMAL
COCAINE QUAL URINE POCT: NEGATIVE
CREATININE QUAL URINE POCT: ABNORMAL
INTERNAL QC QUAL URINE POCT: ABNORMAL
MDMA QUAL URINE POCT: NEGATIVE
METHADONE QUAL URINE POCT: NEGATIVE
METHAMPHETAMINE QUAL URINE POCT: NEGATIVE
OPIATE QUAL URINE POCT: NEGATIVE
OXYCODONE QUAL URINE POCT: NEGATIVE
PH QUAL URINE POCT: ABNORMAL
PHENCYCLIDINE QUAL URINE POCT: NEGATIVE
POCT KIT EXPIRATION DATE: ABNORMAL
POCT KIT LOT NUMBER: ABNORMAL
SPECIFIC GRAVITY POCT: 1.01
TEMPERATURE URINE POCT: ABNORMAL
THC QUAL URINE POCT: NEGATIVE

## 2024-04-10 PROCEDURE — 99214 OFFICE O/P EST MOD 30 MIN: CPT | Performed by: FAMILY MEDICINE

## 2024-04-10 PROCEDURE — 80305 DRUG TEST PRSMV DIR OPT OBS: CPT | Performed by: FAMILY MEDICINE

## 2024-04-10 RX ORDER — BUPRENORPHINE HYDROCHLORIDE AND NALOXONE HYDROCHLORIDE DIHYDRATE 8; 2 MG/1; MG/1
3 TABLET SUBLINGUAL DAILY
Qty: 45 TABLET | Refills: 0 | Status: SHIPPED | OUTPATIENT
Start: 2024-04-10 | End: 2024-04-24

## 2024-04-10 RX ORDER — OLANZAPINE 10 MG/1
10 TABLET ORAL 2 TIMES DAILY
COMMUNITY

## 2024-04-10 ASSESSMENT — PATIENT HEALTH QUESTIONNAIRE - PHQ9: SUM OF ALL RESPONSES TO PHQ QUESTIONS 1-9: 8

## 2024-04-10 NOTE — PROGRESS NOTES
[facility] Follow Up    ASSESSMENT/PLAN                                                      1. Opioid use disorder, severe, dependence (H)  Reporting control of symptoms.  Last use of kratom prior to hospitalization 2/10/24-4/3/24.    Continue buprenorphine SL 24mg/day.   Pt interested in resuming Sublocade.  This was scheduled for 4/24/24.    Will discontinued scheduled SL buprenorphine after Sublocade resumed.    Continue supportive housing at [facility]  - Drugs of Abuse Screen Urine (POC CUPS) POCT  - buprenorphine-naloxone (SUBOXONE) 8-2 MG SUBL sublingual tablet; Place 3 tablets under the tongue daily  Dispense: 45 tablet; Refill: 0    2. Alcohol use disorder, severe, dependence (H)  No longer taking disulfiram.  Denies recent use or cravings.  Consider acamprosate at follow-up.     3. Stimulant use disorder  Denies recent use of cravings    4. Severe benzodiazepine use disorder (H)  Denies recent use or cravings    5. Tobacco use disorder  Not ready to quit at this time    6. Schizoaffective disorder, bipolar type (H)  Prescribed olanzapine, Depakote, recently started HAN Invega due for #2 4/17/24 through [facility].  Follow-up with [facility] psychiatry as directed.    Continue supportive housing ([facility])         Return in 2 weeks (on 4/24/2024).      Patient counseling completed today:  Discussed mechanism of action, potential risks/benefits/side effects of medications and other recommendations above.    Harm reduction counseling including never use alone, availability of naloxone, risks associated with concurrent use of opioids and benzodiazepines, alcohol, or other sedatives, safer administration as applicable.  Discussed importance of avoiding isolation, building a network of supportive relationships, avoiding people/places/things associated with past use to reduce risk of relapse; including motivational interviewing regarding psychosocial interventions.   SUBJECTIVE                                                           CC/HPI:  Laci Hinkle Jr is a 28 year old male with PMH schizoaffective disorder bipolar type, CEASAR, ADHD, and PSUD including opioids on buprenorphine who presents to the Recovery Clinic for return visit.       Brief History:  Pt first presented to Recovery Clinic on 7/22/21. Pt was referred by staff in [facility] due to pt's initial desire to taper off buprenorphine which he had been taking from nonprescription sources.     - Pt soon left [facility], but continued with Recovery Clinic.  He eventually decided to continue buprenorphine therapy, and then transferred to [facility] with initial injection 8/13/21.  He received a total of 3 Sublocade injections, most recently 100mg on 10/8/21.   Pt then indicated to  staff he wanted to discontinue Sublocade injections.    - Lost to follow up until he returned to  on 12/9/21 requesting to resume SL buprenorphine after brief return to use of alcohol and kratom.  Displays irregularities in buprenorphine use including skipping doses and advancing dose.   8/2022 he agreed to recommendation of resuming Sublocade to improve adherence to treatment.  He received Sublocade x4 8/2022-12/2022.   - again lost to follow-up,  [facility] Dec 2023 after recent Kratom use requesting to resume Suboxone. Sublocade again.  Received Sublocade 300mg 1/17/24.   - ED visit 2/6/24 for minda in setting of taking fluoxetine and paroxetine simultaneously. Both selective serotonin reuptake inhibitor's discontinued.   - Hospitalized 2/10-4/3/24 at [facility] after being found with acute mental status changes and self inflicted tongue lacerations.  Intubated for airway protection.  Developed aspiration pneumonia and ARDS.  After 3 weeks transferred to psychiatry.  Cogentin and Lamictal discontinued, continued Depakote, transferred from po olanzapine to HAN Invega.  #2 Invega due 4/17/24.  Discharged to [facility]  Stay of commitment 3/12-9/12/24.        4/10/24 HPI:  Pt presents to clinic, last in  1/17/24.  Reviewed events of last 2 months with pt.  Updated medication list.  Pt states he has been happy with effects of HAN olanzapine, feels more stable.  Plans to follow-up with [facility] psychiatry as directed at hospital discharge.    Adjusting to his current living situation.  Plans to leave IR after 3 months, wants to return to his apartment and eventually return to work at [employer].   Reports he has been taking SL buprenorphine 24mg/day as prescribed.  He had been using kratom prior to recent hospitalization, denies use in the last week since his discharge.  Denies significant cravings.   Would like to resume Sublocade in the near future.             Substance Use History:  Opioids: He describes his opioid use history starting with heroin as a teenager, last heroin use age 17.  He started methadone treatment age 17, dose up to 100mg, then tapered off to 6mg/day then stopped.  He began using kratom ~2016, eventually used amounts up to 100g daily.    He was first prescribed buprenorphine for OUD in 2019. He took as prescribed through 1/2021, then continued on buprenorphine through nonprescription sources.       IV drug use: No   History of overdose: No  Previous treatments : Yes: multiple, [facility], [facility] 11/2020, previous buprenorphine and methadone  Longest period of sobriety: few months  Medical complications related to substance use: exacerbation of MH diagnoses  Hepatitis C Status  no record of testing  HIV Status no record of testing     Other recent substance use:  Stimulants: used methamphetamine ages [age], stopped for 5 years, then resumed age 24.    Sedatives/hypnotics/anxiolytics: has used in past, denies regular use  Alcohol: h/o drinking 1 L liquor daily  Tobacco: 2-3 ppd and/or ENDS  Cannabis: occasional   Hallucinogens:has used in past, denies recent  Behavioral addictions: none        1/8/2024     9:00 AM 1/17/2024    10:00 AM  1/23/2024     8:55 AM   PHQ   PHQ-9 Total Score 11 11 12   Q9: Thoughts of better off dead/self-harm past 2 weeks Not at all Not at all Not at all       Social History  Housing status:  [facility] - hopes to return to his own apartment 7/2024  Employment status: Unemployed, not seeking work  Relationship status: Single  Children: no children      [place] Prescription Drug Monitoring Program Reviewed:  Yes  04/10/2024 04/10/2024 4 Buprenorphine-Nalox 8-2 Mg Tab 45.00 15 Li Vol [id number] Steven (2089) 0/0 24.00 mg [place]   03/28/2024 03/28/2024 3 Buprenorphine-Nalox 8-2 Mg Tab 21.00 7 Ra Shun [id number] Hen (2149) 0/0 24.00 mg [place]   02/01/2024 02/01/2024 2 Eszopiclone 1 Mg Tablet 7.00 7 Vi The [id number] Wal (4283) 0/0 0.33 LME [place]   01/13/2024 01/11/2024 2 Armodafinil 250 Mg Tablet 30.00 30 Vi The [id number] Wal (9639) 0/0  [place]   01/08/2024 01/08/2024 3 Suboxone 12 Mg-3 Mg Sl Film 16.00 8 Ia Lat [id number] Steven (1359) 0/0 24.00 mg [place]   01/02/2024 01/02/2024 3 Suboxone 12 Mg-3 Mg Sl Film 12.00 6 Cl Max [id number] Steven (1359) 0/0 24.00 mg [place]       Medications:  Current Outpatient Medications   Medication Sig Dispense Refill    buprenorphine-naloxone (SUBOXONE) 8-2 MG SUBL sublingual tablet Place 3 tablets under the tongue daily 45 tablet 0    mometasone-formoterol (DULERA) 100-5 MCG/ACT inhaler Inhale 2 puffs into the lungs 2 times daily      OLANZapine (ZYPREXA) 10 MG tablet Take 10 mg by mouth 2 times daily      paliperidone (INVEGA SUSTENNA) 117 MG/0.75ML PEARL Inject 117 mg into the muscle every 28 days      divalproex sodium extended-release (DEPAKOTE ER) 500 MG 24 hr tablet Take 4 tablets (2,000 mg) by mouth daily 360 tablet 0    naloxone (NARCAN) 4 MG/0.1ML nasal spray Spray 1 spray (4 mg) into one nostril alternating nostrils once as needed for opioid reversal every 2-3 minutes until assistance arrives 0.2 mL 11    propranolol (INDERAL) 20 MG tablet Take 1 tablet (20 mg) by  mouth 3 times daily 270 tablet 0     No current facility-administered medications for this visit.       Allergies   Allergen Reactions    Prednisone Other (See Comments)     psych problems       PMH, PSH, FamHx reviewed      OBJECTIVE                                                      /85   Pulse 89     Physical Exam  Constitutional:       General: She is not in acute distress.     Appearance: She is not diaphoretic.   HENT:      Head: Normocephalic and atraumatic.      Nose: No rhinorrhea.   Eyes:      General: No scleral icterus.     Extraocular Movements: Extraocular movements intact.      Conjunctiva/sclera: Conjunctivae normal.   Pulmonary:      Effort: Pulmonary effort is normal.   Neurological:      Mental Status: She is alert and oriented to person, place, and time.      Motor: Tremor present.      Coordination: Coordination is intact.      Gait: Gait is intact.   Psychiatric:         Attention and Perception: Attention and perception normal.         Mood and Affect: Mood normal. Affect is blunt.         Speech: Speech is rapid and pressured. Speech is not tangential.         Behavior: Behavior is cooperative.         Thought Content: Thought content is not delusional. Thought content does not include suicidal ideation.      Comments: Insight and judgement are fair         Labs:    UDS:    Lab Results   Component Value Date    BUP Screen Positive (A) 04/10/2024    BZO Negative 04/10/2024    BAR Negative 04/10/2024    JOSE Negative 04/10/2024    MAMP Negative 04/10/2024    AMP Negative 04/10/2024    MDMA Negative 04/10/2024    MTD Negative 04/10/2024    YKS038 Negative 04/10/2024    OXY Negative 04/10/2024    PCP Negative 04/10/2024    THC Negative 04/10/2024    TEMP 92 F 04/10/2024    SGPOCT 1.015 04/10/2024     *POC urine drug screen does not screen for Fentanyl      Recent Results (from the past 240 hour(s))   Drugs of Abuse Screen Urine (POC CUPS) POCT    Collection Time: 04/10/24  1:13 PM    Result Value Ref Range    POCT Kit Lot Number [id number]     POCT Kit Expiration Date 10/23/25     Temperature Urine POCT 92 F 90 F, 92 F, 94 F, 96 F, 98 F, 100 F    Specific [place] POCT 1.015 1.005, 1.015, 1.025    pH Qual Urine POCT 9 pH 4 pH, 5 pH, 7 pH, 9 pH    Creatinine Qual Urine POCT 200 mg/dL 20 mg/dL, 50 mg/dL, 100 mg/dL, 200 mg/dL    Internal QC Qual Urine POCT Valid Valid    Amphetamine Qual Urine POCT Negative Negative    Barbiturate Qual Urine POCT Negative Negative    Buprenorphine Qual Urine POCT Screen Positive (A) Negative    Benzodiazepine Qual Urine POCT Negative Negative    Cocaine Qual Urine POCT Negative Negative    Methamphetamine Qual Urine POCT Negative Negative    MDMA Qual Urine POCT Negative Negative    Methadone Qual Urine POCT Negative Negative    Opiate Qual Urine POCT Negative Negative    Oxycodone Qual Urine POCT Negative Negative    Phencyclidine Qual Urine POCT Negative Negative    THC Qual Urine POCT Negative Negative           At least 30 min spent on day of encounter in review of medical record,  review, obtaining histories, discussing recommendations, counseling/coordination of care    Asiya Hastings MD  Addiction Medicine  [facility]  [address] [place] [id number] [phone]

## 2024-04-10 NOTE — NURSING NOTE
[facility]      Rooming information:  Approximate last use of full opioid agonist: aprox October 2023  Taking buprenorphine? Yes: suboxone  How much per day? 24mg  Number of buprenorphine films/tablets remaining currently: 0  Side effects related to buprenorphine (constipation, dry mouth, sedation?) Yes:  difficulty urinating   Narcan currently available: Yes  Other recent substance use:    Kratom  NICOTINE-Yes:   If using nicotine, ready to quit? No    Point of care urine drug screen positive for:  Lab Results   Component Value Date    BUP Screen Positive (A) 04/10/2024    BZO Negative 04/10/2024    BAR Negative 04/10/2024    JOSE Negative 04/10/2024    MAMP Negative 04/10/2024    AMP Negative 04/10/2024    MDMA Negative 04/10/2024    MTD Negative 04/10/2024    [id number] Negative 04/10/2024    OXY Negative 04/10/2024    PCP Negative 04/10/2024    THC Negative 04/10/2024    TEMP 92 F 04/10/2024    SGPOCT 1.015 04/10/2024       *POC urine drug screen does not screen for Fentanyl          4/10/2024     1:00 PM   PHQ Assesment Total Score(s)   PHQ-9 Score 8       If PHQ-9 score of 15 or higher, has Recovery Clinic therapist or provider been notified? Yes    Any current suicidal ideation? No  If yes, has [facility] therapist or provider been notified? N/A    [facility] care provider[facility]     Mental health provider: yes  (follow up on MH referral if needed)    Insurance needs: active    Housing needs: stable; at group home currently    Current legal issues: none    Contact information up to date? Yes     3rd Party Involvement  (please obtain WU if pt would like to include)    Nikhil Tenorio MA  April 10, 2024  1:11 PM

## 2024-04-11 ENCOUNTER — TELEPHONE (OUTPATIENT)
Dept: BEHAVIORAL HEALTH | Facility: CLINIC | Age: 29
End: 2024-04-11
Payer: COMMERCIAL

## 2024-04-11 RX ORDER — ALBUTEROL SULFATE 0.83 MG/ML
2.5 SOLUTION RESPIRATORY (INHALATION)
Status: CANCELLED | OUTPATIENT
Start: 2024-04-11

## 2024-04-11 RX ORDER — LIDOCAINE HYDROCHLORIDE 10 MG/ML
2 INJECTION, SOLUTION EPIDURAL; INFILTRATION; INTRACAUDAL; PERINEURAL ONCE
Status: CANCELLED | OUTPATIENT
Start: 2024-04-11 | End: 2024-04-11

## 2024-04-11 RX ORDER — MEPERIDINE HYDROCHLORIDE 25 MG/ML
25 INJECTION INTRAMUSCULAR; INTRAVENOUS; SUBCUTANEOUS EVERY 30 MIN PRN
Status: CANCELLED | OUTPATIENT
Start: 2024-04-11

## 2024-04-11 RX ORDER — DIPHENHYDRAMINE HYDROCHLORIDE 50 MG/ML
50 INJECTION INTRAMUSCULAR; INTRAVENOUS
Status: CANCELLED
Start: 2024-04-11

## 2024-04-11 RX ORDER — METHYLPREDNISOLONE SODIUM SUCCINATE 125 MG/2ML
125 INJECTION, POWDER, LYOPHILIZED, FOR SOLUTION INTRAMUSCULAR; INTRAVENOUS
Status: CANCELLED
Start: 2024-04-11

## 2024-04-11 RX ORDER — ALBUTEROL SULFATE 90 UG/1
1-2 AEROSOL, METERED RESPIRATORY (INHALATION)
Status: CANCELLED
Start: 2024-04-11

## 2024-04-11 RX ORDER — EPINEPHRINE 1 MG/ML
0.3 INJECTION, SOLUTION, CONCENTRATE INTRAVENOUS EVERY 5 MIN PRN
Status: CANCELLED | OUTPATIENT
Start: 2024-04-11

## 2024-04-11 NOTE — TELEPHONE ENCOUNTER
This patient is scheduled to restart Sublocade on 4/24/24.        - I have reviewed recommendations for comprehensive treatment plan with the patient  - I have reviewed the patient's medications comprehensively  and provided education to the patient on risks associated with concurrent use of benzodiazepines, alcohol, other sedatives with opioids  - I have recommended concomitant psychosocial support  - I have complied with all aspects of REMS program for Sublocade. [facility] where Sublocade will be administered is in compliance with all aspects of REMS program.   - Patient meets DSM-5 criteria for moderate or severe opioid use disorder  - Patient has been prescribed buprenorphine 8-24mg/day for at least one 1 week, demonstrating control of cravings and withdrawal symptoms as well as tolerance of buprenorphine.   - Patient will discontinue sublingual buprenorphine upon initiation of Sublocade  - Patient does not have evidence of tampering or attempts to remove the depot at the injection site.   - Patient does not have severe hepatic impairment.   - Patient does not have adrenal insufficiency.   - Patient does not have a history of long QT syndrome  - Patient does not take any antiarrhythmic medications.  - This patient is prescribed Invega, a long acting injectable antipsychotic medication.  He will be monitored for QT prolongation with periodic ECG.   - Urine Drug Screen on 4/10/24 was positive for buprenorphine  - Patient will not be receiving methadone while on Sublocade  - Patient will not be receiving any other long acting buprenorphine products or long acting naltrexone while on Sublocade

## 2024-04-24 ENCOUNTER — INFUSION THERAPY VISIT (OUTPATIENT)
Dept: INFUSION THERAPY | Facility: CLINIC | Age: 29
End: 2024-04-24
Attending: NURSE PRACTITIONER
Payer: COMMERCIAL

## 2024-04-24 DIAGNOSIS — F11.20 OPIOID USE DISORDER, SEVERE, DEPENDENCE (H): Primary | ICD-10-CM

## 2024-04-24 PROCEDURE — 250N000011 HC RX IP 250 OP 636: Mod: JZ | Performed by: FAMILY MEDICINE

## 2024-04-24 PROCEDURE — 250N000009 HC RX 250: Performed by: FAMILY MEDICINE

## 2024-04-24 PROCEDURE — 96372 THER/PROPH/DIAG INJ SC/IM: CPT | Performed by: FAMILY MEDICINE

## 2024-04-24 RX ORDER — LIDOCAINE HYDROCHLORIDE 10 MG/ML
2 INJECTION, SOLUTION EPIDURAL; INFILTRATION; INTRACAUDAL; PERINEURAL ONCE
Status: CANCELLED | OUTPATIENT
Start: 2024-05-22 | End: 2024-05-22

## 2024-04-24 RX ORDER — MEPERIDINE HYDROCHLORIDE 25 MG/ML
25 INJECTION INTRAMUSCULAR; INTRAVENOUS; SUBCUTANEOUS EVERY 30 MIN PRN
Status: CANCELLED | OUTPATIENT
Start: 2024-05-22

## 2024-04-24 RX ORDER — ALBUTEROL SULFATE 0.83 MG/ML
2.5 SOLUTION RESPIRATORY (INHALATION)
Status: CANCELLED | OUTPATIENT
Start: 2024-05-22

## 2024-04-24 RX ORDER — EPINEPHRINE 1 MG/ML
0.3 INJECTION, SOLUTION, CONCENTRATE INTRAVENOUS EVERY 5 MIN PRN
Status: CANCELLED | OUTPATIENT
Start: 2024-05-22

## 2024-04-24 RX ORDER — DIPHENHYDRAMINE HYDROCHLORIDE 50 MG/ML
50 INJECTION INTRAMUSCULAR; INTRAVENOUS
Status: CANCELLED
Start: 2024-05-22

## 2024-04-24 RX ORDER — METHYLPREDNISOLONE SODIUM SUCCINATE 125 MG/2ML
125 INJECTION, POWDER, LYOPHILIZED, FOR SOLUTION INTRAMUSCULAR; INTRAVENOUS
Status: CANCELLED
Start: 2024-05-22

## 2024-04-24 RX ORDER — ALBUTEROL SULFATE 90 UG/1
1-2 AEROSOL, METERED RESPIRATORY (INHALATION)
Status: CANCELLED
Start: 2024-05-22

## 2024-04-24 RX ORDER — LIDOCAINE HYDROCHLORIDE 10 MG/ML
2 INJECTION, SOLUTION EPIDURAL; INFILTRATION; INTRACAUDAL; PERINEURAL ONCE
Status: COMPLETED | OUTPATIENT
Start: 2024-04-24 | End: 2024-04-24

## 2024-04-24 RX ADMIN — LIDOCAINE HYDROCHLORIDE 2 ML: 10 INJECTION, SOLUTION EPIDURAL; INFILTRATION; INTRACAUDAL; PERINEURAL at 15:51

## 2024-04-24 RX ADMIN — BUPRENORPHINE 300 MG: 300 SOLUTION SUBCUTANEOUS at 15:53

## 2024-04-24 NOTE — PROGRESS NOTES
Infusion Nursing Note:  Laci Hinkle  presents today for Sublocade 300 mg.    Patient seen by provider today: Yes: prior to arrival   present during visit today: Not Applicable.    Note: Lidocaine then Sublocade injected into abd.      Intravenous Access:  No Intravenous access/labs at this visit.    Treatment Conditions:  Denies opioid use.      Post Infusion Assessment:  Patient tolerated injection without incident.       Discharge Plan:   Patient and/or family verbalized understanding of discharge instructions and all questions answered.  Patient discharged in stable condition accompanied by: self.      FRANK GROVE RN

## 2024-05-14 ENCOUNTER — OFFICE VISIT (OUTPATIENT)
Dept: BEHAVIORAL HEALTH | Facility: CLINIC | Age: 29
End: 2024-05-14
Payer: COMMERCIAL

## 2024-05-14 VITALS — SYSTOLIC BLOOD PRESSURE: 157 MMHG | DIASTOLIC BLOOD PRESSURE: 94 MMHG | HEART RATE: 75 BPM

## 2024-05-14 DIAGNOSIS — Z04.6 INVOLUNTARY COMMITMENT: ICD-10-CM

## 2024-05-14 DIAGNOSIS — F10.20 ALCOHOL USE DISORDER, SEVERE, DEPENDENCE (H): ICD-10-CM

## 2024-05-14 DIAGNOSIS — F11.20 OPIOID USE DISORDER, SEVERE, DEPENDENCE (H): Primary | ICD-10-CM

## 2024-05-14 LAB
AMPHETAMINE QUAL URINE POCT: NEGATIVE
BARBITURATE QUAL URINE POCT: NEGATIVE
BENZODIAZEPINE QUAL URINE POCT: NEGATIVE
BUPRENORPHINE QUAL URINE POCT: ABNORMAL
COCAINE QUAL URINE POCT: NEGATIVE
CREATININE QUAL URINE POCT: NEGATIVE
INTERNAL QC QUAL URINE POCT: ABNORMAL
MDMA QUAL URINE POCT: NEGATIVE
METHADONE QUAL URINE POCT: NEGATIVE
METHAMPHETAMINE QUAL URINE POCT: NEGATIVE
OPIATE QUAL URINE POCT: NEGATIVE
OXYCODONE QUAL URINE POCT: NEGATIVE
PH QUAL URINE POCT: ABNORMAL
PHENCYCLIDINE QUAL URINE POCT: NEGATIVE
POCT KIT EXPIRATION DATE: ABNORMAL
POCT KIT LOT NUMBER: ABNORMAL
SPECIFIC GRAVITY POCT: 1
TEMPERATURE URINE POCT: ABNORMAL
THC QUAL URINE POCT: ABNORMAL

## 2024-05-14 PROCEDURE — 80305 DRUG TEST PRSMV DIR OPT OBS: CPT

## 2024-05-14 PROCEDURE — 99214 OFFICE O/P EST MOD 30 MIN: CPT

## 2024-05-14 ASSESSMENT — PATIENT HEALTH QUESTIONNAIRE - PHQ9: SUM OF ALL RESPONSES TO PHQ QUESTIONS 1-9: 1

## 2024-05-14 NOTE — NURSING NOTE
[facility]    States he needs a letter to be able to return to work     Rooming information:    Approximate last use of full opioid agonist: 2023  Taking buprenorphine? Yes: sublocade and suboxone PRN  How much per day? monthly   Number of buprenorphine films/tablets remaining currently: quite a bit  Side effects related to buprenorphine (constipation, dry mouth, sedation?) No   Narcan currently available: Yes  Other recent substance use:    Cannabis   NICOTINE-Yes:   If using nicotine, ready to quit? No    Point of care urine drug screen positive for:  Lab Results   Component Value Date    BUP Screen Positive (A) 05/14/2024    BZO Negative 05/14/2024    BAR Negative 05/14/2024    JOSE Negative 05/14/2024    MAMP Negative 05/14/2024    AMP Negative 05/14/2024    MDMA Negative 05/14/2024    MTD Negative 05/14/2024    MRH190 Negative 05/14/2024    OXY Negative 05/14/2024    PCP Negative 05/14/2024    THC Screen Positive (A) 05/14/2024    TEMP 92 F 05/14/2024    SGPOCT 1.005 05/14/2024       *POC urine drug screen does not screen for Fentanyl          5/14/2024    10:00 AM   PHQ Assesment Total Score(s)   PHQ-9 Score 1       If PHQ-9 score of 15 or higher, has [facility] therapist or provider been notified? N/A    Any current suicidal ideation? No  If yes, has [facility] therapist or provider been notified? N/A    Primary care provider: [facility]     Mental health provider: yes  (follow up on MH referral if needed)    Housing needs: stable at home    Insurance: active     Current legal issues: none    Contact information up to date? Yes     [employer]  (please obtain WU if pt would like to include)    Nikhil Tenorio MA  May 14, 2024  10:16 AM

## 2024-05-14 NOTE — PROGRESS NOTES
[facility] Follow Up    ASSESSMENT/PLAN                                                      1. Opioid use disorder, severe, dependence (H)  -symptoms controlled on sublocade  -continue monthly sublocade injection  -continue programming through [facility] and follow all recommendations  - Drugs of Abuse Screen Urine (POC CUPS) POCT  -confirms access to narcan  -Counseling provided regarding   Opioid warning reviewed.    Risk of overdose following a period of abstinence due to decrease tolerance was discussed including risk of death.     Strongly recommended abstain from alcohol, benzodiazepines, THC, opioids and other drugs of abuse.     Increased risk of return to opioid use after use of these substances discussed.      Increased risk of overdose/death with use of other substances particularly benzodiazepines/alcohol reviewed.  -return to clinic with next scheduled sublocade appointment 5/22/24    2. Alcohol use disorder, severe, dependence (H)  -needs improvement  -denies use and reports cravings are controlled  -continue with [facility] programming and follow all recommendations     3. Involuntary commitment  -Recommended completing recommendations through civil commitment. Pat to contact [facility]  regarding gaining further understanding of commitment requirements.    -informed pt that would not be appropriate to provide a letter against court commitment.         Return in about 8 days (around 5/22/2024).      Patient counseling completed today:  Discussed mechanism of action, potential risks/benefits/side effects of medications and other recommendations above.     Discussed risk of precipitated withdrawal with initiation of buprenorphine in the presence of full opioid agonists.    Reviewed directions for initiation of buprenorphine to reduce risk of precipitated withdrawal and maximize efficacy.    Harm reduction counseling including never use alone, availability of  naloxone, risks associated with concurrent use of opioids and benzodiazepines, alcohol, or other sedatives, safer administration as applicable.  Discussed importance of avoiding isolation, building a network of supportive relationships, avoiding people/places/things associated with past use to reduce risk of relapse; including motivational interviewing regarding psychosocial interventions.   SUBJECTIVE                                                          CC/HPI:  Laci Hinkle Jr is a 28 year old male with PMH schizoaffective disorder bipolar type, CEASAR, ADHD, and PSUD including opioids on buprenorphine who presents to the [facility] for return visit.         First [facility] visit 7/22/21    Most recent [facility] visit 4/24/24.    04/24/24 visit:  Is scheduled to get sublocade injection. Has been taking suboxone 24 mg daily, denies cravings or return to use.   Reports intermittent alcohol use, and would like to resume antabuse. Does not want to continue drinking and realizes that his alcohol use will progress quickly of she does get help now. Last drink was 4/21. Alcohol use was triggered by feeling upset about not being able to work and having to be in treatment again for his mental health and having a new diagnosis of schizophrenia.   Plans to retrun to AA for additional support, needs to find a meeting that she feels comfortable at. Also planning attending [employer] as additional support because her amada is important.    A/P last visit:  1. Opioid use disorder, severe, dependence (H)  Reports symptoms are controlled with suboxone 24 ng daily. Is scheduled to resume sublocade after today's appointment. Has tolerated injections in the past, last injection 1/17/2024. In the past has stopped sublocade injections after third injection, discussed recommended length of treatment 6-12 months, or after implementing lifestyle changes that support long-term abstinence.   Providing SL sublocade to take  "4-8 mg daily as needed  Confirms narcan access.   Encouraged attendance at support groups and resources provided.   Continue programming at [facility].   - Drugs of Abuse Screen Urine (POC CUPS) POCT  - buprenorphine-naloxone (SUBOXONE) 8-2 MG SUBL sublingual tablet; Place 0.5-1 tablets under the tongue daily as needed (craing or withdrawal)  Dispense: 15 tablet; Refill: 0     2. Alcohol use disorder, severe, dependence (H)  Brief return to alcohol use this past week, would like to resume antabuse which has been effective for her in the past. Last drink was 4/21. Is very insightful about her recent alcohol use and the likely outcome if she continues to drink. Provided encouragement about her being proactive and taking care of her needs.   Resume antabuse 250 mg daily.   Encouraged meeting attendance, resources provided.   - disulfiram (ANTABUSE) 250 MG tablet; Take 1 tablet (250 mg) by mouth daily  Dispense: 30 tablet; Refill: 1     3. Stimulant use disorder  Denies cravings or return to use.      4. Severe benzodiazepine use disorder (H)  Denies cravings or return to use.      5. Schizoaffective disorder, bipolar type (H)  Mood has improved, and feeling more positive about her current situation and her recovery. Is taking Olanzapine, Depakote, and HAN invega through psychiatry at [facility].     05/14/24 visit:  Firsst dose of sublocade went well, needed supplemental suboxone for 3 days after dose.  Now no current cravings or withdrawal symptoms, or site reactions.  \"I think I\"m going to stay on it longer this time.  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  Feels the kids bring him claudine and finds the work rewarding.  Pat states \"I talked with my counselor and the treatment center is cool with it\"     Currently on a Stay of commitment through [facility].  Murtaza Metzger " [facility]  [phone]    Pat expressing frustration that he has not been told who he should talk to to return to work.  [facility] is requesting the return to work letter.          Last date of full opioid agonist use:     Brief History:  Pt first presented to [facility] on 7/22/21. Pt was referred by staff in [facility] due to pt's initial desire to taper off buprenorphine which he had been taking from nonprescription sources.     - Pt soon left [facility], but continued with [facility].  He eventually decided to continue buprenorphine therapy, and then transferred to [facility] with initial injection 8/13/21.  He received a total of 3 Sublocade injections, most recently 100mg on 10/8/21.   Pt then indicated to  staff he wanted to discontinue Sublocade injections.    - Lost to follow up until he returned to  on 12/9/21 requesting to resume SL buprenorphine after brief return to use of alcohol and kratom.  Displays irregularities in buprenorphine use including skipping doses and advancing dose.   8/2022 he agreed to recommendation of resuming Sublocade to improve adherence to treatment.  He received Sublocade x4 8/2022-12/2022.   - again lost to follow-up,  RTC Dec 2023 after recent Kratom use requesting to resume Suboxone. Sublocade again.  Received Sublocade 300mg 1/17/24.   - ED visit 2/6/24 for minda in setting of taking fluoxetine and paroxetine simultaneously. Both selective serotonin reuptake inhibitor's discontinued.   - [place] [facility] [place] with acute mental status changes and self inflicted tongue lacerations.  Intubated for airway protection.  Developed aspiration pneumonia and ARDS.  After 3 weeks transferred to psychiatry.  Cogentin and Lamictal discontinued, continued Depakote, transferred from po olanzapine to HAN Invega.  #2 Invega due 4/17/24.  Discharged to [facility]  Stay of commitment 3/12-9/12/24.   -Resumed  sublocade 300 mg 4/24/24      Substance Use History:  Opioids: He describes his opioid use history starting with heroin as a teenager, last heroin use age 17.  He started methadone treatment age 17, dose up to 100mg, then tapered off to 6mg/day then stopped.  He began using kratom ~2016, eventually used amounts up to 100g daily.    He was first prescribed buprenorphine for OUD in 2019. He took as prescribed through 1/2021, then continued on buprenorphine through nonprescription sources.       IV drug use: No   History of overdose: No  Previous treatments : Yes: multiple, [facility], [facility] 11/2020, previous buprenorphine and methadone  Longest period of sobriety: few months  Medical complications related to substance use: exacerbation of MH diagnoses  Hepatitis C Status  no record of testing  HIV Status no record of testing     Other recent substance use:  Stimulants: used methamphetamine ages [age], stopped for 5 years, then resumed age 24.    Sedatives/hypnotics/anxiolytics: has used in past, denies regular use  Alcohol: h/o drinking 1 L liquor daily  Tobacco: 2-3 ppd and/or ENDS  Cannabis: occasional   Hallucinogens:has used in past, denies recent  Behavioral addictions: none      4/10/2024     1:00 PM 4/24/2024     2:00 PM 5/14/2024    10:00 AM   PHQ   PHQ-9 Total Score 8 8 1   Q9: Thoughts of better off dead/self-harm past 2 weeks Not at all Not at all Not at all       PAST PSYCHIATRIC HISTORY:  Diagnoses- bipolar disorder, CEASAR, ADHD, schizoaffective disorder  Suicide Attempts: No   Hospitalizations: Yes, multiple      Social History  Housing status:  [facility] - hopes to return to his own apartment 7/2024  Employment status: Unemployed, not seeking work  Relationship status: Single  Children: no children      Labs discussed with patient?  Yes      [place] Prescription Drug Monitoring Program Reviewed:  Yes;     Medications:  Current Outpatient Medications   Medication Sig Dispense Refill     buprenorphine-naloxone (SUBOXONE) 8-2 MG SUBL sublingual tablet Place 0.5-1 tablets under the tongue daily as needed (craing or withdrawal) 15 tablet 0    disulfiram (ANTABUSE) 250 MG tablet Take 1 tablet (250 mg) by mouth daily 30 tablet 1    divalproex sodium extended-release (DEPAKOTE ER) 500 MG 24 hr tablet Take 4 tablets (2,000 mg) by mouth daily 360 tablet 0    mometasone-formoterol (DULERA) 100-5 MCG/ACT inhaler Inhale 2 puffs into the lungs 2 times daily      naloxone (NARCAN) 4 MG/0.1ML nasal spray Spray 1 spray (4 mg) into one nostril alternating nostrils once as needed for opioid reversal every 2-3 minutes until assistance arrives 0.2 mL 11    OLANZapine (ZYPREXA) 10 MG tablet Take 10 mg by mouth 2 times daily      paliperidone (INVEGA SUSTENNA) 117 MG/0.75ML PEARL Inject 117 mg into the muscle every 28 days      propranolol (INDERAL) 20 MG tablet Take 1 tablet (20 mg) by mouth 3 times daily 270 tablet 0     No current facility-administered medications for this visit.       Allergies   Allergen Reactions    Prednisone Other (See Comments)     psych problems       PMH, PSH, FamHx reviewed      OBJECTIVE                                                      BP (!) 157/94   Pulse 75     Physical Exam  Constitutional:       Appearance: Normal appearance.   HENT:      Head: Normocephalic and atraumatic.      Nose: Nose normal.   Eyes:      Extraocular Movements: Extraocular movements intact.      Conjunctiva/sclera: Conjunctivae normal.      Pupils: Pupils are equal, round, and reactive to light.   Pulmonary:      Effort: Pulmonary effort is normal.   Abdominal:      General: Abdomen is flat.   Musculoskeletal:         General: Normal range of motion.      Cervical back: Normal range of motion.   Skin:     General: Skin is warm.   Neurological:      General: No focal deficit present.      Mental Status: She is alert and oriented to person, place, and time.   Psychiatric:         Behavior: Behavior normal.  Behavior is cooperative.      Comments: Pleasant and cooperative.    Expressing frustration          Labs:    UDS:    Lab Results   Component Value Date    BUP Screen Positive (A) 05/14/2024    BZO Negative 05/14/2024    BAR Negative 05/14/2024    JOSE Negative 05/14/2024    MAMP Negative 05/14/2024    AMP Negative 05/14/2024    MDMA Negative 05/14/2024    MTD Negative 05/14/2024    ORD766 Negative 05/14/2024    OXY Negative 05/14/2024    PCP Negative 05/14/2024    THC Screen Positive (A) 05/14/2024    TEMP 92 F 05/14/2024    SGPOCT 1.005 05/14/2024     *POC urine drug screen does not screen for Fentanyl      Recent Results (from the past 240 hour(s))   Drugs of Abuse Screen Urine (POC CUPS) POCT    Collection Time: 05/14/24 10:18 AM   Result Value Ref Range    POCT Kit Lot Number [id number]     POCT Kit Expiration Date 1/18/26     Temperature Urine POCT 92 F 90 F, 92 F, 94 F, 96 F, 98 F, 100 F    Specific [place] POCT 1.005 1.005, 1.015, 1.025    pH Qual Urine POCT 5 pH 4 pH, 5 pH, 7 pH, 9 pH    Creatinine Qual Urine POCT Negative (A) 20 mg/dL, 50 mg/dL, 100 mg/dL, 200 mg/dL    Internal QC Qual Urine POCT Valid Valid    Amphetamine Qual Urine POCT Negative Negative    Barbiturate Qual Urine POCT Negative Negative    Buprenorphine Qual Urine POCT Screen Positive (A) Negative    Benzodiazepine Qual Urine POCT Negative Negative    Cocaine Qual Urine POCT Negative Negative    Methamphetamine Qual Urine POCT Negative Negative    MDMA Qual Urine POCT Negative Negative    Methadone Qual Urine POCT Negative Negative    Opiate Qual Urine POCT Negative Negative    Oxycodone Qual Urine POCT Negative Negative    Phencyclidine Qual Urine POCT Negative Negative    THC Qual Urine POCT Screen Positive (A) Negative           Joann Miller NP    [place] [id number] [address] [address]  [phone]

## 2024-06-07 ENCOUNTER — VIRTUAL VISIT (OUTPATIENT)
Dept: PSYCHIATRY | Facility: CLINIC | Age: 29
End: 2024-06-07
Payer: COMMERCIAL

## 2024-06-07 ENCOUNTER — OFFICE VISIT (OUTPATIENT)
Dept: BEHAVIORAL HEALTH | Facility: CLINIC | Age: 29
End: 2024-06-07
Payer: COMMERCIAL

## 2024-06-07 ENCOUNTER — INFUSION THERAPY VISIT (OUTPATIENT)
Dept: INFUSION THERAPY | Facility: CLINIC | Age: 29
End: 2024-06-07
Attending: FAMILY MEDICINE
Payer: COMMERCIAL

## 2024-06-07 VITALS — SYSTOLIC BLOOD PRESSURE: 153 MMHG | HEART RATE: 73 BPM | DIASTOLIC BLOOD PRESSURE: 100 MMHG

## 2024-06-07 VITALS — SYSTOLIC BLOOD PRESSURE: 143 MMHG | DIASTOLIC BLOOD PRESSURE: 103 MMHG | HEART RATE: 88 BPM

## 2024-06-07 DIAGNOSIS — F11.20 OPIOID USE DISORDER, SEVERE, DEPENDENCE (H): Primary | ICD-10-CM

## 2024-06-07 DIAGNOSIS — Z72.0 TOBACCO ABUSE DISORDER: ICD-10-CM

## 2024-06-07 DIAGNOSIS — Z53.9 NO SHOW: Primary | ICD-10-CM

## 2024-06-07 DIAGNOSIS — F15.20 AMPHETAMINE DEPENDENCE (H): ICD-10-CM

## 2024-06-07 DIAGNOSIS — I10 HYPERTENSION GOAL BP (BLOOD PRESSURE) < 140/90: ICD-10-CM

## 2024-06-07 DIAGNOSIS — F10.20 ALCOHOL USE DISORDER, SEVERE, DEPENDENCE (H): ICD-10-CM

## 2024-06-07 LAB
AMPHETAMINE QUAL URINE POCT: NEGATIVE
BARBITURATE QUAL URINE POCT: NEGATIVE
BENZODIAZEPINE QUAL URINE POCT: NEGATIVE
BUPRENORPHINE QUAL URINE POCT: ABNORMAL
COCAINE QUAL URINE POCT: NEGATIVE
CREAT UR-MCNC: 229 MG/DL
CREATININE QUAL URINE POCT: ABNORMAL
INTERNAL QC QUAL URINE POCT: ABNORMAL
MDMA QUAL URINE POCT: NEGATIVE
METHADONE QUAL URINE POCT: NEGATIVE
METHAMPHETAMINE QUAL URINE POCT: NEGATIVE
OPIATE QUAL URINE POCT: NEGATIVE
OXYCODONE QUAL URINE POCT: NEGATIVE
PH QUAL URINE POCT: ABNORMAL
PHENCYCLIDINE QUAL URINE POCT: NEGATIVE
POCT KIT EXPIRATION DATE: ABNORMAL
POCT KIT LOT NUMBER: ABNORMAL
SPECIFIC GRAVITY POCT: 1.02
TEMPERATURE URINE POCT: ABNORMAL
THC QUAL URINE POCT: ABNORMAL

## 2024-06-07 PROCEDURE — 99207 PR NO BILLABLE SERVICE THIS VISIT: CPT | Mod: 95 | Performed by: NURSE PRACTITIONER

## 2024-06-07 PROCEDURE — 96372 THER/PROPH/DIAG INJ SC/IM: CPT | Performed by: FAMILY MEDICINE

## 2024-06-07 PROCEDURE — G0480 DRUG TEST DEF 1-7 CLASSES: HCPCS

## 2024-06-07 PROCEDURE — 80305 DRUG TEST PRSMV DIR OPT OBS: CPT

## 2024-06-07 PROCEDURE — 250N000009 HC RX 250: Performed by: FAMILY MEDICINE

## 2024-06-07 PROCEDURE — 99214 OFFICE O/P EST MOD 30 MIN: CPT

## 2024-06-07 PROCEDURE — 250N000011 HC RX IP 250 OP 636: Mod: JZ | Performed by: FAMILY MEDICINE

## 2024-06-07 RX ORDER — ALBUTEROL SULFATE 0.83 MG/ML
2.5 SOLUTION RESPIRATORY (INHALATION)
OUTPATIENT
Start: 2024-06-18

## 2024-06-07 RX ORDER — NICOTINE 21 MG/24HR
1 PATCH, TRANSDERMAL 24 HOURS TRANSDERMAL EVERY 24 HOURS
Qty: 30 PATCH | Refills: 1 | Status: SHIPPED | OUTPATIENT
Start: 2024-06-07

## 2024-06-07 RX ORDER — LIDOCAINE HYDROCHLORIDE 10 MG/ML
2 INJECTION, SOLUTION EPIDURAL; INFILTRATION; INTRACAUDAL; PERINEURAL ONCE
OUTPATIENT
Start: 2024-06-18 | End: 2024-06-18

## 2024-06-07 RX ORDER — DISULFIRAM 250 MG/1
250 TABLET ORAL DAILY
Qty: 30 TABLET | Refills: 1 | Status: SHIPPED | OUTPATIENT
Start: 2024-06-07

## 2024-06-07 RX ORDER — DIPHENHYDRAMINE HYDROCHLORIDE 50 MG/ML
50 INJECTION INTRAMUSCULAR; INTRAVENOUS
Start: 2024-06-18

## 2024-06-07 RX ORDER — MEPERIDINE HYDROCHLORIDE 25 MG/ML
25 INJECTION INTRAMUSCULAR; INTRAVENOUS; SUBCUTANEOUS EVERY 30 MIN PRN
OUTPATIENT
Start: 2024-06-18

## 2024-06-07 RX ORDER — METHYLPREDNISOLONE SODIUM SUCCINATE 125 MG/2ML
125 INJECTION, POWDER, LYOPHILIZED, FOR SOLUTION INTRAMUSCULAR; INTRAVENOUS
Start: 2024-06-18

## 2024-06-07 RX ORDER — EPINEPHRINE 1 MG/ML
0.3 INJECTION, SOLUTION, CONCENTRATE INTRAVENOUS EVERY 5 MIN PRN
OUTPATIENT
Start: 2024-06-18

## 2024-06-07 RX ORDER — LIDOCAINE HYDROCHLORIDE 10 MG/ML
2 INJECTION, SOLUTION EPIDURAL; INFILTRATION; INTRACAUDAL; PERINEURAL ONCE
Status: COMPLETED | OUTPATIENT
Start: 2024-06-07 | End: 2024-06-07

## 2024-06-07 RX ORDER — ALBUTEROL SULFATE 90 UG/1
1-2 AEROSOL, METERED RESPIRATORY (INHALATION)
Start: 2024-06-18

## 2024-06-07 RX ADMIN — BUPRENORPHINE 300 MG: 300 SOLUTION SUBCUTANEOUS at 09:47

## 2024-06-07 RX ADMIN — LIDOCAINE HYDROCHLORIDE 2 ML: 10 INJECTION, SOLUTION EPIDURAL; INFILTRATION; INTRACAUDAL; PERINEURAL at 09:43

## 2024-06-07 ASSESSMENT — PATIENT HEALTH QUESTIONNAIRE - PHQ9: SUM OF ALL RESPONSES TO PHQ QUESTIONS 1-9: 8

## 2024-06-07 ASSESSMENT — PAIN SCALES - GENERAL: PAINLEVEL: NO PAIN (0)

## 2024-06-07 NOTE — PROGRESS NOTES
Infusion Nursing Note:  Lcai Hinkle Jr presents today for sublocade injection.    Patient seen by provider today: Yes: patient to be seen in  after injection   present during visit today: Not Applicable.    Note: Patient states doing well over the last few weeks. Denies experiencing withdrawal symptoms today.      Intravenous Access:  No Intravenous access/labs at this visit.    Treatment Conditions:  Patient denies relapse within the last 7 days. Patient's previous injection site is free of signs and symptoms of infection and tampering.      Post Infusion Assessment:  Patient tolerated injection without incident.  No evidence of extravasations.       Discharge Plan:   Discharge instructions reviewed with: Patient.  Patient and/or family verbalized understanding of discharge instructions and all questions answered.  Patient discharged in stable condition accompanied by: self.  Departure Mode: Ambulatory.      Sheeba Lux RN

## 2024-06-07 NOTE — PROGRESS NOTES
Patient has established care with another psychiatric provider through [facility].

## 2024-06-07 NOTE — PROGRESS NOTES
"[facility] Recovery [facility] Follow Up    ASSESSMENT/PLAN                                                      1. Opioid use disorder, severe, dependence (H)  -controlled on monthly sublocade.    -continue with monthly sublocade injection. Encouraging long term maintenance  -continue with programming through re-entry and follow all recommendations  - Drugs of Abuse Screen Urine (POC CUPS) POCT  - Drug Confirmation Panel Urine with Creat - lab collect; Future  -confirmed access to narcan.  -Counseling provided regarding   Opioid warning reviewed.    Risk of overdose following a period of abstinence due to decrease tolerance was discussed including risk of death.     Strongly recommended abstain from alcohol, benzodiazepines, THC, opioids and other drugs of abuse.     Increased risk of return to opioid use after use of these substances discussed.      Increased risk of overdose/death with use of other substances particularly benzodiazepines/alcohol reviewed.  -1 month follow up      2. Alcohol use disorder, severe, dependence (H)  -needs improvement.  Has tried acamprosate in the past but feels it was not effective in controlling cravings.   - disulfiram (ANTABUSE) 250 MG tablet; Take 1 tablet (250 mg) by mouth daily Fill prescription today.  Start taking Sunday June 9  Dispense: 30 tablet; Refill: 1  -take antabuse on a consistent basis.   -continue with [facility] programming and follow all recommendations      3. Tobacco abuse disorder  -needs improvement.  Not committed to tobacco cessation \"I know I have to be ready\" Currently smoking 2 packs per day.  Receptive Nicoderm patch to reduce use. Reviewed removing patch at night, and possibility of vivid dreams.   - nicotine (NICODERM CQ) 21 MG/24HR 24 hr patch; Place 1 patch onto the skin every 24 hours  Dispense: 30 patch; Refill: 1    4. Hypertension goal BP (blood pressure) < 140/90  -needs improvement.  Took propanolol today (prescribed for anxiety) No " headache, vision changes, chest pain or shortness of breath.   -schedule with PCP  -decrease smoking, consider cessation.    5. Amphetamine dependence (H)  -needs improvement.  Had recent slip with methamphetamines. Not on urine drug screen today.  Discussed risks for contamination of unknown substances and discouraged use. Denies cravings, no current indication of use disorder with reported use.  Will monitor for symptoms of stimulant use disorder.   -continue with re-entry programming         Return in about 1 month (around 7/7/2024).    Patient counseling completed today:  Discussed mechanism of action, potential risks/benefits/side effects of medications and other recommendations above.     Discussed risk of precipitated withdrawal with initiation of buprenorphine in the presence of full opioid agonists.    Reviewed directions for initiation of buprenorphine to reduce risk of precipitated withdrawal and maximize efficacy.    Harm reduction counseling including never use alone, availability of naloxone, risks associated with concurrent use of opioids and benzodiazepines, alcohol, or other sedatives, safer administration as applicable.  Discussed importance of avoiding isolation, building a network of supportive relationships, avoiding people/places/things associated with past use to reduce risk of relapse; including motivational interviewing regarding psychosocial interventions.   SUBJECTIVE                                                          CC/HPI:  Laci Hinkle Jr is a 28 year old male with PMH schizoaffective disorder bipolar type, CEASAR, ADHD, and PSUD including opioids on buprenorphine who presents to the Recovery Clinic for return visit.        First Recovery Clinic visit 7/22/21     Most recent Recovery Clinic visit 5/14/24.      A/P last visit: 5/14/24  1. Opioid use disorder, severe, dependence (H)  -symptoms controlled on sublocade  -continue monthly sublocade injection  -continue programming  "through [facility] and follow all recommendations  - Drugs of Abuse Screen Urine (POC CUPS) POCT  -confirms access to narcan  -Counseling provided regarding   Opioid warning reviewed.    Risk of overdose following a period of abstinence due to decrease tolerance was discussed including risk of death.                Strongly recommended abstain from alcohol, benzodiazepines, THC, opioids and other drugs of abuse.                Increased risk of return to opioid use after use of these substances discussed.                     Increased risk of overdose/death with use of other substances particularly benzodiazepines/alcohol reviewed.  -return to clinic with next scheduled sublocade appointment 5/22/24     2. Alcohol use disorder, severe, dependence (H)  -needs improvement  -denies use and reports cravings are controlled  -continue with re-entry programming and follow all recommendations      3. Involuntary commitment  -Recommended completing recommendations through civil commitment. Pat to contact [facility]  regarding gaining further understanding of commitment requirements.    -informed pt that would not be appropriate to provide a letter against court commitment.         06/07/24 visit:  Had a relapse on alcohol and meth.  Wants to get back on antabuse.  \"Had a Rough couple days\" out with lucas and was offered meth.     Has a new Psychiatrist at [facility] that he feels connected with, working with on nightmares spiritually based and graphic. Appt on Wednesday.   Smoking \"a lot\" believes it is contributing to high blood pressure.   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   Received case plan and plans on following through.  Feels he is making progress with current treatment center   Has same therapist for 8 years. , has been doing virtual  Struggling with nightmares, will  to psychiatry about next week.     Sublocade dose #2 today, no site reactions, no cravings, no " use, no withdrawal         Last date of full opioid agonist use:     Brief History:  Pt first presented to Recovery Clinic on 7/22/21. Pt was referred by staff in [facility] due to pt's initial desire to taper off buprenorphine which he had been taking from nonprescription sources.     - Pt soon left [facility], but continued with Recovery Clinic.  He eventually decided to continue buprenorphine therapy, and then transferred to [facility] with initial injection 8/13/21.  He received a total of 3 Sublocade injections, most recently 100mg on 10/8/21.   Pt then indicated to  staff he wanted to discontinue Sublocade injections.    - Lost to follow up until he returned to  on 12/9/21 requesting to resume SL buprenorphine after brief return to use of alcohol and kratom.  Displays irregularities in buprenorphine use including skipping doses and advancing dose.   8/2022 he agreed to recommendation of resuming Sublocade to improve adherence to treatment.  He received Sublocade x4 8/2022-12/2022.   - again lost to follow-up,  [facility] Dec 2023 after recent Kratom use requesting to resume Suboxone. Sublocade again.  Received Sublocade 300mg 1/17/24.   - ED visit 2/6/24 for minda in setting of taking fluoxetine and paroxetine simultaneously. Both selective serotonin reuptake inhibitor's discontinued.   - Hospitalized 2/10-4/3/24 at [facility] after being found with acute mental status changes and self inflicted tongue lacerations.  Intubated for airway protection.  Developed aspiration pneumonia and ARDS.  After 3 weeks transferred to psychiatry.  Cogentin and Lamictal discontinued, continued Depakote, transferred from po olanzapine to [facility] Invega.  #2 Invega due 4/17/24.  Discharged to [facility]  Stay of commitment 3/12-9/12/24.   -Resumed sublocade 300 mg 4/24/24  #2 300 mg 6/7/24      Substance Use History:  Opioids: He describes his opioid use history starting with heroin as a teenager, last heroin use age 17.  He  started methadone treatment age 17, dose up to 100mg, then tapered off to 6mg/day then stopped.  He began using kratom ~2016, eventually used amounts up to 100g daily.    He was first prescribed buprenorphine for OUD in 2019. He took as prescribed through 1/2021, then continued on buprenorphine through nonprescription sources.       IV drug use: No   History of overdose: No  Previous treatments : Yes: multiple, [facility], [facility] 11/2020, previous buprenorphine and methadone  Longest period of sobriety: few months  Medical complications related to substance use: exacerbation of MH diagnoses  Hepatitis C Status  no record of testing  HIV Status no record of testing     Other recent substance use:  Stimulants: used methamphetamine ages [age], stopped for 5 years, then resumed age 24.    Sedatives/hypnotics/anxiolytics: has used in past, denies regular use  Alcohol: h/o drinking 1 L liquor daily  Tobacco: 2-3 ppd and/or ENDS  Cannabis: occasional   Hallucinogens:has used in past, denies recent  Behavioral addictions: none      4/24/2024     2:00 PM 5/14/2024    10:00 AM 6/7/2024    10:00 AM   PHQ   PHQ-9 Total Score 8 1 8   Q9: Thoughts of better off dead/self-harm past 2 weeks Not at all Not at all Not at all       PAST PSYCHIATRIC HISTORY:  Diagnoses- bipolar disorder, CEASAR, ADHD, schizoaffective disorder  Suicide Attempts: No   Hospitalizations: Yes, multiple      Social History  Housing status:  [facility] - hopes to return to his own apartment 7/2024  Employment status: Unemployed, not seeking work  Relationship status: Single  Children: no children       Labs discussed with patient?  Yes      [place] Prescription Drug Monitoring Program Reviewed:  Yes;     Medications:  Current Outpatient Medications   Medication Sig Dispense Refill    buprenorphine-naloxone (SUBOXONE) 8-2 MG SUBL sublingual tablet Place 0.5-1 tablets under the tongue daily as needed (craing or withdrawal) 15 tablet 0    disulfiram  (ANTABUSE) 250 MG tablet Take 1 tablet (250 mg) by mouth daily 30 tablet 1    divalproex sodium extended-release (DEPAKOTE ER) 500 MG 24 hr tablet Take 4 tablets (2,000 mg) by mouth daily 360 tablet 0    mometasone-formoterol (DULERA) 100-5 MCG/ACT inhaler Inhale 2 puffs into the lungs 2 times daily      naloxone (NARCAN) 4 MG/0.1ML nasal spray Spray 1 spray (4 mg) into one nostril alternating nostrils once as needed for opioid reversal every 2-3 minutes until assistance arrives 0.2 mL 11    OLANZapine (ZYPREXA) 10 MG tablet Take 10 mg by mouth 2 times daily      paliperidone (INVEGA SUSTENNA) 117 MG/0.75ML PEARL Inject 117 mg into the muscle every 28 days      propranolol (INDERAL) 20 MG tablet Take 1 tablet (20 mg) by mouth 3 times daily 270 tablet 0     No current facility-administered medications for this visit.       Allergies   Allergen Reactions    Prednisone Other (See Comments)     psych problems       PMH, PSH, FamHx reviewed      OBJECTIVE                                                      BP (!) 153/100   Pulse 73     Physical Exam  HENT:      Head: Normocephalic.      Right Ear: External ear normal.      Left Ear: External ear normal.      Nose: Nose normal.   Eyes:      Extraocular Movements: Extraocular movements intact.      Conjunctiva/sclera: Conjunctivae normal.      Pupils: Pupils are equal, round, and reactive to light.   Neurological:      Mental Status: She is alert.   Psychiatric:         Attention and Perception: Attention normal.         Mood and Affect: Mood is anxious.         Speech: Speech is rapid and pressured.         Behavior: Behavior is hyperactive. Behavior is cooperative.         Cognition and Memory: Cognition and memory normal.         Labs:    UDS:    Lab Results   Component Value Date    BUP Screen Positive (A) 06/07/2024    BZO Negative 06/07/2024    BAR Negative 06/07/2024    JOSE Negative 06/07/2024    MAMP Negative 06/07/2024    AMP Negative 06/07/2024    MDMA Negative  06/07/2024    MTD Negative 06/07/2024    [id number] Negative 06/07/2024    OXY Negative 06/07/2024    PCP Negative 06/07/2024    THC Screen Positive (A) 06/07/2024    TEMP 92 F 06/07/2024    SGPOCT 1.025 06/07/2024     *POC urine drug screen does not screen for Fentanyl      Recent Results (from the past 240 hour(s))   Drugs of Abuse Screen Urine (POC CUPS) POCT    Collection Time: 06/07/24 10:06 AM   Result Value Ref Range    POCT Kit Lot Number [id number]     POCT Kit Expiration Date 2/28/26     Temperature Urine POCT 92 F 90 F, 92 F, 94 F, 96 F, 98 F, 100 F    Specific [place] POCT 1.025 1.005, 1.015, 1.025    pH Qual Urine POCT 7 pH 4 pH, 5 pH, 7 pH, 9 pH    Creatinine Qual Urine POCT 200 mg/dL 20 mg/dL, 50 mg/dL, 100 mg/dL, 200 mg/dL    Internal QC Qual Urine POCT Valid Valid    Amphetamine Qual Urine POCT Negative Negative    Barbiturate Qual Urine POCT Negative Negative    Buprenorphine Qual Urine POCT Screen Positive (A) Negative    Benzodiazepine Qual Urine POCT Negative Negative    Cocaine Qual Urine POCT Negative Negative    Methamphetamine Qual Urine POCT Negative Negative    MDMA Qual Urine POCT Negative Negative    Methadone Qual Urine POCT Negative Negative    Opiate Qual Urine POCT Negative Negative    Oxycodone Qual Urine POCT Negative Negative    Phencyclidine Qual Urine POCT Negative Negative    THC Qual Urine POCT Screen Positive (A) Negative           Joann Miller NP    [place] [id number] [address] [phone]

## 2024-06-07 NOTE — NURSING NOTE
[facility]      Rooming information:    Discuss Antibuse     Approximate last use of full opioid agonist: 2023  Taking buprenorphine? Yes: sublocade and suboxone PRN  How much per day? monthly  Number of buprenorphine films/tablets remaining currently: 0  Side effects related to buprenorphine (constipation, dry mouth, sedation?) No   Narcan currently available: Yes  Other recent substance use:    Methamphetamine and Alcohol   NICOTINE-Yes:   If using nicotine, ready to quit? No    Point of care urine drug screen positive for:  Lab Results   Component Value Date    BUP Screen Positive (A) 06/07/2024    BZO Negative 06/07/2024    BAR Negative 06/07/2024    JOSE Negative 06/07/2024    MAMP Negative 06/07/2024    AMP Negative 06/07/2024    MDMA Negative 06/07/2024    MTD Negative 06/07/2024    [id number] Negative 06/07/2024    OXY Negative 06/07/2024    PCP Negative 06/07/2024    THC Screen Positive (A) 06/07/2024    TEMP 92 F 06/07/2024    SGPOCT 1.025 06/07/2024       *POC urine drug screen does not screen for Fentanyl      Depression Response    Patient completed the PHQ-9 assessment for depression and scored >9? Yes  Question 9 on the PHQ-9 was positive for suicidality? No  Does patient have current mental health provider? Yes    Is this a virtual visit? No    I personally notified the following: NOÉ           6/7/2024    10:00 AM   PHQ Assesment Total Score(s)   PHQ-9 Score 8         Housing needs: stable    Insurance: active     Current legal issues: none    Contact information up to date? Yes     [employer]  (please obtain WU if pt would like to include)    Nikhil Tenorio MA  June 7, 2024  10:00 AM

## 2024-06-11 LAB
AMPHET UR CFM-MCNC: 750 NG/ML
AMPHET/CREAT UR: 328 NG/MG {CREAT}
BUPRENORPHINE UR CFM-MCNC: 261 NG/ML
BUPRENORPHINE/CREAT UR: 114 NG/MG {CREAT}
METHAMPHET UR CFM-MCNC: 848 NG/ML
METHAMPHET/CREAT UR: 370 NG/MG {CREAT}
NALOXONE UR CFM-MCNC: 7 NG/ML
NALOXONE: 3 NG/MG {CREAT}
NORBUPRENORPHINE UR CFM-MCNC: 415 NG/ML
NORBUPRENORPHINE/CREAT UR: 181 NG/MG {CREAT}

## 2024-09-19 ENCOUNTER — TELEPHONE (OUTPATIENT)
Dept: PHARMACY | Facility: OTHER | Age: 29
End: 2024-09-19
Payer: COMMERCIAL

## 2024-09-19 NOTE — TELEPHONE ENCOUNTER
MT Recruitment: [facility]     Referral outreach attempt #1 on September 19, 2024      Outcome: left voicemail- Call back number [phone]    Herminia Pearson CMA  [facility]

## 2024-09-25 ENCOUNTER — TELEPHONE (OUTPATIENT)
Dept: PHARMACY | Facility: OTHER | Age: 29
End: 2024-09-25
Payer: COMMERCIAL

## 2024-09-25 NOTE — TELEPHONE ENCOUNTER
MTM Recruitment: [facility]     Referral outreach attempt #2 on September 25, 2024      Outcome: patient opted out    Herminia Pearson CMA  MT[facility]

## 2024-11-09 ASSESSMENT — PATIENT HEALTH QUESTIONNAIRE - PHQ9: SUM OF ALL RESPONSES TO PHQ QUESTIONS 1-9: 17

## (undated) RX ORDER — LIDOCAINE HYDROCHLORIDE 10 MG/ML
INJECTION, SOLUTION EPIDURAL; INFILTRATION; INTRACAUDAL; PERINEURAL
Status: DISPENSED
Start: 2022-08-12

## (undated) RX ORDER — LIDOCAINE HYDROCHLORIDE 10 MG/ML
INJECTION, SOLUTION EPIDURAL; INFILTRATION; INTRACAUDAL; PERINEURAL
Status: DISPENSED
Start: 2021-08-13

## (undated) RX ORDER — LIDOCAINE HYDROCHLORIDE 10 MG/ML
INJECTION, SOLUTION EPIDURAL; INFILTRATION; INTRACAUDAL; PERINEURAL
Status: DISPENSED
Start: 2024-04-24

## (undated) RX ORDER — LIDOCAINE HYDROCHLORIDE 10 MG/ML
INJECTION, SOLUTION EPIDURAL; INFILTRATION; INTRACAUDAL; PERINEURAL
Status: DISPENSED
Start: 2022-09-09

## (undated) RX ORDER — LIDOCAINE HYDROCHLORIDE 10 MG/ML
INJECTION, SOLUTION EPIDURAL; INFILTRATION; INTRACAUDAL; PERINEURAL
Status: DISPENSED
Start: 2024-06-07

## (undated) RX ORDER — LIDOCAINE HYDROCHLORIDE 10 MG/ML
INJECTION, SOLUTION EPIDURAL; INFILTRATION; INTRACAUDAL; PERINEURAL
Status: DISPENSED
Start: 2024-01-17

## (undated) RX ORDER — LIDOCAINE HYDROCHLORIDE 10 MG/ML
INJECTION, SOLUTION EPIDURAL; INFILTRATION; INTRACAUDAL; PERINEURAL
Status: DISPENSED
Start: 2021-10-08

## (undated) RX ORDER — LIDOCAINE HYDROCHLORIDE 10 MG/ML
INJECTION, SOLUTION EPIDURAL; INFILTRATION; INTRACAUDAL; PERINEURAL
Status: DISPENSED
Start: 2022-10-07

## (undated) RX ORDER — LIDOCAINE HYDROCHLORIDE 10 MG/ML
INJECTION, SOLUTION EPIDURAL; INFILTRATION; INTRACAUDAL; PERINEURAL
Status: DISPENSED
Start: 2021-09-10